# Patient Record
Sex: MALE | Race: WHITE | NOT HISPANIC OR LATINO | Employment: OTHER | URBAN - METROPOLITAN AREA
[De-identification: names, ages, dates, MRNs, and addresses within clinical notes are randomized per-mention and may not be internally consistent; named-entity substitution may affect disease eponyms.]

---

## 2018-09-10 ENCOUNTER — TRANSCRIBE ORDERS (OUTPATIENT)
Dept: ADMINISTRATIVE | Facility: HOSPITAL | Age: 68
End: 2018-09-10

## 2018-09-10 ENCOUNTER — TRANSCRIBE ORDERS (OUTPATIENT)
Dept: LAB | Facility: HOSPITAL | Age: 68
End: 2018-09-10

## 2018-09-10 ENCOUNTER — APPOINTMENT (OUTPATIENT)
Dept: LAB | Facility: HOSPITAL | Age: 68
End: 2018-09-10
Attending: INTERNAL MEDICINE
Payer: MEDICARE

## 2018-09-10 DIAGNOSIS — R07.89 CHEST PRESSURE: ICD-10-CM

## 2018-09-10 DIAGNOSIS — R20.0 NUMBNESS AND TINGLING: Primary | ICD-10-CM

## 2018-09-10 DIAGNOSIS — R07.89 OTHER CHEST PAIN: Primary | ICD-10-CM

## 2018-09-10 DIAGNOSIS — R20.2 NUMBNESS AND TINGLING: Primary | ICD-10-CM

## 2018-09-10 DIAGNOSIS — R07.89 OTHER CHEST PAIN: ICD-10-CM

## 2018-09-10 LAB
ALBUMIN SERPL BCP-MCNC: 3.8 G/DL (ref 3.5–5)
ALP SERPL-CCNC: 61 U/L (ref 46–116)
ALT SERPL W P-5'-P-CCNC: 25 U/L (ref 12–78)
ANION GAP SERPL CALCULATED.3IONS-SCNC: 6 MMOL/L (ref 4–13)
AST SERPL W P-5'-P-CCNC: 20 U/L (ref 5–45)
BILIRUB SERPL-MCNC: 1 MG/DL (ref 0.2–1)
BUN SERPL-MCNC: 18 MG/DL (ref 5–25)
CALCIUM SERPL-MCNC: 8.8 MG/DL (ref 8.3–10.1)
CHLORIDE SERPL-SCNC: 102 MMOL/L (ref 100–108)
CHOLEST SERPL-MCNC: 127 MG/DL (ref 50–200)
CO2 SERPL-SCNC: 28 MMOL/L (ref 21–32)
CREAT SERPL-MCNC: 1 MG/DL (ref 0.6–1.3)
GFR SERPL CREATININE-BSD FRML MDRD: 76 ML/MIN/1.73SQ M
GLUCOSE P FAST SERPL-MCNC: 86 MG/DL (ref 65–99)
HDLC SERPL-MCNC: 42 MG/DL (ref 40–60)
LDLC SERPL CALC-MCNC: 69 MG/DL (ref 0–100)
NONHDLC SERPL-MCNC: 85 MG/DL
POTASSIUM SERPL-SCNC: 4.4 MMOL/L (ref 3.5–5.3)
PROT SERPL-MCNC: 7.7 G/DL (ref 6.4–8.2)
SODIUM SERPL-SCNC: 136 MMOL/L (ref 136–145)
TRIGL SERPL-MCNC: 80 MG/DL
TROPONIN I SERPL-MCNC: <0.02 NG/ML

## 2018-09-10 PROCEDURE — 80061 LIPID PANEL: CPT

## 2018-09-10 PROCEDURE — 36415 COLL VENOUS BLD VENIPUNCTURE: CPT

## 2018-09-10 PROCEDURE — 84484 ASSAY OF TROPONIN QUANT: CPT

## 2018-09-10 PROCEDURE — 80053 COMPREHEN METABOLIC PANEL: CPT

## 2018-09-14 ENCOUNTER — TRANSCRIBE ORDERS (OUTPATIENT)
Dept: ADMINISTRATIVE | Facility: HOSPITAL | Age: 68
End: 2018-09-14

## 2018-09-14 ENCOUNTER — HOSPITAL ENCOUNTER (OUTPATIENT)
Dept: NON INVASIVE DIAGNOSTICS | Facility: CLINIC | Age: 68
Discharge: HOME | End: 2018-09-14
Payer: MEDICARE

## 2018-09-14 DIAGNOSIS — R20.2 NUMBNESS AND TINGLING: ICD-10-CM

## 2018-09-14 DIAGNOSIS — I25.10 CVD (CARDIOVASCULAR DISEASE): Primary | ICD-10-CM

## 2018-09-14 DIAGNOSIS — R07.89 CHEST PRESSURE: ICD-10-CM

## 2018-09-14 DIAGNOSIS — R20.0 NUMBNESS AND TINGLING: ICD-10-CM

## 2018-09-14 LAB
CHEST PAIN STATEMENT: NORMAL
MAX DIASTOLIC BP: 90 MMHG
MAX HEART RATE: 150 BPM
MAX PREDICTED HEART RATE: 150 BPM
MAX. SYSTOLIC BP: 190 MMHG
PROTOCOL NAME: NORMAL
REASON FOR TERMINATION: NORMAL
TARGET HR FORMULA: NORMAL
TEST INDICATION: NORMAL
TIME IN EXERCISE PHASE: NORMAL

## 2018-09-14 PROCEDURE — 93016 CV STRESS TEST SUPVJ ONLY: CPT | Performed by: INTERNAL MEDICINE

## 2018-09-14 PROCEDURE — 93018 CV STRESS TEST I&R ONLY: CPT | Performed by: INTERNAL MEDICINE

## 2018-09-14 PROCEDURE — 93017 CV STRESS TEST TRACING ONLY: CPT

## 2018-09-28 ENCOUNTER — HOSPITAL ENCOUNTER (OUTPATIENT)
Dept: NON INVASIVE DIAGNOSTICS | Facility: CLINIC | Age: 68
Discharge: HOME | End: 2018-09-28
Payer: MEDICARE

## 2018-09-28 DIAGNOSIS — I25.10 CVD (CARDIOVASCULAR DISEASE): ICD-10-CM

## 2018-09-28 PROCEDURE — 93018 CV STRESS TEST I&R ONLY: CPT | Performed by: INTERNAL MEDICINE

## 2018-09-28 PROCEDURE — 93016 CV STRESS TEST SUPVJ ONLY: CPT | Performed by: INTERNAL MEDICINE

## 2018-09-28 PROCEDURE — A9502 TC99M TETROFOSMIN: HCPCS

## 2018-09-28 PROCEDURE — 78452 HT MUSCLE IMAGE SPECT MULT: CPT

## 2018-09-28 PROCEDURE — 78452 HT MUSCLE IMAGE SPECT MULT: CPT | Performed by: INTERNAL MEDICINE

## 2018-09-28 PROCEDURE — 93017 CV STRESS TEST TRACING ONLY: CPT

## 2018-09-28 RX ADMIN — REGADENOSON 0.4 MG: 0.08 INJECTION, SOLUTION INTRAVENOUS at 14:12

## 2018-10-01 LAB
CHEST PAIN STATEMENT: NORMAL
MAX DIASTOLIC BP: 80 MMHG
MAX HEART RATE: 109 BPM
MAX PREDICTED HEART RATE: 150 BPM
MAX. SYSTOLIC BP: 144 MMHG
PROTOCOL NAME: NORMAL
REASON FOR TERMINATION: NORMAL
TARGET HR FORMULA: NORMAL
TEST INDICATION: NORMAL
TIME IN EXERCISE PHASE: NORMAL

## 2019-05-29 ENCOUNTER — HOSPITAL ENCOUNTER (OUTPATIENT)
Dept: RADIOLOGY | Facility: HOSPITAL | Age: 69
Discharge: HOME | End: 2019-05-29
Attending: ORTHOPAEDIC SURGERY
Payer: MEDICARE

## 2019-05-29 ENCOUNTER — HOSPITAL ENCOUNTER (OUTPATIENT)
Dept: RADIOLOGY | Facility: HOSPITAL | Age: 69
Discharge: HOME | End: 2019-05-29
Attending: ORTHOPAEDIC SURGERY

## 2019-05-29 VITALS
BODY MASS INDEX: 36.7 KG/M2 | HEIGHT: 74 IN | HEART RATE: 78 BPM | SYSTOLIC BLOOD PRESSURE: 159 MMHG | WEIGHT: 286 LBS | DIASTOLIC BLOOD PRESSURE: 92 MMHG

## 2019-05-29 DIAGNOSIS — M25.551 PAIN IN RIGHT HIP: ICD-10-CM

## 2019-05-29 DIAGNOSIS — M17.0 PRIMARY OSTEOARTHRITIS OF BOTH KNEES: ICD-10-CM

## 2019-05-29 DIAGNOSIS — M25.561 PAIN IN BOTH KNEES, UNSPECIFIED CHRONICITY: ICD-10-CM

## 2019-05-29 DIAGNOSIS — M25.562 PAIN IN BOTH KNEES, UNSPECIFIED CHRONICITY: ICD-10-CM

## 2019-05-29 DIAGNOSIS — M16.11 ARTHRITIS OF RIGHT HIP: Primary | ICD-10-CM

## 2019-05-29 DIAGNOSIS — M92.523 OSGOOD-SCHLATTER'S DISEASE OF BOTH KNEES: ICD-10-CM

## 2019-05-29 PROCEDURE — 73562 X-RAY EXAM OF KNEE 3: CPT

## 2019-05-29 PROCEDURE — 99214 OFFICE O/P EST MOD 30 MIN: CPT | Performed by: ORTHOPAEDIC SURGERY

## 2019-05-29 PROCEDURE — 73502 X-RAY EXAM HIP UNI 2-3 VIEWS: CPT

## 2019-05-29 RX ORDER — SODIUM CHLORIDE, SODIUM LACTATE, POTASSIUM CHLORIDE, CALCIUM CHLORIDE 600; 310; 30; 20 MG/100ML; MG/100ML; MG/100ML; MG/100ML
125 INJECTION, SOLUTION INTRAVENOUS CONTINUOUS
Status: CANCELLED | OUTPATIENT
Start: 2019-05-29

## 2019-05-29 RX ORDER — LEVOTHYROXINE SODIUM 0.15 MG/1
TABLET ORAL
COMMUNITY
Start: 2014-10-05 | End: 2021-02-27

## 2019-05-29 RX ORDER — ATORVASTATIN CALCIUM 10 MG/1
TABLET, FILM COATED ORAL
COMMUNITY
End: 2021-02-27

## 2019-05-29 RX ORDER — METRONIDAZOLE 7.5 MG/G
GEL TOPICAL
COMMUNITY
End: 2019-08-14 | Stop reason: HOSPADM

## 2019-05-29 RX ORDER — ASCORBIC ACID 500 MG
500 TABLET ORAL 2 TIMES DAILY
Qty: 60 TABLET | Refills: 0 | Status: SHIPPED | OUTPATIENT
Start: 2019-05-29 | End: 2019-08-14 | Stop reason: HOSPADM

## 2019-05-29 RX ORDER — FOLIC ACID 1 MG/1
1 TABLET ORAL DAILY
Qty: 30 TABLET | Refills: 0 | Status: SHIPPED | OUTPATIENT
Start: 2019-05-29 | End: 2019-08-14 | Stop reason: HOSPADM

## 2019-05-29 RX ORDER — GABAPENTIN 300 MG/1
300 CAPSULE ORAL ONCE
Status: CANCELLED | OUTPATIENT
Start: 2019-05-29 | End: 2019-05-29

## 2019-05-29 RX ORDER — FERROUS SULFATE TAB EC 324 MG (65 MG FE EQUIVALENT) 324 (65 FE) MG
324 TABLET DELAYED RESPONSE ORAL
Qty: 60 TABLET | Refills: 0 | Status: SHIPPED | OUTPATIENT
Start: 2019-05-29 | End: 2019-08-14 | Stop reason: HOSPADM

## 2019-05-29 RX ORDER — ACETAMINOPHEN 325 MG/1
975 TABLET ORAL ONCE
Status: CANCELLED | OUTPATIENT
Start: 2019-05-29 | End: 2019-05-29

## 2019-05-29 RX ORDER — CHLORHEXIDINE GLUCONATE 0.12 MG/ML
15 RINSE ORAL ONCE
Status: CANCELLED | OUTPATIENT
Start: 2019-05-29 | End: 2019-05-29

## 2019-07-01 ENCOUNTER — EVALUATION (OUTPATIENT)
Dept: PHYSICAL THERAPY | Facility: CLINIC | Age: 69
End: 2019-07-01
Payer: MEDICARE

## 2019-07-01 DIAGNOSIS — M16.11 ARTHRITIS OF RIGHT HIP: ICD-10-CM

## 2019-07-01 DIAGNOSIS — M25.551 PAIN IN RIGHT HIP: Primary | ICD-10-CM

## 2019-07-01 PROCEDURE — 97162 PT EVAL MOD COMPLEX 30 MIN: CPT | Performed by: PHYSICAL THERAPIST

## 2019-07-01 PROCEDURE — 97110 THERAPEUTIC EXERCISES: CPT | Performed by: PHYSICAL THERAPIST

## 2019-07-01 NOTE — PROGRESS NOTES
PT Evaluation     Today's date: 2019  Patient name: Scar Michaud  : 1950  MRN: [id number]  Referring provider: Hussein Simon MD  Dx:   Encounter Diagnosis     ICD-10-CM    1  Pain in right hip M25 551 Ambulatory referral to Physical Therapy   2  Arthritis of right hip M16 11 Ambulatory referral to Physical Therapy                  Assessment  Assessment details:  Discussed DOS and patients questions were answered to patients satisfaction  Mobility/ROM, Strength and Balance/Gait (including Timed Up & Go) per above  Virtual Home Assessment was reviewed with patient  Home Preparation Checklist was reviewed with patient including identification of care partner and encouragement of single level set-up  (He did not have home assessment with him but he will drop it off and reviwed the elements of this verballY)  Post-operative pain management expectations discussed to the patients satisfaction  Post-operative gait training for level ground, stairs, and car transfers was performed  Patient demonstrated competence with immediate post-operative home exercise program        Impairments: abnormal gait, abnormal or restricted ROM, activity intolerance, impaired balance, impaired physical strength, lacks appropriate home exercise program and pain with function  Understanding of Dx/Px/POC: good   Prognosis: good    Goals  ST-6 weeks  1  Patient to be independent with HEP  2   Decrease pain at least 2 subjective levels  LT-12 weeks  1    Patient to voice comfort with self management of condition  2   75% or > decreased pain  3   75% or > decreased functional deficits  4   Normalize AROM of all deficit planes  5   Normalize strength  7   Patient to voice understanding of activities/positions to avoid    9   Normalize Gait    Plan  Patient would benefit from: skilled PT  Referral necessary: No  Planned modality interventions: cryotherapy  Planned therapy interventions: IADL retraining, joint mobilization, manual therapy, motor coordination training, neuromuscular re-education, patient education, postural training, self care, strengthening, stretching, therapeutic activities, therapeutic exercise, home exercise program, flexibility, ADL training, balance and body mechanics training  Frequency: 2x week  Duration in weeks: 6  Treatment plan discussed with: patient        Subjective Evaluation    History of Present Illness  Onset date: 4 years ago  Mechanism of injury: Chief Complaint: R Hip pain    History:  Pt will be undergoing R PEGGY    In 2016 he fell into a split position  He has had some R groin pain since that time  He lives in a ranch home with steps into the basement where the laundry is  There are 2 small steps into his family room but no steps entering through the front door  There are assistance bars in his bathroom  He has a higher seat toilet  He has no AD but will be looking into this  He lives with his wife who will be able to drive post surgery   He is retired  Surgery is scheduled for 19    PMH: B knee OA, treated with injections with relief  Back pain (L4/5 Discectomy);       Aggravating factors: 2-3 blocks, gofling  Relieving factors: rest    Functional Deficits: golfing, walking    Patient Goals: return to golf    Quality of life: good    Pain  At best pain ratin  At worst pain ratin  Location: R Groin          Objective     Active Range of Motion     Right Hip   Flexion: 95 degrees   Abduction: 45 degrees   External rotation (90/90): 35 degrees   Internal rotation (90/90): 0 degrees     Passive Range of Motion     Right Hip   Abduction: 50 degrees   Internal rotation (90/90): 5 degrees     Strength/Myotome Testing     Right Hip   Planes of Motion   Flexion: 4  Abduction: 4  External rotation: 4  Internal rotation: 4    General Comments:      Hip Comments   Gait: No AD, Mild decreased R stance time  Stairs: Reciprocal, handrail, mild loss of eccentric control with lowering  TU 27 sec no AD  DL Squat 90 with UE support  SLS: 8 sec  Knee MMT       Flowsheet Rows      Most Recent Value   PT/OT G-Codes   Current Score  57   Projected Score  63             Precautions:  Total hip precautions    Manual              PROM Abduction                                                                     Exercise Diary              HEP 10'                         Bike             GT with RW             SLR (DENNIS)             Heel slides             LAQ             Q/Glut Sets             Stand Hip 3 way                                                                                                                                                                Modalities              CP             TENS prn

## 2019-07-08 ENCOUNTER — APPOINTMENT (OUTPATIENT)
Dept: LAB | Facility: HOSPITAL | Age: 69
End: 2019-07-08
Payer: MEDICARE

## 2019-07-08 ENCOUNTER — HOSPITAL ENCOUNTER (OUTPATIENT)
Dept: RADIOLOGY | Facility: HOSPITAL | Age: 69
Discharge: HOME | End: 2019-07-08
Payer: MEDICARE

## 2019-07-08 DIAGNOSIS — M25.551 PAIN IN RIGHT HIP: ICD-10-CM

## 2019-07-08 DIAGNOSIS — M16.11 ARTHRITIS OF RIGHT HIP: ICD-10-CM

## 2019-07-08 LAB
ABO GROUP BLD: NORMAL
ALBUMIN SERPL BCP-MCNC: 3.9 G/DL (ref 3.5–5)
ALP SERPL-CCNC: 61 U/L (ref 46–116)
ALT SERPL W P-5'-P-CCNC: 27 U/L (ref 12–78)
ANION GAP SERPL CALCULATED.3IONS-SCNC: 8 MMOL/L (ref 4–13)
APTT PPP: 33 SECONDS (ref 23–37)
AST SERPL W P-5'-P-CCNC: 25 U/L (ref 5–45)
ATRIAL RATE: 71 BPM
BASOPHILS # BLD AUTO: 0.09 THOUSANDS/ΜL (ref 0–0.1)
BASOPHILS NFR BLD AUTO: 2 % (ref 0–1)
BILIRUB SERPL-MCNC: 1.08 MG/DL (ref 0.2–1)
BLD GP AB SCN SERPL QL: NEGATIVE
BUN SERPL-MCNC: 19 MG/DL (ref 5–25)
CALCIUM SERPL-MCNC: 8.9 MG/DL (ref 8.3–10.1)
CHLORIDE SERPL-SCNC: 106 MMOL/L (ref 100–108)
CO2 SERPL-SCNC: 26 MMOL/L (ref 21–32)
CREAT SERPL-MCNC: 0.97 MG/DL (ref 0.6–1.3)
CRP SERPL QL: <3 MG/L
EOSINOPHIL # BLD AUTO: 0.15 THOUSAND/ΜL (ref 0–0.61)
EOSINOPHIL NFR BLD AUTO: 3 % (ref 0–6)
ERYTHROCYTE [DISTWIDTH] IN BLOOD BY AUTOMATED COUNT: 13.2 % (ref 11.6–15.1)
EST. AVERAGE GLUCOSE BLD GHB EST-MCNC: 120 MG/DL
FERRITIN SERPL-MCNC: 166 NG/ML (ref 8–388)
GFR SERPL CREATININE-BSD FRML MDRD: 79 ML/MIN/1.73SQ M
GLUCOSE P FAST SERPL-MCNC: 90 MG/DL (ref 65–99)
HBA1C MFR BLD: 5.8 % (ref 4.2–6.3)
HCT VFR BLD AUTO: 50.5 % (ref 36.5–49.3)
HGB BLD-MCNC: 16.8 G/DL (ref 12–17)
IMM GRANULOCYTES # BLD AUTO: 0.01 THOUSAND/UL (ref 0–0.2)
IMM GRANULOCYTES NFR BLD AUTO: 0 % (ref 0–2)
INR PPP: 1.06 (ref 0.84–1.19)
IRON SATN MFR SERPL: 38 %
IRON SERPL-MCNC: 126 UG/DL (ref 65–175)
LYMPHOCYTES # BLD AUTO: 1.16 THOUSANDS/ΜL (ref 0.6–4.47)
LYMPHOCYTES NFR BLD AUTO: 24 % (ref 14–44)
MCH RBC QN AUTO: 30.7 PG (ref 26.8–34.3)
MCHC RBC AUTO-ENTMCNC: 33.3 G/DL (ref 31.4–37.4)
MCV RBC AUTO: 92 FL (ref 82–98)
MONOCYTES # BLD AUTO: 0.44 THOUSAND/ΜL (ref 0.17–1.22)
MONOCYTES NFR BLD AUTO: 9 % (ref 4–12)
NEUTROPHILS # BLD AUTO: 3.02 THOUSANDS/ΜL (ref 1.85–7.62)
NEUTS SEG NFR BLD AUTO: 62 % (ref 43–75)
NRBC BLD AUTO-RTO: 0 /100 WBCS
P AXIS: 16 DEGREES
PLATELET # BLD AUTO: 195 THOUSANDS/UL (ref 149–390)
PMV BLD AUTO: 11.8 FL (ref 8.9–12.7)
POTASSIUM SERPL-SCNC: 4.3 MMOL/L (ref 3.5–5.3)
PR INTERVAL: 160 MS
PROT SERPL-MCNC: 7.8 G/DL (ref 6.4–8.2)
PROTHROMBIN TIME: 13.4 SECONDS (ref 11.6–14.5)
QRS AXIS: 22 DEGREES
QRSD INTERVAL: 94 MS
QT INTERVAL: 398 MS
QTC INTERVAL: 432 MS
RBC # BLD AUTO: 5.48 MILLION/UL (ref 3.88–5.62)
RH BLD: POSITIVE
SODIUM SERPL-SCNC: 140 MMOL/L (ref 136–145)
SPECIMEN EXPIRATION DATE: NORMAL
T WAVE AXIS: 25 DEGREES
TIBC SERPL-MCNC: 335 UG/DL (ref 250–450)
VENTRICULAR RATE: 71 BPM
WBC # BLD AUTO: 4.87 THOUSAND/UL (ref 4.31–10.16)

## 2019-07-08 PROCEDURE — 80053 COMPREHEN METABOLIC PANEL: CPT

## 2019-07-08 PROCEDURE — 93005 ELECTROCARDIOGRAM TRACING: CPT

## 2019-07-08 PROCEDURE — 85025 COMPLETE CBC W/AUTO DIFF WBC: CPT

## 2019-07-08 PROCEDURE — 83036 HEMOGLOBIN GLYCOSYLATED A1C: CPT

## 2019-07-08 PROCEDURE — 86901 BLOOD TYPING SEROLOGIC RH(D): CPT

## 2019-07-08 PROCEDURE — 86140 C-REACTIVE PROTEIN: CPT

## 2019-07-08 PROCEDURE — 83540 ASSAY OF IRON: CPT

## 2019-07-08 PROCEDURE — 86850 RBC ANTIBODY SCREEN: CPT

## 2019-07-08 PROCEDURE — 36415 COLL VENOUS BLD VENIPUNCTURE: CPT

## 2019-07-08 PROCEDURE — 82728 ASSAY OF FERRITIN: CPT

## 2019-07-08 PROCEDURE — 85730 THROMBOPLASTIN TIME PARTIAL: CPT

## 2019-07-08 PROCEDURE — 71046 X-RAY EXAM CHEST 2 VIEWS: CPT

## 2019-07-08 PROCEDURE — 93010 ELECTROCARDIOGRAM REPORT: CPT | Performed by: INTERNAL MEDICINE

## 2019-07-08 PROCEDURE — 83550 IRON BINDING TEST: CPT

## 2019-07-08 PROCEDURE — 85610 PROTHROMBIN TIME: CPT

## 2019-07-08 PROCEDURE — 86900 BLOOD TYPING SEROLOGIC ABO: CPT

## 2019-07-09 NOTE — PRE-PROCEDURE INSTRUCTIONS
Pre-Surgery Instructions:   Medication Instructions    atorvastatin (LIPITOR) 10 mg tablet epic    ferrous sulfate 324 (65 Fe) mg Patient was instructed by Physician and understands   folic acid (FOLVITE) 1 mg tablet Patient was instructed by Physician and understands   levothyroxine 150 mcg tablet epic    metroNIDAZOLE (METROGEL) 0 75 % gel Instructed patient per Anesthesia Guidelines   Naproxen Sod-diphenhydrAMINE 220-25 MG TABS Instructed patient per Anesthesia Guidelines   Omega-3 Fatty Acids (FISH OIL PO) Instructed patient per Anesthesia Guidelines   TURMERIC PO Instructed patient per Anesthesia Guidelines  Education Index     Med Instructions Troubleshoot   Herbal Med Class     Stop taking this herbal medications at least one week prior to surgery/procedure  Statin Med Class     Continue to take this medication on your normal schedule  If this is an oral medication and you take it in the morning, then you may take this medicine with a sip of water  Thyroxine Med Class     Continue to take this medication on your normal schedule  If this is an oral medication and you take it in the morning, then you may take this medicine with a sip of water  Pre procedure instructions reviewed with wife present, verbalizes understanding  All questions answered  All ortho vitamins started, confirmed Lovenox for post procedure

## 2019-07-15 ENCOUNTER — APPOINTMENT (OUTPATIENT)
Dept: PHYSICAL THERAPY | Facility: CLINIC | Age: 69
End: 2019-07-15
Payer: MEDICARE

## 2019-07-16 ENCOUNTER — TELEPHONE (OUTPATIENT)
Dept: OBGYN CLINIC | Facility: HOSPITAL | Age: 69
End: 2019-07-16

## 2019-07-16 NOTE — TELEPHONE ENCOUNTER
Caller: Dr Javan Baez Office  Call Back number: [phone]  Provider: Dr Vega Never    Dr Josefina Velez called and like to ask for Clearance Form be faxed to their office at [phone]      Please advise, thank you

## 2019-07-18 ENCOUNTER — TELEPHONE (OUTPATIENT)
Dept: OBGYN CLINIC | Facility: HOSPITAL | Age: 69
End: 2019-07-18

## 2019-07-18 NOTE — TELEPHONE ENCOUNTER
Preoperative Elective Admission Assessment       Living Situation: Pt reports living at home with his wifeTang  Home Layout: Ranch style home with 4 steps to enter                      Steps: 4 to enter    First Floor Setup: Yes    Post-op Caregiver:WifeTang    Post-op Transport: Tang Gordon  Outpatient Physical Therapy Site: [facility]     DME: Pt denies cane, RW or BSC     Patient's Current Level of Function: Independent with ADLS and Ambulation    Medication Management: Pt reports self managing medications removing them daily from the bottle                     Preferred Pharmacy: [facility]                    Blood Management Vitamins: Pt reports taking folic acid, vitamin C and Iron daily                  Post-op anticoagulant: Pt reports it is at pharmacy, and is aware he will  before surgery  DC Plan:Home with outpatient                    Barriers to DC identified preoperatively:     BMI:36 72    [employer]: Enrolled on 7/18                    RAPT:10                    ACE/ARB Form:GFR>60                    HOOS/KOOS:52 965    Patient Education: Pt educated on post op pain, early mobilization (POD0), indication/use of incentive spirometer (10x/hr while awake), and indication for/use of foot/leg pumps  pt encouraged to call me with questions, concerns or issues

## 2019-07-22 ENCOUNTER — OFFICE VISIT (OUTPATIENT)
Dept: PODIATRY | Facility: CLINIC | Age: 69
End: 2019-07-22

## 2019-07-22 VITALS
HEART RATE: 71 BPM | DIASTOLIC BLOOD PRESSURE: 87 MMHG | BODY MASS INDEX: 37.19 KG/M2 | WEIGHT: 280.6 LBS | SYSTOLIC BLOOD PRESSURE: 139 MMHG | HEIGHT: 73 IN

## 2019-07-22 DIAGNOSIS — L60.3 NAIL DYSTROPHY: Primary | ICD-10-CM

## 2019-07-22 PROCEDURE — NCFTCARE PR NON-COVERED FOOT CARE: Performed by: PODIATRIST

## 2019-07-22 NOTE — PROGRESS NOTES
Patient presents for palliative nail care  Patient is having right hip replacement in early August   He desires to have his toenails trimmed properly before this procedure  He is concerned that he could have an ingrown toenail without this palliative care  No ingrown nail pain noted today  Pedal pulses are within normal limits  All toenails are elongated  Treatment consisted of nail trimming

## 2019-07-30 RX ORDER — ACETAMINOPHEN 325 MG/1
650 TABLET ORAL EVERY 6 HOURS PRN
COMMUNITY
End: 2020-10-12

## 2019-07-30 RX ORDER — DOCUSATE SODIUM 100 MG/1
100 CAPSULE, LIQUID FILLED ORAL 2 TIMES DAILY
Status: ON HOLD | COMMUNITY
End: 2019-08-14 | Stop reason: SDUPTHER

## 2019-08-04 ENCOUNTER — ANESTHESIA EVENT (OUTPATIENT)
Dept: PERIOP | Facility: HOSPITAL | Age: 69
DRG: 470 | End: 2019-08-04
Payer: MEDICARE

## 2019-08-05 ENCOUNTER — ANESTHESIA (OUTPATIENT)
Dept: PERIOP | Facility: HOSPITAL | Age: 69
DRG: 470 | End: 2019-08-05
Payer: MEDICARE

## 2019-08-05 ENCOUNTER — HOSPITAL ENCOUNTER (INPATIENT)
Facility: HOSPITAL | Age: 69
LOS: 3 days | DRG: 470 | End: 2019-08-08
Attending: ORTHOPAEDIC SURGERY | Admitting: ORTHOPAEDIC SURGERY
Payer: MEDICARE

## 2019-08-05 DIAGNOSIS — Z96.641 STATUS POST TOTAL REPLACEMENT OF RIGHT HIP: ICD-10-CM

## 2019-08-05 DIAGNOSIS — R45.89 DEPRESSED MOOD: ICD-10-CM

## 2019-08-05 DIAGNOSIS — M16.11 ARTHRITIS OF RIGHT HIP: Primary | ICD-10-CM

## 2019-08-05 PROCEDURE — 97163 PT EVAL HIGH COMPLEX 45 MIN: CPT

## 2019-08-05 PROCEDURE — G8978 MOBILITY CURRENT STATUS: HCPCS

## 2019-08-05 PROCEDURE — 0SR902A REPLACEMENT OF RIGHT HIP JOINT WITH METAL ON POLYETHYLENE SYNTHETIC SUBSTITUTE, UNCEMENTED, OPEN APPROACH: ICD-10-PCS | Performed by: ORTHOPAEDIC SURGERY

## 2019-08-05 PROCEDURE — C1776 JOINT DEVICE (IMPLANTABLE): HCPCS | Performed by: ORTHOPAEDIC SURGERY

## 2019-08-05 PROCEDURE — 27130 TOTAL HIP ARTHROPLASTY: CPT | Performed by: ORTHOPAEDIC SURGERY

## 2019-08-05 PROCEDURE — 99024 POSTOP FOLLOW-UP VISIT: CPT | Performed by: ORTHOPAEDIC SURGERY

## 2019-08-05 PROCEDURE — C1713 ANCHOR/SCREW BN/BN,TIS/BN: HCPCS | Performed by: ORTHOPAEDIC SURGERY

## 2019-08-05 PROCEDURE — G8979 MOBILITY GOAL STATUS: HCPCS

## 2019-08-05 DEVICE — PINNACLE HIP SOLUTIONS ALTRX LD POLYETHYLENE ACETABULAR LINER +4 10 DEGREE 40MM ID 56MM OD
Type: IMPLANTABLE DEVICE | Site: HIP | Status: FUNCTIONAL
Brand: PINNACLE ALTRX

## 2019-08-05 DEVICE — PINNACLE POROCOAT ACETABULAR SHELL SECTOR II 56MM OD
Type: IMPLANTABLE DEVICE | Site: HIP | Status: FUNCTIONAL
Brand: PINNACLE POROCOAT

## 2019-08-05 DEVICE — M-SPEC METAL FEMORAL HEAD 12/14 TAPER DIAMETER 40MM +8.5 OFFSET: Type: IMPLANTABLE DEVICE | Site: HIP | Status: FUNCTIONAL

## 2019-08-05 DEVICE — CORAIL HIP SYSTEM CEMENTLESS FEMORAL STEM 12/14 AMT 135 DEGREES KHO SIZE 14 HA COATED HIGH OFFSET NO COLLAR
Type: IMPLANTABLE DEVICE | Site: HIP | Status: FUNCTIONAL
Brand: CORAIL

## 2019-08-05 DEVICE — PINNACLE CANCELLOUS BONE SCREW 6.5MM X 30MM
Type: IMPLANTABLE DEVICE | Site: HIP | Status: FUNCTIONAL
Brand: PINNACLE

## 2019-08-05 RX ORDER — HYDROMORPHONE HCL/PF 1 MG/ML
0.2 SYRINGE (ML) INJECTION
Status: DISCONTINUED | OUTPATIENT
Start: 2019-08-05 | End: 2019-08-05 | Stop reason: HOSPADM

## 2019-08-05 RX ORDER — MIDAZOLAM HYDROCHLORIDE 1 MG/ML
INJECTION INTRAMUSCULAR; INTRAVENOUS AS NEEDED
Status: DISCONTINUED | OUTPATIENT
Start: 2019-08-05 | End: 2019-08-05 | Stop reason: SURG

## 2019-08-05 RX ORDER — ONDANSETRON 2 MG/ML
4 INJECTION INTRAMUSCULAR; INTRAVENOUS EVERY 6 HOURS PRN
Status: DISCONTINUED | OUTPATIENT
Start: 2019-08-05 | End: 2019-08-08 | Stop reason: HOSPADM

## 2019-08-05 RX ORDER — DOCUSATE SODIUM 100 MG/1
100 CAPSULE, LIQUID FILLED ORAL 2 TIMES DAILY
Status: DISCONTINUED | OUTPATIENT
Start: 2019-08-05 | End: 2019-08-08 | Stop reason: HOSPADM

## 2019-08-05 RX ORDER — CHLORHEXIDINE GLUCONATE 0.12 MG/ML
15 RINSE ORAL ONCE
Status: DISCONTINUED | OUTPATIENT
Start: 2019-08-05 | End: 2019-08-08 | Stop reason: HOSPADM

## 2019-08-05 RX ORDER — DEXAMETHASONE SODIUM PHOSPHATE 4 MG/ML
8 INJECTION, SOLUTION INTRA-ARTICULAR; INTRALESIONAL; INTRAMUSCULAR; INTRAVENOUS; SOFT TISSUE ONCE AS NEEDED
Status: DISCONTINUED | OUTPATIENT
Start: 2019-08-05 | End: 2019-08-05 | Stop reason: HOSPADM

## 2019-08-05 RX ORDER — ATORVASTATIN CALCIUM 10 MG/1
10 TABLET, FILM COATED ORAL
Status: DISCONTINUED | OUTPATIENT
Start: 2019-08-05 | End: 2019-08-08 | Stop reason: HOSPADM

## 2019-08-05 RX ORDER — MAGNESIUM HYDROXIDE 1200 MG/15ML
LIQUID ORAL AS NEEDED
Status: DISCONTINUED | OUTPATIENT
Start: 2019-08-05 | End: 2019-08-05 | Stop reason: HOSPADM

## 2019-08-05 RX ORDER — SODIUM CHLORIDE, SODIUM LACTATE, POTASSIUM CHLORIDE, CALCIUM CHLORIDE 600; 310; 30; 20 MG/100ML; MG/100ML; MG/100ML; MG/100ML
125 INJECTION, SOLUTION INTRAVENOUS CONTINUOUS
Status: DISCONTINUED | OUTPATIENT
Start: 2019-08-05 | End: 2019-08-08 | Stop reason: HOSPADM

## 2019-08-05 RX ORDER — LEVOTHYROXINE SODIUM 0.07 MG/1
150 TABLET ORAL
Status: DISCONTINUED | OUTPATIENT
Start: 2019-08-06 | End: 2019-08-08 | Stop reason: HOSPADM

## 2019-08-05 RX ORDER — LIDOCAINE HYDROCHLORIDE 10 MG/ML
0.5 INJECTION, SOLUTION EPIDURAL; INFILTRATION; INTRACAUDAL; PERINEURAL ONCE AS NEEDED
Status: COMPLETED | OUTPATIENT
Start: 2019-08-05 | End: 2019-08-05

## 2019-08-05 RX ORDER — GABAPENTIN 300 MG/1
300 CAPSULE ORAL ONCE
Status: COMPLETED | OUTPATIENT
Start: 2019-08-05 | End: 2019-08-05

## 2019-08-05 RX ORDER — SODIUM CHLORIDE, SODIUM LACTATE, POTASSIUM CHLORIDE, CALCIUM CHLORIDE 600; 310; 30; 20 MG/100ML; MG/100ML; MG/100ML; MG/100ML
INJECTION, SOLUTION INTRAVENOUS CONTINUOUS PRN
Status: DISCONTINUED | OUTPATIENT
Start: 2019-08-05 | End: 2019-08-05 | Stop reason: SURG

## 2019-08-05 RX ORDER — ALBUMIN, HUMAN INJ 5% 5 %
25 SOLUTION INTRAVENOUS ONCE
Status: COMPLETED | OUTPATIENT
Start: 2019-08-05 | End: 2019-08-05

## 2019-08-05 RX ORDER — ACETAMINOPHEN 325 MG/1
975 TABLET ORAL ONCE
Status: COMPLETED | OUTPATIENT
Start: 2019-08-05 | End: 2019-08-05

## 2019-08-05 RX ORDER — ONDANSETRON 2 MG/ML
4 INJECTION INTRAMUSCULAR; INTRAVENOUS ONCE AS NEEDED
Status: DISCONTINUED | OUTPATIENT
Start: 2019-08-05 | End: 2019-08-05 | Stop reason: HOSPADM

## 2019-08-05 RX ORDER — SODIUM CHLORIDE, SODIUM LACTATE, POTASSIUM CHLORIDE, CALCIUM CHLORIDE 600; 310; 30; 20 MG/100ML; MG/100ML; MG/100ML; MG/100ML
75 INJECTION, SOLUTION INTRAVENOUS CONTINUOUS
Status: DISCONTINUED | OUTPATIENT
Start: 2019-08-05 | End: 2019-08-08 | Stop reason: HOSPADM

## 2019-08-05 RX ORDER — ATORVASTATIN CALCIUM 10 MG/1
10 TABLET, FILM COATED ORAL
Status: DISCONTINUED | OUTPATIENT
Start: 2019-08-05 | End: 2019-08-05

## 2019-08-05 RX ORDER — OXYCODONE HYDROCHLORIDE 10 MG/1
10 TABLET ORAL EVERY 4 HOURS PRN
Status: DISCONTINUED | OUTPATIENT
Start: 2019-08-05 | End: 2019-08-08 | Stop reason: HOSPADM

## 2019-08-05 RX ORDER — CEFAZOLIN SODIUM 1 G/50ML
1000 SOLUTION INTRAVENOUS ONCE
Status: DISCONTINUED | OUTPATIENT
Start: 2019-08-05 | End: 2019-08-08 | Stop reason: HOSPADM

## 2019-08-05 RX ORDER — SENNOSIDES 8.6 MG
1 TABLET ORAL DAILY
Status: DISCONTINUED | OUTPATIENT
Start: 2019-08-06 | End: 2019-08-08 | Stop reason: HOSPADM

## 2019-08-05 RX ORDER — CEFAZOLIN SODIUM 2 G/50ML
2000 SOLUTION INTRAVENOUS ONCE
Status: DISCONTINUED | OUTPATIENT
Start: 2019-08-05 | End: 2019-08-08 | Stop reason: HOSPADM

## 2019-08-05 RX ORDER — EPHEDRINE SULFATE 50 MG/ML
INJECTION INTRAVENOUS AS NEEDED
Status: DISCONTINUED | OUTPATIENT
Start: 2019-08-05 | End: 2019-08-05 | Stop reason: SURG

## 2019-08-05 RX ORDER — FENTANYL CITRATE/PF 50 MCG/ML
12.5 SYRINGE (ML) INJECTION
Status: COMPLETED | OUTPATIENT
Start: 2019-08-05 | End: 2019-08-05

## 2019-08-05 RX ORDER — OXYCODONE HYDROCHLORIDE 5 MG/1
TABLET ORAL
Qty: 30 TABLET | Refills: 0 | Status: SHIPPED | OUTPATIENT
Start: 2019-08-05 | End: 2019-08-14 | Stop reason: HOSPADM

## 2019-08-05 RX ORDER — CEFAZOLIN SODIUM 2 G/50ML
2000 SOLUTION INTRAVENOUS EVERY 8 HOURS
Status: COMPLETED | OUTPATIENT
Start: 2019-08-05 | End: 2019-08-06

## 2019-08-05 RX ORDER — BUPIVACAINE HYDROCHLORIDE 7.5 MG/ML
INJECTION, SOLUTION INTRASPINAL AS NEEDED
Status: DISCONTINUED | OUTPATIENT
Start: 2019-08-05 | End: 2019-08-05 | Stop reason: SURG

## 2019-08-05 RX ORDER — CALCIUM CARBONATE 200(500)MG
1000 TABLET,CHEWABLE ORAL DAILY PRN
Status: DISCONTINUED | OUTPATIENT
Start: 2019-08-05 | End: 2019-08-08 | Stop reason: HOSPADM

## 2019-08-05 RX ORDER — OXYCODONE HYDROCHLORIDE 5 MG/1
5 TABLET ORAL EVERY 4 HOURS PRN
Status: DISCONTINUED | OUTPATIENT
Start: 2019-08-05 | End: 2019-08-08 | Stop reason: HOSPADM

## 2019-08-05 RX ORDER — PROPOFOL 10 MG/ML
INJECTION, EMULSION INTRAVENOUS CONTINUOUS PRN
Status: DISCONTINUED | OUTPATIENT
Start: 2019-08-05 | End: 2019-08-05 | Stop reason: SURG

## 2019-08-05 RX ORDER — ACETAMINOPHEN 325 MG/1
650 TABLET ORAL EVERY 6 HOURS PRN
Status: DISCONTINUED | OUTPATIENT
Start: 2019-08-05 | End: 2019-08-08 | Stop reason: HOSPADM

## 2019-08-05 RX ADMIN — LIDOCAINE HYDROCHLORIDE 0.5 ML: 10 INJECTION, SOLUTION EPIDURAL; INFILTRATION; INTRACAUDAL; PERINEURAL at 09:20

## 2019-08-05 RX ADMIN — PHENYLEPHRINE HYDROCHLORIDE 100 MCG/MIN: 10 INJECTION INTRAVENOUS at 12:52

## 2019-08-05 RX ADMIN — OXYCODONE HYDROCHLORIDE 10 MG: 10 TABLET ORAL at 17:43

## 2019-08-05 RX ADMIN — MIDAZOLAM 1 MG: 1 INJECTION INTRAMUSCULAR; INTRAVENOUS at 12:35

## 2019-08-05 RX ADMIN — FENTANYL CITRATE 12.5 MCG: 50 INJECTION, SOLUTION INTRAMUSCULAR; INTRAVENOUS at 15:23

## 2019-08-05 RX ADMIN — OXYCODONE HYDROCHLORIDE 10 MG: 10 TABLET ORAL at 23:18

## 2019-08-05 RX ADMIN — GABAPENTIN 300 MG: 300 CAPSULE ORAL at 09:02

## 2019-08-05 RX ADMIN — FENTANYL CITRATE 12.5 MCG: 50 INJECTION, SOLUTION INTRAMUSCULAR; INTRAVENOUS at 15:29

## 2019-08-05 RX ADMIN — SODIUM CHLORIDE, SODIUM LACTATE, POTASSIUM CHLORIDE, AND CALCIUM CHLORIDE 125 ML/HR: .6; .31; .03; .02 INJECTION, SOLUTION INTRAVENOUS at 15:36

## 2019-08-05 RX ADMIN — SODIUM CHLORIDE, SODIUM LACTATE, POTASSIUM CHLORIDE, AND CALCIUM CHLORIDE: .6; .31; .03; .02 INJECTION, SOLUTION INTRAVENOUS at 09:20

## 2019-08-05 RX ADMIN — FENTANYL CITRATE 12.5 MCG: 50 INJECTION, SOLUTION INTRAMUSCULAR; INTRAVENOUS at 15:07

## 2019-08-05 RX ADMIN — Medication 3000 MG: at 12:38

## 2019-08-05 RX ADMIN — FENTANYL CITRATE 12.5 MCG: 50 INJECTION, SOLUTION INTRAMUSCULAR; INTRAVENOUS at 15:10

## 2019-08-05 RX ADMIN — SODIUM CHLORIDE, SODIUM LACTATE, POTASSIUM CHLORIDE, AND CALCIUM CHLORIDE 125 ML/HR: .6; .31; .03; .02 INJECTION, SOLUTION INTRAVENOUS at 14:41

## 2019-08-05 RX ADMIN — PHENYLEPHRINE HYDROCHLORIDE 200 MCG: 10 INJECTION INTRAVENOUS at 12:44

## 2019-08-05 RX ADMIN — FENTANYL CITRATE 12.5 MCG: 50 INJECTION, SOLUTION INTRAMUSCULAR; INTRAVENOUS at 15:39

## 2019-08-05 RX ADMIN — ALBUMIN (HUMAN) 25 G: 12.5 SOLUTION INTRAVENOUS at 14:17

## 2019-08-05 RX ADMIN — BUPIVACAINE HYDROCHLORIDE IN DEXTROSE 1.8 ML: 7.5 INJECTION, SOLUTION SUBARACHNOID at 12:30

## 2019-08-05 RX ADMIN — DOCUSATE SODIUM 100 MG: 100 CAPSULE, LIQUID FILLED ORAL at 17:41

## 2019-08-05 RX ADMIN — ATORVASTATIN CALCIUM 10 MG: 10 TABLET, FILM COATED ORAL at 17:43

## 2019-08-05 RX ADMIN — MORPHINE SULFATE 2 MG: 2 INJECTION, SOLUTION INTRAMUSCULAR; INTRAVENOUS at 19:15

## 2019-08-05 RX ADMIN — FENTANYL CITRATE 12.5 MCG: 50 INJECTION, SOLUTION INTRAMUSCULAR; INTRAVENOUS at 15:33

## 2019-08-05 RX ADMIN — FENTANYL CITRATE 12.5 MCG: 50 INJECTION, SOLUTION INTRAMUSCULAR; INTRAVENOUS at 15:26

## 2019-08-05 RX ADMIN — EPHEDRINE SULFATE 20 MG: 50 INJECTION, SOLUTION INTRAVENOUS at 12:42

## 2019-08-05 RX ADMIN — PROPOFOL 50 MCG/KG/MIN: 10 INJECTION, EMULSION INTRAVENOUS at 12:31

## 2019-08-05 RX ADMIN — CEFAZOLIN SODIUM 2000 MG: 2 SOLUTION INTRAVENOUS at 22:11

## 2019-08-05 RX ADMIN — SODIUM CHLORIDE, SODIUM LACTATE, POTASSIUM CHLORIDE, AND CALCIUM CHLORIDE: .6; .31; .03; .02 INJECTION, SOLUTION INTRAVENOUS at 13:07

## 2019-08-05 RX ADMIN — ACETAMINOPHEN 975 MG: 325 TABLET ORAL at 09:02

## 2019-08-05 RX ADMIN — ALBUMIN (HUMAN) 25 G: 12.5 SOLUTION INTRAVENOUS at 15:25

## 2019-08-05 RX ADMIN — SODIUM CHLORIDE, SODIUM LACTATE, POTASSIUM CHLORIDE, AND CALCIUM CHLORIDE 125 ML/HR: .6; .31; .03; .02 INJECTION, SOLUTION INTRAVENOUS at 15:02

## 2019-08-05 RX ADMIN — SODIUM CHLORIDE, SODIUM LACTATE, POTASSIUM CHLORIDE, AND CALCIUM CHLORIDE 125 ML/HR: .6; .31; .03; .02 INJECTION, SOLUTION INTRAVENOUS at 09:20

## 2019-08-05 RX ADMIN — CALCIUM CARBONATE (ANTACID) CHEW TAB 500 MG 1000 MG: 500 CHEW TAB at 19:54

## 2019-08-05 RX ADMIN — MIDAZOLAM 1 MG: 1 INJECTION INTRAMUSCULAR; INTRAVENOUS at 12:22

## 2019-08-05 RX ADMIN — PHENYLEPHRINE HYDROCHLORIDE 200 MCG: 10 INJECTION INTRAVENOUS at 12:51

## 2019-08-05 RX ADMIN — FENTANYL CITRATE 12.5 MCG: 50 INJECTION, SOLUTION INTRAMUSCULAR; INTRAVENOUS at 15:36

## 2019-08-05 NOTE — CONSULTS
Consultation - Lydia Smith 68 y o  male MRN: [id number]    Unit/Bed#: OR [place] Encounter: [id number]        History of Present Illness     HPI: Lydia Smith is a 68 y o  male, with PMH of hyperlipidemia and hypothyroidism, who presents for an elective Rt PEGGY by  Dr Peter Wheeler  He had failed conservative treatment  Pt had minimal EBL intraop  He was hypotensive in PACU with a BP of 52/31  He did receive albumin  Pt currently denies complaints  ROS:  Constitutional: Negative  HENT: Negative  Respiratory: Negative  Cardiovascular: Negative  Gastrointestinal: Negative  Musculoskeletal: Negative  Neurological: Negative  Psychiatric/Behavioral: Negative          Historical Information   Past Medical History:   Diagnosis Date    Hyperthyroidism     Osteoarthritis     last assesed 6-6-16    Polyneuropathy     last assesed 5-8-17    Pure hypercholesterolemia     Wears glasses      Past Surgical History:   Procedure Laterality Date    BACK SURGERY      CHOLECYSTECTOMY      TONSILLECTOMY       Social History   Social History     Substance and Sexual Activity   Alcohol Use Not on file     Social History     Substance and Sexual Activity   Drug Use Not on file     Social History     Tobacco Use   Smoking Status Former Smoker   Smokeless Tobacco Never Used     Family History   Problem Relation Age of Onset    Arthritis Other        Meds/Allergies   current meds:  Current Facility-Administered Medications   Medication Dose Route Frequency    atorvastatin (LIPITOR) tablet 10 mg  10 mg Oral Daily With Dinner    ceFAZolin (ANCEF) IVPB (premix) 2,000 mg  2,000 mg Intravenous Once    And    ceFAZolin (ANCEF) IVPB (premix) 1,000 mg  1,000 mg Intravenous Once    chlorhexidine (PERIDEX) 0 12 % oral rinse 15 mL  15 mL Swish & Spit Once    dexamethasone (DECADRON) injection 8 mg  8 mg Intravenous Once PRN    fentaNYL (SUBLIMAZE) injection 12 5 mcg  12 5 mcg Intravenous Q3 min PRN    HYDROmorphone (DILAUDID) injection 0 2 mg  0 2 mg Intravenous Q5 Min PRN    lactated ringers infusion  75 mL/hr Intravenous Continuous    lactated ringers infusion  125 mL/hr Intravenous Continuous    [START ON 8/6/2019] levothyroxine tablet 150 mcg  150 mcg Oral Early Morning    ondansetron (ZOFRAN) injection 4 mg  4 mg Intravenous Once PRN       PTA meds:   Medications Prior to Admission   Medication    acetaminophen (TYLENOL) 325 mg tablet    atorvastatin (LIPITOR) 10 mg tablet    docusate sodium (COLACE) 100 mg capsule    ferrous sulfate 324 (65 Fe) mg    folic acid (FOLVITE) 1 mg tablet    levothyroxine 150 mcg tablet    Naproxen Sod-diphenhydrAMINE 220-25 MG TABS    TURMERIC PO    ascorbic acid (VITAMIN C) 500 mg tablet    enoxaparin (LOVENOX) 40 mg/0 4 mL    metroNIDAZOLE (METROGEL) 0 75 % gel     No Known Allergies    Objective   Vitals: Blood pressure 133/83, pulse 84, temperature 98 7 °F (37 1 °C), temperature source Tympanic, resp  rate 16, height 6' 2" (1 88 m), weight 131 kg (288 lb), SpO2 95 %  Physical Exam      HENT:  Normocephalic  EOM are normal  PERRLA  Neck supple  Cardiovascular: Normal rate and regular rhythm  Pulmonary: Breath sounds normal  No respiratory distress  Pt has no wheezes nor rales  Abdominal: Soft  Bowel sounds are normal  Pt exhibits no distension  There is no tenderness  There is no rebound and no guarding  Neurological: Pt is alert and oriented to person, place, and time  Psychiatric: Pt has a normal mood and affect  Lab Results:           Invalid input(s): LABGLOM                  Labs reviewed    Imaging: reviewed  EKG, Pathology, and Other Studies: I have personally reviewed pertinent reports  VTE Prophylaxis: Enoxaparin (Lovenox)    Code Status: No Order   Advance Directive and Living Will:      Power of :    POLST:      Reviewed with patient their advanced directive wishes while being in hospital during this encounter   Patient demonstrates understanding of the directives they wish and these are in line with what is noted in the current hospital record  Level 1: Full Code    Assessment/Plan     Rt hip OA s/p Rt PEGGY: Continue post op pain control measures as prescribed  Follow bowel regimen to help decrease narcotic induced constipation  Follow post operative hemoglobin with serial CBC and treat accordingly  Monitor WBC and fever curve post op while encouraging use of incentive spirometer  DVT prophylaxis in place and reviewed  Hypothyroidism: Continue levothyroxine  Hyperlipidemia: Continue atorvastatin  Hypotension: Has improved  Will continue to monitor  Counseling / Coordination of Care  Total time spent: At least 60 minutes, with more than 50% spent counseling/coordinating care  Counseling includes discussion with patient re: progress  and discussion with patient of his/her current medical state/information  Coordination of patient's care was performed in conjunction with primary service  Time invested included review of patient's labs, vitals, and management of their comorbidities with continued monitoring  In addition, this patient was discussed with medical team including physician and advanced extenders  The care of the patient was extensively discussed and appropriate treatment plan was formulated unique for this patient  ** Please Note: Dragon 360 Dictation voice to text software may have been used in the creation of this document   **

## 2019-08-05 NOTE — CONSULTS
Office Visit     5/29/2019  [address] [facility] [place] Beach MD   Orthopedic Surgery   Arthritis of right hip +4 more   Dx   Left Knee - Pain , Right Knee - Pain ; Referred by Marlee Robb MD   Reason for Visit    Progress Notes   Alyson Short PA-C (Physician Assistant) [facility]   Cosigned by: Hector Beach MD at 5/29/2019  5:01 PM   Attestation signed by Hector Beach MD at 5/29/2019 5:01 PM   Patient was seen and examined today  Case was reviewed with the physician assistant  I agree the history, exam, assessment and plan as documented by the physician assistant  Almost 69-year-old male presents for evaluation  He has persistence of weight-bearing pain in both knees, and of note recently notices onset worsening of pain in the right groin greater than left groin  Occurs without back pain, and occurs without paresthesia  Physical exam confirms significant restriction of internal rotation of both hips whether flexed  This recreates pain in the groin right greater than left  Right knee has a 10 degree flexion contracture  Both knees are in varus  There is no limb of the quality  Foot and toes strongly dorsiflex bilaterally  I personally reviewed x-rays of both hips my interpretation is as follows: Modest arthritic changes seen in both hips  Assessment/plan:  Not only does patient has symptomatic osteoarthritis of both knees, he has symptomatic osteoarthritis of both hips  His right hip symptoms are worse  Treatment options discussed in detail  Consent was established for elective right total hip replacement  No guarantees given    I would welcome the opportunity see back in the office a postoperative patient      Expand All Collapse All    Chief complaint;   ambulatory dysfunction                                Bilateral knee pain                                 Sporadic right hip and groin pain      History; 70-year-old male gentleman known to the practice  He has a past history significant for Osgood-Schlatter's disease, of both knees  He has had the inability to kneel and flex, or hyperflex his knees without pain for years      Recently he has noticed declining distance, going from bench to bench when he walks because of predictable difficulty with knee motion knee and leg fatigue and right hip and groin pain  He finds that this is not the way he wishes to live these years  He does not have resting pain of either the right groin or the knees      X-rays of the knees as well as his hip and pelvis were ordered at this time      Medical History   History reviewed  No pertinent past medical history         Surgical History   History reviewed  No pertinent surgical history         History reviewed  No pertinent family history      Social History           Tobacco Use    Smoking status: Former Smoker    Smokeless tobacco: Never Used   Substance Use Topics    Alcohol use: Not on file    Drug use: Not on file      Exam;     General; well-developed well-nourished  male  HEENT; NC/AT  Neck; no JVD no bruits  Chest; CTA  CVS; RRR  Abdomen; soft nontender     Musc:     Adult well-developed well-nourished male, with care of varus knees by inspection alone left greater than right  He has significant patellofemoral grating through the range of motion arc  His right hip allows for hip flexion 90° but is externally rotated  Attempted internal rotation of the right hip has an abrupt stop and discomfort in the groin    He also has reproducible pain over the outside of the hip the lateral aspect of the greater trochanteric flare or greater troch bursa      X-rays; AP pelvis and right hip show significant spondylosis of the lumbar spine to a lesser extent degenerative changes of the right and left hip      Bilateral knee series, shows degenerative tricompartmental osteoarthritis of the knees bone on bone opposition left greater than right knee significant patellofemoral degenerative osteoarthritic changes also noted on the lateral projections the Osgood-Schlatter's and the Merchant view also showing significant patellar degenerative changes               Impression;     History of Osgood-Schlatter's disease both knees  Genu varum left greater than right  Bilateral knee degenerative osteoarthritis  Right groin pain  Right hip DJD  Lumbar spondylosis  Right hip bursitis     Plan;     His exam was thoroughly reviewed with the patient and his wife  Is single most predictable pain generator is his right hip  He is offered an accepts right total hip replacement arthroplasty  He will continue to attempt generalized wellness as well as weight management or weight reduction  He will next be seen in the office as a postoperative patient      Instructions         Return for Postoperative     Additional Documentation     Vitals:    /92    Pulse 78    Ht 6' 2" (1 88 m)    Wt 130 kg (286 lb)    BMI 36 72 kg/m²    BSA 2 53 m²       More Vitals    SmartForms:     [facility] PRE-CHARTING      [facility] PCMH/PCSP WRAP UP REQUIREMENTS ADVANCED       Encounter Info:    Billing Info,    History,    Allergies,    Detailed Report       Media     Opioid Agreement - Scan on 7/24/2019 1:26 PM: Ortho's Opioid agreement Opioid Agreement - Scan on 7/24/2019 1:26 PM: Ortho's Opioid agreement     Orders Placed         CBC and differential       Comprehensive metabolic panel       XR chest pa & lateral       XR hip/pelv 2-3 vws right if performed       XR knee 3 vw left non injury       XR knee 3 vw right non injury      Ambulatory referral to Family Practice Pending Review      Case request operating room: ARTHROPLASTY HIP TOTAL Once      EKG 12 lead      All Encounter Results    Medication Changes         Ascorbic Acid 500 mg Oral 2 times daily       Enoxaparin Sodium 40 mg Subcutaneous Daily (early morning), Provided preop to be used after the surgery Do not start or use this medication prior to your operation       Ferrous Sulfate 324 mg Oral 2 times daily before meals, Take 1 pill BID, PRE-OP with Vit C, may be constipatory       Folic Acid 1 mg Oral Daily, Take 1 pill po Qday PRE-OP, with Vit C, and Iron      Medication List    Visit Diagnoses         Arthritis of right hip      Pain in both knees, unspecified chronicity      Pain in right hip      Primary osteoarthritis of both knees      Osgood-Schlatter's disease of both knees      Problem List

## 2019-08-05 NOTE — ANESTHESIA PROCEDURE NOTES
Spinal Block    Patient location during procedure: OR  Start time: 8/5/2019 12:30 PM  Reason for block: primary anesthetic  Staffing  Anesthesiologist: Bk Graves MD  Resident/CRNA: Lalita Montiel CRNA  Preanesthetic Checklist  Completed: patient identified, site marked, surgical consent, pre-op evaluation, timeout performed, IV checked, risks and benefits discussed and monitors and equipment checked  Spinal Block  Patient position: sitting  Prep: Betadine  Patient monitoring: heart rate, cardiac monitor, continuous pulse ox and frequent blood pressure checks  Approach: midline  Location: L2-3  Injection technique: single-shot  Needle  Needle type: pencil-tip   Needle gauge: 24 G  Needle length: 10 cm  Assessment  Sensory level: T4  Injection Assessment:  positive aspiration for clear CSF, no paresthesia on injection and negative aspiration for heme  Post-procedure:  site cleaned  Additional Notes  Took 4 attempts to place spinal   First 3 by CRNA, unsuccessful likely due to needle length being inadequate  Last attempt by MD, extra long 24 g needle required to reach csf

## 2019-08-05 NOTE — PROGRESS NOTES
BP down to 52/31, patient alert and oriented x 4  States he's droggy  Dr Samson Lugo at bedside and aware    Orders given

## 2019-08-05 NOTE — OP NOTE
OPERATIVE REPORT  PATIENT NAME: Ursula Limon    :  1950  MRN: [id number]  Pt Location: [facility] OR ROOM 04    SURGERY DATE: 2019    Surgeon(s) and Role:     * Betty Beltran MD - Primary     * Madalyn De Jesus PA-C - Srini Galicia MD - Assisting    Preop Diagnosis:  Pain in right hip [M25 551]  Arthritis of right hip [M16 11]    Post-Op Diagnosis Codes:     * Pain in right hip [M25 551]     * Arthritis of right hip [M16 11]    Procedure(s) (LRB):  ARTHROPLASTY HIP TOTAL (Right)    Specimen(s):  * No specimens in log *    Estimated Blood Loss:   Minimal    Drains:  Urethral Catheter Latex 16 Fr  (Active)   Number of days: 0       Anesthesia Type:   Choice    Operative Indications:  Pain in right hip [M25 551]  Arthritis of right hip [M16 11]      Operative Findings:  depuy   Cup=56mm metal   Liner-10 degree lipped poly   Head/neck-40 +8m5mm metal   [id number] HO    Complications:   None    Procedure and Technique: Following induction of adequate level of spinal anesthesia, Morfin catheters and sterilely introduced into this patient's bladder  Antibiotics were administered  His then placed in the left leg is, right-side-up position  An axillary roll was placed underneath the left axilla  The right hip and lateral thigh were then prepped draped sterilely  A posterior-lateral approach was created order gain access to the hip  Full-thickness flaps raised get the tensor fascia  This was split, expose the deep layer the hip  With the hip in internal rotation, the piriformis tendon was identified, transected, and retracted in a posterior fashion  The remainder of the short external rotators were sectioned as well  A T-type capsulotomy was used open the hip  The femoral head was then delivered posteriorly  Utilizing the femoral neck osteotomy guide, the proper femoral cut was then made    A posterior capsulectomy, anterior capsulotomy then created in order to circumferentially expose the aming started 54 extended up to 56 mm, which point time a hemisphere of bleeding cancellous bone countered  Fifty-six trial was inserted and noted to fit well, therefore the 56 mm insert was then hammered into place  A single screw was then placed within the posterior superior quadrant for additional fixation  The 10 degree lip liner was then snapped into position  The proximal femur was then prepared for insertion of Press-Fit component  The box osteotome was used of the proximal femur  Patient's canal sequentially broached  With a 14  High offset, and a 40+ 8 5 femoral head and neck, hip was located, taken through range of motion, found to be quite stable  The trial components removed if the hip was carefully dislocated  The insert components were assembled and introduced the patient's right hip in standard fashion  Hip was once again located, taken through range of motion, found to be quite stable  Satisfied with the extent of surgery, the wounds then flushed with saline closed  A Betadine soak was initiated  The piriformis tendon was reapproximated to the greater trochanter number Vicryl suture  The tensor fascia was then closed number Vicryl suture  The subcu tissue closed mixture 1  For deep layer, 2 O Vicryl for the subcutaneous tissues, and skin staples the skin  Sterile dressings applied  Abduction pillow placed    He was then transferred to recovery room in stable condition with plans to include physical therapy weight-bearing to tolerance, he will require DVT prophylaxis with Lovenox   I was present for the entire procedure    Patient Disposition:  PACU     SIGNATURE: Jerman Huynh MD  DATE: August 5, 2019  TIME: 1:43 PM

## 2019-08-05 NOTE — INTERVAL H&P NOTE
H&P reviewed  After examining the patient I find no changes in the patients condition since the H&P had been written        Preop for right total hip arthroplasty

## 2019-08-05 NOTE — ANESTHESIA POSTPROCEDURE EVALUATION
Post-Op Assessment Note    CV Status:  Stable       Mental Status:  Somnolent   Hydration Status:  Stable   PONV Controlled:  None   Airway Patency:  Patent   Post Op Vitals Reviewed: Yes      Staff: Anesthesiologist, with CRNAs           BP   100/74   Temp   97 2   Pulse  80   Resp   18   SpO2   96

## 2019-08-05 NOTE — H&P (VIEW-ONLY)
Office Visit     5/29/2019  [address] [place] [address] Specialists Sixto Avery MD   Orthopedic Surgery   Arthritis of right hip +4 more   Dx   Left Knee - Pain , Right Knee - Pain ; Referred by Walter Ayoub MD   Reason for Visit    Progress Notes   Sandra Alonso PA-C (Physician Assistant) [facility]   Cosigned by: Sandi Avery MD at 5/29/2019  5:01 PM   Attestation signed by Sandi Avery MD at 5/29/2019 5:01 PM   Patient was seen and examined today  Case was reviewed with the physician assistant  I agree the history, exam, assessment and plan as documented by the physician assistant  Almost 20-year-old male presents for evaluation  He has persistence of weight-bearing pain in both knees, and of note recently notices onset worsening of pain in the right groin greater than left groin  Occurs without back pain, and occurs without paresthesia  Physical exam confirms significant restriction of internal rotation of both hips whether flexed  This recreates pain in the groin right greater than left  Right knee has a 10 degree flexion contracture  Both knees are in varus  There is no limb of the quality  Foot and toes strongly dorsiflex bilaterally  I personally reviewed x-rays of both hips my interpretation is as follows: Modest arthritic changes seen in both hips  Assessment/plan:  Not only does patient has symptomatic osteoarthritis of both knees, he has symptomatic osteoarthritis of both hips  His right hip symptoms are worse  Treatment options discussed in detail  Consent was established for elective right total hip replacement  No guarantees given    I would welcome the opportunity see back in the office a postoperative patient      Expand All Collapse All    Chief complaint;   ambulatory dysfunction                                Bilateral knee pain                                 Sporadic right hip and groin pain      History; 20-year-old male gentleman known to the practice  He has a past history significant for Osgood-Schlatter's disease, of both knees  He has had the inability to kneel and flex, or hyperflex his knees without pain for years      Recently he has noticed declining distance, going from bench to bench when he walks because of predictable difficulty with knee motion knee and leg fatigue and right hip and groin pain  He finds that this is not the way he wishes to live these years  He does not have resting pain of either the right groin or the knees      X-rays of the knees as well as his hip and pelvis were ordered at this time      Medical History   History reviewed  No pertinent past medical history         Surgical History   History reviewed  No pertinent surgical history         History reviewed  No pertinent family history      Social History           Tobacco Use    Smoking status: Former Smoker    Smokeless tobacco: Never Used   Substance Use Topics    Alcohol use: Not on file    Drug use: Not on file      Exam;     General; well-developed well-nourished  male  HEENT; NC/AT  Neck; no JVD no bruits  Chest; CTA  CVS; RRR  Abdomen; soft nontender     Musc:     Adult well-developed well-nourished male, with care of varus knees by inspection alone left greater than right  He has significant patellofemoral grating through the range of motion arc  His right hip allows for hip flexion 90° but is externally rotated  Attempted internal rotation of the right hip has an abrupt stop and discomfort in the groin    He also has reproducible pain over the outside of the hip the lateral aspect of the greater trochanteric flare or greater troch bursa      X-rays; AP pelvis and right hip show significant spondylosis of the lumbar spine to a lesser extent degenerative changes of the right and left hip      Bilateral knee series, shows degenerative tricompartmental osteoarthritis of the knees bone on bone opposition left greater than right knee significant patellofemoral degenerative osteoarthritic changes also noted on the lateral projections the Osgood-Schlatter's and the Merchant view also showing significant patellar degenerative changes               Impression;     History of Osgood-Schlatter's disease both knees  Genu varum left greater than right  Bilateral knee degenerative osteoarthritis  Right groin pain  Right hip DJD  Lumbar spondylosis  Right hip bursitis     Plan;     His exam was thoroughly reviewed with the patient and his wife  Is single most predictable pain generator is his right hip  He is offered an accepts right total hip replacement arthroplasty  He will continue to attempt generalized wellness as well as weight management or weight reduction  He will next be seen in the office as a postoperative patient      Instructions         Return for Postoperative     Additional Documentation     Vitals:    /92    Pulse 78    Ht 6' 2" (1 88 m)    Wt 130 kg (286 lb)    BMI 36 72 kg/m²    BSA 2 53 m²       More Vitals    SmartForms:     [facility] PRE-CHARTING      [facility] PCMH/PCSP WRAP UP REQUIREMENTS ADVANCED       Encounter Info:    Billing Info,    History,    Allergies,    Detailed Report       Media     Opioid Agreement - Scan on 7/24/2019 1:26 PM: Ortho's Opioid agreement Opioid Agreement - Scan on 7/24/2019 1:26 PM: Ortho's Opioid agreement     Orders Placed         CBC and differential       Comprehensive metabolic panel       XR chest pa & lateral       XR hip/pelv 2-3 vws right if performed       XR knee 3 vw left non injury       XR knee 3 vw right non injury      Ambulatory referral to Family Practice Pending Review      Case request operating room: ARTHROPLASTY HIP TOTAL Once      EKG 12 lead      All Encounter Results    Medication Changes         Ascorbic Acid 500 mg Oral 2 times daily       Enoxaparin Sodium 40 mg Subcutaneous Daily (early morning), Provided preop to be used after the surgery Do not start or use this medication prior to your operation       Ferrous Sulfate 324 mg Oral 2 times daily before meals, Take 1 pill BID, PRE-OP with Vit C, may be constipatory       Folic Acid 1 mg Oral Daily, Take 1 pill po Qday PRE-OP, with Vit C, and Iron      Medication List    Visit Diagnoses         Arthritis of right hip      Pain in both knees, unspecified chronicity      Pain in right hip      Primary osteoarthritis of both knees      Osgood-Schlatter's disease of both knees      Problem List

## 2019-08-05 NOTE — PHYSICAL THERAPY NOTE
Physical Therapy Evaluation     Patient's Name: Ehsan Wyman    Admitting Diagnosis  Pain in right hip [M25 551]  Arthritis of right hip [M16 11]    Problem List  Patient Active Problem List   Diagnosis    Pain in right hip    Arthritis of right hip    Primary osteoarthritis of both knees    Osgood-Schlatter's disease of both knees    Pain in both knees       Past Medical History  Past Medical History:   Diagnosis Date    Hyperthyroidism     Osteoarthritis     last assesed 6-6-16    Polyneuropathy     last assesed 5-8-17    Pure hypercholesterolemia     Wears glasses        Past Surgical History  Past Surgical History:   Procedure Laterality Date    BACK SURGERY      CHOLECYSTECTOMY      TONSILLECTOMY          08/05/19 9276   Note Type   Note type Eval/Treat   Pain Assessment   Pain Assessment 0-10   Pain Score 8   Pain Type Acute pain   Pain Location Hip   Pain Orientation Right   Hospital Pain Intervention(s) Ambulation/increased activity;Repositioned   Home Living   Type of [address] One level  (4 RUDY)   Prior Function   Level of [place] Independent with ADLs and functional mobility   Lives With Spouse   Receives Help From Family   ADL Assistance Independent   IADLs Independent   Falls in the last 6 months 0   Vocational Retired   Restrictions/Precautions   [place] Bearing Precautions Per Order Yes   RLE [place] Bearing Per Order WBAT   Other Precautions Pain; Fall Risk;Multiple lines;WBS;Chair Alarm; Bed Alarm;THR   General   Family/Caregiver Present No   Cognition   Orientation Level Oriented X4   RLE Assessment   RLE Assessment   (grossly at least 4/5)   LLE Assessment   LLE Assessment WFL   Coordination   Movements are Fluid and Coordinated 0   Coordination and Movement Description slow and gaurded with pain   Bed Mobility   Supine to Sit 4  Minimal assistance   Additional items Assist x 1; Increased time required;LE management   Sit to Supine Unable to assess  (pt left resting in chair, alarm active, no additional reques)   Transfers   Sit to Stand 4  Minimal assistance   Additional items Assist x 1   Stand to Sit 4  Minimal assistance   Additional items Assist x 1;Verbal cues   Ambulation/Elevation   Gait pattern Excessively slow; Shuffling;Decreased foot clearance;Decreased R stance   Gait Assistance 4  Minimal assist   Additional items Assist x 1   Assistive Device Rolling walker   Distance 3'   Balance   Static Sitting Fair -   Dynamic Sitting Fair -   Ambulatory Poor +   Endurance Deficit   Endurance Deficit Yes   Endurance Deficit Description limited by pain, fatigue   Activity Tolerance   Activity Tolerance Patient limited by fatigue;Patient limited by pain   Nurse Made Aware [facility] gave clearance to work with pt   Assessment   Prognosis Good   Problem List Decreased strength;Decreased endurance; Impaired balance;Decreased mobility; Decreased safety awareness;Pain;Orthopedic restrictions;Decreased range of motion   Assessment Pt is 68 y o  male seen for PT evaluation s/p admit to [facility] on 8/5/2019 w/ Arthritis of right hip  PT consulted to assess pt's functional mobility and d/c needs  Order placed for PT eval and tx, w/ up w/ A and WBAT R LE order  Comorbidities affecting pt's physical performance at time of assessment include: pain, new onset tremor, hypotension in PACU  PTA, pt was ambulates household distances and has 4 RUDY  Personal factors affecting pt at time of IE include: ambulating w/ assistive device, stairs to enter home, inability to ambulate household distances, limited home support, impulsivity, unable to perform physical activity, inability to perform IADLs and inability to perform ADLs  Please find objective findings from PT assessment regarding body systems outlined above with impairments and limitations including weakness, impaired balance, gait deviations, decreased activity tolerance, decreased safety awareness and fall risk   Noted B/L UE tremor laying in bed which pt reports began in PACU, nsg aware  Pt required RLE management for bed mobility  Tolerated sitting EOB with stable BP  Educated on hand placement during transfers  Demonstrated fair WB tolerance through painful RLE  Demonstrated ability to ambulate to chair without reports of dizziness  Extensive eduction for THPs  Pt reports frequently sleeping on side and rolling to get OOB, educated in risks associated with avoiding IR with all mobility  The following objective measures performed on IE also reveal limitations: Barthel Index: 65/100  Pt's clinical presentation is currently unstable/unpredictable seen in pt's presentation of pain  Pt to benefit from continued PT tx to address deficits as defined above and maximize level of functional independent mobility and consistency  From PT/mobility standpoint, recommendation at time of d/c would be OP PT pending progress in order to facilitate return to PLOF  Goals   Patient Goals To decrease pain and tremor   STG Expiration Date 08/17/19   Short Term Goal #1 1  Complete bed mobility and transfers I to decrease need for caregiver in home  2  Ambulate 300' I to complete household and community mobility without A  3  Improve dynamic balance to good to decrease need for UE support during ambulation  4  Be educated & demonstate 12 steps to be able to enter home without A  5  I with HEP  6  I with THPs   Plan   Treatment/Interventions Gait training;Bed mobility; Equipment eval/education;Patient/family training; Endurance training;Functional transfer training;LE strengthening/ROM   PT Frequency Twice a day;7x/wk   Recommendation   Recommendation Outpatient PT   Equipment Recommended Walker; Other (Comment)  (BSC)   PT - OK to Discharge No   Barthel Index   Feeding 10   Bathing 0   Grooming Score 5   Dressing Score 10   Bladder Score 0   Bowels Score 10   Toilet Use Score 10   Transfers (Bed/Chair) Score 10   Mobility (Level Surface) Score 10   Stairs Score 0   Barthel Index Score 65           Melony Lal, PT

## 2019-08-05 NOTE — SOCIAL WORK
CM met with Pt with an introduction and explanation of role  Pt reported residng with his spouse Radha Bocanegra in a ranch style house with no use of DME and 1 step to enter the home  Pt reported being independent with ADLs with no hx of VNA, SNF, mental health or drug/alcohol placements  Pt reported having a living will, uses [place] [address] [employer] [place] and has Dr Sandy Chavarria as a PCP  Family to transport upon d/c     CM reviewed d/c planning process including the following: identifying help at home, patient preference for d/c planning needs, Discharge Lounge, [facility] Meds to Bed program, availability of treatment team to discuss questions or concerns patient and/or family may have regarding understanding medications and recognizing signs and symptoms once discharged  CM also encouraged patient to follow up with all recommended appointments after discharge  Patient advised of importance for patient and family to participate in managing patients medical well being

## 2019-08-05 NOTE — PLAN OF CARE
Problem: PHYSICAL THERAPY ADULT  Goal: Performs mobility at highest level of function for planned discharge setting  See evaluation for individualized goals  Description  Treatment/Interventions: Gait training, Bed mobility, Equipment eval/education, Patient/family training, Endurance training, Functional transfer training, LE strengthening/ROM  Equipment Recommended: Hassan Shone, Other (Comment)([facility])       See flowsheet documentation for full assessment, interventions and recommendations  Note:   Prognosis: Good  Problem List: Decreased strength, Decreased endurance, Impaired balance, Decreased mobility, Decreased safety awareness, Pain, Orthopedic restrictions, Decreased range of motion  Assessment: Pt is 68 y o  male seen for PT evaluation s/p admit to [facility] on 8/5/2019 w/ Arthritis of right hip  PT consulted to assess pt's functional mobility and d/c needs  Order placed for PT eval and tx, w/ up w/ A and WBAT R LE order  Comorbidities affecting pt's physical performance at time of assessment include: pain, new onset tremor, hypotension in PACU  PTA, pt was ambulates household distances and has 4 RUDY  Personal factors affecting pt at time of IE include: ambulating w/ assistive device, stairs to enter home, inability to ambulate household distances, limited home support, impulsivity, unable to perform physical activity, inability to perform IADLs and inability to perform ADLs  Please find objective findings from PT assessment regarding body systems outlined above with impairments and limitations including weakness, impaired balance, gait deviations, decreased activity tolerance, decreased safety awareness and fall risk  Noted B/L UE tremor laying in bed which pt reports began in PACU, nsg aware  Pt required RLE management for bed mobility  Tolerated sitting EOB with stable BP  Educated on hand placement during transfers  Demonstrated fair WB tolerance through painful RLE   Demonstrated ability to ambulate to chair without reports of dizziness  Extensive eduction for THPs  Pt reports frequently sleeping on side and rolling to get OOB, educated in risks associated with avoiding IR with all mobility  The following objective measures performed on IE also reveal limitations: Barthel Index: 65/100  Pt's clinical presentation is currently unstable/unpredictable seen in pt's presentation of pain  Pt to benefit from continued PT tx to address deficits as defined above and maximize level of functional independent mobility and consistency  From PT/mobility standpoint, recommendation at time of d/c would be IP rehab pending progress in order to facilitate return to PLOF  Recommendation: Outpatient PT     PT - OK to Discharge: No    See flowsheet documentation for full assessment

## 2019-08-05 NOTE — ANESTHESIA PREPROCEDURE EVALUATION
Review of Systems/Medical History  Patient summary reviewed  Chart reviewed  No history of anesthetic complications     Cardiovascular  Hyperlipidemia,    Pulmonary  Smoker ex-smoker  ,        GI/Hepatic            Endo/Other  History of thyroid disease (h/o hypertyhroid) , hypothyroidism,   Obesity    GYN       Hematology   Musculoskeletal    Arthritis     Neurology   Psychology     Chronic pain (both knees and hips),            Physical Exam    Airway    Mallampati score: III  TM Distance: >3 FB  Neck ROM: full     Dental       Cardiovascular  Rhythm: regular, Rate: normal,     Pulmonary  Breath sounds clear to auscultation,     Other Findings        Anesthesia Plan  ASA Score- 2     Anesthesia Type- IV sedation with anesthesia and spinal with ASA Monitors  Additional Monitors:   Airway Plan:         Plan Factors-    Induction- intravenous  Postoperative Plan-     Informed Consent- Anesthetic plan and risks discussed with patient  I personally reviewed this patient with the CRNA  Discussed and agreed on the Anesthesia Plan with the CRNA  Thalia Fisher

## 2019-08-05 NOTE — DISCHARGE INSTRUCTIONS
Discharge Instructions - Orthopedics  Lety  68 y o  male MRN: [id number]  Unit/Bed#: PACU 02    Weight Bearing Status:                                           Weight Bearing as tolerated to the right lower extremity  DVT prophylaxis:  Complete course of Lovenox as directed    Pain:  Continue analgesics as directed    Showering Instructions:   Do not shower until postop day 5  Dressing Instructions:   Keep dressing clean, dry and intact until follow up appointment  Driving Instructions:  No driving until cleared by Orthopaedic Surgery  PT/OT:  Continue PT/OT on outpatient basis as directed    Appt Instructions:    If you do not have your appointment, please call the clinic at [phone]  Otherwise followup as scheduled below:

## 2019-08-06 LAB
ANION GAP SERPL CALCULATED.3IONS-SCNC: 5 MMOL/L (ref 4–13)
BUN SERPL-MCNC: 14 MG/DL (ref 5–25)
CALCIUM SERPL-MCNC: 7.7 MG/DL (ref 8.3–10.1)
CHLORIDE SERPL-SCNC: 105 MMOL/L (ref 100–108)
CO2 SERPL-SCNC: 26 MMOL/L (ref 21–32)
CREAT SERPL-MCNC: 0.91 MG/DL (ref 0.6–1.3)
ERYTHROCYTE [DISTWIDTH] IN BLOOD BY AUTOMATED COUNT: 13.4 % (ref 11.6–15.1)
GFR SERPL CREATININE-BSD FRML MDRD: 84 ML/MIN/1.73SQ M
GLUCOSE SERPL-MCNC: 134 MG/DL (ref 65–140)
HCT VFR BLD AUTO: 35.1 % (ref 36.5–49.3)
HGB BLD-MCNC: 11.5 G/DL (ref 12–17)
MCH RBC QN AUTO: 30.6 PG (ref 26.8–34.3)
MCHC RBC AUTO-ENTMCNC: 32.8 G/DL (ref 31.4–37.4)
MCV RBC AUTO: 93 FL (ref 82–98)
PLATELET # BLD AUTO: 146 THOUSANDS/UL (ref 149–390)
PMV BLD AUTO: 11 FL (ref 8.9–12.7)
POTASSIUM SERPL-SCNC: 3.9 MMOL/L (ref 3.5–5.3)
RBC # BLD AUTO: 3.76 MILLION/UL (ref 3.88–5.62)
SODIUM SERPL-SCNC: 136 MMOL/L (ref 136–145)
WBC # BLD AUTO: 6.12 THOUSAND/UL (ref 4.31–10.16)

## 2019-08-06 PROCEDURE — G8988 SELF CARE GOAL STATUS: HCPCS

## 2019-08-06 PROCEDURE — 97167 OT EVAL HIGH COMPLEX 60 MIN: CPT

## 2019-08-06 PROCEDURE — 97110 THERAPEUTIC EXERCISES: CPT

## 2019-08-06 PROCEDURE — 99024 POSTOP FOLLOW-UP VISIT: CPT | Performed by: PHYSICIAN ASSISTANT

## 2019-08-06 PROCEDURE — 80048 BASIC METABOLIC PNL TOTAL CA: CPT | Performed by: ORTHOPAEDIC SURGERY

## 2019-08-06 PROCEDURE — 85027 COMPLETE CBC AUTOMATED: CPT | Performed by: ORTHOPAEDIC SURGERY

## 2019-08-06 PROCEDURE — 97116 GAIT TRAINING THERAPY: CPT

## 2019-08-06 PROCEDURE — G8987 SELF CARE CURRENT STATUS: HCPCS

## 2019-08-06 PROCEDURE — 97530 THERAPEUTIC ACTIVITIES: CPT

## 2019-08-06 RX ORDER — METHOCARBAMOL 500 MG/1
500 TABLET, FILM COATED ORAL EVERY 6 HOURS PRN
Status: DISCONTINUED | OUTPATIENT
Start: 2019-08-06 | End: 2019-08-08 | Stop reason: HOSPADM

## 2019-08-06 RX ADMIN — ACETAMINOPHEN 650 MG: 325 TABLET ORAL at 03:09

## 2019-08-06 RX ADMIN — DOCUSATE SODIUM 100 MG: 100 CAPSULE, LIQUID FILLED ORAL at 18:18

## 2019-08-06 RX ADMIN — CEFAZOLIN SODIUM 2000 MG: 2 SOLUTION INTRAVENOUS at 05:35

## 2019-08-06 RX ADMIN — ATORVASTATIN CALCIUM 10 MG: 10 TABLET, FILM COATED ORAL at 18:17

## 2019-08-06 RX ADMIN — ACETAMINOPHEN 650 MG: 325 TABLET ORAL at 11:03

## 2019-08-06 RX ADMIN — ENOXAPARIN SODIUM 40 MG: 40 INJECTION SUBCUTANEOUS at 02:14

## 2019-08-06 RX ADMIN — MORPHINE SULFATE 2 MG: 2 INJECTION, SOLUTION INTRAMUSCULAR; INTRAVENOUS at 09:16

## 2019-08-06 RX ADMIN — SODIUM CHLORIDE, SODIUM LACTATE, POTASSIUM CHLORIDE, AND CALCIUM CHLORIDE 75 ML/HR: .6; .31; .03; .02 INJECTION, SOLUTION INTRAVENOUS at 02:11

## 2019-08-06 RX ADMIN — OXYCODONE HYDROCHLORIDE 10 MG: 10 TABLET ORAL at 08:21

## 2019-08-06 RX ADMIN — OXYCODONE HYDROCHLORIDE 10 MG: 10 TABLET ORAL at 12:01

## 2019-08-06 RX ADMIN — LEVOTHYROXINE SODIUM 150 MCG: 75 TABLET ORAL at 05:33

## 2019-08-06 RX ADMIN — METHOCARBAMOL 500 MG: 500 TABLET, FILM COATED ORAL at 13:24

## 2019-08-06 RX ADMIN — SENNOSIDES 8.6 MG: 8.6 TABLET, FILM COATED ORAL at 08:21

## 2019-08-06 RX ADMIN — MORPHINE SULFATE 2 MG: 2 INJECTION, SOLUTION INTRAMUSCULAR; INTRAVENOUS at 13:24

## 2019-08-06 RX ADMIN — DOCUSATE SODIUM 100 MG: 100 CAPSULE, LIQUID FILLED ORAL at 08:21

## 2019-08-06 NOTE — PLAN OF CARE
Problem: PHYSICAL THERAPY ADULT  Goal: Performs mobility at highest level of function for planned discharge setting  See evaluation for individualized goals  Description  Treatment/Interventions: Gait training, Bed mobility, Equipment eval/education, Patient/family training, Endurance training, Functional transfer training, LE strengthening/ROM  Equipment Recommended: Sukh Saleem, Other (Comment)([facility])       See flowsheet documentation for full assessment, interventions and recommendations  Outcome: Progressing  Note:   Prognosis: Good  Problem List: Decreased strength, Decreased range of motion, Decreased endurance, Impaired balance, Decreased mobility, Decreased safety awareness, Pain, Decreased coordination  Assessment: Pt demonstrated improved functional mobility this session  Continues to require assist with mobility, but able to do so with decreased complaints of pain  Ambulated up to household distances with standing rest in between to use urinal   Pt instructed in safe transfers and hip precautions but will benefit from further practice & education to ensure recall as he did not remember events from this AM   Anticipate pt will continue to make progress to discharge home in 1-2 sessions to increase ambulation and attempt stair training to ensure safe mobilty in home & community  Barriers to Discharge: Inaccessible home environment  Barriers to Discharge Comments: increased ambulation & RUDY  Recommendation: Home with family support, Outpatient PT     PT - OK to Discharge: No    See flowsheet documentation for full assessment

## 2019-08-06 NOTE — PHYSICAL THERAPY NOTE
PHYSICAL THERAPY NOTE          Patient Name: Nahum Katz  JUSLH'V Date: 8/6/2019 08/06/19 0920   Pain Assessment   Pain Assessment 0-10   Pain Score Worst Possible Pain   Pain Type Acute pain   Pain Location Hip   Pain Orientation Right   Hospital Pain Intervention(s) Ambulation/increased activity;Repositioned   Response to Interventions improved once resting in chair   Precautions   Total Hip Replacement Flexion; Internal rotation;ADduction   Restrictions/Precautions   Weight Bearing Precautions Per Order Yes   RLE Weight Bearing Per Order WBAT   Other Precautions Multiple lines; Fall Risk;Pain; Chair Alarm; Bed Alarm;THR;WBS   General   Chart Reviewed Yes   Family/Caregiver Present No   Cognition   Orientation Level Oriented X4   Subjective   Subjective "I had a very bad night and I am very sore"   Bed Mobility   Supine to Sit 3  Moderate assistance   Additional items Assist x 1; Increased time required   Sit to Supine Unable to assess  (pt left in chair, call bell in reach, all concern addressed)   Transfers   Sit to Stand 4  Minimal assistance   Additional items Assist x 1   Stand to Sit 4  Minimal assistance   Additional items Assist x 1;Verbal cues   Ambulation/Elevation   Gait pattern Decreased foot clearance;Shuffling;Decreased R stance   Gait Assistance 4  Minimal assist   Additional items Assist x 1   Assistive Device Rolling walker   Distance 10'   Balance   Static Sitting Fair -   Dynamic Sitting Fair -   Ambulatory Poor +   Endurance Deficit   Endurance Deficit Yes   Endurance Deficit Description limited by pain   Activity Tolerance   Activity Tolerance Patient limited by fatigue;Patient limited by pain   Nurse Made Aware Nsg gave clearance to work with pt   Aware of pt request for additional pain medication   Exercises   Glute Sets   (pt reports completing I prior to session)   Hip Flexion Sitting;AROM;10 reps   Hip Adduction AROM;10 reps; Sitting   Knee AROM Long Arc Quad Sitting;AROM;5 reps   Ankle Pumps   (Pt reports completing supine prior to session)   Assessment   Prognosis Good   Problem List Decreased strength;Decreased range of motion;Decreased endurance; Impaired balance;Decreased mobility; Decreased safety awareness;Pain;Decreased coordination   Assessment Pt required LE management for bed mobility  Required single step instruction to maintain THPs  Tolerated sitting EOB with stable BP  Hand placement instruction during transfers  Instructed for upright posture  Pt required increased encouragement to ambulate increased distance with noted stiffness and pain  Fair WB tolerance for step to gait pattern with minimal A from therapist and technique instruction  Pt noted anxiety about returning home after MD asked if pt was going to rehab  Anticipate pt will progress quickly with improved pain control  Required increased time and encouragement to complete LE exercises this session  Pt will benefit from continued inpt skilled PT to maximize functional mobility & safety  Goals   Patient Goals To decrease pain   STG Expiration Date 08/17/19   Treatment Day 1   Plan   Treatment/Interventions Bed mobility;Gait training; Endurance training;LE strengthening/ROM; Functional transfer training;OT;Spoke to case management;Spoke to nursing   Progress Slow progress, decreased activity tolerance   PT Frequency Twice a day;7x/wk   Recommendation   Recommendation Outpatient PT   Equipment Recommended Walker; Other (Comment)  (BSC)   PT - OK to Discharge Breanna Collier PT

## 2019-08-06 NOTE — UTILIZATION REVIEW
Initial Clinical Review    Elective Inpatient surgical procedure    Age/Sex: 68 y o  male     Surgery Date: 8/5    Procedure: S/P ARTHROPLASTY HIP TOTAL (Right)    Anesthesia: Choice    Admission Orders: Date/Time/Statement: 8/5/19 @ 1411   Orders Placed This Encounter   Procedures    Inpatient Admission     Standing Status:   Standing     Number of Occurrences:   1     Order Specific Question:   Admitting Physician     Answer:   Fely Hardy [[id number]]     Order Specific Question:   Level of Care     Answer:   Med Surg [[id number]]     Order Specific Question:   Estimated length of stay     Answer:   More than 2 Midnights     Order Specific Question:   Certification     Answer:   I certify that inpatient services are medically necessary for this patient for a duration of greater than two midnights  See H&P and MD Progress Notes for additional information about the patient's course of treatment       Vital Signs: /80   Pulse 97   Temp 98 6 °F (37 °C)   Resp 18   Ht 6' 2" (1 88 m)   Wt 131 kg (288 lb)   SpO2 96%   BMI 36 98 kg/m²      Diet: Regular  Mobility: Activity as tolerated  DVT Prophylaxis: Sequential compression device    Medications/Pain Control:   Current Facility-Administered Medications:  acetaminophen 650 mg Oral Q6H PRN    atorvastatin 10 mg Oral Daily With Dinner    calcium carbonate 1,000 mg Oral Daily PRN    cefazolin 2,000 mg Intravenous Once    And       cefazolin 1,000 mg Intravenous Once    chlorhexidine 15 mL Swish & Spit Once    docusate sodium 100 mg Oral BID    enoxaparin 40 mg Subcutaneous Q24H    lactated ringers 1,000 mL Intravenous Once PRN    And       lactated ringers 1,000 mL Intravenous Once PRN    lactated ringers 75 mL/hr Intravenous Continuous Last Rate: 75 mL/hr (08/06/19 0211)   lactated ringers 125 mL/hr Intravenous Continuous Last Rate: 125 mL/hr (08/05/19 0920)   lactated ringers 125 mL/hr Intravenous Continuous Last Rate: Stopped (08/06/19 0130)   levothyroxine 150 mcg Oral Early Morning    morphine injection 2 mg Intravenous Q2H PRN    ondansetron 4 mg Intravenous Q6H PRN    oxyCODONE 10 mg Oral Q4H PRN    oxyCODONE 5 mg Oral Q4H PRN    senna 1 tablet Oral Daily    sodium chloride 1,000 mL Intravenous Once PRN    And       sodium chloride 1,000 mL Intravenous Once PRN        Network Utilization Review Department  Phone: [phone]; Fax [phone]  [email or web address]  ATTENTION: Please call with any questions or concerns to [phone]  and carefully listen to the prompts so that you are directed to the right person  Send all requests for admission clinical reviews, approved or denied determinations and any other requests to fax [phone]   All voicemails are confidential

## 2019-08-06 NOTE — PROGRESS NOTES
Internal Medicine Progress Note  Patient: Huseyin Torres  Age/sex: 68 y o  male  Medical Record #: [id number]      ASSESSMENT/PLAN:  Huseyin Torres is seen and examined and mangement for following issues:    Rt hip OA s/p Rt PEGGY: - Hgb 11 5  Monitor WBC and fever curve post op while encouraging use of incentive spirometer  DVT prophylaxis in place and reviewed      Hypothyroidism: Continue levothyroxine      Hyperlipidemia: Continue atorvastatin      Hypotension: Has improved with intravenous fluids    Will continue to monitor  Fever: Tmax 100 9 overnight  No fever this a m     WBC within normal limits  Monitor for any signs of infection  No leukocytosis  Thrombocytopenia: Mild at 146  Subjective: Patient seen and examined  Patients overnight issues or events were reviewed with nursing or staff during rounds or morning huddle session  New or overnight issues include the following: Hypotension: Pt did receive IV fluid boluses overnight  Fever: Tmax 100 9  No leukocytosis  Temp normal this AM  Will monitor for signs of infection  Hypothyroidism: Continue levothyroxine as per home dosage      ROS:   GI: denies abdominal pain, change bowel habits or reflux symptoms  Neuro: Denies any headache, new vision changes, new neuropathies,new weaknesses   Respiratory: No Cough, SOB, denies wheeze  Cardiovascular: No CP, palpitations , denies perception of rapid heartbeat  : denies any new urinary burning or frequency    Review of Scheduled Meds:    Current Facility-Administered Medications:  acetaminophen 650 mg Oral Q6H PRN Sue Schultz MD    atorvastatin 10 mg Oral Daily With Yazan Yee MD    calcium carbonate 1,000 mg Oral Daily PRN Sue Schultz MD    cefazolin 2,000 mg Intravenous Once Sue Schultz MD    And        cefazolin 1,000 mg Intravenous Once Sue Schultz MD    chlorhexidine 15 mL Swish & Spit Once Sue Schultz MD    docusate sodium 100 mg Oral BID Sue Schultz MD    enoxaparin 40 mg Subcutaneous Q24H Maggie Islas MD    lactated ringers 1,000 mL Intravenous Once PRN Maggie Islas MD    And        lactated ringers 1,000 mL Intravenous Once PRN Maggie Islas MD    lactated ringers 75 mL/hr Intravenous Continuous Maggie Islas MD Last Rate: 75 mL/hr (08/06/19 0211)   lactated ringers 125 mL/hr Intravenous Continuous Maggie Islas MD Last Rate: 125 mL/hr (08/05/19 0920)   lactated ringers 125 mL/hr Intravenous Continuous Maggie sIlas MD Last Rate: Stopped (08/06/19 0130)   levothyroxine 150 mcg Oral Early Morning Maggie Islas MD    morphine injection 2 mg Intravenous Q2H PRN Maggie Islas MD    ondansetron 4 mg Intravenous Q6H PRN Maggie Islas MD    oxyCODONE 10 mg Oral Q4H PRN Maggie Islas MD    oxyCODONE 5 mg Oral Q4H PRN Maggie Islas MD    senna 1 tablet Oral Daily Maggie Islas MD    sodium chloride 1,000 mL Intravenous Once PRN Maggie Islas MD    And        sodium chloride 1,000 mL Intravenous Once PRN Maggie Islas MD        Labs:     Results from last 7 days   Lab Units 08/06/19  0546   WBC Thousand/uL 6 12   HEMOGLOBIN g/dL 11 5*   HEMATOCRIT % 35 1*   PLATELETS Thousands/uL 146*     Results from last 7 days   Lab Units 08/06/19  0546   SODIUM mmol/L 136   POTASSIUM mmol/L 3 9   CHLORIDE mmol/L 105   CO2 mmol/L 26   BUN mg/dL 14   CREATININE mg/dL 0 91   CALCIUM mg/dL 7 7*                      *Labs reviewed  *Radiology studies reviewed  *Medications reviewed and reconciled as needed  *Please refer to order section for additional ordered labs studies    Physical Examination:  Vitals:   Vitals:    08/05/19 1907 08/06/19 0000 08/06/19 0309 08/06/19 0703   BP: 117/70 125/79 121/79 122/77   Pulse: 86 105 101 92   Resp: 16 18 18 18   Temp: 98 4 °F (36 9 °C) (!) 100 9 °F (38 3 °C) 99 7 °F (37 6 °C) 98 6 °F (37 °C)   TempSrc:       SpO2: 100% 95% 93% 94%   Weight:       Height:           GEN: NAD  RESP: CTAB, no R/R/W, good expiratory effort, breath sounds equal  CV: +S1 S2, regular rate, no rubs, PMI normal  ABD: soft, NT, ND, normal BS   : catheter removed;   EXT: DP pulses intact b/l; good cap refill;   Skin: no rashes , no lesions  Neuro: AAOx3 no focality on exam;    Total time spent: At least 35 minutes, with more than 50% spent counseling/coordinating care  Counseling includes discussion with patient re: progress  and discussion with patient of his/her current medical state/information  Coordination of patient's care was performed in conjunction with primary service  Time invested included review of patient's labs, vitals, and management of their comorbidities with continued monitoring  In addition, this patient was discussed with medical team including physician and advanced extenders  The care of the patient was extensively discussed and appropriate treatment plan was formulated unique for this patient  ** Please Note: Dragon 360 Dictation voice to text software may have been used in the creation of this document   **

## 2019-08-06 NOTE — PLAN OF CARE
Problem: PHYSICAL THERAPY ADULT  Goal: Performs mobility at highest level of function for planned discharge setting  See evaluation for individualized goals  Description  Treatment/Interventions: Gait training, Bed mobility, Equipment eval/education, Patient/family training, Endurance training, Functional transfer training, LE strengthening/ROM  Equipment Recommended: Karen Bernal, Other (Comment)([facility] [place] [facility])       See flowsheet documentation for full assessment, interventions and recommendations  Note:   Prognosis: Good  Problem List: Decreased strength, Decreased range of motion, Decreased endurance, Impaired balance, Decreased mobility, Decreased safety awareness, Pain, Decreased coordination  Assessment: Pt required LE management for bed mobility  Required single step instruction to maintain THPs  Tolerated sitting EOB with stable BP  Hand placement instruction during transfers  Instructed for upright posture  Pt required increased encouragement to ambulate increased distance with noted stiffness and pain  Fair WB tolerance for step to gait pattern with minimal A from therapist and technique instruction  Pt noted anxiety about returning home after MD asked if pt was going to rehab  Anticipate pt will progress quickly with improved pain control  Required increased time and encouragement to complete LE exercises this session  Pt will benefit from continued inpt skilled PT to maximize functional mobility & safety  Recommendation: Outpatient PT     PT - OK to Discharge: No    See flowsheet documentation for full assessment

## 2019-08-06 NOTE — OCCUPATIONAL THERAPY NOTE
[address]  Evaluation     Patient Name: Jassi Melton  PXMAI'V Date: 8/6/2019  Problem List  Patient Active Problem List   Diagnosis    Pain in right hip    Arthritis of right hip    Primary osteoarthritis of both knees    Osgood-Schlatter's disease of both knees    Pain in both knees     Past Medical History  Past Medical History:   Diagnosis Date    Hyperthyroidism     Osteoarthritis     last assesed 6-6-16    Polyneuropathy     last assesed 5-8-17    Pure hypercholesterolemia     Wears glasses      Past Surgical History  Past Surgical History:   Procedure Laterality Date    BACK SURGERY      CHOLECYSTECTOMY      TONSILLECTOMY           08/06/19 0905   Note Type   Note type Eval/Treat   Restrictions/Precautions   Weight Bearing Precautions Per Order Yes   RUE Weight Bearing Per Order WBAT   LUE Weight Bearing Per Order WBAT   RLE Weight Bearing Per Order WBAT   LLE Weight Bearing Per Order WBAT   Braces or Orthoses   (ABDUCTION PILLOW)   Other Precautions WBS;THR;Fall Risk;Pain;Multiple lines   Pain Assessment   Pain Assessment 0-10   Pain Score 8   Pain Type Acute pain   Pain Location Hip   Pain Orientation Right   Hospital Pain Intervention(s) Repositioned; Ambulation/increased activity; Emotional support   Home Living   Type of [address] One level;Stairs to enter with rails  (4 RUDY)   Prior Function   Level of [place] Independent with ADLs and functional mobility   Lives With Spouse   Receives Help From Family   ADL Assistance Independent   IADLs Independent   Falls in the last 6 months 0   Vocational Retired   [address] [place] [address]   Reciprocal Relationships SUPPORTIVE FAMILY   Service to Others RETIRED   Intrinsic Gratification MOSTLY SEDENTARY    Subjective   Subjective "I THINK REHAB WOULD BE BEST FOR ME"   ADL   Eating Assistance 7  Independent   Grooming Assistance 5  Supervision/Setup   UB Bathing Assistance 4  Minimal Assistance   LB Bathing Assistance 3  Moderate Assistance   UB Dressing Assistance 4  Minimal Assistance   LB Dressing Assistance 3  Moderate Assistance   Toileting Assistance  3  Moderate Assistance   Bed Mobility   Supine to Sit 3  Moderate assistance   Transfers   Sit to Stand 4  Minimal assistance   Stand to Sit 4  Minimal assistance   Stand pivot 4  Minimal assistance   Additional Comments SELF LIMITING - REQUIRES ENCOURAGMENT    Functional Mobility   Functional Mobility 4  Minimal assistance   Additional Comments ONLY ABLE TO TAKE A FEW STEPS FORWARD DESPITE ENCOURAGEMENT TO CONTINUE    Additional items Rolling walker   Balance   Static Sitting Fair -   Dynamic Sitting Fair -   Static Standing Poor +   Dynamic Standing Poor   Ambulatory Poor   Activity Tolerance   Activity Tolerance Patient limited by fatigue;Patient limited by pain   RUE Assessment   RUE Assessment WFL   LUE Assessment   LUE Assessment WFL   Cognition   Overall Cognitive Status WFL   Assessment   Limitation Decreased ADL status; Decreased endurance;Decreased self-care trans;Decreased high-level ADLs   Prognosis Good   Assessment Pt is a 68 y o  male who was [facility] to [facility] on 8/5/2019 with Arthritis of right hip s/p R THR - posterior approach  Pt's problem list also includes PMH of obesity, previous surgery, neuropathy and hyperthyroidism, OA, hypercholesterolemia  At baseline pt was completing adls and mobility independently - I iadls - shares homemaking with spouse  Pt lives with wife in 1 story home with 4 RUDY  Currently pt requires moderate assist for overall ADLS and min assist for functional mobility/transfers  Pt currently presents with impairments in the following categories -steps to enter environment, difficulty performing ADLS and difficulty performing IADLS  activity tolerance, endurance and standing balance/tolerance   These impairments, as well as pt's fatigue, pain, hip precautions, orthopedic restricitions , WBS  and risk for falls  limit pt's ability to safely engage in all baseline areas of occupation, includingbathing, dressing, toileting, functional mobility/transfers, community mobility, driving, house maintenance, meal prep, cleaning, social participation  and leisure activities  From OT standpoint, recommend home with family support upon D/C  OT will continue to follow to address the below stated goals  Goals   Patient Goals have less pain    STG Time Frame 3-5   Short Term Goal #1 refer to established goals below   Plan   Treatment Interventions ADL retraining;Functional transfer training; Endurance training;Patient/family training;Equipment evaluation/education; Compensatory technique education; Activityengagement   Goal Expiration Date 08/11/19   OT Frequency 3-5x/wk   Recommendation   OT Discharge Recommendation Home with family support   Barthel Index   Feeding 10   Bathing 0   Grooming Score 5   Dressing Score 5   Bladder Score 10   Bowels Score 10   Toilet Use Score 5   Transfers (Bed/Chair) Score 10   Mobility (Level Surface) Score 0   Stairs Score 0   Barthel Index Score 55       OCCUPATIONAL THERAPY GOALS:    *Mod I adls after setup with use of AE    *Mod I toileting and clothing management   *Mod I functional mobility and transfers to/from all surfaces with good dynamic balance and safety for participation in dynamic adls and iadl tasks   *Demonstrate good carryover with safe use of RW, LHAE and THR precautions during functional tasks   *Assess DME needs   *Increase activity tolerance to 35-40 minutes for participation in adls and enjoyable activities  *Assist with safe d/c recommendations     Nilo Surendra, OT

## 2019-08-06 NOTE — PROGRESS NOTES
Progress Note - Orthopedics   Sharon Sames 68 y o  male MRN: [id number]  Unit/Bed#: -01      Subjective:    68 y o male POD#1 R PEGGY  Patient described chills and reflux last night, both are resolved currently  No n/t       Labs:  0   Lab Value Date/Time    HCT 50 5 (H) 07/08/2019 1014    HGB 16 8 07/08/2019 1014    INR 1 06 07/08/2019 1014    WBC 4 87 07/08/2019 1014    CRP <3 0 07/08/2019 1014       Meds:    Current Facility-Administered Medications:     acetaminophen (TYLENOL) tablet 650 mg, 650 mg, Oral, Q6H PRN, Kailey Mazariegos MD, 650 mg at 08/06/19 0309    atorvastatin (LIPITOR) tablet 10 mg, 10 mg, Oral, Daily With Charmayne Greenhouse, MD, 10 mg at 08/05/19 1743    calcium carbonate (TUMS) chewable tablet 1,000 mg, 1,000 mg, Oral, Daily PRN, Kailey Mazariegos MD, 1,000 mg at 08/05/19 1954    ceFAZolin (ANCEF) IVPB (premix) 2,000 mg, 2,000 mg, Intravenous, Once **AND** ceFAZolin (ANCEF) IVPB (premix) 1,000 mg, 1,000 mg, Intravenous, Once, Kailey Mazariegos MD    chlorhexidine (PERIDEX) 0 12 % oral rinse 15 mL, 15 mL, Swish & Spit, Once, Kailey Mazariegos MD    docusate sodium (COLACE) capsule 100 mg, 100 mg, Oral, BID, Kailey Mazariegos MD, 100 mg at 08/05/19 1741    enoxaparin (LOVENOX) subcutaneous injection 40 mg, 40 mg, Subcutaneous, Q24H, Kailey Mazariegos MD, 40 mg at 08/06/19 0214    lactated ringers bolus 1,000 mL, 1,000 mL, Intravenous, Once PRN **AND** lactated ringers bolus 1,000 mL, 1,000 mL, Intravenous, Once PRN, Kailey Mazariegos MD    lactated ringers infusion, 75 mL/hr, Intravenous, Continuous, Kailey Mazariegos MD, Last Rate: 75 mL/hr at 08/06/19 0211, 75 mL/hr at 08/06/19 0211    lactated ringers infusion, 125 mL/hr, Intravenous, Continuous, Kailey Mazariegos MD, Last Rate: 125 mL/hr at 08/05/19 0920, 125 mL/hr at 08/05/19 0920    lactated ringers infusion, 125 mL/hr, Intravenous, Continuous, Kailey Mazariegos MD, Stopped at 08/06/19 0130    levothyroxine tablet 150 mcg, 150 mcg, Oral, Early Morning, Gabi Crowley MD, 150 mcg at 08/06/19 0533    morphine injection 2 mg, 2 mg, Intravenous, Q2H PRN, Gabi Crowley MD, 2 mg at 08/05/19 1915    ondansetron [facility]) injection 4 mg, 4 mg, Intravenous, Q6H PRN, Gabi Crowley MD    oxyCODONE (ROXICODONE) immediate release tablet 10 mg, 10 mg, Oral, Q4H PRN, Gabi Crowley MD, 10 mg at 08/05/19 2318    oxyCODONE (ROXICODONE) IR tablet 5 mg, 5 mg, Oral, Q4H PRN, Gabi Crowley MD    senna (SENOKOT) tablet 8 6 mg, 1 tablet, Oral, Daily, Gabi Crowley MD    sodium chloride 0 9 % bolus 1,000 mL, 1,000 mL, Intravenous, Once PRN **AND** sodium chloride 0 9 % bolus 1,000 mL, 1,000 mL, Intravenous, Once PRN, Gabi Crowley MD    Blood Culture:   No results found for: BLOODCX    Wound Culture:   No results found for: WOUNDCULT    Ins and Outs:  I/O last 24 hours:   In: 6237 5 [I V :6187 5; IV Piggyback:50]  Out: 6362 [Urine:1325]          Physical:  Vitals:    08/06/19 0309   BP: 121/79   Pulse: 101   Resp: 18   Temp: 99 7 °F (37 6 °C)   SpO2: 93%     Musculoskeletal: right Lower Extremity  · Dressing c/d/i  · Calf soft, compressible  · SILT s/s/sp/dp/t  · +fhl/ehl, +ankle dorsi/plantar flexion  · +PT pulse    _*_*_*_*_*_*_*_*_*_*_*_*_*_*_*_*_*_*_*_*_*_*_*_*_*_*_*_*_*_*_*_*_*_*_*_*_*_*_*_*_*    Assessment:    69 y o male  POD#1 R PEGGY     Plan:  · WBAT RLE  · Posterior hip precautions  · PT/OT  · Pain control  · DVT ppx  · Patient noted to have acute blood loss anemia due to a drop in Hbg of > 2 0g from preop levels, will monitor vital signs and resuscitate with IV fluids as needed  · Dispo: Ortho will follow    Jesus Toussaint PA-C

## 2019-08-06 NOTE — PLAN OF CARE
Problem: Potential for Falls  Goal: Patient will remain free of falls  Description  INTERVENTIONS:  - Assess patient frequently for physical needs  -  Identify cognitive and physical deficits and behaviors that affect risk of falls    -  [place] fall precautions as indicated by assessment   - Educate patient/family on patient safety including physical limitations  - Instruct patient to call for assistance with activity based on assessment  - Modify environment to reduce risk of injury  - Consider OT/PT consult to assist with strengthening/mobility  Outcome: Progressing     Problem: Prexisting or High Potential for Compromised Skin Integrity  Goal: Skin integrity is maintained or improved  Description  INTERVENTIONS:  - Identify patients at risk for skin breakdown  - Assess and monitor skin integrity  - Assess and monitor nutrition and hydration status  - Monitor labs (i e  albumin)  - Assess for incontinence   - Turn and reposition patient  - Assist with mobility/ambulation  - Relieve pressure over bony prominences  - Avoid friction and shearing  - Provide appropriate hygiene as needed including keeping skin clean and dry  - Evaluate need for skin moisturizer/barrier cream  - Collaborate with interdisciplinary team (i e  Nutrition, Rehabilitation, etc )   - Patient/family teaching  Outcome: Progressing

## 2019-08-06 NOTE — PHYSICAL THERAPY NOTE
Physical Therapy Progress Note     08/06/19 2455   Pain Assessment   Pain Assessment 0-10   Pain Score 5   Pain Location Hip   Pain Orientation Right   Hospital Pain Intervention(s) Cold applied;Repositioned; Ambulation/increased activity; Rest   Response to Interventions tolerated   Restrictions/Precautions   RLE Weight Bearing Per Order WBAT   Other Precautions Pain; Fall Risk;WBS;THR   Subjective   Subjective Pt initially encountered asleep, having just been given IV morphine as per wife  Deferred at that time, attempted again, with pt intially asleep & easily roused  Reports improved pain, but does not remember AM therapy session or hip precautions  Appeared fatigued throughout session  Bed Mobility   Supine to Sit 3  Moderate assistance   Additional items Assist x 2   Transfers   Sit to Stand 4  Minimal assistance   Additional items Assist x 1; Armrests; Increased time required;Verbal cues   Stand to Sit 4  Minimal assistance   Additional items Assist x 1; Armrests; Increased time required;Verbal cues   Stand pivot 4  Minimal assistance   Additional items Assist x 1; Armrests; Increased time required   Ambulation/Elevation   Gait pattern Excessively slow; Step to;Short stride; Inconsistent davi;Decreased R stance;Decreased foot clearance; Antalgic; Improper Weight shift   Gait Assistance 4  Minimal assist   Additional items Assist x 1  (+ chair follow)   Assistive Device Rolling walker   Distance 40', 40'   Balance   Static Sitting Fair +   Static Standing Fair   Ambulatory Poor +   Endurance Deficit   Endurance Deficit Yes   Endurance Deficit Description fatigue, pain   Activity Tolerance   Activity Tolerance Patient tolerated treatment well;Patient limited by fatigue;Patient limited by pain   Nurse Made Aware fatigue, pain   Assessment   Prognosis Good   Problem List Decreased strength;Decreased range of motion;Decreased endurance; Impaired balance;Decreased mobility; Decreased safety awareness;Pain;Decreased coordination   Assessment Pt demonstrated improved functional mobility this session  Continues to require assist with mobility, but able to do so with decreased complaints of pain  Ambulated up to household distances with standing rest in between to use urinal   Pt instructed in safe transfers and hip precautions but will benefit from further practice & education to ensure recall as he did not remember events from this AM   Anticipate pt will continue to make progress to discharge home in 1-2 sessions to increase ambulation and attempt stair training to ensure safe mobilty in home & community  Barriers to Discharge Inaccessible home environment   Barriers to Discharge Comments increased ambulation & RUDY   Goals   Patient Goals to feel better & go home tomorrow   STG Expiration Date 08/17/19   Treatment Day 2   Plan   Treatment/Interventions Functional transfer training;LE strengthening/ROM; Elevations; Therapeutic exercise; Endurance training;Patient/family training;Equipment eval/education; Bed mobility;Gait training   Progress Progressing toward goals   PT Frequency Twice a day;7x/wk   Recommendation   Recommendation Home with family support; Outpatient PT   Equipment Recommended Walker  [facility])   PT - OK to Discharge No     Fausto Gibson, PTA

## 2019-08-06 NOTE — PLAN OF CARE
Problem: OCCUPATIONAL THERAPY ADULT  Goal: Performs self-care activities at highest level of function for planned discharge setting  See evaluation for individualized goals  Description  Treatment Interventions: ADL retraining, Functional transfer training, Endurance training, Patient/family training, Equipment evaluation/education, Compensatory technique education, Activityengagement          See flowsheet documentation for full assessment, interventions and recommendations  Note:   Limitation: Decreased ADL status, Decreased endurance, Decreased self-care trans, Decreased high-level ADLs  Prognosis: Good  Assessment: Pt is a 68 y o  male who was [facility] to [facility] on 8/5/2019 with Arthritis of right hip s/p R THR - posterior approach  Pt's problem list also includes PMH of obesity, previous surgery, neuropathy and hyperthyroidism, OA, hypercholesterolemia  At baseline pt was completing adls and mobility independently - I iadls - shares homemaking with spouse  Pt lives with wife in 1 story home with 4 RUDY  Currently pt requires moderate assist for overall ADLS and min assist for functional mobility/transfers  Pt currently presents with impairments in the following categories -steps to enter environment, difficulty performing ADLS and difficulty performing IADLS  activity tolerance, endurance and standing balance/tolerance  These impairments, as well as pt's fatigue, pain, hip precautions, orthopedic restricitions , WBS  and risk for falls  limit pt's ability to safely engage in all baseline areas of occupation, includingbathing, dressing, toileting, functional mobility/transfers, community mobility, driving, house maintenance, meal prep, cleaning, social participation  and leisure activities  From OT standpoint, recommend home with family support upon D/C  OT will continue to follow to address the below stated goals        OT Discharge Recommendation: Home with family support

## 2019-08-07 DIAGNOSIS — Z96.641 AFTERCARE FOLLOWING RIGHT HIP JOINT REPLACEMENT SURGERY: Primary | ICD-10-CM

## 2019-08-07 DIAGNOSIS — Z47.1 AFTERCARE FOLLOWING RIGHT HIP JOINT REPLACEMENT SURGERY: Primary | ICD-10-CM

## 2019-08-07 PROBLEM — M16.11 ARTHRITIS OF RIGHT HIP: Status: RESOLVED | Noted: 2019-05-29 | Resolved: 2019-08-07

## 2019-08-07 PROBLEM — M25.551 PAIN IN RIGHT HIP: Status: RESOLVED | Noted: 2019-05-29 | Resolved: 2019-08-07

## 2019-08-07 LAB
ANION GAP SERPL CALCULATED.3IONS-SCNC: 7 MMOL/L (ref 4–13)
BUN SERPL-MCNC: 12 MG/DL (ref 5–25)
CALCIUM SERPL-MCNC: 8.3 MG/DL (ref 8.3–10.1)
CHLORIDE SERPL-SCNC: 99 MMOL/L (ref 100–108)
CO2 SERPL-SCNC: 26 MMOL/L (ref 21–32)
CREAT SERPL-MCNC: 0.91 MG/DL (ref 0.6–1.3)
ERYTHROCYTE [DISTWIDTH] IN BLOOD BY AUTOMATED COUNT: 13.3 % (ref 11.6–15.1)
GFR SERPL CREATININE-BSD FRML MDRD: 84 ML/MIN/1.73SQ M
GLUCOSE SERPL-MCNC: 112 MG/DL (ref 65–140)
HCT VFR BLD AUTO: 35.5 % (ref 36.5–49.3)
HGB BLD-MCNC: 11.6 G/DL (ref 12–17)
MCH RBC QN AUTO: 30.3 PG (ref 26.8–34.3)
MCHC RBC AUTO-ENTMCNC: 32.7 G/DL (ref 31.4–37.4)
MCV RBC AUTO: 93 FL (ref 82–98)
PLATELET # BLD AUTO: 143 THOUSANDS/UL (ref 149–390)
PMV BLD AUTO: 11.6 FL (ref 8.9–12.7)
POTASSIUM SERPL-SCNC: 3.7 MMOL/L (ref 3.5–5.3)
RBC # BLD AUTO: 3.83 MILLION/UL (ref 3.88–5.62)
SODIUM SERPL-SCNC: 132 MMOL/L (ref 136–145)
WBC # BLD AUTO: 9.01 THOUSAND/UL (ref 4.31–10.16)

## 2019-08-07 PROCEDURE — 97110 THERAPEUTIC EXERCISES: CPT

## 2019-08-07 PROCEDURE — 99024 POSTOP FOLLOW-UP VISIT: CPT | Performed by: PHYSICIAN ASSISTANT

## 2019-08-07 PROCEDURE — 97535 SELF CARE MNGMENT TRAINING: CPT

## 2019-08-07 PROCEDURE — 80048 BASIC METABOLIC PNL TOTAL CA: CPT | Performed by: ORTHOPAEDIC SURGERY

## 2019-08-07 PROCEDURE — 85027 COMPLETE CBC AUTOMATED: CPT | Performed by: ORTHOPAEDIC SURGERY

## 2019-08-07 PROCEDURE — 97116 GAIT TRAINING THERAPY: CPT

## 2019-08-07 PROCEDURE — 97530 THERAPEUTIC ACTIVITIES: CPT

## 2019-08-07 RX ADMIN — ATORVASTATIN CALCIUM 10 MG: 10 TABLET, FILM COATED ORAL at 15:42

## 2019-08-07 RX ADMIN — ACETAMINOPHEN 650 MG: 325 TABLET ORAL at 03:16

## 2019-08-07 RX ADMIN — DOCUSATE SODIUM 100 MG: 100 CAPSULE, LIQUID FILLED ORAL at 08:54

## 2019-08-07 RX ADMIN — ACETAMINOPHEN 650 MG: 325 TABLET ORAL at 15:42

## 2019-08-07 RX ADMIN — OXYCODONE HYDROCHLORIDE 10 MG: 10 TABLET ORAL at 04:20

## 2019-08-07 RX ADMIN — DOCUSATE SODIUM 100 MG: 100 CAPSULE, LIQUID FILLED ORAL at 17:24

## 2019-08-07 RX ADMIN — SENNOSIDES 8.6 MG: 8.6 TABLET, FILM COATED ORAL at 08:54

## 2019-08-07 RX ADMIN — OXYCODONE HYDROCHLORIDE 10 MG: 10 TABLET ORAL at 08:54

## 2019-08-07 RX ADMIN — OXYCODONE HYDROCHLORIDE 5 MG: 5 TABLET ORAL at 13:47

## 2019-08-07 RX ADMIN — METHOCARBAMOL 500 MG: 500 TABLET, FILM COATED ORAL at 08:54

## 2019-08-07 RX ADMIN — OXYCODONE HYDROCHLORIDE 10 MG: 10 TABLET ORAL at 18:32

## 2019-08-07 RX ADMIN — ENOXAPARIN SODIUM 40 MG: 40 INJECTION SUBCUTANEOUS at 01:34

## 2019-08-07 RX ADMIN — METHOCARBAMOL 500 MG: 500 TABLET, FILM COATED ORAL at 15:42

## 2019-08-07 RX ADMIN — LEVOTHYROXINE SODIUM 150 MCG: 75 TABLET ORAL at 05:37

## 2019-08-07 NOTE — PROGRESS NOTES
Orthopedics   Juany Diego 68 y o  male MRN: [id number]  Unit/Bed#: -01      Subjective:  69 y o male post operative day 2 right posterior total hip arthroplasty  Pt doing well  Pain controlled now that patient taking medications more regularly  PT did not go well yesterday due to pain       Labs:  0   Lab Value Date/Time    HCT 35 1 (L) 08/06/2019 0546    HCT 50 5 (H) 07/08/2019 1014    HGB 11 5 (L) 08/06/2019 0546    HGB 16 8 07/08/2019 1014    INR 1 06 07/08/2019 1014    WBC 6 12 08/06/2019 0546    WBC 4 87 07/08/2019 1014    CRP <3 0 07/08/2019 1014       Meds:    Current Facility-Administered Medications:     acetaminophen (TYLENOL) tablet 650 mg, 650 mg, Oral, Q6H PRN, Linda Ashton MD, 650 mg at 08/07/19 0316    atorvastatin (LIPITOR) tablet 10 mg, 10 mg, Oral, Daily With Mattie Oseguera MD, 10 mg at 08/06/19 1817    calcium carbonate (TUMS) chewable tablet 1,000 mg, 1,000 mg, Oral, Daily PRN, Linda Ashton MD, 1,000 mg at 08/05/19 1954    ceFAZolin (ANCEF) IVPB (premix) 2,000 mg, 2,000 mg, Intravenous, Once **AND** ceFAZolin (ANCEF) IVPB (premix) 1,000 mg, 1,000 mg, Intravenous, Once, Linda Ashton MD    chlorhexidine (PERIDEX) 0 12 % oral rinse 15 mL, 15 mL, Swish & Spit, Once, Linda Ashton MD    docusate sodium (COLACE) capsule 100 mg, 100 mg, Oral, BID, Linda Ashton MD, 100 mg at 08/06/19 1818    enoxaparin (LOVENOX) subcutaneous injection 40 mg, 40 mg, Subcutaneous, Q24H, Linda Ashton MD, 40 mg at 08/07/19 0134    lactated ringers infusion, 75 mL/hr, Intravenous, Continuous, Linda Ashton MD, Last Rate: 75 mL/hr at 08/06/19 0211, 75 mL/hr at 08/06/19 0211    lactated ringers infusion, 125 mL/hr, Intravenous, Continuous, Linda Ashton MD, Last Rate: 125 mL/hr at 08/05/19 0920, 125 mL/hr at 08/05/19 0920    lactated ringers infusion, 125 mL/hr, Intravenous, Continuous, Linda Ashton MD, Stopped at 08/06/19 0130    levothyroxine tablet 150 mcg, 150 mcg, Oral, Early Morning, Sue Schultz MD, 150 mcg at 08/07/19 0537    methocarbamol (ROBAXIN) tablet 500 mg, 500 mg, Oral, Q6H PRN, Va Flores MD, 500 mg at 08/06/19 1324    morphine injection 2 mg, 2 mg, Intravenous, Q2H PRN, Sue Schultz MD, 2 mg at 08/06/19 1324    ondansetron (ZOFRAN) injection 4 mg, 4 mg, Intravenous, Q6H PRN, Sue Schultz MD    oxyCODONE (ROXICODONE) immediate release tablet 10 mg, 10 mg, Oral, Q4H PRN, Sue Schultz MD, 10 mg at 08/07/19 0420    oxyCODONE (ROXICODONE) IR tablet 5 mg, 5 mg, Oral, Q4H PRN, Sue Schultz MD    senna (SENOKOT) tablet 8 6 mg, 1 tablet, Oral, Daily, Sue Schultz MD, 8 6 mg at 08/06/19 8124    Blood Culture:   No results found for: BLOODCX    Wound Culture:   No results found for: WOUNDCULT    Ins and Outs:  I/O last 24 hours: In: 3257 5 [P O :1020; I V :2187 5; IV Piggyback:50]  Out: 2150 [Urine:2150]          Physical Exam:  Vitals:    08/07/19 0144   BP:    Pulse:    Resp:    Temp:    SpO2: 93%     right lower extremity:  · Dressings C/D/I  · Sensation intact L2-S1  · Motor intact L2-S1  · 2+ dorsalis pedis     _*_*_*_*_*_*_*_*_*_*_*_*_*_*_*_*_*_*_*_*_*_*_*_*_*_*_*_*_*_*_*_*_*_*_*_*_*_*_*_*_*    Assessment: 69 y o male post operative day 2 right total hip arthroplasty   Doing well    Plan:  · Weight Bearing as tolerated  · Up and out of bed  · Posterior total hip precautions  · Abduction pillow while in bed  · DC planning  · Pending PT progress  · DVT prophylaxis  · Analgesics  · PT/OT  · Patient noted to have acute blood loss anemia due to a drop in Hbg of > 2 0g from preop levels, will monitor vital signs and resuscitate with IV fluids as needed    Alayna Medrano PA-C

## 2019-08-07 NOTE — PLAN OF CARE
Problem: PHYSICAL THERAPY ADULT  Goal: Performs mobility at highest level of function for planned discharge setting  See evaluation for individualized goals  Description  Treatment/Interventions: Gait training, Bed mobility, Equipment eval/education, Patient/family training, Endurance training, Functional transfer training, LE strengthening/ROM  Equipment Recommended: Teresa Lu (Comment)([facility] [place])       See flowsheet documentation for full assessment, interventions and recommendations  8/7/2019 1606 by Daria Hernandez PTA  Outcome: Progressing  Note:   Prognosis: Good  Problem List: Decreased strength, Decreased range of motion, Decreased endurance, Impaired balance, Decreased mobility, Decreased safety awareness, Pain, Decreased coordination  Assessment: Pt continues to demonstrate difficulty with ambulation due to RLE weakness, pain, and lack of full knee extension during stance phase  Pt quickly became discouraged by lack of progress & feelings of weakness this session, which also impacted his participation this session  Extensive emotional support given during session & encouragement given by this PTA & pt's wife  Extensive seated rests given between trials to recover & attempt to use urinal   After ambulating, pt performed LE exercises as noted above to improve RLE strength, AROM, and attempt to improve R knee extension to facilitate improved gait quality  Will continue to benefit from therapy services to progress mobility, strength, and activity tolerance to ensure safe discharge home when appropriate  If pt unable to progress mobility, may require inpatient rehab to address deficits before returning home  Barriers to Discharge: Inaccessible home environment  Barriers to Discharge Comments: increased ambulation, stair training  Recommendation: Home with family support, Outpatient PT(pending progress)     PT - OK to Discharge: No    See flowsheet documentation for full assessment       8/7/2019 5564 by Mikhail Mendoza PTA  Outcome: Progressing  Note:   Prognosis: Good  Problem List: Decreased strength, Decreased range of motion, Decreased endurance, Impaired balance, Decreased mobility, Decreased safety awareness, Pain, Decreased coordination  Assessment: Pt continues to be limited in mobility this session by complaints of increased pain & weakness in RLE  Pt also remains self limiting with mobility tasks, reporting inability to perform them before attempting  Pt does require assist to perform R knee extension to assist with RLE positioning & donning briefs & shorts at beginning of session  Pt able to maintain standing balance while performing dynamic UE movements to complete dression with AYON  Occasional UE support required to maintain balance  Pt then ambulated up to household distances with seated rest between trials to recover due to reported fatigue & pain  Applied ACE wrap to R knee to improve support as pt reports using one at home prior to surgery  Pt verbalized improved confidence after & reduced pain  Took it off after ambulation due to pt reporting pounding & feeling his pulse behind his knee while knees bent  During ambulation, pt required occasional standing rests to complete increased distance, and demonstrated toe walking & limited knee extension while weight bearing on RLE  Will require further instruction and education to improve gait quality before attempting stair training next session if appropriate  Will continue to benefit from therapy services at this time to progress mobility, reduce pain, improve pt confidence, and attempt stairs when appropriate to allow pt access to home & community at discharge  Barriers to Discharge: Inaccessible home environment  Barriers to Discharge Comments: limited ambulation & RUDY at this time  Recommendation: Home with family support, Outpatient PT     PT - OK to Discharge: No    See flowsheet documentation for full assessment

## 2019-08-07 NOTE — PROGRESS NOTES
Internal Medicine Progress Note  Patient: Rylan Covarrubias  Age/sex: 68 y o  male  Medical Record #: [id number]      ASSESSMENT/PLAN:  Rylan Covarrubias is seen and examined and mangement for following issues:    Rt hip OA s/p Rt PEGGY: - Hgb 11 6  Monitor WBC and fever curve post op while encouraging use of incentive spirometer  DVT prophylaxis in place and reviewed      Hypothyroidism: Continue levothyroxine      Hyperlipidemia: Continue atorvastatin      Hypotension:  improved with intravenous fluids    Will continue to monitor  Post operative blood loss anemia:  Stable with expected drop, asymptomatic, cont ferrous sulfate    Fever:  Improved, afebrile overnight; wbc wnl    Thrombocytopenia: Mild; improving    Patient stable for DC from medical standpoint  Subjective: Patient seen and examined  Patients overnight issues or events were reviewed with nursing or staff during rounds or morning huddle session  New or overnight issues include the following: Hypotension: Pt did receive IV fluid boluses initially post op however no further required, BP stable  Fever: Tmax 99 5, no further fevers noted overnight  Hypothyroidism: Continue levothyroxine as per home dosage      ROS:   GI: denies abdominal pain, change bowel habits or reflux symptoms  Neuro: Denies any headache, new vision changes, new neuropathies,new weaknesses   Respiratory: No Cough, SOB, denies wheeze  Cardiovascular: No CP, palpitations , denies perception of rapid heartbeat  Musculoskeletal: +right hip pain  : denies any new urinary burning or frequency    Review of Scheduled Meds:    Current Facility-Administered Medications:  acetaminophen 650 mg Oral Q6H PRN Gabi Crowley MD    atorvastatin 10 mg Oral Daily With Bhavesh Garcia MD    calcium carbonate 1,000 mg Oral Daily PRN Gabi Crowley MD    cefazolin 2,000 mg Intravenous Once Gabi Crowley MD    And        cefazolin 1,000 mg Intravenous Once Gabi Crowley MD    chlorhexidine 15 mL Swish & Spit Once Kirby Nuno MD    docusate sodium 100 mg Oral BID Kirby Nuno MD    enoxaparin 40 mg Subcutaneous Q24H Kirby Nuno MD    lactated ringers 75 mL/hr Intravenous Continuous Kirby Nuno MD Last Rate: 75 mL/hr (08/06/19 0211)   lactated ringers 125 mL/hr Intravenous Continuous Kirby Nuno MD Last Rate: 125 mL/hr (08/05/19 0920)   lactated ringers 125 mL/hr Intravenous Continuous Kirby Nuno MD Last Rate: Stopped (08/06/19 0130)   levothyroxine 150 mcg Oral Early Morning Kirby Nuno MD    methocarbamol 500 mg Oral Q6H PRN Roverto Rodriguez MD    morphine injection 2 mg Intravenous Q2H PRN Kirby Nuno MD    ondansetron 4 mg Intravenous Q6H PRN Kirby Nuno MD    oxyCODONE 10 mg Oral Q4H PRN Kirby Nuno MD    oxyCODONE 5 mg Oral Q4H PRN Kirby Nnuo MD    senna 1 tablet Oral Daily Kirby Nuno MD        Labs:     Results from last 7 days   Lab Units 08/07/19  0515 08/06/19  0546   WBC Thousand/uL 9 01 6 12   HEMOGLOBIN g/dL 11 6* 11 5*   HEMATOCRIT % 35 5* 35 1*   PLATELETS Thousands/uL 143* 146*     Results from last 7 days   Lab Units 08/07/19  0515 08/06/19  0546   SODIUM mmol/L 132* 136   POTASSIUM mmol/L 3 7 3 9   CHLORIDE mmol/L 99* 105   CO2 mmol/L 26 26   BUN mg/dL 12 14   CREATININE mg/dL 0 91 0 91   CALCIUM mg/dL 8 3 7 7*                      *Labs reviewed  *Radiology studies reviewed  *Medications reviewed and reconciled as needed  *Please refer to order section for additional ordered labs studies    Physical Examination:  Vitals:   Vitals:    08/06/19 2100 08/06/19 2340 08/07/19 0100 08/07/19 0144   BP:  120/93     Pulse:  103     Resp:  18     Temp:  99 5 °F (37 5 °C)     TempSrc:       SpO2: 92% 95% 91% 93%   Weight:       Height:           GEN: NAD  RESP: CTAB, no R/R/W, good expiratory effort, breath sounds equal  CV: +S1 S2, regular rate, no rubs, PMI normal  ABD: soft, NT, ND, normal BS   : voiding;   EXT: DP pulses intact b/l; good cap refill; +right hip dressing intact  Skin: no rashes , no lesions  Neuro: AAOx3 no focality on exam;    Total time spent: At least 35 minutes, with more than 50% spent counseling/coordinating care  Counseling includes discussion with patient re: progress  and discussion with patient of his/her current medical state/information  Coordination of patient's care was performed in conjunction with primary service  Time invested included review of patient's labs, vitals, and management of their comorbidities with continued monitoring  In addition, this patient was discussed with medical team including physician and advanced extenders  The care of the patient was extensively discussed and appropriate treatment plan was formulated unique for this patient  ** Please Note: Dragon 360 Dictation voice to text software may have been used in the creation of this document   **

## 2019-08-07 NOTE — PLAN OF CARE
Problem: PHYSICAL THERAPY ADULT  Goal: Performs mobility at highest level of function for planned discharge setting  See evaluation for individualized goals  Description  Treatment/Interventions: Gait training, Bed mobility, Equipment eval/education, Patient/family training, Endurance training, Functional transfer training, LE strengthening/ROM  Equipment Recommended: Haresh Greene, Other (Comment)([facility] [place])       See flowsheet documentation for full assessment, interventions and recommendations  Outcome: Progressing  Note:   Prognosis: Good  Problem List: Decreased strength, Decreased range of motion, Decreased endurance, Impaired balance, Decreased mobility, Decreased safety awareness, Pain, Decreased coordination  Assessment: Pt continues to be limited in mobility this session by complaints of increased pain & weakness in RLE  Pt also remains self limiting with mobility tasks, reporting inability to perform them before attempting  Pt does require assist to perform R knee extension to assist with RLE positioning & donning briefs & shorts at beginning of session  Pt able to maintain standing balance while performing dynamic UE movements to complete dression with AYON  Occasional UE support required to maintain balance  Pt then ambulated up to household distances with seated rest between trials to recover due to reported fatigue & pain  Applied ACE wrap to R knee to improve support as pt reports using one at home prior to surgery  Pt verbalized improved confidence after & reduced pain  Took it off after ambulation due to pt reporting pounding & feeling his pulse behind his knee while knees bent  During ambulation, pt required occasional standing rests to complete increased distance, and demonstrated toe walking & limited knee extension while weight bearing on RLE  Will require further instruction and education to improve gait quality before attempting stair training next session if appropriate    Will continue to benefit from therapy services at this time to progress mobility, reduce pain, improve pt confidence, and attempt stairs when appropriate to allow pt access to home & community at discharge  Barriers to Discharge: Inaccessible home environment  Barriers to Discharge Comments: limited ambulation & RUDY at this time  Recommendation: Home with family support, Outpatient PT     PT - OK to Discharge: No    See flowsheet documentation for full assessment

## 2019-08-07 NOTE — DISCHARGE SUMMARY
ORTHOPEDICS DISCHARGE SUMMARY   Adalberto De La Cruz 68 y o  male MRN: [id number]  Unit/Bed#: -01      Attending Physician: Carol Ann Ledesma    Admitting diagnosis: Pain in right hip [M25 551]  Arthritis of right hip [M16 11]    Discharge diagnosis: Pain in right hip [M25 551]  Arthritis of right hip [M16 11]    Date of admission: 8/5/2019    Date of discharge: 08/07/19    Procedure: Right Total Hip Arthroplasty    HPI  This is a 68y o  year old male that presented to the office with signs and symptoms of right hip osteoarthritis  They tried and failed conservative treatment measures and wished to proceed with surgical intervention  The risks, benefits, and complications of the procedure were discussed with the patient and informed consent was obtained  Hospital Course: The patient was admitted to the hospital on 8/5/2019 and underwent an uncomplicated right total hip arthroplasty  They were transferred to the floor after a brief stay in the post-anesthesia care unit  Their pain was well managed with IV and oral pain medications  They began therapy on post operative day #1  Lovenox was also started for DVT prophylaxis post operative day #1  On discharge date pt was cleared by PT and the medicine team and determined to be safe for discharge  Daily discussion was had with the patient, nursing staff, orthopaedic team, and family members if present  All questions were answered to the patients satisifaction  0   Lab Value Date/Time    HGB 11 6 (L) 08/07/2019 0515    HGB 11 5 (L) 08/06/2019 0546    HGB 16 8 07/08/2019 1014       Acute blood loss anemia, as evidenced by a drop greater than 2 gm in Hgb (16 8 to 11 5 to 11 6)  Vital signs remained stable and pt was resuscitated with IVF as needed   Body mass index is 36 98 kg/m²  moderately obese  Recommend behavior modifications, nutrition and physical activity  Discharge Instructions: The patient was discharged weight bearing as tolerated to the right lower extremity  Lovenox will be continued for 28 days  Continue PT/OT  Take pain medications as instructed  Discharge Medications: For the complete list of discharge medications, please refer to the patient's medication reconciliation

## 2019-08-07 NOTE — PHYSICAL THERAPY NOTE
Physical Therapy Progress Note     08/07/19 1000   Pain Assessment   Pain Assessment 0-10   Pain Score 7   Pain Location Hip;Knee   Pain Orientation Right   Hospital Pain Intervention(s) Repositioned; Ambulation/increased activity; Rest   Response to Interventions tolerated   Restrictions/Precautions   RLE Weight Bearing Per Order WBAT   Other Precautions Pain; Fall Risk;WBS;THR   Subjective   Subjective Pt complained of increased pain in R knee this session, and demonstrates overall anxiety with mobility tasks  Transfers   Sit to Stand 4  Minimal assistance   Additional items Assist x 1; Armrests; Increased time required;Verbal cues   Stand to Sit 4  Minimal assistance   Additional items Assist x 1; Armrests; Increased time required;Verbal cues   Ambulation/Elevation   Gait pattern Excessively slow; Step to;Short stride; Foward flexed;Decreased R stance;Decreased foot clearance; Antalgic; Improper Weight shift   Gait Assistance 4  Minimal assist   Additional items Assist x 1  (+ chair follow)   Assistive Device Rolling walker   Distance 20', 50'   Balance   Static Sitting Fair +   Static Standing Fair   Ambulatory Poor +   Endurance Deficit   Endurance Deficit Yes   Endurance Deficit Description pain, fatigue   Activity Tolerance   Activity Tolerance Patient tolerated treatment well;Patient limited by fatigue;Patient limited by pain   Nurse Terri Madrid RN   Assessment   Prognosis Good   Problem List Decreased strength;Decreased range of motion;Decreased endurance; Impaired balance;Decreased mobility; Decreased safety awareness;Pain;Decreased coordination   Assessment Pt continues to be limited in mobility this session by complaints of increased pain & weakness in RLE  Pt also remains self limiting with mobility tasks, reporting inability to perform them before attempting  Pt does require assist to perform R knee extension to assist with RLE positioning & donning briefs & shorts at beginning of session    Pt able to maintain standing balance while performing dynamic UE movements to complete dression with AYON  Occasional UE support required to maintain balance  Pt then ambulated up to household distances with seated rest between trials to recover due to reported fatigue & pain  Applied ACE wrap to R knee to improve support as pt reports using one at home prior to surgery  Pt verbalized improved confidence after & reduced pain  Took it off after ambulation due to pt reporting pounding & feeling his pulse behind his knee while knees bent  During ambulation, pt required occasional standing rests to complete increased distance, and demonstrated toe walking & limited knee extension while weight bearing on RLE  Will require further instruction and education to improve gait quality before attempting stair training next session if appropriate  Will continue to benefit from therapy services at this time to progress mobility, reduce pain, improve pt confidence, and attempt stairs when appropriate to allow pt access to home & community at discharge  Barriers to Discharge Inaccessible home environment   Barriers to Discharge Comments limited ambulation & RUDY at this time   Goals   Patient Goals to stay in hospital to get better   STG Expiration Date 08/17/19   Treatment Day 3   Plan   Treatment/Interventions Functional transfer training;LE strengthening/ROM; Elevations; Therapeutic exercise; Endurance training;Patient/family training;Equipment eval/education; Bed mobility;Gait training   Progress Progressing toward goals   PT Frequency 7x/wk; Twice a day   Recommendation   Recommendation Home with family support; Outpatient PT   Equipment Recommended Lesly Barfield   PT - OK to Discharge Breanna Hamlin, PTA

## 2019-08-07 NOTE — OCCUPATIONAL THERAPY NOTE
Occupational Therapy Treatment Note     08/07/19 1027   Restrictions/Precautions   Weight Bearing Precautions Per Order Yes   RUE Weight Bearing Per Order WBAT   LUE Weight Bearing Per Order WBAT   RLE Weight Bearing Per Order WBAT   LLE Weight Bearing Per Order WBAT   Braces or Orthoses   (ABDUCTION PILLOW)   Other Precautions Fall Risk;Pain;WBS   Lifestyle   Autonomy I ADLS AND MOBILITY - I IADLS - SHARES HOMEMAKING WITH SPOUSE   Reciprocal Relationships SUPPORTIVE FAMILY   Service to Others RETIRED   Intrinsic Gratification MOSTLY SEDENTARY    Pain Assessment   Pain Assessment 0-10   Pain Type Acute pain   Pain Location Hip   Pain Orientation Right   ADL   Where Assessed Sitting at sink   Grooming Assistance 5  Supervision/Setup   Grooming Deficit Wash/dry hands; Wash/dry face   UB Bathing Assistance 5  Supervision/Setup   UB Bathing Deficit Increased time to complete   UB Dressing Assistance 4  Minimal Assistance   UB Dressing Deficit Thread LUE;Setup; Thread RUE   LB Dressing Assistance 3  Moderate Assistance   LB Dressing Deficit Thread RLE into pants; Thread LLE into pants;Pull up over hips   Toileting Assistance  3  Moderate Assistance   Toileting Deficit Use of bedpan/urinal setup   Toileting Comments urinal set up    Transfers   Sit to Stand 4  Minimal assistance   Additional items Assist x 1   Stand to Sit 4  Minimal assistance   Additional items Assist x 1   Functional Mobility   Functional Mobility 4  Minimal assistance   Additional items Rolling walker   Cognition   Overall Cognitive Status WFL   Orientation Level Oriented X4   Assessment   Assessment Pt participated in occupational therapy with focus on activity tolerance, bed mob, unsupported sitting balance and tolerance for pt engagement in functional self-care task/oral care  Pt cleared by FAIZAN/Elpidio for pt participation in occupational therapy    Pt received sitting out of bed to bedside chair and agreeable to therapy following pt Identifiers confirmed  Pt requires assist/SBA and increased time for UB/LB self-care 2* pt report of significant R LE knee pain  pt able to demonstrate good carryover with long handle reacher this session   pt    Plan   Treatment Interventions ADL retraining;Functional transfer training   Goal Expiration Date 08/11/19   Treatment Day 1   OT Frequency 3-5x/wk   Recommendation   OT Discharge Recommendation Home with family support   Barthel Index   Feeding 10   Bathing 0   Grooming Score 5   Dressing Score 5   Bladder Score 10   Bowels Score 10   Toilet Use Score 5   Transfers (Bed/Chair) Score 10   Mobility (Level Surface) Score 0   Stairs Score 0   Barthel Index Score 55       Zahra AKBARA/L

## 2019-08-07 NOTE — PHYSICAL THERAPY NOTE
Physical Therapy Progress Note     08/07/19 1538   Pain Assessment   Pain Assessment FLACC   Pain Rating: FLACC (Rest) - Face 0   Pain Rating: FLACC (Rest) - Legs 0   Pain Rating: FLACC (Rest) - Activity 0   Pain Rating: FLACC (Rest) - Cry 1   Pain Rating: FLACC (Rest) - Consolability 0   Score: FLACC (Rest) 1   Pain Rating: FLACC (Activity) - Face 2   Pain Rating: FLACC (Activity) - Legs 1   Pain Rating: FLACC (Activity) - Activity 1   Restrictions/Precautions   RLE Weight Bearing Per Order WBAT   Other Precautions Pain; Fall Risk;WBS;THR   Subjective   Subjective Pt encountered seated in recliner, initially pleasant and agreeable, but became fatigued and discouraged by lack of progress this session  Extensive emotional support and encouragement given after gait trials  Wife present & reports pt has not been eating or drinking much in last 2 days and has been sleeping most of the day  Pt reports he will continue to do his best tomorrow  Transfers   Sit to Stand 4  Minimal assistance   Additional items Assist x 1; Armrests; Increased time required   Stand to Sit 4  Minimal assistance   Additional items Assist x 1; Armrests; Increased time required   Ambulation/Elevation   Gait pattern Excessively slow; Step to;Short stride; Foward flexed; Inconsistent davi; Shuffling;Decreased R stance;Decreased foot clearance; Antalgic; Improper Weight shift  (unable to achieve R knee extension)   Gait Assistance 4  Minimal assist  (min-mod A as he fatigued)   Additional items Assist x 1  (+ chair follow)   Assistive Device Rolling walker   Distance 10', 15' x 2   Balance   Static Sitting Fair +   Static Standing Fair   Ambulatory Poor +   Endurance Deficit   Endurance Deficit Yes   Endurance Deficit Description pain, fatigue, RLE weakness   Activity Tolerance   Activity Tolerance Patient limited by fatigue;Patient limited by pain   Nurse [address], RN   Exercises   [employer]; Right;5 reps   Hip Flexion Sitting;AAROM; Right;15 reps   Knee AROM Short Arc Quad Sitting;10 reps;AAROM; Right   Knee AROM Long Arc Quad Sitting;10 reps;AAROM; Right   Ankle Pumps Sitting;10 reps;AAROM; Right   Assessment   Prognosis Good   Problem List Decreased strength;Decreased range of motion;Decreased endurance; Impaired balance;Decreased mobility; Decreased safety awareness;Pain;Decreased coordination   Assessment Pt continues to demonstrate difficulty with ambulation due to RLE weakness, pain, and lack of full knee extension during stance phase  Pt quickly became discouraged by lack of progress & feelings of weakness this session, which also impacted his participation this session  Extensive emotional support given during session & encouragement given by this PTA & pt's wife  Extensive seated rests given between trials to recover & attempt to use urinal   After ambulating, pt performed LE exercises as noted above to improve RLE strength, AROM, and attempt to improve R knee extension to facilitate improved gait quality  Will continue to benefit from therapy services to progress mobility, strength, and activity tolerance to ensure safe discharge home when appropriate  If pt unable to progress mobility, may require inpatient rehab to address deficits before returning home  Barriers to Discharge Inaccessible home environment   Barriers to Discharge Comments increased ambulation, stair training   Goals   Patient Goals to do better tomorrow   STG Expiration Date 08/17/19   Treatment Day 4   Plan   Treatment/Interventions Functional transfer training;LE strengthening/ROM; Elevations; Therapeutic exercise; Endurance training;Patient/family training;Equipment eval/education; Bed mobility;Gait training   Progress Progressing toward goals   PT Frequency 7x/wk; Twice a day   Recommendation   Recommendation Home with family support; Outpatient PT  (pending progress)   Equipment Recommended Vesta Patterson   PT - OK to Discharge Breanna Sheikh PTA

## 2019-08-07 NOTE — PLAN OF CARE
Problem: OCCUPATIONAL THERAPY ADULT  Goal: Performs self-care activities at highest level of function for planned discharge setting  See evaluation for individualized goals  Description  Treatment Interventions: ADL retraining, Functional transfer training, Endurance training, Patient/family training, Equipment evaluation/education, Compensatory technique education, Activityengagement          See flowsheet documentation for full assessment, interventions and recommendations  Outcome: Progressing  Note:   Limitation: Decreased ADL status, Decreased endurance, Decreased self-care trans, Decreased high-level ADLs  Prognosis: Good  Assessment: Pt participated in occupational therapy with focus on activity tolerance, bed mob, unsupported sitting balance and tolerance for pt engagement in functional self-care task/oral care  Pt cleared by RN/Elpidio for pt participation in occupational therapy  Pt received sitting out of bed to bedside chair and agreeable to therapy following pt Identifiers confirmed  Pt requires assist/SBA and increased time for UB/LB self-care 2* pt report of significant R LE knee pain  pt able to demonstrate good carryover with long handle reacher this session   pt      OT Discharge Recommendation: Home with family support

## 2019-08-08 ENCOUNTER — HOSPITAL ENCOUNTER (INPATIENT)
Facility: HOSPITAL | Age: 69
LOS: 6 days | Discharge: HOME | DRG: 560 | End: 2019-08-14
Attending: PHYSICAL MEDICINE & REHABILITATION | Admitting: PHYSICAL MEDICINE & REHABILITATION
Payer: MEDICARE

## 2019-08-08 VITALS
BODY MASS INDEX: 38.34 KG/M2 | TEMPERATURE: 98.6 F | SYSTOLIC BLOOD PRESSURE: 141 MMHG | WEIGHT: 298.72 LBS | DIASTOLIC BLOOD PRESSURE: 90 MMHG | RESPIRATION RATE: 18 BRPM | OXYGEN SATURATION: 95 % | HEART RATE: 91 BPM | HEIGHT: 74 IN

## 2019-08-08 DIAGNOSIS — G47.9 DIFFICULTY SLEEPING: ICD-10-CM

## 2019-08-08 DIAGNOSIS — K59.01 SLOW TRANSIT CONSTIPATION: ICD-10-CM

## 2019-08-08 DIAGNOSIS — Z96.641 STATUS POST RIGHT HIP REPLACEMENT: Primary | ICD-10-CM

## 2019-08-08 DIAGNOSIS — M92.523 OSGOOD-SCHLATTER'S DISEASE OF BOTH KNEES: ICD-10-CM

## 2019-08-08 PROBLEM — Z96.642 STATUS POST TOTAL HIP REPLACEMENT, LEFT: Status: ACTIVE | Noted: 2019-08-08

## 2019-08-08 LAB
ANION GAP SERPL CALCULATED.3IONS-SCNC: 6 MMOL/L (ref 4–13)
BUN SERPL-MCNC: 14 MG/DL (ref 5–25)
CALCIUM SERPL-MCNC: 8.2 MG/DL (ref 8.3–10.1)
CHLORIDE SERPL-SCNC: 102 MMOL/L (ref 100–108)
CO2 SERPL-SCNC: 28 MMOL/L (ref 21–32)
CREAT SERPL-MCNC: 0.79 MG/DL (ref 0.6–1.3)
ERYTHROCYTE [DISTWIDTH] IN BLOOD BY AUTOMATED COUNT: 13.1 % (ref 11.6–15.1)
GFR SERPL CREATININE-BSD FRML MDRD: 90 ML/MIN/1.73SQ M
GLUCOSE SERPL-MCNC: 109 MG/DL (ref 65–140)
HCT VFR BLD AUTO: 34.1 % (ref 36.5–49.3)
HGB BLD-MCNC: 11.5 G/DL (ref 12–17)
MCH RBC QN AUTO: 31 PG (ref 26.8–34.3)
MCHC RBC AUTO-ENTMCNC: 33.7 G/DL (ref 31.4–37.4)
MCV RBC AUTO: 92 FL (ref 82–98)
PLATELET # BLD AUTO: 139 THOUSANDS/UL (ref 149–390)
PMV BLD AUTO: 10.9 FL (ref 8.9–12.7)
POTASSIUM SERPL-SCNC: 3.9 MMOL/L (ref 3.5–5.3)
RBC # BLD AUTO: 3.71 MILLION/UL (ref 3.88–5.62)
SODIUM SERPL-SCNC: 136 MMOL/L (ref 136–145)
WBC # BLD AUTO: 8.68 THOUSAND/UL (ref 4.31–10.16)

## 2019-08-08 PROCEDURE — 99024 POSTOP FOLLOW-UP VISIT: CPT | Performed by: PHYSICIAN ASSISTANT

## 2019-08-08 PROCEDURE — 99223 1ST HOSP IP/OBS HIGH 75: CPT | Performed by: PHYSICAL MEDICINE & REHABILITATION

## 2019-08-08 PROCEDURE — 97535 SELF CARE MNGMENT TRAINING: CPT

## 2019-08-08 PROCEDURE — 97116 GAIT TRAINING THERAPY: CPT

## 2019-08-08 PROCEDURE — 80048 BASIC METABOLIC PNL TOTAL CA: CPT | Performed by: ORTHOPAEDIC SURGERY

## 2019-08-08 PROCEDURE — 85027 COMPLETE CBC AUTOMATED: CPT | Performed by: ORTHOPAEDIC SURGERY

## 2019-08-08 PROCEDURE — NC001 PR NO CHARGE: Performed by: ORTHOPAEDIC SURGERY

## 2019-08-08 PROCEDURE — 97110 THERAPEUTIC EXERCISES: CPT

## 2019-08-08 PROCEDURE — 99222 1ST HOSP IP/OBS MODERATE 55: CPT | Performed by: PSYCHIATRY & NEUROLOGY

## 2019-08-08 PROCEDURE — 1123F ACP DISCUSS/DSCN MKR DOCD: CPT | Performed by: ORTHOPAEDIC SURGERY

## 2019-08-08 PROCEDURE — 97530 THERAPEUTIC ACTIVITIES: CPT

## 2019-08-08 RX ORDER — DOCUSATE SODIUM 100 MG/1
100 CAPSULE, LIQUID FILLED ORAL 2 TIMES DAILY
Status: DISCONTINUED | OUTPATIENT
Start: 2019-08-09 | End: 2019-08-14 | Stop reason: HOSPADM

## 2019-08-08 RX ORDER — OXYCODONE HYDROCHLORIDE 5 MG/1
5 TABLET ORAL EVERY 4 HOURS PRN
Status: DISCONTINUED | OUTPATIENT
Start: 2019-08-08 | End: 2019-08-13

## 2019-08-08 RX ORDER — METHOCARBAMOL 500 MG/1
500 TABLET, FILM COATED ORAL EVERY 6 HOURS PRN
Status: DISCONTINUED | OUTPATIENT
Start: 2019-08-08 | End: 2019-08-09

## 2019-08-08 RX ORDER — ACETAMINOPHEN 325 MG/1
650 TABLET ORAL EVERY 6 HOURS PRN
Status: DISCONTINUED | OUTPATIENT
Start: 2019-08-08 | End: 2019-08-14 | Stop reason: HOSPADM

## 2019-08-08 RX ORDER — LEVOTHYROXINE SODIUM 0.07 MG/1
150 TABLET ORAL
Status: DISCONTINUED | OUTPATIENT
Start: 2019-08-09 | End: 2019-08-14 | Stop reason: HOSPADM

## 2019-08-08 RX ORDER — OXYCODONE HYDROCHLORIDE 10 MG/1
10 TABLET ORAL EVERY 4 HOURS PRN
Status: DISCONTINUED | OUTPATIENT
Start: 2019-08-08 | End: 2019-08-13

## 2019-08-08 RX ORDER — ONDANSETRON 2 MG/ML
4 INJECTION INTRAMUSCULAR; INTRAVENOUS EVERY 6 HOURS PRN
Status: DISCONTINUED | OUTPATIENT
Start: 2019-08-08 | End: 2019-08-12

## 2019-08-08 RX ORDER — ATORVASTATIN CALCIUM 10 MG/1
10 TABLET, FILM COATED ORAL
Status: DISCONTINUED | OUTPATIENT
Start: 2019-08-09 | End: 2019-08-14 | Stop reason: HOSPADM

## 2019-08-08 RX ORDER — SENNOSIDES 8.6 MG
1 TABLET ORAL DAILY
Status: DISCONTINUED | OUTPATIENT
Start: 2019-08-09 | End: 2019-08-14 | Stop reason: HOSPADM

## 2019-08-08 RX ORDER — CALCIUM CARBONATE 200(500)MG
1000 TABLET,CHEWABLE ORAL DAILY PRN
Status: DISCONTINUED | OUTPATIENT
Start: 2019-08-08 | End: 2019-08-14 | Stop reason: HOSPADM

## 2019-08-08 RX ADMIN — ENOXAPARIN SODIUM 40 MG: 40 INJECTION SUBCUTANEOUS at 05:54

## 2019-08-08 RX ADMIN — OXYCODONE HYDROCHLORIDE 5 MG: 5 TABLET ORAL at 00:00

## 2019-08-08 RX ADMIN — OXYCODONE HYDROCHLORIDE 10 MG: 10 TABLET ORAL at 08:18

## 2019-08-08 RX ADMIN — DOCUSATE SODIUM 100 MG: 100 CAPSULE, LIQUID FILLED ORAL at 08:18

## 2019-08-08 RX ADMIN — OXYCODONE HYDROCHLORIDE 10 MG: 10 TABLET ORAL at 13:27

## 2019-08-08 RX ADMIN — OXYCODONE HYDROCHLORIDE 10 MG: 10 TABLET ORAL at 20:48

## 2019-08-08 RX ADMIN — METHOCARBAMOL 500 MG: 500 TABLET, FILM COATED ORAL at 22:16

## 2019-08-08 RX ADMIN — DOCUSATE SODIUM 100 MG: 100 CAPSULE, LIQUID FILLED ORAL at 17:15

## 2019-08-08 RX ADMIN — ATORVASTATIN CALCIUM 10 MG: 10 TABLET, FILM COATED ORAL at 17:15

## 2019-08-08 RX ADMIN — LEVOTHYROXINE SODIUM 150 MCG: 75 TABLET ORAL at 05:54

## 2019-08-08 RX ADMIN — SENNOSIDES 8.6 MG: 8.6 TABLET, FILM COATED ORAL at 08:18

## 2019-08-08 RX ADMIN — METHOCARBAMOL 500 MG: 500 TABLET, FILM COATED ORAL at 08:18

## 2019-08-08 NOTE — PHYSICAL THERAPY NOTE
Physical Therapy Progress Note     08/08/19 1140   Pain Assessment   Pain Assessment 0-10   Pain Score 6   Pain Location Hip   Pain Orientation Right   Hospital Pain Intervention(s) Cold applied;Repositioned;Rest;Elevated; Ambulation/increased activity   Response to Interventions tolerated   Restrictions/Precautions   RLE Weight Bearing Per Order WBAT   Other Precautions Pain; Fall Risk;WBS;THR   Subjective   Subjective Pt encountered seated in recliner, more pleasant and alert since last session, but reported feeling very frustrated with progress before gettting OOB this AM   More motivated to increase ambulation this session  Transfers   Sit to Stand 5  Supervision   Additional items Assist x 1; Armrests; Increased time required   Stand to Sit 4  Minimal assistance   Additional items Assist x 1; Armrests; Increased time required   Ambulation/Elevation   Gait pattern Excessively slow; Step to;Short stride;Decreased R stance;Decreased foot clearance; Antalgic; Improper Weight shift   Gait Assistance 4  Minimal assist   Additional items Assist x 1  (+ chair follow)   Assistive Device Rolling walker   Distance 70', 50'   Balance   Static Sitting Fair +   Static Standing Fair   Ambulatory Poor +   Endurance Deficit   Endurance Deficit Yes   Endurance Deficit Description pain, fatigue   Activity Tolerance   Activity Tolerance Patient tolerated treatment well;Patient limited by fatigue;Patient limited by pain   Nurse [address], RN   Assessment   Prognosis Good   Problem List Decreased strength;Decreased range of motion;Decreased endurance; Impaired balance;Decreased mobility; Decreased safety awareness;Pain;Decreased coordination   Assessment Pt demonstrated improved mobility compared to quoc's sessions  Performed scooting & transferring from chair without physical assist today and maintained static standing balance without incident while getting washed by AYON    Pt continues to require assist to ambulate household distances due to decreased R knee extension, with toe walking as a result  Demonstrated improved weight bearing compared to yesterday's session, but must improve consistancy before attempting stair training  Discussed pt progress and current barriers with supervising PT & medical team before & after session, and currently recommending rehab due to increased assist required for mobility, decreased standing activity tolerance, and impaired RLE strength, weight bearing, and balance at this time  Barriers to Discharge Inaccessible home environment   Barriers to Discharge Comments increase imbulation, activity tolerance, and attempt stairs when appropriate   Goals   Patient Goals to get better & be more independent   STG Expiration Date 08/17/19   Treatment Day 5   Plan   Treatment/Interventions Functional transfer training;LE strengthening/ROM; Elevations; Therapeutic exercise; Endurance training;Patient/family training;Equipment eval/education; Bed mobility;Gait training   Progress Progressing toward goals   PT Frequency 7x/wk; Twice a day   Recommendation   Recommendation Post acute IP rehab  (pending progress)   Equipment Recommended Carlos James, PTA

## 2019-08-08 NOTE — SOCIAL WORK
CM met with pt  A post acute care recommendation was made by the care team for rehab  Discussed [facility] with pt  Pt requested [facility] and [employer] referrals  CM made referrals

## 2019-08-08 NOTE — PROGRESS NOTES
PHYSICAL MEDICINE AND REHABILITATION   PREADMISSION ASSESSMENT     Projected [facility] Diagnoses:  Impairment of mobility, safety and Activities of Daily Living (ADLs) due to Orthopedic Disorders:  08 51  Unilateral Hip Replacement   Etiology: Right Hip Osteoarthritis   Date of Onset: 8/5/19   Date of surgery: 8/5/19: Right Hip Total Arthroplasty     PATIENT INFORMATION  Name: Shaggy Spencer Phone #: [phone] (home)   Address: [address] [address]  YOB: 1950 Age: 68 y o  SS#   Marital Status: /Civil Union  Ethnicity:    Employment Status: retired  Extended Emergency Contact Information  Primary Emergency Contact: Aniya Dos Santos  Address: [facility] [address] [facility] [address], [address] [place] [address] Phone: [phone]  Relation: Spouse  Advance Directive: Level 1 Full Code, AD Unknown     INSURANCE/COVERAGE:     Primary Payor: MEDICARE / Plan: MEDICARE A AND B / Product Type: Medicare A & B Fee for Service /   Secondary Payer:[employer] Blue   Payer Contact:  Payer Contact:   Contact Phone:  Contact Phone:   Authorization #:   Coverage Dates:  LCD:   Medicare ID #: [id number]  Medicare Days:60/30/60  Medical Record #: [id number]    REFERRAL SOURCE:   Referring provider: Chandler Lamas MD  Referring facility: [address] [place] [address]   Room: [facility] [address]  PCP: Jordy Patel MD PCP phone number: [phone]    MEDICAL INFORMATION  HPI: Luba Lerma is a 68year old male with PMH of hypothyroidism, HTN, HLD, and Osgood Schlatter's disease of the bi-lateral knees  He was following with orthopedics for complaints of of bi-lateral knee pain with worsening pain in the bi-lateral groin right greater than left  On exam he was with significant restriction of rotation of his hips  Imaging showed moderate arthritic changes to bi-lateral hips    It was recommended that he undergo surgical intervention  Patient presented to [facility] on 8/5/19 for scheduled right hip total arthroplasty with Dr Janie Coreas  Prior to admission hemoglobin was 16 8 and it is 11 5 today  He was given IV fluids and has not needed transfusion  Platelets prior to admission were 195 and are 139 today  He has been closely monitored with ABLA and thrombocytopenia  He is hemodynamically stable at this time  He did have fevers up to 100 9 but did not have leukocytosis  He is afebrile  Post operatively patient was with hypotension that resolved with IV fluids  Acute post op pain is being managed with oxycodone  PT and OT have evaluated that patient and are recommending inpatient rehab as well as attending physician  He is medically clear for transfer today  Past Medical History:   Past Surgical History:    Allergies:     Past Medical History:   Diagnosis Date    Hyperthyroidism     Osteoarthritis     last assesed 6-6-16    Polyneuropathy     last assesed 5-8-17    Pure hypercholesterolemia     Wears glasses     Past Surgical History:   Procedure Laterality Date    BACK SURGERY      CHOLECYSTECTOMY      ND TOTAL HIP ARTHROPLASTY Right 8/5/2019    Procedure: ARTHROPLASTY HIP TOTAL;  Surgeon: Saul Rosas MD;  Location: [facility];  Service: Orthopedics    TONSILLECTOMY       No Known Allergies      Comorbidities: ABLA, hypotension, thrombocytopenia, acute pain and history of: HLD, HTN, and [place]-Schlatter's Disease    CURRENT VITAL SIGNS:   Temp:  [98 2 °F (36 8 °C)-98 8 °F (37 1 °C)] 98 6 °F (37 °C)  HR:  [] 91  Resp:  [18] 18  BP: (124-151)/(74-94) 141/90   Intake/Output Summary (Last 24 hours) at 8/8/2019 1509  Last data filed at 8/8/2019 1500  Gross per 24 hour   Intake 720 ml   Output 850 ml   Net -130 ml        LABORATORY RESULTS:      Lab Results   Component Value Date    HGB 11 5 (L) 08/08/2019    HCT 34 1 (L) 08/08/2019    WBC 8 68 08/08/2019     Lab Results   Component Value Date BUN 14 08/08/2019    K 3 9 08/08/2019     08/08/2019    CREATININE 0 79 08/08/2019     Lab Results   Component Value Date    PROTIME 13 4 07/08/2019    INR 1 06 07/08/2019        DIAGNOSTIC STUDIES:  Xr Chest Pa & Lateral    Result Date: 7/10/2019  Impression: No acute cardiopulmonary disease   Workstation performed: [id number]       PRECAUTIONS/SPECIAL NEEDS:  Tobacco:   Social History     Tobacco Use   Smoking Status Former Smoker   Smokeless Tobacco Never Used   , Alcohol:    Social History     Substance and Sexual Activity   Alcohol Use Never    Frequency: Never    Drinks per session: Patient refused    Binge frequency: Never   , Total Hip Precautions, Weight Bearing Precautions:  weight bearing as tolerated, Anticoagulation:  Lovenox 40 mg Q24hrs, Edema Management, Safety Concerns, Pain Management and Fall Precautions     MEDICATIONS:     Current Facility-Administered Medications:     acetaminophen (TYLENOL) tablet 650 mg, 650 mg, Oral, Q6H PRN, Virgil Vasquez MD, 650 mg at 08/07/19 1542    atorvastatin (LIPITOR) tablet 10 mg, 10 mg, Oral, Daily With Love Smiley MD, 10 mg at 08/07/19 1542    calcium carbonate (TUMS) chewable tablet 1,000 mg, 1,000 mg, Oral, Daily PRN, Virgil Vasquez MD, 1,000 mg at 08/05/19 1954    ceFAZolin (ANCEF) IVPB (premix) 2,000 mg, 2,000 mg, Intravenous, Once **AND** ceFAZolin (ANCEF) IVPB (premix) 1,000 mg, 1,000 mg, Intravenous, Once, Virgil Vasquez MD    chlorhexidine (PERIDEX) 0 12 % oral rinse 15 mL, 15 mL, Swish & Spit, Once, Virgil Vasquez MD    docusate sodium (COLACE) capsule 100 mg, 100 mg, Oral, BID, Virgil Vasquez MD, 100 mg at 08/08/19 0818    enoxaparin (LOVENOX) subcutaneous injection 40 mg, 40 mg, Subcutaneous, Q24H, Virgil Vasquez MD, 40 mg at 08/08/19 0554    lactated ringers infusion, 75 mL/hr, Intravenous, Continuous, Virgil Vasquez MD, Last Rate: 75 mL/hr at 08/06/19 0211, 75 mL/hr at 08/06/19 0211    lactated ringers infusion, 125 mL/hr, Intravenous, Continuous, Angeles Russo MD, Last Rate: 125 mL/hr at 08/05/19 0920, 125 mL/hr at 08/05/19 0920    lactated ringers infusion, 125 mL/hr, Intravenous, Continuous, Angeles Russo MD, Stopped at 08/06/19 0130    levothyroxine tablet 150 mcg, 150 mcg, Oral, Early Morning, Angeles Russo MD, 150 mcg at 08/08/19 0554    methocarbamol (ROBAXIN) tablet 500 mg, 500 mg, Oral, Q6H PRN, Debra Kimbrough MD, 500 mg at 08/08/19 0818    ondansetron (ZOFRAN) injection 4 mg, 4 mg, Intravenous, Q6H PRN, Angeles Russo MD    oxyCODONE (ROXICODONE) immediate release tablet 10 mg, 10 mg, Oral, Q4H PRN, Angeles Russo MD, 10 mg at 08/08/19 1327    oxyCODONE (ROXICODONE) IR tablet 5 mg, 5 mg, Oral, Q4H PRN, Angeles Russo MD, 5 mg at 08/08/19 0000    senna (SENOKOT) tablet 8 6 mg, 1 tablet, Oral, Daily, Angeles Russo MD, 8 6 mg at 08/08/19 0818    SKIN INTEGRITY:   Right hip incision with mepilex dressing C/D/I    PRIOR LEVEL OF FUNCTION:  He lives in a(n) single family home  Bucky Caba is  and lives with their spouse  Self Care: Independent, Indoor Mobility: Independent, Stairs (in/outdoor): Independent and Cognition: Independent    HOME ENVIRONMENT:  The living area: can live on one level  There 4 steps to enter the home  The patient will have 24 hour supervision/physical assistance available upon discharge  PREVIOUS DME:  Equipment in home (previous DME): None    FUNCTIONAL STATUS:  Physical Therapy Occupational Therapy Speech Therapy   As per PTA:       08/08/19 1140   Pain Assessment   Pain Assessment 0-10   Pain Score 6   Pain Location Hip   Pain Orientation Right   Hospital Pain Intervention(s) Cold applied;Repositioned;Rest;Elevated; Ambulation/increased activity   Response to Interventions tolerated   Restrictions/Precautions   RLE Weight Bearing Per Order WBAT   Other Precautions Pain; Fall Risk;WBS;THR   Subjective   Subjective Pt encountered seated in recliner, more pleasant and alert since last session, but reported feeling very frustrated with progress before gettting OOB this AM   More motivated to increase ambulation this session  Transfers   Sit to Stand 5  Supervision   Additional items Assist x 1; Armrests; Increased time required   Stand to Sit 4  Minimal assistance   Additional items Assist x 1; Armrests; Increased time required   Ambulation/Elevation   Gait pattern Excessively slow; Step to;Short stride;Decreased R stance;Decreased foot clearance; Antalgic; Improper Weight shift   Gait Assistance 4  Minimal assist   Additional items Assist x 1  (+ chair follow)   Assistive Device Rolling walker   Distance 70', 50'   Balance   Static Sitting Fair +   Static Standing Fair   Ambulatory Poor +   Endurance Deficit   Endurance Deficit Yes   Endurance Deficit Description pain, fatigue   Activity Tolerance   Activity Tolerance Patient tolerated treatment well;Patient limited by fatigue;Patient limited by pain   Nurse [address], RN   Assessment   Prognosis Good   Problem List Decreased strength;Decreased range of motion;Decreased endurance; Impaired balance;Decreased mobility; Decreased safety awareness;Pain;Decreased coordination   Assessment Pt demonstrated improved mobility compared to yesteday's sessions  Performed scooting & transferring from chair without physical assist today and maintained static standing balance without incident while getting washed by AYON  Pt continues to require assist to ambulate household distances due to decreased R knee extension, with toe walking as a result  Demonstrated improved weight bearing compared to yesterday's session, but must improve consistancy before attempting stair training    Discussed pt progress and current barriers with supervising PT & medical team before & after session, and currently recommending rehab due to increased assist required for mobility, decreased standing activity tolerance, and impaired RLE strength, weight bearing, and balance at this time  As per OT:    08/07/19 1027    Restrictions/Precautions   Weight Bearing Precautions Per Order Yes   RUE Weight Bearing Per Order WBAT   LUE Weight Bearing Per Order WBAT   RLE Weight Bearing Per Order WBAT   LLE Weight Bearing Per Order WBAT   Braces or Orthoses    (ABDUCTION PILLOW)   Other Precautions Fall Risk;Pain;WBS   Lifestyle   Autonomy I ADLS AND MOBILITY - I IADLS - SHARES HOMEMAKING WITH SPOUSE   Reciprocal Relationships SUPPORTIVE FAMILY   Service to Others RETIRED   Intrinsic Gratification MOSTLY SEDENTARY    Pain Assessment   Pain Assessment 0-10   Pain Type Acute pain   Pain Location Hip   Pain Orientation Right   ADL   Where Assessed Sitting at sink   Grooming Assistance 5  Supervision/Setup   Grooming Deficit Wash/dry hands; Wash/dry face   UB Bathing Assistance 5  Supervision/Setup   UB Bathing Deficit Increased time to complete   UB Dressing Assistance 4  Minimal Assistance   UB Dressing Deficit Thread LUE;Setup; Thread RUE   LB Dressing Assistance 3  Moderate Assistance   LB Dressing Deficit Thread RLE into pants; Thread LLE into pants;Pull up over hips   Toileting Assistance  3  Moderate Assistance   Toileting Deficit Use of bedpan/urinal setup   Toileting Comments urinal set up    Transfers   Sit to Stand 4  Minimal assistance   Additional items Assist x 1   Stand to Sit 4  Minimal assistance   Additional items Assist x 1   Functional Mobility   Functional Mobility 4  Minimal assistance   Additional items Rolling walker   Cognition   Overall Cognitive Status WFL   Orientation Level Oriented X4   Assessment   Assessment Pt participated in occupational therapy with focus on activity tolerance, bed mob, unsupported sitting balance and tolerance for pt engagement in functional self-care task/oral care  Pt cleared by FAIZAN/Elpidio for pt participation in occupational therapy    Pt received sitting out of bed to bedside chair and agreeable to therapy following pt Identifiers confirmed  Pt requires assist/SBA and increased time for UB/LB self-care 2* pt report of significant R LE knee pain  pt able to demonstrate good carryover with long handle reacher this session  pt           N/A     CURRENT GAP IN FUNCTION  Prior to admission patient was completely independent with functional mobility, ADLs, and IADLS including driving without and assistive device  Estimated length of stay: 10 to 14 days    Anticipated Post-Discharge Disposition/Treatment  Disposition: Return to previous home/apartment  Outpatient Services: Physical Therapy (PT) and Occupational Therapy (OT)    BARRIERS TO DISCHARGE  Lovenox, Weakness, Pain, Balance Difficulty, Fatigue, Home Accessibility, Caregiver Accessibility, Financial Resources, Equipment Needs and Resource Availability    INTERVENTIONS FOR DISCHARGE  Adaptive equipment, Patient/Family/Caregiver Education, Community Resources, Financial Assistance, Arrange DME needs, Medication Changes as per MD and Therapy exercises    REQUIRED THERAPY:  Patient will require PT and OT 90 minutes each per day, five days per week to achieve rehab goals  REQUIRED FUNCTIONAL AND MEDICAL MANAGEMENT FOR INPATIENT REHABILITATION:  Skin:  Right hip incision with mepilex dressing C/D/I , Pain Management: Overall pain is well controlled, Deep Vein Thrombosis (DVT) Prophylaxis:  low molecular weight heparin and SCD's while in bed, nursing management for education, internal medicine to monitor and manage medical conditions, PM&R to maximize function and provide medical oversight, PT/OT intervention, patient and family education and training, neuropsychology consult, and any other consults as needed  RECOMMENDED LEVEL OF CARE:  Pearl Fatima was following with orthopedics for complaints of of bi-lateral knee pain with worsening pain in the bi-lateral groin right greater than left  On exam he was with significant restriction of rotation of his hips    Imaging showed moderate arthritic changes to bi-lateral hips  He presented to [address] [place] [address] on 8/5/19 for scheduled right hip total arthroplasty with Dr Ermelinda Cobos  Prior to admission hemoglobin was 16 8 and it is 11 5 today  He was given IV fluids and has not needed transfusion  Platelets prior to admission were 195 and are 139 today  He has been closely monitored with ABLA and thrombocytopenia  He is hemodynamically stable at this time  He did have fevers up to 100 9 but did not have leukocytosis  He is afebrile  Post operatively patient was with hypotension that resolved with IV fluids  Acute post op pain is being managed with oxycodone  Prior to admission patient was completely independent with functional mobility, ADLs, and IADLS including driving without and assistive device  Currently he is a min assist with transfers and ambulation with rolling walker as well as sup to min with upper body ADLs and mod assist with lower body ADLs  Nursing is being recommended for education, internal medicine to monitor and manage medical conditions, PM&R to maximize function and provide medical oversight, and inpatient rehab to maximize self care and mobility to supervision to modified independent upon discharge to home with the support of his wife

## 2019-08-08 NOTE — OCCUPATIONAL THERAPY NOTE
Occupational Therapy Treatment Note:       08/08/19 1033   Restrictions/Precautions   RLE Weight Bearing Per Order WBAT   Other Precautions   (P THP, abd pillow)   Pain Assessment   Pain Assessment 0-10   Pain Score 6   Transfers   Sit to Stand 5  Supervision  (from taller  reclining arm chair)   Stand to Sit 4  Minimal assistance   Stand pivot 4  Minimal assistance   Functional Mobility   Functional Mobility 4  Minimal assistance   Additional Comments vc's for reassurance and for technique with rw   Additional items Rolling walker   Cognition   Arousal/Participation Alert; Cooperative   Attention Attends with cues to redirect   Memory Decreased recall of precautions   Following Commands Follows one step commands without difficulty   Comments pt requires mod cues to follow thp's during adl session  pt reports alck of appetite, and feeling shaky with mobility and dizzy when sitting in chair  Activity Tolerance   Activity Tolerance Patient tolerated treatment well   Assessment   Assessment pt participated in am ot session and was seen focusing on  adls seated oob in chair, functional sit to from stand transfers using rw and standing tolerence  pt required asst x 1 for all mobility and increased time  pt was able to use dressing stick with ease to doff b socks  pt used rigid sock aide with min asst for allignment  pt with fair immediate recall   Plan   Treatment Interventions ADL retraining;Functional transfer training; Activityengagement;Equipment evaluation/education;Patient/family training; Endurance training   Goal Expiration Date 08/11/19   Treatment Day 2   OT Frequency 3-5x/wk   Recommendation   OT Discharge Recommendation Short Term Rehab   Barthel Index   Feeding 10   Bathing 0   Grooming Score 5   Dressing Score 5   Bladder Score 10   Bowels Score 10   Toilet Use Score 5   Transfers (Bed/Chair) Score 10   Mobility (Level Surface) Score 0   Stairs Score 0   Barthel Index Score 55   Modified [place] Scale Modified Barrow Scale 4   pt was sba grooming, sba ub bathing and dressing, mod asst lb bathing and dressing using dressing stick and rigid sock aide for b sock raul doff seated in chair after set up

## 2019-08-08 NOTE — PROGRESS NOTES
08/08/19 1500   Clinical Encounter Type   Visited With Patient and family together   Routine Visit Introduction   Family Spiritual Encounters   Family Coping Accepting

## 2019-08-08 NOTE — PROGRESS NOTES
08/08/19 1500   Spiritual Beliefs/Perceptions   Support Systems Spouse/significant other   Stress Factors   Patient Stress Factors Health changes   Family Stress Factors None identified   Coping Responses   Patient Coping Open/discussion   Family Coping Accepting   Plan of Care   Comments Pt  visited will be having surgey done on Monday, provided  empathically listening, and a caring presence     Assessment Completed by: Unit visit

## 2019-08-08 NOTE — PROGRESS NOTES
Orthopedics Progress / Post-op Note  Sara Roth 68 y o  male MRN: [id number]  Unit/Bed#: -01      Subjective:  68 y o male post operative day 3 right posterior hip arthroplasty  Patient states he is slow to progress with physical therapy  Denies any new pain or symptoms in the right hip  No acute issues overnight        Objective:    Labs:  0   Lab Value Date/Time    HCT 35 5 (L) 08/07/2019 0515    HCT 35 1 (L) 08/06/2019 0546    HCT 50 5 (H) 07/08/2019 1014    HGB 11 6 (L) 08/07/2019 0515    HGB 11 5 (L) 08/06/2019 0546    HGB 16 8 07/08/2019 1014    INR 1 06 07/08/2019 1014    WBC 9 01 08/07/2019 0515    WBC 6 12 08/06/2019 0546    WBC 4 87 07/08/2019 1014    CRP <3 0 07/08/2019 1014       Meds:    Current Facility-Administered Medications:     acetaminophen (TYLENOL) tablet 650 mg, 650 mg, Oral, Q6H PRN, Greg Xiong MD, 650 mg at 08/07/19 1542    atorvastatin (LIPITOR) tablet 10 mg, 10 mg, Oral, Daily With Yaa Mathias MD, 10 mg at 08/07/19 1542    calcium carbonate (TUMS) chewable tablet 1,000 mg, 1,000 mg, Oral, Daily PRN, Greg Xiong MD, 1,000 mg at 08/05/19 1954    ceFAZolin (ANCEF) IVPB (premix) 2,000 mg, 2,000 mg, Intravenous, Once **AND** ceFAZolin (ANCEF) IVPB (premix) 1,000 mg, 1,000 mg, Intravenous, Once, Greg Xiong MD    chlorhexidine (PERIDEX) 0 12 % oral rinse 15 mL, 15 mL, Swish & Spit, Once, Greg Xiong MD    docusate sodium (COLACE) capsule 100 mg, 100 mg, Oral, BID, Greg Xiong MD, 100 mg at 08/07/19 1724    enoxaparin (LOVENOX) subcutaneous injection 40 mg, 40 mg, Subcutaneous, Q24H, Greg Xiong MD, 40 mg at 08/08/19 0554    lactated ringers infusion, 75 mL/hr, Intravenous, Continuous, Greg Xiong MD, Last Rate: 75 mL/hr at 08/06/19 0211, 75 mL/hr at 08/06/19 0211    lactated ringers infusion, 125 mL/hr, Intravenous, Continuous, Greg Xiong MD, Last Rate: 125 mL/hr at 08/05/19 0920, 125 mL/hr at 08/05/19 0920    lactated ringers infusion, 125 mL/hr, Intravenous, Continuous, Renee Reid MD, Stopped at 08/06/19 0130    levothyroxine tablet 150 mcg, 150 mcg, Oral, Early Morning, Renee Reid MD, 150 mcg at 08/08/19 0554    methocarbamol (ROBAXIN) tablet 500 mg, 500 mg, Oral, Q6H PRN, Natan Durham MD, 500 mg at 08/07/19 1542    ondansetron (ZOFRAN) injection 4 mg, 4 mg, Intravenous, Q6H PRN, Renee Reid MD    oxyCODONE (ROXICODONE) immediate release tablet 10 mg, 10 mg, Oral, Q4H PRN, Renee Reid MD, 10 mg at 08/07/19 1832    oxyCODONE (ROXICODONE) IR tablet 5 mg, 5 mg, Oral, Q4H PRN, Renee Reid MD, 5 mg at 08/08/19 0000    senna (SENOKOT) tablet 8 6 mg, 1 tablet, Oral, Daily, Renee Reid MD, 8 6 mg at 08/07/19 0854    Blood Culture:   No results found for: BLOODCX    Wound Culture:   No results found for: WOUNDCULT    Ins and Outs:  I/O last 24 hours: In: 780 [P O :780]  Out: 1875 [Urine:1875]        Orthopedic Physical Exam:  Vitals:    08/08/19 0100   BP:    Pulse:    Resp:    Temp:    SpO2: 92%   NAD  Lying supine in bed  Breathing unlabored  right Lower Extremity extremity:  · Dressings mild central saturation, mostly clean  It is intact  · Skin no surrounding erythema or ecchymoses  · He is able to flex hip and knee joint  · +Ankle PF/DF, +EHL/FHL  · Sensation intact, L2-S1  · Feet warm and well perfused  · Toes warm, sensate, mobile    _*_*_*_*_*_*_*_*_*_*_*_*_*_*_*_*_*_*_*_*_*_*_*_*_*_*_*_*_*_*_*_*_*_*_*_*_*_*_*_*_*    Assessment: 69 y o male post operative day 3 S/p right posterior hip arthroplasty  Slow to progress with therapy        Plan:  · WBAT RLE  · Posterior hip precautions  · Abduction pillow while in bed  · Up and out of bed  · DVT prophylaxis- Lovenox  · Analgesics  · PT/OT  · Dispo: Ortho will follow  · Patient noted to have acute blood loss anemia due to a drop in Hbg of > 2 0g from preop levels, will monitor vital signs and resuscitate with IV fluids as needed        Louise Carpenter, ANGELINA

## 2019-08-08 NOTE — PLAN OF CARE
Problem: PHYSICAL THERAPY ADULT  Goal: Performs mobility at highest level of function for planned discharge setting  See evaluation for individualized goals  Description  Treatment/Interventions: Gait training, Bed mobility, Equipment eval/education, Patient/family training, Endurance training, Functional transfer training, LE strengthening/ROM  Equipment Recommended: Ryan Ortiz, Other (Comment)([facility])       See flowsheet documentation for full assessment, interventions and recommendations  Outcome: Progressing  Note:   Prognosis: Good  Problem List: Decreased strength, Decreased range of motion, Decreased endurance, Impaired balance, Decreased mobility, Decreased safety awareness, Pain, Decreased coordination  Assessment: Pt demonstrated improved mobility compared to yesteday's sessions  Performed scooting & transferring from chair without physical assist today and maintained static standing balance without incident while getting washed by AYON  Pt continues to require assist to ambulate household distances due to decreased R knee extension, with toe walking as a result  Demonstrated improved weight bearing compared to yesterday's session, but must improve consistancy before attempting stair training  Discussed pt progress and current barriers with supervising PT & medical team before & after session, and currently recommending rehab due to increased assist required for mobility, decreased standing activity tolerance, and impaired RLE strength, weight bearing, and balance at this time  Barriers to Discharge: Inaccessible home environment  Barriers to Discharge Comments: increase imbulation, activity tolerance, and attempt stairs when appropriate  Recommendation: Post acute IP rehab(pending progress)     PT - OK to Discharge: No    See flowsheet documentation for full assessment

## 2019-08-08 NOTE — PLAN OF CARE
Problem: PHYSICAL THERAPY ADULT  Goal: Performs mobility at highest level of function for planned discharge setting  See evaluation for individualized goals  Description  Treatment/Interventions: Gait training, Bed mobility, Equipment eval/education, Patient/family training, Endurance training, Functional transfer training, LE strengthening/ROM  Equipment Recommended: Marlin Miles, Other (Comment)([facility] [place])       See flowsheet documentation for full assessment, interventions and recommendations  Outcome: Progressing  Note:   Prognosis: Good  Problem List: Decreased strength, Decreased range of motion, Decreased endurance, Impaired balance, Decreased mobility, Decreased safety awareness, Pain, Decreased coordination  Assessment: Pt demonstrated improved mobility compared to yesteday's sessions  Performed scooting & transferring from chair without physical assist today and maintained static standing balance without incident while getting washed by AYON  Pt continues to require assist to ambulate household distances due to decreased R knee extension, with toe walking as a result  Demonstrated improved weight bearing compared to yesterday's session, but must improve consistancy before attempting stair training  Discussed pt progress and current barriers with supervising PT & medical team before & after session, and currently recommending rehab due to increased assist required for mobility, decreased standing activity tolerance, and impaired RLE strength, weight bearing, and balance at this time  Barriers to Discharge: Inaccessible home environment  Barriers to Discharge Comments: increase imbulation, activity tolerance, and attempt stairs when appropriate  Recommendation: Post acute IP rehab(pending progress)     PT - OK to Discharge: No    See flowsheet documentation for full assessment

## 2019-08-08 NOTE — PLAN OF CARE
Problem: OCCUPATIONAL THERAPY ADULT  Goal: Performs self-care activities at highest level of function for planned discharge setting  See evaluation for individualized goals  Description  Treatment Interventions: ADL retraining, Functional transfer training, Endurance training, Patient/family training, Equipment evaluation/education, Compensatory technique education, Activityengagement          See flowsheet documentation for full assessment, interventions and recommendations  Outcome: Progressing  Note:   Limitation: Decreased ADL status, Decreased endurance, Decreased self-care trans, Decreased high-level ADLs  Prognosis: Good  Assessment: pt participated in am ot session and was seen focusing on  adls seated oob in chair, functional sit to from stand transfers using rw and standing tolerence  pt required asst x 1 for all mobility and increased time  pt was able to use dressing stick with ease to doff b socks  pt used rigid sock aide with min asst for allignment   pt with fair immediate recall     OT Discharge Recommendation: Short Term Rehab     April Rise GABO Myles

## 2019-08-08 NOTE — SOCIAL WORK
[facility] accepted pt  CM notified pt and his spouse and they are in agreement with [facility]  CM notified ortho and pt's nurse  [facility] to call RNCM with room # and time  RNCM aware

## 2019-08-08 NOTE — PLAN OF CARE
Problem: PHYSICAL THERAPY ADULT  Goal: Performs mobility at highest level of function for planned discharge setting  See evaluation for individualized goals  Description  Treatment/Interventions: Gait training, Bed mobility, Equipment eval/education, Patient/family training, Endurance training, Functional transfer training, LE strengthening/ROM  Equipment Recommended: Angela Patel, Other (Comment)([facility] [place])       See flowsheet documentation for full assessment, interventions and recommendations  8/8/2019 1724 by Gilmer Vang PTA  Outcome: Progressing  Note:   Prognosis: Good  Problem List: Decreased strength, Decreased range of motion, Decreased endurance, Impaired balance, Decreased mobility, Decreased safety awareness, Pain, Decreased coordination  Assessment: Pt continues to ambulate household distances with improved pace & decreased assist this session  Pt performed all transfers safely without assist & maintained static balance without UE support to urinate before ambulating  Pt refused seated rest due to unscertainty about being able to stand from lower surface  Upon return to room, pt performed LE exercises as noted above to improve strength and ROM in RLE to facilitate improved transfers & ambulation  Pt will continue to benefit from therapy services to maximize independence with ambulatory tasks and attempt stairs when he is consistantly demonstrating improved RLE weightbearing & mobility  Barriers to Discharge: Inaccessible home environment  Barriers to Discharge Comments: increase imbulation, activity tolerance, and attempt stairs when appropriate  Recommendation: Post acute IP rehab     PT - OK to Discharge: No    See flowsheet documentation for full assessment       8/8/2019 1205 by Gilmer Vang PTA  Outcome: Progressing  Note:   Prognosis: Good  Problem List: Decreased strength, Decreased range of motion, Decreased endurance, Impaired balance, Decreased mobility, Decreased safety awareness, Pain, Decreased coordination  Assessment: Pt demonstrated improved mobility compared to yesteday's sessions  Performed scooting & transferring from chair without physical assist today and maintained static standing balance without incident while getting washed by AYON  Pt continues to require assist to ambulate household distances due to decreased R knee extension, with toe walking as a result  Demonstrated improved weight bearing compared to yesterday's session, but must improve consistancy before attempting stair training  Discussed pt progress and current barriers with supervising PT & medical team before & after session, and currently recommending rehab due to increased assist required for mobility, decreased standing activity tolerance, and impaired RLE strength, weight bearing, and balance at this time  Barriers to Discharge: Inaccessible home environment  Barriers to Discharge Comments: increase imbulation, activity tolerance, and attempt stairs when appropriate  Recommendation: Post acute IP rehab(pending progress)     PT - OK to Discharge: No    See flowsheet documentation for full assessment

## 2019-08-08 NOTE — PHYSICAL THERAPY NOTE
Physical Therapy Progress Note     08/08/19 1140   Pain Assessment   Pain Assessment 0-10   Pain Score 6   Pain Location Hip   Pain Orientation Right   Hospital Pain Intervention(s) Cold applied;Repositioned;Rest;Elevated; Ambulation/increased activity   Response to Interventions tolerated   Restrictions/Precautions   RLE Weight Bearing Per Order WBAT   Other Precautions Pain; Fall Risk;WBS;THR   Subjective   Subjective Pt encountered seated in recliner, more pleasant and alert since last session, but reported feeling very frustrated with progress before gettting OOB this AM   More motivated to increase ambulation this session  Transfers   Sit to Stand 5  Supervision   Additional items Assist x 1; Armrests; Increased time required   Stand to Sit 4  Minimal assistance   Additional items Assist x 1; Armrests; Increased time required   Ambulation/Elevation   Gait pattern Excessively slow; Step to;Short stride;Decreased R stance;Decreased foot clearance; Antalgic; Improper Weight shift   Gait Assistance 4  Minimal assist   Additional items Assist x 1  (+ chair follow)   Assistive Device Rolling walker   Distance 70', 50'   Balance   Static Sitting Fair +   Static Standing Fair   Ambulatory Poor +   Endurance Deficit   Endurance Deficit Yes   Endurance Deficit Description pain, fatigue   Activity Tolerance   Activity Tolerance Patient tolerated treatment well;Patient limited by fatigue;Patient limited by pain   Nurse [address], RN   Assessment   Prognosis Good   Problem List Decreased strength;Decreased range of motion;Decreased endurance; Impaired balance;Decreased mobility; Decreased safety awareness;Pain;Decreased coordination   Assessment Pt demonstrated improved mobility compared to quoc's sessions  Performed scooting & transferring from chair without physical assist today and maintained static standing balance without incident while getting washed by AYON    Pt continues to require assist to ambulate household distances due to decreased R knee extension, with toe walking as a result  Demonstrated improved weight bearing compared to yesterday's session, but must improve consistancy before attempting stair training  Discussed pt progress and current barriers with supervising PT & medical team before & after session, and currently recommending rehab due to increased assist required for mobility, decreased standing activity tolerance, and impaired RLE strength, weight bearing, and balance at this time  Barriers to Discharge Inaccessible home environment   Barriers to Discharge Comments increase imbulation, activity tolerance, and attempt stairs when appropriate   Goals   Patient Goals to get better & be more independent   STG Expiration Date 08/17/19   Treatment Day 5   Plan   Treatment/Interventions Functional transfer training;LE strengthening/ROM; Elevations; Therapeutic exercise; Endurance training;Patient/family training;Equipment eval/education; Bed mobility;Gait training   Progress Progressing toward goals   PT Frequency 7x/wk; Twice a day   Recommendation   Recommendation Post acute IP rehab  (pending progress)   Equipment Recommended Walker     Actual treatment time:  2133-3502    Zack Mathews, PTA

## 2019-08-08 NOTE — PHYSICAL THERAPY NOTE
Physical Therapy Progress Note     08/08/19 3312   Pain Assessment   Pain Assessment 0-10   Pain Score 6   Pain Location Hip   Pain Orientation Right   Hospital Pain Intervention(s) Cold applied;Repositioned;Elevated; Rest   Response to Interventions tolerated   Restrictions/Precautions   LLE Weight Bearing Per Order WBAT   Other Precautions WBS;THR;Pain; Fall Risk   Subjective   Subjective Pt encountered seated in recliner, initially anxious about not being able to urinate, but felt better after doing so at beginning of session  Pauline Hernández to go to rehab and make further progress  Anxious & mindful of hip precautions during session  Transfers   Sit to Stand 5  Supervision   Additional items Assist x 1; Armrests; Increased time required   Stand to Sit 5  Supervision   Additional items Assist x 1; Armrests; Increased time required   Ambulation/Elevation   Gait pattern Excessively slow; Step to;Short stride; Inconsistent davi;Decreased R stance;Decreased foot clearance; Antalgic; Improper Weight shift   Gait Assistance 4  Minimal assist   Additional items Assist x 1   Assistive Device Rolling walker   Distance 80'   Balance   Static Sitting Fair +   Static Standing Fair   Ambulatory Poor +   Endurance Deficit   Endurance Deficit Yes   Endurance Deficit Description pain, fatigue   Activity Tolerance   Activity Tolerance Patient tolerated treatment well;Patient limited by fatigue;Patient limited by pain   Nurse [address], RN   Exercises   Glute Sets Sitting;10 reps;AROM; Bilateral   Hip Flexion Sitting;10 reps;AAROM; Right;Bilateral   Hip Abduction Sitting;10 reps;AAROM; Right;Bilateral   Knee AROM Short Arc Quad Sitting;10 reps;AROM; Right   Knee AROM Long Arc Quad Sitting;10 reps;AAROM; Right;Bilateral   Ankle Pumps Sitting;10 reps;AROM; Bilateral   Heel Cord Stretch Sitting;5 reps;Right  (20 second holds)   Assessment   Prognosis Good   Problem List Decreased strength;Decreased range of motion;Decreased endurance; Impaired balance;Decreased mobility; Decreased safety awareness;Pain;Decreased coordination   Assessment Pt continues to ambulate household distances with improved pace & decreased assist this session  Pt performed all transfers safely without assist & maintained static balance without UE support to urinate before ambulating  Pt refused seated rest due to unscertainty about being able to stand from lower surface  Upon return to room, pt performed LE exercises as noted above to improve strength and ROM in RLE to facilitate improved transfers & ambulation  Pt will continue to benefit from therapy services to maximize independence with ambulatory tasks and attempt stairs when he is consistantly demonstrating improved RLE weightbearing & mobility  Barriers to Discharge Inaccessible home environment   Goals   Patient Goals to go to rehab, get better & go home   STG Expiration Date 08/17/19   Treatment Day 6   Plan   Treatment/Interventions Functional transfer training;LE strengthening/ROM; Elevations; Therapeutic exercise; Endurance training;Patient/family training;Equipment eval/education; Bed mobility;Gait training   Progress Progressing toward goals   PT Frequency 7x/wk; Twice a day   Recommendation   Recommendation Post acute IP rehab   Equipment Recommended Valerie Drew, AGUSTÍN

## 2019-08-08 NOTE — PROGRESS NOTES
Internal Medicine Progress Note  Patient: Scar Michaud  Age/sex: 68 y o  male  Medical Record #: [id number]      ASSESSMENT/PLAN:  Scar Michaud is seen and examined and mangement for following issues:    Rt hip OA s/p Rt PEGGY: - Hgb 11 5  Monitor WBC and fever curve post op while encouraging use of incentive spirometer  DVT prophylaxis in place and reviewed      Hypothyroidism: Continue levothyroxine      Hyperlipidemia: Continue atorvastatin  Post operative blood loss anemia:  Stable with expected drop, asymptomatic, cont ferrous sulfate    Thrombocytopenia: Mild; improving    Patient stable for DC from medical standpoint  Subjective: Patient seen and examined  Patients overnight issues or events were reviewed with nursing or staff during rounds or morning huddle session  New or overnight issues include the following: Hypotension: no further issues; BP stable  Fever: resolved; no further fevers noted Hypothyroidism: Continue levothyroxine as per home dosage      ROS:   GI: denies abdominal pain, change bowel habits or reflux symptoms  Neuro: Denies any headache, new vision changes, new neuropathies,new weaknesses   Respiratory: No Cough, SOB, denies wheeze  Cardiovascular: No CP, palpitations , denies perception of rapid heartbeat  Musculoskeletal: +right hip pain  : denies any new urinary burning or frequency    Review of Scheduled Meds:    Current Facility-Administered Medications:  acetaminophen 650 mg Oral Q6H PRN Maggie Islas MD    atorvastatin 10 mg Oral Daily With Clarence Castellanos MD    calcium carbonate 1,000 mg Oral Daily PRN Maggie Islas MD    cefazolin 2,000 mg Intravenous Once Maggie Islas MD    And        cefazolin 1,000 mg Intravenous Once Maggie Islas MD    chlorhexidine 15 mL Swish & Spit Once Maggie Islas MD    docusate sodium 100 mg Oral BID Maggie Islas MD    enoxaparin 40 mg Subcutaneous Q24H Maggie Islas MD    lactated ringers 75 mL/hr Intravenous Continuous Courtney Tariq MD Last Rate: 75 mL/hr (08/06/19 0211)   lactated ringers 125 mL/hr Intravenous Continuous Courtney Tariq MD Last Rate: 125 mL/hr (08/05/19 0920)   lactated ringers 125 mL/hr Intravenous Continuous Courtney Tariq MD Last Rate: Stopped (08/06/19 0130)   levothyroxine 150 mcg Oral Early Morning Courtney Tariq MD    methocarbamol 500 mg Oral Q6H PRN Gabriel Arciniega MD    ondansetron 4 mg Intravenous Q6H PRN Courtney Tariq MD    oxyCODONE 10 mg Oral Q4H PRN Courtney Tariq MD    oxyCODONE 5 mg Oral Q4H PRN Courtney Tariq MD    senna 1 tablet Oral Daily Courtney Tariq MD        Labs:     Results from last 7 days   Lab Units 08/08/19  0541 08/07/19  0515 08/06/19  0546   WBC Thousand/uL 8 68 9 01 6 12   HEMOGLOBIN g/dL 11 5* 11 6* 11 5*   HEMATOCRIT % 34 1* 35 5* 35 1*   PLATELETS Thousands/uL 139* 143* 146*     Results from last 7 days   Lab Units 08/08/19  0541 08/07/19  0515 08/06/19  0546   SODIUM mmol/L 136 132* 136   POTASSIUM mmol/L 3 9 3 7 3 9   CHLORIDE mmol/L 102 99* 105   CO2 mmol/L 28 26 26   BUN mg/dL 14 12 14   CREATININE mg/dL 0 79 0 91 0 91   CALCIUM mg/dL 8 2* 8 3 7 7*                      *Labs reviewed  *Radiology studies reviewed  *Medications reviewed and reconciled as needed  *Please refer to order section for additional ordered labs studies    Physical Examination:  Vitals:   Vitals:    08/07/19 1838 08/07/19 2100 08/08/19 0100 08/08/19 0552   BP: 151/94      Pulse: 97      Resp: 18      Temp: 98 2 °F (36 8 °C)      TempSrc:       SpO2: 95% 92% 92%    Weight:    135 kg (298 lb 11 6 oz)   Height:           GEN: NAD  RESP: CTAB, no R/R/W, good expiratory effort, breath sounds equal  CV: +S1 S2, regular rate, no rubs, PMI normal  ABD: soft, NT, ND, normal BS   : voiding;   EXT: DP pulses intact b/l; good cap refill; +right hip dressing intact  Skin: no rashes , no lesions  Neuro: AAOx3 no focality on exam;    Total time spent:   At least 35 minutes, with more than 50% spent counseling/coordinating care  Counseling includes discussion with patient re: progress  and discussion with patient of his/her current medical state/information  Coordination of patient's care was performed in conjunction with primary service  Time invested included review of patient's labs, vitals, and management of their comorbidities with continued monitoring  In addition, this patient was discussed with medical team including physician and advanced extenders  The care of the patient was extensively discussed and appropriate treatment plan was formulated unique for this patient  ** Please Note: Dragon 360 Dictation voice to text software may have been used in the creation of this document   **

## 2019-08-08 NOTE — CONSULTS
Consultation - [facility] 68 y o  male MRN: [id number]  Unit/Bed#: -01 Encounter: [id number]      Chief Complaint:  I feel hopeless and I just do not want to be a burden to my wife    History of Present Illness   Physician Requesting Consult: Mathieu Streeter MD  Reason for Consult / Principal Problem:  68-year-old male under care after right total hip replacement POD3   Patient had been sad and states not save going home  Possible MDD symptoms please evaluate  Bryn Athyn Marci is a 68 y o  male presents with feeling of hopelessness post right hip surgery  He was not aware of his long recovery time post surgery and this such did not realize that he would be immobile  for such a long period of time  He was under the impression that when he would be released 2 days after surgery and that he would be in a much better place physically without  restraints  His biggest concern is of being a burden to his wife and his family  He does not mind the fact that he will not be completely independent or that he might require some assistance as long as his burden to do not escalate for his wife and his family  He has no past major psychiatric history  He has mildly decreased appetite  Today patient feels better because he is going to inpatient rehabilitation and he feels that when he goes home he will be more ready  He denies suicidal thoughts plans or intent, he denies any psychotic symptoms  Psychiatric Review Of Systems:  sleep: yes  appetite changes: no  weight changes: no  energy/anergy: no  interest/pleasure/anhedonia: no  somatic symptoms: no  anxiety/panic: no  iván: no  guilty/hopeless: no  self injurious behavior/risky behavior: no    Historical Information   Past Psychiatric History:   None  Currently in treatment with none    Past Suicide attempts:  None  Past Violent behavior:  None  Past Psychiatric medication trial:  None    Substance Abuse History:  Denies drugs or alcohol history     I have assessed this patient for substance use within the past 12 months     History of IP/OP rehabilitation program:  None  Smoking history: Former smoker  Family Psychiatric History:   He denies any mental illness in the family    Social History  Education: high school diploma/GED  Learning Disabilities: None  Marital history:   Living arrangement, social support: He live with his wife  Occupational History: retired  Functioning Relationships: good support system    Other Pertinent History: No legal or  history    Traumatic History:   Abuse: None  Other Traumatic Events: None    Past Medical History:   Diagnosis Date    Hyperthyroidism     Osteoarthritis     last assesed 6-6-16    Polyneuropathy     last assesed 5-8-17    Pure hypercholesterolemia     Wears glasses        Medical Review Of Systems:  Review of Systems - Negative except difficulty ambulating, all other systems reviewed were negative    Meds/Allergies   current meds:   Current Facility-Administered Medications   Medication Dose Route Frequency    acetaminophen (TYLENOL) tablet 650 mg  650 mg Oral Q6H PRN    atorvastatin (LIPITOR) tablet 10 mg  10 mg Oral Daily With Dinner    calcium carbonate (TUMS) chewable tablet 1,000 mg  1,000 mg Oral Daily PRN    ceFAZolin (ANCEF) IVPB (premix) 2,000 mg  2,000 mg Intravenous Once    And    ceFAZolin (ANCEF) IVPB (premix) 1,000 mg  1,000 mg Intravenous Once    chlorhexidine (PERIDEX) 0 12 % oral rinse 15 mL  15 mL Swish & Spit Once    docusate sodium (COLACE) capsule 100 mg  100 mg Oral BID    enoxaparin (LOVENOX) subcutaneous injection 40 mg  40 mg Subcutaneous Q24H    lactated ringers infusion  75 mL/hr Intravenous Continuous    lactated ringers infusion  125 mL/hr Intravenous Continuous    lactated ringers infusion  125 mL/hr Intravenous Continuous    levothyroxine tablet 150 mcg  150 mcg Oral Early Morning    methocarbamol (ROBAXIN) tablet 500 mg 500 mg Oral Q6H PRN    ondansetron (ZOFRAN) injection 4 mg  4 mg Intravenous Q6H PRN    oxyCODONE (ROXICODONE) immediate release tablet 10 mg  10 mg Oral Q4H PRN    oxyCODONE (ROXICODONE) IR tablet 5 mg  5 mg Oral Q4H PRN    senna (SENOKOT) tablet 8 6 mg  1 tablet Oral Daily     No Known Allergies    Objective   Vital signs in last 24 hours:  Temp:  [98 2 °F (36 8 °C)-98 8 °F (37 1 °C)] 98 6 °F (37 °C)  HR:  [] 91  Resp:  [18] 18  BP: (124-151)/(74-94) 141/90      Intake/Output Summary (Last 24 hours) at 8/8/2019 1308  Last data filed at 8/8/2019 0900  Gross per 24 hour   Intake 540 ml   Output 850 ml   Net -310 ml       Mental Status Evaluation:  Appearance:  age appropriate   Behavior:  normal   Speech:  normal pitch and normal volume   Mood:  euthymic   Affect:  mood-congruent   Language: naming objects and repeating phrases   Thought Process:  goal directed   Associations: intact associations   Thought Content:  normal   Perceptual Disturbances: None   Risk Potential: Denies suicidal thoughts plans or intent   Sensorium:  person, place, time/date, situation, day of week and month of year   Memory:  recent and remote memory grossly intact   Cognition:  grossly intact   Consciousness:  alert and awake    Attention: attention span and concentration were age appropriate   Intellect: within normal limits   Fund of Knowledge: awareness of current events: Fair, past history: Fair and vocabulary: Fair   Insight:  good   Judgment: good   Muscle Strength and Tone: Within normal limits   Gait/Station: Difficulty ambulating   Motor Activity: no abnormal movements     Lab Results:    I have personally reviewed all pertinent laboratory/tests results    Labs in last 72 hours:   Recent Labs     08/08/19  0541   WBC 8 68   RBC 3 71*   HGB 11 5*   HCT 34 1*   *   RDW 13 1   SODIUM 136   K 3 9      CO2 28   BUN 14   CREATININE 0 79   GLUC 109   CALCIUM 8 2*       Code Status: )Level 1 - Full Code    Assessment/Plan     Assessment:  Lydia Smith is a 68 y o  male who was admitted for right hip surgery, he he was expecting faster recovery and he thought that he was not able to home because he did not want to be a burden for his wife, he was feeling depressed and hopeless  Patient was informed that he would be transferred to rehabilitation to get stronger before he go home and he feels better about it, his mood is euthymic, denies suicidal thoughts plans or intent    He is more stable  Diagnosis:  Adjustment disorder unspecified  Plan:  Continue medical management  Patient is stable at this moment because he is going to inpatient rehabilitation and he feel better from his mood  No other intervention at this time  I will sign off   Risks, benefits and possible side effects of Medications:   No medication given      Jade Diallo MD

## 2019-08-09 ENCOUNTER — TELEPHONE (OUTPATIENT)
Dept: OBGYN CLINIC | Facility: HOSPITAL | Age: 69
End: 2019-08-09

## 2019-08-09 PROBLEM — D62 ACUTE BLOOD LOSS ANEMIA: Status: ACTIVE | Noted: 2019-08-09

## 2019-08-09 PROBLEM — E03.9 HYPOTHYROID: Status: ACTIVE | Noted: 2019-08-09

## 2019-08-09 PROBLEM — R35.0 URINARY FREQUENCY: Status: ACTIVE | Noted: 2019-08-09

## 2019-08-09 PROBLEM — Z74.09 IMPAIRED MOBILITY AND ADLS: Status: ACTIVE | Noted: 2019-08-09

## 2019-08-09 PROBLEM — Z78.9 IMPAIRED MOBILITY AND ADLS: Status: ACTIVE | Noted: 2019-08-09

## 2019-08-09 PROBLEM — E87.1 HYPONATREMIA: Status: ACTIVE | Noted: 2019-08-09

## 2019-08-09 PROBLEM — R41.0 CONFUSION AND DISORIENTATION: Status: ACTIVE | Noted: 2019-08-09

## 2019-08-09 PROCEDURE — 97116 GAIT TRAINING THERAPY: CPT

## 2019-08-09 PROCEDURE — 97166 OT EVAL MOD COMPLEX 45 MIN: CPT

## 2019-08-09 PROCEDURE — 97530 THERAPEUTIC ACTIVITIES: CPT

## 2019-08-09 PROCEDURE — 99232 SBSQ HOSP IP/OBS MODERATE 35: CPT | Performed by: PHYSICAL MEDICINE & REHABILITATION

## 2019-08-09 PROCEDURE — 97110 THERAPEUTIC EXERCISES: CPT

## 2019-08-09 PROCEDURE — 97162 PT EVAL MOD COMPLEX 30 MIN: CPT

## 2019-08-09 PROCEDURE — 97535 SELF CARE MNGMENT TRAINING: CPT

## 2019-08-09 RX ORDER — POLYETHYLENE GLYCOL 3350 17 G/17G
17 POWDER, FOR SOLUTION ORAL DAILY PRN
Status: DISCONTINUED | OUTPATIENT
Start: 2019-08-09 | End: 2019-08-14 | Stop reason: HOSPADM

## 2019-08-09 RX ORDER — LIDOCAINE 50 MG/G
1 PATCH TOPICAL DAILY
Status: DISCONTINUED | OUTPATIENT
Start: 2019-08-09 | End: 2019-08-14 | Stop reason: HOSPADM

## 2019-08-09 RX ORDER — LIDOCAINE 50 MG/G
1 PATCH TOPICAL DAILY
Status: DISCONTINUED | OUTPATIENT
Start: 2019-08-09 | End: 2019-08-09

## 2019-08-09 RX ADMIN — ACETAMINOPHEN 650 MG: 325 TABLET ORAL at 21:27

## 2019-08-09 RX ADMIN — POLYETHYLENE GLYCOL 3350 17 G: 17 POWDER, FOR SOLUTION ORAL at 16:40

## 2019-08-09 RX ADMIN — ATORVASTATIN CALCIUM 10 MG: 10 TABLET, FILM COATED ORAL at 16:40

## 2019-08-09 RX ADMIN — LIDOCAINE 1 PATCH: 50 PATCH CUTANEOUS at 18:30

## 2019-08-09 RX ADMIN — ENOXAPARIN SODIUM 40 MG: 40 INJECTION SUBCUTANEOUS at 06:06

## 2019-08-09 RX ADMIN — ACETAMINOPHEN 650 MG: 325 TABLET ORAL at 08:42

## 2019-08-09 RX ADMIN — DOCUSATE SODIUM 100 MG: 100 CAPSULE, LIQUID FILLED ORAL at 17:09

## 2019-08-09 RX ADMIN — LEVOTHYROXINE SODIUM 150 MCG: 75 TABLET ORAL at 06:07

## 2019-08-09 RX ADMIN — SENNOSIDES 8.6 MG: 8.6 TABLET, FILM COATED ORAL at 08:42

## 2019-08-09 RX ADMIN — DOCUSATE SODIUM 100 MG: 100 CAPSULE, LIQUID FILLED ORAL at 08:42

## 2019-08-09 NOTE — PROGRESS NOTES
PT Evaluation   08/09/19 1230   Patient Data   Rehab Impairment unilateral hip replacement    Etiologic Diagnosis R hip Osteoarthritis   Date of Onset 08/05/19  (R PEGGY )   Support System   Name Fatimah Cole Spouse   Phone Number [phone]   Home Setup   Type of Home Single Level   Method of Entry Curb  (2 curb like step )   Number of Stairs 2  (no railing )   Number of Stairs in Home   (FF to basement but does not have to manage )   First Floor Bathroom Full;Tub; Shower  (1 bathroom with walk in shower and 1 bathroom with tub)   First Floor Bathroom Accessibility Grab bars in [address] [place] [address] Available Yes   Available Equipment   (bedrail that they can install )   Prior Level of Function   Self-Care 3  Independent - Patient completed the activities by him/herself, with or without an assistive device, with no assistance from a helper  Indoor-Mobility (Ambulation) 3  Independent - Patient completed the activities by him/herself, with or without an assistive device, with no assistance from a helper  Stairs 3  Independent - Patient completed the activities by him/herself, with or without an assistive device, with no assistance from a helper  Functional Cognition 3  Independent - Patient completed the activities by him/herself, with or without an assistive device, with no assistance from a helper     Prior Assistance Needed for Household Chores/Cleaning   Prior Device Used Other (comments)  (none )   Patient Preference   Nickname (Patient Preference) Dandre   Restrictions/Precautions   Precautions Fall Risk;Pain;THR   RLE Weight Bearing Per Order WBAT   Pain Assessment   Pain Score 2  (inc pain with activities/ thera ex to 7/10)   Pain Type Acute pain;Surgical pain   Pain Location Leg  (R ant   thigh)   Pain Orientation Right   Hospital Pain Intervention(s) Cold applied;Repositioned  (nurse made aware )   QI: Roll Left and Right   Comment THP'S   Reason if not Attempted Safety concerns   Roll Left and Right CARE Score 88   QI: Sit to Lying   Assistance Needed Physical assistance   Assistance Provided by [place] 25%-49%   Comment for R LE   Sit to Lying CARE Score 3   QI: Lying to Sitting on Side of Bed   Assistance Needed Physical assistance   Assistance Provided by [place] 50%-74%   Lying to Sitting on Side of Bed CARE Score 2   QI: Sit to Stand   Assistance Needed Physical assistance   Assistance Provided by [place] Less than 25%   Sit to Stand CARE Score 3   QI: Chair/Bed-to-Chair Transfer   Assistance Needed Physical assistance   Assistance Provided by [place] Less than 25%   Chair/Bed-to-Chair Transfer CARE Score 3   QI: Car Transfer   Comment THP's, NW9 car is too low for pt;s height  Pt  has an SUV   Reason if not Attempted Safety concerns   Car Transfer CARE Score 88   Transfer Bed/Chair/Wheelchair   Adaptive Equipment Roller Walker   Stand Pivot Contact Guard   Sit to Stand Minimal   Stand to Sit Supervision  (CS/ CGA)   Supine to Sit Moderate Assist   Sit to Supine Minimal   Bed, Chair, Wheelchair Transfer (FIM) 3 - [place] lifts two limbs during transfer  (trunk)   QI: Walk 10 Feet   Assistance Needed Physical assistance   Assistance Provided by [place] Total assistance   Comment w/c follow for safety    Walk 10 Feet CARE Score 1   QI: Walk 50 Feet with Two Turns   Assistance Needed Physical assistance   Assistance Provided by [place] Total assistance   Comment w/c follow    Walk 50 Feet with Two Turns CARE Score 1   QI: Walk 150 Feet   Comment limited by pain    Reason if not Attempted Safety concerns   Walk 150 Feet CARE Score 88   QI: Walking 10 Feet on Uneven Surfaces   Reason if not Attempted Safety concerns   Walking 10 Feet on Uneven Surfaces CARE Score 88   Ambulation   Does the patient walk? 2  Yes   Primary Discharge Mode of Locomotion Walk   Walk Assist Level Contact Guard; Chair Follow   Gait Pattern Antalgic; Inconsistant Yadi;Decreased R stance; Improper weight shift  (pt  does not put R heel on the floor needing cueing )   Assist Device Roller Mary Castellanos Walked (feet) 100 ft   Limitations Noted In Endurance;Strength   Walking (FIM) 1 - Patient requires assist of two people   Wheelchair mobility   QI: Does the patient use a wheelchair? 0  No   QI: 1 Step (Curb)   Assistance Needed Physical assistance   Assistance Provided by [place] Total assistance   Comment min A X 2    1 Step (Curb) CARE Score 1   QI: 4 Steps   Comment does not have to manage steps inside ths house    Reason if not Attempted Safety concerns   4 Steps CARE Score 88   QI: 12 Steps   Comment does not have to manage steps inside ths house    Reason if not Attempted Safety concerns   12 Steps CARE Score 88   Stairs   Type Curb   # of Steps 1  (6" curb step on NW9 (then stepped up on 8"))   Weight Bearing Precautions RLE;WBAT   Assist Devices Roller Walker   Stairs (FIM) 1 - Patient goes up and down less than 4 stairs regardless of assist/device/set up   Comprehension   QI: Comprehension 4  Undestands: Clear comprehension without cues or repetitions   Comprehension (FIM) 6 - Understands complex/abstract but requires more time   Expression   QI: Expression 4  Express complex messages without difficulty and with speech that is clear and easy to Ralph   Expression (FIM) 6 - Expresses complex/abstract but requires:  more time   Social Interaction   Social Interaction (FIM) 6 - Interacts appropriately with others BUT requires extra  time   Problem Solving   Problem solving (FIM) 5 - Solves basic problems 90% of time   Memory   Memory (FIM) 5 - Recalls/performs request 90% of time   RLE Assessment   RLE Assessment X  (Simultaneous filing   User may not have seen previous data )   Strength RLE   R Hip Flexion 2+/5   R Hip ABduction 2+/5   R Hip ADduction 3-/5   R Knee Flexion 3-/5   R Knee Extension 3-/5   R Ankle Dorsiflexion 4/5   R Ankle Plantar Flexion 4/5   LLE Assessment   LLE Assessment WFL  (5/5 grossly graded )   Sensation Light Touch No apparent deficits   Sharp/Dull No apparent deficits   Propioception No apparent deficits   Objective Measure   PT Measure(s) Pt  participated in supine GS, SAQ, heelslides AAROM R LE and R gastroc and platar flexor manual gentle stretching   Seated LAQ and hip flex, AAROM R LE    (brief icing while at mat )   PT Findings Pt  has a hx of Osgood Schaletters dse on B knees  Discharge Information   Patient's Discharge Plan return home with family support   Patient's Rehab Expectations to move around better and for R LE to have less pain and swelling    Barriers to Discharge Home Decreased Strength;Decreased Endurance;Pain; Safety Considerations; Other  (othopedic restriction, RUDY )   Impressions Pt  Is a 69 y/o male who underwent elective R PEGGY secondary to OA by Dr Bren Cameron on 8/5  Pt  Has PMHx of Htn, hypothyroidism and Osgood Schlatter's disease of B knees  Post op pt  Also had a drop in Hgb but did not need transfusion and also had hypotension needing IV fluids  Pt  Now admitted to the [facility] and PT eval completed with pt  Demonstrating dec flexibility and strength on R LE, dec activity tolerance with mod pain with functional mobility needing min A with functional transfers and ambulation and mod A for bed mobility  Pt  demonstrates dec R knee ext ROM and swelling resulting to dec heelstrike in stance and dec WBAT on R LE due to pain  Pt  Reported to be fully indep with functional mobility PTA and is living with his wife that can assist him prn  Pt  Is a good rehab candidate and will benefit from skilled PT to address functional decline to be able to safely return home mod I level with family support      PT Therapy Minutes   PT Time In 1230   PT Time Out 1400   PT Total Time (minutes) 90   PT Mode of treatment - Individual (minutes) 90   PT Mode of treatment - Concurrent (minutes) 0   PT Mode of treatment - Group (minutes) 0   PT Mode of treatment - Co-treat (minutes) 0   PT Mode of Teatment - Total time(minutes) 90 minutes

## 2019-08-09 NOTE — ASSESSMENT & PLAN NOTE
Results from last 7 days   Lab Units 08/12/19  0528 08/08/19  0541 08/07/19  0515   SODIUM mmol/L 138 136 132*       · Resolved    · Monitor BMP intermittently

## 2019-08-09 NOTE — PROGRESS NOTES
Internal Medicine Progress Note  Patient: Hair Brownlee  Age/sex: 68 y o  male  Medical Record #: [id number]      ASSESSMENT/PLAN:  Hair Brownlee is seen and examined and mangement for following issues:    Rt hip OA s/p Rt PEGGY: - Hgb 11 5  Monitor WBC and fever curve post op while encouraging use of incentive spirometer  DVT prophylaxis in place and reviewed      Hypothyroidism: Continue levothyroxine      Hyperlipidemia: Continue atorvastatin  Post operative blood loss anemia:  Stable with expected drop, asymptomatic, cont ferrous sulfate    Thrombocytopenia: Mild  Stable  Subjective: Patient seen and examined  Patients overnight issues or events were reviewed with nursing or staff during rounds or morning huddle session  New or overnight issues include the following: Hypotension: no further issues; BP stable  Thrombocytopenia: Stable  Will continue to monitor  Hypothyroidism: Continue levothyroxine as per home dosage  Pt does report constipation      ROS:   GI: denies abdominal pain, change bowel habits or reflux symptoms  Neuro: Denies any headache, new vision changes, new neuropathies,new weaknesses   Respiratory: No Cough, SOB, denies wheeze  Cardiovascular: No CP, palpitations , denies perception of rapid heartbeat  Musculoskeletal: +right hip pain  : denies any new urinary burning or frequency    Review of Scheduled Meds:    Current Facility-Administered Medications:  acetaminophen 650 mg Oral Q6H PRN Kahlil Cortes MD   atorvastatin 10 mg Oral Daily With Dinner Lety Marx MD   calcium carbonate 1,000 mg Oral Daily PRN Kahlil Cortes MD   docusate sodium 100 mg Oral BID Kahlil Cortes MD   enoxaparin 40 mg Subcutaneous Q24H Kahlil Cortes MD   levothyroxine 150 mcg Oral Early Morning Kahlil Cortes MD   methocarbamol 500 mg Oral Q6H PRN Kahlil Cortes MD   ondansetron 4 mg Intravenous Q6H PRN Kahlil Cortes MD   oxyCODONE 10 mg Oral Q4H PRN Lety Marx MD   oxyCODONE 5 mg Oral Q4H PRN Nikole Dejesus MD   senna 1 tablet Oral Daily Nikole Dejesus MD       Labs:     Results from last 7 days   Lab Units 08/08/19  0541 08/07/19  0515 08/06/19  0546   WBC Thousand/uL 8 68 9 01 6 12   HEMOGLOBIN g/dL 11 5* 11 6* 11 5*   HEMATOCRIT % 34 1* 35 5* 35 1*   PLATELETS Thousands/uL 139* 143* 146*     Results from last 7 days   Lab Units 08/08/19  0541 08/07/19  0515 08/06/19  0546   SODIUM mmol/L 136 132* 136   POTASSIUM mmol/L 3 9 3 7 3 9   CHLORIDE mmol/L 102 99* 105   CO2 mmol/L 28 26 26   BUN mg/dL 14 12 14   CREATININE mg/dL 0 79 0 91 0 91   CALCIUM mg/dL 8 2* 8 3 7 7*                      *Labs reviewed  *Radiology studies reviewed  *Medications reviewed and reconciled as needed  *Please refer to order section for additional ordered labs studies    Physical Examination:  Vitals:   Vitals:    08/08/19 2012   BP: 132/79   BP Location: Right arm   Pulse: 97   Resp: 18   Temp: 98 7 °F (37 1 °C)   TempSrc: Oral   SpO2: 96%   Weight: 130 kg (286 lb 2 5 oz)   Height: 6' 1" (1 854 m)       GEN: NAD  RESP: CTAB, no R/R/W, good expiratory effort, breath sounds equal  CV: +S1 S2, regular rate, no rubs, PMI normal  ABD: soft, NT, ND, normal BS   : voiding;   EXT: DP pulses intact b/l; good cap refill; +right hip dressing intact  Skin: no rashes , no lesions  Neuro: AAOx3 no focality on exam;    Total time spent: At least 35 minutes, with more than 50% spent counseling/coordinating care  Counseling includes discussion with patient re: progress  and discussion with patient of his/her current medical state/information  Coordination of patient's care was performed in conjunction with primary service  Time invested included review of patient's labs, vitals, and management of their comorbidities with continued monitoring  In addition, this patient was discussed with medical team including physician and advanced extenders   The care of the patient was extensively discussed and appropriate treatment plan was formulated unique for this patient  ** Please Note: Dragon 360 Dictation voice to text software may have been used in the creation of this document   **

## 2019-08-09 NOTE — TELEPHONE ENCOUNTER
Patient was advised but appt on 8/13/19 was not cancelled in case of discharge & per Dr Falcon Amel request per phone call

## 2019-08-09 NOTE — ASSESSMENT & PLAN NOTE
· Performed on 8/5/19 by Dr Yenifer Rao  · Weight-bearing as tolerated  · hip precautions in place - reviewed them in detail with patient today  · DVT Prophylaxis for at 19 more days after day of discharge  · Has Lovenox at home  · Dr Yenifer Rao saw patient 8/13  Staples were removed last night  Has follow-up next week with Dr Yenifer Rao with repeat imaging  · Discussed with Orthopedics

## 2019-08-09 NOTE — PROGRESS NOTES
OCCUPATIONAL THERAPY ACUTE REHAB EVALUATION    Active Problem List:   Patient Active Problem List   Diagnosis    Primary osteoarthritis of both knees    Osgood-Schlatter's disease of both knees    Pain in both knees    Status post right hip replacement    Status post total hip replacement, left    Hyponatremia    Acute blood loss anemia    Confusion and disorientation    Urinary frequency    Impaired mobility and ADLs    Hypothyroid     Past Medical Hx:   Past Medical History:   Diagnosis Date    Hyperthyroidism     Osteoarthritis     last assesed 6-6-16    Polyneuropathy     last assesed 5-8-17    Pure hypercholesterolemia     Wears glasses      Past Surgical Hx:   Past Surgical History:   Procedure Laterality Date    BACK SURGERY      CHOLECYSTECTOMY      SC TOTAL HIP ARTHROPLASTY Right 8/5/2019    Procedure: ARTHROPLASTY HIP TOTAL;  Surgeon: Hector Beach MD;  Location: [facility];  Service: Orthopedics    TONSILLECTOMY         08/09/19 0830   Patient Data   Rehab Impairment Unilateral hip replacement   Etiologic Diagnosis Right Hip Osteoarthritis   Date of Onset 08/05/19   Home Setup   Type of Home Single Level   First Floor Bathroom Full;Tub; Shower  (master BR walk in shower w/ grab bars; other tub shower)   First Floor Bathroom Accessibility Grab bars in [address] Pt lives in single level home with his spouse, Aracely Savage, who does not work during day and can provide 24/7 supervision at d/c   pt reports having a walk in shower with grab bars in his bedroom and a spare bathroom with a full tub shower  pt was previously indep in his ADL/IADLs  Prior IADL Participation   Money Management Identify Money;Estimate Costs;Estimate Change;Manage Checkbook; Combine Bills   Laundry Partial Participation   Home Cleaning Partial Participation   Prior Level of Function   Self-Care 3   Independent - Patient completed the activities by him/herself, with or without an assistive device, with no assistance from a helper  Functional Cognition 3  Independent - Patient completed the activities by him/herself, with or without an assistive device, with no assistance from a helper  Patient Preference   Nicknamapoorva (Patient Preference) Dandre   Psychosocial   Psychosocial (WDL) WDL   Restrictions/Precautions   Precautions Fall Risk;Pain;THR   RLE Weight Bearing Per Order WBAT   Pain Assessment   Pain Assessment 0-10   Pain Score 2   Pain Type Surgical pain   Pain Location Hip;Leg   Pain Orientation Right   Hospital Pain Intervention(s) Repositioned; Ambulation/increased activity; Rest   Response to Interventions pt tolerated tx   QI: Eating   Assistance Needed Independent   Eating CARE Score 6   Eating Assessment   Eating (FIM) 7 - Patient completely independent   QI: Oral Hygiene   Assistance Needed Incidental touching   Oral Hygiene CARE Score 4   Grooming   Able To Initiate Tasks;Comb/Brush Hair;Wash/Dry Face;Brush/Clean Teeth;Wash/Dry Hands   Limitation Noted In Safety;Strength   Findings Pt completed grooming tasks standing at sink with RW at CGA level due to dec standing balance/endurance and to inc adherence to R THPs  Grooming (FIM) 4 - Patient requires steadying assist or light touching   QI: Shower/Bathe Self   Assistance Needed Physical assistance   Assistance Provided by [place] 75% or more   Shower/Bathe Self CARE Score 2   Bathing   Assessed Bath Style Sponge Bath   Anticipated D/C Bath Style Shower; Tub   Able to Gather/Transport No   Able to [employer] Temperature Yes   Able to Wash/Rinse/Dry (body part) Left Arm;Right Arm;L Upper Leg;R Upper Leg;Chest;Abdomen;Perineal Area   Limitations Noted in Balance; Endurance;ROM;Safety;Strength;Timeliness   Positioning Seated;Standing   Findings  Pt has shower orders but refused shower this morning    Pt completed sponge bath at sink standing with RW to bathe buttocks and groin and seated at w/c due dec standing balance/endurance, inc pain, and to inc adherence to R THPs  Pt req A to bath b/l LEs/feet to inc safety and adherence to THPs  Bathing (FIM) 4 - Patient completes 8/10 or 9/10 parts   QI: Upper Body Dressing   Assistance Needed Supervision   Upper Body Dressing CARE Score 4   QI: Lower Body Dressing   Assistance Needed Physical assistance   Assistance Provided by [place] 75% or more   Lower Body Dressing CARE Score 2   QI: Putting On/Taking Off Footwear   Assistance Needed Physical assistance   Assistance Provided by [place] Total assistance   Putting On/Taking Off Footwear CARE Score 1   Dressing/Undressing Clothing   Remove UB Clothes Pullover Shirt   Remove LB Clothes Socks   [address]; Undergarment;Socks   Limitations Noted In Balance; Endurance; Safety;Strength;ROM; Timeliness   Positioning Supported Sit;Standing   Findings pt completed UB dressing seated due to dec standing balance/endurance  Pt req A to thread b/l LEs into pants and underpants due to dec ROM to inc adherence to THPs and pt able to in stance don LB clothing over hips with CGA  Pt req A to doff/dons b/l socks     UB Dressing (FIM) 5 - Patient requires supervision/monitoring   LB Dressing (FIM) 2 - Patient completes 25-49% of all tasks   QI: [address] Needed Incidental touching   Toileting Hygiene CARE Score 4   Toileting   Findings use of urinal in stance   Toileting (FIM) 4 - Patient completes 75% of all tasks   QI: Sit to Stand   Assistance Needed Physical assistance   Assistance Provided by [facility] Less than 25%   Sit to Stand CARE Score 3   QI: Chair/Bed-to-Chair Transfer   Assistance Needed Physical assistance   Assistance Provided by [place] Less than 25%   Chair/Bed-to-Chair Transfer CARE Score 3   Transfer Bed/Chair/Wheelchair   Findings A with bed mob; Min A for fxnl xfers with use of RW;   Bed, Chair, Wheelchair Transfer (FIM) 3 - Patient completes 50 - 74% of all tasks   QI: Toilet Transfer   Assistance Needed Physical assistance   Assistance Provided by [place] Less than 25%   Toilet Transfer CARE Score 3   Toilet Transfer   Findings raised toilet seat to adhere to [address]   Toilet Transfer (FIM) 4 - Patient completes 75% of all tasks   Tub/Shower Transfer   Not Assessed Sponge Bath;Patient refusal   Comprehension   QI: Comprehension 4  Undestands: Clear comprehension without cues or repetitions   Comprehension (FIM) 6 - Understands complex/abstract but requires  glasses for visual comp   Expression   QI: Expression 4  Express complex messages without difficulty and with speech that is clear and easy to [place]   Expression (FIM) 7 - Expresses complex/abstract ideas in a reasonable time w/o devices or helper  Social Interaction   Social Interaction (FIM) 7 - Interacts appropriately without assistive device, medication or helper   Problem Solving   Problem solving (FIM) 6 - Solves complex problems BUT requires extra time   Memory   Memory (FIM) 6 - Recognizes with extra time   Cognition   Overall Cognitive Status WFL   Comments Pt reports episode of confusion in which he "broke all the laws" of THP  MD suspects reaction to medications  Recommendation for alarms H S  Discharge Information   Vocational Plan Retired/not working   Patient's Discharge Plan return home with support from spouse   Patient's Rehab Expectations "I want to be comfortable and be independent again"   Barriers to Discharge Home Decreased Strength;Decreased Endurance;Pain; Safety Considerations   Impressions Pt is a 69 y/o male admitted to [facility] s/p R Unilateral hip replacement with THP(posterior) in place  PTA pt reports he was fully indep with ADl/IADL fxn however was presenting with dec toelrance and pain with walking prompting ortho assessment  At time of OT evaluation pt overall Min A for transfers and Max A for LB ADLs    Pt found to be below fxnl baseline and would benefit from 7-10 day LOS to achieve Mod I level goals with use of LHAE and LRAD      OT Therapy Minutes   OT Time In 0830   OT Time Out 1000   OT Total Time (minutes) 90   OT Mode of treatment - Individual (minutes) 90   OT Mode of treatment - Concurrent (minutes) 0   OT Mode of treatment - Group (minutes) 0   OT Mode of treatment - Co-treat (minutes) 0   OT Mode of Teatment - Total time(minutes) 90 minutes

## 2019-08-09 NOTE — ASSESSMENT & PLAN NOTE
· Acute comprehensive interdisciplinary inpatient rehabilitation including PT, OT, RN, CM, SW, dietary

## 2019-08-09 NOTE — PLAN OF CARE
Problem: Potential for Falls  Goal: Patient will remain free of falls  Description  INTERVENTIONS:  - Assess patient frequently for physical needs  -  Identify cognitive and physical deficits and behaviors that affect risk of falls    -  [place] fall precautions as indicated by assessment   - Educate patient/family on patient safety including physical limitations  - Instruct patient to call for assistance with activity based on assessment  - Modify environment to reduce risk of injury  - Consider OT/PT consult to assist with strengthening/mobility  Outcome: Progressing     Problem: PAIN - ADULT  Goal: Verbalizes/displays adequate comfort level or baseline comfort level  Description  Interventions:  - Encourage patient to monitor pain and request assistance  - Assess pain using appropriate pain scale  - Administer analgesics based on type and severity of pain and evaluate response  - Implement non-pharmacological measures as appropriate and evaluate response  - Consider cultural and social influences on pain and pain management  - Notify physician/advanced practitioner if interventions unsuccessful or patient reports new pain  Outcome: Progressing     Problem: INFECTION - ADULT  Goal: Absence or prevention of progression during hospitalization  Description  INTERVENTIONS:  - Assess and monitor for signs and symptoms of infection  - Monitor lab/diagnostic results  - Monitor all insertion sites, i e  indwelling lines, tubes, and drains  - Monitor endotracheal (as able) and nasal secretions for changes in amount and color  - [place] appropriate cooling/warming therapies per order  - Administer medications as ordered  - Instruct and encourage patient and family to use good hand hygiene technique  - Identify and instruct in appropriate isolation precautions for identified infection/condition  Outcome: Progressing  Goal: Absence of fever/infection during neutropenic period  Description  INTERVENTIONS:  - Monitor WBC  - Implement neutropenic guidelines  Outcome: Progressing     Problem: SAFETY ADULT  Goal: Patient will remain free of falls  Description  INTERVENTIONS:  - Assess patient frequently for physical needs  -  Identify cognitive and physical deficits and behaviors that affect risk of falls    -  [place] fall precautions as indicated by assessment   - Educate patient/family on patient safety including physical limitations  - Instruct patient to call for assistance with activity based on assessment  - Modify environment to reduce risk of injury  - Consider OT/PT consult to assist with strengthening/mobility  Outcome: Progressing  Goal: Maintain or return to baseline ADL function  Description  INTERVENTIONS:  -  Assess patient's ability to carry out ADLs; assess patient's baseline for ADL function and identify physical deficits which impact ability to perform ADLs (bathing, care of mouth/teeth, toileting, grooming, dressing, etc )  - Assess/evaluate cause of self-care deficits   - Assess range of motion  - Assess patient's mobility; develop plan if impaired  - Assess patient's need for assistive devices and provide as appropriate  - Encourage maximum independence but intervene and supervise when necessary  ¯ Involve family in performance of ADLs  ¯ Assess for home care needs following discharge   ¯ Request OT consult to assist with ADL evaluation and planning for discharge  ¯ Provide patient education as appropriate  Outcome: Progressing  Goal: Maintain or return mobility status to optimal level  Description  INTERVENTIONS:  - Assess patient's baseline mobility status (ambulation, transfers, stairs, etc )    - Identify cognitive and physical deficits and behaviors that affect mobility  - Identify mobility aids required to assist with transfers and/or ambulation (gait belt, sit-to-stand, lift, walker, cane, etc )  - [place] fall precautions as indicated by assessment  - Record patient progress and toleration of activity level on Mobility SBAR; progress patient to next Phase/Stage  - Instruct patient to call for assistance with activity based on assessment  - Request Rehabilitation consult to assist with strengthening/weightbearing, etc   Outcome: Progressing     Problem: DISCHARGE PLANNING  Goal: Discharge to home or other facility with appropriate resources  Description  INTERVENTIONS:  - Identify barriers to discharge w/patient and caregiver  - Arrange for needed discharge resources and transportation as appropriate  - Identify discharge learning needs (meds, wound care, etc )  - Arrange for interpretive services to assist at discharge as needed  - Refer to Case Management Department for coordinating discharge planning if the patient needs post-hospital services based on physician/advanced practitioner order or complex needs related to functional status, cognitive ability, or social support system  Outcome: Progressing

## 2019-08-09 NOTE — PROGRESS NOTES
PCA went in pt's room to check on venodyne pump that was beeping around 0145  Pt reported to PCA that he ripped off his venodynes  Nurse went in to check on pt  Pt stated, " I took off venodynes and abductor wedge and sat on the edge of bed  I emptied my urinal  in the sink  I don't know if it was dream or reality  I do not know why this is happening " Pt reoriented to surroundings and repositioned in bed  Pt voided in urinal  Bed mague placed for safety  Denies pain at his time  Instructed pt to call for assistance  Call bell within reach  Will continue to monitor pt

## 2019-08-09 NOTE — PLAN OF CARE
Problem: Potential for Falls  Goal: Patient will remain free of falls  Description  INTERVENTIONS:  - Assess patient frequently for physical needs  -  Identify cognitive and physical deficits and behaviors that affect risk of falls    -  [place] fall precautions as indicated by assessment   - Educate patient/family on patient safety including physical limitations  - Instruct patient to call for assistance with activity based on assessment  - Modify environment to reduce risk of injury  - Consider OT/PT consult to assist with strengthening/mobility  Outcome: Progressing     Problem: PAIN - ADULT  Goal: Verbalizes/displays adequate comfort level or baseline comfort level  Description  Interventions:  - Encourage patient to monitor pain and request assistance  - Assess pain using appropriate pain scale  - Administer analgesics based on type and severity of pain and evaluate response  - Implement non-pharmacological measures as appropriate and evaluate response  - Consider cultural and social influences on pain and pain management  - Notify physician/advanced practitioner if interventions unsuccessful or patient reports new pain  Outcome: Progressing     Problem: INFECTION - ADULT  Goal: Absence or prevention of progression during hospitalization  Description  INTERVENTIONS:  - Assess and monitor for signs and symptoms of infection  - Monitor lab/diagnostic results  - Monitor all insertion sites, i e  indwelling lines, tubes, and drains  - Monitor endotracheal (as able) and nasal secretions for changes in amount and color  - [place] appropriate cooling/warming therapies per order  - Administer medications as ordered  - Instruct and encourage patient and family to use good hand hygiene technique  - Identify and instruct in appropriate isolation precautions for identified infection/condition  Outcome: Progressing     Problem: SAFETY ADULT  Goal: Patient will remain free of falls  Description  INTERVENTIONS:  - Assess patient frequently for physical needs  -  Identify cognitive and physical deficits and behaviors that affect risk of falls    -  [place] fall precautions as indicated by assessment   - Educate patient/family on patient safety including physical limitations  - Instruct patient to call for assistance with activity based on assessment  - Modify environment to reduce risk of injury  - Consider OT/PT consult to assist with strengthening/mobility  Outcome: Progressing  Goal: Maintain or return to baseline ADL function  Description  INTERVENTIONS:  -  Assess patient's ability to carry out ADLs; assess patient's baseline for ADL function and identify physical deficits which impact ability to perform ADLs (bathing, care of mouth/teeth, toileting, grooming, dressing, etc )  - Assess/evaluate cause of self-care deficits   - Assess range of motion  - Assess patient's mobility; develop plan if impaired  - Assess patient's need for assistive devices and provide as appropriate  - Encourage maximum independence but intervene and supervise when necessary  ¯ Involve family in performance of ADLs  ¯ Assess for home care needs following discharge   ¯ Request OT consult to assist with ADL evaluation and planning for discharge  ¯ Provide patient education as appropriate  Outcome: Progressing  Goal: Maintain or return mobility status to optimal level  Description  INTERVENTIONS:  - Assess patient's baseline mobility status (ambulation, transfers, stairs, etc )    - Identify cognitive and physical deficits and behaviors that affect mobility  - Identify mobility aids required to assist with transfers and/or ambulation (gait belt, sit-to-stand, lift, walker, cane, etc )  - [place] fall precautions as indicated by assessment  - Record patient progress and toleration of activity level on Mobility SBAR; progress patient to next Phase/Stage  - Instruct patient to call for assistance with activity based on assessment  - Request Rehabilitation consult to assist with strengthening/weightbearing, etc   Outcome: Progressing     Problem: DISCHARGE PLANNING  Goal: Discharge to home or other facility with appropriate resources  Description  INTERVENTIONS:  - Identify barriers to discharge w/patient and caregiver  - Arrange for needed discharge resources and transportation as appropriate  - Identify discharge learning needs (meds, wound care, etc )  - Arrange for interpretive services to assist at discharge as needed  - Refer to Case Management Department for coordinating discharge planning if the patient needs post-hospital services based on physician/advanced practitioner order or complex needs related to functional status, cognitive ability, or social support system  Outcome: Progressing

## 2019-08-09 NOTE — PLAN OF CARE
Problem: Potential for Falls  Goal: Patient will remain free of falls  Description  INTERVENTIONS:  - Assess patient frequently for physical needs  -  Identify cognitive and physical deficits and behaviors that affect risk of falls    -  [place] fall precautions as indicated by assessment   - Educate patient/family on patient safety including physical limitations  - Instruct patient to call for assistance with activity based on assessment  - Modify environment to reduce risk of injury  - Consider OT/PT consult to assist with strengthening/mobility  Outcome: Progressing     Problem: PAIN - ADULT  Goal: Verbalizes/displays adequate comfort level or baseline comfort level  Description  Interventions:  - Encourage patient to monitor pain and request assistance  - Assess pain using appropriate pain scale  - Administer analgesics based on type and severity of pain and evaluate response  - Implement non-pharmacological measures as appropriate and evaluate response  - Consider cultural and social influences on pain and pain management  - Notify physician/advanced practitioner if interventions unsuccessful or patient reports new pain  Outcome: Progressing     Problem: INFECTION - ADULT  Goal: Absence or prevention of progression during hospitalization  Description  INTERVENTIONS:  - Assess and monitor for signs and symptoms of infection  - Monitor lab/diagnostic results  - Monitor all insertion sites, i e  indwelling lines, tubes, and drains  - Monitor endotracheal (as able) and nasal secretions for changes in amount and color  - [place] appropriate cooling/warming therapies per order  - Administer medications as ordered  - Instruct and encourage patient and family to use good hand hygiene technique  - Identify and instruct in appropriate isolation precautions for identified infection/condition  Outcome: Progressing     Problem: SAFETY ADULT  Goal: Patient will remain free of falls  Description  INTERVENTIONS:  - Assess patient frequently for physical needs  -  Identify cognitive and physical deficits and behaviors that affect risk of falls    -  [place] fall precautions as indicated by assessment   - Educate patient/family on patient safety including physical limitations  - Instruct patient to call for assistance with activity based on assessment  - Modify environment to reduce risk of injury  - Consider OT/PT consult to assist with strengthening/mobility  Outcome: Progressing  Goal: Maintain or return to baseline ADL function  Description  INTERVENTIONS:  -  Assess patient's ability to carry out ADLs; assess patient's baseline for ADL function and identify physical deficits which impact ability to perform ADLs (bathing, care of mouth/teeth, toileting, grooming, dressing, etc )  - Assess/evaluate cause of self-care deficits   - Assess range of motion  - Assess patient's mobility; develop plan if impaired  - Assess patient's need for assistive devices and provide as appropriate  - Encourage maximum independence but intervene and supervise when necessary  ¯ Involve family in performance of ADLs  ¯ Assess for home care needs following discharge   ¯ Request OT consult to assist with ADL evaluation and planning for discharge  ¯ Provide patient education as appropriate  Outcome: Progressing  Goal: Maintain or return mobility status to optimal level  Description  INTERVENTIONS:  - Assess patient's baseline mobility status (ambulation, transfers, stairs, etc )    - Identify cognitive and physical deficits and behaviors that affect mobility  - Identify mobility aids required to assist with transfers and/or ambulation (gait belt, sit-to-stand, lift, walker, cane, etc )  - [place] fall precautions as indicated by assessment  - Record patient progress and toleration of activity level on Mobility SBAR; progress patient to next Phase/Stage  - Instruct patient to call for assistance with activity based on assessment  - Request Rehabilitation consult to assist with strengthening/weightbearing, etc   Outcome: Progressing     Problem: DISCHARGE PLANNING  Goal: Discharge to home or other facility with appropriate resources  Description  INTERVENTIONS:  - Identify barriers to discharge w/patient and caregiver  - Arrange for needed discharge resources and transportation as appropriate  - Identify discharge learning needs (meds, wound care, etc )  - Arrange for interpretive services to assist at discharge as needed  - Refer to Case Management Department for coordinating discharge planning if the patient needs post-hospital services based on physician/advanced practitioner order or complex needs related to functional status, cognitive ability, or social support system  Outcome: Progressing     Care plan reviewed

## 2019-08-09 NOTE — TREATMENT PLAN
Individualized Plan of [address] 68 y o  male MRN: [id number]  Unit/Bed#: -01 Encounter: [id number]     PATIENT INFORMATION  ADMISSION DATE: 8/8/2019  8:09 PM RACHEL CATEGORY:Orthopedic Disorders:  08 51  Unilateral Hip Replacement   ADMISSION DIAGNOSIS: Status post total hip replacement, right [[id number]]  EXPECTED LOS: 7 to 10 days     MEDICAL/FUNCTIONAL PROGNOSIS  Based on my assessment of the patient's medical conditions and current functional status, the prognosis for attaining medical and functional goals or the IRF stay is:  Good    Medical Goals: Patient will be medically stable for discharge to [facility] restrictive envrionment upon completion of rehab program    [address]: Home - with supervision     ANTICIPATED FOLLOW-UP SERVICE:   Outpatient Therapy Services: PT and OT      Home Health Services: PT, OT and Nursing    DISCIPLINE SPECIFIC PLANS:  Required Disciplines & Services: Rehabillitation Nursing, Case Management, Dietay/Nutrition and Prosthetics/Orthostics    REQUIRED THERAPY:  Therapy Hours per Day Days per Week Total Days   Physical Therapy 1 5 5 5   Occupational Therapy 1 5 5 5   NOTE: Additional therapy time(s) may be added as appropriate to meet patient needs and to achieve functional goals      Patient will either participate in above therapy regimen or participate in 900 minutes of therapy within 7 day week consisting of PT and OT due to the following medical procedure/condition:Orthopedic Disorders:  08 51  Unilateral Hip Replacement    ANTICIPATED FUNCTIONAL OUTCOMES:  ADL:  Modified independent using least restrictive assistive device   Bladder/Bowel:   Modified independent using least restrictive assistive device   Transfers:   Modified independent using least restrictive assistive device   Locomotion:   Modified independent using least restrictive assistive device   Cognitive:   patient at cog baseline     DISCHARGE PLANNING NEEDS  Equipment needs: Discharge needs to be reviewed with team    REHAB ANTICIPATED PARTICIPATION RESTRICTIONS:  Assist with Mobility and Rquires Assist with ADLS

## 2019-08-09 NOTE — ASSESSMENT & PLAN NOTE
Results from last 7 days   Lab Units 08/12/19  0528 08/08/19  0541 08/07/19  0515   HEMOGLOBIN g/dL 11 3* 11 5* 11 6*       · Monitor CBC intermittently  · Transfuse for Hgb <7

## 2019-08-09 NOTE — H&P
PHYSICAL MEDICINE AND REHABILITATION H&P/ADMISSION NOTE  Nahum Katz 68 y o  male MRN: [id number]  Unit/Bed#: [facility] 453-01 Encounter: [id number]     Rehab Diagnosis: Impairment of mobility, safety and Activities of Daily Living (ADLs) due to Orthopedic Disorders:  08 51  Unilateral Hip Replacement    History of Present Illness:   Nahum Katz is a 68 y o  male who presented to the [facility] [place] for a planned PEGGY  Procedure was performed on August 5th 2019 by Dr Tip Wells  Postoperatively patient with by point drop in hemoglobin to 11 5, hyponatremia  Behavioral services were consulted, due to heightened anxiety by patient  Patient was evaluated by PT and OT, found to be below functional baseline  He has accepted to the [facility] on 8/8/19  Subjective:  Patient seen examined at bedside today  This morning around 1:00 a m  patient had episode of confusion, in which he tried to climb out of bed, this regarding hip precautions  He reports he was aware that he was doing this, but "could not control myself"  He denies any pain currently, reporting that he was able tolerate therapies this morning without too much difficulty  Patient also notes that he has been having frequent urination, stating that he has been going approximately 2-3 times an hour  He denies any history of BPH, nor any history of bladder spasms  He denies any suprapubic pain, no dysuria, no burning with urination  Review of Systems: A 10-point review of systems was performed  Negative except as listed above  Plan:     * Impaired mobility and ADLs  Assessment & Plan  · Acute comprehensive interdisciplinary inpatient rehabilitation including PT, OT, RN, CM, SW, dietary    Status post total hip replacement, left  Assessment & Plan  · Performed on 8/5/19 by Dr Tip Wells    · Weight-bearing as tolerated  · hip precautions in place    Urinary frequency  Assessment & Plan  · Ordered bladder scans q 6 hours  · Patient may be having bladder spasms, consider starting Ditropan   · Patient without fever, chills, suprapubic pain, dysuria, leukocytosis  Low likelihood of UTI    Confusion and disorientation  Assessment & Plan  · Likely secondary to new environment  · Will discontinue Robaxin  · Continue oxycodone for now, consider either weaning off or switching to tramadol if confusion reoccurs    Acute blood loss anemia  Assessment & Plan  Results from last 7 days   Lab Units 08/08/19  0541 08/07/19  0515 08/06/19  0546   HEMOGLOBIN g/dL 11 5* 11 6* 11 5*       · Monitor CBC intermittently  · Transfuse for Hgb <7      Hyponatremia  Assessment & Plan  Results from last 7 days   Lab Units 08/08/19  0541 08/07/19  0515 08/06/19  0546   SODIUM mmol/L 136 132* 136       · Improved  · Monitor BMP intermittently    Osgood-Schlatter's disease of both knees  Assessment & Plan  · Follow-up with orthopedics as outpatient  · Order Lidoderm patch, especially to right knee    # Skin  · Encourage regular turning as patient at risk for skin breakdown  · Staff to continue patient education on Q2h turning  · Rehabilitation team to perform skin checks regularly     # Bowel  · Patient reports no constipation  · To ensure regular BMs, bowel regimen consisting of:  colace , dulcolax suppository  and miralax     # Bladder  · Patient with urinary frequency, see plan as above    # Pain  · Continue tylenol, for max of 3gm daily  · Continue oxycodone   · Discontinue methocarbamol  · Start lidoderm patch(es)    # Rehab Psych   · There are no psychological or psychiatric problems identified    # Other  - Diet/Nutrition:        Diet Orders   (From admission, onward)             Start     Ordered    08/08/19 2026  Diet Regular; Regular House  Diet effective now     Question Answer Comment   Diet Type Regular    Regular Regular House    RD to adjust diet per protocol?  Yes        08/08/19 2026              - DVT prophy: Sequential compression device (Venodyne)  and Enoxaparin (Lovenox)  - GI ppx: None  - Nausea: Zofran  - Supplements: None  - Sleep: None    Disposition: TBD    CODE: Level 1: Full Code   Drug regimen reviewed, all potential adverse effects identified and addressed:    Scheduled Meds:    Current Facility-Administered Medications:  acetaminophen 650 mg Oral Q6H PRN Kahlil Cortes MD   atorvastatin 10 mg Oral Daily With Miguel Scott MD   calcium carbonate 1,000 mg Oral Daily PRN Kahlil Cortes MD   docusate sodium 100 mg Oral BID Kahlil Cortes MD   enoxaparin 40 mg Subcutaneous Q24H Kahlil Cortes MD   levothyroxine 150 mcg Oral Early Morning Kahlil Cortes MD   lidocaine 1 patch Topical Daily Kahlil Cortes MD   ondansetron 4 mg Intravenous Q6H PRN Kahlil Cortes MD   oxyCODONE 10 mg Oral Q4H PRN Kahlil Cortes MD   oxyCODONE 5 mg Oral Q4H PRN Kahlil Cortes MD   polyethylene glycol 17 g Oral Daily PRN Sravanthi Kelly PA-C   senna 1 tablet Oral Daily Kahlil Cortes MD        Restrictions include:  bilateral lower extremities weight bearing as tolerated Fall precautions      Functional History - Prior to Admission:      Functional Status: Patient was independent with mobility/ambulation, transfers, ADL's, IADL's  Functional Status Upon Admission to [facility]:  Mobility:  Min assist  Transfers:  Min assist  ADLs:  Min assist    Amirah Elaine lives with their family  He lives in [facility]) single family home  The living area: can live on one level  Equipment in home: [address]  There 2 steps to enter the home  Patient/family's goals: Return to previous home/apartment    The patient will have 24 hour supervision/physical assistance available upon discharge      Physical Exam:  Temp:  [98 7 °F (37 1 °C)-99 2 °F (37 3 °C)] 99 2 °F (37 3 °C)  HR:  [83-97] 83  Resp:  [18] 18  BP: (121-132)/(74-79) 121/74  SpO2:  [95 %-96 %] 95 %    General: alert, no apparent distress, cooperative and comfortable  HEENT:  Head: Normocephalic, no lesions, without obvious abnormality  LUNGS:  no abnormal respiratory pattern, no retractions noted, non-labored breathing   ABDOMEN:  soft, non-tender  Bowel sounds normal  No masses, no organomegaly  EXTREMITIES:  extremities normal, warm and well-perfused; no cyanosis, clubbing, or edema  NEURO:   mental status, speech normal, alert and oriented x3  PSYCH:  Alert and oriented, appropriate affect  INCISION:  C/D/I and dressings present  MMT:   Strength:   Right  Left  Site  Right*  Left  Site    5 5  S Ab: Shoulder Abductors  4  5  HF: Hip Flexors    5 5  EF: Elbow Flexors  4 5 KF: Knee Flexors    5  5  EE: Elbow Extensors  4 5  KE: Knee Extensors    5  5  WE: Wrist Extensors  5  5  DR: Dorsi Flexors    5  5  FF: Finger Flexors  5  5  PF: Plantar Flexors    5  5  HI: Hand Intrinsics  5  5  EHL: Extensor Hallucis Longus   * secondary to pain    Laboratory:    Results from last 7 days   Lab Units 08/08/19  0541 08/07/19  0515 08/06/19  0546   HEMOGLOBIN g/dL 11 5* 11 6* 11 5*   HEMATOCRIT % 34 1* 35 5* 35 1*   WBC Thousand/uL 8 68 9 01 6 12     Results from last 7 days   Lab Units 08/08/19  0541 08/07/19  0515 08/06/19  0546   BUN mg/dL 14 12 14   SODIUM mmol/L 136 132* 136   POTASSIUM mmol/L 3 9 3 7 3 9   CHLORIDE mmol/L 102 99* 105   CREATININE mg/dL 0 79 0 91 0 91            Wt Readings from Last 1 Encounters:   08/09/19 128 kg (283 lb 1 1 oz)     Estimated body mass index is 37 35 kg/m² as calculated from the following:    Height as of this encounter: 6' 1" (1 854 m)  Weight as of this encounter: 128 kg (283 lb 1 1 oz)  Imaging: reviewed     Rehabilitation Prognosis: good     Tolerance for three hours of therapy a day: good     Family/Patient Goals:  Patient/family's goals: Return to previous home/apartment  Patient will receive PT and OT 90 minutes each per day, five days per week to achieve rehab goals or participate in 900 minutes of therapy within a 7 day week period      Mobility Goals: Supervision / Standby Assist  Transfer Goals: Supervision / Standby Assist  Activities of Daily Living (ADLs) Goals: Supervision / Standby Assist    Discharge Planning:  Rehabilitation and discharge goals discussed with the patient and/or family  Case Managment and Social Work to review patient/family resources and to coordinate Discharge Planning  Estimated length of stay: 4 to 7 days    Patient and Family Education and Training:  Rehabilitation and discharge goals discussed with the patient and/or family  Patient/family education/training needs to be discussed in weekly team meeting      Equipment/DME needs: Therapy teams to assess and evaluate for additional equipment/DME needs throughout rehabilitation stay    Past Medical History:   Past Surgical History:   Family History:   Social history:   Past Medical History:   Diagnosis Date    Hyperthyroidism     Osteoarthritis     last assesed 6-6-16    Polyneuropathy     last assesed 5-8-17    Pure hypercholesterolemia     Wears glasses     Past Surgical History:   Procedure Laterality Date    BACK SURGERY      CHOLECYSTECTOMY      AR TOTAL HIP ARTHROPLASTY Right 8/5/2019    Procedure: ARTHROPLASTY HIP TOTAL;  Surgeon: Mariusz Licea MD;  [facility]: [facility];  Service: Orthopedics    TONSILLECTOMY       Family History   Problem Relation Age of Onset    Arthritis Other       Social History     Socioeconomic History    Marital status: /Civil Union     Spouse name: None    Number of children: None    Years of education: None    Highest education level: None   Occupational History    None   Social Needs    Financial resource strain: None    Food insecurity:     Worry: None     Inability: None    Transportation needs:     Medical: None     Non-medical: None   Tobacco Use    Smoking status: Former Smoker    Smokeless tobacco: Never Used   Substance and Sexual Activity    Alcohol use: Never     Frequency: Never     Drinks per session: Patient refused     Binge frequency: Never    Drug use: Never    Sexual activity: None   Lifestyle    Physical activity:     Days per week: None     Minutes per session: None    Stress: None   Relationships    Social connections:     Talks on phone: None     Gets together: None     Attends [employer] service: None     Active member of club or organization: None     Attends meetings of clubs or organizations: None     Relationship status: None    Intimate partner violence:     Fear of current or ex partner: None     Emotionally abused: None     Physically abused: None     Forced sexual activity: None   Other Topics Concern    None   Social History Narrative    None          Current Medical Diagnosis Allergies   Patient Active Problem List   Diagnosis    Primary osteoarthritis of both knees    Osgood-Schlatter's disease of both knees    Pain in both knees    Status post right hip replacement    Status post total hip replacement, left    Hyponatremia    Acute blood loss anemia    Confusion and disorientation    Urinary frequency    Impaired mobility and ADLs    No Known Allergies        Medical Necessity Criteria for [facility] Admission: Electrolyte imbalance:  hyponatremia, Anemia, Bowel/Bladder Management and Incision/Wound care  In addition, the preadmission screen, post-admission physical evaluation, overall plan of care and admissions order demonstrate a reasonable expectation that the following criteria were met at the [facility]  1  The patient requires active and ongoing therapeutic intervention of multiple therapy disciplines (physical therapy, occupational therapy, speech-language pathology, or prosthetics/orthotics), one of which is physical or occupational therapy      2  Patient requires an intensive rehabilitation therapy program, as defined in Chapter 1, section 110 2 2 of the CMS Medicare Policy Manual  This intensive rehabilitation therapy program will consist of at least 3 hours of therapy per day at least 5 days per week or at least 15 hours of intensive rehabilitation therapy within a 7 consecutive day period, beginning with the date of admission to the [facility]  3  The patient is reasonably expected to actively participate in, and benefit significantly from, the intensive rehabilitation therapy program as defined in Chapter 1, section 110 2 2 of the CMS Medicare Policy Manual at this time of admission to the [facility]  He can reasonably be expected to make measurable improvement (that will be of practical value to improve the patients functional capacity or adaptation to impairments) as a result of the rehabilitation treatment, as defined in section 110 3, and such improvement can be expected to be made within the prescribed period of time  As noted in the CMS Medicare Policy Manual, the patient need not be expected to achieve complete independence in the domain of self-care nor be expected to return to his or her prior level of functioning in order to meet this standard  4  The patient must require physician supervision by a rehabilitation physician  As such, a rehabilitation physician will conduct face-to-face visits with the patient at least 3 days per week throughout the patients stay in the [facility] to assess the patient both medically and functionally, as well as to modify the course of treatment as needed to maximize the patients capacity to benefit from the rehabilitation process    5  The patient requires an intensive and coordinated interdisciplinary approach to providing rehabilitation, as defined in Chapter 1, section 110 2 5 of the CMS Medicare Policy Manual  This will be achieved through periodic team conferences, conducted at least once in a 7-day period, and comprising of an interdisciplinary team of medical professionals consisting of: a rehabilitation physician, registered nurse,  and/or , and a licensed/certified therapist from each therapy discipline involved in treating the patient  Changes Since Pre-admission Assessment: None -This patient's participation in rehab continues to be reasonable, necessary and appropriate  CMS Required Post-Admission Physician Evaluation Elements  History and Physical, including medical history, functional history and active comorbidities as in above text      PostAdmission Physician Evaluation:  The patient has the potential to make improvement and is in need of physical, occupational, and/or therapy services  The patient may also need nutritional services  Given the patient's complex medical condition and risk of further medical complications, rehabilitative services cannot be safely provided at a lower level of care, such as a skilled nursing facility  I have reviewed the patient's functional and medical status at the time of the preadmission screening and they are the same as on the day of this admission  I acknowledge that I have personally performed a full physical examination on this patient within 24 hours of admission  The patient and/or family demonstrated understanding the rehabilitation program and the discharge process after we discussed them      Agree in entirety: yes  Minor adaptions: none    Major changes: none     Nikole Dejesus MD  Physical Medicine and Rehabilitation    ** Please Note: Fluency Direct voice to text software may have been used in the creation of this document   **

## 2019-08-09 NOTE — TELEPHONE ENCOUNTER
Patient is calling because he has his first post op on 8/13/19 but is still in the arc unit at [facility] in room 453  Patient is asking if Dr Lyssa Lopes can see him while still there instead of in office  Patient will be there through Monday, as of now     [phone]

## 2019-08-09 NOTE — ASSESSMENT & PLAN NOTE
· Likely secondary to new environment/Robaxin  · Pain controlled without Robaxin  · Resolved  · Has not been using oxycodone  Discontinued

## 2019-08-09 NOTE — ASSESSMENT & PLAN NOTE
· Improved  No complaints today  · Bladder scans unremarkable, discontinued  · Patient without fever, chills, suprapubic pain, dysuria, leukocytosis    Low likelihood of UTI

## 2019-08-09 NOTE — SOCIAL WORK
Met with Pt to review rehab routine, and CM role  Pts spouse was also present  Pt reports that there are only 2 half [place], their [place] home  Pt shared that he is familiar with [facility], outpt services, as well as DME, as his parents required all of the above before they passed, but he has not been involved himself  Pts spouse and the son that lives locally (the others are in [place] and [place], [place]), as well as neighbors, are supportive and can be of assistance, if necessary  Pt shared that he utilizes [facility] [address], and was interested in [employer] meds to beds  Pt understands the team meeting process, as well as potential LOS  Following to assist with d/c planning needs

## 2019-08-10 PROCEDURE — 97116 GAIT TRAINING THERAPY: CPT

## 2019-08-10 PROCEDURE — 97110 THERAPEUTIC EXERCISES: CPT

## 2019-08-10 PROCEDURE — 97535 SELF CARE MNGMENT TRAINING: CPT | Performed by: OCCUPATIONAL THERAPY ASSISTANT

## 2019-08-10 PROCEDURE — 97530 THERAPEUTIC ACTIVITIES: CPT

## 2019-08-10 RX ORDER — MUSCLE RUB CREAM 100; 150 MG/G; MG/G
CREAM TOPICAL 4 TIMES DAILY PRN
Status: DISCONTINUED | OUTPATIENT
Start: 2019-08-10 | End: 2019-08-14 | Stop reason: HOSPADM

## 2019-08-10 RX ORDER — BISACODYL 10 MG
10 SUPPOSITORY, RECTAL RECTAL ONCE
Status: DISCONTINUED | OUTPATIENT
Start: 2019-08-11 | End: 2019-08-13

## 2019-08-10 RX ORDER — POLYETHYLENE GLYCOL 3350 17 G/17G
17 POWDER, FOR SOLUTION ORAL DAILY
Status: DISCONTINUED | OUTPATIENT
Start: 2019-08-10 | End: 2019-08-14 | Stop reason: HOSPADM

## 2019-08-10 RX ADMIN — ENOXAPARIN SODIUM 40 MG: 40 INJECTION SUBCUTANEOUS at 05:56

## 2019-08-10 RX ADMIN — DOCUSATE SODIUM 100 MG: 100 CAPSULE, LIQUID FILLED ORAL at 08:19

## 2019-08-10 RX ADMIN — ACETAMINOPHEN 650 MG: 325 TABLET ORAL at 03:55

## 2019-08-10 RX ADMIN — LEVOTHYROXINE SODIUM 150 MCG: 75 TABLET ORAL at 05:56

## 2019-08-10 RX ADMIN — POLYETHYLENE GLYCOL 3350 17 G: 17 POWDER, FOR SOLUTION ORAL at 20:56

## 2019-08-10 RX ADMIN — ACETAMINOPHEN 650 MG: 325 TABLET ORAL at 21:24

## 2019-08-10 RX ADMIN — DOCUSATE SODIUM 100 MG: 100 CAPSULE, LIQUID FILLED ORAL at 17:31

## 2019-08-10 RX ADMIN — ACETAMINOPHEN 650 MG: 325 TABLET ORAL at 10:09

## 2019-08-10 RX ADMIN — LIDOCAINE 1 PATCH: 50 PATCH CUTANEOUS at 18:40

## 2019-08-10 RX ADMIN — ATORVASTATIN CALCIUM 10 MG: 10 TABLET, FILM COATED ORAL at 17:31

## 2019-08-10 RX ADMIN — SENNOSIDES 8.6 MG: 8.6 TABLET, FILM COATED ORAL at 08:19

## 2019-08-10 RX ADMIN — MENTHOL, METHYL SALICYLATE: 10; 15 CREAM TOPICAL at 22:27

## 2019-08-10 NOTE — PROGRESS NOTES
Occupational Therapy Treatment Note:       08/10/19 1230   Pain Assessment   Pain Assessment 0-10   Pain Score 4   Pain Location Hip;Leg   Pain Orientation Right   Hospital Pain Intervention(s) Repositioned;Elevated; Rest  (Post having had received medication from [place] to alleviate  )   Restrictions/Precautions   Precautions Fall Risk;Pain;THR   RLE Weight Bearing Per Order WBAT   ROM Restrictions Yes   RLE ROM Restriction   (THPs)   Braces or Orthoses Other (Comment)  (Hip abduction pillow )   QI: Oral Hygiene   Assistance Needed Supervision   Assistance Provided by [place] No physical assistance   Comment Pt requesting to complete while seated due to c/o increased RLE discomfort while in stance  Oral Hygiene CARE Score 4   Grooming   Able To Initiate Tasks; Acquire Items; Shave;Comb/Brush Hair;Wash/Dry Face;Brush/Clean Teeth;Wash/Dry Hands   Limitation Noted In Safety;Strength   Findings Pt requesting to complete while seated due to c/o increased RLE discomfort while in stance  Grooming (FIM) 5 - Patient requires supervision/monitoring   QI: Shower/Bathe Self   Assistance Needed Adaptive equipment;Set-up / clean-up;Supervision;Verbal cues   Assistance Provided by [place] No physical assistance   Comment Instructed pt on using reacher with washcloth as simulated long handled sponge to bathe lower legs/feet, which pt able to complete with S  Pt completed carmen-care while standing unilaterally supported at grab bar with S and no noted LOB  Shower/Bathe Self CARE Score 4   Bathing   Assessed Bath Style Shower   Anticipated D/C Bath Style Shower   Able to [place] No   Able to [employer] Temperature Yes   Able to Wash/Rinse/Dry (body part) Left Arm;Right Arm;L Upper Leg;R Upper Leg;L Lower Leg/Foot;R Lower Leg/Foot;Chest;Abdomen;Perineal Area; Buttocks   Limitations Noted in Balance; Endurance;ROM;Safety;Strength   Positioning Seated;Standing   Adaptive Equipment Longhand Reacher;Tub Bench; Shower [employer] Held Shower   Bathing (FIM) 5 - Patient requires supervision/monitoring but completes 10/10 parts   Tub/Shower Transfer   Limitations Noted In Balance; Endurance; Safety;LE Strength   Adaptive Equipment Grab Bars;Transfer Bench; Other  (RW)   Assessed Shower   Findings VCs for technique  Provided pt with handout on tub bench available through [employer] with dimensions highlighted, which pt identified as his preferred place of purchase; pt's wife plans to take measurements to ensure adequate fit  Shower Transfer (FIM) 4 - Patient requires steadying assist or light touching   QI: Upper Body Dressing   Assistance Needed Set-up / clean-up;Supervision   Assistance Provided by [place] No physical assistance   Comment Seated  Upper Body Dressing CARE Score 4   QI: Lower Body Dressing   Assistance Needed Adaptive equipment;Set-up / clean-up;Supervision;Verbal cues   Assistance Provided by [place] No physical assistance   Comment Post initial demonstration and VCs for technique pt able to doff/don undergarment/pants using [place] Bending with S  Pt completed clothing mgmt over hips while in brief unsupported stance (RW available for support as needed) with S and no noted LOB  Lower Body Dressing CARE Score 4   QI: Putting On/Taking Off Footwear   Assistance Needed Adaptive equipment;Set-up / clean-up;Supervision;Verbal cues   Assistance Provided by [place] No physical assistance   Comment Post initial demonstration and VCs for technique pt able to doff socks using dressing stick, retrieve from floor level using reacher, don socks using wide sock aide, and don shoes with shoelaces remaining tied using reacher and [place] with S     Putting On/Taking Off Footwear CARE Score 4   QI: Picking Up Object   Comment Discussed various methods for safely transporting items using RW, including basket, tray table, and bag  Pt reported he would prefer RW bag as he hopes to transition to cane as soon as appropriate   Discussed option of using resuable 99 cent grocery bag, cutting straps, and double knotting to RW, and of using bottled drinks, placing food into tupperware containers, and using folding reacher (for easy access when needing to retrieve items from floor level)  Provided pt with handout on RW bag and folding reacher available on [employer] as pt identified this as his preferred place of purchase  Dressing/Undressing Clothing   Remove UB Clothes Pullover Shirt   Remove LB Clothes Shorts; Undergarment;Socks; Shoes   Don UB Clothes Pullover Shirt   Don LB Clothes Shorts; Undergarment;Socks; Shoes   Limitations Noted In Balance; Endurance; Safety;Strength;ROM; Timeliness   Adaptive Equipment Reacher;Dressing Stick;LH Shoehorn;Sock Aide; Other  (RW)   Positioning Supported Sit;Standing   Findings Discussed pt's options for purchasing LHAE kit items, as well as leg  LHAE (for bed mobility and car transfer - pt reported he has been requiring one for RLE re-positioning during hx stay thus far) including [employer] and [employer], which pt identified as his preferred place of purchase; handouts provided; OT to f/u on pt's decision  UB Dressing (FIM) 5 - Patient requires supervision/monitoring   LB Dressing (FIM) 5 - Patient requires supervision/monitoring   QI: Sit to Stand   Assistance Needed Adaptive equipment;Set-up / clean-up;Supervision   Assistance Provided by [place] No physical assistance   Comment RW   Sit to Stand CARE Score 4   QI: Chair/Bed-to-Chair Transfer   Assistance Needed Adaptive equipment;Set-up / clean-up;Supervision   Assistance Provided by [place] No physical assistance   Comment CS; RW   Chair/Bed-to-Chair Transfer CARE Score 4   Transfer Bed/Chair/Wheelchair   Limitations Noted In Balance; Endurance;Pain Management;LE Strength   Adaptive Equipment Roller Walker   Stand Pivot Supervision  (CS)   Sit to Stand Supervision   Stand to Sit Supervision   Findings Pt completed functional mobility short distances using RW with CS     Bed, Chair, Wheelchair Transfer (FIM) 5 - Patient requires supervision/monitoring  (CS)   QI: [place] [address] Pt reported at baseline having difficulty with posterior reach required for thoroughly completing rear carmen-care; pt reported he purchased a toileting aide which he has not used yet but plans to trial upon return to home  QI: Toilet Transfer   Comment Pt will require commode to place over standard height toilet upon return to home; OT to f/u if a platform/wide version is warranted and issue appropriate DME order  Cognition   Overall Cognitive Status WFL   Arousal/Participation Alert; Cooperative   Attention Within functional limits   Orientation Level Oriented X4   Memory Within functional limits   Following Commands Follows all commands and directions without difficulty   Activity Tolerance   Activity Tolerance Patient tolerated treatment well   Assessment   Treatment Assessment OT tx sessions focused on transfers, standing balance, functional mobility, ADL skills, discussion of DME needs, and overall activity tolerance/endurance  Pt verbalizing eagerness to return home  Pt's wife present througout for FT and reported she is available to provide assistance for pt as needed upon return to home  OT to f/u on DME assessment  Refer above for details on pt performance  Pt would benefit from continued skilled OT services in order to achieve highest functional abilities  OT Family training done with: Pt's wife  Prognosis Good   Problem List Decreased strength;Decreased range of motion;Decreased endurance; Impaired balance;Decreased mobility;Orthopedic restrictions;Pain   Barriers to Discharge Inaccessible home environment   Plan   Treatment/Interventions ADL retraining;Functional transfer training; Endurance training;Patient/family training;Equipment eval/education; Bed mobility; Compensatory technique education;OT   Progress Progressing toward goals   Recommendation   OT Discharge Recommendation Home with family support   OT Therapy Minutes   OT Time In 1230   OT Time Out 1400   OT Total Time (minutes) 90   OT Mode of treatment - Individual (minutes) 90   OT Mode of treatment - Concurrent (minutes) 0   OT Mode of treatment - Group (minutes) 0   OT Mode of treatment - Co-treat (minutes) 0   OT Mode of Teatment - Total time(minutes) 90 minutes   Therapy Time missed   Time missed?  No   Rei Quiles, [address]

## 2019-08-10 NOTE — PROGRESS NOTES
08/10/19 0830   Pain Assessment   Pain Score 2   Pain Type Acute pain   Pain Location Leg  (and thigh)   Pain Orientation Right; [facility]   Hospital Pain Intervention(s) Repositioned; Rest  (reported to have tylenol and lidocane overnight )   Restrictions/Precautions   Precautions Fall Risk;Pain;THR   RLE Weight Bearing Per Order WBAT   Cognition   Arousal/Participation Alert; Cooperative   Attention Within functional limits   Memory Within functional limits   Following Commands Follows all commands and directions without difficulty   Subjective   Subjective Pt  reported that he is feeling much better today with lesser pain and swelling  Stated that Dr Lidia Chisholm prescribe for him to have a Lidocane patch and was really helping him a lot    QI: Roll Left and Right   Assistance Needed Supervision;Verbal cues   Comment partial roll only due to hip precautions using bedrail    Roll Left and Right CARE Score 4   QI: Sit to Lying   Assistance Needed Supervision;Verbal cues   Comment in room, min use of bedrail with slight HOB elevated , pt  able to get R leg over using leg     Sit to Lying CARE Score 4   QI: Lying to Sitting on Side of Bed   Assistance Needed Supervision   Comment using bedrail upon getting up and HOB elevated and leg  for R LE   Lying to Sitting on Side of Bed CARE Score 4   QI: Sit to Stand   Assistance Needed Incidental touching   Sit to Stand CARE Score 4   QI: Chair/Bed-to-Chair Transfer   Assistance Needed Incidental touching   Chair/Bed-to-Chair Transfer CARE Score 4   Transfer Bed/Chair/Wheelchair   Limitations Noted In Pain Management   Adaptive Equipment Roller Walker   Stand Pivot Contact Guard   Sit to [employer]   Stand to [employer] Guard   Supine to Sit Supervision   Sit to Supine Supervision   Findings Pt  has a bed at home that [facility] can be elevated and will have his son find their bedrail they have for their parents previously to be installed      Bed, Chair, Wheelchair Transfer (FIM) 4 - Patient requires steadying assist or light touching   QI: Car Transfer   Reason if not Attempted Safety concerns   Car Transfer CARE Score 88   QI: Walk 10 Feet   Assistance Needed Incidental touching   Walk 10 Feet CARE Score 4   QI: Walk 50 Feet with Two Turns   Assistance Needed Incidental touching   Walk 50 Feet with Two Turns CARE Score 4   QI: Walk 150 Feet   Reason if not Attempted Safety concerns   Walk 150 Feet CARE Score 88   QI: Walking 10 Feet on Uneven Surfaces   Reason if not Attempted Safety concerns   Walking 10 Feet on Uneven Surfaces CARE Score 88   Ambulation   Does the patient walk? 2  Yes   Primary Discharge Mode of Locomotion Walk   Walk Assist Level Contact Guard;Close Supervision   Gait Pattern Inconsistant Yadi;Decreased R stance; Improper weight shift   Assist Device Roller Mary Castellanos Walked (feet) 120 ft  (X 2 )   Limitations Noted In Endurance   Walking (FIM) 2 - Patient ambulates between 50 - 149 feet regardless of assist/device/set up   Wheelchair mobility   QI: Does the patient use a wheelchair? 0  No   QI: 4 Steps   Reason if not Attempted Activity not applicable   4 Steps CARE Score 9   QI: 12 Steps   Reason if not Attempted Activity not applicable   12 Steps CARE Score 9   Stairs   Findings did not assess today    Therapeutic Interventions   Strengthening heelslides flex and ext, abd and add over slide board, R hip + knee flex, SAQ with 2# wts on L LE  seated LAQ, add squeeze and hip abd with red TB    Flexibility B hamstring and gastroc gentle stretching, seated R heelslides,    Equipment Use   NuStep Level 1 seat 13 X 10 mins and 30 secs    Assessment   Treatment Assessment Pt  tolerated session better today than yesterday  pt  noted to have dec swelling on R LE and inc ROM with mobility with dec tendency to tip toe with stance on R LE  Although pt  cont to need cueing to strike heel in stance and with dec stance time   Pt  able to perform better bed mobility today with S level using leg  and with HOB elevated and using bedrail  Pt  has inc pain at end of session to 5/10 but is still is manageable for pt  Pt  cont to avoid narcotics and stronger pain meds and will try to keep tylenol for pain relief  Pt  would like to keep leg  in room to allow R LE movement and repositioning  Educated pt  to always keep in mind his precautions and to avoid twisting and rotating his R leg  Problem List Decreased strength;Decreased range of motion;Decreased endurance; Impaired balance;Decreased mobility;Orthopedic restrictions;Pain   Barriers to Discharge Inaccessible home environment   Barriers to Discharge Comments RUDY   PT Barriers   Physical Impairment Decreased strength;Decreased range of motion;Decreased endurance; Impaired balance;Decreased mobility;Orthopedic restrictions;Pain   Functional Limitation Stair negotiation;Standing;Transfers; Walking   Plan   Treatment/Interventions Functional transfer training;LE strengthening/ROM; Elevations; Therapeutic exercise; Endurance training;Patient/family training;Equipment eval/education; Bed mobility;Gait training   Recommendation   Recommendation Outpatient PT; Home with family support   Equipment Recommended Walker  (pt  willing to purchase bariatric walker for comfort )   PT Therapy Minutes   PT Time In 0830   PT Time Out 1000   PT Total Time (minutes) 90   PT Mode of treatment - Individual (minutes) 90   PT Mode of treatment - Concurrent (minutes) 0   PT Mode of treatment - Group (minutes) 0   PT Mode of treatment - Co-treat (minutes) 0   PT Mode of Teatment - Total time(minutes) 90 minutes   Therapy Time missed   Time missed?  No

## 2019-08-10 NOTE — PLAN OF CARE
Problem: Potential for Falls  Goal: Patient will remain free of falls  Description  INTERVENTIONS:  - Assess patient frequently for physical needs  -  Identify cognitive and physical deficits and behaviors that affect risk of falls    -  [place] fall precautions as indicated by assessment   - Educate patient/family on patient safety including physical limitations  - Instruct patient to call for assistance with activity based on assessment  - Modify environment to reduce risk of injury  - Consider OT/PT consult to assist with strengthening/mobility  Outcome: Progressing     Problem: PAIN - ADULT  Goal: Verbalizes/displays adequate comfort level or baseline comfort level  Description  Interventions:  - Encourage patient to monitor pain and request assistance  - Assess pain using appropriate pain scale  - Administer analgesics based on type and severity of pain and evaluate response  - Implement non-pharmacological measures as appropriate and evaluate response  - Consider cultural and social influences on pain and pain management  - Notify physician/advanced practitioner if interventions unsuccessful or patient reports new pain  Outcome: Progressing     Problem: INFECTION - ADULT  Goal: Absence or prevention of progression during hospitalization  Description  INTERVENTIONS:  - Assess and monitor for signs and symptoms of infection  - Monitor lab/diagnostic results  - Monitor all insertion sites, i e  indwelling lines, tubes, and drains  - Monitor endotracheal (as able) and nasal secretions for changes in amount and color  - [place] appropriate cooling/warming therapies per order  - Administer medications as ordered  - Instruct and encourage patient and family to use good hand hygiene technique  - Identify and instruct in appropriate isolation precautions for identified infection/condition  Outcome: Progressing     Problem: SAFETY ADULT  Goal: Patient will remain free of falls  Description  INTERVENTIONS:  - Assess patient frequently for physical needs  -  Identify cognitive and physical deficits and behaviors that affect risk of falls    -  [place] fall precautions as indicated by assessment   - Educate patient/family on patient safety including physical limitations  - Instruct patient to call for assistance with activity based on assessment  - Modify environment to reduce risk of injury  - Consider OT/PT consult to assist with strengthening/mobility  Outcome: Progressing  Goal: Maintain or return to baseline ADL function  Description  INTERVENTIONS:  -  Assess patient's ability to carry out ADLs; assess patient's baseline for ADL function and identify physical deficits which impact ability to perform ADLs (bathing, care of mouth/teeth, toileting, grooming, dressing, etc )  - Assess/evaluate cause of self-care deficits   - Assess range of motion  - Assess patient's mobility; develop plan if impaired  - Assess patient's need for assistive devices and provide as appropriate  - Encourage maximum independence but intervene and supervise when necessary  ¯ Involve family in performance of ADLs  ¯ Assess for home care needs following discharge   ¯ Request OT consult to assist with ADL evaluation and planning for discharge  ¯ Provide patient education as appropriate  Outcome: Progressing  Goal: Maintain or return mobility status to optimal level  Description  INTERVENTIONS:  - Assess patient's baseline mobility status (ambulation, transfers, stairs, etc )    - Identify cognitive and physical deficits and behaviors that affect mobility  - Identify mobility aids required to assist with transfers and/or ambulation (gait belt, sit-to-stand, lift, walker, cane, etc )  - [place] fall precautions as indicated by assessment  - Record patient progress and toleration of activity level on Mobility SBAR; progress patient to next Phase/Stage  - Instruct patient to call for assistance with activity based on assessment  - Request Rehabilitation consult to assist with strengthening/weightbearing, etc   Outcome: Progressing     Problem: DISCHARGE PLANNING  Goal: Discharge to home or other facility with appropriate resources  Description  INTERVENTIONS:  - Identify barriers to discharge w/patient and caregiver  - Arrange for needed discharge resources and transportation as appropriate  - Identify discharge learning needs (meds, wound care, etc )  - Arrange for interpretive services to assist at discharge as needed  - Refer to Case Management Department for coordinating discharge planning if the patient needs post-hospital services based on physician/advanced practitioner order or complex needs related to functional status, cognitive ability, or social support system  Outcome: Progressing

## 2019-08-11 PROCEDURE — 97116 GAIT TRAINING THERAPY: CPT

## 2019-08-11 PROCEDURE — 97530 THERAPEUTIC ACTIVITIES: CPT

## 2019-08-11 PROCEDURE — 97535 SELF CARE MNGMENT TRAINING: CPT

## 2019-08-11 PROCEDURE — 97110 THERAPEUTIC EXERCISES: CPT

## 2019-08-11 RX ORDER — BISACODYL 10 MG
10 SUPPOSITORY, RECTAL RECTAL ONCE
Status: DISCONTINUED | OUTPATIENT
Start: 2019-08-11 | End: 2019-08-13

## 2019-08-11 RX ADMIN — DOCUSATE SODIUM 100 MG: 100 CAPSULE, LIQUID FILLED ORAL at 08:01

## 2019-08-11 RX ADMIN — ACETAMINOPHEN 650 MG: 325 TABLET ORAL at 23:27

## 2019-08-11 RX ADMIN — DOCUSATE SODIUM 100 MG: 100 CAPSULE, LIQUID FILLED ORAL at 17:37

## 2019-08-11 RX ADMIN — LEVOTHYROXINE SODIUM 150 MCG: 75 TABLET ORAL at 05:55

## 2019-08-11 RX ADMIN — POLYETHYLENE GLYCOL 3350 17 G: 17 POWDER, FOR SOLUTION ORAL at 08:01

## 2019-08-11 RX ADMIN — SENNOSIDES 8.6 MG: 8.6 TABLET, FILM COATED ORAL at 08:01

## 2019-08-11 RX ADMIN — ACETAMINOPHEN 650 MG: 325 TABLET ORAL at 12:41

## 2019-08-11 RX ADMIN — ATORVASTATIN CALCIUM 10 MG: 10 TABLET, FILM COATED ORAL at 17:37

## 2019-08-11 RX ADMIN — ENOXAPARIN SODIUM 40 MG: 40 INJECTION SUBCUTANEOUS at 05:55

## 2019-08-11 RX ADMIN — LIDOCAINE 1 PATCH: 50 PATCH CUTANEOUS at 18:49

## 2019-08-11 NOTE — PROGRESS NOTES
08/11/19 1230   Pain Assessment   Pain Score 6   Pain Type Acute pain   Pain Location Hip   Pain Orientation Right   Hospital Pain Intervention(s) Repositioned;Distraction  (given tylenol during session, nu step )   Restrictions/Precautions   Precautions Fall Risk;THR  (alarms and GH at HS )   RLE Weight Bearing Per Order WBAT   Cognition   Arousal/Participation Alert; Cooperative   Attention Within functional limits   Memory Within functional limits   Following Commands Follows all commands and directions without difficulty   Subjective   Subjective pt  reported stiffness and dull pain on R hip but otherwise is ready to participate in therapy    QI: Sit to [address] equipment;Supervision   Sit to Lying CARE Score 4   QI: Lying to Sitting on Side of Bed   Assistance Needed Adaptive equipment;Supervision   Lying to Sitting on Side of Bed CARE Score 4   QI: Sit to Stand   Assistance Needed Supervision   Comment CS   Sit to Stand CARE Score 4   QI: Chair/Bed-to-Chair Transfer   Assistance Needed Supervision   Comment CS   Chair/Bed-to-Chair Transfer CARE Score 4   Transfer Bed/Chair/Wheelchair   Adaptive Equipment Roller Walker   Stand Pivot Supervision   Sit to Stand Supervision   Stand to Sit Supervision   Supine to Sit Supervision   Sit to Supine Supervision   Findings S using leg  with minimal use od bedrail, pt's HOB elevated but can do this with his baed at home    Bed, Chair, Wheelchair Transfer (FIM) 5 - Patient requires supervision/monitoring   QI: Car Transfer   Comment to be assessed tomorrow using patient's SUV   QI: Walk 10 Feet   Assistance Needed Supervision   Walk 10 Feet CARE Score 4   QI: Walk 50 Feet with Two Turns   Assistance Needed Supervision   Walk 50 Feet with Two Turns CARE Score 4   QI: Walk 150 Feet   Assistance Needed Supervision   Walk 150 Feet CARE Score 4   QI: Walking 10 Feet on Uneven Surfaces   Assistance Needed Incidental touching   Walking 10 Feet on Uneven Surfaces CARE Score 4   Ambulation   Does the patient walk? 2  Yes   Primary Discharge Mode of Locomotion Walk   Walk Assist Level Close Supervision   Gait Pattern Inconsistant Yadi;Decreased R stance; Improper weight shift   Assist Device Roller Mary Castellanos Walked (feet) 150 ft   Limitations Noted In Endurance; Heel Strike   Findings walked outside 100-200 feet with CS including ramp and cement sidewalk and curb step    Walking (FIM) 5 - Patient requires supervision/monitoring AND distance 150 feet or more, no rest   Wheelchair mobility   QI: Does the patient use a wheelchair? 0  No   QI: 1 Step (Curb)   Assistance Needed Verbal cues; Incidental touching   1 Step (Curb) CARE Score 4   QI: 4 Steps   Reason if not Attempted Activity not applicable   4 Steps CARE Score 9   QI: 12 Steps   Reason if not Attempted Activity not applicable   12 Steps CARE Score 9   Stairs   Type Curb   # of Steps 2   Weight Bearing Precautions RLE;WBAT   Assist Devices Roller Walker   Findings min A    Stairs (FIM) 1 - Patient goes up and down less than 4 stairs regardless of assist/device/set up   QI: Toilet Transfer   Comment stood up to use urinal    Equipment Use   NuStep Level 1 x 10 mins for R LE ROM and dec stiffness seat 13    Assessment   Treatment Assessment Pt  tolerated session well although has mod pain all throuhgout session but was manageable  Tx focused on increasing activity tolerance in walking both in even and uneven surfaces , curb management and bed mobility  Pt  CS/ S level with most functional mobility including bed mobility using leg   Pt  cont to elevate HOB since his bed at home has this option and uses bedrail minimally to support himself  Pt  stated that at home on the R side of the bed he has a post/ nigtstand that is sturdy enough for him to stabilize himself or could install a bedrail/ bedcane  Pt  cont to be concern about in toeing of R foot with mobility and when sitting and laying down  Cont to educate patient that as much as he can he has to correct his toieing in and makig sure that his knee and hip are also not internally rotated  Explained to patient that sometimes it is normal for foot to turn in or out unconsciously knowing  when we are staying still but as long as the knee and hip is in neutral position he is still within precautions and to always make sure that there is a pillow or towel roll in bet his legs to keep his R leg from truning in execessively  Pt  able to demonstrate understanding  Pt  Cont to demonstrate good progress in PT and cont with POC as tolerated  FT tomorrow with pt's wife for car transfers using there on SUV  Problem List Decreased strength;Decreased range of motion;Decreased endurance; Impaired balance;Decreased mobility;Orthopedic restrictions;Pain   Barriers to Discharge Inaccessible home environment   PT Barriers   Physical Impairment Decreased strength;Decreased range of motion;Decreased endurance; Impaired balance;Decreased mobility;Orthopedic restrictions;Pain   Functional Limitation Stair negotiation;Standing;Transfers; Walking   Plan   Treatment/Interventions Functional transfer training;LE strengthening/ROM; Elevations; Therapeutic exercise; Endurance training;Patient/family training;Equipment eval/education; Bed mobility;Gait training   Recommendation   Recommendation Outpatient PT; Home with family support   Equipment Recommended Walker   PT Therapy Minutes   PT Time In 1230   PT Time Out 1330   PT Total Time (minutes) 60   PT Mode of treatment - Individual (minutes) 60   PT Mode of treatment - Concurrent (minutes) 0   PT Mode of treatment - Group (minutes) 0   PT Mode of treatment - Co-treat (minutes) 0   PT Mode of Teatment - Total time(minutes) 60 minutes   Therapy Time missed   Time missed?  No

## 2019-08-11 NOTE — PROGRESS NOTES
[address]  OT Daily Treatment Note           08/11/19 0845   Pain Assessment   Pain Assessment 0-10   Pain Score 6  (c/o "stiffness")   Pain Type Acute pain;Surgical pain   Pain Location Groin; Hip   Pain Orientation Right   Restrictions/Precautions   RLE Weight Bearing Per Order WBAT   QI: Lying to Sitting on Side of Bed   Assistance Needed Physical assistance   Assistance Provided by Winigan Less than 25%   Lying to Sitting on Side of Bed CARE Score 3   QI: Sit to [address] Provided by Winigan No physical assistance   Comment close   Sit to Stand CARE Score 4   QI: Chair/Bed-to-Chair Transfer   Assistance Needed Supervision   Assistance Provided by Winigan No physical assistance   Comment close   Chair/Bed-to-Chair Transfer CARE Score 4   Transfer Bed/Chair/Wheelchair   Adaptive Equipment Roller Walker   Sit to Stand Supervision   Stand to Sit Supervision   Supine to Sit Minimal  (with bed rail)   Findings close supervision, assist for right LE with bed mobility with use of bed rails   Bed, Chair, Wheelchair Transfer (FIM) 4 - Winigan lifts one extremity during transfer   QI: [address] Needed Physical assistance   Assistance Provided by Winigan Less than 25%   Berlin Carrillo 83 Score 3   Toileting   Able to [address] down yes, up yes  Manage Hygiene Bladder   Limitations Noted In Balance   Adaptive Equipment Grab Bar  [facility] over toilet)   Findings Patient requires assistance to pull pants up from floor secondary to THP's   Completed toileting x2 trials   Toileting (FIM) 4 - Patient completes 75% of all tasks   QI: [address] 61 Provided by Winigan No physical assistance   Toilet Transfer CARE Score 4   Toilet Transfer   Surface Assessed Standard Commode  (over toilet)   Transfer Technique Standard   Limitations Noted In [address] Transfer (FIM) 5 - Patient requires supervision/monitoring  (close)   Right Upper Extremity- Strength   RUE Strength Comment Patient completed B/L UE exercise with 4 lbs , 3x10 in shoulder press, chest press, internal/external rotation, and elbow flexion/extension  Activity Tolerance   Activity Tolerance Patient tolerated treatment well   Assessment   Treatment Assessment Patient is pleasant and cooperative this morning  Patient reporting concerns of constipation and discomfort  RN reported she was aware  Patient completed toileting trials x2 with increased time during second trial  This therapist applied ace wrap to right LE in figure 8 pattern for edema management  He completed seated UE exercise to increase overall strength for ADLs and transfers  Prognosis Good   Problem List Decreased strength;Decreased range of motion;Decreased endurance; Impaired balance;Decreased mobility;Pain;Orthopedic restrictions   Plan   Treatment/Interventions ADL retraining;Functional transfer training; Therapeutic exercise; Endurance training;Patient/family training;Equipment eval/education; Bed mobility   Progress Progressing toward goals   OT Therapy Minutes   OT Time In 0845   OT Time Out 0945   OT Total Time (minutes) 60   OT Mode of treatment - Individual (minutes) 45   OT Mode of treatment - Concurrent (minutes) 15   Therapy Time missed   Time missed? No           Patient left with call bell in reach and alarms in place

## 2019-08-11 NOTE — PROGRESS NOTES
Internal Medicine Progress Note  Patient: Tona Ledesma  Age/sex: 68 y o  male  Medical Record #: [id number]      ASSESSMENT/PLAN:  Tona Ledesma is seen and examined and mangement for following issues:    Rt hip OA s/p Rt PEGGY 8/5/19: WBAT; watch incision  Added ACE RLE yesterday for edema     Hypothyroidism: Continue current Levothyroxine  TSH 5/2019 0 91     Hyperlipidemia: Continue atorvastatin  Post operative blood loss anemia:  Stable with expected drop, asymptomatic  On no iron = not needed    Thrombocytopenia: Mild  Stable  Constipation:  Resolved    Right knee pain: says HS Lidopatch effective      Subjective: Patients overnight issues or events were reviewed with nursing or staff during rounds or morning huddle session  No new or overnight issues  Thrombocytopenia: Stable  Hypothyroidism: Continue levothyroxine as per home dosage  Constipation:  resolved  Offers no complaints      ROS:   GI: denies abdominal pain, change bowel habits or reflux symptoms  Neuro: Denies any headache, new vision changes, new neuropathies,new weaknesses   Respiratory: No Cough, SOB, denies wheeze  Cardiovascular: No CP, palpitations , denies perception of rapid heartbeat  Musculoskeletal: +right hip pain  : denies any new urinary burning or frequency    Review of Scheduled Meds:    Current Facility-Administered Medications:  acetaminophen 650 mg Oral Q6H PRN Kahlil Cortes MD   atorvastatin 10 mg Oral Daily With Dinner Zenia Trejo MD   bisacodyl 10 mg Rectal Once Ara Zuñiga MD   bisacodyl 10 mg Rectal Once Ara Zuñiga MD   calcium carbonate 1,000 mg Oral Daily PRN Kahlil Cortes MD   docusate sodium 100 mg Oral BID Kahlil Cortes MD   enoxaparin 40 mg Subcutaneous Q24H Kahlil Cortes MD   levothyroxine 150 mcg Oral Early Morning Kahlil Cortes MD   lidocaine 1 patch Topical Daily Zenia Trejo MD   menthol-methyl salicylate  Apply externally 4x Daily PRN Ara Zuñiga MD   ondansetron 4 mg Intravenous Q6H PRN Vance Marie MD   oxyCODONE 10 mg Oral Q4H PRN Kahlil Cortes MD   oxyCODONE 5 mg Oral Q4H PRN Vance Marie MD   polyethylene glycol 17 g Oral Daily PRN Sravanthi Kelly PA-C   polyethylene glycol 17 g Oral Daily Quiana Tilley MD   senna 1 tablet Oral Daily Vance Marie MD       Labs:     Results from last 7 days   Lab Units 08/08/19  0541 08/07/19  0515 08/06/19  0546   WBC Thousand/uL 8 68 9 01 6 12   HEMOGLOBIN g/dL 11 5* 11 6* 11 5*   HEMATOCRIT % 34 1* 35 5* 35 1*   PLATELETS Thousands/uL 139* 143* 146*     Results from last 7 days   Lab Units 08/08/19  0541 08/07/19  0515 08/06/19  0546   SODIUM mmol/L 136 132* 136   POTASSIUM mmol/L 3 9 3 7 3 9   CHLORIDE mmol/L 102 99* 105   CO2 mmol/L 28 26 26   BUN mg/dL 14 12 14   CREATININE mg/dL 0 79 0 91 0 91   CALCIUM mg/dL 8 2* 8 3 7 7*                      *Labs reviewed  *Radiology studies reviewed  *Medications reviewed and reconciled as needed  *Please refer to order section for additional ordered labs studies    Physical Examination:  Vitals:   Vitals:    08/10/19 1300 08/10/19 2124 08/11/19 0554 08/11/19 0600   BP: 119/72 124/75 125/73    BP Location: Right arm Right arm Right arm    Pulse: 84 83 75    Resp: 18 20 18    Temp: 98 °F (36 7 °C) 98 1 °F (36 7 °C) 97 7 °F (36 5 °C)    TempSrc: Oral Oral Oral    SpO2: 96% 96% 94%    Weight:    127 kg (280 lb 6 8 oz)   Height:           GEN: NAD  RESP: BBS w/o crackles/wheeze/rhonci; resp unlabored  CV: +S1 S2, regular rate, no rubs/murmurs/gallops  ABD: soft, NT, ND, normal BS   : voiding   EXT: no edema of LLE but +edema RLE = ACE wrapped; +right hip dressing intact  Skin: no rashes , no lesions  Neuro: AAOx3 no focality on exam    Total time spent: At least 35 minutes, with more than 50% spent counseling/coordinating care  Counseling includes discussion with patient re: progress  and discussion with patient of his/her current medical state/information   Coordination of patient's care was performed in conjunction with primary service  Time invested included review of patient's labs, vitals, and management of their comorbidities with continued monitoring  In addition, this patient was discussed with medical team including physician and advanced extenders  The care of the patient was extensively discussed and appropriate treatment plan was formulated unique for this patient  ** Please Note: Dragon 360 Dictation voice to text software may have been used in the creation of this document   **

## 2019-08-11 NOTE — PLAN OF CARE
Problem: Potential for Falls  Goal: Patient will remain free of falls  Description  INTERVENTIONS:  - Assess patient frequently for physical needs  -  Identify cognitive and physical deficits and behaviors that affect risk of falls    -  [place] fall precautions as indicated by assessment   - Educate patient/family on patient safety including physical limitations  - Instruct patient to call for assistance with activity based on assessment  - Modify environment to reduce risk of injury  - Consider OT/PT consult to assist with strengthening/mobility  Outcome: Progressing     Problem: PAIN - ADULT  Goal: Verbalizes/displays adequate comfort level or baseline comfort level  Description  Interventions:  - Encourage patient to monitor pain and request assistance  - Assess pain using appropriate pain scale  - Administer analgesics based on type and severity of pain and evaluate response  - Implement non-pharmacological measures as appropriate and evaluate response  - Consider cultural and social influences on pain and pain management  - Notify physician/advanced practitioner if interventions unsuccessful or patient reports new pain  Outcome: Progressing     Problem: INFECTION - ADULT  Goal: Absence or prevention of progression during hospitalization  Description  INTERVENTIONS:  - Assess and monitor for signs and symptoms of infection  - Monitor lab/diagnostic results  - Monitor all insertion sites, i e  indwelling lines, tubes, and drains  - Monitor endotracheal (as able) and nasal secretions for changes in amount and color  - [place] appropriate cooling/warming therapies per order  - Administer medications as ordered  - Instruct and encourage patient and family to use good hand hygiene technique  - Identify and instruct in appropriate isolation precautions for identified infection/condition  Outcome: Progressing     Problem: SAFETY ADULT  Goal: Patient will remain free of falls  Description  INTERVENTIONS:  - Assess patient frequently for physical needs  -  Identify cognitive and physical deficits and behaviors that affect risk of falls    -  [place] fall precautions as indicated by assessment   - Educate patient/family on patient safety including physical limitations  - Instruct patient to call for assistance with activity based on assessment  - Modify environment to reduce risk of injury  - Consider OT/PT consult to assist with strengthening/mobility  Outcome: Progressing  Goal: Maintain or return to baseline ADL function  Description  INTERVENTIONS:  -  Assess patient's ability to carry out ADLs; assess patient's baseline for ADL function and identify physical deficits which impact ability to perform ADLs (bathing, care of mouth/teeth, toileting, grooming, dressing, etc )  - Assess/evaluate cause of self-care deficits   - Assess range of motion  - Assess patient's mobility; develop plan if impaired  - Assess patient's need for assistive devices and provide as appropriate  - Encourage maximum independence but intervene and supervise when necessary  ¯ Involve family in performance of ADLs  ¯ Assess for home care needs following discharge   ¯ Request OT consult to assist with ADL evaluation and planning for discharge  ¯ Provide patient education as appropriate  Outcome: Progressing  Goal: Maintain or return mobility status to optimal level  Description  INTERVENTIONS:  - Assess patient's baseline mobility status (ambulation, transfers, stairs, etc )    - Identify cognitive and physical deficits and behaviors that affect mobility  - Identify mobility aids required to assist with transfers and/or ambulation (gait belt, sit-to-stand, lift, walker, cane, etc )  - [place] fall precautions as indicated by assessment  - Record patient progress and toleration of activity level on Mobility SBAR; progress patient to next Phase/Stage  - Instruct patient to call for assistance with activity based on assessment  - Request Rehabilitation consult to assist with strengthening/weightbearing, etc   Outcome: Progressing     Problem: DISCHARGE PLANNING  Goal: Discharge to home or other facility with appropriate resources  Description  INTERVENTIONS:  - Identify barriers to discharge w/patient and caregiver  - Arrange for needed discharge resources and transportation as appropriate  - Identify discharge learning needs (meds, wound care, etc )  - Arrange for interpretive services to assist at discharge as needed  - Refer to Case Management Department for coordinating discharge planning if the patient needs post-hospital services based on physician/advanced practitioner order or complex needs related to functional status, cognitive ability, or social support system  Outcome: Progressing

## 2019-08-11 NOTE — PROGRESS NOTES
When pt  Was transferring back from bathroom with rolling walker, pt  Stated that it felt like he "pulled something" in his left upper arm  Dr Nataly Cook on floor at the time  Dr Nataly Cook made aware of same and orders noted  [place] applied as per prn order  Pt  Also medicated with Tylenol as requested  Will continue to monitor

## 2019-08-12 LAB
ANION GAP SERPL CALCULATED.3IONS-SCNC: 6 MMOL/L (ref 4–13)
BASOPHILS # BLD AUTO: 0.08 THOUSANDS/ΜL (ref 0–0.1)
BASOPHILS NFR BLD AUTO: 1 % (ref 0–1)
BUN SERPL-MCNC: 11 MG/DL (ref 5–25)
CALCIUM SERPL-MCNC: 8.2 MG/DL (ref 8.3–10.1)
CHLORIDE SERPL-SCNC: 103 MMOL/L (ref 100–108)
CO2 SERPL-SCNC: 29 MMOL/L (ref 21–32)
CREAT SERPL-MCNC: 0.78 MG/DL (ref 0.6–1.3)
EOSINOPHIL # BLD AUTO: 0.32 THOUSAND/ΜL (ref 0–0.61)
EOSINOPHIL NFR BLD AUTO: 4 % (ref 0–6)
ERYTHROCYTE [DISTWIDTH] IN BLOOD BY AUTOMATED COUNT: 13.6 % (ref 11.6–15.1)
GFR SERPL CREATININE-BSD FRML MDRD: 91 ML/MIN/1.73SQ M
GLUCOSE P FAST SERPL-MCNC: 97 MG/DL (ref 65–99)
GLUCOSE SERPL-MCNC: 97 MG/DL (ref 65–140)
HCT VFR BLD AUTO: 34.7 % (ref 36.5–49.3)
HGB BLD-MCNC: 11.3 G/DL (ref 12–17)
IMM GRANULOCYTES # BLD AUTO: 0.07 THOUSAND/UL (ref 0–0.2)
IMM GRANULOCYTES NFR BLD AUTO: 1 % (ref 0–2)
LYMPHOCYTES # BLD AUTO: 1.63 THOUSANDS/ΜL (ref 0.6–4.47)
LYMPHOCYTES NFR BLD AUTO: 22 % (ref 14–44)
MCH RBC QN AUTO: 30.5 PG (ref 26.8–34.3)
MCHC RBC AUTO-ENTMCNC: 32.6 G/DL (ref 31.4–37.4)
MCV RBC AUTO: 94 FL (ref 82–98)
MONOCYTES # BLD AUTO: 0.81 THOUSAND/ΜL (ref 0.17–1.22)
MONOCYTES NFR BLD AUTO: 11 % (ref 4–12)
NEUTROPHILS # BLD AUTO: 4.62 THOUSANDS/ΜL (ref 1.85–7.62)
NEUTS SEG NFR BLD AUTO: 61 % (ref 43–75)
NRBC BLD AUTO-RTO: 0 /100 WBCS
PLATELET # BLD AUTO: 302 THOUSANDS/UL (ref 149–390)
PMV BLD AUTO: 9.5 FL (ref 8.9–12.7)
POTASSIUM SERPL-SCNC: 3.9 MMOL/L (ref 3.5–5.3)
RBC # BLD AUTO: 3.7 MILLION/UL (ref 3.88–5.62)
SODIUM SERPL-SCNC: 138 MMOL/L (ref 136–145)
WBC # BLD AUTO: 7.53 THOUSAND/UL (ref 4.31–10.16)

## 2019-08-12 PROCEDURE — 97535 SELF CARE MNGMENT TRAINING: CPT

## 2019-08-12 PROCEDURE — 85025 COMPLETE CBC W/AUTO DIFF WBC: CPT | Performed by: PHYSICIAN ASSISTANT

## 2019-08-12 PROCEDURE — 80048 BASIC METABOLIC PNL TOTAL CA: CPT | Performed by: PHYSICIAN ASSISTANT

## 2019-08-12 PROCEDURE — 97530 THERAPEUTIC ACTIVITIES: CPT

## 2019-08-12 PROCEDURE — 97110 THERAPEUTIC EXERCISES: CPT

## 2019-08-12 PROCEDURE — 97116 GAIT TRAINING THERAPY: CPT

## 2019-08-12 PROCEDURE — 99232 SBSQ HOSP IP/OBS MODERATE 35: CPT | Performed by: PHYSICAL MEDICINE & REHABILITATION

## 2019-08-12 RX ORDER — LANOLIN ALCOHOL/MO/W.PET/CERES
3 CREAM (GRAM) TOPICAL
Status: DISCONTINUED | OUTPATIENT
Start: 2019-08-12 | End: 2019-08-14 | Stop reason: HOSPADM

## 2019-08-12 RX ORDER — ONDANSETRON 4 MG/1
4 TABLET, ORALLY DISINTEGRATING ORAL EVERY 6 HOURS PRN
Status: DISCONTINUED | OUTPATIENT
Start: 2019-08-12 | End: 2019-08-13

## 2019-08-12 RX ADMIN — ACETAMINOPHEN 650 MG: 325 TABLET ORAL at 12:34

## 2019-08-12 RX ADMIN — ENOXAPARIN SODIUM 40 MG: 40 INJECTION SUBCUTANEOUS at 05:28

## 2019-08-12 RX ADMIN — MELATONIN 3 MG: 3 TAB ORAL at 22:32

## 2019-08-12 RX ADMIN — POLYETHYLENE GLYCOL 3350 17 G: 17 POWDER, FOR SOLUTION ORAL at 08:12

## 2019-08-12 RX ADMIN — SENNOSIDES 8.6 MG: 8.6 TABLET, FILM COATED ORAL at 08:12

## 2019-08-12 RX ADMIN — ATORVASTATIN CALCIUM 10 MG: 10 TABLET, FILM COATED ORAL at 16:35

## 2019-08-12 RX ADMIN — LEVOTHYROXINE SODIUM 150 MCG: 75 TABLET ORAL at 05:28

## 2019-08-12 RX ADMIN — DOCUSATE SODIUM 100 MG: 100 CAPSULE, LIQUID FILLED ORAL at 08:12

## 2019-08-12 RX ADMIN — DOCUSATE SODIUM 100 MG: 100 CAPSULE, LIQUID FILLED ORAL at 18:10

## 2019-08-12 RX ADMIN — LIDOCAINE 1 PATCH: 50 PATCH CUTANEOUS at 19:01

## 2019-08-12 RX ADMIN — ACETAMINOPHEN 650 MG: 325 TABLET ORAL at 22:32

## 2019-08-12 NOTE — PLAN OF CARE
Problem: Potential for Falls  Goal: Patient will remain free of falls  Description  INTERVENTIONS:  - Assess patient frequently for physical needs  -  Identify cognitive and physical deficits and behaviors that affect risk of falls    -  [place] fall precautions as indicated by assessment   - Educate patient/family on patient safety including physical limitations  - Instruct patient to call for assistance with activity based on assessment  - Modify environment to reduce risk of injury  - Consider OT/PT consult to assist with strengthening/mobility  Outcome: Progressing     Problem: PAIN - ADULT  Goal: Verbalizes/displays adequate comfort level or baseline comfort level  Description  Interventions:  - Encourage patient to monitor pain and request assistance  - Assess pain using appropriate pain scale  - Administer analgesics based on type and severity of pain and evaluate response  - Implement non-pharmacological measures as appropriate and evaluate response  - Consider cultural and social influences on pain and pain management  - Notify physician/advanced practitioner if interventions unsuccessful or patient reports new pain  Outcome: Progressing     Problem: INFECTION - ADULT  Goal: Absence or prevention of progression during hospitalization  Description  INTERVENTIONS:  - Assess and monitor for signs and symptoms of infection  - Monitor lab/diagnostic results  - Monitor all insertion sites, i e  indwelling lines, tubes, and drains  - Monitor endotracheal (as able) and nasal secretions for changes in amount and color  - [place] appropriate cooling/warming therapies per order  - Administer medications as ordered  - Instruct and encourage patient and family to use good hand hygiene technique  - Identify and instruct in appropriate isolation precautions for identified infection/condition  Outcome: Progressing     Problem: SAFETY ADULT  Goal: Patient will remain free of falls  Description  INTERVENTIONS:  - Assess patient frequently for physical needs  -  Identify cognitive and physical deficits and behaviors that affect risk of falls    -  [place] fall precautions as indicated by assessment   - Educate patient/family on patient safety including physical limitations  - Instruct patient to call for assistance with activity based on assessment  - Modify environment to reduce risk of injury  - Consider OT/PT consult to assist with strengthening/mobility  Outcome: Progressing  Goal: Maintain or return to baseline ADL function  Description  INTERVENTIONS:  -  Assess patient's ability to carry out ADLs; assess patient's baseline for ADL function and identify physical deficits which impact ability to perform ADLs (bathing, care of mouth/teeth, toileting, grooming, dressing, etc )  - Assess/evaluate cause of self-care deficits   - Assess range of motion  - Assess patient's mobility; develop plan if impaired  - Assess patient's need for assistive devices and provide as appropriate  - Encourage maximum independence but intervene and supervise when necessary  ¯ Involve family in performance of ADLs  ¯ Assess for home care needs following discharge   ¯ Request OT consult to assist with ADL evaluation and planning for discharge  ¯ Provide patient education as appropriate  Outcome: Progressing  Goal: Maintain or return mobility status to optimal level  Description  INTERVENTIONS:  - Assess patient's baseline mobility status (ambulation, transfers, stairs, etc )    - Identify cognitive and physical deficits and behaviors that affect mobility  - Identify mobility aids required to assist with transfers and/or ambulation (gait belt, sit-to-stand, lift, walker, cane, etc )  - [place] fall precautions as indicated by assessment  - Record patient progress and toleration of activity level on Mobility SBAR; progress patient to next Phase/Stage  - Instruct patient to call for assistance with activity based on assessment  - Request Rehabilitation consult to assist with strengthening/weightbearing, etc   Outcome: Progressing     Problem: DISCHARGE PLANNING  Goal: Discharge to home or other facility with appropriate resources  Description  INTERVENTIONS:  - Identify barriers to discharge w/patient and caregiver  - Arrange for needed discharge resources and transportation as appropriate  - Identify discharge learning needs (meds, wound care, etc )  - Arrange for interpretive services to assist at discharge as needed  - Refer to Case Management Department for coordinating discharge planning if the patient needs post-hospital services based on physician/advanced practitioner order or complex needs related to functional status, cognitive ability, or social support system  Outcome: Progressing

## 2019-08-12 NOTE — PROGRESS NOTES
08/12/19 1400   Pain Assessment   Pain Assessment 0-10   Pain Score 6   Pain Type Acute pain;Surgical pain   Pain Location Hip;Groin;Knee   Pain Orientation Right   Pain Descriptors Aching;Discomfort   Pain Frequency Intermittent   Pain Onset Gradual   Hospital Pain Intervention(s) Cold applied; Rest   Restrictions/Precautions   Precautions Fall Risk;Pain   Braces or Orthoses Other (Comment)  (ACE wrap RLE )   Cognition   Overall Cognitive Status WFL   Subjective   Subjective pt anxious about going home  pt c/o discomfort in RLE but tolerable with therapy   QI: Sit to [address] Provided by [place] No physical assistance   Sit to Stand CARE Score 4   QI: Chair/Bed-to-Chair Transfer   Assistance Needed Supervision; Adaptive equipment   Assistance Provided by [place] No physical assistance   Chair/Bed-to-Chair Transfer CARE Score 4   Transfer Bed/Chair/Wheelchair   Limitations Noted In Pain Management;LE Strength   Adaptive Equipment Roller Walker   All Transfer Supervision   Bed, Chair, Wheelchair Transfer (FIM) 5 - Patient requires supervision/monitoring   QI: Car Transfer   Assistance Needed Physical assistance   Assistance Provided by [place] 25%-49%   Comment A with RLE into and out of car   Car Transfer CARE Score 3   QI: [address]; Adaptive equipment   Assistance Provided by [place] No physical assistance   Walk 10 Feet CARE Score 4   QI: Walk 50 Feet with Two Turns   Assistance Needed Supervision; Adaptive equipment   Assistance Provided by [place] No physical assistance   Walk 50 Feet with Two Turns CARE Score 4   QI: Walk 150 Feet   Assistance Needed Supervision; Adaptive equipment   Assistance Provided by [place] No physical assistance   Walk 150 Feet CARE Score 4   QI: Walking 10 Feet on Uneven Surfaces   Assistance Needed Incidental touching; Adaptive equipment   Assistance Provided by [place] No physical assistance   Walking 10 Feet on Uneven Surfaces CARE Score 4   Ambulation   Does the patient walk? 2  Yes   Primary Discharge Mode of Locomotion Walk   Walk Assist Level Close Supervision   Gait Pattern Inconsistant Yadi; Slow Yadi;Decreased foot clearance;Decreased R stance; Improper weight shift   Assist Device Roller Mary Castellanos Walked (feet) 300 ft   Limitations Noted In Endurance; Heel Strike; Sequencing;Speed;Strength;Swing   Findings increased gait distance today  short distances outside while walking to car for tx   Walking (FIM) 5 - Patient requires supervision/monitoring AND distance 150 feet or more, no rest   QI: Wheel 50 Feet with Two Turns   Reason if not Attempted Activity not applicable   Wheel 50 Feet with Two Turns CARE Score 9   QI: Wheel 150 Feet   Reason if not Attempted Activity not applicable   Wheel 449 Feet CARE Score 9   Wheelchair mobility   QI: Does the patient use a wheelchair? 0  No   QI: 1 Step (Curb)   Assistance Needed Supervision; Adaptive equipment   Assistance Provided by [place] No physical assistance   1 Step (Curb) CARE Score 4   QI: 4 Steps   Reason if not Attempted Activity not applicable   4 Steps CARE Score 9   QI: 12 Steps   Reason if not Attempted Activity not applicable   12 Steps CARE Score 9   Stairs   Type Curb   # of Steps 2   Weight Bearing Precautions Fall Risk;Hip;RLE   Assist Devices Roller Walker   Findings CS 6" step outside   Stairs (FIM) 1 - Patient goes up and down less than 4 stairs regardless of assist/device/set up   QI: Toilet Transfer   Assistance Needed Supervision   Toilet Transfer CARE Score 4   Toilet Transfer   Findings stood to urinate    Toilet Transfer (FIM) 5 - Patient requires supervision/monitoring   Therapeutic Interventions   Strengthening standing hip flex and abd x20 each    supine LAQ x30   quad sets with CP applied in room   Equipment Use   NuStep L2 10mins (seat 13, arms 9)   Assessment   Treatment Assessment pt tolerated tx well with increase fatigue towards end of session  pt anxious about car tx and going home, but able to perform with Bernice  will provide pt with waffle cushion for d/c for the car to even chair out for improved comfort  spoke to nursing about ACE wrap on RLE and discontinued alarms at this time  will cont to work on strenghtening and improving wt bearing to progress with functional mobility and ind for d/c home  Family/Caregiver Present wife  Problem List Decreased strength;Decreased endurance;Decreased mobility   Barriers to Discharge Inaccessible home environment   PT Barriers   Physical Impairment Decreased strength;Decreased range of motion;Decreased endurance; Impaired balance;Decreased mobility;Orthopedic restrictions;Pain   Functional Limitation Stair negotiation;Standing;Transfers; Walking   Plan   Treatment/Interventions Functional transfer training;LE strengthening/ROM; Endurance training;Bed mobility;Gait training   Progress Progressing toward goals   Recommendation   Recommendation Outpatient PT; Home with family support   Equipment Recommended Walker   PT Therapy Minutes   PT Time In 1400   PT Time Out 1530   PT Total Time (minutes) 90   PT Mode of treatment - Individual (minutes) 90   PT Mode of treatment - Concurrent (minutes) 0   PT Mode of treatment - Group (minutes) 0   PT Mode of treatment - Co-treat (minutes) 0   PT Mode of Teatment - Total time(minutes) 90 minutes   Therapy Time missed   Time missed?  No

## 2019-08-12 NOTE — PROGRESS NOTES
Physical Medicine and Rehabilitation Progress Note  Don Sheppard 68 y o  male MRN: [id number]  Unit/Bed#: [facility] 453-01 Encounter: [id number]    HPI: Don Sheppard is a 68 y o  male who presented to the [facility] for a planned PEGGY  Procedure was performed on August 5th 2019 by Dr Loman Lombard  Postoperatively patient with by point drop in hemoglobin to 11 5, hyponatremia  Behavioral services were consulted, due to heightened anxiety by patient  Patient was evaluated by PT and OT, found to be below functional baseline  He has accepted to the [facility] on 8/8/19  Chief Complaint: Difficulty sleeping     Interval: Pt s/e in reclining chair  Feeling well and pain is quite well-controlled  He is limiting his narcotic use  He does report difficulty sleeping at night, due to discomfort (typically a side sleeper, and hip precautions prevent him from doing that), and loud noises outside his room  He typically goes to sleep around 1AM at home, and uses soft music to help him sleep  He denies any depression/anxiety, Cp, Sob, fevers, chills, N/V  He is tolerating therapy  Urinary frequency has improved, and bladder scans have been low  His last BM was today after getting miralax, prunes, and coffee  ROS: A 10 point ROS was performed; negative except as noted above  Assessment/Plan:      Hypothyroid  Assessment & Plan  · Continue synthroid    Urinary frequency  Assessment & Plan  · Improved  No complaints today  · Bladder scans unremarkable, discontinued  · Patient without fever, chills, suprapubic pain, dysuria, leukocytosis  Low likelihood of UTI    Confusion and disorientation  Assessment & Plan  · Likely secondary to new environment/Robaxin  · Pain controlled without Robaxin  · Resolved     · Continue oxycodone for now, consider either weaning off or switching to tramadol if confusion reoccurs    Acute blood loss anemia  Assessment & Plan  Results from last 7 days   Lab Units 08/12/19  0528 08/08/19  0541 08/07/19  0515   HEMOGLOBIN g/dL 11 3* 11 5* 11 6*       · Monitor CBC intermittently  · Transfuse for Hgb <7      Hyponatremia  Assessment & Plan  Results from last 7 days   Lab Units 08/12/19  0528 08/08/19  0541 08/07/19  0515   SODIUM mmol/L 138 136 132*       · Resolved  · Monitor BMP intermittently    Status post total hip replacement, left  Assessment & Plan  · Performed on 8/5/19 by Dr Yenifer Sauer  · Weight-bearing as tolerated  · hip precautions in place - reviewed them in detail with patient today  · DVT Prophylaxis for at least 28-30 days post-op  Osgood-Schlatter's disease of both knees  Assessment & Plan  · Follow-up with orthopedics as outpatient  · Order Lidoderm patch, especially to right knee    * Impaired mobility and ADLs  Assessment & Plan  · Acute comprehensive interdisciplinary inpatient rehabilitation including PT, OT, RN, CM, SW, dietary    # Skin  · Encourage regular turning as patient at risk for skin breakdown  · Staff to continue patient education on Q2h turning     # Bowel  · Patient reports constipation  · To ensure regular BMs, bowel regimen consisting of:  colace , senna and miralax     # Bladder  · Patient voiding spontaneously  · See above  # Pain  · Continue tylenol, for max of 3gm daily  · Continue oxycodone   · Continue lidoderm patch(es)    # Rehab Psych   · There are no psychological or psychiatric problems identified    # Other  - Diet/Nutrition:        Diet Orders   (From admission, onward)             Start     Ordered    08/08/19 2026  Diet Regular; Regular House  Diet effective now     Question Answer Comment   Diet Type Regular    Regular Regular House    RD to adjust diet per protocol?  Yes        08/08/19 2026              - DVT prophy: Sequential compression device (Venodyne)  and Enoxaparin (Lovenox)  - GI ppx: None  - Nausea: None  - Supplements: None  - Sleep: Melatonin    Disposition: TBD    CODE: Level 1: Full Code Scheduled Meds:  Current Facility-Administered Medications:  acetaminophen 650 mg Oral Q6H PRN Alana Arechiga MD   atorvastatin 10 mg Oral Daily With Dinner Alana Arechiga MD   bisacodyl 10 mg Rectal Once Anali Quispe MD   bisacodyl 10 mg Rectal Once Anali Quispe MD   calcium carbonate 1,000 mg Oral Daily PRN Alana Arechiga MD   docusate sodium 100 mg Oral BID Kahlil Cortes MD   enoxaparin 40 mg Subcutaneous Q24H Kahlil Cortes MD   levothyroxine 150 mcg Oral Early Morning Kahlil Cortes MD   lidocaine 1 patch Topical Daily Kahlil Cortes MD   melatonin 3 mg Oral HS PRN Sravanthi Kelly PA-C   melatonin 3 mg Oral HS Nadira Johansen MD   menthol-methyl salicylate  Apply externally 4x Daily PRN Anali Quispe MD   ondansetron 4 mg Oral Q6H PRN Sravanthi Kelly PA-C   oxyCODONE 10 mg Oral Q4H PRN Alana Arechiga MD   oxyCODONE 5 mg Oral Q4H PRN Kahlil Cortes MD   polyethylene glycol 17 g Oral Daily PRN Sravanthi Kelly PA-C   polyethylene glycol 17 g Oral Daily Anali Quispe MD   senna 1 tablet Oral Daily Kahlil Cortes MD        Objective:    Functional Update:  Mobility: Sup for bed mobility, Close Supervision for ambulation with '   Transfers: CGA  ADLs: [place]-CGA for bathing, no physical assistance for UB dressing, [place] for LB dressing, Total A for footwear,     Allergies per EMR    Physical Exam:  Temp:  [97 7 °F (36 5 °C)-98 1 °F (36 7 °C)] 98 1 °F (36 7 °C)  HR:  [75-82] 78  Resp:  [17-18] 18  BP: (117-134)/(70-79) 123/70  SpO2:  [96 %-97 %] 96 %    General: alert, no apparent distress, cooperative and comfortable  CARDIAC:  regular rate and rhythm  LUNGS:  normal air entry, lungs clear to auscultation  ABDOMEN:  soft, non-tender   Bowel sounds normal  No masses, no organomegaly  EXTREMITIES:  extremities normal, warm and well-perfused; no cyanosis, clubbing, or edema  NEURO:   mental status, speech normal, alert and oriented x3  PSYCH:  Normal  and Alert and oriented, appropriate affect  MMT:   Right  Left  Site  Right  Left  Site    5 5  S Ab: Shoulder Abductors  5  5  HF: Hip Flexors    5 5  EF: Elbow Flexors  5  5 KF: Knee Flexors    5  5  EE: Elbow Extensors  5  5  KE: Knee Extensors    5  5  WE: Wrist Extensors  5  5  DR: Dorsi Flexors    5  5  FF: Finger Flexors  5  5  PF: Plantar Flexors    5  5  HI: Hand Intrinsics  5  5  EHL: Extensor Hallucis Longus   Physical examination is otherwise unchanged from previous encounter, except as noted above  Diagnostic Studies: Reviewed, no new imaging  No orders to display       Laboratory: Reviewed  Results from last 7 days   Lab Units 08/12/19  0528 08/08/19  0541 08/07/19  0515   HEMOGLOBIN g/dL 11 3* 11 5* 11 6*   HEMATOCRIT % 34 7* 34 1* 35 5*   WBC Thousand/uL 7 53 8 68 9 01     Results from last 7 days   Lab Units 08/12/19  0528 08/08/19  0541 08/07/19  0515   BUN mg/dL 11 14 12   SODIUM mmol/L 138 136 132*   POTASSIUM mmol/L 3 9 3 9 3 7   CHLORIDE mmol/L 103 102 99*   CREATININE mg/dL 0 78 0 79 0 91            Patient Active Problem List   Diagnosis    Primary osteoarthritis of both knees    Osgood-Schlatter's disease of both knees    Pain in both knees    Status post right hip replacement    Status post total hip replacement, left    Hyponatremia    Acute blood loss anemia    Confusion and disorientation    Urinary frequency    Impaired mobility and ADLs    Hypothyroid       ** Please Note: Fluency Direct voice to text software may have been used in the creation of this document   **

## 2019-08-12 NOTE — PCC OCCUPATIONAL THERAPY
Pt is a 67 y/o male admitted to [facility] s/p R Unilateral hip replacement with THP(posterior) in place  PTA pt reports he was fully indep with ADl/IADL fxn however was presenting with dec toelrance and pain with walking prompting ortho assessment  At time of OT evaluation pt overall Min A for transfers and Max A for LB ADLs  Pt currently S for xfers with use of RW and inc A for LB ADLs as pt reports he will not be purchasing LHAE and will have his spouse assist for ADLs to adhere to THP  Pt found to be below fxnl baseline and would benefit from 7-10 day LOS to achieve Mod I level goals with use of LHAE and LRAD

## 2019-08-12 NOTE — PROGRESS NOTES
08/12/19 0830   Pain Assessment   Pain Assessment No/denies pain   Pain Score No Pain   Diversional Activities Other (Comment)  (Conversation)   Restrictions/Precautions   Precautions Fall Risk;Pain;THR  (alarms and GH and HS, hip abduction pillow)   RLE Weight Bearing Per Order WBAT   QI: Shower/Bathe Self   Assistance Needed Physical assistance   Assistance Provided by [facility] Less than 25%   Shower/Bathe Self CARE Score 3   Bathing   Assessed Bath Style Shower   Anticipated D/C Bath Style Shower   Able to Janet Milad No   Able to [employer] Temperature Yes   Able to Wash/Rinse/Dry (body part) Left Arm;Right Arm;L Upper Leg;R Upper Leg;Chest;Abdomen;Perineal Area; Buttocks   Limitations Noted in Other  (refused use of LHAE stating spouse to assist at d/c)   Positioning Seated;Standing   Adaptive Equipment Other  (refused Mary Jane Ache stating spouse to complete at home)   Findings  Pt required assistance with bilatera LE bathing from knees down with pt requiring assistance with pt refusing use of LHAE stating spouse to complete for pt at d/c  Pt and OTR discussed importance of increasing pt IND however despite education pt refused use of LHAE  Bathing (FIM) 4 - Patient completes 8/10 or 9/10 parts   Tub/Shower Transfer   Limitations Noted In Balance   Adaptive Equipment Grab Bars;Seat with Back   Assessed Shower   Findings CGA with grab bars for shower stall transfer   Shower Transfer (FIM) 4 - Patient requires steadying assist or light touching   QI: Upper Body Dressing   Assistance Needed Set-up / [place] Provided by [facility] No physical assistance   Upper Body Dressing CARE Score 5   QI: Lower Body Dressing   Assistance Needed Physical assistance   Assistance Provided by [facility] 25%-49%   Comment Mod A needed to maintain THPs during LB dressing tasks with pt refusing use of LHAE for threading and donning/doffing socks stating spouse to assist with task upon d/c      Lower Body Dressing CARE Score 3 QI: Putting On/Taking Off Footwear   Assistance Needed Physical assistance   Assistance Provided by [place] Total assistance   Comment Pt refusing use of LHAE sock aid despite education to don/doff socks and footwear  Putting On/Taking Off Footwear CARE Score 1   Dressing/Undressing Clothing   Remove UB Clothes Pullover Shirt   Remove LB Clothes Undergarment;Socks; [address] UB Clothes Pullover Shirt   Don LB Clothes Undergarment;Socks; Shorts   Limitations Noted In Balance; Endurance;Timeliness   Positioning Supported Sit;Standing   UB Dressing (FIM) 5 - [place] sets up supplies or applies device   LB Dressing (FIM) 3 - Patient completes  50-74% of all tasks   QI: Lying to Sitting on Side of Bed   Assistance Needed Supervision; Adaptive equipment   Assistance Provided by [place] No physical assistance   Comment bed rails   Lying to Sitting on Side of Bed CARE Score 4   QI: Sit to Stand   Assistance Needed Incidental touching   Comment CGA with RW   Sit to Stand CARE Score 4   QI: Chair/Bed-to-Chair Transfer   Assistance Needed Incidental touching   Comment CGA with RW   Chair/Bed-to-Chair Transfer CARE Score 4   Transfer Bed/Chair/Wheelchair   Bed, Chair, Wheelchair Transfer (FIM) 4 - Patient requires steadying assist or light touching   QI: [address] Needed Incidental touching   Comment CGA with RW    Toileting Hygiene CARE Score 4   Toileting   Able to [address] down yes, up yes     Able to Manage Clothing Closures Yes   Manage Hygiene Bladder   Limitations Noted In Balance   Toileting (FIM) 4 - Patient requires steadying assist or light touching   QI: Toilet Transfer   Assistance Needed Incidental touching   Comment CGA with RW   Toilet Transfer CARE Score 4   Toilet Transfer   Surface Assessed Raised Toilet   Transfer Technique Standard   Toilet Transfer (FIM) 4 - Patient requires steadying assist or light touching   Cognition   Orientation Level Oriented X4   Assessment   Treatment Assessment Pt participated in 80 OT session with fair activity tolerance with focus on ADL training and AE education  Pt seated in bedside recliner upon therapist entering room  Pt expressed concern to OT in regards to not sleeping well the night before and wanting to speak with the MD to discuss  Nursing informed  Pt and OTR discussed techniques to prepare body for sleep in preparation and varying repositioning for pain relief to increase restfulness for improved participation in daily tasks  Pt agreeable to participate in showering routine during OT session  Pt and OTR discussed use of LHAE in depth and importance of utilization for completion of ADL tasks at Mod I level however despite education pt refusing to utilize [facility] stating his spouse will complete at home  Pt participated in toileting routine prior to shower at [facility] level with RW  Pt participated in showering routine at Set-up A for UB bathing/dressing, Min A/Mod A for LB bathing/dressing (secondary to pt refusing to participate in activities below knee, adhering to THPs however refusing use of LHAE with therapist completing for pt)  Hip incision covered during showering routine- with dressing remaining dry and intact pre/post showering routine  Pt participated in grooming standing at sinkside at set-up A level  Pt participated in transfer from w/c to recliner chair at end of therapy session at [facility] level with RW  Pt seated in bedside recliner with bed/chair alarm on, tray table, phone, and call bell in reach  Pt would benefit from continued OT services to progress pt through [address] to meet established OT goals  Prognosis Good   Problem List Decreased strength;Decreased endurance;Decreased mobility   Plan   Treatment/Interventions ADL retraining;Functional transfer training; Endurance training; Therapeutic exercise;Patient/family training; Compensatory technique education;Equipment eval/education   OT Therapy Minutes   OT Time In 0830   OT Time Out 1000   OT Total Time (minutes) 90   OT Mode of treatment - Individual (minutes) 90   OT Mode of treatment - Concurrent (minutes) 0   OT Mode of treatment - Group (minutes) 0   OT Mode of treatment - Co-treat (minutes) 0   OT Mode of Teatment - Total time(minutes) 90 minutes   Therapy Time missed   Time missed?  No

## 2019-08-12 NOTE — PCC PHYSICAL THERAPY
Pt making progress towards d/c goals  Pt safely maintaining hip precautions and performing all txs at CS level  Pt using leg  for bed mobility at this time  Pt with pain and decrease strength limiting activity tolerance at this time  Pt has good support at home and good potential for meeting goals for d/c  Will benefit from cont therapy to improve strengthening, activity tolerance and safety to progress with functional mobility and ind for d/c home

## 2019-08-12 NOTE — PROGRESS NOTES
Internal Medicine Progress Note  Patient: Adalberto De La Cruz  Age/sex: 68 y o  male  Medical Record #: [id number]      ASSESSMENT/PLAN:  Adalberto De La Cruz is seen and examined and mangement for following issues:    Rt hip OA s/p Rt PEGGY 8/5/19: WBAT; watch incision  Added ACE RLE for edema     Hypothyroidism: Continue current Levothyroxine  TSH 5/2019 0 91     Hyperlipidemia: Continue atorvastatin  Post operative blood loss anemia:  Stable with expected drop, asymptomatic  On no iron = not needed    Thrombocytopenia: Mild  Stable  Right knee pain: says HS Lidopatch effective      Subjective: Patients overnight issues or events were reviewed with nursing or staff during rounds or morning huddle session  No new or overnight issues  Thrombocytopenia: Stable  Hypothyroidism: Continue levothyroxine as per home dosage  Rt knee pain: Improved with Lidoderm patch  Pt reports difficulty sleeping  He is requesting a medication as needed for sleep      ROS:   GI: denies abdominal pain, change bowel habits or reflux symptoms  Neuro: Denies any headache, new vision changes, new neuropathies,new weaknesses   Respiratory: No Cough, SOB, denies wheeze  Cardiovascular: No CP, palpitations, denies perception of rapid heartbeat  Musculoskeletal: +right hip pain  : denies any new urinary burning or frequency    Review of Scheduled Meds:    Current Facility-Administered Medications:  acetaminophen 650 mg Oral Q6H PRN Kahlil Cortes MD   atorvastatin 10 mg Oral Daily With Dinner Nikole Dejesus MD   bisacodyl 10 mg Rectal Once Kenny Moon MD   bisacodyl 10 mg Rectal Once Kenny Moon MD   calcium carbonate 1,000 mg Oral Daily PRN Kahlil Cortes MD   docusate sodium 100 mg Oral BID Kahlil Cortes MD   enoxaparin 40 mg Subcutaneous Q24H Kahlil Cortes MD   levothyroxine 150 mcg Oral Early Morning Kahlil Cortes MD   lidocaine 1 patch Topical Daily Nikole Dejesus MD   menthol-methyl salicylate  Apply externally 4x Daily PRN Karissa Naranjo MD   ondansetron 4 mg Oral Q6H PRN Sravanthi Kelly PA-C   oxyCODONE 10 mg Oral Q4H PRN Gianluca Boswell MD   oxyCODONE 5 mg Oral Q4H PRN Gianluca Boswell MD   polyethylene glycol 17 g Oral Daily PRN Sravanthi Kelly PA-C   polyethylene glycol 17 g Oral Daily Karissa Naranjo MD   senna 1 tablet Oral Daily Gianluca Boswell MD       Labs:     Results from last 7 days   Lab Units 08/12/19  0528 08/08/19  0541 08/07/19  0515   WBC Thousand/uL 7 53 8 68 9 01   HEMOGLOBIN g/dL 11 3* 11 5* 11 6*   HEMATOCRIT % 34 7* 34 1* 35 5*   PLATELETS Thousands/uL 302 139* 143*     Results from last 7 days   Lab Units 08/12/19  0528 08/08/19  0541 08/07/19  0515   SODIUM mmol/L 138 136 132*   POTASSIUM mmol/L 3 9 3 9 3 7   CHLORIDE mmol/L 103 102 99*   CO2 mmol/L 29 28 26   BUN mg/dL 11 14 12   CREATININE mg/dL 0 78 0 79 0 91   CALCIUM mg/dL 8 2* 8 2* 8 3                      *Labs reviewed  *Radiology studies reviewed  *Medications reviewed and reconciled as needed  *Please refer to order section for additional ordered labs studies    Physical Examination:  Vitals:   Vitals:    08/11/19 1341 08/11/19 2059 08/12/19 0500 08/12/19 0600   BP: 143/82 134/79 117/75    BP Location: Right arm Right arm Right arm    Pulse: 85 82 75    Resp: 18 17 18    Temp: 98 °F (36 7 °C) 98 °F (36 7 °C) 97 7 °F (36 5 °C)    TempSrc: Oral Oral Oral    SpO2: 97% 96% 97%    Weight:    125 kg (275 lb 2 2 oz)   Height:           GEN: NAD  RESP: BBS w/o crackles/wheeze/rhonci; resp unlabored  CV: +S1 S2, regular rate, no rubs/murmurs/gallops  ABD: soft, NT, ND, normal BS   : voiding   EXT: no edema of LLE but +edema RLE = ACE wrapped; +right hip dressing intact  Skin: no rashes , no lesions  Neuro: AAOx3 no focality on exam    Total time spent: At least 35 minutes, with more than 50% spent counseling/coordinating care   Counseling includes discussion with patient re: progress  and discussion with patient of his/her current medical state/information  Coordination of patient's care was performed in conjunction with primary service  Time invested included review of patient's labs, vitals, and management of their comorbidities with continued monitoring  In addition, this patient was discussed with medical team including physician and advanced extenders  The care of the patient was extensively discussed and appropriate treatment plan was formulated unique for this patient  ** Please Note: Dragon 360 Dictation voice to text software may have been used in the creation of this document   **

## 2019-08-12 NOTE — PCC NURSING
Right hip OA  S /P- right hip total arthroplasty with Dr Yenifer Sauer- 8/5  Incision- staples with mepilex  WBAT RLE  Pt  Is CG  assist with the RW  Abductor pillow- total hip precautions  Pt  Takes primarily Tylenol for pain control even though Oxycodone is ordered, pt  Prefers not to take anything that strong  Lidoderm patch to right knee at HS effective for pain control  LBM on 8/11  Pt  Taking Miralax, Colace and Senokot for same  Pt  Was constipated but instead of taking dulcolax supp, he requested warm prune juice and coffee which was effective for a large BM  Lipitor is taken by pt for hypercholesterolemia and Synthroid is taken for hypothyroidism  Pt  Was encouraged to inject himself with Lovenox, but he is not interested in doing so  Wife to help with injections  She will need teaching on Lovenox administration  This week we will monitor vital signs, labs, manage pain, prevent skin breakdown by offloading pressure  We will keep patient safe from falls  Teaching will start with wife on Lovenox injections

## 2019-08-13 PROBLEM — G47.9 DIFFICULTY SLEEPING: Status: ACTIVE | Noted: 2019-08-13

## 2019-08-13 PROCEDURE — 97530 THERAPEUTIC ACTIVITIES: CPT | Performed by: OCCUPATIONAL THERAPY ASSISTANT

## 2019-08-13 PROCEDURE — 97110 THERAPEUTIC EXERCISES: CPT

## 2019-08-13 PROCEDURE — 99232 SBSQ HOSP IP/OBS MODERATE 35: CPT | Performed by: PHYSICAL MEDICINE & REHABILITATION

## 2019-08-13 PROCEDURE — 97116 GAIT TRAINING THERAPY: CPT

## 2019-08-13 PROCEDURE — 97110 THERAPEUTIC EXERCISES: CPT | Performed by: OCCUPATIONAL THERAPY ASSISTANT

## 2019-08-13 PROCEDURE — 99024 POSTOP FOLLOW-UP VISIT: CPT | Performed by: ORTHOPAEDIC SURGERY

## 2019-08-13 PROCEDURE — 97530 THERAPEUTIC ACTIVITIES: CPT

## 2019-08-13 PROCEDURE — 97535 SELF CARE MNGMENT TRAINING: CPT | Performed by: OCCUPATIONAL THERAPY ASSISTANT

## 2019-08-13 RX ADMIN — MELATONIN 3 MG: 3 TAB ORAL at 21:16

## 2019-08-13 RX ADMIN — SENNOSIDES 8.6 MG: 8.6 TABLET, FILM COATED ORAL at 08:15

## 2019-08-13 RX ADMIN — ACETAMINOPHEN 650 MG: 325 TABLET ORAL at 08:36

## 2019-08-13 RX ADMIN — DOCUSATE SODIUM 100 MG: 100 CAPSULE, LIQUID FILLED ORAL at 17:35

## 2019-08-13 RX ADMIN — LEVOTHYROXINE SODIUM 150 MCG: 75 TABLET ORAL at 05:30

## 2019-08-13 RX ADMIN — ACETAMINOPHEN 650 MG: 325 TABLET ORAL at 14:28

## 2019-08-13 RX ADMIN — ATORVASTATIN CALCIUM 10 MG: 10 TABLET, FILM COATED ORAL at 17:35

## 2019-08-13 RX ADMIN — MENTHOL, METHYL SALICYLATE: 10; 15 CREAM TOPICAL at 14:29

## 2019-08-13 RX ADMIN — DOCUSATE SODIUM 100 MG: 100 CAPSULE, LIQUID FILLED ORAL at 08:15

## 2019-08-13 RX ADMIN — ENOXAPARIN SODIUM 40 MG: 40 INJECTION SUBCUTANEOUS at 05:30

## 2019-08-13 RX ADMIN — POLYETHYLENE GLYCOL 3350 17 G: 17 POWDER, FOR SOLUTION ORAL at 08:15

## 2019-08-13 RX ADMIN — LIDOCAINE 1 PATCH: 50 PATCH CUTANEOUS at 18:14

## 2019-08-13 RX ADMIN — ACETAMINOPHEN 650 MG: 325 TABLET ORAL at 21:19

## 2019-08-13 NOTE — PROGRESS NOTES
08/13/19 1330   Pain Assessment   Pain Assessment No/denies pain   Pain Score No Pain   Restrictions/Precautions   Precautions Fall Risk;Pain;THR   RLE Weight Bearing Per Order WBAT   Cognition   Overall Cognitive Status WFL   Arousal/Participation Alert; Cooperative   Subjective   Subjective Pt reports today has been a big step in his rehab and excited about getting d/c date this thurday 8/15  Pt agreeable to session  QI: Sit to [address]; Adaptive equipment   Assistance Provided by [place] No physical assistance   Comment Leg  RLE    Sit to Lying CARE Score 6   QI: Lying to Sitting on Side of Bed   Assistance Needed Independent; Adaptive equipment   Assistance Provided by [place] No physical assistance   Comment leg  RLE    Lying to Sitting on Side of Bed CARE Score 6   QI: Sit to Stand   Assistance Needed Adaptive equipment; Independent   Assistance Provided by [place] No physical assistance   Comment RW    Sit to Stand CARE Score 6   QI: Chair/Bed-to-Chair Transfer   Assistance Needed Independent; Adaptive equipment   Assistance Provided by [place] No physical assistance   Comment RW    Chair/Bed-to-Chair Transfer CARE Score 6   Transfer Bed/Chair/Wheelchair   Limitations Noted In Pain Management;LE Strength   Adaptive Equipment Roller Walker   Stand Pivot Modified Independent   Sit to Stand Modified Independent   Stand to Sit Modified Independent   Supine to Sit Modified Independent   Sit to Supine Modified Independent   Findings Pt progressed to [place]ha IRP  SPoke with OT Nikki Kurtz who agreed to progress pt  Nursing notified  Bed, Chair, Wheelchair Transfer (FIM) 6 - Patient requires assistive device/extra time/safety concerns but completes independently   QI: [address]; Adaptive equipment   Assistance Provided by [place] No physical assistance   Comment with RW    Walk 10 Feet CARE Score 6   QI: Walk 50 Feet with Two Turns   Assistance Needed Independent; Adaptive equipment   Assistance Provided by [place] No physical assistance   Comment With RW    Walk 50 Feet with Two Turns CARE Score 6   QI: Walk 150 Feet   Assistance Needed Supervision; Adaptive equipment   Assistance Provided by [place] No physical assistance   Comment CS with RW for longer distances  Walk 150 Feet CARE Score 4   Ambulation   Does the patient walk? 2  Yes   Primary Discharge Mode of Locomotion Walk   Walk Assist Level Modified Independent;Close Supervision   Gait Pattern Inconsistant Yadi; Slow Yadi;Decreased foot clearance; Improper weight shift   Assist Device Roller Mary Castellanos Walked (feet) 400 ft  (x2, 50x2 )   Limitations Noted In Endurance; Heel Strike;Speed;Strength;Swing   Findings Pt ambulated HH distances [place] and remains CS for longer distance walking  Walking (FIM) 5 - Patient requires supervision/monitoring AND distance 150 feet or more, no rest   Wheelchair mobility   QI: Does the patient use a wheelchair? 0  No   QI: 1 Step (Curb)   Assistance Needed Supervision; Adaptive equipment   Assistance Provided by [place] No physical assistance   Comment CS with BHR non-reciprocal pattern on  stairs    1 Step (Curb) CARE Score 4   QI: 4 Steps   Assistance Needed Supervision   Assistance Provided by [place] No physical assistance   Comment CS with BHR non-reciprocal pattern on  stairs    4 Steps CARE Score 4   Stairs   Type Stairs   # of Steps 6   Weight Bearing Precautions Fall Risk;WBAT;RLE   Assist Devices Bilateral Rail   Findings CS with BHR non-reciprocal pattern on  stairs    Stairs (FIM) 2 - Patient goes up and down 4 - 11 stairs regardless of assist/device/setup   Toilet Transfer   Toilet Transfer (FIM) 0 - Activity does not occur   Therapeutic Interventions   Flexibility Seated BLE passive hs and gastroc stretch x4 min   Modalities CP behind R knee x25 min post session      Other Re-wrapped ace wrap for RLE, ambultion in room working on walker maangment in tight spaces and setting up furniture  Bed mobility in room to find comfotable position for pt to sleep using wedges and pillows while maintaining THPs  Assessment   Treatment Assessment Pt participated in skilled PT session with focus on inc functional mobility, progressing with IRPs and bed mobility to help inc sleep at night  Pt able to find comfotable position with therapist positioning wedges and pillows to help relieve pressure and maintain THPs  PCA Juan instructed to pass on set up to night PCA  Pt progressed to IRP per PT Niraj Walton  Spent time on walker management and furniture set up in room to maximize safety  Pt cont to beenfit from skiled PT to maximize functional independence and overall safety  Family/Caregiver Present no    Barriers to Discharge Inaccessible home environment   PT Barriers   Physical Impairment Decreased strength;Decreased range of motion;Decreased endurance; Impaired balance;Decreased mobility;Pain;Orthopedic restrictions   Functional Limitation Car transfers;Stair negotiation;Standing;Transfers; Walking   Plan   Treatment/Interventions Functional transfer training;LE strengthening/ROM; Elevations; Therapeutic exercise; Endurance training;Equipment eval/education;Gait training;Bed mobility   Progress Progressing toward goals   Recommendation   Recommendation Home with family support; Outpatient PT   Equipment Recommended Walker   PT Therapy Minutes   PT Time In 1330   PT Time Out 1430   PT Total Time (minutes) 60   PT Mode of treatment - Individual (minutes) 60   PT Mode of treatment - Concurrent (minutes) 0   PT Mode of treatment - Group (minutes) 0   PT Mode of treatment - Co-treat (minutes) 0   PT Mode of Teatment - Total time(minutes) 60 minutes   Therapy Time missed   Time missed?  No

## 2019-08-13 NOTE — ASSESSMENT & PLAN NOTE
Discussed sleep hygiene  Started melatonin 3mg dosed at bedtime, has PRN order for another 3mg as well

## 2019-08-13 NOTE — PROGRESS NOTES
08/13/19 0830   Pain Assessment   Pain Assessment 0-10   Pain Score 5   Pain Type Acute pain;Surgical pain   Pain Location Hip;Knee;Leg   Pain Orientation Right   Hospital Pain Intervention(s) Cold applied; Ambulation/increased activity; Medication (See MAR)  (Nursing admin medication at start of session)   Response to Interventions Dec at end of session to 2/10 after activity  Restrictions/Precautions   Precautions Fall Risk;Pain  (R THP's)   RLE Weight Bearing Per Order WBAT   Braces or Orthoses Other (Comment)  (ACE wrape RLE )   General   Change In Medical/Functional Status     Cognition   Overall Cognitive Status WFL   Arousal/Participation Alert; Cooperative   Subjective   Subjective Pt reports fatigue due to not sleeping at night  Pt agreeable to sesion  QI: Sit to Stand   Assistance Needed Supervision   Assistance Provided by [place] No physical assistance   Comment CS with RW    Sit to Stand CARE Score 4   QI: Chair/Bed-to-Chair Transfer   Assistance Needed Supervision; Adaptive equipment   Assistance Provided by [place] No physical assistance   Comment CS with RW    Chair/Bed-to-Chair Transfer CARE Score 4   Transfer Bed/Chair/Wheelchair   Limitations Noted In Pain Management;LE Strength   Adaptive Equipment Roller Walker   Stand Pivot Supervision   Sit to Stand Supervision   Stand to Sit Supervision   Findings CS with functional transfers  Speak with OT about progressing pt this afternoon to IRP  Bed, Chair, Wheelchair Transfer (FIM) 5 - Patient requires supervision/monitoring   QI: Walk 10 Feet   Assistance Needed Supervision; Adaptive equipment   Assistance Provided by [place] No physical assistance   Comment CS with RW   Walk 10 Feet CARE Score 4   QI: Walk 50 Feet with Two Turns   Assistance Needed Supervision; Adaptive equipment   Assistance Provided by [place] No physical assistance   Comment CS with RW    Walk 50 Feet with Two Turns CARE Score 4   QI: Walk 150 Feet   Assistance Needed Supervision; Adaptive equipment   Assistance Provided by [place] No physical assistance   Comment CS with RW    Walk 150 Feet CARE Score 4   Ambulation   Does the patient walk? 2  Yes   Primary Discharge Mode of Locomotion Walk   Walk Assist Level Supervision   Gait Pattern Inconsistant Yadi; Slow Yadi;Decreased foot clearance; Improper weight shift   Assist Device Roller Mary Castellanos Walked (feet) 300 ft  (x2 200x2 )   Limitations Noted In Endurance; Heel Strike;Speed;Strength;Swing   Findings Pt ambulated with CS level verbal cues to inc heel strike on RLE  Pt fatigued at end of walks but recover affter short rest break  Walking (FIM) 5 - Patient requires supervision/monitoring AND distance 150 feet or more, no rest   Wheelchair mobility   QI: Does the patient use a wheelchair? 0  No   QI: 1 Step (Curb)   Assistance Needed Supervision; Adaptive equipment   Assistance Provided by [place] No physical assistance   Comment CS with RW 6" curb step x2 to mimic home set up  1 Step (Curb) CARE Score 4   QI: 4 Steps   Reason if not Attempted Activity not applicable   4 Steps CARE Score 9   QI: 12 Steps   Reason if not Attempted Activity not applicable   12 Steps CARE Score 9   Stairs   Type Curb   # of Steps 2   Weight Bearing Precautions Fall Risk;WBAT;RLE   Assist Devices Roller Walker   Findings CS with RW 6" curb step in supply closet   Stairs (FIM) 1 - Patient goes up and down less than 4 stairs regardless of assist/device/set up   Toilet Transfer   Toilet Transfer (FIM) 0 - Activity does not occur   Therapeutic Interventions   Strengthening Repeated STS from 18inch high chair to mimic toilet height at home    Flexibility BLE passive HS and gastroc stretching x4min   Modalities CP at end of session behing R knee in recliner due to inc R knee pain with ambulation      Assessment   Treatment Assessment Pt participated in skilled PT session with focus on inc functional mobility, transfer, act tolerance and educated on equipment for home  Spoke with pt and JOHANA tamayo about commode for toilet and RW  Pt plans to order adaptive equipment which allows him to sit on his normal toilet but has handles similiar to commode for pt to push from when standing  Pt requested ordering his own as commode over toilet does not allow him to properly clean himself  Pt states he feels much better after talking about d/c plans and equipment he needs  Pt ordered equipment after session to be delivered thursday8/15  Understands these are out of pocket expenses  walker order placed today by JOHANA tamayo  Spoke to OT about progressing PT to IRP this afternoon, plan to assess function in room  Pt cont to benefit from skileld PT to inc R hip strength, RLE ROM, functional mobility to maximize functional independence and overall safety       Family/Caregiver Present no    Barriers to Discharge Inaccessible home environment   PT Barriers   Physical Impairment Decreased strength;Decreased range of motion;Decreased endurance; Impaired balance;Decreased mobility;Orthopedic restrictions;Pain   Functional Limitation Stair negotiation;Standing;Transfers; Walking   Plan   Treatment/Interventions Functional transfer training;LE strengthening/ROM; Elevations; Therapeutic exercise; Endurance training;Patient/family training;Equipment eval/education; Bed mobility;Gait training   Progress Progressing toward goals   Recommendation   Recommendation Home with family support; Outpatient PT   Equipment Recommended Walker   PT Equipment ordered RW ordered by JOHANA Tamayo    Date ordered 08/13/19   PT Therapy Minutes   PT Time In 0830   PT Time Out 0930   PT Total Time (minutes) 60   PT Mode of treatment - Individual (minutes) 60   PT Mode of treatment - Concurrent (minutes) 0   PT Mode of treatment - Group (minutes) 0   PT Mode of treatment - Co-treat (minutes) 0   PT Mode of Teatment - Total time(minutes) 60 minutes   Therapy Time missed   Time missed?  No

## 2019-08-13 NOTE — TEAM CONFERENCE
Acute RehabilitationTeam Conference Note  Date: 8/13/2019   Time: 10:42 AM       Patient Name:  Marcelino Driscoll       Medical Record Number: [id number]   YOB: 1950  Sex: Male          Room/Bed:  [facility] 453/[facility] 453-01  Payor Info:  Payor: Jyoti Shepherd / Plan: MEDICARE A AND B / Product Type: Medicare A & B Fee for Service /      Admitting Diagnosis: Status post total hip replacement, right [Z96 [id number]]   Admit Date/Time:  8/8/2019  8:09 PM  Admission Comments: No comment available     Primary Diagnosis:  Impaired mobility and ADLs  Principal Problem: Impaired mobility and ADLs    Patient Active Problem List    Diagnosis Date Noted    Hyponatremia 08/09/2019    Acute blood loss anemia 08/09/2019    Confusion and disorientation 08/09/2019    Urinary frequency 08/09/2019    Impaired mobility and ADLs 08/09/2019    Hypothyroid 08/09/2019    Status post total hip replacement, left 08/08/2019    Status post right hip replacement 08/07/2019    Primary osteoarthritis of both knees 05/29/2019    Osgood-Schlatter's disease of both knees 05/29/2019    Pain in both knees 05/29/2019       Physical Therapy:    Weight Bearing Status: Weight Bearing as Tolerated  Transfers: Supervision  Bed Mobility: Supervision  Amulation Distance (ft): 300 feet  Ambulation: Supervision  Assistive Device for Ambulation: Roller Walker  Number of Stairs: 1  Assistive Device for Stairs: [address]: Supervision  Discharge Recommendations: Home with:  [address] [facility] [address] with[de-identified] Family Support, Outpatient Physical Therapy    Pt making progress towards d/c goals  Pt safely maintaining hip precautions and performing all txs at  level  Pt using leg  for bed mobility at this time  Pt with pain and decrease strength limiting activity tolerance at this time  Pt has good support at home and good potential for meeting goals for d/c    Will benefit from cont therapy to improve strengthening, activity tolerance and safety to progress with functional mobility and ind for d/c home  Occupational Therapy:  Eating: Independent  Grooming: Supervision  Bathing: Minimal Assistance  Bathing: Minimal Assistance  Upper Body Dressing: Supervision  Lower Body Dressing: Maximum Assistance  Toileting: Supervision  Tub/Shower Transfer: Supervision  Toilet Transfer: Supervision  Cognition: Within Defined Limits  Orientation: Person, Place, Time, Situation  Discharge Recommendations: Home with:  [address] with[de-identified] Family Support       Pt is a 69 y/o male admitted to [facility] s/p R Unilateral hip replacement with THP(posterior) in place  PTA pt reports he was fully indep with ADl/IADL fxn however was presenting with dec toelrance and pain with walking prompting ortho assessment  At time of OT evaluation pt overall Min A for transfers and Max A for LB ADLs  Pt currently S for xfers with use of RW and inc A for LB ADLs as pt reports he will not be purchasing LHAE and will have his spouse assist for ADLs to adhere to THP  Pt found to be below fxnl baseline and would benefit from 7-10 day LOS to achieve Mod I level goals with use of LHAE and LRAD  Speech Therapy:           No notes on file    Nursing Notes:  Appetite: Good  Diet Type: Regular/House                      Diet Patient/Family Education Complete: Yes                            Bladder: 5 - Supervision     Bladder Patient/Family Education: Yes  Bowel: Medication     Bowel Patient/Family Education: Yes  Pain Location: Hip, Knee, Leg  Pain Orientation: Right  Pain Score: 5                       Hospital Pain Intervention(s): Medication (See MAR)  Pain Patient/Family Education: Yes  Medication Management/Safety  New Medication: (Pt  is not on Heparin  Pt  is receiving Lovenox  )  Injectable: Lovenox(Pt  is not interested in self injecting  Wife will need education on Lovenox administration  )  Safe Administration: Yes  Medication Patient/Family Education Complete: Yes    Right hip OA   S /P- right hip total arthroplasty with Dr Erik Roberson- 8/5  Incision- staples with mepilex  WBAT RLE  Pt  Is CG  assist with the RW  Abductor pillow- total hip precautions  Pt  Takes primarily Tylenol for pain control even though Oxycodone is ordered, pt  Prefers not to take anything that strong  Lidoderm patch to right knee at HS effective for pain control  LBM on 8/11  Pt  Taking Miralax, Colace and Senokot for same  Pt  Was constipated but instead of taking dulcolax supp, he requested warm prune juice and coffee which was effective for a large BM  Lipitor is taken by pt for hypercholesterolemia and Synthroid is taken for hypothyroidism  Pt  Was encouraged to inject himself with Lovenox, but he is not interested in doing so  Wife to help with injections  She will need teaching on Lovenox administration  This week we will monitor vital signs, labs, manage pain, prevent skin breakdown by offloading pressure  We will keep patient safe from falls  Teaching will start with wife on Lovenox injections  Case Management:     Discharge Planning  Living Arrangements: Spouse/significant other  Support Systems: Family members, Yazidi/seth community  Assistance Needed: no  Type of Current Residence: Private residence  Current [facility] : No  Pt is participating well with therapy, and plans to return home  Pts wife is supportive, and both were educated about the potential for cont'd care, ie therapy and services  Following to assist with d/c planning needs  Is the patient actively participating in therapies? yes  List any modifications to the treatment plan:     Barriers Interventions   All functional barriers are resolved  Is the patient making expected progress toward goals?  yes  List any update or changes to goals:     Medical Goals: Patient will be medically stable for discharge to [facility] upon completion of rehab program and Patient will be able to manage medical conditions and comorbid conditions with medications and follow up upon completion of rehab program    Weekly Team Goals:   Rehab Team Goals  ADL Team Goal: Patient will be independent with ADLs with least restrictive device upon completion of rehab program  Bowel/Bladder Team Goal: Patient will return to premorbid level for bladder/bowel management upon completion of rehab program  Transfer Team Goal: Patient will be independent with transfers with least restrictive device upon completion of rehab program  Locomotion Team Goal: Patient will be independent with locomotion with least restrictive device upon completion of rehab program  Cognitive Team Goal: Patient will be independent for basic and complex tasks upon completion of rehab program    Discussion:  Pt is currently at supervision for transfers/bed mobility, as well as ambulation  Pt is min a for bathing, max a for lower body dressing, and supervision for upper body dressing  Pt has reached goals, and has supportive family for assistance  Recommendations are for outpt PT  Anticipated Discharge Date: 8 15 19   [facility] Team Members Present: The following team members are supervising care for this patient and were present during this Weekly Team Conference      Physician: Dr Blanche Valentin MD  : LIOR Maldonado/ GENTRYW  Registered Nurse: Natalee Blanchard RN, CRRN  Physical Therapist: Ira Monroy DPT  Occupational Therapist: Sasha Holm MS, OTR/L  Speech Therapist:   Other: Luis Mcgrath RN

## 2019-08-13 NOTE — PROGRESS NOTES
Occupational Therapy Treatment Note:       08/13/19 1000   Pain Assessment   Pain Assessment No/denies pain   Pain Score No Pain   Restrictions/Precautions   Precautions Fall Risk;Pain;THR   RLE Weight Bearing Per Order WBAT   ROM Restrictions Yes   RLE ROM Restriction Other  (THPs)   Braces or Orthoses Other (Comment)  (RLE ace wrap; hip abduction wedge in bed  )   QI: [address] Provided by [place] No physical assistance   Eating CARE Score 6   Eating Assessment   Food To Mouth Yes   Able To Cut Yes   Positioning Upright;Out of Bed   Meal Assessed Lunch   Intake Mode PO;Self   Finishes Timely Yes   Opens Packages Yes   Eating (FIM) 7 - Patient completely independent   QI: Lower Body Dressing   Assistance Needed Adaptive equipment;Set-up / clean-up;Supervision   Assistance Provided by [place] No physical assistance   Comment When provided with increased time pt able to doff/don pants over feet while seated using reacher and complete clothing mgmt over hips while in brief unsupported stance (RW available for support as needed) with S     Lower Body Dressing CARE Score 4   QI: Putting On/Taking Off Footwear   Assistance Needed Adaptive equipment;Set-up / Nawaf Moore; Verbal cues; Physical assistance   Assistance Provided by [place] 25%-49%   Comment Seated  Pt able to doff socks using dressing stick, retrieve from floor level using reacher, doff shoes using reacher, don socks using widened sock aide, and don L shoe with elastic shoelaces using reacher and [address] with S when provided with increased time  Assist warranted to don R shoe; pt reported he will be able to receive assist from wife upon return to home  Putting On/Taking Off Footwear CARE Score 3   Dressing/Undressing Clothing   Remove LB Clothes Pants;Socks; Shoes   Don LB Clothes Pants;Socks; Shoes   Limitations Noted In Balance; Endurance;Strength;ROM; Timeliness   Adaptive Equipment Reacher;Dressing Stick;LH Shoehorn;Sock Aide;Elastic Laces; Other  (RW)   Positioning Supported Sit;Standing   Findings [employer]  LB Dressing (FIM) 4 - Patient completes 75% of all tasks   QI: Sit to Lying   Comment Pt reported he utilizes leg  LHAE for RLE mgmt; discussed having placed on nightstand next to bed for easy access as BRP privledges granted  Leg  issued  QI: Lying to Sitting on Side of Bed   Comment Pt reported he utilizes leg  LHAE for RLE mgmt; discussed having placed on nightstand next to bed for easy access as BRP privledges granted  Leg  issued  QI: Sit to Stand   Assistance Needed Independent; Adaptive equipment   Assistance Provided by [place] No physical assistance   Comment BRPs using RW  Sit to Stand CARE Score 6   QI: Chair/Bed-to-Chair Transfer   Assistance Needed Independent; Adaptive equipment   Assistance Provided by [place] No physical assistance   Comment BRPs using RW   Chair/Bed-to-Chair Transfer CARE Score 6   Transfer Bed/Chair/Wheelchair   Limitations Noted In Balance; Endurance;UE Strength;LE Strength   Adaptive Equipment Roller Walker   Stand Pivot Modified Independent  (BRPs)   Sit to Stand Other  (BRPs)   Stand to Sit Other  (BRPs)   Findings Pt completed functional mobility short distances using RW at BRP level  Pt demonstrated good carryover with folding/unfolding of RW     Bed, Chair, Wheelchair Transfer (FIM) 6 - Patient requires assistive device/extra time/safety concerns but completes independently  (BRPs)   QI: [address]; Adaptive equipment   Assistance Provided by [place] No physical assistance   Comment With urination only pt able to complete toileting routine at BRP level, using RW for support as needed while in stance  Pt verbalized difficulty completing rear carmen-care at baseline and has toileting aide oRsario Sánchez to trial upon return home, however reported increased ease with use of hand held bidet; wife able to provide assist as needed  Pt, OTR, PT, and NSG all aware that pt is to ring call bell for assist post BM for assist with rear carmen-care  Toileting Hygiene CARE Score 6   Toileting   Able to Pull Clothing down yes, up yes  Able to [employer] Other  (None needed; elastic waisted pants  )   Manage Hygiene Bladder   Limitations Noted In [address] Other  (RW; commode over toilet  )   Toileting (FIM) 6 - Patient requires assistive device/extra time/safety concerns but completes independently  (BRPs)   QI: Toilet Transfer   Assistance Needed Independent; Adaptive equipment   Assistance Provided by [place] No physical assistance   Comment BPRs; RW and commode over toilet; pt reported he has a raised toilet seat at baseline and order bilat hand rails  Toilet Transfer CARE Score 6   Toilet Transfer   Surface Assessed Raised Toilet  (Commode over toilet  )   Transfer Technique Standard   Limitations Noted In Balance; Endurance;UE Strength;LE Strength   Adaptive Equipment   (RW; commode over toilet  )   Toilet Transfer (FIM) 6 - Patient requires assistive device/extra time/safety concerns but completes independently  (BRPs)   Meal Prep   Meal Preparation Pt reported he wife performs majority of meal prep tasks at baseline  Kitchen Mobility   Kitchen-Mobility Level Walker   Kitchen Activity Retrieve items;Transport items   Kitchen Mobility Comments Pt reported commonly used items are positioned within easy reach in abidance to THPs in refigerator and overhead cabinets, however that pots and pants are located in below waist drawers, as well as items in below waist freezer; discussed having wife pull these items out in advance for pt if needed  Discussed safe RW positioning, item transport in RW bag (bottled drinks and food in tupperware containers), [id number] method, and completing lengthy meal prep tasks while seated if needed   Pt completed task of retrieving a cup from overhead cabinet, pitcher of water from refigerator, pouring self a cup of water, and using sliding-on countertop method to transport cup to microwave while in stance/ambulating with RW at DS/Pawel level  Exercise Tools   Theraband Pt completed BUE blue theraband exercises in various planes at 3 sets/10 reps; HEP established as pt verbalized interest in continuing upon UB strength upon return home; in order to improve upon overall strength and endurance for carryover in completion of transfers and functional tasks as pt c/o generalized weakness/fatigue  Cognition   Overall Cognitive Status WFL   Arousal/Participation Alert; Cooperative   Attention Within functional limits   Orientation Level Oriented X4   Memory Within functional limits   Following Commands Follows all commands and directions without difficulty   Activity Tolerance   Activity Tolerance Patient tolerated treatment well   Assessment   Treatment Assessment OT tx sessions focused on transfers, standing balance, functional mobility, toileting, LB dressing, kitchen mobility, UB strengthening, and overall activity tolerance/endurance  Spoke with PT, OTR, and NSG and pt progress to BRP privledges using RW (with exception of BM as assist warranted for rear carmen-care)  Refer above for details on pt performance  Pt would benefit from continued skilled OT services in order to achieve highest functional abilities  Prognosis Good   Problem List Decreased strength;Decreased range of motion;Decreased endurance; Impaired balance;Decreased mobility;Orthopedic restrictions   Barriers to Discharge Inaccessible home environment   Plan   Treatment/Interventions ADL retraining;Functional transfer training; Therapeutic exercise; Endurance training;Patient/family training;Equipment eval/education; Compensatory technique education;OT   Progress Progressing toward goals   Recommendation   OT Discharge Recommendation Home with family support   OT Equipment ordered LHAE kit; leg    Date ordered 08/03/19   OT Therapy Minutes   OT Time In 1000   OT Time Out 1130   OT Total Time (minutes) 90   OT Mode of treatment - Individual (minutes) 90   OT Mode of treatment - Concurrent (minutes) 0   OT Mode of treatment - Group (minutes) 0   OT Mode of treatment - Co-treat (minutes) 0   OT Mode of Teatment - Total time(minutes) 90 minutes   Therapy Time missed   Time missed?  Breanna Yoder, [address]

## 2019-08-13 NOTE — PROGRESS NOTES
Physical Medicine and Rehabilitation Progress Note  Paty Carter 68 y o  male MRN: [id number]  Unit/Bed#: [facility] 453-01 Encounter: [id number]    HPI: Paty Carter is a 68 y o  male who presented to the [address] [place] [address] for a planned PEGGY  Procedure was performed on August 5th 2019 by Dr Jan Romo  Postoperatively patient with by point drop in hemoglobin to 11 5, hyponatremia  Behavioral services were consulted, due to heightened anxiety by patient  Patient was evaluated by PT and OT, found to be below functional baseline  He has accepted to the [facility] on 8/8/19  Chief Complaint: Poor sleep    Interval: Pt s/e in reclining chair  He had poor sleep last night as his ACE wrap for his RLE edema was uncomfortable  He actually was able to fall asleep, but woke up at 3:00am  He was able to get back to sleep, but then had to wake-up for the morning  His pain is well-controlled  He has Lipitor, levothyroxine, and Lovenox at home, but will need prescriptions for the remainder of his meds  He had a good bowel movement this morning  He otherwise feels well and denies any other symptoms  ROS: A 10 point ROS was performed; negative except as noted above  Assessment/Plan:      Difficulty sleeping  Assessment & Plan  Discussed sleep hygiene  Started melatonin 3mg dosed at bedtime, has PRN order for another 3mg as well  Hypothyroid  Assessment & Plan  · Continue synthroid    Urinary frequency  Assessment & Plan  · Improved  No complaints today  · Bladder scans unremarkable, discontinued  · Patient without fever, chills, suprapubic pain, dysuria, leukocytosis  Low likelihood of UTI    Confusion and disorientation  Assessment & Plan  · Likely secondary to new environment/Robaxin  · Pain controlled without Robaxin  · Resolved  · Has not been using oxycodone  Discontinued       Acute blood loss anemia  Assessment & Plan  Results from last 7 days   Lab Units 08/12/19  0528 08/08/19  0541 08/07/19  0515   HEMOGLOBIN g/dL 11 3* 11 5* 11 6*       · Monitor CBC intermittently  · Transfuse for Hgb <7      Hyponatremia  Assessment & Plan  Results from last 7 days   Lab Units 08/12/19  0528 08/08/19  0541 08/07/19  0515   SODIUM mmol/L 138 136 132*       · Resolved  · Monitor BMP intermittently    Status post total hip replacement, left  Assessment & Plan  · Performed on 8/5/19 by Dr Tigist Pang  · Weight-bearing as tolerated  · hip precautions in place - reviewed them in detail with patient today  · DVT Prophylaxis for at 18 more days after day of discharge  · Has Lovenox at home  · Dr Tigist Pang saw patient today  Plan to remove staples next week  · Discussed with Orthopedics  Osgood-Schlatter's disease of both knees  Assessment & Plan  · Follow-up with orthopedics as outpatient  · Order Lidoderm patch, especially to right knee    * Impaired mobility and ADLs  Assessment & Plan  · Acute comprehensive interdisciplinary inpatient rehabilitation including PT, OT, RN, CM, SW, dietary    # Skin  · Encourage regular turning as patient at risk for skin breakdown  · Staff to continue patient education on Q2h turning     # Bowel  · Patient reports constipation  · To ensure regular BMs, bowel regimen consisting of:  colace , senna and miralax  · Last BM 8/13     # Bladder  · Patient voiding spontaneously  · See above  # Pain  · Continue tylenol, for max of 3gm daily  · Discontinue oxycodone   · Continue lidoderm patch(es)    # Rehab Psych   · There are no psychological or psychiatric problems identified    # Other  - Diet/Nutrition:        Diet Orders   (From admission, onward)             Start     Ordered    08/08/19 2026  Diet Regular; Regular House  Diet effective now     Question Answer Comment   Diet Type Regular    Regular Regular House    RD to adjust diet per protocol?  Yes        08/08/19 2026              - DVT prophy: Sequential compression device (Venodyne)  and Enoxaparin (Lovenox)  - GI ppx: None  - Nausea: None  - Supplements: None  - Sleep: Melatonin    Disposition: Team Meeting 8/13  Plan to discharge on 8/15  Patient is amenable  Has Lovenox at home  He would like his wife to be trained to give it to him  Will require 18 more days of treatment on day of discharge      CODE: Level 1: Full Code     Scheduled Meds:    Current Facility-Administered Medications:  acetaminophen 650 mg Oral Q6H PRN Kahlil Cortes MD   atorvastatin 10 mg Oral Daily With Dinner Delvin Matthew MD   bisacodyl 10 mg Rectal Once Tracey Bliss MD   bisacodyl 10 mg Rectal Once Tracey Bliss MD   calcium carbonate 1,000 mg Oral Daily PRN Delvin Matthew MD   docusate sodium 100 mg Oral BID Kahlil Cortes MD   enoxaparin 40 mg Subcutaneous Q24H Kahlil Cortes MD   levothyroxine 150 mcg Oral Early Morning Kahlil Cortes MD   lidocaine 1 patch Topical Daily Kahlil Cortes MD   melatonin 3 mg Oral HS PRN Sravanthi Kelly PA-C   melatonin 3 mg Oral HS Elbert Rodriguez MD   menthol-methyl salicylate  Apply externally 4x Daily PRN Tracey Bliss MD   ondansetron 4 mg Oral Q6H PRN Sravanthi Kelly PA-C   oxyCODONE 10 mg Oral Q4H PRN Delvin Matthew MD   oxyCODONE 5 mg Oral Q4H PRN Kahlil Cortes MD   polyethylene glycol 17 g Oral Daily PRN Sravanthi Kelly PA-C   polyethylene glycol 17 g Oral Daily Tracey Bliss MD   senna 1 tablet Oral Daily Kahlil Corets MD        Objective:    Functional Update:  Mobility: Sup for bed mobility, Supervision for ambulation with '  Transfers: Supervision  ADLs: Bernice-CGA for bathing, no physical assistance for UB dressing, Bernice for LB dressing, Total A for footwear,     Allergies per EMR    Physical Exam:  Temp:  [97 8 °F (36 6 °C)-98 1 °F (36 7 °C)] 98 °F (36 7 °C)  HR:  [69-82] 82  Resp:  [16-20] 16  BP: (132-154)/(76-77) 133/76  SpO2:  [95 %-98 %] 97 %    General: alert, no apparent distress, cooperative and comfortable  CARDIAC:  regular rate and rhythm  LUNGS: normal air entry, lungs clear to auscultation  ABDOMEN:  soft, non-tender  Bowel sounds normal  No masses, no organomegaly  EXTREMITIES:  extremities normal, warm and well-perfused; no cyanosis, clubbing, or edema  NEURO:   mental status, speech normal, alert and oriented x3  PSYCH:  Normal  and Alert and oriented, appropriate affect  Physical examination is otherwise unchanged from previous encounter, except as noted above  Diagnostic Studies: Reviewed, no new imaging  No orders to display       Laboratory: Reviewed, no new labs  Results from last 7 days   Lab Units 08/12/19 0528 08/08/19  0541 08/07/19  0515   HEMOGLOBIN g/dL 11 3* 11 5* 11 6*   HEMATOCRIT % 34 7* 34 1* 35 5*   WBC Thousand/uL 7 53 8 68 9 01     Results from last 7 days   Lab Units 08/12/19 0528 08/08/19  0541 08/07/19  0515   BUN mg/dL 11 14 12   SODIUM mmol/L 138 136 132*   POTASSIUM mmol/L 3 9 3 9 3 7   CHLORIDE mmol/L 103 102 99*   CREATININE mg/dL 0 78 0 79 0 91            Patient Active Problem List   Diagnosis    Primary osteoarthritis of both knees    Osgood-Schlatter's disease of both knees    Pain in both knees    Status post right hip replacement    Status post total hip replacement, left    Hyponatremia    Acute blood loss anemia    Confusion and disorientation    Urinary frequency    Impaired mobility and ADLs    Hypothyroid       ** Please Note: Fluency Direct voice to text software may have been used in the creation of this document   **

## 2019-08-13 NOTE — PROGRESS NOTES
Internal Medicine Progress Note  Patient: Marcus Potts  Age/sex: 68 y o  male  Medical Record #: [id number]      ASSESSMENT/PLAN:  Marcus Potts is seen and examined and mangement for following issues:    Rt hip OA s/p Rt PEGGY 8/5/19: WBAT; watch incision  Added ACE RLE for edema     Hypothyroidism: Continue current Levothyroxine  TSH 5/2019 0 91     Hyperlipidemia: Continue atorvastatin  Post operative blood loss anemia:  Stable with expected drop, asymptomatic  On no iron = not needed    Thrombocytopenia: Mild  Stable  Right knee pain: says HS Lidopatch effective      Subjective: Patients overnight issues or events were reviewed with nursing or staff during rounds or morning huddle session  No new or overnight issues  Thrombocytopenia: Stable  Hypothyroidism: Continue levothyroxine as per home dosage  Rt knee pain: Improved with Lidoderm patch  Pt reports difficulty sleeping  He is requesting a medication as needed for sleep      ROS:   GI: denies abdominal pain, change bowel habits or reflux symptoms  Neuro: Denies any headache, new vision changes, new neuropathies,new weaknesses   Respiratory: No Cough, SOB, denies wheeze  Cardiovascular: No CP, palpitations, denies perception of rapid heartbeat  Musculoskeletal: +right hip pain  : denies any new urinary burning or frequency    Review of Scheduled Meds:    Current Facility-Administered Medications:  acetaminophen 650 mg Oral Q6H PRN Kahlil Cortes MD   atorvastatin 10 mg Oral Daily With Dinner Alana Arechiga MD   bisacodyl 10 mg Rectal Once Anali Quispe MD   bisacodyl 10 mg Rectal Once Anali Quispe MD   calcium carbonate 1,000 mg Oral Daily PRN Kahlil Cortes MD   docusate sodium 100 mg Oral BID Kahlil Cortes MD   enoxaparin 40 mg Subcutaneous Q24H Kahlil Cortes MD   levothyroxine 150 mcg Oral Early Morning Kahlil Cortes MD   lidocaine 1 patch Topical Daily Kahlil Cortes MD   melatonin 3 mg Oral HS PRN Sravanthi Kelly ANGELINA   melatonin 3 mg Oral HS Abril Paredes MD   menthol-methyl salicylate  Apply externally 4x Daily PRN Hunter Cordova MD   ondansetron 4 mg Oral Q6H PRN Sravanthi Kelly PA-C   oxyCODONE 10 mg Oral Q4H PRN Edmundo Pickens MD   oxyCODONE 5 mg Oral Q4H PRN Edmundo Pickens MD   polyethylene glycol 17 g Oral Daily PRN Sravanthi Kelly, ANGELINA   polyethylene glycol 17 g Oral Daily Hunter Cordova MD   senna 1 tablet Oral Daily Edmundo Pickens MD       Labs:     Results from last 7 days   Lab Units 08/12/19  0528 08/08/19  0541 08/07/19  0515   WBC Thousand/uL 7 53 8 68 9 01   HEMOGLOBIN g/dL 11 3* 11 5* 11 6*   HEMATOCRIT % 34 7* 34 1* 35 5*   PLATELETS Thousands/uL 302 139* 143*     Results from last 7 days   Lab Units 08/12/19  0528 08/08/19  0541 08/07/19  0515   SODIUM mmol/L 138 136 132*   POTASSIUM mmol/L 3 9 3 9 3 7   CHLORIDE mmol/L 103 102 99*   CO2 mmol/L 29 28 26   BUN mg/dL 11 14 12   CREATININE mg/dL 0 78 0 79 0 91   CALCIUM mg/dL 8 2* 8 2* 8 3                      *Labs reviewed  *Radiology studies reviewed  *Medications reviewed and reconciled as needed  *Please refer to order section for additional ordered labs studies    Physical Examination:  Vitals:   Vitals:    08/12/19 1306 08/12/19 2021 08/13/19 0533 08/13/19 0651   BP: 123/70 154/77 132/76    BP Location: Right arm Left arm Right arm    Pulse: 78 76 69    Resp: 18 20 19    Temp: 98 1 °F (36 7 °C) 97 8 °F (36 6 °C) 98 1 °F (36 7 °C)    TempSrc: Oral Oral Oral    SpO2: 96% 98% 95%    Weight:    124 kg (274 lb 4 oz)   Height:           GEN: NAD  RESP: BBS w/o crackles/wheeze/rhonci; resp unlabored  CV: +S1 S2, regular rate, no rubs/murmurs/gallops  ABD: soft, NT, ND, normal BS   : voiding   EXT: no edema of LLE but +edema RLE = ACE wrapped; +right hip dressing intact  Skin: no rashes , no lesions  Neuro: AAOx3 no focality on exam    Total time spent: At least 35 minutes, with more than 50% spent counseling/coordinating care   Counseling includes discussion with patient re: progress  and discussion with patient of his/her current medical state/information  Coordination of patient's care was performed in conjunction with primary service  Time invested included review of patient's labs, vitals, and management of their comorbidities with continued monitoring  In addition, this patient was discussed with medical team including physician and advanced extenders  The care of the patient was extensively discussed and appropriate treatment plan was formulated unique for this patient  ** Please Note: Dragon 360 Dictation voice to text software may have been used in the creation of this document   **

## 2019-08-13 NOTE — SOCIAL WORK
Met with Pt to review team meeting, and he is in agreement with the d/c scheduled for Thursday, 8 15  Pt would like CM to schedule his initial appt or outpt PT at [facility]  VM left for [facility], [phone], requesting a return call to schedule Pts initial evaluation for PT  Referral for a heavy duty walker sent to [facility] via [place], requesting delivery to Pts room prior to d/c

## 2019-08-13 NOTE — PROGRESS NOTES
08/13/19 4697 [facility] Involvement Patient active with Jehovah's witness   Spiritual Beliefs/Perceptions   Concept of God Accepting   Support Systems Family members;Alevism/seth community   Stress Factors   Patient Stress Factors None identified   Coping Responses   Patient Coping Accepting;Open/discussion   Plan of Care   Comments Cultivated a realtionship of care and support with patient  Explored support system and Christianity resources     Assessment Completed by: Unit visit

## 2019-08-13 NOTE — PLAN OF CARE
Problem: Potential for Falls  Goal: Patient will remain free of falls  Description  INTERVENTIONS:  - Assess patient frequently for physical needs  -  Identify cognitive and physical deficits and behaviors that affect risk of falls    -  [place] fall precautions as indicated by assessment   - Educate patient/family on patient safety including physical limitations  - Instruct patient to call for assistance with activity based on assessment  - Modify environment to reduce risk of injury  - Consider OT/PT consult to assist with strengthening/mobility  Outcome: Progressing     Problem: PAIN - ADULT  Goal: Verbalizes/displays adequate comfort level or baseline comfort level  Description  Interventions:  - Encourage patient to monitor pain and request assistance  - Assess pain using appropriate pain scale  - Administer analgesics based on type and severity of pain and evaluate response  - Implement non-pharmacological measures as appropriate and evaluate response  - Consider cultural and social influences on pain and pain management  - Notify physician/advanced practitioner if interventions unsuccessful or patient reports new pain  Outcome: Progressing     Problem: INFECTION - ADULT  Goal: Absence or prevention of progression during hospitalization  Description  INTERVENTIONS:  - Assess and monitor for signs and symptoms of infection  - Monitor lab/diagnostic results  - Monitor all insertion sites, i e  indwelling lines, tubes, and drains  - Monitor endotracheal (as able) and nasal secretions for changes in amount and color  - [place] appropriate cooling/warming therapies per order  - Administer medications as ordered  - Instruct and encourage patient and family to use good hand hygiene technique  - Identify and instruct in appropriate isolation precautions for identified infection/condition  Outcome: Progressing     Problem: SAFETY ADULT  Goal: Patient will remain free of falls  Description  INTERVENTIONS:  - Assess patient frequently for physical needs  -  Identify cognitive and physical deficits and behaviors that affect risk of falls    -  [place] fall precautions as indicated by assessment   - Educate patient/family on patient safety including physical limitations  - Instruct patient to call for assistance with activity based on assessment  - Modify environment to reduce risk of injury  - Consider OT/PT consult to assist with strengthening/mobility  Outcome: Progressing  Goal: Maintain or return to baseline ADL function  Description  INTERVENTIONS:  -  Assess patient's ability to carry out ADLs; assess patient's baseline for ADL function and identify physical deficits which impact ability to perform ADLs (bathing, care of mouth/teeth, toileting, grooming, dressing, etc )  - Assess/evaluate cause of self-care deficits   - Assess range of motion  - Assess patient's mobility; develop plan if impaired  - Assess patient's need for assistive devices and provide as appropriate  - Encourage maximum independence but intervene and supervise when necessary  ¯ Involve family in performance of ADLs  ¯ Assess for home care needs following discharge   ¯ Request OT consult to assist with ADL evaluation and planning for discharge  ¯ Provide patient education as appropriate  Outcome: Progressing  Goal: Maintain or return mobility status to optimal level  Description  INTERVENTIONS:  - Assess patient's baseline mobility status (ambulation, transfers, stairs, etc )    - Identify cognitive and physical deficits and behaviors that affect mobility  - Identify mobility aids required to assist with transfers and/or ambulation (gait belt, sit-to-stand, lift, walker, cane, etc )  - [place] fall precautions as indicated by assessment  - Record patient progress and toleration of activity level on Mobility SBAR; progress patient to next Phase/Stage  - Instruct patient to call for assistance with activity based on assessment  - Request Rehabilitation consult to assist with strengthening/weightbearing, etc   Outcome: Progressing     Problem: DISCHARGE PLANNING  Goal: Discharge to home or other facility with appropriate resources  Description  INTERVENTIONS:  - Identify barriers to discharge w/patient and caregiver  - Arrange for needed discharge resources and transportation as appropriate  - Identify discharge learning needs (meds, wound care, etc )  - Arrange for interpretive services to assist at discharge as needed  - Refer to Case Management Department for coordinating discharge planning if the patient needs post-hospital services based on physician/advanced practitioner order or complex needs related to functional status, cognitive ability, or social support system  Outcome: Progressing

## 2019-08-13 NOTE — PCC CARE MANAGEMENT
Pt is participating well with therapy, and plans to return home  Pts wife is supportive, and both were educated about the potential for cont'd care, ie therapy and services  Following to assist with d/c planning needs

## 2019-08-14 VITALS
BODY MASS INDEX: 37.2 KG/M2 | RESPIRATION RATE: 18 BRPM | TEMPERATURE: 98.1 F | OXYGEN SATURATION: 95 % | DIASTOLIC BLOOD PRESSURE: 80 MMHG | WEIGHT: 280.65 LBS | HEART RATE: 83 BPM | HEIGHT: 73 IN | SYSTOLIC BLOOD PRESSURE: 124 MMHG

## 2019-08-14 PROBLEM — R35.0 URINARY FREQUENCY: Status: RESOLVED | Noted: 2019-08-09 | Resolved: 2019-08-14

## 2019-08-14 PROBLEM — E87.1 HYPONATREMIA: Status: RESOLVED | Noted: 2019-08-09 | Resolved: 2019-08-14

## 2019-08-14 PROBLEM — R41.0 CONFUSION AND DISORIENTATION: Status: RESOLVED | Noted: 2019-08-09 | Resolved: 2019-08-14

## 2019-08-14 PROCEDURE — 97110 THERAPEUTIC EXERCISES: CPT

## 2019-08-14 PROCEDURE — 97530 THERAPEUTIC ACTIVITIES: CPT

## 2019-08-14 PROCEDURE — 97535 SELF CARE MNGMENT TRAINING: CPT

## 2019-08-14 PROCEDURE — 99239 HOSP IP/OBS DSCHRG MGMT >30: CPT | Performed by: PHYSICAL MEDICINE & REHABILITATION

## 2019-08-14 RX ORDER — SENNOSIDES 8.6 MG
1 TABLET ORAL DAILY
Qty: 30 EACH | Refills: 0 | Status: SHIPPED | OUTPATIENT
Start: 2019-08-15 | End: 2020-10-12

## 2019-08-14 RX ORDER — LIDOCAINE 50 MG/G
1 PATCH TOPICAL DAILY
Qty: 30 PATCH | Refills: 0 | Status: SHIPPED | OUTPATIENT
Start: 2019-08-14 | End: 2020-10-12

## 2019-08-14 RX ORDER — POLYETHYLENE GLYCOL 3350 17 G/17G
17 POWDER, FOR SOLUTION ORAL DAILY PRN
Qty: 14 EACH | Refills: 0 | Status: SHIPPED | OUTPATIENT
Start: 2019-08-14 | End: 2020-10-12

## 2019-08-14 RX ORDER — LANOLIN ALCOHOL/MO/W.PET/CERES
3 CREAM (GRAM) TOPICAL
Qty: 30 TABLET | Refills: 0 | Status: SHIPPED | OUTPATIENT
Start: 2019-08-14 | End: 2020-10-12

## 2019-08-14 RX ORDER — DOCUSATE SODIUM 100 MG/1
100 CAPSULE, LIQUID FILLED ORAL 2 TIMES DAILY
Qty: 30 CAPSULE | Refills: 0 | Status: SHIPPED | OUTPATIENT
Start: 2019-08-14 | End: 2020-10-12

## 2019-08-14 RX ADMIN — DOCUSATE SODIUM 100 MG: 100 CAPSULE, LIQUID FILLED ORAL at 08:53

## 2019-08-14 RX ADMIN — POLYETHYLENE GLYCOL 3350 17 G: 17 POWDER, FOR SOLUTION ORAL at 08:53

## 2019-08-14 RX ADMIN — SENNOSIDES 8.6 MG: 8.6 TABLET, FILM COATED ORAL at 08:53

## 2019-08-14 RX ADMIN — LEVOTHYROXINE SODIUM 150 MCG: 75 TABLET ORAL at 05:14

## 2019-08-14 RX ADMIN — ENOXAPARIN SODIUM 40 MG: 40 INJECTION SUBCUTANEOUS at 11:54

## 2019-08-14 RX ADMIN — ACETAMINOPHEN 650 MG: 325 TABLET ORAL at 10:26

## 2019-08-14 NOTE — PROGRESS NOTES
08/14/19 0930   Pain Assessment   Pain Assessment No/denies pain   Pain Score No Pain   Restrictions/Precautions   Precautions Pain;THR   RLE Weight Bearing Per Order WBAT   Cognition   Overall Cognitive Status WFL   Arousal/Participation Alert; Cooperative   Subjective   Subjective Pt states he cont with not being able to sleep last night and has been awake since 330 this morning  Pt agreeable to session and excited about news he will be d/c home today  QI: Roll Left and Right   Assistance Needed Independent   Assistance Provided by [place] No physical assistance   Roll Left and Right CARE Score 6   QI: Sit to [address]; Adaptive equipment   Assistance Provided by [place] No physical assistance   Comment Leg  RLE    Sit to Lying CARE Score 6   QI: Lying to Sitting on Side of Bed   Assistance Needed Independent; Adaptive equipment   Assistance Provided by [place] No physical assistance   Comment Leg  RLE    Lying to Sitting on Side of Bed CARE Score 6   QI: Sit to [address] Provided by [place] No physical assistance   Comment STS to RW    Sit to Stand CARE Score 6   Bed Mobility   Able to Roll Right to Left;Scoot Up;Scoot Down;Left to Right   QI: Chair/Bed-to-Chair Transfer   Assistance Needed Independent; Adaptive equipment   Assistance Provided by [place] No physical assistance   Comment RW    Chair/Bed-to-Chair Transfer CARE Score 6   Transfer Bed/Chair/Wheelchair   Limitations Noted In LE Strength   Adaptive Equipment Roller Walker   Stand Pivot Modified Independent   Sit to Stand Modified Independent   Stand to Sit Modified Independent   Supine to Sit Modified Independent   Sit to Supine Modified Independent   Car Transfer Supervision   Findings Pt functioning overall Radha level with use of RW and leg  for bedmobility      Bed, Chair, Wheelchair Transfer (FIM) 6 - Patient requires assistive device/extra time/safety concerns but completes independently   QI: Car Transfer   Assistance Needed Supervision; Adaptive equipment   Assistance Provided by [place] No physical assistance   Comment use of leg  for RLE  Modifed car transfer performed on hi/low mat on [address] 9 with pt swinging legs over bolster and therapist providing boundaries to prevent extra space to swing BLE into car  Car Transfer CARE Score 4   QI: Walk [address]; Adaptive equipment   Assistance Provided by [place] No physical assistance   Comment RW    Walk 10 Feet CARE Score 6   QI: Walk 50 Feet with Two [address]; Adaptive equipment   Assistance Provided by [place] No physical assistance   Comment RW   Walk 50 Feet with Two Turns CARE Score 6   QI: Walk 150 Feet   Assistance Needed Adaptive equipment; Independent   Assistance Provided by [place] No physical assistance   Comment RW    Walk 150 Feet CARE Score 6   QI: Walking 10 Feet on Uneven Surfaces   Assistance Needed Supervision; Adaptive equipment   Assistance Provided by [place] No physical assistance   Comment CS on with RW on ramp in PT gym  Walking 10 Feet on Uneven Surfaces CARE Score 4   Ambulation   Does the patient walk? 2  Yes   Primary Discharge Mode of Locomotion Walk   Walk Assist Level Moderate Assist;Supervision   Gait Pattern Slow Yadi;Decreased foot clearance; Improper weight shift   Assist Device Roller Mary Castellanos Walked (feet) 500 ft  (x2, 300, 150modI )   Limitations Noted In Endurance; Heel Strike;Strength;Swing   Findings Pt ambulated S level 500ft with RW  Radha level with HH distances up to 150ft  Walking (FIM) 6 - Patient requires assistive device/extra time/safety concerns but completes independently AND distance 150 feet or more, no rest   Wheelchair mobility   QI: Does the patient use a wheelchair? 0  No   Wheelchair (FIM) 0 - Activity does not occur   QI: 1 Step (Curb)   Assistance Needed Adaptive equipment; Independent Assistance Provided by [place] No physical assistance   Comment RW 6"  step in PT gym  1 Step (Curb) CARE Score 6   QI: 4 Steps   Assistance Needed Adaptive equipment;Supervision   Assistance Provided by [place] No physical assistance   Comment CS on  6"  stairs    4 Steps CARE Score 4   QI: 12 Steps   Reason if not Attempted Activity not applicable   12 Steps CARE Score 9   Stairs   Type Stairs;Curb;Ramp   # of Steps 6  (6"  stairs )   Weight Bearing Precautions Fall Risk;WBAT;RLE   Assist Devices Bilateral Rail   Stairs (FIM) 5 - Patient requires verbal cues AND goes up and down full flight (12- 14 stairs)   QI: Picking Up Object   Assistance Needed Adaptive equipment; Independent   Assistance Provided by [place] No physical assistance   Comment Radha with long handled reacher to prevent breaking THP  Picking Up Object CARE Score 6   Toilet Transfer   Toilet Transfer (FIM) 0 - Activity does not occur   Therapeutic Interventions   Strengthening Standing TE: 2x10 RLE: BUE support on RW, hip flexion, ext, ABD, heel raise  Repeated STS x8    Flexibility Seated BLE passive hs gastroc stretch x4min  Balance Standing unsupported with RW in front of pt incase of LOB [id number]  Other Simulated car txfr on hi/low mat NW9  STS from varying surface heights on hi/low mat on NW9  Rewrapped RLE ace wrap  Assessment   Treatment Assessment Pt participated in skilled PT session with focus on inc functional mobility, LE strengthening, inc act tolerance and safe d/c planning  Discussed with pt his concerns for functionality at home and pt at ease after conversation  States he feels ready for d/c and spoke with MD reguarding his questions about medical management  Pt to d/c home this evening and Radha level with use of RW and adaptive equipment  Pt demo ability to safelty complete transfer, ambulation, and RUDY into home without A  Plan to provide HEP to patient as initial eval for OPPT a week away   Pt appropriate for d c at current functional level with wife home to provide support 24/7  Pt cont to benefit from skilled PT to maximize functional independence and dec caregiver burden  Family/Caregiver Present no    Barriers to Discharge None   PT Barriers   Physical Impairment Decreased strength;Decreased endurance;Decreased range of motion;Decreased mobility   Plan   Treatment/Interventions Functional transfer training;LE strengthening/ROM; Elevations; Therapeutic exercise; Endurance training   Progress Progressing toward goals   Recommendation   Recommendation Outpatient PT; Home with family support   Equipment Recommended Walker   PT Therapy Minutes   PT Time In 0930   PT Time Out 1100   PT Total Time (minutes) 90   PT Mode of treatment - Individual (minutes) 90   PT Mode of treatment - Concurrent (minutes) 0   PT Mode of treatment - Group (minutes) 0   PT Mode of treatment - Co-treat (minutes) 0   PT Mode of Teatment - Total time(minutes) 90 minutes   Therapy Time missed   Time missed?  No

## 2019-08-14 NOTE — DISCHARGE SUMMARY
Discharge Summary - PMR   Paty Carter 68 y o  male MRN: [id number]  Unit/Bed#: [facility] 453-01 Encounter: [id number]    Admission Date: 8/8/2019     Discharge Date:     Etiologic/Rehabilitation Diagnosis: Impairment of mobility, safety and Activities of Daily Living (ADLs) due to Orthopedic Disorders:  08 51  Unilateral Hip Replacement    HPI: Paty Carter is a 68 y o  male who presented to the [address] [place] [address] for a planned PEGGY  Procedure was performed on August 5th 2019 by Dr Jan Romo  Postoperatively patient with by point drop in hemoglobin to 11 5, hyponatremia  Behavioral services were consulted, due to heightened anxiety by patient  Patient was evaluated by PT and OT, found to be below functional baseline  He has accepted to the [facility] on 8/8/19  Procedures Performed During [facility] Admission: None    Acute Rehabilitation Center Course: Patient participated in a comprehensive interdisciplinary inpatient rehabilitation program which included involvment of MD, therapies (PT, OT, and/or SLP), RN, CM, SW, dietary, and psychology services  He was able to be advanced to a Supervision-Bernice level of assist and considered safe for discharge home  Please see below for patient's day to day management of medical needs  Difficulty sleeping  Assessment & Plan  Discussed sleep hygiene  Started melatonin 3mg dosed at bedtime, has PRN order for another 3mg as well  Hypothyroid  Assessment & Plan  · Continue synthroid    Acute blood loss anemia  Assessment & Plan  Results from last 7 days   Lab Units 08/12/19  0528 08/08/19  0541 08/07/19  0515   HEMOGLOBIN g/dL 11 3* 11 5* 11 6*       · Monitor CBC intermittently  · Transfuse for Hgb <7      Status post total hip replacement, left  Assessment & Plan  · Performed on 8/5/19 by Dr Jan Romo  · Weight-bearing as tolerated  · hip precautions in place - reviewed them in detail with patient today     · DVT Prophylaxis for at 19 more days after day of discharge  · Has Lovenox at home  · Dr Erik Roberson saw patient 8/13  Staples were removed last night  Has follow-up next week with Dr Erik Roberson with repeat imaging  · Discussed with Orthopedics  Osgood-Schlatter's disease of both knees  Assessment & Plan  · Follow-up with orthopedics as outpatient  · Order Lidoderm patch, especially to right knee    * Impaired mobility and ADLs  Assessment & Plan  · Acute comprehensive interdisciplinary inpatient rehabilitation including PT, OT, RN, CM, SW, dietary    Urinary frequencyresolved as of 8/14/2019  Assessment & Plan  · Improved  No complaints today  · Bladder scans unremarkable, discontinued  · Patient without fever, chills, suprapubic pain, dysuria, leukocytosis  Low likelihood of UTI    Confusion and disorientationresolved as of 8/14/2019  Assessment & Plan  · Likely secondary to new environment/Robaxin  · Pain controlled without Robaxin  · Resolved  · Has not been using oxycodone  Discontinued  Hyponatremiaresolved as of 8/14/2019  Assessment & Plan  Results from last 7 days   Lab Units 08/12/19  0528 08/08/19  0541 08/07/19  0515   SODIUM mmol/L 138 136 132*       · Resolved  · Monitor BMP intermittently      Discharge Physical Examination:  /74 (BP Location: Right arm)   Pulse 73   Temp 98 2 °F (36 8 °C) (Oral)   Resp 18   Ht 6' 1" (1 854 m)   Wt 127 kg (280 lb 10 3 oz)   SpO2 94%   BMI 37 03 kg/m²     Gen: No acute distress, Well-nourished, well-appearing  HEENT: Moist mucus membranes, Normocephalic/Atraumatic  Cardiovascular: Regular rate, rhythm, S1/S2  Distal pulses palpable  Heme/Extr: Mild proximal RUE edema, improved during his stay  Pulmonary: Non-labored breathing  Lungs CTAB  : No lopez  GI: Soft, non-tender, non-distended  BS+  MSK: AROM is WFL in all extremities  No effusions or deformities  Bulk is symmetric  See below for MMT scores  Integumentary: Skin is warm, dry  No rashes or ulcers    Neuro: AAOx3, Speech is intact  Appropriate to questioning  Tone is normal    MMT:   Strength:   Right  Left  Site  Right  Left  Site    5 5  S Ab: Shoulder Abductors  1-2  5  HF: Hip Flexors    5 5  EF: Elbow Flexors  5  5 KF: Knee Flexors    5  5  EE: Elbow Extensors  5  5  KE: Knee Extensors    5  5  WE: Wrist Extensors  5  5  DR: Dorsi Flexors    5  5  FF: Finger Flexors  5  5  PF: Plantar Flexors    5  5  HI: Hand Intrinsics  5  5  EHL: Extensor Hallucis Longus   Psych: Normal mood and affect  Significant Findings, Care, Treatment and Services Provided: Acute comprehensive interdisciplinary inpatient rehabilitation including PT, OT, SLP, RN, CM, SW, dietary, psychology, etc     Complications: None    Functional Status Upon Admission to [facility]:  Mobility:  Min assist  Transfers:  Min assist  ADLs:  Min assist    Functional Status Upon Discharge from [facility]:   Mobility: Supervision for transfers, ambulation 300' with RW  ADLs: Ind with eating, Supervision for grooming, Bernice for bathing, Supervision fro UB ADLs, maxA for LB dressing, Supervision for toileting, toilet transfers  Discharge Diagnosis: Impairment of mobility, safety and Activities of Daily Living (ADLs) due to Orthopedic Disorders:  08 51  Unilateral Hip Replacement    Discharge Medications:   See after visit summary for reconciled discharge medications provided to patient and family  Condition at Discharge: good     Discharge instructions/Information to patient and family:   See after visit summary for information provided to patient and family  Provisions for Follow-Up Care:  See after visit summary for information related to follow-up care and any pertinent home health orders  Future Appointments   Date Time Provider Sheree Lora   8/20/2019  9:15 AM Ruthann Upton MD [facility]       Disposition: Home    Planned Readmission: No    Discharge Statement   I spent 45 minutes discharging the patient   This time was spent on the day of discharge  I had direct contact with the patient on the day of discharge  Greater than 50% of the total time was spent examining patient, answering all patient questions, arranging and discussing plan of care with patient as well as directly providing post-discharge instructions  Additional time then spent on discharge activities  Discharge Medications:  See after visit summary for reconciled discharge medications provided to patient and family

## 2019-08-14 NOTE — PROGRESS NOTES
Internal Medicine Progress Note  Patient: Sharon Taveras  Age/sex: 68 y o  male  Medical Record #: [id number]      ASSESSMENT/PLAN:  Sharon Taveras is seen and examined and mangement for following issues:    Rt hip OA s/p Rt PEGGY 8/5/19: WBAT; watch incision  Added ACE RLE for edema     Hypothyroidism: Continue current Levothyroxine  TSH 5/2019 0 91     Hyperlipidemia: Continue atorvastatin  Post operative blood loss anemia:  Stable with expected drop, asymptomatic  Thrombocytopenia: Mild  Stable  Right knee pain: says HS Lidopatch effective      Subjective: Patients overnight issues or events were reviewed with nursing or staff during rounds or morning huddle session  No new or overnight issues  Thrombocytopenia: Stable  Hypothyroidism: Continue levothyroxine as per home dosage  Rt knee pain: Cont Lidoderm patch   Would like to go home today instead of tomorrow d/t sleeping issues    ROS:   GI: denies abdominal pain, change bowel habits or reflux symptoms  Neuro: Denies any headache, new vision changes, new neuropathies,new weaknesses   Respiratory: No Cough, SOB, denies wheeze  Cardiovascular: No CP, palpitations, denies perception of rapid heartbeat  Musculoskeletal: +right hip pain  : denies any new urinary burning or frequency    Review of Scheduled Meds:    Current Facility-Administered Medications:  acetaminophen 650 mg Oral Q6H PRN Kahlil Cortes MD   atorvastatin 10 mg Oral Daily With Dinner Gia Leonard MD   calcium carbonate 1,000 mg Oral Daily PRN Kahlil Cortes MD   docusate sodium 100 mg Oral BID Kahlil Cortes MD   enoxaparin 40 mg Subcutaneous Q24H Jet Leonard MD   levothyroxine 150 mcg Oral Early Morning Kahlil Cortes MD   lidocaine 1 patch Topical Daily Kahlil Cortes MD   melatonin 3 mg Oral HS PRN Sravanthi Kelly PA-C   melatonin 3 mg Oral HS Jet Leonard MD   menthol-methyl salicylate  Apply externally 4x Daily PRN Janee Thacker MD   polyethylene glycol 17 g Oral Daily PRN Sravanthi Kelly PA-C   polyethylene glycol 17 g Oral Daily Janette Calderon MD   senna 1 tablet Oral Daily Radha Ray MD       Labs:     Results from last 7 days   Lab Units 08/12/19  0528 08/08/19  0541   WBC Thousand/uL 7 53 8 68   HEMOGLOBIN g/dL 11 3* 11 5*   HEMATOCRIT % 34 7* 34 1*   PLATELETS Thousands/uL 302 139*     Results from last 7 days   Lab Units 08/12/19  0528 08/08/19  0541   SODIUM mmol/L 138 136   POTASSIUM mmol/L 3 9 3 9   CHLORIDE mmol/L 103 102   CO2 mmol/L 29 28   BUN mg/dL 11 14   CREATININE mg/dL 0 78 0 79   CALCIUM mg/dL 8 2* 8 2*                      *Labs reviewed  *Radiology studies reviewed  *Medications reviewed and reconciled as needed  *Please refer to order section for additional ordered labs studies    Physical Examination:  Vitals:   Vitals:    08/13/19 2000 08/13/19 2054 08/14/19 0527 08/14/19 0534   BP:  139/72 121/74    BP Location:  Right arm Right arm    Pulse:  81 73    Resp:  16 18    Temp: (!) 125 °F (51 7 °C) 98 3 °F (36 8 °C) 98 2 °F (36 8 °C)    TempSrc:  Oral Oral    SpO2:  96% 94%    Weight:   127 kg (280 lb 10 3 oz) 127 kg (280 lb 10 3 oz)   Height:           GEN: NAD  RESP: BBS w/o crackles/wheeze/rhonci; resp unlabored  CV: +S1 S2, regular rate, no rubs/murmurs/gallops  ABD: soft, NT, ND, normal BS   : voiding   EXT: no edema of LLE but +edema RLE = ACE wrapped; +right hip dressing intact  Skin: no rashes , no lesions  Neuro: AAOx3 no focality on exam    Total time spent: At least 35 minutes, with more than 50% spent counseling/coordinating care  Counseling includes discussion with patient re: progress  and discussion with patient of his/her current medical state/information  Coordination of patient's care was performed in conjunction with primary service  Time invested included review of patient's labs, vitals, and management of their comorbidities with continued monitoring   In addition, this patient was discussed with medical team including physician and advanced extenders  The care of the patient was extensively discussed and appropriate treatment plan was formulated unique for this patient  ** Please Note: Dragon 360 Dictation voice to text software may have been used in the creation of this document   **

## 2019-08-14 NOTE — NURSING NOTE
Wife demonstrated proper administration of Lovenox injection with verbal cueing from this RN  Wife comfortable with procedure

## 2019-08-14 NOTE — NURSING NOTE
D/c instructions discussed with pt and wife  All questions answered  RW delivered to bedside  All belongings packed and sent with pt  Pt D/C to home at Mod I level

## 2019-08-14 NOTE — PROGRESS NOTES
08/14/19 1330   Pain Assessment   Pain Assessment No/denies pain   Pain Score No Pain   Restrictions/Precautions   Precautions Pain;THR   Cognition   Arousal/Participation Alert; Cooperative   Subjective   Subjective Pt excited to d/c home shortly after therapy session  Pt agreeable to session    QI: Sit to [address]; Adaptive equipment   Assistance Provided by Mcdonough No physical assistance   Sit to Stand CARE Score 6   QI: Chair/Bed-to-Chair Transfer   Assistance Needed Adaptive equipment; Independent   Assistance Provided by Mcdonough No physical assistance   Comment RW    Chair/Bed-to-Chair Transfer CARE Score 6   Transfer Bed/Chair/Wheelchair   Limitations Noted In LE Strength   Adaptive Equipment Roller Walker   Stand Pivot Modified Independent   Sit to Stand Modified Independent   Stand to Sit Modified Independent   Bed, Chair, Wheelchair Transfer (FIM) 6 - Patient requires assistive device/extra time/safety concerns but completes independently   QI: [address]; Adaptive equipment   Assistance Provided by Mcdonough No physical assistance   Comment RW    Walk 10 Feet CARE Score 6   QI: Walk 50 Feet with Two [address]; Adaptive equipment   Assistance Provided by Mcdonough No physical assistance   Comment RW    Walk 50 Feet with Two Turns CARE Score 6   QI: Walk 150 Feet   Assistance Needed Independent; Adaptive equipment   Assistance Provided by Mcdonough No physical assistance   Comment RW    Walk 150 Feet CARE Score 6   Ambulation   Does the patient walk? 2  Yes   Primary Discharge Mode of Locomotion Walk   Walk Assist Level Modified Independent   Gait Pattern Slow Yadi;Decreased foot clearance; Improper weight shift   Assist Device Sachi Castellanos Walked (feet) 200 ft  (x2)   Limitations Noted In Endurance; Heel Strike;Strength;Swing   Walking (FIM) 6 - Patient requires assistive device/extra time/safety concerns but completes independently AND distance 150 feet or more, no rest   Wheelchair mobility   QI: Does the patient use a wheelchair? 0  No   Toilet Transfer   Toilet Transfer (FIM) 0 - Activity does not occur   Therapeutic Interventions   Other Pt given HEP to do after d/c as OPPT eval not for approx1 week  Pt with sound understanding of exercises and able to recite hip precautions  Instructed to stop acitivity if inc pain  Equipment Use   NuStep Seat 13 Arm 9 12min BLE and BUE L2 SPM around 60    Assessment   Treatment Assessment Pt participated in skilled PT session with focus on functional mobility and act tolerance  Pt to d/c home this afternoon having all goals met  pt functioning at [address] level with wife present to provide support  Pt to follow up OPPT  Given HEP to hold him over in meantime  Pt cont to beenfit from skileld PT to inc strength and functional mobility to maximize functional independence  Family/Caregiver Present no    Barriers to Discharge None   PT Barriers   Physical Impairment Decreased strength;Decreased range of motion;Decreased endurance;Decreased mobility   Plan   Treatment/Interventions Functional transfer training;Elevations;LE strengthening/ROM; Therapeutic exercise; Endurance training   Progress Progressing toward goals   Recommendation   Recommendation Outpatient PT; Home with family support   Equipment Recommended Walker   PT Therapy Minutes   PT Time In 1330   PT Time Out 1400   PT Total Time (minutes) 30   PT Mode of treatment - Individual (minutes) 30   PT Mode of treatment - Concurrent (minutes) 0   PT Mode of treatment - Group (minutes) 0   PT Mode of treatment - Co-treat (minutes) 0   PT Mode of Teatment - Total time(minutes) 30 minutes   Therapy Time missed   Time missed?  No

## 2019-08-14 NOTE — PLAN OF CARE
Problem: Potential for Falls  Goal: Patient will remain free of falls  Description  INTERVENTIONS:  - Assess patient frequently for physical needs  -  Identify cognitive and physical deficits and behaviors that affect risk of falls    -  [place] fall precautions as indicated by assessment   - Educate patient/family on patient safety including physical limitations  - Instruct patient to call for assistance with activity based on assessment  - Modify environment to reduce risk of injury  - Consider OT/PT consult to assist with strengthening/mobility  Outcome: Progressing     Problem: PAIN - ADULT  Goal: Verbalizes/displays adequate comfort level or baseline comfort level  Description  Interventions:  - Encourage patient to monitor pain and request assistance  - Assess pain using appropriate pain scale  - Administer analgesics based on type and severity of pain and evaluate response  - Implement non-pharmacological measures as appropriate and evaluate response  - Consider cultural and social influences on pain and pain management  - Notify physician/advanced practitioner if interventions unsuccessful or patient reports new pain  Outcome: Progressing     Problem: INFECTION - ADULT  Goal: Absence or prevention of progression during hospitalization  Description  INTERVENTIONS:  - Assess and monitor for signs and symptoms of infection  - Monitor lab/diagnostic results  - Monitor all insertion sites, i e  indwelling lines, tubes, and drains  - Monitor endotracheal (as able) and nasal secretions for changes in amount and color  - [place] appropriate cooling/warming therapies per order  - Administer medications as ordered  - Instruct and encourage patient and family to use good hand hygiene technique  - Identify and instruct in appropriate isolation precautions for identified infection/condition  Outcome: Progressing     Problem: SAFETY ADULT  Goal: Patient will remain free of falls  Description  INTERVENTIONS:  - Assess patient frequently for physical needs  -  Identify cognitive and physical deficits and behaviors that affect risk of falls    -  [place] fall precautions as indicated by assessment   - Educate patient/family on patient safety including physical limitations  - Instruct patient to call for assistance with activity based on assessment  - Modify environment to reduce risk of injury  - Consider OT/PT consult to assist with strengthening/mobility  Outcome: Progressing  Goal: Maintain or return to baseline ADL function  Description  INTERVENTIONS:  -  Assess patient's ability to carry out ADLs; assess patient's baseline for ADL function and identify physical deficits which impact ability to perform ADLs (bathing, care of mouth/teeth, toileting, grooming, dressing, etc )  - Assess/evaluate cause of self-care deficits   - Assess range of motion  - Assess patient's mobility; develop plan if impaired  - Assess patient's need for assistive devices and provide as appropriate  - Encourage maximum independence but intervene and supervise when necessary  ¯ Involve family in performance of ADLs  ¯ Assess for home care needs following discharge   ¯ Request OT consult to assist with ADL evaluation and planning for discharge  ¯ Provide patient education as appropriate  Outcome: Progressing  Goal: Maintain or return mobility status to optimal level  Description  INTERVENTIONS:  - Assess patient's baseline mobility status (ambulation, transfers, stairs, etc )    - Identify cognitive and physical deficits and behaviors that affect mobility  - Identify mobility aids required to assist with transfers and/or ambulation (gait belt, sit-to-stand, lift, walker, cane, etc )  - [place] fall precautions as indicated by assessment  - Record patient progress and toleration of activity level on Mobility SBAR; progress patient to next Phase/Stage  - Instruct patient to call for assistance with activity based on assessment  - Request Rehabilitation consult to assist with strengthening/weightbearing, etc   Outcome: Progressing     Problem: DISCHARGE PLANNING  Goal: Discharge to home or other facility with appropriate resources  Description  INTERVENTIONS:  - Identify barriers to discharge w/patient and caregiver  - Arrange for needed discharge resources and transportation as appropriate  - Identify discharge learning needs (meds, wound care, etc )  - Arrange for interpretive services to assist at discharge as needed  - Refer to Case Management Department for coordinating discharge planning if the patient needs post-hospital services based on physician/advanced practitioner order or complex needs related to functional status, cognitive ability, or social support system  Outcome: Progressing     Problem: Prexisting or High Potential for Compromised Skin Integrity  Goal: Skin integrity is maintained or improved  Description  INTERVENTIONS:  - Identify patients at risk for skin breakdown  - Assess and monitor skin integrity  - Assess and monitor nutrition and hydration status  - Monitor labs   - Assess for incontinence   - Turn and reposition patient  - Assist with mobility/ambulation  - Relieve pressure over bony prominences  - Avoid friction and shearing  - Provide appropriate hygiene as needed including keeping skin clean and dry  - Evaluate need for skin moisturizer/barrier cream  - Collaborate with interdisciplinary team   - Patient/family teaching  - Consider wound care consult   Outcome: Progressing

## 2019-08-14 NOTE — PROGRESS NOTES
Progress Note - Orthopedics   Raoul Potts 68 y o  male MRN: [id number]  Unit/Bed#: -01      Subjective:    68 y o male s/p Right PEGGY POD 8 currently in the acute rehab center  He states feeling well and progressing with physical therapy  His pain is well controlled  He is scheduled to be discharged this Thursday from [facility]  He has being ambulating with a walker most of the time  Pt doing well      Labs:  0   Lab Value Date/Time    HCT 34 7 (L) 08/12/2019 0528    HCT 34 1 (L) 08/08/2019 0541    HCT 35 5 (L) 08/07/2019 0515    HGB 11 3 (L) 08/12/2019 0528    HGB 11 5 (L) 08/08/2019 0541    HGB 11 6 (L) 08/07/2019 0515    INR 1 06 07/08/2019 1014    WBC 7 53 08/12/2019 0528    WBC 8 68 08/08/2019 0541    WBC 9 01 08/07/2019 0515    CRP <3 0 07/08/2019 1014       Meds:    Current Facility-Administered Medications:     acetaminophen (TYLENOL) tablet 650 mg, 650 mg, Oral, Q6H PRN, Eduardo Egan MD, 650 mg at 08/13/19 2119    atorvastatin (LIPITOR) tablet 10 mg, 10 mg, Oral, Daily With Javi Randolph MD, 10 mg at 08/13/19 1735    calcium carbonate (TUMS) chewable tablet 1,000 mg, 1,000 mg, Oral, Daily PRN, Eduardo Egan MD    docusate sodium (COLACE) capsule 100 mg, 100 mg, Oral, BID, Kahlil Cortes MD, 100 mg at 08/13/19 1735    [START ON 8/14/2019] enoxaparin (LOVENOX) subcutaneous injection 40 mg, 40 mg, Subcutaneous, Q24H, Simeon Keller MD    levothyroxine tablet 150 mcg, 150 mcg, Oral, Early Morning, Kahlil Cortes MD, 150 mcg at 08/13/19 0530    lidocaine (LIDODERM) 5 % patch 1 patch, 1 patch, Topical, Daily, Kahlil Cortes MD, 1 patch at 08/13/19 1814    melatonin tablet 3 mg, 3 mg, Oral, HS PRN, Sravanthi Kelly PA-C    melatonin tablet 3 mg, 3 mg, Oral, HS, Simeon Keller MD, 3 mg at 08/13/19 2116    menthol-methyl salicylate (BENGAY) 87-04 % cream, , Apply externally, 4x Daily PRN, Ynes Canales MD    polyethylene glycol [facility]) packet 17 g, 17 g, Oral, Daily ESHA, Sravanthi Kelly PA-C, 17 g at 08/09/19 1640    polyethylene glycol (MIRALAX) packet 17 g, 17 g, Oral, Daily, Kenny Moon MD, 17 g at 08/13/19 0815    senna (SENOKOT) tablet 8 6 mg, 1 tablet, Oral, Daily, Kahlil Cortes MD, 8 6 mg at 08/13/19 0815    Blood Culture:   No results found for: BLOODCX    Wound Culture:   No results found for: WOUNDCULT    Ins and Outs:  I/O last 24 hours: In: 8637 [P O :1040]  Out: 4235 [Urine:1350]          Physical:  Vitals:    08/13/19 2054   BP: 139/72   Pulse: 81   Resp: 16   Temp: 98 3 °F (36 8 °C)   SpO2: 96%     Musculoskeletal: right Lower Extremity  · Skin with staples in place, incision dry and clean  No erythema noted  · Nontender to palpation  · SILT s/s/sp/dp/t  +fhl/ehl, +ankle dorsi/plantar flexion  2+ DP pulse    Assessment:    69 y o male s/p R PEGGY POD 8 doing well with physical therapy      Plan:  · WBAT RLE  · Continue PT/OT  · Staples removed  · Sterile Strips applied  · Continue Pain control  · Continue DVT ppx  · Follow up visit next week    Chon Balbuena MD

## 2019-08-14 NOTE — CASE MANAGEMENT
Team dc summary - pt made good progress and returned home w/family support and contd outpt physical therapy at [facility] scheduled for pt and placed on dc instructions  Pt received a heavy duty roller walker through [facility] medical  Family present for dc instructions and aware of pts functional ability

## 2019-08-14 NOTE — PLAN OF CARE
Problem: Potential for Falls  Goal: Patient will remain free of falls  Description  INTERVENTIONS:  - Assess patient frequently for physical needs  -  Identify cognitive and physical deficits and behaviors that affect risk of falls    -  [place] fall precautions as indicated by assessment   - Educate patient/family on patient safety including physical limitations  - Instruct patient to call for assistance with activity based on assessment  - Modify environment to reduce risk of injury  - Consider OT/PT consult to assist with strengthening/mobility  Outcome: Completed     Problem: PAIN - ADULT  Goal: Verbalizes/displays adequate comfort level or baseline comfort level  Description  Interventions:  - Encourage patient to monitor pain and request assistance  - Assess pain using appropriate pain scale  - Administer analgesics based on type and severity of pain and evaluate response  - Implement non-pharmacological measures as appropriate and evaluate response  - Consider cultural and social influences on pain and pain management  - Notify physician/advanced practitioner if interventions unsuccessful or patient reports new pain  Outcome: Completed     Problem: INFECTION - ADULT  Goal: Absence or prevention of progression during hospitalization  Description  INTERVENTIONS:  - Assess and monitor for signs and symptoms of infection  - Monitor lab/diagnostic results  - Monitor all insertion sites, i e  indwelling lines, tubes, and drains  - Monitor endotracheal (as able) and nasal secretions for changes in amount and color  - [place] appropriate cooling/warming therapies per order  - Administer medications as ordered  - Instruct and encourage patient and family to use good hand hygiene technique  - Identify and instruct in appropriate isolation precautions for identified infection/condition  Outcome: Completed     Problem: SAFETY ADULT  Goal: Patient will remain free of falls  Description  INTERVENTIONS:  - Assess patient frequently for physical needs  -  Identify cognitive and physical deficits and behaviors that affect risk of falls    -  [place] fall precautions as indicated by assessment   - Educate patient/family on patient safety including physical limitations  - Instruct patient to call for assistance with activity based on assessment  - Modify environment to reduce risk of injury  - Consider OT/PT consult to assist with strengthening/mobility  Outcome: Completed  Goal: Maintain or return to baseline ADL function  Description  INTERVENTIONS:  -  Assess patient's ability to carry out ADLs; assess patient's baseline for ADL function and identify physical deficits which impact ability to perform ADLs (bathing, care of mouth/teeth, toileting, grooming, dressing, etc )  - Assess/evaluate cause of self-care deficits   - Assess range of motion  - Assess patient's mobility; develop plan if impaired  - Assess patient's need for assistive devices and provide as appropriate  - Encourage maximum independence but intervene and supervise when necessary  ¯ Involve family in performance of ADLs  ¯ Assess for home care needs following discharge   ¯ Request OT consult to assist with ADL evaluation and planning for discharge  ¯ Provide patient education as appropriate  Outcome: Completed  Goal: Maintain or return mobility status to optimal level  Description  INTERVENTIONS:  - Assess patient's baseline mobility status (ambulation, transfers, stairs, etc )    - Identify cognitive and physical deficits and behaviors that affect mobility  - Identify mobility aids required to assist with transfers and/or ambulation (gait belt, sit-to-stand, lift, walker, cane, etc )  - [place] fall precautions as indicated by assessment  - Record patient progress and toleration of activity level on Mobility SBAR; progress patient to next Phase/Stage  - Instruct patient to call for assistance with activity based on assessment  - Request Rehabilitation consult to assist with strengthening/weightbearing, etc   Outcome: Completed     Problem: DISCHARGE PLANNING  Goal: Discharge to home or other facility with appropriate resources  Description  INTERVENTIONS:  - Identify barriers to discharge w/patient and caregiver  - Arrange for needed discharge resources and transportation as appropriate  - Identify discharge learning needs (meds, wound care, etc )  - Arrange for interpretive services to assist at discharge as needed  - Refer to Case Management Department for coordinating discharge planning if the patient needs post-hospital services based on physician/advanced practitioner order or complex needs related to functional status, cognitive ability, or social support system  Outcome: Completed     Problem: Prexisting or High Potential for Compromised Skin Integrity  Goal: Skin integrity is maintained or improved  Description  INTERVENTIONS:  - Identify patients at risk for skin breakdown  - Assess and monitor skin integrity  - Assess and monitor nutrition and hydration status  - Monitor labs   - Assess for incontinence   - Turn and reposition patient  - Assist with mobility/ambulation  - Relieve pressure over bony prominences  - Avoid friction and shearing  - Provide appropriate hygiene as needed including keeping skin clean and dry  - Evaluate need for skin moisturizer/barrier cream  - Collaborate with interdisciplinary team   - Patient/family teaching  - Consider wound care consult   Outcome: Completed

## 2019-08-14 NOTE — DISCHARGE INSTRUCTIONS
[address] Discharge Instructions    Should you develop fevers, chills, sweats, rigors, or any drainage from your surgical site please contact your family doctor or surgeon immediately or go to the ER immediately as these are indicators of possible infection     Please note you are restricted from driving/operating a motorized vehicle/operating heavy machinery/etc until you are cleared by your doctors or through a formal driving evaluation  This service is offered through [facility] driving evaluation program on [address] however this evaluation can be done at a site of your choosing  Please contact your family doctor for a referral when your family doctor clears you to perform this evaluation once your neurologic/physical deficits have stabilized  Please see your doctors listed in the follow up providers section of your discharge paperwork, and take the discharge paperwork with you to your appointments    Please note changes may have been made to your medications please refer to your discharge paperwork for your current medications and take this list with you to all your doctors appointments for your doctors to review    Please do not resume a home medication unless the medication reconciliation sheet indicates to do so, please do not assume that a medication that you were given a prescription for is the same as a medication you have at home based on both medications having the same name as dosages and frequency may have changed  Prior to your discharge from the hospital, your nurse has reviewed: your medications, when to take them, how to take them, what they are for, how much to take, and when your next dose is due; please follow these instructions as directed       Unless listed to do so on your medication reconciliation in your discharge paperwork please do not combine pain medications, muscle relaxants, or sedatives/sleep aids/anti-anxiety medications that you may have been prescribed prior to admission with each other  Please do not drive or operate heavy machinery while using these medications  Do not drink alcohol while using these medications  Unless listed to do so on your medication reconciliation in your discharge paperwork in general it is advisable to limit/avoid the use of pain medications, sedatives/sleep aids/anti-anxiety medications, and muscle relaxants as they may become habit forming  Please do not drive or operate heavy machinery while using these medications  Do not drink alcohol while using these medications    Please avoid NSAID (including but not limited to advil, aleve, motrin, naproxen, ibuprofen, mobic, meloxicam, diclofenac etc) medications as NSAID medications may delay bone healing    Please avoid NSAID (including but not limited to advil, aleve, motrin, naproxen, ibuprofen, mobic, meloxicam diclofenac etc) medications, anti platelet medications (including but not limited to plavix, aspirin and aspirin containing products), and any prescription blood thinners due to your already being on Lovenox for a total of a 28 day course (You have 19 more days left)  You may be cleared by your doctors to use these medications after you have completed your 28 day course however please do not use them unless your are advised to do so by your doctors as the use of these medications in combination with Lovenox can increase bleeding risk       Unless specifically noted in your medication list provided to you in your discharge paper work, please discuss with your family doctor prior to resuming any vitamins/minerals/supplements you may have been taking prior to your hospitalization     Please note a summary of your hospital stay with relevant information for your doctors has been sent to them, please confirm with your doctors at your follow up visits that they have received this summary and have them contact [address] [place] [address] if they have not received them along with any other medical records they may require  You are a 68 y o  male who presented to the [address] [place] [address] for a planned total hip replacement  Procedure was performed on August 5th 2019 by Dr Stanton Carrasco  Postoperatively you had a little bit of blood loss anemia, but did not require any transfusions  Your sodium levels were also low  You had a lot of anxiety, but that has improved  You were transferred to the [facility] on 8/8/19 and we managed the following issues:     * Impaired mobility and ADLs  Assessment & Plan  · You participated in an acute comprehensive interdisciplinary inpatient rehabilitation program including PT, OT, RN, CM, SW, dietary     Status post total hip replacement, left  Assessment & Plan  · Performed on 8/5/19 by Dr Stanton Carrasco  · Weight-bearing as tolerated  · We discussed hip precautions in detail  Please no bending past 90 degrees, twisting, squatting, crossing your legs  We discussed how this can lead to hip dislocation  · You will need to take Lovenox for 19 more days once discharged home  · Dr Stanton Carrasco has ordered repeat XR of your hip  This can be completed at [place] [id number]  Please do this prior to your appointment next week        Acute blood loss anemia  Assessment & Plan  We monitored your hemoglobin levels which remained stable          Hyponatremia  Assessment & Plan  · Your sodium levels normalized during your stay      Osgood-Schlatter's disease of both knees  Assessment & Plan  · Follow-up with orthopedics as outpatient  Lidoderm patch to right knee as needed for pain  Please wear for only 12 hours at a time, and then remove for 12 hours  Do not place any heating pads over patch

## 2019-08-14 NOTE — SOCIAL WORK
OZZIE from [facility], re: scheduling for Pt  PCT VERNELL Arshad, [phone], and the first available is 8 28, at 11am       Pt was informed, and the information was placed on d/c instructions  PCT Nikole [facility], re: Pts d/c occurring today, instead of tomorrow, and that they will have payment for the balance of DME

## 2019-08-14 NOTE — PROGRESS NOTES
08/14/19 0700   Pain Assessment   Pain Assessment 0-10   Pain Score 7   Pain Location Leg;Back;Hip   Hospital Pain Intervention(s) Rest;Repositioned; Emotional support;Distraction   Response to Interventions improved tolerance   Restrictions/Precautions   Precautions Pain;THR   QI: Eating   Assistance Needed Independent   Eating CARE Score 6   Eating Assessment   Eating (FIM) 7 - Patient completely independent   QI: Oral Hygiene   Assistance Needed Independent   Oral Hygiene CARE Score 6   Grooming   Able To Initiate Tasks; Acquire Items; Wash/Dry Face;Brush/Clean Teeth;Wash/Dry Hands;Comb/Brush Hair   Findings Pt completed in stance at sink   Grooming (FIM) 7 - No helper, safely, timely and completes all tasks independently   QI: Shower/Bathe Self   Assistance Needed Set-up / clean-up   Shower/Bathe Self CARE Score 5   Bathing   Assessed Bath Style Sponge Bath   Anticipated D/C Bath Style Shower;Sponge Bath   Able to [employer] Temperature Yes   Able to Wash/Rinse/Dry (body part) Left Arm;Right Arm;L Upper Leg;R Upper Leg;L Lower Leg/Foot;R Lower Leg/Foot;Chest;Abdomen;Perineal Area; Buttocks   Positioning Seated;Standing   Findings  Pt utilizing LHS to complete LB bathing to adhere to THP  Pt edu on hook method to dry b/l LEs  pt req s/u of items only in which pt's spouse will provide at d/c   Bathing (FIM) 5 - [place] sets up supplies or applies device but patient completes 10/10 parts   Tub/Shower Transfer   Shower Transfer (FIM) 5 - Patient requires supervision/monitoring   QI: Upper Body Dressing   Assistance Needed Independent   Upper Body Dressing CARE Score 6   QI: [address]; Adaptive equipment   Lower Body Dressing CARE Score 6   QI: Putting On/Taking Off Footwear   Assistance Needed Physical assistance   Assistance Provided by [place] Less than 25%   Putting On/Taking Off Footwear CARE Score 3   Dressing/Undressing Clothing   Able to  [facility] Clothes Pullover Shirt   Remove LB Clothes Pants; Undergarment;Socks   Don  [address]; Undergarment;Socks; Shoes   Findings A to don ace wrap to LLE only   UB Dressing (FIM) 7 - No helper, safely, timely and completes all tasks independently   LB Dressing (FIM) 5 - [place] sets up supplies or applies device   QI: Roll Left and Right   Assistance Needed Independent   Roll Left and Right CARE Score 6   QI: Sit to [address] to Lying CARE Score 6   QI: Lying to Sitting on Side of Bed   Assistance Needed Independent   Lying to Sitting on Side of Bed CARE Score 6   QI: Sit to [address]; Adaptive equipment   Sit to Stand CARE Score 6   QI: Chair/Bed-to-Chair Transfer   Assistance Needed Independent; Adaptive equipment   Chair/Bed-to-Chair Transfer CARE Score 6   Transfer Bed/Chair/Wheelchair   Findings use of RW   Bed, Chair, Wheelchair Transfer (FIM) 6 - Patient requires assistive device/extra time/safety concerns but completes independently   QI: [address]  Score 6   Toileting   Able to Pull Clothing down yes, up yes  Able to [address] Yes   Findings urinated in stance at toilet   Toileting (FIM) 7 - No helper, safely, timely and completes all tasks independently   QI: Toilet Transfer   Assistance Needed Independent; Adaptive equipment   Toilet Transfer CARE Score 6   Toilet Transfer   Toilet Transfer (FIM) 6 - Patient requires assistive device/extra time/safety concerns but completes independently   Assessment   Treatment Assessment Upon OT arrival, pt demo inc fear, worry, frustrations, and verbalized inc fatigue  Pt reports "I did not sleep well at all last night, or any night really since Mackenzie been here"   Pt then ongoing regarding medications, equipment, and other pt perceived issues he was anxious to discuss   OT spoke with pt to calm and to redirect and develop appropriate solutions/resolutions to all of pt's concerns  Pt able to be consoled and demo inc confidence and dec worry  Pt completed ADL routine at overall mod Ilevel with exception to set up assist for bathing tasks  Pt not agreeable to shower this am however completed sponge bathing routine with use of LHAE  Pt appropriate for d/c home at current level of fxn with current support in place   OT Therapy Minutes   OT Time In 0700   OT Time Out 0830   OT Total Time (minutes) 90   OT Mode of treatment - Individual (minutes) 75   OT Mode of treatment - Concurrent (minutes) 15   OT Mode of treatment - Group (minutes) 0   OT Mode of treatment - Co-treat (minutes) 0   OT Mode of Teatment - Total time(minutes) 90 minutes   Therapy Time missed   Time missed?  No

## 2019-08-14 NOTE — PHYSICAL THERAPY NOTE
[facility] PT Discharge Summary  Pt demonstrated good progress in PT  At time of d/c pt demonstrated mod indep with transfers using RW and leg  (bed/car transfer) and ambulation task using RW up to 200' while S for longer distances with uneven terrain and negotiating curb/ramp with RW or 6 steps using bilat HR  Family training with pt's wife for car transfer completed prior to d/c as well  DME for bariatric RW per pt request (aware of extra cost) submitted to CM and issued to pt prior to d/c  Also given waffle cushion for added height/comfort on car seat  Pt was given a HEP while awaiting outpatient PT appt with good carry over of exercise technique while maintaining THPs on R LE  Pt was d/c on 8/14/2019 to home with family support and outpatient PT services to address ongoing rehab needs

## 2019-08-18 NOTE — OCCUPATIONAL THERAPY NOTE
Pt discharge home from [facility] on 8/15/2019 at overall MOD I  level for xfers,  MOD I level for ADL fxn, and MOD I level for IADL tasks with use of RW shower chair  Pt discharged home with family support  Pt demo good progress in skilled OT  OT recommending use of DME/AE as mentioned and pt/family received and/or purchased prior to d/c for home use  Family training not necessary prior to d/c and completed to maximize indep and safety with ADL/IADL fxn at home environment  Pt safe to d/c home at current level of function and continues to present with deficits of decreased endurance, pain and ortheopedic restrictions  Pt with no further OT needs

## 2019-08-20 ENCOUNTER — HOSPITAL ENCOUNTER (OUTPATIENT)
Dept: RADIOLOGY | Facility: HOSPITAL | Age: 69
Discharge: HOME | End: 2019-08-20
Attending: ORTHOPAEDIC SURGERY
Payer: MEDICARE

## 2019-08-20 ENCOUNTER — OFFICE VISIT (OUTPATIENT)
Dept: OBGYN CLINIC | Facility: HOSPITAL | Age: 69
End: 2019-08-20

## 2019-08-20 VITALS
SYSTOLIC BLOOD PRESSURE: 149 MMHG | HEART RATE: 88 BPM | BODY MASS INDEX: 37.11 KG/M2 | WEIGHT: 279.98 LBS | HEIGHT: 73 IN | DIASTOLIC BLOOD PRESSURE: 79 MMHG

## 2019-08-20 DIAGNOSIS — Z47.1 AFTERCARE FOLLOWING RIGHT HIP JOINT REPLACEMENT SURGERY: ICD-10-CM

## 2019-08-20 DIAGNOSIS — Z96.641 STATUS POST RIGHT HIP REPLACEMENT: Primary | ICD-10-CM

## 2019-08-20 DIAGNOSIS — Z96.641 AFTERCARE FOLLOWING RIGHT HIP JOINT REPLACEMENT SURGERY: ICD-10-CM

## 2019-08-20 PROCEDURE — 73502 X-RAY EXAM HIP UNI 2-3 VIEWS: CPT

## 2019-08-20 PROCEDURE — 99024 POSTOP FOLLOW-UP VISIT: CPT | Performed by: ORTHOPAEDIC SURGERY

## 2019-08-20 RX ORDER — CEPHALEXIN 500 MG/1
500 CAPSULE ORAL ONCE
Qty: 4 CAPSULE | Refills: 3 | Status: SHIPPED | OUTPATIENT
Start: 2019-08-20 | End: 2019-08-20

## 2019-08-20 NOTE — PROGRESS NOTES
Assessment:  1  Status post right hip replacement  cephalexin (KEFLEX) 500 mg capsule     Patient Active Problem List   Diagnosis    Primary osteoarthritis of both knees    Osgood-Schlatter's disease of both knees    Pain in both knees    Status post right hip replacement    Status post total hip replacement, left    Acute blood loss anemia    Impaired mobility and ADLs    Hypothyroid    Difficulty sleeping           Plan      Continue in PT  Hip precautions reviewed again today  Dental prophylaxis referred to pharmacy  Follow up in 4 weeks  Subjective:     Patient ID:    Chief Complaint:Solis Carbajal Edu 68 y o  male      HPI    47-year-old male who is 2 weeks status post right total hip arthroplasty presents for routine postoperative check today  He is ambulating with his walker  He feels very well and has no complaints        The following portions of the patient's history were reviewed and updated as appropriate: allergies, current medications, past family history, past social history, past surgical history and problem list       Social History     Socioeconomic History    Marital status: /Civil Union     Spouse name: Not on file    Number of children: Not on file    Years of education: Not on file    Highest education level: Not on file   Occupational History    Not on file   Social Needs    Financial resource strain: Not on file    Food insecurity:     Worry: Not on file     Inability: Not on file    Transportation needs:     Medical: Not on file     Non-medical: Not on file   Tobacco Use    Smoking status: Former Smoker    Smokeless tobacco: Never Used   Substance and Sexual Activity    Alcohol use: Never     Frequency: Never     Drinks per session: Patient refused     Binge frequency: Never    Drug use: Never    Sexual activity: Not on file   Lifestyle    Physical activity:     Days per week: Not on file     Minutes per session: Not on file    Stress: Not on file   Relationships    Social connections:     Talks on phone: Not on file     Gets together: Not on file     Attends Jewish service: Not on file     Active member of club or organization: Not on file     Attends meetings of clubs or organizations: Not on file     Relationship status: Not on file    Intimate partner violence:     Fear of current or ex partner: Not on file     Emotionally abused: Not on file     Physically abused: Not on file     Forced sexual activity: Not on file   Other Topics Concern    Not on file   Social History Narrative    Not on file     Past Medical History:   Diagnosis Date    Hyperthyroidism     Osteoarthritis     last assesed 6-6-16    Polyneuropathy     last assesed 5-8-17    Pure hypercholesterolemia     Wears glasses      Past Surgical History:   Procedure Laterality Date    BACK SURGERY      CHOLECYSTECTOMY      DC TOTAL HIP ARTHROPLASTY Right 8/5/2019    Procedure: ARTHROPLASTY HIP TOTAL;  Surgeon: Sulema Escobar MD;  [facility]: [facility];  Service: Orthopedics    TONSILLECTOMY       No Known Allergies  Current Outpatient Medications on File Prior to Visit   Medication Sig Dispense Refill    acetaminophen (TYLENOL) 325 mg tablet Take 650 mg by mouth every 6 (six) hours as needed for mild pain      atorvastatin (LIPITOR) 10 mg tablet atorvastatin 10 mg tablet      docusate sodium (COLACE) 100 mg capsule Take 1 capsule (100 mg total) by mouth 2 (two) times a day 30 capsule 0    enoxaparin (LOVENOX) 40 mg/0 4 mL Inject 0 4 mL (40 mg total) under the skin daily in the early morning for 28 days Provided preop to be used after the surgery Do not start or use this medication prior to your operation 28 Syringe 0    levothyroxine 150 mcg tablet levothyroxine 150 mcg tablet      lidocaine (LIDODERM) 5 % Apply 1 patch topically daily Remove & Discard patch within 12 hours or as directed by MD 30 patch 0    melatonin 3 mg Take 1 tablet (3 mg total) by mouth daily at bedtime 30 tablet 0    polyethylene glycol (MIRALAX) 17 g packet Take 17 g by mouth daily as needed (constipation) 14 each 0    senna (SENOKOT) 8 6 mg Take 1 tablet (8 6 mg total) by mouth daily 30 each 0    TURMERIC PO daily        No current facility-administered medications on file prior to visit  Objective:        Right Hip Exam     Comments:  Right hip incision is clean and dry                    Portions of the record may have been created with voice recognition software   Occasional wrong word or "sound a like" substitutions may have occurred due to the inherent limitations of voice recognition software   Read the chart carefully and recognize, using context, where substitutions have occurred

## 2019-08-26 ENCOUNTER — EVALUATION (OUTPATIENT)
Dept: PHYSICAL THERAPY | Facility: CLINIC | Age: 69
End: 2019-08-26
Payer: MEDICARE

## 2019-08-26 DIAGNOSIS — M25.551 RIGHT HIP PAIN: Primary | ICD-10-CM

## 2019-08-26 DIAGNOSIS — Z96.641 STATUS POST TOTAL HIP REPLACEMENT, RIGHT: ICD-10-CM

## 2019-08-26 PROCEDURE — 97530 THERAPEUTIC ACTIVITIES: CPT | Performed by: PHYSICAL THERAPIST

## 2019-08-26 PROCEDURE — 97162 PT EVAL MOD COMPLEX 30 MIN: CPT | Performed by: PHYSICAL THERAPIST

## 2019-08-26 PROCEDURE — 97140 MANUAL THERAPY 1/> REGIONS: CPT | Performed by: PHYSICAL THERAPIST

## 2019-08-26 NOTE — PROGRESS NOTES
PT Evaluation     Today's date: 2019  Patient name: Daniela Sandoval  : 1950  MRN: [id number]  Referring provider: Mis Good  Dx:   Encounter Diagnosis     ICD-10-CM    1  Right hip pain M25 551    2  Status post total hip replacement, right [id number] [id number]                   Assessment  Assessment details: Pt demonstrated decreased strength, balance, functional mobility, hamstring flexibility and pain  Pt will benefit from PT to address impairments and restore function  Impairments: abnormal gait, abnormal or restricted ROM, abnormal movement, activity intolerance, impaired physical strength, lacks appropriate home exercise program, pain with function and safety issue    Goals  ST-4 weeks  1   Pt will decrease pain > 25%  2   Pt will d/c FWW   3   Pt will increase hip flexion MMT > 3+/5  LT-8 weeks  1   Pt will decrease pain > 50%  2   Pt will d/c AD  3   Pt will ascend 2 flights of stairs with a reciprocal gait pattern  Plan  Planned therapy interventions: manual therapy, abdominal trunk stabilization, neuromuscular re-education, patient education, postural training, body mechanics training, strengthening, stretching, therapeutic activities, therapeutic exercise, therapeutic training, flexibility, functional ROM exercises, gait training and home exercise program  Frequency: 2x week  Duration in weeks: 8        Subjective Evaluation    History of Present Illness  Date of surgery: 2019  Mechanism of injury: Pt is a 68 yom s/p a R PEGGY with a posterior approach on 19  Pt discharged from hospital to a rehab facility for 8 days    Pain  Current pain ratin  At best pain ratin  At worst pain ratin  Quality: dull ache  Relieving factors: rest  Aggravating factors: walking, standing and sitting    Social Support  Steps to enter house: yes (1 step with a wide platform and no railings )  Stairs in house: yes (13 steps, B railing to basement where laundry is )   Lives in: one-story house  Lives with: spouse    Employment status: not working  Patient Goals  Patient goals for therapy: decreased edema, decreased pain, improved balance, increased motion, increased strength, independence with ADLs/IADLs and return to sport/leisure activities          Objective     Passive Range of Motion     Right Hip   Flexion: 90 degrees   Abduction: 20 degrees     Additional Passive Range of Motion Details  PSLR: R: 32 deg  Gait: severe rounded shoulders and lumbar flexion with a FWW  STS: Max UE assist   Stairs: 4 inch steps with B railing 1x5 leading with the R   Balance: SLS: R: 15 seconds  Transfers: min LE assist with supine to sit  Strength/Myotome Testing     Right Hip   Planes of Motion   Flexion: 3  Extension: 4-  Abduction: 3+  Adduction: 3+             Precautions:   Standard R hip precautions  PMHx: hypothyroidism, B Osgood-Schlatter's, L4-5 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'  Dx: R PEGGY 8/5/19      Manual  8/22            R hip PROM 7 min            R HA stretching 15"x3                                                       Exercise Diary  8/22            Gait training 1x50"FWW, 1x50 ft SPC            Step-ups FWW 4"/ 1x5            Mini-suats FWW 1x10            Standing Hip ABD AROM 1x10            Balance: SLS 1x20" ea            SLR 1x3            Bridges 1x10            Supine HA stretch 15"x3                                                                                                                                                                                         Modalities

## 2019-08-29 ENCOUNTER — OFFICE VISIT (OUTPATIENT)
Dept: PHYSICAL THERAPY | Facility: CLINIC | Age: 69
End: 2019-08-29
Payer: MEDICARE

## 2019-08-29 DIAGNOSIS — Z96.641 STATUS POST TOTAL HIP REPLACEMENT, RIGHT: ICD-10-CM

## 2019-08-29 DIAGNOSIS — M25.551 RIGHT HIP PAIN: Primary | ICD-10-CM

## 2019-08-29 PROCEDURE — 97116 GAIT TRAINING THERAPY: CPT | Performed by: PHYSICAL THERAPIST

## 2019-08-29 PROCEDURE — 97140 MANUAL THERAPY 1/> REGIONS: CPT | Performed by: PHYSICAL THERAPIST

## 2019-08-29 PROCEDURE — 97110 THERAPEUTIC EXERCISES: CPT | Performed by: PHYSICAL THERAPIST

## 2019-08-29 NOTE — PROGRESS NOTES
Daily Note     Today's date: 2019  Patient name: Merrily Burkitt  : 1950  MRN: [id number]  Referring provider: Sofiya Abbott  Dx:   Encounter Diagnosis     ICD-10-CM    1  Right hip pain M25 551    2  Status post total hip replacement, right Z96 641                   Subjective: Pt reports no pain  Objective: See treatment diary below    Assessment: Pt demonstrated extreme difficulty with R SLS secondary to R knee OA and instability  During gait training, but demonstrated mod L trunk lean during L stance without an AD  Plan: Cont POC  Precautions:   Standard R hip precautions  PMHx: hypothyroidism, B Osgood-Schlatter's, L4-5 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'  Dx: R PEGGY 19      Manual             R hip PROM 7 min 7 min           R HA stretching 15"x3 15"x3                                                      Exercise Diary             Gait training 1x50"FWW, 1x50 ft SPC SPC 1x400 ft, no AD 1x100 ft           Step-ups FWW 4"/ 1x5 FWW 4"/ L railing 1x5           Mini-suats FWW 1x10 UE assist 2x10           Standing Hip ABD AROM 1x10 2x10           Balance: SLS 1x20" ea 2x20" ea           SLR 1x3 3x5           Bridges 1x10 2x10           Supine HA stretch 15"x3 15"x3           LAQ  5#/ 2x10           SAQ  5#/ 2x10                                                                                                                                                              Modalities

## 2019-09-03 ENCOUNTER — OFFICE VISIT (OUTPATIENT)
Dept: PHYSICAL THERAPY | Facility: CLINIC | Age: 69
End: 2019-09-03
Payer: MEDICARE

## 2019-09-03 DIAGNOSIS — M25.551 RIGHT HIP PAIN: Primary | ICD-10-CM

## 2019-09-03 DIAGNOSIS — Z96.641 STATUS POST TOTAL HIP REPLACEMENT, RIGHT: ICD-10-CM

## 2019-09-03 PROCEDURE — 97110 THERAPEUTIC EXERCISES: CPT | Performed by: PHYSICAL THERAPIST

## 2019-09-03 PROCEDURE — 97530 THERAPEUTIC ACTIVITIES: CPT | Performed by: PHYSICAL THERAPIST

## 2019-09-03 PROCEDURE — 97116 GAIT TRAINING THERAPY: CPT | Performed by: PHYSICAL THERAPIST

## 2019-09-03 NOTE — PROGRESS NOTES
Daily Note     Today's date: 9/3/2019  Patient name: Sarah Ibarra  : 1950  MRN: [id number]  Referring provider: Analy Yu  Dx:   Encounter Diagnosis     ICD-10-CM    1  Right hip pain M25 551    2  Status post total hip replacement, right [id number] [id number]                   Subjective: Pt reports no R hip pain, but reports a 5/10 R knee pain  Pt requests not performing a SLS with the [employer] secondary to R knee pain  Objective: See treatment diary below  Assessment: Pt was limited with stairs and ambulation secondary to R knee pain  Plan: Cont POC  Precautions:   Standard R hip precautions  PMHx: hypothyroidism, B Osgood-Schlatter's, L4-5 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'  Dx: R PEGGY 19      Manual  8/22 8/29 9/3          R hip PROM 7 min 7 min 7 min          R HA stretching 15"x3 15"x3 15"x3                                                     Exercise Diary  8/22 8/29 9/3          Gait training 1x50"FWW, 1x50 ft SPC SPC 1x400 ft, no AD 1x100 ft No AD 1x200 ft          Step-ups FWW 4"/ 1x5 FWW 4"/ L railing 1x5 Railing 4"/ 1x5 railing, 1x5 no railing, 6"/ 1x4 railing          Mini-suats FWW 1x10 UE assist 2x10 UE assist 2x10          Standing Hip ABD AROM 1x10 2x10 2x10          Balance: SLS 1x20" ea 2x20" ea np          SLR 1x3 3x5 3x6          Bridges 1x10 2x10 2x10          Supine HA stretch 15"x3 15"x3           LAQ  5#/ 2x10 5#/ 3x10          SAQ  5#/ 2x10 5#/ 3x10          Biodex: FT EO balance   L6/ 1x90"          Bi[employer]: limits of stability FT    L11 beginner 1 x 90"          SL leg press machine   40#/ 2x10                                                                                                                      Modalities

## 2019-09-05 ENCOUNTER — OFFICE VISIT (OUTPATIENT)
Dept: PHYSICAL THERAPY | Facility: CLINIC | Age: 69
End: 2019-09-05
Payer: MEDICARE

## 2019-09-05 DIAGNOSIS — M25.551 RIGHT HIP PAIN: Primary | ICD-10-CM

## 2019-09-05 DIAGNOSIS — Z96.641 STATUS POST TOTAL HIP REPLACEMENT, RIGHT: ICD-10-CM

## 2019-09-05 PROCEDURE — 97110 THERAPEUTIC EXERCISES: CPT | Performed by: PHYSICAL THERAPIST

## 2019-09-05 PROCEDURE — 97116 GAIT TRAINING THERAPY: CPT | Performed by: PHYSICAL THERAPIST

## 2019-09-05 PROCEDURE — 97530 THERAPEUTIC ACTIVITIES: CPT | Performed by: PHYSICAL THERAPIST

## 2019-09-05 NOTE — PROGRESS NOTES
Daily Note     Today's date: 2019  Patient name: Argelia Puri  : 1950  MRN: [id number]  Referring provider: Betzaida Crane  Dx:   Encounter Diagnosis     ICD-10-CM    1  Right hip pain M25 551    2  Status post total hip replacement, right Z96 641                   Subjective: Pt reports no R hip pain, but reports a 5/10 R knee pain  Pt being able to ambulate on the grass with a SPC today  Pt requests to hold Biodex machine  Objective: See treatment diary below  Assessment: Pt's stair tolerance and SLS balance was limited secondary to R knee instability and pain  Plan: Cont POC  Precautions:   Standard R hip precautions  PMHx: hypothyroidism, B Osgood-Schlatter's, L4-5 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'  Dx: R PEGGY 19      Manual  8/22 8/29 9/3 9/5         R hip PROM 7 min 7 min 7 min 7 min         R HA stretching 15"x3 15"x3 15"x3 15"x3                                                    Exercise Diary  8/22 8/29 9/3 9/5         Gait training 1x50"FWW, 1x50 ft SPC SPC 1x400 ft, no AD 1x100 ft No AD 1x200 ft No AD 1x250 ft         Step-ups FWW 4"/ 1x5 FWW 4"/ L railing 1x5 Railing 4"/ 1x5 railing, 1x5 no railing, 6"/ 1x4 railing Railing 4"/1x5, railing 6"/ 1x5         Mini-suats FWW 1x10 UE assist 2x10 UE assist 2x10 UE assist 2x10         Standing Hip ABD AROM 1x10 2x10 2x10 3x12         Balance: SLS 1x20" ea 2x20" ea np          SLR 1x3 3x5 3x6 3x8         Bridges 1x10 2x10 2x10 3x10         Supine HA stretch 15"x3 15"x3           LAQ  5#/ 2x10 5#/ 3x10 5#/ 3x10         SAQ  5#/ 2x10 5#/ 3x10 5#/ 3x10         Biodex: FT EO balance   L6/ 1x90"          Biodex: limits of stability FT    L11 beginner 1 x 90"          SL leg press machine   40#/ 2x10 70#/ 3x10                                                                                                                     Modalities

## 2019-09-09 ENCOUNTER — OFFICE VISIT (OUTPATIENT)
Dept: PHYSICAL THERAPY | Facility: CLINIC | Age: 69
End: 2019-09-09
Payer: MEDICARE

## 2019-09-09 DIAGNOSIS — Z96.641 STATUS POST TOTAL HIP REPLACEMENT, RIGHT: ICD-10-CM

## 2019-09-09 DIAGNOSIS — M25.551 RIGHT HIP PAIN: Primary | ICD-10-CM

## 2019-09-09 PROCEDURE — 97110 THERAPEUTIC EXERCISES: CPT | Performed by: PHYSICAL THERAPIST

## 2019-09-09 PROCEDURE — 97530 THERAPEUTIC ACTIVITIES: CPT | Performed by: PHYSICAL THERAPIST

## 2019-09-09 PROCEDURE — 97116 GAIT TRAINING THERAPY: CPT | Performed by: PHYSICAL THERAPIST

## 2019-09-09 NOTE — PROGRESS NOTES
Daily Note     Today's date: 2019  Patient name: Argelia Puri  : 1950  MRN: [id number]  Referring provider: Betzaida Crane  Dx:   Encounter Diagnosis     ICD-10-CM    1  Right hip pain M25 551    2  Status post total hip replacement, right Z96 641                   Subjective: Pt reports no R hip pain, but reports a 5/10 R knee pain  Pt being able to ambulate on the grass with a SPC today  Pt requests to hold Biodex machine  Objective: See treatment diary below  Assessment: Pt's stair tolerance and SLS balance was limited secondary to R knee instability and pain  Plan: Cont POC  Precautions:   Standard R hip precautions  PMHx: hypothyroidism, B Osgood-Schlatter's, L4-5 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'  Dx: R PEGGY 19      Manual  8/22 8/29 9/3 9/5 9/9        R hip PROM 7 min 7 min 7 min 7 min 7 min        R HA stretching 15"x3 15"x3 15"x3 15"x3 15"x3                                                   Exercise Diary  8/22 8/29 9/3 9/5 9/9        Gait training 1x50"FWW, 1x50 ft SPC SPC 1x400 ft, no AD 1x100 ft No AD 1x200 ft No AD 1x250 ft TM 10 min x 1 3 mph        Step-ups FWW 4"/ 1x5 FWW 4"/ L railing 1x5 Railing 4"/ 1x5 railing, 1x5 no railing, 6"/ 1x4 railing Railing 4"/1x5, railing 6"/ 1x5 Railing 6"/ 1x10        Mini-suats FWW 1x10 UE assist 2x10 UE assist 2x10 UE assist 2x10 UE assist 2x10        Standing Hip ABD AROM 1x10 2x10 2x10 3x12 1 5#/ 3x10        Balance: SLS 1x20" ea 2x20" ea np          SLR 1x3 3x5 3x6 3x8 3x8        Bridges 1x10 2x10 2x10 3x10 3x10        Supine HA stretch 15"x3 15"x3   15"x3        LAQ  5#/ 2x10 5#/ 3x10 5#/ 3x10         SAQ  5#/ 2x10 5#/ 3x10 5#/ 3x10         Biodex: FT EO balance   L6/ 1x90"          Biodex: limits of stability FT    L11 beginner 1 x 90"          SL leg press machine   40#/ 2x10 70#/ 3x10 70#/ 3x10                                                                                                                    Modalities

## 2019-09-11 ENCOUNTER — TELEPHONE (OUTPATIENT)
Dept: OBGYN CLINIC | Facility: HOSPITAL | Age: 69
End: 2019-09-11

## 2019-09-11 NOTE — TELEPHONE ENCOUNTER
Pt had RTHA on 8/5  Contacted me today reports no hip pain, but reports "twisting" the wrong way this morning and since having back pain  Pt questioned whether he could take motrin or aleve  Pt reports being off of Lovenox injections since early September  Pt was told as long as he is off the lovenox (due to blood thinning) he can take the Motrin as box recommends  Pt reports hip is doing well, reports walking 1/4 mile a day  pt encouraged to call me with questions, concerns or issues

## 2019-09-12 ENCOUNTER — OFFICE VISIT (OUTPATIENT)
Dept: PHYSICAL THERAPY | Facility: CLINIC | Age: 69
End: 2019-09-12
Payer: MEDICARE

## 2019-09-12 DIAGNOSIS — M25.551 RIGHT HIP PAIN: Primary | ICD-10-CM

## 2019-09-12 DIAGNOSIS — Z96.641 STATUS POST TOTAL HIP REPLACEMENT, RIGHT: ICD-10-CM

## 2019-09-12 PROCEDURE — 97110 THERAPEUTIC EXERCISES: CPT | Performed by: PHYSICAL THERAPIST

## 2019-09-12 PROCEDURE — 97116 GAIT TRAINING THERAPY: CPT | Performed by: PHYSICAL THERAPIST

## 2019-09-12 NOTE — PROGRESS NOTES
Daily Note     Today's date: 2019  Patient name: Paty Carter  : 1950  MRN: [id number]  Referring provider: Devan Martins  Dx:   Encounter Diagnosis     ICD-10-CM    1  Right hip pain M25 551    2  Status post total hip replacement, right Z96 641                   Subjective: Pt reports his threw his back out yesterday  Pt denies radicular sx  Pt refused biodex or balance  Objective: See treatment diary below  Assessment: Pt was limited secondary to LBP  Plan: Cont POC  Precautions:   Standard R hip precautions  PMHx: hypothyroidism, B Osgood-Schlatter's, L4-5 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'  Dx: R PEGGY 19      Manual  8/22 8/29 9/3 9/5 9/9 9/12       R hip PROM 7 min 7 min 7 min 7 min 7 min 7 min       R HA stretching 15"x3 15"x3 15"x3 15"x3 15"x3 15"x3                                                  Exercise Diary  8/22 8/29 9/3 9/5 9/9 9/12       Gait training 1x50"FWW, 1x50 ft SPC SPC 1x400 ft, no AD 1x100 ft No AD 1x200 ft No AD 1x250 ft TM 10 min x 1 3 mph TM 8 min x 1 1-1 3 mph       Step-ups FWW 4"/ 1x5 FWW 4"/ L railing 1x5 Railing 4"/ 1x5 railing, 1x5 no railing, 6"/ 1x4 railing Railing 4"/1x5, railing 6"/ 1x5 Railing 6"/ 1x10        Mini-suats FWW 1x10 UE assist 2x10 UE assist 2x10 UE assist 2x10 UE assist 2x10 U assist 2x10       Standing Hip ABD AROM 1x10 2x10 2x10 3x12 1 5#/ 3x10 2x15       Balance: SLS 1x20" ea 2x20" ea np          SLR 1x3 3x5 3x6 3x8 3x8 3x10       Bridges 1x10 2x10 2x10 3x10 3x10 held       Supine HA stretch 15"x3 15"x3   15"x3 15"x3       LAQ  5#/ 2x10 5#/ 3x10 5#/ 3x10         SAQ  5#/ 2x10 5#/ 3x10 5#/ 3x10         Biodex: FT EO balance   L6/ 1x90"          Biodex: limits of stability FT    L11 beginner 1 x 90"          SL leg press machine   40#/ 2x10 70#/ 3x10 70#/ 3x10                                                                                                                    Modalities

## 2019-09-16 ENCOUNTER — OFFICE VISIT (OUTPATIENT)
Dept: PODIATRY | Facility: CLINIC | Age: 69
End: 2019-09-16

## 2019-09-16 VITALS
SYSTOLIC BLOOD PRESSURE: 145 MMHG | DIASTOLIC BLOOD PRESSURE: 85 MMHG | BODY MASS INDEX: 37.25 KG/M2 | HEIGHT: 72 IN | WEIGHT: 275 LBS | HEART RATE: 83 BPM

## 2019-09-16 DIAGNOSIS — L60.3 NAIL DYSTROPHY: Primary | ICD-10-CM

## 2019-09-16 PROCEDURE — NCFTCARE PR NON-COVERED FOOT CARE: Performed by: PODIATRIST

## 2019-09-16 NOTE — PROGRESS NOTES
Patient presents for palliative nail care  No acute disorder noted  Pedal pulses are within normal limits  Patient cannot bend to cut them due to recent right hip replacement  Treatment consisted of nail trimming

## 2019-09-17 ENCOUNTER — OFFICE VISIT (OUTPATIENT)
Dept: PHYSICAL THERAPY | Facility: CLINIC | Age: 69
End: 2019-09-17
Payer: MEDICARE

## 2019-09-17 DIAGNOSIS — Z96.641 STATUS POST TOTAL HIP REPLACEMENT, RIGHT: ICD-10-CM

## 2019-09-17 DIAGNOSIS — M25.551 RIGHT HIP PAIN: Primary | ICD-10-CM

## 2019-09-17 PROCEDURE — 97110 THERAPEUTIC EXERCISES: CPT | Performed by: PHYSICAL THERAPIST

## 2019-09-17 PROCEDURE — 97140 MANUAL THERAPY 1/> REGIONS: CPT | Performed by: PHYSICAL THERAPIST

## 2019-09-17 PROCEDURE — 97116 GAIT TRAINING THERAPY: CPT | Performed by: PHYSICAL THERAPIST

## 2019-09-17 NOTE — PROGRESS NOTES
Daily Note     Today's date: 2019  Patient name: Kaley Ba  : 1950  MRN: [id number]  Referring provider: Elio Aguilar  Dx:   Encounter Diagnosis     ICD-10-CM    1  Right hip pain M25 551    2  Status post total hip replacement, right Z96 641                   Subjective: Pt reports improved LBP, no hip pain  Objective: See treatment diary below  Hook lying PSLR: R: 50 deg, R hip PROM: WNL within precautions  Assessment: Pt demonstrated improved ambulation speed and tolerance but was limited secondary to R knee pain  Plan: Cont POC  Precautions:   Standard R hip precautions  PMHx: hypothyroidism, B Osgood-Schlatter's, L4-5 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'  Dx: R PEGGY 19      Manual  8/22 8/29 9/3 9/5 9/9 9/12 9/17      R hip PROM 7 min 7 min 7 min 7 min 7 min 7 min 7 min      R HA stretching 15"x3 15"x3 15"x3 15"x3 15"x3 15"x3 15"x3                                                 Exercise Diary  8/22 8/29 9/3 9/5 9/9 9/12 9/17      Gait training 1x50"FWW, 1x50 ft SPC SPC 1x400 ft, no AD 1x100 ft No AD 1x200 ft No AD 1x250 ft TM 10 min x 1 3 mph TM 8 min x 1 1-1 3 mph TM 8 min x 1 3-1 4 mph      Step-ups FWW 4"/ 1x5 FWW 4"/ L railing 1x5 Railing 4"/ 1x5 railing, 1x5 no railing, 6"/ 1x4 railing Railing 4"/1x5, railing 6"/ 1x5 Railing 6"/ 1x10        Mini-suats FWW 1x10 UE assist 2x10 UE assist 2x10 UE assist 2x10 UE assist 2x10 U assist 2x10 UE assist 2x10      Standing Hip ABD AROM 1x10 2x10 2x10 3x12 1 5#/ 3x10 2x15 1 5#/ 3x10      Balance: SLS 1x20" ea 2x20" ea np          SLR 1x3 3x5 3x6 3x8 3x8 3x10 3x10      Bridges 1x10 2x10 2x10 3x10 3x10 held 3x10      Supine HA stretch 15"x3 15"x3   15"x3 15"x3 15"x3      LAQ  5#/ 2x10 5#/ 3x10 5#/ 3x10         SAQ  5#/ 2x10 5#/ 3x10 5#/ 3x10         Biodex: FT EO balance   L6/ 1x90"          Biodex: limits of stability FT    L11 beginner 1 x 90"          SL leg press machine   40#/ 2x10 70#/ 3x10 70#/ 3x10  70#/ 3x10 Modalities

## 2019-09-19 ENCOUNTER — OFFICE VISIT (OUTPATIENT)
Dept: PHYSICAL THERAPY | Facility: CLINIC | Age: 69
End: 2019-09-19
Payer: MEDICARE

## 2019-09-19 DIAGNOSIS — Z96.641 STATUS POST TOTAL HIP REPLACEMENT, RIGHT: ICD-10-CM

## 2019-09-19 DIAGNOSIS — M25.551 RIGHT HIP PAIN: Primary | ICD-10-CM

## 2019-09-19 PROCEDURE — 97110 THERAPEUTIC EXERCISES: CPT | Performed by: PHYSICAL THERAPIST

## 2019-09-19 PROCEDURE — 97116 GAIT TRAINING THERAPY: CPT | Performed by: PHYSICAL THERAPIST

## 2019-09-19 NOTE — PROGRESS NOTES
Daily Note     Today's date: 2019  Patient name: Argelia Puri  : 1950  MRN: [id number]  Referring provider: Betzaida Crane  Dx:   Encounter Diagnosis     ICD-10-CM    1  Right hip pain M25 551    2  Status post total hip replacement, right Z96 641                   Subjective: Pt reports no hip pain, improved SL sleeping tolerance  Objective: See treatment diary below  Assessment: Pt demonstrated mod trunk lean during R stance  Plan: Cont POC  Precautions:   Standard R hip precautions  PMHx: hypothyroidism, B Osgood-Schlatter's, L4-5 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'  Dx: R PEGGY 19      Manual  8/22 8/29 9/3 9/5 9/9 9/12 9/17 9/19     R hip PROM 7 min 7 min 7 min 7 min 7 min 7 min 7 min 7 min     R HA stretching 15"x3 15"x3 15"x3 15"x3 15"x3 15"x3 15"x3 15"x3                                                Exercise Diary  8/22 8/29 9/3 9/5 9/9 9/12 9/17 9/19     Gait training 1x50"FWW, 1x50 ft SPC SPC 1x400 ft, no AD 1x100 ft No AD 1x200 ft No AD 1x250 ft TM 10 min x 1 3 mph TM 8 min x 1 1-1 3 mph TM 8 min x 1 3-1 4 mph TM 8 min x 1 5 mph     Step-ups FWW 4"/ 1x5 FWW 4"/ L railing 1x5 Railing 4"/ 1x5 railing, 1x5 no railing, 6"/ 1x4 railing Railing 4"/1x5, railing 6"/ 1x5 Railing 6"/ 1x10        Mini-suats FWW 1x10 UE assist 2x10 UE assist 2x10 UE assist 2x10 UE assist 2x10 U assist 2x10 UE assist 2x10 UE assist 2x10     Standing Hip ABD AROM 1x10 2x10 2x10 3x12 1 5#/ 3x10 2x15 1 5#/ 3x10 2#/ 3x10     Balance: SLS 1x20" ea 2x20" ea np          SLR 1x3 3x5 3x6 3x8 3x8 3x10 3x10 3x10     Bridges 1x10 2x10 2x10 3x10 3x10 held 3x10 3x10     Supine HA stretch 15"x3 15"x3   15"x3 15"x3 15"x3 15"x3     LAQ  5#/ 2x10 5#/ 3x10 5#/ 3x10    5#/ 3x12     SAQ  5#/ 2x10 5#/ 3x10 5#/ 3x10         Biodex: FT EO balance   L6/ 1x90"          Biodex: limits of stability FT    L11 beginner 1 x 90"          SL leg press machine   40#/ 2x10 70#/ 3x10 70#/ 3x10  70#/ 3x10 70#/ 3x10 Modalities

## 2019-09-23 ENCOUNTER — APPOINTMENT (OUTPATIENT)
Dept: PHYSICAL THERAPY | Facility: CLINIC | Age: 69
End: 2019-09-23
Payer: MEDICARE

## 2019-09-24 ENCOUNTER — HOSPITAL ENCOUNTER (OUTPATIENT)
Dept: RADIOLOGY | Facility: HOSPITAL | Age: 69
Discharge: HOME | End: 2019-09-24
Attending: ORTHOPAEDIC SURGERY
Payer: MEDICARE

## 2019-09-24 ENCOUNTER — OFFICE VISIT (OUTPATIENT)
Dept: OBGYN CLINIC | Facility: HOSPITAL | Age: 69
End: 2019-09-24

## 2019-09-24 VITALS
DIASTOLIC BLOOD PRESSURE: 87 MMHG | HEIGHT: 72 IN | WEIGHT: 275 LBS | HEART RATE: 88 BPM | BODY MASS INDEX: 37.25 KG/M2 | SYSTOLIC BLOOD PRESSURE: 153 MMHG

## 2019-09-24 DIAGNOSIS — M54.5 ACUTE LOW BACK PAIN, UNSPECIFIED BACK PAIN LATERALITY, WITH SCIATICA PRESENCE UNSPECIFIED: ICD-10-CM

## 2019-09-24 DIAGNOSIS — M54.5 ACUTE LOW BACK PAIN, UNSPECIFIED BACK PAIN LATERALITY, WITH SCIATICA PRESENCE UNSPECIFIED: Primary | ICD-10-CM

## 2019-09-24 PROCEDURE — 72100 X-RAY EXAM L-S SPINE 2/3 VWS: CPT

## 2019-09-24 PROCEDURE — 99024 POSTOP FOLLOW-UP VISIT: CPT | Performed by: ORTHOPAEDIC SURGERY

## 2019-09-24 RX ORDER — METHOCARBAMOL 750 MG/1
750 TABLET, FILM COATED ORAL EVERY 8 HOURS SCHEDULED
Qty: 30 TABLET | Refills: 1 | Status: SHIPPED | OUTPATIENT
Start: 2019-09-24 | End: 2020-10-12

## 2019-09-24 NOTE — PROGRESS NOTES
Subjective; Follow-up for right total hip replacement  This gentleman is approximately 6 weeks out from successful right total hip replacement  He enthusiastically reports that his hip is doing wonderful,   He then quickly adds that he has low back pain in a gentleman has a history for lumbar surgery 1987 and previous treatment for his back  He finds that he feels okay when physical therapy is being done and performed, however difficulty after its performance as well as during hours of sleep  He is accompanied by his wife for today's visit  There are no recent films of his lumbar sacral spine these were done at this time  Past Medical History:   Diagnosis Date    Hyperthyroidism     Osteoarthritis     last assesed 6-6-16    Polyneuropathy     last assesed 5-8-17    Pure hypercholesterolemia     Wears glasses        Past Surgical History:   Procedure Laterality Date    BACK SURGERY      CHOLECYSTECTOMY      DE TOTAL HIP ARTHROPLASTY Right 8/5/2019    Procedure: ARTHROPLASTY HIP TOTAL;  Surgeon: Hector Beach MD;  Location: [facility];  Service: Orthopedics    TONSILLECTOMY         Family History   Problem Relation Age of Onset    Arthritis Other        Social History     Tobacco Use    Smoking status: Former Smoker    Smokeless tobacco: Never Used   Substance Use Topics    Alcohol use: Never     Frequency: Never     Drinks per session: Patient refused     Binge frequency: Never    Drug use: Never     Exam;    His exam was performed standing and seated  Standing his pelvis was horizontal to the floor  His left knee was fully extended his right knee slightly flexed  He felt imbalanced when the right knee was fully extended  With the use of a shoe box he felt most comfortable when he was standing left shoe on a 3 /8 in buildup      Seated; he had no reproducible pain from the right hip  Right hip flexion to 90° internal and external rotation all done without pain    X-rays LS two views; show evidence of prior surgery there is degenerative disc at various lower lumbar levels most advanced at L4-5 and L5-S1 there is a grade 1 spondylolisthesis at L4-5 there is significant or varied evidence of foraminal and spinal stenosis 3/4,4/5, and 5/1    Impression;    Several weeks status post right total hip replacement  Onset or recurrence of lumbago  History of spinal surgery  Spinal spondylosis  Lumbar degenerative disc disease  Lumbar spinal stenosis    Plan;    His therapy was altered to include both lower extremities as well as respect his past history of lumbar surgery and lumbar spinal stenosis  He was offered and elected to receive a muscle relaxant in addition to the Naprosyn that he already takes  He was given a script for a 3 8 in heel lift as recommended by the attending surgeon for left shoe only  He intends to get this filled at [facility]     His entire experience was supervised by and plan formulated by the attending    It was my privilege to assist him in the delivery of this gentleman's care    We spent over 30 minutes with this patient today of which half was in verbal face-to-face consultation

## 2019-09-26 ENCOUNTER — APPOINTMENT (OUTPATIENT)
Dept: PHYSICAL THERAPY | Facility: CLINIC | Age: 69
End: 2019-09-26
Payer: MEDICARE

## 2019-09-30 ENCOUNTER — OFFICE VISIT (OUTPATIENT)
Dept: PHYSICAL THERAPY | Facility: CLINIC | Age: 69
End: 2019-09-30
Payer: MEDICARE

## 2019-09-30 DIAGNOSIS — M54.50 CHRONIC RIGHT-SIDED LOW BACK PAIN WITHOUT SCIATICA: ICD-10-CM

## 2019-09-30 DIAGNOSIS — G89.29 CHRONIC RIGHT-SIDED LOW BACK PAIN WITHOUT SCIATICA: ICD-10-CM

## 2019-09-30 DIAGNOSIS — Z96.641 STATUS POST TOTAL HIP REPLACEMENT, RIGHT: Primary | ICD-10-CM

## 2019-09-30 PROCEDURE — 97140 MANUAL THERAPY 1/> REGIONS: CPT | Performed by: PHYSICAL THERAPIST

## 2019-09-30 PROCEDURE — 97110 THERAPEUTIC EXERCISES: CPT | Performed by: PHYSICAL THERAPIST

## 2019-09-30 PROCEDURE — 97161 PT EVAL LOW COMPLEX 20 MIN: CPT | Performed by: PHYSICAL THERAPIST

## 2019-09-30 NOTE — PROGRESS NOTES
Daily Note     Today's date: 2019  Patient name: Shaggy Spencer  : 1950  MRN: [id number]  Referring provider: Maxine Foster  Dx:   Encounter Diagnosis     ICD-10-CM    1  Right hip pain M25 551    2  Status post total hip replacement, right Z96 641                   Subjective: Pt reports LBP is limiting his ability to walk, sit, lie supine  Pt presents with a script for LBP  Objective: See treatment diary below  Pt was issued a 3/8" L heel lift and reports mild relief  Pt was unable to tolerate R SL secondary to R hip soreness  Assessment: Pt presents with an acute exacerbation of chronic LBP with a mobilization with  R opening and stabilization preference  Plan: Cont POC  Precautions:   Standard R hip precautions  PMHx: hypothyroidism, B Osgood-Schlatter's, L4-5 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'  Dx: R PEGGY 19, Chronic LBP with a mobilization/stabilization preference      Manual              P-A Gr IV L1-S1 mobs 1x20 ea            Laser R L2-4 4000J                                                       Exercise Diary              R side glides 1x10            Standing lumbar ext 1x10            PPT 5"x10            PPT with partial crunch 1x10            bridges 2x10            Standing L hip ABD             Posture education 3 min

## 2019-09-30 NOTE — PROGRESS NOTES
PT Evaluation     Today's date: 2019  Patient name: Ursula Limon  : 1950  MRN: [id number]  Referring provider: Nimco Deal PA-C  Dx:   Encounter Diagnosis     ICD-10-CM    1  Status post total hip replacement, right Z96 641    2  Chronic right-sided low back pain without sciatica M54 5     G89 29                   Assessment  Assessment details: Pt presents with acute on chronic LBP with R opening mobility and stability preference  Pt will benefit from PT to address impairments and restore function  Impairments: abnormal gait, abnormal or restricted ROM, abnormal movement, activity intolerance, impaired physical strength, lacks appropriate home exercise program, pain with function, poor posture  and poor body mechanics    Goals  ST-4 weeks  1   Pt will decrease pain > 50%  2   Pt will be able to lie supine without pain  LT-8 weeks  1   Pt will decrease pain > 75%  2   Pt will be able to walk > 15 minutes without pain  Plan  Planned modality interventions: low level laser therapy  Planned therapy interventions: joint mobilization, abdominal trunk stabilization, neuromuscular re-education, strengthening, stretching, therapeutic activities, therapeutic exercise, flexibility, functional ROM exercises, gait training and home exercise program  Frequency: 2x week  Duration in weeks: 6        Subjective Evaluation    History of Present Illness  Mechanism of injury: Pt reports an acute exacerbation of chronic LBP that is worse on the R than L  Pt reports occasional vague radicular sx to the calf    Pain  Current pain ratin  At best pain ratin  At worst pain ratin  Quality: dull ache and sharp  Relieving factors: change in position      Diagnostic Tests  X-ray: abnormal (19: advaced lumbar degenerative changes )  Patient Goals  Patient goals for therapy: increased strength, decreased pain, improved balance, increased motion, decreased edema and independence with ADLs/IADLs          Objective     Active Range of Motion     Lumbar   Flexion:  Restriction level: moderate  Extension:  Restriction level: moderate    Joint Play     Hypomobile: L1, L2, L3, L4, L5 and S1     Pain: L3   Mechanical Assessment    Cervical      Thoracic    Lying extension: repeated movements  Pain location: no change    Lumbar    Standing flexion: repeated movements   Pain location:no change  Standing extension: repeated movements  Pain location: no change  Left Sidegliding: repeated movements  Pain location: no change  Right sidegliding: repeated movements  Pain location: no change  Pain intensity:better    Tests     Lumbar     Left   Negative crossed SLR and passive SLR  Right   Negative crossed SLR and passive SLR

## 2019-10-01 ENCOUNTER — APPOINTMENT (OUTPATIENT)
Dept: PHYSICAL THERAPY | Facility: CLINIC | Age: 69
End: 2019-10-01
Payer: MEDICARE

## 2019-10-03 ENCOUNTER — OFFICE VISIT (OUTPATIENT)
Dept: PHYSICAL THERAPY | Facility: CLINIC | Age: 69
End: 2019-10-03
Payer: MEDICARE

## 2019-10-03 DIAGNOSIS — M25.551 RIGHT HIP PAIN: ICD-10-CM

## 2019-10-03 DIAGNOSIS — M54.50 CHRONIC RIGHT-SIDED LOW BACK PAIN WITHOUT SCIATICA: Primary | ICD-10-CM

## 2019-10-03 DIAGNOSIS — G89.29 CHRONIC RIGHT-SIDED LOW BACK PAIN WITHOUT SCIATICA: Primary | ICD-10-CM

## 2019-10-03 DIAGNOSIS — Z96.641 STATUS POST TOTAL HIP REPLACEMENT, RIGHT: ICD-10-CM

## 2019-10-03 PROCEDURE — 97112 NEUROMUSCULAR REEDUCATION: CPT | Performed by: PHYSICAL THERAPIST

## 2019-10-03 PROCEDURE — 97110 THERAPEUTIC EXERCISES: CPT | Performed by: PHYSICAL THERAPIST

## 2019-10-03 NOTE — PROGRESS NOTES
Daily Note     Today's date: 10/3/2019  Patient name: Puneet Rosa  : 1950  MRN: [id number]  Referring provider: Stefano Bernheim  Dx:   Encounter Diagnosis     ICD-10-CM    1  Chronic right-sided low back pain without sciatica M54 5     G89 29    2  Status post total hip replacement, right Z96 641    3  Right hip pain M25 551                   Subjective: Pt reports fair compliance to HEP over the past few days  Pt reports the most pain relief with PPT  Objective: See treatment diary below  Assessment: Pt demonstrated poor core stability  Plan: Cont POC  Precautions:   Standard R hip precautions  PMHx: hypothyroidism, B Osgood-Schlatter's, L4-5 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'  Dx: R PEGGY 19, Chronic LBP with a mobilization/stabilization preference      Manual  9/30 10/3           P-A Gr IV L1-S1 mobs 1x20 ea 1x30 ea           Laser R L2-4 np np           Graston R lumbar paraspinals 5 min 5 min                                         Exercise Diary  9/30 10/3           R side glides 1x10 1x10           Standing lumbar ext 1x10 1x10           PPT 5"x10 5"x20           PPT with partial crunch 1x10 3x10           bridges 2x10 3x10           Standing L hip ABD  3x15            Posture education 3 min            DLS: SLR  [id number] ea

## 2019-10-07 ENCOUNTER — OFFICE VISIT (OUTPATIENT)
Dept: PHYSICAL THERAPY | Facility: CLINIC | Age: 69
End: 2019-10-07
Payer: MEDICARE

## 2019-10-07 DIAGNOSIS — M25.551 RIGHT HIP PAIN: ICD-10-CM

## 2019-10-07 DIAGNOSIS — M54.50 CHRONIC RIGHT-SIDED LOW BACK PAIN WITHOUT SCIATICA: Primary | ICD-10-CM

## 2019-10-07 DIAGNOSIS — G89.29 CHRONIC RIGHT-SIDED LOW BACK PAIN WITHOUT SCIATICA: Primary | ICD-10-CM

## 2019-10-07 DIAGNOSIS — Z96.641 STATUS POST TOTAL HIP REPLACEMENT, RIGHT: ICD-10-CM

## 2019-10-07 PROCEDURE — 97112 NEUROMUSCULAR REEDUCATION: CPT | Performed by: PHYSICAL THERAPIST

## 2019-10-07 PROCEDURE — 97140 MANUAL THERAPY 1/> REGIONS: CPT | Performed by: PHYSICAL THERAPIST

## 2019-10-07 PROCEDURE — 97110 THERAPEUTIC EXERCISES: CPT | Performed by: PHYSICAL THERAPIST

## 2019-10-07 NOTE — PROGRESS NOTES
Daily Note     Today's date: 10/7/2019  Patient name: Lillian Marin  : 1950  MRN: [id number]  Referring provider: Joe Marx  Dx:   Encounter Diagnosis     ICD-10-CM    1  Chronic right-sided low back pain without sciatica M54 5     G89 29    2  Status post total hip replacement, right Z96 641    3  Right hip pain M25 551                   Subjective: Pt reports occasional compliance with PPT which he reports help when his back is tightening, but reports not completing HEP since last visit  Pt reports pain is getting much better and reports no episodes of high or moderate pain since last visit  Objective: See treatment diary below  Assessment: Pt demonstrated improved lumbar AROM, TTP S1     Plan: Cont POC  Precautions:   Standard R hip precautions  PMHx: hypothyroidism, B Osgood-Schlatter's, L4-5 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'  Dx: R PEGGY 19, Chronic LBP with a mobilization/stabilization preference      Manual  9/30 10/3 10/7          P-A Gr IV L1-S1 mobs 1x20 ea 1x30 ea 1x30 ea          Laser R L2-4 np np           Graston R lumbar paraspinals 5 min 5 min 5 min                                        Exercise Diary  9/30 10/3 10/7          R side glides 1x10 1x10 1x15          Standing lumbar ext 1x10 1x10 1x15          PPT 5"x10 5"x20 5"x20          PPT with partial crunch 1x10 3x10 3x10          bridges 2x10 3x10 3x10          Standing L hip ABD  3x15  3x15          Posture education 3 min            DLS: SLR  8V29 ea 2N06 ea

## 2019-10-10 ENCOUNTER — OFFICE VISIT (OUTPATIENT)
Dept: PHYSICAL THERAPY | Facility: CLINIC | Age: 69
End: 2019-10-10
Payer: MEDICARE

## 2019-10-10 DIAGNOSIS — M25.551 RIGHT HIP PAIN: ICD-10-CM

## 2019-10-10 DIAGNOSIS — M54.50 CHRONIC RIGHT-SIDED LOW BACK PAIN WITHOUT SCIATICA: Primary | ICD-10-CM

## 2019-10-10 DIAGNOSIS — Z96.641 STATUS POST TOTAL HIP REPLACEMENT, RIGHT: ICD-10-CM

## 2019-10-10 DIAGNOSIS — G89.29 CHRONIC RIGHT-SIDED LOW BACK PAIN WITHOUT SCIATICA: Primary | ICD-10-CM

## 2019-10-10 PROCEDURE — 97140 MANUAL THERAPY 1/> REGIONS: CPT | Performed by: PHYSICAL THERAPIST

## 2019-10-10 PROCEDURE — 97112 NEUROMUSCULAR REEDUCATION: CPT | Performed by: PHYSICAL THERAPIST

## 2019-10-10 PROCEDURE — 97110 THERAPEUTIC EXERCISES: CPT | Performed by: PHYSICAL THERAPIST

## 2019-10-10 NOTE — PROGRESS NOTES
Daily Note     Today's date: 10/10/2019  Patient name: Arnold Guadarrama  : 1950  MRN: [id number]  Referring provider: Chen Babin  Dx:   Encounter Diagnosis     ICD-10-CM    1  Chronic right-sided low back pain without sciatica M54 5     G89 29    2  Status post total hip replacement, right Z96 641    3  Right hip pain M25 551                   Subjective: Pt reports no LBP currently, mild soreness after moving furniture around in the basement, poor compliance with HEP, but reports being active  Objective: See treatment diary below  Assessment: Pt demonstrated improved lumbar AROM, TTP L3 likely secondary to instability  Plan: Cont POC  Precautions:   Standard R hip precautions  PMHx: hypothyroidism, B Osgood-Schlatter's, L4-5 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'  Dx: R PEGGY 19, Chronic LBP with a mobilization/stabilization preference      Manual  9/30 10/3 10/7 10/10         P-A Gr IV L1-S1 mobs 1x20 ea 1x30 ea 1x30 ea 1x50 ea         Laser R L2-4 np np           Graston R lumbar paraspinals 5 min 5 min 5 min 5 min                                       Exercise Diary  9/30 10/3 10/7 10/10         R side glides 1x10 1x10 1x15 1x15         Standing lumbar ext 1x10 1x10 1x15 1x15         PPT 5"x10 5"x20 5"x20 5"x20         PPT with partial crunch 1x10 3x10 3x10 3x10         bridges 2x10 3x10 3x10 3x12         Standing L hip ABD  3x15  3x15 3x15         Posture education 3 min            DLS: SLR  9F92 ea 6V32 ea 2x10 ea

## 2019-10-14 ENCOUNTER — OFFICE VISIT (OUTPATIENT)
Dept: PHYSICAL THERAPY | Facility: CLINIC | Age: 69
End: 2019-10-14
Payer: MEDICARE

## 2019-10-14 DIAGNOSIS — M25.551 RIGHT HIP PAIN: ICD-10-CM

## 2019-10-14 DIAGNOSIS — Z96.641 STATUS POST TOTAL HIP REPLACEMENT, RIGHT: ICD-10-CM

## 2019-10-14 DIAGNOSIS — G89.29 CHRONIC RIGHT-SIDED LOW BACK PAIN WITHOUT SCIATICA: Primary | ICD-10-CM

## 2019-10-14 DIAGNOSIS — M54.50 CHRONIC RIGHT-SIDED LOW BACK PAIN WITHOUT SCIATICA: Primary | ICD-10-CM

## 2019-10-14 PROCEDURE — 97140 MANUAL THERAPY 1/> REGIONS: CPT | Performed by: PHYSICAL THERAPIST

## 2019-10-14 PROCEDURE — 97110 THERAPEUTIC EXERCISES: CPT | Performed by: PHYSICAL THERAPIST

## 2019-10-14 PROCEDURE — 97112 NEUROMUSCULAR REEDUCATION: CPT | Performed by: PHYSICAL THERAPIST

## 2019-10-14 NOTE — PROGRESS NOTES
Daily Note     Today's date: 10/14/2019  Patient name: Cody Ojeda  : 1950  MRN: [id number]  Referring provider: Rose Cadena  Dx:   Encounter Diagnosis     ICD-10-CM    1  Chronic right-sided low back pain without sciatica M54 5     G89 29    2  Status post total hip replacement, right Z96 641    3  Right hip pain M25 551                   Subjective: Pt reports R sided thoracic pain after turning in bed last night, poor compliance with HEP  Objective: See treatment diary below  Assessment: Pt demonstrated TTP T9-L3 centrally and T9/10 on the R side  Pain appears to be muscular at this time  Plan: Cont POC  Precautions:   Standard R hip precautions  PMHx: hypothyroidism, B Osgood-Schlatter's, L4-5 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'  Dx: R PEGGY 19, Chronic LBP with a mobilization/stabilization preference  * indicates pain      Manual  9/30 10/3 10/7 10/10 10/14        P-A Gr IV L1-S1 mobs 1x20 ea 1x30 ea 1x30 ea 1x50 ea 1x50 ea        Laser R L2-4 np np           Graston R lumbar paraspinals 5 min 5 min 5 min 5 min 5  min                                      Exercise Diary  9/30 10/3 10/7 10/10 10/14        R side glides 1x10 1x10 1x15 1x15 1x15        Standing lumbar ext 1x10 1x10 1x15 1x15 1x15        PPT 5"x10 5"x20 5"x20 5"x20 5"x20        PPT with partial crunch 1x10 3x10 3x10 3x10 3x10        bridges 2x10 3x10 3x10 3x12 3x12        Standing L hip ABD  3x15  3x15 3x15 3x15        Posture education 3 min            DLS: SLR  7T62 ea 5W46 ea 2x10 ea 1x10 *        Back Extension machine     40#/ 3x10        Seated thoracic B Rot

## 2019-10-17 ENCOUNTER — OFFICE VISIT (OUTPATIENT)
Dept: PHYSICAL THERAPY | Facility: CLINIC | Age: 69
End: 2019-10-17
Payer: MEDICARE

## 2019-10-17 DIAGNOSIS — G89.29 CHRONIC RIGHT-SIDED LOW BACK PAIN WITHOUT SCIATICA: Primary | ICD-10-CM

## 2019-10-17 DIAGNOSIS — M54.50 CHRONIC RIGHT-SIDED LOW BACK PAIN WITHOUT SCIATICA: Primary | ICD-10-CM

## 2019-10-17 PROCEDURE — 97110 THERAPEUTIC EXERCISES: CPT | Performed by: PHYSICAL THERAPIST

## 2019-10-17 PROCEDURE — 97140 MANUAL THERAPY 1/> REGIONS: CPT | Performed by: PHYSICAL THERAPIST

## 2019-10-17 PROCEDURE — 97112 NEUROMUSCULAR REEDUCATION: CPT | Performed by: PHYSICAL THERAPIST

## 2019-10-17 NOTE — PROGRESS NOTES
Daily Note     Today's date: 10/17/2019  Patient name: Chantelle Harper  : 1950  MRN: [id number]  Referring provider: Marleny Richardson  Dx:   Encounter Diagnosis     ICD-10-CM    1  Chronic right-sided low back pain without sciatica M54 5     G89 29                   Subjective: Pt reports no central LBP, 4/10 R sided lower thoracic pain that is worse with L SL  Objective: See treatment diary below  Assessment: Pt demonstrated sx relief with lower thoracic R sided opening mobs  Plan: Cont POC  Precautions:   Standard R hip precautions  PMHx: hypothyroidism, B Osgood-Schlatter's, L4-5 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'  Dx: R PEGGY 19, Chronic LBP with a mobilization/stabilization preference  * indicates pain      Manual  9/30 10/3 10/7 10/10 10/14 10/17       P-A Gr IV L1-S1 mobs 1x20 ea 1x30 ea 1x30 ea 1x50 ea 1x50 ea 1x50 ea       Laser R L2-4 np np           Graston R lumbar paraspinals 5 min 5 min 5 min 5 min 5  min 5 min       Prone R lower thoracic Gr IV opening mobs      1x50                        Exercise Diary  9/30 10/3 10/7 10/10 10/14 10/17       R side glides 1x10 1x10 1x15 1x15 1x15 1x15       Standing lumbar ext 1x10 1x10 1x15 1x15 1x15 1x15       PPT 5"x10 5"x20 5"x20 5"x20 5"x20 5"x20       PPT with partial crunch 1x10 3x10 3x10 3x10 3x10 3x10       bridges 2x10 3x10 3x10 3x12 3x12 3x12       Standing L hip ABD  3x15  3x15 3x15 3x15 3x15       Posture education 3 min            DLS: SLR  6N60 ea 8K35 ea 2x10 ea 1x10 *        Back Extension machine     40#/ 3x10 40#/ 3x10       Seated thoracic B Rot

## 2019-10-21 ENCOUNTER — OFFICE VISIT (OUTPATIENT)
Dept: PHYSICAL THERAPY | Facility: CLINIC | Age: 69
End: 2019-10-21
Payer: MEDICARE

## 2019-10-21 DIAGNOSIS — G89.29 CHRONIC RIGHT-SIDED LOW BACK PAIN WITHOUT SCIATICA: Primary | ICD-10-CM

## 2019-10-21 DIAGNOSIS — M54.50 CHRONIC RIGHT-SIDED LOW BACK PAIN WITHOUT SCIATICA: Primary | ICD-10-CM

## 2019-10-21 PROCEDURE — 97112 NEUROMUSCULAR REEDUCATION: CPT | Performed by: PHYSICAL THERAPIST

## 2019-10-21 PROCEDURE — 97140 MANUAL THERAPY 1/> REGIONS: CPT | Performed by: PHYSICAL THERAPIST

## 2019-10-21 PROCEDURE — 97110 THERAPEUTIC EXERCISES: CPT | Performed by: PHYSICAL THERAPIST

## 2019-10-21 NOTE — PROGRESS NOTES
Daily Note     Today's date: 10/21/2019  Patient name: Modesta Buckner  : 1950  MRN: [id number]  Referring provider: Etienne Ogden  Dx:   Encounter Diagnosis     ICD-10-CM    1  Chronic right-sided low back pain without sciatica M54 5     G89 29    2  Status post total hip replacement, right Z96 641    3  Right hip pain M25 551                   Subjective: Pt reports no central LBP and no thoracic pain  Pt reports sleeping on his R side helped decrease the pain  Objective: See treatment diary below  Assessment: R lumbar opening while sleeping is managing sx  Plan: Cont POC  Precautions:   Standard R hip precautions  PMHx: hypothyroidism, B Osgood-Schlatter's, L4-5 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'  Dx: R PEGGY 19, Chronic LBP with a mobilization/stabilization preference  * indicates pain      Manual  9/30 10/3 10/7 10/10 10/14 10/17 10/21      P-A Gr IV L1-S1 mobs 1x20 ea 1x30 ea 1x30 ea 1x50 ea 1x50 ea 1x50 ea 1x50 ea      Laser R L2-4 np np           Graston R lumbar paraspinals 5 min 5 min 5 min 5 min 5  min 5 min 5 min      Prone R lower thoracic Gr IV opening mobs      1x50 1x50                       Exercise Diary  9/30 10/3 10/7 10/10 10/14 10/17 10/21      R side glides 1x10 1x10 1x15 1x15 1x15 1x15 1x15      Standing lumbar ext 1x10 1x10 1x15 1x15 1x15 1x15 1x15      PPT 5"x10 5"x20 5"x20 5"x20 5"x20 5"x20 5"x20      PPT with partial crunch 1x10 3x10 3x10 3x10 3x10 3x10 3x10 3x12     bridges 2x10 3x10 3x10 3x12 3x12 3x12 3x12 3x15     Standing L hip ABD  3x15  3x15 3x15 3x15 3x15 3x15      Posture education 3 min            DLS: SLR  9U25 ea 1E74 ea 2x10 ea 1x10 *        Back Extension machine     40#/ 3x10 40#/ 3x10 50#/ 3x10      Seated thoracic B Rot

## 2019-10-24 ENCOUNTER — OFFICE VISIT (OUTPATIENT)
Dept: PHYSICAL THERAPY | Facility: CLINIC | Age: 69
End: 2019-10-24
Payer: MEDICARE

## 2019-10-24 DIAGNOSIS — M54.50 CHRONIC RIGHT-SIDED LOW BACK PAIN WITHOUT SCIATICA: Primary | ICD-10-CM

## 2019-10-24 DIAGNOSIS — G89.29 CHRONIC RIGHT-SIDED LOW BACK PAIN WITHOUT SCIATICA: Primary | ICD-10-CM

## 2019-10-24 PROCEDURE — 97140 MANUAL THERAPY 1/> REGIONS: CPT | Performed by: PHYSICAL THERAPIST

## 2019-10-24 PROCEDURE — 97112 NEUROMUSCULAR REEDUCATION: CPT | Performed by: PHYSICAL THERAPIST

## 2019-10-24 PROCEDURE — 97110 THERAPEUTIC EXERCISES: CPT | Performed by: PHYSICAL THERAPIST

## 2019-10-24 NOTE — PROGRESS NOTES
Daily Note     Today's date: 10/24/2019  Patient name: Giorgio Khanna  : 1950  MRN: [id number]  Referring provider: Paolo Carrasquillo  Dx:   Encounter Diagnosis     ICD-10-CM    1  Chronic right-sided low back pain without sciatica M54 5     G89 29                   Subjective: Pt reports only very mild LBP first thing in the morning that he can resolve with heat  Pt denies functional limitations  Objective: See treatment diary below  Assessment: Pt demonstrated functional ROM, improved core stability, R hip strength and ROM and is appropriate for d/c     Plan: D/c to HEP  Precautions:   Standard R hip precautions  PMHx: hypothyroidism, B Osgood-Schlatter's, L4-5 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'  Dx: R PEGGY 19, Chronic LBP with a mobilization/stabilization preference  * indicates pain      Manual  9/30 10/3 10/7 10/10 10/14 10/17 10/21 10/24     P-A Gr IV L1-S1 mobs 1x20 ea 1x30 ea 1x30 ea 1x50 ea 1x50 ea 1x50 ea 1x50 ea 1x50 ea     Laser R L2-4 np np           Graston R lumbar paraspinals 5 min 5 min 5 min 5 min 5  min 5 min 5 min 5 min     Prone R lower thoracic Gr IV opening mobs      1x50 1x50 1x50                    3x15  Exercise Diary  9/30 10/3 10/7 10/10 10/14 10/17 10/21 10/24     R side glides 1x10 1x10 1x15 1x15 1x15 1x15 1x15 1x15     Standing lumbar ext 1x10 1x10 1x15 1x15 1x15 1x15 1x15 1x15     PPT 5"x10 5"x20 5"x20 5"x20 5"x20 5"x20 5"x20 5"x20     PPT with partial crunch 1x10 3x10 3x10 3x10 3x10 3x10 3x10 3x12     bridges 2x10 3x10 3x10 3x12 3x12 3x12 3x12 3x15     Standing L hip ABD  3x15  3x15 3x15 3x15 3x15 3x15 3x15     Posture education 3 min            DLS: SLR  8A42 ea 3Y22 ea 2x10 ea 1x10 *   2x10 ea     Back Extension machine     40#/ 3x10 40#/ 3x10 50#/ 3x10 50#/ 3x10     Seated thoracic B Rot

## 2019-10-28 ENCOUNTER — APPOINTMENT (OUTPATIENT)
Dept: PHYSICAL THERAPY | Facility: CLINIC | Age: 69
End: 2019-10-28
Payer: MEDICARE

## 2019-10-31 ENCOUNTER — APPOINTMENT (OUTPATIENT)
Dept: PHYSICAL THERAPY | Facility: CLINIC | Age: 69
End: 2019-10-31
Payer: MEDICARE

## 2019-11-05 ENCOUNTER — OFFICE VISIT (OUTPATIENT)
Dept: OBGYN CLINIC | Facility: HOSPITAL | Age: 69
End: 2019-11-05

## 2019-11-05 ENCOUNTER — HOSPITAL ENCOUNTER (OUTPATIENT)
Dept: RADIOLOGY | Facility: HOSPITAL | Age: 69
Discharge: HOME | End: 2019-11-05
Attending: ORTHOPAEDIC SURGERY
Payer: MEDICARE

## 2019-11-05 VITALS
SYSTOLIC BLOOD PRESSURE: 146 MMHG | HEART RATE: 77 BPM | DIASTOLIC BLOOD PRESSURE: 92 MMHG | HEIGHT: 72 IN | BODY MASS INDEX: 37.79 KG/M2 | WEIGHT: 279 LBS

## 2019-11-05 DIAGNOSIS — Z47.1 AFTERCARE FOLLOWING RIGHT HIP JOINT REPLACEMENT SURGERY: ICD-10-CM

## 2019-11-05 DIAGNOSIS — Z96.641 AFTERCARE FOLLOWING RIGHT HIP JOINT REPLACEMENT SURGERY: ICD-10-CM

## 2019-11-05 DIAGNOSIS — Z47.1 AFTERCARE FOLLOWING RIGHT HIP JOINT REPLACEMENT SURGERY: Primary | ICD-10-CM

## 2019-11-05 DIAGNOSIS — Z96.641 AFTERCARE FOLLOWING RIGHT HIP JOINT REPLACEMENT SURGERY: Primary | ICD-10-CM

## 2019-11-05 PROCEDURE — 73502 X-RAY EXAM HIP UNI 2-3 VIEWS: CPT

## 2019-11-05 PROCEDURE — 99024 POSTOP FOLLOW-UP VISIT: CPT | Performed by: ORTHOPAEDIC SURGERY

## 2019-11-05 NOTE — PROGRESS NOTES
Assessment:   Diagnosis ICD-10-CM Associated Orders   1  Aftercare following right hip joint replacement surgery Z47 1 XR hip/pelv 2-3 vws right if performed    Z96 641        Plan: Activities as tolerated  Antibiotics for 2 yrs PO  Patient would like his knees injected at the next visit  To do next visit:  Return in about 4 months (around 3/5/2020) for x-rays  The above stated was discussed in layman's terms and the patient expressed understanding  All questions were answered to the patient's satisfaction  Scribe Attestation    I,:   Jovon Leiva am acting as a scribe while in the presence of the attending physician :        I,:   Rodriguez Hernandez MD personally performed the services described in this documentation    as scribed in my presence :              Subjective:   Britany Leon is a 68 y o  male who presents today s/p right total hip arthroplasty 8/05/19  Patient states overall he is doing well and offers no complaints  Patient states he is able to complete ADLs without difficulty  Patient complains of B/L knee pain today but would not like them addressed until his next visit         Review of systems negative unless otherwise specified in HPI    Past Medical History:   Diagnosis Date    Hyperthyroidism     Osteoarthritis     last assesed 6-6-16    Polyneuropathy     last assesed 5-8-17    Pure hypercholesterolemia     Wears glasses        Past Surgical History:   Procedure Laterality Date    BACK SURGERY      CHOLECYSTECTOMY      [place] TOTAL HIP ARTHROPLASTY Right 8/5/2019    Procedure: ARTHROPLASTY HIP TOTAL;  Surgeon: Rodriguez Hernandez MD;  Location:  MAIN OR;  Service: Orthopedics    TONSILLECTOMY         Family History   Problem Relation Age of Onset    Arthritis Other        Social History     Occupational History    Not on file   Tobacco Use    Smoking status: Former Smoker    Smokeless tobacco: Never Used   Substance and Sexual Activity    Alcohol use: Never Frequency: Never     Drinks per session: Patient refused     Binge frequency: Never    Drug use: Never    Sexual activity: Not on file         Current Outpatient Medications:     acetaminophen (TYLENOL) 325 mg tablet, Take 650 mg by mouth every 6 (six) hours as needed for mild pain, Disp: , Rfl:     atorvastatin (LIPITOR) 10 mg tablet, atorvastatin 10 mg tablet, Disp: , Rfl:     docusate sodium (COLACE) 100 mg capsule, Take 1 capsule (100 mg total) by mouth 2 (two) times a day (Patient not taking: Reported on 9/16/2019), Disp: 30 capsule, Rfl: 0    levothyroxine 150 mcg tablet, levothyroxine 150 mcg tablet, Disp: , Rfl:     lidocaine (LIDODERM) 5 %, Apply 1 patch topically daily Remove & Discard patch within 12 hours or as directed by MD, Disp: 30 patch, Rfl: 0    melatonin 3 mg, Take 1 tablet (3 mg total) by mouth daily at bedtime (Patient not taking: Reported on 9/16/2019), Disp: 30 tablet, Rfl: 0    methocarbamol (ROBAXIN) 750 mg tablet, Take 1 tablet (750 mg total) by mouth every 8 (eight) hours for 30 doses, Disp: 30 tablet, Rfl: 1    polyethylene glycol (MIRALAX) 17 g packet, Take 17 g by mouth daily as needed (constipation) (Patient not taking: Reported on 9/16/2019), Disp: 14 each, Rfl: 0    senna (SENOKOT) 8 6 mg, Take 1 tablet (8 6 mg total) by mouth daily (Patient not taking: Reported on 9/16/2019), Disp: 30 each, Rfl: 0    TURMERIC PO, daily , Disp: , Rfl:     No Known Allergies         Vitals:    11/05/19 1051   BP: 146/92   Pulse: 77       Objective:                    Right Hip Exam     Tenderness   The patient is experiencing no tenderness  Range of Motion   The patient has normal right hip ROM  Muscle Strength   The patient has normal right hip strength  Other   Erythema: absent  Sensation: normal  Pulse: present    Comments: Well healed incision, clean, dry, and intact          Diagnostics, reviewed and taken today if performed as documented:     The attending physician has personally reviewed the pertinent films in PACS and interpretation is as follows:  Right hip x-rays demonstrate no fracture or dislocation, prothesis remain in acceptable alignment and position  Procedures, if performed today:    Procedures    None performed      Portions of the record may have been created with voice recognition software  Occasional wrong word or "sound a like" substitutions may have occurred due to the inherent limitations of voice recognition software  Read the chart carefully and recognize, using context, where substitutions have occurred

## 2019-11-25 ENCOUNTER — OFFICE VISIT (OUTPATIENT)
Dept: PODIATRY | Facility: CLINIC | Age: 69
End: 2019-11-25

## 2019-11-25 VITALS
BODY MASS INDEX: 37.93 KG/M2 | HEART RATE: 72 BPM | WEIGHT: 280 LBS | HEIGHT: 72 IN | DIASTOLIC BLOOD PRESSURE: 80 MMHG | SYSTOLIC BLOOD PRESSURE: 147 MMHG

## 2019-11-25 DIAGNOSIS — L60.3 NAIL DYSTROPHY: Primary | ICD-10-CM

## 2019-11-25 PROCEDURE — NCFTCARE PR NON-COVERED FOOT CARE: Performed by: PODIATRIST

## 2019-11-25 NOTE — PROGRESS NOTES
Patient presents for palliative nail care  No acute disorder noted  Pedal pulses are within normal limits  Treatment consisted of nail trimming  Patient desires to be seen every 2 months

## 2020-01-20 ENCOUNTER — OFFICE VISIT (OUTPATIENT)
Dept: PODIATRY | Facility: CLINIC | Age: 70
End: 2020-01-20

## 2020-01-20 VITALS
BODY MASS INDEX: 38.9 KG/M2 | DIASTOLIC BLOOD PRESSURE: 94 MMHG | HEIGHT: 72 IN | HEART RATE: 94 BPM | WEIGHT: 287.2 LBS | SYSTOLIC BLOOD PRESSURE: 134 MMHG

## 2020-01-20 DIAGNOSIS — L60.3 NAIL DYSTROPHY: Primary | ICD-10-CM

## 2020-01-20 PROCEDURE — NCFTCARE PR NON-COVERED FOOT CARE: Performed by: PODIATRIST

## 2020-01-20 NOTE — PROGRESS NOTES
Patient presents for palliative nail care  No acute disorder noted but patient complains of ingrown nail pain along the lateral nail border of the right hallux  Pedal pulses are within normal limits  Superficial avulsion performed  Patient advised to consider partial avulsion or partial matrixectomy in the future  He is rescheduled in 8 weeks

## 2020-03-05 ENCOUNTER — TELEPHONE (OUTPATIENT)
Dept: OBGYN CLINIC | Facility: HOSPITAL | Age: 70
End: 2020-03-05

## 2020-03-05 DIAGNOSIS — Z96.642 STATUS POST TOTAL HIP REPLACEMENT, LEFT: ICD-10-CM

## 2020-03-05 DIAGNOSIS — Z47.1 AFTERCARE FOLLOWING RIGHT HIP JOINT REPLACEMENT SURGERY: Primary | ICD-10-CM

## 2020-03-05 DIAGNOSIS — Z96.641 AFTERCARE FOLLOWING RIGHT HIP JOINT REPLACEMENT SURGERY: Primary | ICD-10-CM

## 2020-03-05 RX ORDER — CEPHALEXIN 500 MG/1
2000 CAPSULE ORAL ONCE
Qty: 4 CAPSULE | Refills: 1 | Status: SHIPPED | OUTPATIENT
Start: 2020-03-05 | End: 2020-03-05

## 2020-03-09 DIAGNOSIS — Z96.641 AFTERCARE FOLLOWING RIGHT HIP JOINT REPLACEMENT SURGERY: Primary | ICD-10-CM

## 2020-03-09 DIAGNOSIS — Z47.1 AFTERCARE FOLLOWING RIGHT HIP JOINT REPLACEMENT SURGERY: Primary | ICD-10-CM

## 2020-03-09 RX ORDER — CEPHALEXIN 500 MG/1
2000 CAPSULE ORAL ONCE
Qty: 4 CAPSULE | Refills: 0 | Status: SHIPPED | OUTPATIENT
Start: 2020-03-09 | End: 2020-03-09

## 2020-07-15 ENCOUNTER — OFFICE VISIT (OUTPATIENT)
Dept: OBGYN CLINIC | Facility: CLINIC | Age: 70
End: 2020-07-15
Payer: MEDICARE

## 2020-07-15 VITALS
HEART RATE: 75 BPM | WEIGHT: 287 LBS | DIASTOLIC BLOOD PRESSURE: 95 MMHG | SYSTOLIC BLOOD PRESSURE: 156 MMHG | HEIGHT: 72 IN | BODY MASS INDEX: 38.87 KG/M2

## 2020-07-15 DIAGNOSIS — R20.2 NUMBNESS AND TINGLING IN LEFT HAND: Primary | ICD-10-CM

## 2020-07-15 DIAGNOSIS — R20.0 NUMBNESS AND TINGLING IN LEFT HAND: Primary | ICD-10-CM

## 2020-07-15 PROCEDURE — 99214 OFFICE O/P EST MOD 30 MIN: CPT | Performed by: SURGERY

## 2020-07-15 RX ORDER — METHYLPREDNISOLONE 4 MG/1
TABLET ORAL
Qty: 1 EACH | Refills: 0 | Status: SHIPPED | OUTPATIENT
Start: 2020-07-15 | End: 2020-10-12

## 2020-07-15 NOTE — PROGRESS NOTES
ASSESSMENT/PLAN:      68 y o  male with left hand numbness and tingling  The etiology of carpal and cubital tunnel was discussed with Horatio Osler today  OT was ordered for ulnar and median nerve glides  A medrol dosepak was prescribed and sent to his pharmacy electronically  He will continue with nighttime bracing  A EMG was ordered today to evaluate his ulnar, median and radial nerves  It was discussed today that if his symptoms improve with nighttime bracing, the oral steroids as well as OT he may cancel the EMG  I would like to see him back in the office in aprox  5 weeks, prior to his EMG to check his progress  The patient verbalized understanding of exam findings and treatment plan  We engaged in the shared decision-making process and treatment options were discussed at length with the patient  Surgical and conservative management discussed today along with risks and benefits  Diagnoses and all orders for this visit:    Numbness and tingling in left hand  -     EMG 2 Limb Upper Extremity; Future  -     Ambulatory referral to PT/OT hand therapy; Future    Other orders  -     methylPREDNISolone 4 MG tablet therapy pack; Use as directed on package      Follow Up:  Return in about 5 weeks (around 8/19/2020), or before EMG  To Do Next Visit:  Re-evaluation of current issue    ____________________________________________________________________________________________________________________________________________      CHIEF COMPLAINT:  Chief Complaint   Patient presents with    Left Hand - Pain       SUBJECTIVE:  Fransisca Potter is a 68y o  year old RHD male who presents to the office today for left hand numbness and tingling  Horatio Osler states that he has been experiencing numbness to his left ring finger for aprox  3-4 weeks  he denies any injury or trama  He notes that numbness starts at his shoulder and radiated down to his hand  He notes his numbness and tingling is intermittent   He states if his hand is at his side he will experience numbness and tingling  If his arm is elevated, he will not experience numbness and tingling  He bought a carpal tunnel brace OTC that he wears at night, with improvement in his symptoms  He will wear brace occassionally throughout the day  I have personally reviewed all the relevant PMH, PSH, SH, FH, Medications and allergies       PAST MEDICAL HISTORY:  Past Medical History:   Diagnosis Date    Hyperthyroidism     Osteoarthritis     last assesed 6-6-16    Polyneuropathy     last assesed 5-8-17    Pure hypercholesterolemia     Wears glasses        PAST SURGICAL HISTORY:  Past Surgical History:   Procedure Laterality Date    BACK SURGERY      CHOLECYSTECTOMY      [place] TOTAL HIP ARTHROPLASTY Right 8/5/2019    Procedure: ARTHROPLASTY HIP TOTAL;  Surgeon: Radha Marquez MD;  Location: [facility];  Service: Orthopedics    TONSILLECTOMY         FAMILY HISTORY:  Family History   Problem Relation Age of Onset    Arthritis Other        SOCIAL HISTORY:  Social History     Tobacco Use    Smoking status: Former Smoker    Smokeless tobacco: Never Used   Substance Use Topics    Alcohol use: Never     Frequency: Never     Drinks per session: Patient refused     Binge frequency: Never    Drug use: Never       MEDICATIONS:    Current Outpatient Medications:     atorvastatin (LIPITOR) 10 mg tablet, atorvastatin 10 mg tablet, Disp: , Rfl:     levothyroxine 150 mcg tablet, levothyroxine 150 mcg tablet, Disp: , Rfl:     acetaminophen (TYLENOL) 325 mg tablet, Take 650 mg by mouth every 6 (six) hours as needed for mild pain, Disp: , Rfl:     docusate sodium (COLACE) 100 mg capsule, Take 1 capsule (100 mg total) by mouth 2 (two) times a day (Patient not taking: Reported on 9/16/2019), Disp: 30 capsule, Rfl: 0    lidocaine (LIDODERM) 5 %, Apply 1 patch topically daily Remove & Discard patch within 12 hours or as directed by MD (Patient not taking: Reported on 1/20/2020), Disp: 30 patch, Rfl: 0    melatonin 3 mg, Take 1 tablet (3 mg total) by mouth daily at bedtime (Patient not taking: Reported on 9/16/2019), Disp: 30 tablet, Rfl: 0    methocarbamol (ROBAXIN) 750 mg tablet, Take 1 tablet (750 mg total) by mouth every 8 (eight) hours for 30 doses, Disp: 30 tablet, Rfl: 1    polyethylene glycol (MIRALAX) 17 g packet, Take 17 g by mouth daily as needed (constipation) (Patient not taking: Reported on 9/16/2019), Disp: 14 each, Rfl: 0    senna (SENOKOT) 8 6 mg, Take 1 tablet (8 6 mg total) by mouth daily (Patient not taking: Reported on 9/16/2019), Disp: 30 each, Rfl: 0    TURMERIC PO, daily , Disp: , Rfl:     ALLERGIES:  No Known Allergies    REVIEW OF SYSTEMS:  Review of Systems   Constitutional: Negative for chills, fever and unexpected weight change  HENT: Negative for hearing loss, nosebleeds and sore throat  Eyes: Negative for pain, redness and visual disturbance  Respiratory: Negative for cough, shortness of breath and wheezing  Cardiovascular: Negative for chest pain, palpitations and leg swelling  Gastrointestinal: Negative for abdominal pain, nausea and vomiting  Endocrine: Negative for polydipsia and polyuria  Genitourinary: Negative for difficulty urinating and hematuria  Musculoskeletal: Negative for arthralgias, joint swelling and myalgias  Skin: Negative for rash and wound  Neurological: Positive for numbness  Negative for dizziness and headaches  Psychiatric/Behavioral: Negative for decreased concentration, dysphoric mood and suicidal ideas  The patient is not nervous/anxious          VITALS:  Vitals:    07/15/20 1428   BP: 156/95   Pulse: 75       LABS:  HgA1c:   Lab Results   Component Value Date    HGBA1C 5 8 07/08/2019     BMP:   Lab Results   Component Value Date    CALCIUM 8 2 (L) 08/12/2019    K 3 9 08/12/2019    CO2 29 08/12/2019     08/12/2019    BUN 11 08/12/2019    CREATININE 0 78 08/12/2019 _____________________________________________________  PHYSICAL EXAMINATION:  General: well developed and well nourished, alert, oriented times 3 and appears comfortable  Psychiatric: Normal  HEENT: Normocephalic, Atraumatic Trachea Midline, No torticollis  Pulmonary: No audible wheezing or respiratory distress   Cardiovascular: No pitting edema, 2+ radial pulse   Skin: No masses, erythema, lacerations, fluctation, ulcerations  Neurovascular: Sensation Intact to the Median, Ulnar, Radial Nerve, Motor Intact to the Median, Ulnar, Radial Nerve and Pulses Intact  Musculoskeletal: Normal, except as noted in detailed exam and in HPI  MUSCULOSKELETAL EXAMINATION:    Left Carpal Tunnel Exam:  Negative thenar atrophy  Negative phalen's test  Positive carpal tunnel compression  Negative tinels over median nerve at the wrist     - Spurling's test bilaterally   Normal neck and shoulder ROM     Left Ulnar Nerve Exam:  Negative intrinsic atrophy  Negative  deformity at the elbow  Full range of motion with flexion and extension of the elbow  Negative ulnar nerve compression test at the elbow  Negative tinels over the ulnar nerve at the elbow  Non tender to palpation  Able to cross index and long fingers           ___________________________________________________  STUDIES REVIEWED:  No imaging to review            PROCEDURES PERFORMED:  Procedures  No Procedures performed today    _____________________________________________________      [facility]    I,:   Mike Oscar am acting as a scribe while in the presence of the attending physician :        I,:   Maritza Ware MD personally performed the services described in this documentation    as scribed in my presence :

## 2020-07-17 ENCOUNTER — TELEPHONE (OUTPATIENT)
Dept: OBGYN CLINIC | Facility: HOSPITAL | Age: 70
End: 2020-07-17

## 2020-07-17 NOTE — TELEPHONE ENCOUNTER
Can you please call him, let him know to hold off on steroids until 5 days after he has finished his oral abx for dental procedure

## 2020-07-17 NOTE — TELEPHONE ENCOUNTER
Spoke to patient  He stated he would hold off on going to the dentist right now  Advised that it was just for a cleaning and he would rather take the steroids to get that issue dealt with first  Will reschedule his dental cleaning

## 2020-07-17 NOTE — TELEPHONE ENCOUNTER
Patient sees Dr Anahi Amaya  Patient is calling to check with Dr Anahi Amaya about his medication  He wanted to know if there will be an interaction between the methylPREDNISolone and also the antibiotic Keflex he will be taking for his dental appointment        CB: [phone]

## 2020-07-23 ENCOUNTER — EVALUATION (OUTPATIENT)
Dept: OCCUPATIONAL THERAPY | Age: 70
End: 2020-07-23
Payer: MEDICARE

## 2020-07-23 DIAGNOSIS — R20.0 NUMBNESS AND TINGLING IN LEFT HAND: Primary | ICD-10-CM

## 2020-07-23 DIAGNOSIS — R20.0 TACTILE ANESTHESIA: ICD-10-CM

## 2020-07-23 DIAGNOSIS — R20.2 NUMBNESS AND TINGLING IN LEFT HAND: Primary | ICD-10-CM

## 2020-07-23 PROCEDURE — 97165 OT EVAL LOW COMPLEX 30 MIN: CPT

## 2020-07-23 PROCEDURE — 97110 THERAPEUTIC EXERCISES: CPT

## 2020-07-23 NOTE — PROGRESS NOTES
OT Evaluation     Today's date: 2020  Patient name: Lasha Engel  : 1950  MRN: [id number]  Referring provider: Leandra Conner MD  Dx:   Encounter Diagnosis     ICD-10-CM    1  Numbness and tingling in left hand R20 0 Ambulatory referral to PT/OT hand therapy    R20 2                   Assessment  Assessment details: Patient referred to therapy with "buzzing" sensation in left UE which radiates in the UE from shoulder to hand   Pattern in dermatomes of C5/ C7  With nerve like sensation in biceps and ring finger  He does have some left intrinsic hand weakness and rotator cuff  He is mildly limited in passive/active ER compared to right  This is more noticeable in sitting vs  Supine  His Upper llimb tension test on left was(+)  FIGUEROA gliding was WNL's  Mild tinel's over ulnar nerve at elbow  He does have poor habits with leaning on his left arm and working in sedentary postures  He is going for an EMG  Continue with wrist brace for now but most likely will try an anterior elbow orthosis for night or modify his sleeping posture since he sleeps on his left side  Patient needs to be educated in ergonomics for his job also  Impairments: abnormal or restricted ROM, impaired physical strength, lacks appropriate home exercise program and poor body mechanics  Other impairment:  altered sensation left UE  Understanding of Dx/Px/POC: fair   Prognosis: good    Goals  STGS  4 visits 1) instruct in ulnar/median nerve glides 2) modify sleep postures 3) try elbow pad  4) increase his understanding of management of symptoms with activity modification and ergonomics  LTGs 4-6wks 1) improve left ER by 10 degrees 2) improve RTC strength by 1/2 grade ( 4/4+) 3) improve left intrinsic hand strength 5) decrease intensity and or frequency of parathesias in left UE           Subjective Evaluation    History of Present Illness  Mechanism of injury: Patient started with pressure sensativity in the palm of his hand  He was also experiencing altered sensation down the back of his forearm  Presently he has a "buzzing" in his left UE that radiates from his shoulder to his hand  "I sleep in stupid ways"  Patient states his symptoms are not affecting his function  Onset 4-6 wks ago  Patient just finished a medrol dose pack  His symptoms do not awake him at night  He is wearing a wrist brace and notices less symptoms in his hand but not in his upper arm  He also will wear the wrist brace during the day  Denies neck/shoulder pain  Patient states he is very sedentary right now  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  His office chair has arm rests and he leans on his elbows  Pain  Current pain ratin  At worst pain ratin  Aggravating factors: keyboarding and sitting  Progression: no change    Hand dominance: right    Patient Goals  Patient goal: relieve parathesias in left UE    get rid of the buzzing in arm  Flowsheet Rows      Most Recent Value   PT/OT G-Codes   Current Score  88   Projected Score  87             Precautions: universal      Manuals             FIGUEROA/MNG performed and instructed for HEP x 10min            PROM left ER                                       Neuro Re-Ed                                       Patient education in Cub  Tunnel management with positioning ad activity modification 5'            Intrinsic hand strengthening             Ergonomic education                                        Ther Ex             Rotator cuff strengthening                                                                                                          Ther Activity                                       Gait Training                                       Modalities

## 2020-07-29 ENCOUNTER — OFFICE VISIT (OUTPATIENT)
Dept: OCCUPATIONAL THERAPY | Age: 70
End: 2020-07-29
Payer: MEDICARE

## 2020-07-29 DIAGNOSIS — R20.2 NUMBNESS AND TINGLING IN LEFT HAND: Primary | ICD-10-CM

## 2020-07-29 DIAGNOSIS — R20.0 NUMBNESS AND TINGLING IN LEFT HAND: Primary | ICD-10-CM

## 2020-07-29 PROCEDURE — 97110 THERAPEUTIC EXERCISES: CPT

## 2020-07-29 PROCEDURE — 97140 MANUAL THERAPY 1/> REGIONS: CPT

## 2020-07-29 NOTE — PROGRESS NOTES
Daily Note     Today's date: 2020  Patient name: Chaka Perez  : 1950  MRN: [id number]  Referring provider: Gamaliel Van MD  Dx:   Encounter Diagnosis     ICD-10-CM    1  Numbness and tingling in left hand R20 0     R20 2                   Subjective: patient noted today that his arm falls asleep or buzzs when he holds the steering wheel      Objective: See treatment diary below  Screened patient for TOS  Adson's test (-)    (+) radicular symptoms with rotation and lateral flexion to ipsilateral side  Patient chief complaints during session is altered feeling in his bicep region and also at times posterior arm  Provided with medium soft putty for intrinsic hand strengthening  We examined sitting posture  He has some rounding of his shoulders but nothing terrible  Issued yellow t-band for ER with arms at side  May perform this as part of his home exercise program         Assessment: Tolerated treatment well  Patient would benefit from continued OT      Plan: Progress treatment as tolerated  Precautions: universal      Manuals            FIGUEROA/MNG performed and instructed for HEP x 10min X 5min           PROM left ER  X 5min                                     Neuro Re-Ed                                       Patient education in Cub  Tunnel management with positioning ad activity modification 5' Educated in posture           Intrinsic hand strengthening  rubberbands, putty exercises           Ergonomic education                                        Ther Ex             Rotator cuff strengthening  ER YTB elbows at side                           Warm up  UBE 8min x 65 rpm                                                                            Ther Activity                                       Gait Training                                       Modalities

## 2020-07-31 ENCOUNTER — OFFICE VISIT (OUTPATIENT)
Dept: OCCUPATIONAL THERAPY | Age: 70
End: 2020-07-31
Payer: MEDICARE

## 2020-07-31 DIAGNOSIS — R20.2 NUMBNESS AND TINGLING IN LEFT HAND: Primary | ICD-10-CM

## 2020-07-31 DIAGNOSIS — R20.0 NUMBNESS AND TINGLING IN LEFT HAND: Primary | ICD-10-CM

## 2020-07-31 PROCEDURE — 97110 THERAPEUTIC EXERCISES: CPT

## 2020-07-31 PROCEDURE — 97140 MANUAL THERAPY 1/> REGIONS: CPT

## 2020-07-31 NOTE — PROGRESS NOTES
Daily Note     Today's date: 2020  Patient name: Roxie Staples  : 1950  MRN: [id number]  Referring provider: Aston Morales MD  Dx:   Encounter Diagnosis     ICD-10-CM    1  Numbness and tingling in left hand R20 0     R20 2                   Subjective: "Im trying to figure out what causes my symptoms  Noticing discomfort posterior shoulder      Objective: See treatment diary below  Normal gliding of ulnar nerve in both UE's  Left UE minimal more median nerve tension than the left  Weakness left palmar interossei    Assessment: Tolerated treatment well  Patient would benefit from continued OT      Plan: Progress treatment as tolerated  Precautions: universal      Manuals           FIGUEROA/MNG performed and instructed for HEP x 10min X 5min 15'            PROM left ER  X 5min                                     Neuro Re-Ed                                       Patient education in Cub  Tunnel management with positioning ad activity modification 5' Educated in posture           Intrinsic hand strengthening  rubberbands, putty exercises RB, YPW finger add  Ergonomic education              S upine ER with diaphragmatic breathing   2x10                       Ther Ex             Rotator cuff strengthening  ER YTB elbows at side    ER YTB, elbows at side 3x10                       Warm up  UBE 8min x 65 rpm                                                                            Ther Activity                                                                              Modalities             HP to left shoulder   10'

## 2020-08-04 ENCOUNTER — APPOINTMENT (OUTPATIENT)
Dept: OCCUPATIONAL THERAPY | Age: 70
End: 2020-08-04
Payer: MEDICARE

## 2020-08-06 ENCOUNTER — OFFICE VISIT (OUTPATIENT)
Dept: OCCUPATIONAL THERAPY | Age: 70
End: 2020-08-06
Payer: MEDICARE

## 2020-08-06 DIAGNOSIS — R20.0 NUMBNESS AND TINGLING IN LEFT HAND: Primary | ICD-10-CM

## 2020-08-06 DIAGNOSIS — R20.2 NUMBNESS AND TINGLING IN LEFT HAND: Primary | ICD-10-CM

## 2020-08-06 PROCEDURE — 97110 THERAPEUTIC EXERCISES: CPT

## 2020-08-06 NOTE — PROGRESS NOTES
Daily Note     Today's date: 2020  Patient name: Janey Bone  : 1950  MRN: [id number]  Referring provider: Natacha Iraheta MD  Dx:   Encounter Diagnosis     ICD-10-CM    1  Numbness and tingling in left hand  R20 0     R20 2                   Subjective: Patient finding arm numbness when driving  Objective: See treatment diary below  Superior shoulder tenderness with patient reporting numbness down arm during palpation  He states he still leans on his elbow when working on computer  I gave him an elbow pad  Instructed patient not to use of upper band feels too tight or constrictive  Therapist able to fit finger size under the pad  Assessment: Tolerated treatment well  Patient would benefit from continued OT      Plan: Progress treatment as tolerated  Precautions: universal      Manuals          FIGUEROA/MNG performed and instructed for HEP x 10min X 5min 15'   x10'         PROM left ER  X 5min                                     Neuro Re-Ed                                       Patient education in Cub  Tunnel management with positioning ad activity modification 5' Educated in posture  Made elbow pad          Intrinsic hand strengthening  rubberbands, putty exercises RB, YPW finger add  RB, pegs,          Ergonomic education              S upine ER with diaphragmatic breathing   2x10          Corner stretch    15x         Ther Ex             Rotator cuff strengthening  ER YTB elbows at side    ER YTB, elbows at side 3x10                       Warm up  UBE 8min x 65 rpm                                                                            Ther Activity                                                                              Modalities             HP to left shoulder   10' 10'

## 2020-08-11 ENCOUNTER — OFFICE VISIT (OUTPATIENT)
Dept: OCCUPATIONAL THERAPY | Age: 70
End: 2020-08-11
Payer: MEDICARE

## 2020-08-11 DIAGNOSIS — R20.2 NUMBNESS AND TINGLING IN LEFT HAND: Primary | ICD-10-CM

## 2020-08-11 DIAGNOSIS — R20.0 NUMBNESS AND TINGLING IN LEFT HAND: Primary | ICD-10-CM

## 2020-08-11 PROCEDURE — 97140 MANUAL THERAPY 1/> REGIONS: CPT

## 2020-08-11 PROCEDURE — 97110 THERAPEUTIC EXERCISES: CPT

## 2020-08-11 NOTE — PROGRESS NOTES
Daily Note     Today's date: 2020  Patient name: Lasha Engel  : 1950  MRN: [id number]  Referring provider: Leandra Conner MD  Dx:   Encounter Diagnosis     ICD-10-CM    1  Numbness and tingling in left hand  R20 0     R20 2                   Subjective: Patient finding arm numbness when driving  Objective: See treatment diary below  Superior shoulder tenderness with patient reporting numbness down arm during palpation  He states he still leans on his elbow when working on computer  I gave him an elbow pad  Instructed patient not to use of upper band feels too tight or constrictive  Therapist able to fit finger size under the pad  Assessment: Tolerated treatment well  Patient would benefit from continued OT      Plan: Progress treatment as tolerated  Precautions: universal      Manuals          FIGUEROA/MNG performed and instructed for HEP x 10min X 5min 15'   x10'         PROM left ER  X 5min                                     Neuro Re-Ed                                       Patient education in Cub  Tunnel management with positioning ad activity modification 5' Educated in posture  Made elbow pad          Intrinsic hand strengthening  rubberbands, putty exercises RB, YPW finger add  RB, pegs,          Ergonomic education              S upine ER with diaphragmatic breathing   2x10          Corner stretch    15x         Ther Ex             Rotator cuff strengthening  ER YTB elbows at side    ER YTB, elbows at side 3x10                       Warm up  UBE 8min x 65 rpm                                                                            Ther Activity                                                                              Modalities             HP to left shoulder   10' 10'                                                                                                        Daily Note     Today's date: 2020  Patient name: Yaw Erickson Raya  : 1950  MRN: [id number]  Referring provider: Esme Jordan MD  Dx:   Encounter Diagnosis     ICD-10-CM    1  Numbness and tingling in left hand  R20 0     R20 2                   Subjective: "I didn't have tingling in my arm at all yesterday"  Pt states that he still feels tingling when driving but did not experience tingling while mowing the lawn  Pt still c/o shoulder tenderness  Objective: See treatment diary below  He states he has been wearing the elbow pad and feels it has been helping  Pt  has diminished the severity and frequency of his arm tingling  Pt still has tenderness of R shoulder  Treatment focused on nerve gliding, finger intrinsic strengthening, and shoulder stretching  Therapist assessed strength 4-/5  Therapist discussed sizing with pt to order elbow pads- however sizing does not allow for proper fit  Therapist provided pt with an extra elbow pad to modify to ensure proper fit  Assessment: Tolerated treatment well  Patient would benefit from continued OT  Plan: Progress treatment as tolerated  Inc  Postural strengthening/exercises  Precautions: universal      Manuals         FIGUEROA/MNG performed and instructed for HEP x 10min X 5min 15'   x10' 15'        PROM left ER  X 5min                                     Neuro Re-Ed                                       Patient education in Cub  Tunnel management with positioning ad activity modification 5' Educated in posture  Made elbow pad          Intrinsic hand strengthening  rubberbands, putty exercises RB, YPW finger add  RB, pegs,  RB x3    YPW finger add  Ergonomic education              S upine ER with diaphragmatic breathing   2x10          Corner stretch    15x 15x        Ther Ex             Rotator cuff strengthening  ER YTB elbows at side    ER YTB, elbows at side 3x10                       Warm up  UBE 8min x 65 rpm Ther Activity                                                                              Modalities             HP to left shoulder   10' 10' 20'

## 2020-08-13 ENCOUNTER — OFFICE VISIT (OUTPATIENT)
Dept: OCCUPATIONAL THERAPY | Age: 70
End: 2020-08-13
Payer: MEDICARE

## 2020-08-13 DIAGNOSIS — R20.2 NUMBNESS AND TINGLING IN LEFT HAND: Primary | ICD-10-CM

## 2020-08-13 DIAGNOSIS — R20.0 NUMBNESS AND TINGLING IN LEFT HAND: Primary | ICD-10-CM

## 2020-08-13 PROCEDURE — 97140 MANUAL THERAPY 1/> REGIONS: CPT

## 2020-08-13 PROCEDURE — 97530 THERAPEUTIC ACTIVITIES: CPT

## 2020-08-13 NOTE — PROGRESS NOTES
Daily Note     Today's date: 2020  Patient name: Ramya Snowden  : 1950  MRN: [id number]  Referring provider: Lake Cha MD  Dx:   No diagnosis found  Subjective: Patient finding arm numbness when driving  Objective: See treatment diary below  Superior shoulder tenderness with patient reporting numbness down arm during palpation  He states he still leans on his elbow when working on computer  I gave him an elbow pad  Instructed patient not to use of upper band feels too tight or constrictive  Therapist able to fit finger size under the pad  Assessment: Tolerated treatment well  Patient would benefit from continued OT      Plan: Progress treatment as tolerated  Precautions: universal      Manuals          FIGUEROA/MNG performed and instructed for HEP x 10min X 5min 15'   x10'         PROM left ER  X 5min                                     Neuro Re-Ed                                       Patient education in Cub  Tunnel management with positioning ad activity modification 5' Educated in posture  Made elbow pad          Intrinsic hand strengthening  rubberbands, putty exercises RB, YPW finger add  RB, pegs,          Ergonomic education              S upine ER with diaphragmatic breathing   2x10          Corner stretch    15x         Ther Ex             Rotator cuff strengthening  ER YTB elbows at side    ER YTB, elbows at side 3x10                       Warm up  UBE 8min x 65 rpm                                                                            Ther Activity                                                                              Modalities             HP to left shoulder   10' 10'                                                                                                        Daily Note     Today's date: 2020  Patient name: Ramya Snowden  : 1950  MRN: [id number]  Referring provider: Lake Cha MD  Dx: No diagnosis found  Subjective: Pt reports he is still experiencing slight tingling but it has been improving since attending therapy  Pt c/o of L shoulder pain/tenderness  Pt reports sleeping on his left side with this left arm bent but pt denies numbness or tingling in this position or when waking up in the morning  Objective: See treatment diary below  Treatment focused on ulnar and radial nerve gliding, finger intrinsic strengthening, and shoulder stretching  Median and ulnar nerve glides WNL  Pt denies paraesthesias  Pt still has tenderness of R shoulder (supraspinatus)  Pt felt inc  In shoulder irritation when performing external rotation with theraband  FOTO was completed today  Assessment: Tolerated treatment well  Patient would benefit from continued OT  Pt needs to modify sleep position as this is likely contributing to his arm pain  Plan: Progress treatment as tolerated  Inc  Postural strengthening/exercises as well as rotator cuff strengthening as tolerated  Precautions: universal      Manuals 7/23 7/29 7/31 8/6 8/11 8/13       FIGUEROA/MNG performed and instructed for HEP x 10min X 5min 15'   x10' 15' 15'       PROM left ER  X 5min                                     Neuro Re-Ed                                       Patient education in Cub  Tunnel management with positioning ad activity modification 5' Educated in posture  Made elbow pad          Intrinsic hand strengthening  rubberbands, putty exercises RB, YPW finger add  RB, pegs,  RB x3    YPW finger add  RB    YPW finger add  Ergonomic education              S upine ER with diaphragmatic breathing   2x10          Corner stretch    15x 15x        Ther Ex             Rotator cuff strengthening  ER YTB elbows at side    ER YTB, elbows at side 3x10   YTB ER, IR and shoulder adduction with elbow at side                    Warm up  UBE 8min x 65 rpm Ther Activity                                                                              Modalities             HP to left shoulder   10' 10' 20' 10'

## 2020-08-18 ENCOUNTER — OFFICE VISIT (OUTPATIENT)
Dept: OCCUPATIONAL THERAPY | Age: 70
End: 2020-08-18
Payer: MEDICARE

## 2020-08-18 DIAGNOSIS — R20.0 NUMBNESS AND TINGLING IN LEFT HAND: Primary | ICD-10-CM

## 2020-08-18 DIAGNOSIS — R20.2 NUMBNESS AND TINGLING IN LEFT HAND: Primary | ICD-10-CM

## 2020-08-18 PROCEDURE — 97110 THERAPEUTIC EXERCISES: CPT

## 2020-08-18 PROCEDURE — 97140 MANUAL THERAPY 1/> REGIONS: CPT

## 2020-08-18 NOTE — PROGRESS NOTES
Daily Note     Today's date: 2020  Patient name: Jacob Reyes  : 1950  MRN: [id number]  Referring provider: Yang Van MD  Dx:   No diagnosis found  Subjective: 0/10 pain last several days  Experiencing  "buzzing" in biceps region when driving  I'm watching carefully what I'm doing"  Denies parathesias in hand  Objective: See treatment diary below  Patient trying to change his sleeping posture  He does notice a difference  Wearing protected elbow pad  (-) tenderness supraspinatus today  Spurlings test (-)  Assessment: Tolerated treatment well  Patient's symptoms resolving in hand/elbow  (+) symptoms biceps (C5) distribution  Plan: Per recommendations Dr Kristen Webber  Follow up appt  2020  Hand therapy appt on 2020  Precautions: universal      Manuals         FIGUEROA/MNG performed and instructed for HEP x 10min X 5min 15'   x10' 10'        PROM left ER  X 5min                                     Neuro Re-Ed                                       Patient education in Cub  Tunnel management with positioning ad activity modification 5' Educated in posture  Made elbow pad          Intrinsic hand strengthening  rubberbands, putty exercises RB, YPW finger add  RB, pegs,  RB        Ergonomic education              S upine ER with diaphragmatic breathing   2x10          Corner stretch    15x 15x        Ther Ex             Rotator cuff strengthening  ER YTB elbows at side    ER YTB, elbows at side 3x10  ER/IR YTB 3x10        UE ROM     Wrist maze        Warm up  UBE 8min x 65 rpm   8min 65RPM        Hand strengthening     RPW                                                            Ther Activity                                                                              Modalities             HP to left shoulder   10' 10' Daily Note     Today's date: 2020  Patient name: Maico Flaherty  : 1950  MRN: [id number]  Referring provider: Carolina Barraza MD  Dx:   No diagnosis found  Subjective: Pt reports he is still experiencing slight tingling but it has been improving since attending therapy  Pt c/o of L shoulder pain/tenderness  Pt reports sleeping on his left side with this left arm bent but pt denies numbness or tingling in this position or when waking up in the morning  Objective: See treatment diary below  Treatment focused on ulnar and radial nerve gliding, finger intrinsic strengthening, and shoulder stretching  Median and ulnar nerve glides WNL  Pt denies paraesthesias  Pt still has tenderness of R shoulder (supraspinatus)  Pt felt inc  In shoulder irritation when performing external rotation with theraband  FOTO was completed today  Assessment: Tolerated treatment well  Patient would benefit from continued OT  Pt needs to modify sleep position as this is likely contributing to his arm pain  Plan: Progress treatment as tolerated  Inc  Postural strengthening/exercises as well as rotator cuff strengthening as tolerated  Precautions: universal      Manuals        FIGUEROA/MNG performed and instructed for HEP x 10min X 5min 15'   x10' 15' 15'       PROM left ER  X 5min                                     Neuro Re-Ed                                       Patient education in Cub  Tunnel management with positioning ad activity modification 5' Educated in posture  Made elbow pad          Intrinsic hand strengthening  rubberbands, putty exercises RB, YPW finger add  RB, pegs,  RB x3    YPW finger add  RB    YPW finger add          Ergonomic education              S upine ER with diaphragmatic breathing   2x10          Corner stretch    15x 15x        Ther Ex             Rotator cuff strengthening  ER YTB elbows at side    ER YTB, elbows at side 3x10   YTB ER, IR and shoulder adduction with elbow at side                    Warm up  UBE 8min x 65 rpm                                                                            Ther Activity                                                                              Modalities             HP to left shoulder   10' 10' 20' 10'

## 2020-08-19 ENCOUNTER — OFFICE VISIT (OUTPATIENT)
Dept: OBGYN CLINIC | Facility: CLINIC | Age: 70
End: 2020-08-19
Payer: MEDICARE

## 2020-08-19 VITALS
SYSTOLIC BLOOD PRESSURE: 145 MMHG | HEART RATE: 73 BPM | BODY MASS INDEX: 36.84 KG/M2 | HEIGHT: 72 IN | WEIGHT: 272 LBS | DIASTOLIC BLOOD PRESSURE: 87 MMHG

## 2020-08-19 DIAGNOSIS — R20.2 NUMBNESS AND TINGLING IN LEFT HAND: Primary | ICD-10-CM

## 2020-08-19 DIAGNOSIS — R20.0 NUMBNESS AND TINGLING IN LEFT HAND: Primary | ICD-10-CM

## 2020-08-19 PROCEDURE — 99213 OFFICE O/P EST LOW 20 MIN: CPT | Performed by: SURGERY

## 2020-08-19 NOTE — PROGRESS NOTES
ASSESSMENT/PLAN:      65 y o  male with left hand numbness and tingling  It was discussed with Bina Louis today that the numbness and tingling into his left biceps and shoulder region is likely due to cervical issues as the numbness and tingling no longer radiated past his elbow  His OT may recommend for him to see at physical therapist with regards to his shoulder/neck  I will place an order if needed  He will continue with nighttime bracing as well as his therapy exercises  I will see him back in 2 months time for re-evaluation  His EMG is scheduled for November, will discuss if this should still be performed at his next visit  The patient verbalized understanding of exam findings and treatment plan  We engaged in the shared decision-making process and treatment options were discussed at length with the patient  Surgical and conservative management discussed today along with risks and benefits  Diagnoses and all orders for this visit:    Numbness and tingling in left hand      Follow Up:  Return in about 2 months (around 10/19/2020)  To Do Next Visit:  Re-evaluation of current issue    ____________________________________________________________________________________________________________________________________________      CHIEF COMPLAINT:  Chief Complaint   Patient presents with    Left Hand - Follow-up       SUBJECTIVE:  Rosaura Spencer is a 65y o  year old RHD male who presents to the office today for a follow up regarding left hand numbness and tingling  Bina Louis took an oral steroid  He has also been attending OT for nerve glides  He notes that the numbness and tingling that was extending into his fingers has resolved  He does note numbness and tingling to his biceps/shouler region  He notes the only trigger he was able to find has been when he is driving and gripping the steering wheel  He denies any cervical issues          I have personally reviewed all the relevant PMH, PSH, SH, FH, Medications and allergies       PAST MEDICAL HISTORY:  Past Medical History:   Diagnosis Date    Hyperthyroidism     Osteoarthritis     last assesed 6-6-16    Polyneuropathy     last assesed 5-8-17    Pure hypercholesterolemia     Wears glasses        PAST SURGICAL HISTORY:  Past Surgical History:   Procedure Laterality Date    BACK SURGERY      CHOLECYSTECTOMY      NE TOTAL HIP ARTHROPLASTY Right 8/5/2019    Procedure: ARTHROPLASTY HIP TOTAL;  Surgeon: Rocío Mccoy MD;  Location: [facility];  Service: Orthopedics    TONSILLECTOMY         FAMILY HISTORY:  Family History   Problem Relation Age of Onset    Arthritis Other        SOCIAL HISTORY:  Social History     Tobacco Use    Smoking status: Former Smoker    Smokeless tobacco: Never Used   Substance Use Topics    Alcohol use: Never     Frequency: Never     Drinks per session: Patient refused     Binge frequency: Never    Drug use: Never       MEDICATIONS:    Current Outpatient Medications:     atorvastatin (LIPITOR) 10 mg tablet, atorvastatin 10 mg tablet, Disp: , Rfl:     levothyroxine 150 mcg tablet, levothyroxine 150 mcg tablet, Disp: , Rfl:     acetaminophen (TYLENOL) 325 mg tablet, Take 650 mg by mouth every 6 (six) hours as needed for mild pain, Disp: , Rfl:     docusate sodium (COLACE) 100 mg capsule, Take 1 capsule (100 mg total) by mouth 2 (two) times a day (Patient not taking: Reported on 9/16/2019), Disp: 30 capsule, Rfl: 0    lidocaine (LIDODERM) 5 %, Apply 1 patch topically daily Remove & Discard patch within 12 hours or as directed by MD (Patient not taking: Reported on 1/20/2020), Disp: 30 patch, Rfl: 0    melatonin 3 mg, Take 1 tablet (3 mg total) by mouth daily at bedtime (Patient not taking: Reported on 9/16/2019), Disp: 30 tablet, Rfl: 0    methocarbamol (ROBAXIN) 750 mg tablet, Take 1 tablet (750 mg total) by mouth every 8 (eight) hours for 30 doses, Disp: 30 tablet, Rfl: 1    methylPREDNISolone 4 MG tablet therapy pack, Use as directed on package, Disp: 1 each, Rfl: 0    polyethylene glycol (MIRALAX) 17 g packet, Take 17 g by mouth daily as needed (constipation) (Patient not taking: Reported on 9/16/2019), Disp: 14 each, Rfl: 0    senna (SENOKOT) 8 6 mg, Take 1 tablet (8 6 mg total) by mouth daily (Patient not taking: Reported on 9/16/2019), Disp: 30 each, Rfl: 0    TURMERIC PO, daily , Disp: , Rfl:     ALLERGIES:  No Known Allergies    REVIEW OF SYSTEMS:  Review of Systems   Constitutional: Negative for chills, fever and unexpected weight change  HENT: Negative for hearing loss, nosebleeds and sore throat  Eyes: Negative for pain, redness and visual disturbance  Respiratory: Negative for cough, shortness of breath and wheezing  Cardiovascular: Negative for chest pain, palpitations and leg swelling  Gastrointestinal: Negative for abdominal pain, nausea and vomiting  Endocrine: Negative for polydipsia and polyuria  Genitourinary: Negative for difficulty urinating and hematuria  Musculoskeletal: Negative for arthralgias, joint swelling and myalgias  Skin: Negative for rash and wound  Neurological: Positive for numbness  Negative for dizziness and headaches  Psychiatric/Behavioral: Negative for decreased concentration, dysphoric mood and suicidal ideas  The patient is not nervous/anxious          VITALS:  Vitals:    08/19/20 1355   BP: 145/87   Pulse: 73       LABS:  HgA1c:   Lab Results   Component Value Date    HGBA1C 5 8 07/08/2019     BMP:   Lab Results   Component Value Date    CALCIUM 8 2 (L) 08/12/2019    K 3 9 08/12/2019    CO2 29 08/12/2019     08/12/2019    BUN 11 08/12/2019    CREATININE 0 78 08/12/2019       _____________________________________________________  PHYSICAL EXAMINATION:  General: well developed and well nourished, alert, oriented times 3 and appears comfortable  Psychiatric: Normal  HEENT: Normocephalic, Atraumatic Trachea Midline, No torticollis  Pulmonary: No audible wheezing or respiratory distress   Cardiovascular: No pitting edema, 2+ radial pulse   Skin: No masses, erythema, lacerations, fluctation, ulcerations  Neurovascular: Sensation Intact to the Median, Ulnar, Radial Nerve, Motor Intact to the Median, Ulnar, Radial Nerve and Pulses Intact  Musculoskeletal: Normal, except as noted in detailed exam and in HPI        MUSCULOSKELETAL EXAMINATION:    Left upper extremity:     No erythema, ecchymosis or edema  Full elbow and wrist ROM   Non tender to palpation  Full composite fist   2+ radial pulse   Sensation intact to light touch      ___________________________________________________  STUDIES REVIEWED:  No new imaging to review           PROCEDURES PERFORMED:  Procedures  No Procedures performed today    _____________________________________________________      Elverna Halsted    I,:   Peggy Oscar am acting as a scribe while in the presence of the attending physician :        I,:   Noris Maravilla MD personally performed the services described in this documentation    as scribed in my presence :

## 2020-08-20 ENCOUNTER — OFFICE VISIT (OUTPATIENT)
Dept: OCCUPATIONAL THERAPY | Age: 70
End: 2020-08-20
Payer: MEDICARE

## 2020-08-20 DIAGNOSIS — M54.12 RADICULOPATHY, CERVICAL REGION: Primary | ICD-10-CM

## 2020-08-20 DIAGNOSIS — R20.2 NUMBNESS AND TINGLING IN LEFT HAND: Primary | ICD-10-CM

## 2020-08-20 DIAGNOSIS — R20.0 NUMBNESS AND TINGLING IN LEFT HAND: Primary | ICD-10-CM

## 2020-08-20 PROCEDURE — 97110 THERAPEUTIC EXERCISES: CPT

## 2020-08-20 PROCEDURE — 97140 MANUAL THERAPY 1/> REGIONS: CPT

## 2020-08-20 NOTE — PROGRESS NOTES
Reassessment    Today's date: 2020  Patient name: Ailin Syed  : 1950  MRN: [id number]  Referring provider: Jerry Patiño MD  Dx:   Encounter Diagnosis     ICD-10-CM    1  Numbness and tingling in left hand  R20 0     R20 2                   Subjective: 0/10 pain last several days  Experiencing  "buzzing" in biceps region when driving  I'm watching carefully what I'm doing"  Denies parathesias in hand  Objective: See treatment diary below  Seen by Dr Annie Chen yesterday  Patient's issues may be coming from cervical region  MMT:   ER/IR: 4-/5, 5/5  Shoulder abduction: 4+/5   :   R: 75  L:  70   Pinch:  Lateral: R 17   L 16      2-point: R 14   L   12 3-jaw  Alfred:R  12   L 13  ROM: ER at shoulder- 90    Goals  STGS  4 visits 1) instruct in ulnar/median nerve glides MET2) modify sleep postures MET 3) try elbow pad MET 4) increase his understanding of management of symptoms with activity modification and ergonomics   MET LTGs 4-6wks 1) improve left ER by 10 degrees   MET 2) improve RTC strength by 1/2 grade ( 4/4+) 3) improve left intrinsic hand strength   MET5) decrease intensity and or frequency of parathesias in left UE  PARTIALLY MET        Assessment: Tolerated treatment well  Patient's symptoms resolving in hand/elbow  (+) symptoms biceps (C5) distribution  (+) supraspinatus pain and tenderness      Plan: Ot goals met  Pt discharged from OT and transferred to PT for cervical radiculopathy  Precautions: universal      Manuals  8/6        FIGUEROA/MNG performed and instructed for HEP x 10min X 5min 15'   x10' 10' 10'       PROM left ER  X 5min                                     Neuro Re-Ed                                       Patient education in Cub   Tunnel management with positioning ad activity modification 5' Educated in posture  Made elbow pad          Intrinsic hand strengthening  rubberbands, putty exercises RB, YPW finger add  RB, pegs,  RB RB       Ergonomic education              S upine ER with diaphragmatic breathing   2x10          Corner stretch    15x 15x 15x       Ther Ex             Rotator cuff strengthening  ER YTB elbows at side  ER YTB, elbows at side 3x10  ER/IR YTB 3x10 ER/IR YTB 5x10       UE ROM     Wrist maze        Warm up  UBE 8min x 65 rpm   8min 65RPM        Hand strengthening     RPW              Wall stretching                                              Ther Activity                                                                              Modalities             HP to left shoulder   10' 10'  10'                                                                                                      Daily Note     Today's date: 2020  Patient name: Rell Dodd  : 1950  MRN: [id number]  Referring provider: Artem Brooks MD  Dx:   Encounter Diagnosis     ICD-10-CM    1  Numbness and tingling in left hand  R20 0     R20 2                   Subjective: Pt reports he is still experiencing slight tingling but it has been improving since attending therapy  Pt c/o of L shoulder pain/tenderness  Pt reports sleeping on his left side with this left arm bent but pt denies numbness or tingling in this position or when waking up in the morning  Objective: See treatment diary below  Treatment focused on ulnar and radial nerve gliding, finger intrinsic strengthening, and shoulder stretching  Median and ulnar nerve glides WNL  Pt denies paraesthesias  Pt still has tenderness of R shoulder (supraspinatus)  Pt felt inc  In shoulder irritation when performing external rotation with theraband  FOTO was completed today  Assessment: Tolerated treatment well  Patient would benefit from continued OT  Pt needs to modify sleep position as this is likely contributing to his arm pain  Plan: Progress treatment as tolerated     Inc  Postural strengthening/exercises as well as rotator cuff strengthening as tolerated  Precautions: universal      Manuals 7/23 7/29 7/31 8/6 8/11 8/13       FIGUEROA/MNG performed and instructed for HEP x 10min X 5min 15'   x10' 15' 15'       PROM left ER  X 5min                                     Neuro Re-Ed                                       Patient education in Cub  Tunnel management with positioning ad activity modification 5' Educated in posture  Made elbow pad          Intrinsic hand strengthening  rubberbands, putty exercises RB, YPW finger add  RB, pegs,  RB x3    YPW finger add  RB    YPW finger add  Ergonomic education              S upine ER with diaphragmatic breathing   2x10          Corner stretch    15x 15x        Ther Ex             Rotator cuff strengthening  ER YTB elbows at side    ER YTB, elbows at side 3x10   YTB ER, IR and shoulder adduction with elbow at side                    Warm up  UBE 8min x 65 rpm                                                                            Ther Activity                                                                              Modalities             HP to left shoulder   10' 10' 20' 10'

## 2020-08-25 ENCOUNTER — APPOINTMENT (OUTPATIENT)
Dept: OCCUPATIONAL THERAPY | Age: 70
End: 2020-08-25
Payer: MEDICARE

## 2020-08-27 ENCOUNTER — APPOINTMENT (OUTPATIENT)
Dept: OCCUPATIONAL THERAPY | Age: 70
End: 2020-08-27
Payer: MEDICARE

## 2020-08-27 ENCOUNTER — EVALUATION (OUTPATIENT)
Dept: PHYSICAL THERAPY | Age: 70
End: 2020-08-27
Payer: MEDICARE

## 2020-08-27 DIAGNOSIS — M54.12 RADICULOPATHY, CERVICAL REGION: Primary | ICD-10-CM

## 2020-08-27 PROCEDURE — 97110 THERAPEUTIC EXERCISES: CPT | Performed by: PHYSICAL THERAPIST

## 2020-08-27 PROCEDURE — 97162 PT EVAL MOD COMPLEX 30 MIN: CPT | Performed by: PHYSICAL THERAPIST

## 2020-08-27 NOTE — PROGRESS NOTES
PT Evaluation     Today's date: 2020  Patient name: Ailin Syed  : 1950  MRN: [id number]  Referring provider: Jerry Patiño MD  Dx:   Encounter Diagnosis     ICD-10-CM    1  Radiculopathy, cervical region  M54 12 Ambulatory referral to Physical Therapy                  Assessment  Assessment details: PT IE: 2020  Patient reported he initially had left hand and 5 th digit tingling and pain  Patient noted he did have left ue radicular symptoms  Patient noted he worked with OT and had success with his left hand but still had left UT / periscapular region symptoms that radiate into his biceps region  Patient noted he does sleep on his left side  Patient noted getting up in the am he has difficulties  Patient noted he had low back surgery in  and right PEGGY in   Patient noted he has bilateral knee Osgood javed  Patient noted he has left UT / periscapular region numbness / pain that radiates into left biceps  Patient noted lifting is limited by weakness  Patient noted he has tension in left UT and periscapular tension  Patient noted the left biceps paraesthesias is produced with grabbing the steering wheel  Patient noted he has 4 th digit edema hyper sensation that is intermittent  Patient denies cervical spine movements aggravating or producing left UT / UE symptoms  Patient noted he is right ue dominant  Patient denies left ue weakness  Impairments: abnormal or restricted ROM, abnormal movement, activity intolerance, lacks appropriate home exercise program and pain with function  Understanding of Dx/Px/POC: excellent   Prognosis: good  Prognosis details: Patient is a 65y o  year old male seen for outpatient PT evaluation with pain, mobility and functional deficits due to cervical spine radiculopathy   Patient presents to PT IE with the following problems, concerns, deficits and impairments: left UT and ue pain, decreased cervical spine range of motion, decreased left ue strength, + TTP, decrease in postural awareness, + special tests, functional limitations and decreased tolerance to activity  Patient would benefit from skilled PT services under the following PT treatment plan to address the above noted deficits: therapeutic exercises and activities to facilitate cervical spine rom and left ue rom and strength, modalities, manual therapy techniques, postural reeducation and strengthening, IASTM techniques, Kinesio taping techniques, and a hep  Thank you for the referral      Goals  Short Term goals - 4 weeks  1  Patient will be independent HEP  2   Patient will report a 25 - 50% decrease in pain complaints  3   Increase strength 1/2 grade  4   Increase ROM 5-10 degrees  Long Term goals - 8 weeks  1  Patient will report elimination of pain complaints  2   Patient will return to all recreational activities without restriction  3   ROM WFL  4   Strength 5/5   5   Patient will report lifting improved so he denies house hold deficits  6   Patient will report looking improved so he has no left ue symptoms  7   Patient will report no longer having left ue symptoms with driving  8   Patient will deny sleep deficits      Plan  Patient would benefit from: skilled physical therapy  Planned modality interventions: cryotherapy, TENS, thermotherapy: hydrocollator packs and unattended electrical stimulation  Planned therapy interventions: joint mobilization, manual therapy, massage, neuromuscular re-education, patient education, postural training, body mechanics training, self care, compression, strengthening, stretching, therapeutic activities, therapeutic exercise, therapeutic training, flexibility, functional ROM exercises, graded activity, graded exercise, graded motor and home exercise program  Frequency: 2x week  Duration in weeks: 6  Treatment plan discussed with: patient        Subjective Evaluation    History of Present Illness  Mechanism of injury: Patient's PMHx is remarkable for Hypothyroidism, Osgood Schlatter's, bilateral knee OA, bilateral PEGGY and back surgery  Pain  At best pain ratin  At worst pain ratin  Location: left cervical spine, UT, periscapular and elbow region  Patient Goals  Patient goals for therapy: decreased pain, increased motion and increased strength  Patient goal: Patient's goal: to isolate symptom generator and improve"  Objective     Tenderness     Additional Tenderness Details  Patient is + TTP at left UT, cervical spine paraspinal and periscapular region at minimal levels  Patient exhibits protracted cervical spine at moderate levels  Active Range of Motion   Cervical/Thoracic Spine       Cervical    Flexion: 35 degrees   Extension: 20 degrees      Left lateral flexion: 25 degrees     with pain  Right lateral flexion: 35 degrees      Left rotation: 50 degrees with pain  Right rotation: 60 degrees         Left Shoulder   Flexion: 150 degrees   Abduction: 140 degrees   External rotation 90°: 80 degrees   Internal rotation 90°: 70 degrees     Right Shoulder   Flexion: 170 degrees   Abduction: 168 degrees   External rotation 90°: 90 degrees  Internal rotation 90°: 64 degrees     Strength/Myotome Testing     Left Shoulder     Planes of Motion   Flexion: 4   Abduction: 4   External rotation at 0°: 4+   Internal rotation at 0°: 4+     Right Shoulder     Planes of Motion   Flexion: 5   Abduction: 5   External rotation at 0°: 5   Internal rotation at 0°: 5     Tests   Cervical     Left   Positive Spurling's Test A  Left Shoulder   Negative ULTT3 and ULTT4  Precautions: Patient's PMHx is remarkable for Hypothyroidism, Osgood Schlatter's, bilateral knee OA, bilateral PEGGY and back surgery        Manuals             STM / IASTM to left UT and cervical spine seated                                                    Neuro Re-Ed Ther Ex             Pulleys             UBE             Corner pec stretch with bilateral ue at 90 degrees             Scapular squeezes 5 x            Seated postural correction slough over correct 5 x             Cervical spine retraction 5 x             UT stretch without ue support             LS stretch without ue support             Shoulder scaption and flexion:B:             Biceps curls:B:             tband rows and extension:B:             tband ir and er:L             Supine shoulder flexion:B:             scap punches and triceps extension:L             Side lying shoulder ER and abd: L             Prone I, T and Y                                                                                                                                                                                      Ther Activity                                       Gait Training                                       Modalities             MHP to left UT PRN

## 2020-09-01 ENCOUNTER — OFFICE VISIT (OUTPATIENT)
Dept: PHYSICAL THERAPY | Age: 70
End: 2020-09-01
Payer: MEDICARE

## 2020-09-01 DIAGNOSIS — M54.12 RADICULOPATHY, CERVICAL REGION: Primary | ICD-10-CM

## 2020-09-01 PROCEDURE — 97110 THERAPEUTIC EXERCISES: CPT

## 2020-09-01 NOTE — PROGRESS NOTES
Daily Note     Today's date: 2020  Patient name: Yesenia Viera  : 1950  MRN: [id number]  Referring provider: Nick Fatima MD  Dx:   Encounter Diagnosis     ICD-10-CM    1  Radiculopathy, cervical region  M54 12        Start Time: 1000  Stop Time: 1045    Subjective: Pt reported 5/10 pain after taking tylenol at L UT and shoulder since a recent fall  Objective: See treatment diary below      Assessment: Increased pain at L shoulder/ UT after today's session due to UE exercises today, tender to touch at L UT  Progress as able       Plan: Cont with plan of care     Precautions: Patient's PMHx is remarkable for Hypothyroidism, Osgood Schlatter's, bilateral knee OA, bilateral PEGGY and back surgery        Manuals            STM / IASTM to left UT and cervical spine seated  NT                        Ther Ex             Pulleys  6 MIN            UBE  NT           Corner pec stretch with bilateral ue at 90 degrees  NT           Scapular squeezes 5 x 20X 3SEC            Seated postural correction slough over correct 5 x  20X            Cervical spine retraction 5 x  20X 3SEC            UT stretch without ue support  20sec 5x           LS stretch without ue support  NT           Shoulder scaption and flexion:B:  NT           Biceps curls:B:  NT           tband rows and extension:B:  NT           tband ir and er:L  NT           Supine shoulder flexion:B:  NT           scap punches and triceps extension:L  20X            Side lying shoulder ER and abd: L  NT           Prone I, T and Y  NT             Modalities               MHP to left UT PRN               CP to L UT and L shoulder   10 min

## 2020-09-03 ENCOUNTER — OFFICE VISIT (OUTPATIENT)
Dept: PHYSICAL THERAPY | Age: 70
End: 2020-09-03
Payer: MEDICARE

## 2020-09-03 DIAGNOSIS — M54.12 RADICULOPATHY, CERVICAL REGION: Primary | ICD-10-CM

## 2020-09-03 PROCEDURE — 97110 THERAPEUTIC EXERCISES: CPT | Performed by: PHYSICAL THERAPIST

## 2020-09-03 PROCEDURE — 97140 MANUAL THERAPY 1/> REGIONS: CPT | Performed by: PHYSICAL THERAPIST

## 2020-09-03 NOTE — PROGRESS NOTES
Daily Note     Today's date: 9/3/2020  Patient name: Maico Flaherty  : 1950  MRN: [id number]  Referring provider: Carolina Barraza MD  Dx:   Encounter Diagnosis     ICD-10-CM    1  Radiculopathy, cervical region  M54 12                Subjective:  Patient reported left UT and left UE muscle soreness persisted after last PT treatment but he noted pain is less in both left UT and left upper arm  Objective: See treatment diary below  Assessment: Patient presents with postural correction / cervical spine retraction as intensity reducing but not providing long term pain elimination  Patient continues to exhibit decrease in prolonged cervical spine postural control as well as utilization of left ue during functional activities as pain aggravating  Thus, pt education on importance of postural control as well as additions to manual therapy techniques added to current PT treatment session to promote long term pain reduction  Plan: Cont with plan of care     Precautions: Patient's PMHx is remarkable for Hypothyroidism, Osgood Schlatter's, bilateral knee OA, bilateral PEGGY and back surgery        Manuals 8/27 9/1 9/3          STM / IASTM to left UT and cervical spine seated  NT 10 min                       Ther Ex             Pulleys  6 MIN  6 min          UBE  NT NT          Corner pec stretch with bilateral ue at 90 degrees  NT 20 sec x 5          Scapular squeezes 5 x 20X 3SEC  3 sec x 20          Seated postural correction slough over correct 5 x  20X  3 sec x 20          Cervical spine retraction 5 x  20X 3SEC  2 x 10          Cervical spine retraction with extension seated    add         UT stretch without ue support  20sec 5x 20 sec x 5          LS stretch without ue support  NT NT          Shoulder scaption and flexion:B:  NT 2 x 10          Biceps curls:B:  NT 2 x 10          tband rows and extension:B:  NT NT          tband ir and er:L  NT NT          Supine shoulder flexion:B:  NT NT scap punches and triceps extension:L  20X  NT          Side lying shoulder ER and abd: L  NT NT          Prone I, T and Y  NT NT            Modalities               MHP to left UT PRN               CP to L UT and L shoulder   10 min  NT

## 2020-09-08 ENCOUNTER — OFFICE VISIT (OUTPATIENT)
Dept: PHYSICAL THERAPY | Age: 70
End: 2020-09-08
Payer: MEDICARE

## 2020-09-08 DIAGNOSIS — M54.12 RADICULOPATHY, CERVICAL REGION: Primary | ICD-10-CM

## 2020-09-08 PROCEDURE — 97140 MANUAL THERAPY 1/> REGIONS: CPT

## 2020-09-08 PROCEDURE — 97110 THERAPEUTIC EXERCISES: CPT

## 2020-09-08 NOTE — PROGRESS NOTES
Daily Note     Today's date: 2020  Patient name: Jovany Garcia  : 1950  MRN: [id number]  Referring provider: Marcus John MD  Dx:   Encounter Diagnosis     ICD-10-CM    1  Radiculopathy, cervical region  M54 12        Start Time: 1030  Stop Time: 1115    Subjective: Pt noted 3/10 pain at L UT today       Objective: See treatment diary below  Assessment: No pain after man work and modalities, Tender to touch at L UT today  Plan: Cont with plan of care     Precautions: Patient's PMHx is remarkable for Hypothyroidism, Osgood Schlatter's, bilateral knee OA, bilateral PEGGY and back surgery        Manuals 8/27 9/1 9/3 9/8         STM / IASTM to left UT and cervical spine seated  NT 10 min 10 min                       Ther Ex             Pulleys  6 MIN  6 min 6 min          UBE  NT NT          Corner pec stretch with bilateral ue at 90 degrees  NT 20 sec x 5 10x 10sec          Scapular squeezes 5 x 20X 3SEC  3 sec x 20 20x 3sec         Seated postural correction slough over correct 5 x  20X  3 sec x 20 20x 3sec          Cervical spine retraction 5 x  20X 3SEC  2 x 10 20x 3sec          Cervical spine retraction with extension seated    20x 3sec          UT stretch without ue support  20sec 5x 20 sec x 5 20sec 5x         LS stretch without ue support  NT NT NT         Shoulder scaption and flexion:B:  NT 2 x 10 20x          Biceps curls:B:  NT 2 x 10 20x          tband rows and extension:B:  NT NT NT         tband ir and er:L  NT NT NT         Supine shoulder flexion:B:  NT NT NT         scap punches and triceps extension:L  20X  NT NT         Side lying shoulder ER and abd: L  NT NT NT         Prone I, T and Y  NT NT NT           Modalities               MHP to left UT PRN               CP to L UT and L shoulder   10 min  NT NT

## 2020-09-10 ENCOUNTER — APPOINTMENT (OUTPATIENT)
Dept: PHYSICAL THERAPY | Age: 70
End: 2020-09-10
Payer: MEDICARE

## 2020-09-11 ENCOUNTER — OFFICE VISIT (OUTPATIENT)
Dept: PHYSICAL THERAPY | Age: 70
End: 2020-09-11
Payer: MEDICARE

## 2020-09-11 ENCOUNTER — TELEPHONE (OUTPATIENT)
Dept: OBGYN CLINIC | Facility: HOSPITAL | Age: 70
End: 2020-09-11

## 2020-09-11 DIAGNOSIS — Z96.60 HISTORY OF JOINT REPLACEMENT, UNSPECIFIED JOINT: Primary | ICD-10-CM

## 2020-09-11 DIAGNOSIS — M54.12 RADICULOPATHY, CERVICAL REGION: Primary | ICD-10-CM

## 2020-09-11 PROCEDURE — 97110 THERAPEUTIC EXERCISES: CPT | Performed by: PHYSICAL THERAPIST

## 2020-09-11 PROCEDURE — 97140 MANUAL THERAPY 1/> REGIONS: CPT | Performed by: PHYSICAL THERAPIST

## 2020-09-11 RX ORDER — CEPHALEXIN 500 MG/1
CAPSULE ORAL
Qty: 12 CAPSULE | Refills: 3 | Status: SHIPPED | OUTPATIENT
Start: 2020-09-11 | End: 2020-09-11

## 2020-09-11 RX ORDER — CEPHALEXIN 500 MG/1
CAPSULE ORAL
Qty: 12 CAPSULE | Refills: 3 | Status: SHIPPED | OUTPATIENT
Start: 2020-09-11 | End: 2020-09-18

## 2020-09-11 NOTE — PROGRESS NOTES
Daily Note     Today's date: 2020  Patient name: Landon Salinas  : 1950  MRN: [id number]  Referring provider: Liliam Burnett MD  Dx:   Encounter Diagnosis     ICD-10-CM    1  Radiculopathy, cervical region  M54 12                Subjective: Patient reported intermittent left UE pain into mid upper arm while left UT pain persists at 2-3 of 10 pain  Objective: See treatment diary below  Assessment: Patient presents with left medial distal UT / left upper thoracic spine erector spinae musculature as his area of pain aggravation with repeated ue movements as well as + TTP  Patient presents with postural correction persisting as pain reducing and not producing left ue radicular symptoms thus PT recommendations of hep as repeated cervical spine retraction with extension  Plan: Cont with plan of care     Precautions: Patient's PMHx is remarkable for Hypothyroidism, Osgood Schlatter's, bilateral knee OA, bilateral PEGGY and back surgery        Manuals 8/27 9/1 9/3 9/8 9/11        STM / IASTM to left UT and cervical spine seated  NT 10 min 10 min  10 min                     Ther Ex             Pulleys  6 MIN  6 min 6 min  6 min        UBE  NT NT NT NT        Corner pec stretch with bilateral ue at 90 degrees  NT 20 sec x 5 10x 10sec  10 sec x 10        Scapular squeezes 5 x 20X 3SEC  3 sec x 20 20x 3sec 3 sec x 20        Seated postural correction slough over correct 5 x  20X  3 sec x 20 20x 3sec  3 sec x 20        Cervical spine retraction 5 x  20X 3SEC  2 x 10 20x 3sec  3 sec x 20        Cervical spine retraction with extension seated    20x 3sec  2 x 10        UT stretch without ue support  20sec 5x 20 sec x 5 20sec 5x 20 sec x 5        LS stretch without ue support  NT NT NT NT        Shoulder scaption and flexion:B:  NT 2 x 10 20x  2 x 10        Biceps curls:B:  NT 2 x 10 20x  2 x 10        tband rows and extension:B:  NT NT NT Red x 20        tband ir and er:L  NT NT NT NT Supine shoulder flexion:B:  NT NT NT 2 x 10        scap punches and triceps extension:L  20X  NT NT 2 x 10        Side lying shoulder ER and abd: L  NT NT NT 2 x 10        Prone I, T and Y  NT NT NT NT          Modalities               MHP to left UT PRN               CP to L UT and L shoulder   10 min  NT NT 10 min MHP

## 2020-09-11 NOTE — TELEPHONE ENCOUNTER
Medhat Camargo from [facility] called in asking if the cephalexin (KEFLEX) 500 mg capsule can be substituted or is there a specific brand that Dr Laura Soto wants to order  [phone]- option [phone]

## 2020-09-11 NOTE — TELEPHONE ENCOUNTER
Patient sees Dr Jani Cabello    Patient called in asking for a script for antibiotics for his dental  appmts  He has dental appmts every 3 mos  Pls use [facility] on file  Thank you        [phone]

## 2020-09-15 ENCOUNTER — OFFICE VISIT (OUTPATIENT)
Dept: PHYSICAL THERAPY | Age: 70
End: 2020-09-15
Payer: MEDICARE

## 2020-09-15 DIAGNOSIS — M54.12 RADICULOPATHY, CERVICAL REGION: Primary | ICD-10-CM

## 2020-09-15 PROCEDURE — 97110 THERAPEUTIC EXERCISES: CPT | Performed by: PHYSICAL THERAPIST

## 2020-09-15 PROCEDURE — 97140 MANUAL THERAPY 1/> REGIONS: CPT | Performed by: PHYSICAL THERAPIST

## 2020-09-15 NOTE — PROGRESS NOTES
Daily Note     Today's date: 9/15/2020  Patient name: Miley Flynn  : 1950  MRN: [id number]  Referring provider: Rishabh Johnson MD  Dx:   Encounter Diagnosis     ICD-10-CM    1  Radiculopathy, cervical region  M54 12                Subjective: Patient reported he has not had many episodes of right ue pain and his pain primarily is present in the left UT / superior right scapular region at minimal to moderate levels           Objective: See treatment diary below  Assessment: Patient presents stretching, manual therapy techniques and modalities as most effective in left UT / LS / superior scapular region pain reduction but any over head or repeated ue activities aggravate left UT / LS / superior scapular region  Plan: Cont with plan of care     Precautions: Patient's PMHx is remarkable for Hypothyroidism, Osgood Schlatter's, bilateral knee OA, bilateral PEGGY and back surgery        Manuals 8/27 9/1 9/3 9/8 9/11 9/15       STM / IASTM to left UT and cervical spine seated  NT 10 min 10 min  10 min 10 min                    Ther Ex             Pulleys  6 MIN  6 min 6 min  6 min 6 min       UBE  NT NT NT NT NT       Corner pec stretch with bilateral ue at 90 degrees  NT 20 sec x 5 10x 10sec  10 sec x 10 10 sec x 10       Scapular squeezes 5 x 20X 3SEC  3 sec x 20 20x 3sec 3 sec x 20 3 sec x 20       Seated postural correction slough over correct 5 x  20X  3 sec x 20 20x 3sec  3 sec x 20 3 sec x 20       Cervical spine retraction 5 x  20X 3SEC  2 x 10 20x 3sec  3 sec x 20 2 x 10       Cervical spine retraction with extension seated    20x 3sec  2 x 10 2 x 10       UT stretch without ue support  20sec 5x 20 sec x 5 20sec 5x 20 sec x 5 20 sec x 5       LS stretch without ue support  NT NT NT NT 20 sec x 5       Shoulder scaption and flexion:B:  NT 2 x 10 20x  2 x 10 2 x 10       Biceps curls:B:  NT 2 x 10 20x  2 x 10 2 x 10       tband rows and extension:B:  NT NT NT Red x 20 Red x 20       tband ir and er:L  NT NT NT NT NT       Supine shoulder flexion:B:  NT NT NT 2 x 10 2 x 10       scap punches and triceps extension:L  20X  NT NT 2 x 10 2 x 10       Side lying shoulder ER and abd: L  NT NT NT 2 x 10 2 x 10       Prone I, T and Y  NT NT NT NT NT         Modalities               MHP to left UT PRN               CP to L UT and L shoulder   10 min  NT NT 10 min MHP 10 min MHP

## 2020-09-17 ENCOUNTER — OFFICE VISIT (OUTPATIENT)
Dept: PHYSICAL THERAPY | Age: 70
End: 2020-09-17
Payer: MEDICARE

## 2020-09-17 DIAGNOSIS — M54.12 RADICULOPATHY, CERVICAL REGION: Primary | ICD-10-CM

## 2020-09-17 PROCEDURE — 97140 MANUAL THERAPY 1/> REGIONS: CPT | Performed by: PHYSICAL THERAPIST

## 2020-09-17 PROCEDURE — 97110 THERAPEUTIC EXERCISES: CPT | Performed by: PHYSICAL THERAPIST

## 2020-09-17 NOTE — PROGRESS NOTES
Daily Note     Today's date: 2020  Patient name: Zulma Dunlap  : 1950  MRN: [id number]  Referring provider: Vladislav Freed MD  Dx:   Encounter Diagnosis     ICD-10-CM    1  Radiculopathy, cervical region  M54 12                Subjective: Patient reported he is feeling better since onset of PT with left UT / superior right scapular region pain persisting at minimal levels  Objective: See treatment diary below  Assessment: Patient presents stretching, manual therapy techniques and modalities as most effective in left UT / LS / superior scapular region pain reduction but any over head or repeated ue activities aggravate left UT / LS / superior scapular region  Thus, emphasis on elevation arom and strengthening to promote left GH joint stability  Plus, pt continues to exhibit reduction in TTP in left UT / LS / superior scapular region with short term pain elimination and overall pain reduction due to additions of stretching, manual therapy techniques and modalities  Plan: Cont with plan of care     Precautions: Patient's PMHx is remarkable for Hypothyroidism, Osgood Schlatter's, bilateral knee OA, bilateral PEGGY and back surgery        Manuals 8/27 9/1 9/3 9/8 9/11 9/15 9/17      STM / IASTM to left UT and cervical spine seated  NT 10 min 10 min  10 min 10 min 10 min                   Ther Ex             Pulleys  6 MIN  6 min 6 min  6 min 6 min 6 min      UBE  NT NT NT NT NT NT      Corner pec stretch with bilateral ue at 90 degrees  NT 20 sec x 5 10x 10sec  10 sec x 10 10 sec x 10 10 sec x 10      Scapular squeezes 5 x 20X 3SEC  3 sec x 20 20x 3sec 3 sec x 20 3 sec x 20 3 sec x 20      Seated postural correction slough over correct 5 x  20X  3 sec x 20 20x 3sec  3 sec x 20 3 sec x 20 3 sec x 20      Cervical spine retraction 5 x  20X 3SEC  2 x 10 20x 3sec  3 sec x 20 2 x 10 2 x 10      Cervical spine retraction with extension seated    20x 3sec  2 x 10 2 x 10 2 x 10 UT stretch without ue support  20sec 5x 20 sec x 5 20sec 5x 20 sec x 5 20 sec x 5 Right side bend left rotatoin stretch 20 sec x 5      LS stretch without ue support  NT NT NT NT 20 sec x 5 See above      Shoulder scaption and flexion:B:  NT 2 x 10 20x  2 x 10 2 x 10 1 5# x 20      Biceps curls:B:  NT 2 x 10 20x  2 x 10 2 x 10 1 5# x 30      tband rows and extension:B:  NT NT NT Red x 20 Red x 20 Red x 20      tband ir and er:L  NT NT NT NT NT Red x 20      Supine shoulder flexion:B:  NT NT NT 2 x 10 2 x 10 1 5# on each ue x 20 with spc      scap punches and triceps extension:L  20X  NT NT 2 x 10 2 x 10 1 5# x 20      Side lying shoulder ER and abd: L  NT NT NT 2 x 10 2 x 10 1 5# x 20      Prone I, T and Y  NT NT NT NT NT NT        Modalities               MHP to left UT PRN               CP to L UT and L shoulder   10 min  NT NT 10 min MHP 10 min MHP 10 min MHP

## 2020-09-22 ENCOUNTER — OFFICE VISIT (OUTPATIENT)
Dept: PHYSICAL THERAPY | Age: 70
End: 2020-09-22
Payer: MEDICARE

## 2020-09-22 DIAGNOSIS — M54.12 RADICULOPATHY, CERVICAL REGION: Primary | ICD-10-CM

## 2020-09-22 PROCEDURE — 97110 THERAPEUTIC EXERCISES: CPT | Performed by: PHYSICAL THERAPIST

## 2020-09-22 PROCEDURE — 97140 MANUAL THERAPY 1/> REGIONS: CPT | Performed by: PHYSICAL THERAPIST

## 2020-09-22 NOTE — PROGRESS NOTES
Daily Note     Today's date: 2020  Patient name: Solomon Mcnamara  : 1950  MRN: [id number]  Referring provider: Shabbir Toussaint MD  Dx:   Encounter Diagnosis     ICD-10-CM    1  Radiculopathy, cervical region  M54 12                Subjective: Patient reported he has an increase in left UT / superior right scapular region pain to moderate levels, which he noted was due to life stressors currently going on in his life  Objective: See treatment diary below  Assessment: Patient presents stretching, manual therapy techniques and modalities as most effective in left UT / LS / superior scapular region pain reduction but any over head or repeated ue activities aggravate left UT / LS / superior scapular region  But, postural correction is the movement based functional activities in which pt can perform that is effective in short term pain reduction  But, use of manual therapy techniques and modalities provide him longer periods of pain reduction which is the skilled therapy services we continue to provide as well as education on hep on attaining proper postural status  Plan: Cont with plan of care     Precautions: Patient's PMHx is remarkable for Hypothyroidism, Osgood Schlatter's, bilateral knee OA, bilateral PEGGY and back surgery        Manuals 8/27 9/1 9/3 9/8 9/11 9/15 9/17 9/22     STM / IASTM to left UT and cervical spine seated  NT 10 min 10 min  10 min 10 min 10 min                   Ther Ex             Pulleys  6 MIN  6 min 6 min  6 min 6 min 6 min 6 min     UBE  NT NT NT NT NT NT NT     Corner pec stretch with bilateral ue at 90 degrees  NT 20 sec x 5 10x 10sec  10 sec x 10 10 sec x 10 10 sec x 10 10 sec x 10     Scapular squeezes 5 x 20X 3SEC  3 sec x 20 20x 3sec 3 sec x 20 3 sec x 20 3 sec x 20 3 sec x 30     Seated postural correction slough over correct 5 x  20X  3 sec x 20 20x 3sec  3 sec x 20 3 sec x 20 3 sec x 20 3 sec x 30     Cervical spine retraction 5 x  20X 3SEC 2 x 10 20x 3sec  3 sec x 20 2 x 10 2 x 10 2 x 10     Cervical spine retraction with extension seated    20x 3sec  2 x 10 2 x 10 2 x 10      UT stretch without ue support  20sec 5x 20 sec x 5 20sec 5x 20 sec x 5 20 sec x 5 Right side bend left rotatoin stretch 20 sec x 5 Right side bend left rotatoin stretch 20 sec x 5     LS stretch without ue support  NT NT NT NT 20 sec x 5 See above NT     Shoulder scaption and flexion:B:  NT 2 x 10 20x  2 x 10 2 x 10 1 5# x 20 2 x 10     Biceps curls:B:  NT 2 x 10 20x  2 x 10 2 x 10 1 5# x 30 2 x 10     tband rows and extension:B:  NT NT NT Red x 20 Red x 20 Red x 20 NT     tband ir and er:L  NT NT NT NT NT Red x 20 NT     Supine shoulder flexion:B:  NT NT NT 2 x 10 2 x 10 1 5# on each ue x 20 with spc 2 x 10     scap punches and triceps extension:L  20X  NT NT 2 x 10 2 x 10 1 5# x 20 2 x 10     Side lying shoulder ER and abd: L  NT NT NT 2 x 10 2 x 10 1 5# x 20 NT     Prone I, T and Y  NT NT NT NT NT NT NT       Modalities               MHP to left UT PRN               CP to L UT and L shoulder   10 min  NT NT 10 min MHP 10 min MHP 10 min MHP

## 2020-09-24 ENCOUNTER — EVALUATION (OUTPATIENT)
Dept: PHYSICAL THERAPY | Age: 70
End: 2020-09-24
Payer: MEDICARE

## 2020-09-24 DIAGNOSIS — M54.12 RADICULOPATHY, CERVICAL REGION: Primary | ICD-10-CM

## 2020-09-24 PROCEDURE — 97750 PHYSICAL PERFORMANCE TEST: CPT | Performed by: PHYSICAL THERAPIST

## 2020-09-24 PROCEDURE — 97140 MANUAL THERAPY 1/> REGIONS: CPT | Performed by: PHYSICAL THERAPIST

## 2020-09-24 PROCEDURE — 97110 THERAPEUTIC EXERCISES: CPT | Performed by: PHYSICAL THERAPIST

## 2020-09-24 NOTE — PROGRESS NOTES
PT Evaluation  / PT Reassessment / PT Discharge    Today's date: 2020  Patient name: Jovany Garcia  : 1950  MRN: [id number]  Referring provider: Marcus John MD  Dx:   Encounter Diagnosis     ICD-10-CM    1  Radiculopathy, cervical region  M54 12                   Assessment  Assessment details: PT Reassessment: 2020  Patient reported the following progress since onset of PT: increase in cervical and left ue mobility, decrease in "buzzing type pain" frequency, decrease in cervical spine pain  But, he noted the following deficits that still persist: stress aggravates left cervical and ue pain, overall sedentary lifestyle  But, pt does recognize he is more mobile and active since onset of PT  Patient noted he is compliant with hep especially with combined cervical spine right side bending left rotation stretch that nearly eliminates left cervical / UT and superior shoulder symptoms  PT IE: 2020  Patient reported he initially had left hand and 5 th digit tingling and pain  Patient noted he did have left ue radicular symptoms  Patient noted he worked with OT and had success with his left hand but still had left UT / periscapular region symptoms that radiate into his biceps region  Patient noted he does sleep on his left side  Patient noted getting up in the am he has difficulties  Patient noted he had low back surgery in  and right PEGGY in 2019  Patient noted he has bilateral knee Osgood javed  Patient noted he has left UT / periscapular region numbness / pain that radiates into left biceps  Patient noted lifting is limited by weakness  Patient noted he has tension in left UT and periscapular tension  Patient noted the left biceps paraesthesias is produced with grabbing the steering wheel  Patient noted he has 4 th digit edema hyper sensation that is intermittent  Patient denies cervical spine movements aggravating or producing left UT / UE symptoms  Patient noted he is right ue dominant  Patient denies left ue weakness  Impairments: abnormal or restricted ROM, abnormal movement, activity intolerance, lacks appropriate home exercise program and pain with function  Understanding of Dx/Px/POC: excellent   Prognosis: good  Prognosis details: Patient is a 65y o  year old male seen for outpatient PT reevaluation with pain, mobility and functional deficits due to cervical spine radiculopathy  Patient presents with the following progress since onset of PT: decrease in left cervical / UT and shoulder pain, increase in cervical spine mobility, increase in left ue rom and strength, postural awareness improvements and functional progress  Thus, due to functional and impairment progress patient agrees with PT POC to d/c to hep  Thus, we discussed final activity and hep with red and green theraband provided  Therefore, patient d/c to hep with patient in agreement of d/c to hep  Thank you for the referral      Goals  Short Term goals - 4 weeks  1  Patient will be independent HEP   MET  2   Patient will report a 25 - 50% decrease in pain complaints  MET  3   Increase strength 1/2 grade  MET  4   Increase ROM 5-10 degrees  MET  Long Term goals - 8 weeks  1  Patient will report elimination of pain complaints  Partially MET  2   Patient will return to all recreational activities without restriction  Partially MET  3   ROM WFL  MET  4   Strength 5/5  Partially MET  5   Patient will report lifting improved so he denies house hold deficits  MET  6   Patient will report looking improved so he has no left ue symptoms  Partially MET  7   Patient will report no longer having left ue symptoms with driving  MET  8   Patient will deny sleep deficits  Partially MET      Plan  Patient would benefit from: skilled physical therapy  Planned modality interventions: cryotherapy, TENS, thermotherapy: hydrocollator packs and unattended electrical stimulation  Planned therapy interventions: joint mobilization, manual therapy, massage, neuromuscular re-education, patient education, postural training, body mechanics training, self care, compression, strengthening, stretching, therapeutic activities, therapeutic exercise, therapeutic training, flexibility, functional ROM exercises, graded activity, graded exercise, graded motor and home exercise program  Frequency: 2x week  Duration in weeks: 6  Treatment plan discussed with: patient        Subjective Evaluation    History of Present Illness  Mechanism of injury: Patient's PMHx is remarkable for Hypothyroidism, Osgood Schlatter's, bilateral knee OA, bilateral PEGGY and back surgery  Pain  At best pain ratin  At worst pain ratin  Location: left cervical spine, UT, periscapular and elbow region  Patient Goals  Patient goals for therapy: decreased pain, increased motion and increased strength  Patient goal: Patient's goal: to isolate symptom generator and improve"  Objective     Tenderness     Additional Tenderness Details  Patient is + TTP at left UT, cervical spine paraspinal and periscapular region at minimal levels  Patient exhibits protracted cervical spine at minimal to moderate levels      Active Range of Motion   Cervical/Thoracic Spine       Cervical    Flexion: 35 degrees   Extension: 40 degrees      Left lateral flexion: 35 degrees      Right lateral flexion: 42 degrees      Left rotation: 60 degrees  Right rotation: 64 degrees         Left Shoulder   Flexion: 170 degrees   Abduction: 164 degrees   External rotation 90°: 80 degrees   Internal rotation 90°: 70 degrees     Right Shoulder   Flexion: 172 degrees   Abduction: 168 degrees   External rotation 90°: 90 degrees  Internal rotation 90°: 64 degrees     Strength/Myotome Testing     Left Shoulder     Planes of Motion   Flexion: 4+   Abduction: 4+   External rotation at 0°: 5   Internal rotation at 0°: 5     Right Shoulder     Planes of Motion   Flexion: 5 Abduction: 5   External rotation at 0°: 5   Internal rotation at 0°: 5     Tests   Cervical     Left   Positive Spurling's Test A  Left Shoulder   Negative ULTT3 and ULTT4  Precautions: Patient's PMHx is remarkable for Hypothyroidism, Osgood Schlatter's, bilateral knee OA, bilateral PEGGY and back surgery        Manuals 8/27 9/1 9/3 9/8 9/11 9/15 9/17 9/22 9/24    STM / IASTM to left UT and cervical spine seated  NT 10 min 10 min  10 min 10 min 10 min 10 min 10 min                 Ther Ex             Pulleys  6 MIN  6 min 6 min  6 min 6 min 6 min 6 min 6 min    UBE  NT NT NT NT NT NT NT NT    Corner pec stretch with bilateral ue at 90 degrees  NT 20 sec x 5 10x 10sec  10 sec x 10 10 sec x 10 10 sec x 10 10 sec x 10 10 sec x 10    Scapular squeezes 5 x 20X 3SEC  3 sec x 20 20x 3sec 3 sec x 20 3 sec x 20 3 sec x 20 3 sec x 30 3 sec x 30    Seated postural correction slough over correct 5 x  20X  3 sec x 20 20x 3sec  3 sec x 20 3 sec x 20 3 sec x 20 3 sec x 30 3 sec x 30    Cervical spine retraction 5 x  20X 3SEC  2 x 10 20x 3sec  3 sec x 20 2 x 10 2 x 10 2 x 10 10 x     Cervical spine retraction with extension seated    20x 3sec  2 x 10 2 x 10 2 x 10  2 x 10    UT stretch without ue support  20sec 5x 20 sec x 5 20sec 5x 20 sec x 5 20 sec x 5 Right side bend left rotatoin stretch 20 sec x 5 Right side bend left rotation stretch 20 sec x 5 Right side bend left rotation stretch 20 sec x 5    LS stretch without ue support  NT NT NT NT 20 sec x 5 See above NT NT    Shoulder scaption and flexion:B:  NT 2 x 10 20x  2 x 10 2 x 10 1 5# x 20 2 x 10 2 x 10    Biceps curls:B:  NT 2 x 10 20x  2 x 10 2 x 10 1 5# x 30 2 x 10 2 x 10    tband rows and extension:B:  NT NT NT Red x 20 Red x 20 Red x 20 NT NT    tband ir and er:L  NT NT NT NT NT Red x 20 NT NT    Supine shoulder flexion:B:  NT NT NT 2 x 10 2 x 10 1 5# on each ue x 20 with spc 2 x 10 2 x 10    scap punches and triceps extension:L  20X  NT NT 2 x 10 2 x 10 1 5# x 20 2 x 10 2 x 10    Side lying shoulder ER and abd: L  NT NT NT 2 x 10 2 x 10 1 5# x 20 NT 2 x 10    Prone I, T and Y  NT NT NT NT NT NT NT NT      Modalities               MHP to left UT PRN               CP to L UT and L shoulder   10 min  NT NT 10 min MHP 10 min MHP 10 min MHP 10 min total 10 min total

## 2020-10-07 ENCOUNTER — TRANSCRIBE ORDERS (OUTPATIENT)
Dept: ADMINISTRATIVE | Age: 70
End: 2020-10-07

## 2020-10-07 ENCOUNTER — APPOINTMENT (OUTPATIENT)
Dept: LAB | Age: 70
End: 2020-10-07
Payer: MEDICARE

## 2020-10-07 DIAGNOSIS — E03.9 MYXEDEMA HEART DISEASE: ICD-10-CM

## 2020-10-07 DIAGNOSIS — E78.2 MIXED HYPERLIPIDEMIA: ICD-10-CM

## 2020-10-07 DIAGNOSIS — N40.1 BENIGN PROSTATIC HYPERPLASIA WITH URINARY RETENTION: ICD-10-CM

## 2020-10-07 DIAGNOSIS — I51.9 MYXEDEMA HEART DISEASE: ICD-10-CM

## 2020-10-07 DIAGNOSIS — I51.9 MYXEDEMA HEART DISEASE: Primary | ICD-10-CM

## 2020-10-07 DIAGNOSIS — R33.8 BENIGN PROSTATIC HYPERPLASIA WITH URINARY RETENTION: ICD-10-CM

## 2020-10-07 DIAGNOSIS — E03.9 MYXEDEMA HEART DISEASE: Primary | ICD-10-CM

## 2020-10-07 LAB
ALBUMIN SERPL BCP-MCNC: 3.8 G/DL (ref 3.5–5)
ALP SERPL-CCNC: 71 U/L (ref 46–116)
ALT SERPL W P-5'-P-CCNC: 21 U/L (ref 12–78)
ANION GAP SERPL CALCULATED.3IONS-SCNC: 5 MMOL/L (ref 4–13)
AST SERPL W P-5'-P-CCNC: 15 U/L (ref 5–45)
BILIRUB SERPL-MCNC: 0.95 MG/DL (ref 0.2–1)
BILIRUB UR QL STRIP: NEGATIVE
BUN SERPL-MCNC: 15 MG/DL (ref 5–25)
CALCIUM SERPL-MCNC: 8.8 MG/DL (ref 8.3–10.1)
CHLORIDE SERPL-SCNC: 109 MMOL/L (ref 100–108)
CHOLEST SERPL-MCNC: 130 MG/DL (ref 50–200)
CLARITY UR: CLEAR
CO2 SERPL-SCNC: 26 MMOL/L (ref 21–32)
COLOR UR: NORMAL
CREAT SERPL-MCNC: 0.95 MG/DL (ref 0.6–1.3)
GFR SERPL CREATININE-BSD FRML MDRD: 80 ML/MIN/1.73SQ M
GLUCOSE P FAST SERPL-MCNC: 86 MG/DL (ref 65–99)
GLUCOSE UR STRIP-MCNC: NEGATIVE MG/DL
HDLC SERPL-MCNC: 47 MG/DL
HGB UR QL STRIP.AUTO: NEGATIVE
KETONES UR STRIP-MCNC: NEGATIVE MG/DL
LDLC SERPL CALC-MCNC: 66 MG/DL (ref 0–100)
LEUKOCYTE ESTERASE UR QL STRIP: NEGATIVE
NITRITE UR QL STRIP: NEGATIVE
NONHDLC SERPL-MCNC: 83 MG/DL
PH UR STRIP.AUTO: 6.5 [PH]
POTASSIUM SERPL-SCNC: 4.2 MMOL/L (ref 3.5–5.3)
PROT SERPL-MCNC: 7.3 G/DL (ref 6.4–8.2)
PROT UR STRIP-MCNC: NEGATIVE MG/DL
PSA SERPL-MCNC: 0.4 NG/ML (ref 0–4)
SODIUM SERPL-SCNC: 140 MMOL/L (ref 136–145)
SP GR UR STRIP.AUTO: 1.02 (ref 1–1.03)
T4 FREE SERPL-MCNC: 1.47 NG/DL (ref 0.76–1.46)
TRIGL SERPL-MCNC: 84 MG/DL
TSH SERPL DL<=0.05 MIU/L-ACNC: 0.64 UIU/ML (ref 0.36–3.74)
UROBILINOGEN UR QL STRIP.AUTO: 1 E.U./DL

## 2020-10-07 PROCEDURE — 36415 COLL VENOUS BLD VENIPUNCTURE: CPT

## 2020-10-07 PROCEDURE — 80053 COMPREHEN METABOLIC PANEL: CPT

## 2020-10-07 PROCEDURE — 81003 URINALYSIS AUTO W/O SCOPE: CPT | Performed by: INTERNAL MEDICINE

## 2020-10-07 PROCEDURE — 84443 ASSAY THYROID STIM HORMONE: CPT

## 2020-10-07 PROCEDURE — 84439 ASSAY OF FREE THYROXINE: CPT

## 2020-10-07 PROCEDURE — 80061 LIPID PANEL: CPT

## 2020-10-07 PROCEDURE — G0103 PSA SCREENING: HCPCS

## 2020-10-12 ENCOUNTER — OFFICE VISIT (OUTPATIENT)
Dept: INTERNAL MEDICINE CLINIC | Facility: CLINIC | Age: 70
End: 2020-10-12
Payer: MEDICARE

## 2020-10-12 VITALS
DIASTOLIC BLOOD PRESSURE: 96 MMHG | HEART RATE: 72 BPM | TEMPERATURE: 97.9 F | BODY MASS INDEX: 36.84 KG/M2 | HEIGHT: 72 IN | WEIGHT: 272 LBS | SYSTOLIC BLOOD PRESSURE: 149 MMHG

## 2020-10-12 DIAGNOSIS — Z23 NEED FOR IMMUNIZATION AGAINST INFLUENZA: ICD-10-CM

## 2020-10-12 DIAGNOSIS — I10 ESSENTIAL HYPERTENSION: Primary | ICD-10-CM

## 2020-10-12 PROCEDURE — 90662 IIV NO PRSV INCREASED AG IM: CPT | Performed by: INTERNAL MEDICINE

## 2020-10-12 PROCEDURE — G0008 ADMIN INFLUENZA VIRUS VAC: HCPCS | Performed by: INTERNAL MEDICINE

## 2020-10-12 PROCEDURE — 99214 OFFICE O/P EST MOD 30 MIN: CPT | Performed by: INTERNAL MEDICINE

## 2020-10-14 ENCOUNTER — OFFICE VISIT (OUTPATIENT)
Dept: OBGYN CLINIC | Facility: CLINIC | Age: 70
End: 2020-10-14
Payer: MEDICARE

## 2020-10-14 VITALS
SYSTOLIC BLOOD PRESSURE: 145 MMHG | DIASTOLIC BLOOD PRESSURE: 104 MMHG | WEIGHT: 273 LBS | BODY MASS INDEX: 36.18 KG/M2 | HEIGHT: 73 IN

## 2020-10-14 DIAGNOSIS — M54.12 RADICULOPATHY, CERVICAL REGION: ICD-10-CM

## 2020-10-14 DIAGNOSIS — R20.2 NUMBNESS AND TINGLING IN LEFT HAND: Primary | ICD-10-CM

## 2020-10-14 DIAGNOSIS — R20.0 NUMBNESS AND TINGLING IN LEFT HAND: Primary | ICD-10-CM

## 2020-10-14 PROCEDURE — 99213 OFFICE O/P EST LOW 20 MIN: CPT | Performed by: SURGERY

## 2020-10-14 RX ORDER — DIAZEPAM 5 MG/1
TABLET ORAL
COMMUNITY
Start: 2020-09-08

## 2020-10-14 RX ORDER — CHLORHEXIDINE GLUCONATE 0.12 MG/ML
RINSE ORAL
COMMUNITY
Start: 2020-07-22 | End: 2021-04-14 | Stop reason: CLARIF

## 2021-02-13 DIAGNOSIS — Z23 ENCOUNTER FOR IMMUNIZATION: ICD-10-CM

## 2021-02-15 ENCOUNTER — TELEPHONE (OUTPATIENT)
Dept: INTERNAL MEDICINE CLINIC | Facility: CLINIC | Age: 71
End: 2021-02-15

## 2021-02-15 ENCOUNTER — TELEPHONE (OUTPATIENT)
Dept: OBGYN CLINIC | Facility: HOSPITAL | Age: 71
End: 2021-02-15

## 2021-02-15 NOTE — TELEPHONE ENCOUNTER
Patient is calling back stating that he would get his COVID vaccine 36 hours after taking the antibiotics for his dental procedure  He is asking if this is still okay? He is scheduled for his vaccine on Wednesday

## 2021-02-15 NOTE — TELEPHONE ENCOUNTER
Spoke to patient to address his questions  Pt was advised Dr Ashtyn Ralph typically has his pts take prophylactic antibiotics prior to dental appts x2 years post-op  Pt has cleaning scheduled for this afternoon, he confirms he does have antibiotics for today's appt  He is requesting a refill for future dental appts this year- Dr Ashtyn Ralph at your earliest convenience, please prescribe pt prophylactic antibiotics and send to [facility]  Pt is questioning if his dental antibiotics would interfere with the COVID vaccine  Pt does not currently have one scheduled, is currently in process of trying to get appt  Pt advised as vaccine is so new, little literature out there  NN advised however that it is possible if pt were to get the vaccine within a few days of oral antibiotic that this could affect the body's immune response of the vaccine  Pt advised it is truly up to him and is risk/benefit  Pt advised can also s/w PCP  Pt pleased and denies having questions, he reports he plans to wait on COVID vaccine this week with dental appt this afternoon and will try to schedule vaccine next week  Pt encouraged to call me with any questions, concerns or issues

## 2021-02-15 NOTE — TELEPHONE ENCOUNTER
Dr Alysa Cain    [phone]    Patient had his right hip replaced in 2019  How long will he need to take antibiotics? Will getting the covid vaccine will interfere with antibiotics?

## 2021-02-15 NOTE — TELEPHONE ENCOUNTER
Called pt, he again is looking for guidance as he has now secured himself a COVID vaccine appt and has a dental appt this afternoon  Strongly advised pt to contact his PCP regarding risk/benefit of taking the antibiotics and then having COVID vaccine-- pt plans to call PCP now to discuss for decision making  Surgeon is also welcome to weigh in if he has anything further to add  Thank you all

## 2021-02-15 NOTE — TELEPHONE ENCOUNTER
Pt is going to dentist today and is taking 4 keflex prior to the dental work  PATIENT IS GETTING HIS [address]    IT OKAY FOR HIM TO GET THE SHOT AFTER TAKING THE ANTIBIOTICS? ??

## 2021-02-21 ENCOUNTER — IMMUNIZATIONS (OUTPATIENT)
Dept: FAMILY MEDICINE CLINIC | Facility: HOSPITAL | Age: 71
End: 2021-02-21

## 2021-02-21 DIAGNOSIS — Z23 ENCOUNTER FOR IMMUNIZATION: Primary | ICD-10-CM

## 2021-02-21 PROCEDURE — 91300 SARS-COV-2 / COVID-19 MRNA VACCINE (PFIZER-BIONTECH) 30 MCG: CPT

## 2021-02-21 PROCEDURE — 0001A SARS-COV-2 / COVID-19 MRNA VACCINE (PFIZER-BIONTECH) 30 MCG: CPT

## 2021-02-27 DIAGNOSIS — E78.5 HYPERLIPIDEMIA, UNSPECIFIED HYPERLIPIDEMIA TYPE: Primary | ICD-10-CM

## 2021-02-27 DIAGNOSIS — E03.9 ACQUIRED HYPOTHYROIDISM: ICD-10-CM

## 2021-02-27 RX ORDER — ATORVASTATIN CALCIUM 10 MG/1
TABLET, FILM COATED ORAL
Qty: 90 TABLET | Refills: 3 | Status: SHIPPED | OUTPATIENT
Start: 2021-02-27 | End: 2022-02-22

## 2021-02-27 RX ORDER — LEVOTHYROXINE SODIUM 0.15 MG/1
TABLET ORAL
Qty: 90 TABLET | Refills: 3 | Status: SHIPPED | OUTPATIENT
Start: 2021-02-27 | End: 2022-02-22

## 2021-03-12 ENCOUNTER — IMMUNIZATIONS (OUTPATIENT)
Dept: FAMILY MEDICINE CLINIC | Facility: HOSPITAL | Age: 71
End: 2021-03-12

## 2021-03-12 DIAGNOSIS — Z23 ENCOUNTER FOR IMMUNIZATION: Primary | ICD-10-CM

## 2021-03-12 PROCEDURE — 91300 SARS-COV-2 / COVID-19 MRNA VACCINE (PFIZER-BIONTECH) 30 MCG: CPT

## 2021-03-12 PROCEDURE — 0002A SARS-COV-2 / COVID-19 MRNA VACCINE (PFIZER-BIONTECH) 30 MCG: CPT

## 2021-04-14 ENCOUNTER — OFFICE VISIT (OUTPATIENT)
Dept: INTERNAL MEDICINE CLINIC | Facility: CLINIC | Age: 71
End: 2021-04-14
Payer: MEDICARE

## 2021-04-14 VITALS
WEIGHT: 272 LBS | TEMPERATURE: 97.6 F | OXYGEN SATURATION: 97 % | BODY MASS INDEX: 36.05 KG/M2 | DIASTOLIC BLOOD PRESSURE: 84 MMHG | HEIGHT: 73 IN | SYSTOLIC BLOOD PRESSURE: 140 MMHG | HEART RATE: 73 BPM

## 2021-04-14 DIAGNOSIS — E03.9 ACQUIRED HYPOTHYROIDISM: ICD-10-CM

## 2021-04-14 DIAGNOSIS — Z96.641 STATUS POST RIGHT HIP REPLACEMENT: ICD-10-CM

## 2021-04-14 DIAGNOSIS — E66.01 OBESITY, MORBID (HCC): ICD-10-CM

## 2021-04-14 DIAGNOSIS — M17.0 PRIMARY OSTEOARTHRITIS OF BOTH KNEES: ICD-10-CM

## 2021-04-14 DIAGNOSIS — I10 ESSENTIAL HYPERTENSION: Primary | ICD-10-CM

## 2021-04-14 DIAGNOSIS — Z11.59 NEED FOR HEPATITIS C SCREENING TEST: ICD-10-CM

## 2021-04-14 DIAGNOSIS — Z00.00 MEDICARE ANNUAL WELLNESS VISIT, SUBSEQUENT: ICD-10-CM

## 2021-04-14 PROCEDURE — 99214 OFFICE O/P EST MOD 30 MIN: CPT | Performed by: INTERNAL MEDICINE

## 2021-04-14 PROCEDURE — G0438 PPPS, INITIAL VISIT: HCPCS | Performed by: INTERNAL MEDICINE

## 2021-04-14 PROCEDURE — 1123F ACP DISCUSS/DSCN MKR DOCD: CPT | Performed by: INTERNAL MEDICINE

## 2021-04-14 NOTE — PROGRESS NOTES
Assessment/Plan:    No problem-specific Assessment & Plan notes found for this encounter  Diagnoses and all orders for this visit:    Essential hypertension  -     Comprehensive metabolic panel; Future  -     CBC (Includes Diff/Plt) (Refl); Future  -     Urinalysis with microscopic  -     Lipid panel; Future    Need for hepatitis C screening test    Medicare annual wellness visit, subsequent    Status post right hip replacement    Obesity, morbid ([address] )    Other orders  -     Cancel: Hepatitis C antibody; Future          Subjective:      Patient ID: Lasha Engel is a 65 y o  male  Follow up  Hyperlipidemia,  Hip  Osteoarthritis, Hypertension      The following portions of the patient's history were reviewed and updated as appropriate: allergies, current medications, past family history, past medical history, past social history, past surgical history, and problem list     Review of Systems   Constitutional: Negative  HENT: Negative for dental problem, drooling, ear discharge and ear pain  Eyes: Negative for discharge, redness and itching  Respiratory: Negative for apnea, cough and wheezing  Cardiovascular: Negative for chest pain and palpitations  Gastrointestinal: Negative for abdominal pain, blood in stool, diarrhea and vomiting  Endocrine: Negative for polydipsia, polyphagia and polyuria  Genitourinary: Negative for decreased urine volume, dysuria and frequency  Musculoskeletal: Negative for arthralgias, myalgias and neck stiffness  Skin: Negative for pallor and wound  Allergic/Immunologic: Negative for environmental allergies and food allergies  Neurological: Negative for facial asymmetry, light-headedness, numbness and headaches  Hematological: Negative for adenopathy  Does not bruise/bleed easily  Psychiatric/Behavioral: Negative for agitation, behavioral problems and confusion           Objective:      /84 (BP Location: Left arm, Patient Position: Sitting, Cuff Size: Standard)   Pulse 73   Temp 97 6 °F (36 4 °C) (Temporal)   Ht 6' 1" (1 854 m)   Wt 123 kg (272 lb)   SpO2 97%   BMI 35 89 kg/m²     BMI Counseling: Body mass index is 35 89 kg/m²  The BMI is above normal  Nutrition recommendations include reducing portion sizes, decreasing overall calorie intake, 3-5 servings of fruits/vegetables daily, reducing fast food intake, consuming healthier snacks and decreasing soda and/or juice intake  Physical Exam  Constitutional:       Appearance: Normal appearance  He is obese  HENT:      Head: Normocephalic  Nose: Nose normal       Mouth/Throat:      Mouth: Mucous membranes are moist    Eyes:      Pupils: Pupils are equal, round, and reactive to light  Neck:      Musculoskeletal: Neck supple  Cardiovascular:      Rate and Rhythm: Regular rhythm  Heart sounds: Normal heart sounds  Pulmonary:      Breath sounds: Normal breath sounds  Abdominal:      Palpations: Abdomen is soft  Musculoskeletal:         General: No swelling  Skin:     General: Skin is warm  Neurological:      General: No focal deficit present  Mental Status: He is alert and oriented to person, place, and time  Psychiatric:         Mood and Affect: Mood normal         Assessment and Plan:     Problem List Items Addressed This Visit     None      Visit Diagnoses     Need for hepatitis C screening test    -  Primary           Preventive health issues were discussed with patient, and age appropriate screening tests were ordered as noted in patient's After Visit Summary  Personalized health advice and appropriate referrals for health education or preventive services given if needed, as noted in patient's After Visit Summary       History of Present Illness:     Patient presents for Medicare Annual Wellness visit    Patient Care Team:  Kiko Chino MD as PCP - General (Internal Medicine)  Kendra Alex MD     Problem List:     Patient Active Problem List   Diagnosis    Primary osteoarthritis of both knees    Osgood-Schlatter's disease of both knees    Pain in both knees    Status post right hip replacement    Status post total hip replacement, left    Acute blood loss anemia    Impaired mobility and ADLs    Hypothyroid    Difficulty sleeping    Aftercare following right hip joint replacement surgery      Past Medical and Surgical History:     Past Medical History:   Diagnosis Date    Anxiety disorder     Atherosclerotic heart disease of native coronary artery without angina pectoris     Benign prostatic hyperplasia without lower urinary tract symptoms     Dyslipidemia     GERD (gastroesophageal reflux disease)     Hypertension     Hyperthyroidism     Impaired fasting blood sugar     Low back pain     Osteoarthritis     last assesed 6-6-16    Other chest pain     Paresthesia of skin     Polyneuropathy     last assesed 5-8-17    Pure hypercholesterolemia     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 ([address] )     Wears glasses      Past Surgical History:   Procedure Laterality Date    BACK SURGERY      CHOLECYSTECTOMY      COLONOSCOPY  02/06/2019    NH TOTAL HIP ARTHROPLASTY Right 8/5/2019    Procedure: ARTHROPLASTY HIP TOTAL;  Surgeon: Cecelia Gonzáles MD;  [facility];  Service: Orthopedics    TONSILLECTOMY        Family History:     Family History   Problem Relation Age of Onset    Arthritis Other     Heart disease Father       Social History:        Social History     Socioeconomic History    Marital status: /Civil Union     Spouse name: Not on file    Number of children: Not on file    Years of education: Not on file    Highest education level: Not on file   Occupational History    Not on file   Social Needs    Financial resource strain: Not on file    Food insecurity     Worry: Not on file     Inability: Not on file    Transportation needs     Medical: Not on file     Non-medical: Not on file   Tobacco Use    Smoking status: Former Smoker    Smokeless tobacco: Never Used   Substance and Sexual Activity    Alcohol use: Never     Frequency: Never     Drinks per session: Patient refused     Binge frequency: Never    Drug use: Never    Sexual activity: Not on file   Lifestyle    Physical activity     Days per week: Not on file     Minutes per session: Not on file    Stress: Not on file   Relationships    Social connections     Talks on phone: Not on file     Gets together: Not on file     Attends Bahai service: Not on file     Active member of club or organization: Not on file     Attends meetings of clubs or organizations: Not on file     Relationship status: Not on file    Intimate partner violence     Fear of current or ex partner: Not on file     Emotionally abused: Not on file     Physically abused: Not on file     Forced sexual activity: Not on file   Other Topics Concern    Not on file   Social History Narrative    Smoking: Non-smoker    As per [employer]      Medications and Allergies:     Current Outpatient Medications   Medication Sig Dispense Refill    atorvastatin (LIPITOR) 10 mg tablet TAKE 1 TABLET BY MOUTH AT BEDTIME 90 tablet 3    chlorhexidine (PERIDEX) 0 12 % solution RINSE FOR 30 SECONDS AS DIRECTED      diazepam (VALIUM) 5 mg tablet TAKE 1 TABLET BY MOUTH 1 HOUR BEFORE DENTAL APPOINTMENT      levothyroxine 150 mcg tablet TAKE 1 TABLET BY MOUTH DAILY 90 tablet 3     No current facility-administered medications for this visit        No Known Allergies   Immunizations:     Immunization History   Administered Date(s) Administered    Influenza, high dose seasonal 0 7 mL 10/12/2020    Pneumococcal 11/04/2016    Pneumococcal Conjugate 13-Valent 01/18/2016    Pneumococcal Conjugate PCV 7 11/04/2016    SARS-CoV-2 / COVID-19 mRNA IM (Pfizer-BioNTech) 02/21/2021, 03/12/2021    Tdap 05/31/2013, 08/13/2018    Zoster Vaccine Recombinant 08/22/2018, 12/04/2018      Health Maintenance: Topic Date Due    Hepatitis C Screening  Never done    Colorectal Cancer Screening  Never done         Topic Date Due    Pneumococcal Vaccine: 65+ Years (2 of 2 - PPSV23) 01/18/2017      Medicare Health Risk Assessment:     Pulse 73   Temp 97 6 °F (36 4 °C) (Temporal)   Ht 6' 1" (1 854 m)   Wt 123 kg (272 lb)   SpO2 97%   BMI 35 89 kg/m²      Tremayne Hawkins is here for his Subsequent Wellness visit  Health Risk Assessment:   Patient rates overall health as good  Patient feels that their physical health rating is same  Patient is very satisfied with their life  Eyesight was rated as same  Hearing was rated as same  Patient feels that their emotional and mental health rating is same  Patients states they are never, rarely angry  Patient states they are never, rarely unusually tired/fatigued  Pain experienced in the last 7 days has been none  Patient states that he has experienced no weight loss or gain in last 6 months  Fall Risk Screening: In the past year, patient has experienced: no history of falling in past year      Home Safety:  Patient has trouble with stairs inside or outside of their home  Patient has working smoke alarms and has working carbon monoxide detector  Home safety hazards include: none  Nutrition:   Current diet is Regular  Medications:   Patient is currently taking over-the-counter supplements  OTC medications include: see medication list  Patient is able to manage medications  Activities of Daily Living (ADLs)/Instrumental Activities of Daily Living (IADLs):   Walk and transfer into and out of bed and chair?: Yes  Dress and groom yourself?: Yes    Bathe or shower yourself?: Yes    Feed yourself?  Yes  Do your laundry/housekeeping?: Yes  Manage your money, pay your bills and track your expenses?: Yes  Make your own meals?: Yes    Do your own shopping?: Yes    Previous Hospitalizations:   Any hospitalizations or ED visits within the last 12 months?: No      Advance Care Planning:   Living will: Yes    Durable POA for healthcare: Yes    Advanced directive: Yes      PREVENTIVE SCREENINGS      Cardiovascular Screening:    General: Screening Not Indicated and History Lipid Disorder      Diabetes Screening:     General: Screening Current      Prostate Cancer Screening:    General: Screening Current      Abdominal Aortic Aneurysm (AAA) Screening:    Risk factors include: age between 73-69 yo and tobacco use        Lung Cancer Screening:     General: Screening Not Indicated    Screening, Brief Intervention, and Referral to Treatment (SBIRT)    Screening  Typical number of drinks in a day: 0  Typical number of drinks in a week: 0  Interpretation: Low risk drinking behavior      Single Item Drug Screening:  How often have you used an illegal drug (including marijuana) or a prescription medication for non-medical reasons in the past year? never    Single Item Drug Screen Score: 0  Interpretation: Negative screen for possible drug use disorder      Lydia Harris MD

## 2021-04-14 NOTE — PATIENT INSTRUCTIONS

## 2021-04-15 ENCOUNTER — TELEPHONE (OUTPATIENT)
Dept: ADMINISTRATIVE | Facility: OTHER | Age: 71
End: 2021-04-15

## 2021-04-15 ENCOUNTER — APPOINTMENT (OUTPATIENT)
Dept: LAB | Age: 71
End: 2021-04-15
Payer: MEDICARE

## 2021-04-15 DIAGNOSIS — I10 ESSENTIAL HYPERTENSION: Primary | ICD-10-CM

## 2021-04-15 LAB
ALBUMIN SERPL BCP-MCNC: 3.5 G/DL (ref 3.5–5)
ALP SERPL-CCNC: 56 U/L (ref 46–116)
ALT SERPL W P-5'-P-CCNC: 23 U/L (ref 12–78)
ANION GAP SERPL CALCULATED.3IONS-SCNC: 3 MMOL/L (ref 4–13)
AST SERPL W P-5'-P-CCNC: 16 U/L (ref 5–45)
BACTERIA UR QL AUTO: NORMAL /HPF
BASOPHILS # BLD AUTO: 0.08 THOUSANDS/ΜL (ref 0–0.1)
BASOPHILS NFR BLD AUTO: 2 % (ref 0–1)
BILIRUB SERPL-MCNC: 0.89 MG/DL (ref 0.2–1)
BILIRUB UR QL STRIP: NEGATIVE
BUN SERPL-MCNC: 21 MG/DL (ref 5–25)
CALCIUM SERPL-MCNC: 8.5 MG/DL (ref 8.3–10.1)
CHLORIDE SERPL-SCNC: 108 MMOL/L (ref 100–108)
CHOLEST SERPL-MCNC: 125 MG/DL (ref 50–200)
CLARITY UR: CLEAR
CO2 SERPL-SCNC: 29 MMOL/L (ref 21–32)
COLOR UR: NORMAL
CREAT SERPL-MCNC: 0.96 MG/DL (ref 0.6–1.3)
EOSINOPHIL # BLD AUTO: 0.18 THOUSAND/ΜL (ref 0–0.61)
EOSINOPHIL NFR BLD AUTO: 4 % (ref 0–6)
ERYTHROCYTE [DISTWIDTH] IN BLOOD BY AUTOMATED COUNT: 13 % (ref 11.6–15.1)
GFR SERPL CREATININE-BSD FRML MDRD: 79 ML/MIN/1.73SQ M
GLUCOSE P FAST SERPL-MCNC: 94 MG/DL (ref 65–99)
GLUCOSE UR STRIP-MCNC: NEGATIVE MG/DL
HCT VFR BLD AUTO: 47.5 % (ref 36.5–49.3)
HDLC SERPL-MCNC: 44 MG/DL
HGB BLD-MCNC: 15.9 G/DL (ref 12–17)
HGB UR QL STRIP.AUTO: NEGATIVE
IMM GRANULOCYTES # BLD AUTO: 0.01 THOUSAND/UL (ref 0–0.2)
IMM GRANULOCYTES NFR BLD AUTO: 0 % (ref 0–2)
KETONES UR STRIP-MCNC: NEGATIVE MG/DL
LDLC SERPL CALC-MCNC: 67 MG/DL (ref 0–100)
LEUKOCYTE ESTERASE UR QL STRIP: NEGATIVE
LYMPHOCYTES # BLD AUTO: 1.31 THOUSANDS/ΜL (ref 0.6–4.47)
LYMPHOCYTES NFR BLD AUTO: 27 % (ref 14–44)
MCH RBC QN AUTO: 31.2 PG (ref 26.8–34.3)
MCHC RBC AUTO-ENTMCNC: 33.5 G/DL (ref 31.4–37.4)
MCV RBC AUTO: 93 FL (ref 82–98)
MONOCYTES # BLD AUTO: 0.46 THOUSAND/ΜL (ref 0.17–1.22)
MONOCYTES NFR BLD AUTO: 10 % (ref 4–12)
NEUTROPHILS # BLD AUTO: 2.78 THOUSANDS/ΜL (ref 1.85–7.62)
NEUTS SEG NFR BLD AUTO: 57 % (ref 43–75)
NITRITE UR QL STRIP: NEGATIVE
NON-SQ EPI CELLS URNS QL MICRO: NORMAL /HPF
NONHDLC SERPL-MCNC: 81 MG/DL
NRBC BLD AUTO-RTO: 0 /100 WBCS
PH UR STRIP.AUTO: 6.5 [PH]
PLATELET # BLD AUTO: 189 THOUSANDS/UL (ref 149–390)
PMV BLD AUTO: 10.5 FL (ref 8.9–12.7)
POTASSIUM SERPL-SCNC: 4.4 MMOL/L (ref 3.5–5.3)
PROT SERPL-MCNC: 7.3 G/DL (ref 6.4–8.2)
PROT UR STRIP-MCNC: NEGATIVE MG/DL
RBC # BLD AUTO: 5.09 MILLION/UL (ref 3.88–5.62)
RBC #/AREA URNS AUTO: NORMAL /HPF
SODIUM SERPL-SCNC: 140 MMOL/L (ref 136–145)
SP GR UR STRIP.AUTO: 1.02 (ref 1–1.03)
TRIGL SERPL-MCNC: 71 MG/DL
UROBILINOGEN UR QL STRIP.AUTO: 0.2 E.U./DL
WBC # BLD AUTO: 4.82 THOUSAND/UL (ref 4.31–10.16)
WBC #/AREA URNS AUTO: NORMAL /HPF

## 2021-04-15 PROCEDURE — 85025 COMPLETE CBC W/AUTO DIFF WBC: CPT

## 2021-04-15 PROCEDURE — 80053 COMPREHEN METABOLIC PANEL: CPT

## 2021-04-15 PROCEDURE — 81001 URINALYSIS AUTO W/SCOPE: CPT | Performed by: INTERNAL MEDICINE

## 2021-04-15 PROCEDURE — 36415 COLL VENOUS BLD VENIPUNCTURE: CPT

## 2021-04-15 PROCEDURE — 80061 LIPID PANEL: CPT

## 2021-04-15 NOTE — TELEPHONE ENCOUNTER
----- Message from Brian Cook sent at 4/14/2021  1:29 PM EDT -----  Regarding: COLONOSCOPY  04/14/21 1:29 PM    Hello, our patient Orval Economy has had CRC: Colonoscopy completed/performed  Please assist in updating the patient chart by making an External outreach to [place] [address] facility located in [address] [place] [address]  The date of service is 1/21/19      Thank you,  Brian Cook  [facility] INTERNAL MED

## 2021-04-15 NOTE — TELEPHONE ENCOUNTER
Upon review of the In Basket request and the patient's chart, initial outreach has been made via fax, please see Contacts section for details       Thank you  Osmani Aranda MA

## 2021-04-15 NOTE — LETTER
Procedure Request Form: Colonoscopy      Date Requested: 04/15/21  Patient: Lawerliseth Crafts  Patient : 1950   Referring Provider: Everett Reed, MD        Date of Procedure ______________________________       The above patient has informed us that they have completed their   most recent Colonoscopy at your facility  Please complete   this form and attach all corresponding procedure reports/results  Comments __________________________________________________________  ____________________________________________________________________  ____________________________________________________________________  ____________________________________________________________________    Facility Completing Procedure _________________________________________    Form Completed By (print name) _______________________________________      Signature __________________________________________________________      These reports are needed for  compliance    Please fax this completed form and a copy of the procedure report to our office located at [place] [address] as soon as possible to [phone] [facility]: Phone [phone]    We thank you for your assistance in treating our mutual patient

## 2021-04-19 ENCOUNTER — TELEPHONE (OUTPATIENT)
Dept: OBGYN CLINIC | Facility: HOSPITAL | Age: 71
End: 2021-04-19

## 2021-04-19 DIAGNOSIS — Z96.641 AFTERCARE FOLLOWING RIGHT HIP JOINT REPLACEMENT SURGERY: Primary | ICD-10-CM

## 2021-04-19 DIAGNOSIS — Z47.1 AFTERCARE FOLLOWING RIGHT HIP JOINT REPLACEMENT SURGERY: Primary | ICD-10-CM

## 2021-04-19 RX ORDER — CEPHALEXIN 500 MG/1
2000 CAPSULE ORAL ONCE
Qty: 4 CAPSULE | Refills: 0 | Status: SHIPPED | OUTPATIENT
Start: 2021-04-19 | End: 2021-04-19

## 2021-04-19 NOTE — TELEPHONE ENCOUNTER
Patient sees Dr Gallagher    Patient is having a root cancel and needs another script for antibiotics      Pharmacy: [facility] [id number] [facility] [address] - [phone]

## 2021-04-19 NOTE — TELEPHONE ENCOUNTER
Rx of Keflex sent to [facility] pharmacy  - to be taken about 1 hour prior to dental procedure  Thanks

## 2021-04-21 NOTE — TELEPHONE ENCOUNTER
Upon review of the In Basket request we were able to locate, review, and update the patient chart as requested for CRC: Colonoscopy  Any additional questions or concerns should be emailed to the Practice Liaisons via [email or web address] email, please do not reply via In Basket      Thank you  Zhao Montano MA

## 2021-04-23 ENCOUNTER — TELEPHONE (OUTPATIENT)
Dept: OBGYN CLINIC | Facility: HOSPITAL | Age: 71
End: 2021-04-23

## 2021-04-26 ENCOUNTER — OFFICE VISIT (OUTPATIENT)
Dept: OBGYN CLINIC | Facility: HOSPITAL | Age: 71
End: 2021-04-26
Payer: MEDICARE

## 2021-04-26 ENCOUNTER — HOSPITAL ENCOUNTER (OUTPATIENT)
Dept: RADIOLOGY | Facility: HOSPITAL | Age: 71
Discharge: HOME | End: 2021-04-26
Payer: MEDICARE

## 2021-04-26 VITALS
DIASTOLIC BLOOD PRESSURE: 91 MMHG | HEIGHT: 73 IN | WEIGHT: 272.4 LBS | HEART RATE: 76 BPM | BODY MASS INDEX: 36.1 KG/M2 | SYSTOLIC BLOOD PRESSURE: 163 MMHG

## 2021-04-26 DIAGNOSIS — M54.40 LOW BACK PAIN WITH SCIATICA, SCIATICA LATERALITY UNSPECIFIED, UNSPECIFIED BACK PAIN LATERALITY, UNSPECIFIED CHRONICITY: Primary | ICD-10-CM

## 2021-04-26 DIAGNOSIS — M54.40 LOW BACK PAIN WITH SCIATICA, SCIATICA LATERALITY UNSPECIFIED, UNSPECIFIED BACK PAIN LATERALITY, UNSPECIFIED CHRONICITY: ICD-10-CM

## 2021-04-26 PROBLEM — M79.605 ACUTE LEG PAIN, LEFT: Status: ACTIVE | Noted: 2021-04-26

## 2021-04-26 PROCEDURE — 99213 OFFICE O/P EST LOW 20 MIN: CPT | Performed by: PHYSICIAN ASSISTANT

## 2021-04-26 PROCEDURE — 72100 X-RAY EXAM L-S SPINE 2/3 VWS: CPT

## 2021-04-26 RX ORDER — METHYLPREDNISOLONE 4 MG/1
TABLET ORAL
Qty: 1 EACH | Refills: 0 | Status: SHIPPED | OUTPATIENT
Start: 2021-04-26

## 2021-04-26 RX ORDER — CEPHALEXIN 500 MG/1
CAPSULE ORAL
COMMUNITY
Start: 2021-04-19

## 2021-04-26 NOTE — PROGRESS NOTES
Assessment:   Diagnosis ICD-10-CM Associated Orders   1  Low back pain with sciatica, sciatica laterality unspecified, unspecified back pain laterality, unspecified chronicity  M54 40 XR spine lumbar 2 or 3 views injury     methylPREDNISolone 4 MG tablet therapy pack     Ambulatory referral to Physical Therapy     Ambulatory referral to Pain Management        Plan:    · Medrol Dosepak forwarded to pharmacy  · Ambulatory referral to physical therapy for lumbar spine and left leg pain  · Ambulatory referral to Spine and Pain to be made in 4-6 weeks if he has no improvement from therapy and Medrol  · Local corticosteroid injection offered to the left trochanteric bursa however Dandre's left leg symptoms extend down to the lateral ankle and foot and he wishes to hold off on this  To do next visit:  No follow-ups on file  The above stated was discussed in layman's terms and the patient expressed understanding  All questions were answered to the patient's satisfaction  Subjective:   Elis Valdivia is a 65 y o  male who presents today with a 2 week history of acute lateral sided left leg pain began without injury or exacerbating event  Ellender Patricio note intermittent but significant leg pain starting from the left buttock down the entire left lateral leg to the ankle and foot  This seems to occur when he has in a lateral prone position on the the right side or the left side  He denies numbness or tingling  He has no bowel bladder incontinence  He has a history of right-sided leg sciatica which was treated with lumbar surgery by Dr Suly Rodgers since in back in 1987  He recently has been taking 1 Tylenol or 1 Motrin for pain relief    The Motrin seems to help him the most           Review of systems negative unless otherwise specified in HPI    Past Medical History:   Diagnosis Date    Anxiety disorder     Atherosclerotic heart disease of native coronary artery without angina pectoris     Benign prostatic hyperplasia without lower urinary tract symptoms     Dyslipidemia     GERD (gastroesophageal reflux disease)     Hypertension     Hyperthyroidism     Impaired fasting blood sugar     Low back pain     Osteoarthritis     last assesed 6-6-16    Other chest pain     Paresthesia of skin     Polyneuropathy     last assesed 5-8-17    Pure hypercholesterolemia     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 ([address] )     Wears glasses        Past Surgical History:   Procedure Laterality Date    BACK SURGERY      CHOLECYSTECTOMY      COLONOSCOPY  02/06/2019    [place] TOTAL HIP ARTHROPLASTY Right 8/5/2019    Procedure: ARTHROPLASTY HIP TOTAL;  Surgeon: Valarie Ma MD;  [facility];  Service: Orthopedics    TONSILLECTOMY         Family History   Problem Relation Age of Onset    Arthritis Other     Heart disease Father        Social History     Occupational History    Not on file   Tobacco Use    Smoking status: Former Smoker    Smokeless tobacco: Never Used   Substance and Sexual Activity    Alcohol use: Never     Frequency: Never     Drinks per session: Patient refused     Binge frequency: Never    Drug use: Never    Sexual activity: Not on file         Current Outpatient Medications:     atorvastatin (LIPITOR) 10 mg tablet, TAKE 1 TABLET BY MOUTH AT BEDTIME, Disp: 90 tablet, Rfl: 3    cephalexin (KEFLEX) 500 mg capsule, TAKE 4 CAPSULES BY MOUTH ONCE FOR 1 DOSE  TAKE 1 HOUR PRIOR TO DENTAL PROCEDURE , Disp: , Rfl:     diazepam (VALIUM) 5 mg tablet, TAKE 1 TABLET BY MOUTH 1 HOUR BEFORE DENTAL APPOINTMENT, Disp: , Rfl:     levothyroxine 150 mcg tablet, TAKE 1 TABLET BY MOUTH DAILY, Disp: 90 tablet, Rfl: 3    methylPREDNISolone 4 MG tablet therapy pack, Use as directed on package, Disp: 1 each, Rfl: 0    No Known Allergies         Vitals:    04/26/21 0917   BP: 163/91   Pulse: 76       Objective:                    Physical Exam  Constitutional:       Appearance: He is well-developed  HENT:      Head: Normocephalic and atraumatic  Neck:      Musculoskeletal: Normal range of motion  Cardiovascular:      Rate and Rhythm: Normal rate  Pulmonary:      Effort: Pulmonary effort is normal    Musculoskeletal:      Right knee: He exhibits no effusion  Skin:     General: Skin is warm and dry  Capillary Refill: Capillary refill takes less than 2 seconds  Findings: No erythema or rash  Neurological:      Mental Status: He is alert and oriented to person, place, and time  Psychiatric:         Mood and Affect: Mood normal          Behavior: Behavior normal        Spine exam:  Lumbar Spine Exam    Appearance:  Normal lordosis  Palpation/Tenderness:  no tenderness or spasm  Sensory:  no sensory deficits noted  Range of Motion:  Full range of motion with no pain or limitations in flexion, extension, lateral flexion and rotation  Motor Strength:  Left hip flexion:  5/5  Right hip flexion:  5/5  Left knee flexion:  5/5  Right knee flexion:  5/5  Left foot dorsiflexion:  5/5  Left foot plantar flexion:  5/5  Right foot dorsiflexion:  5/5  Right foot plantar flexion:  5/5  Reflexes:  Left Patellar:  2+   Right Patellar:  1+   Left Achilles:  2+   Right Achilles:  2+   Special Tests:  Left Straight Leg Test:  negative  Right Straight Leg Test:  negative    Diagnostics, reviewed and taken today if performed as documented: The attending physician has personally reviewed the pertinent films in PACS and interpretation is as follows:  X-rays of the lumbar spine demonstrate no acute changes in comparison with films from 2019  There is multilevel osteoarthritic changes and decreased disc space narrowing present most pronounced at L3-4 and L4-5  Procedures, if performed today:    None performed      Portions of the record may have been created with voice recognition software    Occasional wrong word or "sound a like" substitutions may have occurred due to the inherent limitations of voice recognition software  Read the chart carefully and recognize, using context, where substitutions have occurred

## 2021-05-04 ENCOUNTER — EVALUATION (OUTPATIENT)
Dept: PHYSICAL THERAPY | Age: 71
End: 2021-05-04
Payer: MEDICARE

## 2021-05-04 DIAGNOSIS — M54.40 LOW BACK PAIN WITH SCIATICA, SCIATICA LATERALITY UNSPECIFIED, UNSPECIFIED BACK PAIN LATERALITY, UNSPECIFIED CHRONICITY: Primary | ICD-10-CM

## 2021-05-04 PROCEDURE — 97110 THERAPEUTIC EXERCISES: CPT | Performed by: PHYSICAL THERAPIST

## 2021-05-04 PROCEDURE — 97162 PT EVAL MOD COMPLEX 30 MIN: CPT | Performed by: PHYSICAL THERAPIST

## 2021-05-04 NOTE — PROGRESS NOTES
PT Evaluation     Today's date: 2021  Patient name: Elis Valdivia  : 1950  MRN: [id number]  Referring provider: Mariposa Gaviria PA-C  Dx:   Encounter Diagnosis     ICD-10-CM    1  Low back pain with sciatica, sciatica laterality unspecified, unspecified back pain laterality, unspecified chronicity  M54 40 Ambulatory referral to Physical Therapy                  Assessment  Assessment details: PT IE: 2021  Patient reported 2-3 weeks ago he had left lateral le pain that went into the left ankle and left buttock pain and tightness  Patient noted he fell in 2020  Patient noted after he fell he saw Dr Shyann Kaur associate who took x-ray on right hip which was - for bony pathology  Patient noted he had lumbar spine x-ray which was + for L 5 S 1 degenerative changes  Patient to see pain management later this week  Patient noted he was prescribed and took a medrol dose pack that reduced pain significantly initially but after dose pack was finished  Patient noted the past 3 days his body pain is aggravated which he feels is due to the wearing off of the positive effects of oral steroid  Patient noted his bilateral knee pain has been aggravated as well as well as bilateral knee instability has occurred as well  Patient noted he has not been able to resume fitness activities due to lumbar spine and left le pain  Patient noted he has getting muscle spasms in bilateral gastrocnemius the past 2 nights  Patient noted since COVID his activity level has significantly decreased  Patient noted buttock pain is a "knot type of tightness"  Patient noted prior to oral steroid, he would typically have left lateral distal le pain at night  Patient noted sitting eliminates pain  Patient noted in the evening, after prolonged sitting, he would move around and sit in different chairs and he has left le pain  Patient noted stretching left buttock is pain reducing    Patient noted walking did reduce his pain  Patient noted reclining in a chair and lying as pain aggravating  Patient noted he had to sleep flat last night which reduced his pain  Patient noted his left le pain into the lateral ankle is " nerve type pain" but he denies left radicular paresthesia  Patient noted his bilateral knees are weak, but he does not recognize if left le is weaker than right  Patient noted bending is without pain aggravation  Patient noted stair climbing is limited by apprehension of falling  Patient noted the following deficits that are limited by left le symptoms: stair climbing, walking, transfers, left side lying, pushing off with left le limits transfers and unable to resume fitness walking and activities  Patient noted he is apprehensive of all weight baring activities due to bilateral knee pain and weakness  Impairments: abnormal gait, abnormal or restricted ROM, abnormal movement, activity intolerance, lacks appropriate home exercise program and pain with function  Understanding of Dx/Px/POC: excellent   Prognosis: good  Prognosis details: Patient is a 65y o  year old male seen for outpatient PT evaluation with pain, mobility and functional deficits due to lumbar spine pain with intermittent left le sciatica  Patient presents to PT IE with the following problems, concerns, deficits and impairments: left le and buttock region pain, decreased lumbar spine range of motion, decreased left le strength, gait and stair dysfunctions, functional limitations and decreased tolerance to activity  Patient would benefit from skilled PT services under the following PT treatment plan to address the above noted deficits: therapeutic exercises and activities to facilitate lumbar spine rom and left le rom and strength, modalities, manual therapy techniques, postural reeducation and strengthening, DLS and abdominal strengthening activities, IASTM techniques, Kinesio taping technique and a hep    Thank you for the referral  Goals  Short Term goals - 4 weeks  1  Patient will be independent HEP  2   Patient will report a 25 - 50% decrease in pain complaints  3   Increase strength 1/2 grade  4   Increase ROM 5-10 degrees  Long Term goals - 8 weeks  1  Patient will report elimination of pain complaints  2   Patient will return to all recreational activities without restriction  3   ROM WFL  4   Strength 5/5   5   Patient will report stair climbing is no longer limited by left distal lateral le pain  6   Patient will report walking is no longer limited by left distal lateral le pain  7   Patient will report all transfers are no longer limited by left distal lateral le pain  8   Patient will report he is able to lie on left side during sleep and not having any sleep disturbed  9   Patient will report ability to resume all fitness activities with spouse  Plan  Patient would benefit from: skilled physical therapy  Planned modality interventions: cryotherapy, TENS, thermotherapy: hydrocollator packs, traction and unattended electrical stimulation  Planned therapy interventions: joint mobilization, manual therapy, massage, abdominal trunk stabilization, aquatic therapy, balance, balance/weight bearing training, neuromuscular re-education, patient education, postural training, body mechanics training, compression, self care, strengthening, stretching, therapeutic activities, therapeutic exercise, therapeutic training, transfer training, flexibility, functional ROM exercises, gait training, graded activity, graded exercise, graded motor and home exercise program  Frequency: 2x week  Duration in weeks: 8  Treatment plan discussed with: patient        Subjective Evaluation    History of Present Illness  Mechanism of injury: Patient's PMHx is remarkable for hypothyroid, polyneuropathy, HTN, bilateral knee OA, right hip PEGGY        LUMBAR SPINE     INDICATION:   M54 40: Lumbago with sciatica, unspecified side      COMPARISON: X-ray lumbar spine 2019     VIEWS:  XR SPINE LUMBAR 2 OR 3 VIEWS INJURY        FINDINGS:     Partially lumbarized S1      Levoscoliosis with apex at L3-4      No acute fracture      Grade 1 anterolisthesis of L5 on S1       Advanced degenerative changes pronounced at levels L4-5 and L5-S1      The pedicles appear intact      Soft tissues are unremarkable      Partially imaged right hip replacement      IMPRESSION:     1  No acute osseous abnormality      2  Advanced degenerative changes pronounced at levels L4-5 and L5-S1  Pain  At best pain ratin  At worst pain ratin  Location: lumbar spine region    Patient Goals  Patient goals for therapy: decreased pain, increased motion, increased strength, independence with ADLs/IADLs and return to sport/leisure activities  Patient goal: Patient's goal is to resume fitness activities with spouse  Objective     Active Range of Motion     Lumbar   Flexion: 78 degrees   Extension: 24 degrees   Left lateral flexion: 24 degrees       Right lateral flexion: 26 degrees   Left rotation: 50 degrees   Right rotation: 58 degrees   Left Hip   Flexion: 100 degrees     Right Hip   Flexion: 96 degrees     Additional Active Range of Motion Details  Hamstring mobility on right at 38 degrees and left at 44 degrees  Strength/Myotome Testing     Left Hip   Planes of Motion   Flexion: 4-  Extension: 4  Abduction: 4  Adduction: 4+    Right Hip   Planes of Motion   Flexion: 4-  Extension: 4-  Abduction: 4  Adduction: 4+    Left Knee   Flexion: 4  Extension: 4-    Right Knee   Flexion: 4-  Extension: 4-    Left Ankle/Foot   Dorsiflexion: 5  Plantar flexion: 5    Right Ankle/Foot   Dorsiflexion: 5  Plantar flexion: 5    Tests     Lumbar     Left   Negative passive SLR and slump test      Right   Negative passive SLR       Left Pelvic Girdle/Sacrum   Negative: active SLR test      Right Pelvic Girdle/Sacrum   Negative: active SLR test      Ambulation     Ambulation: Level Surfaces   Ambulation without assistive device: independent    Additional Level Surfaces Ambulation Details  Patient ambulates with decrease in pace, increase in base of support, decrease in left le stance phase versus right le stance phase  Ambulation: Stairs   Ascend stairs: independent  Pattern: non-reciprocal  Railings: two rails  Descend stairs: independent  Pattern: non-reciprocal  Railings: two rails             Precautions: Patient's PMHx is remarkable for hypothyroid, polyneuropathy, HTN, bilateral knee OA, right hip PEGGY        Manuals 5/4            Prone lumbar spine extension mobilization                                                     Neuro Re-Ed                                                                                                        Ther Ex             Nu step or recumbent bike             Standing back bends against counter top 2 x 10                         LAQ:B:             Seated hip flexion:B:                          Hamstring stretch:B:             Gastrocnemius stretch:B:             LTR:B:             Piriformis stretch:B:             DLS Abdominal bracing in hook lying:              DLS Abdominal bracing in hook lying with hip flexion:B:             DLS Abdominal bracing in hook lying with slr flexion:B              bridges             Prone lying             Prone press ups                          Mini squats to chair             Lunges of foam:B:             Forward step up:B: 6"                                      Ther Activity                                       Gait Training                                       Modalities

## 2021-05-06 ENCOUNTER — CONSULT (OUTPATIENT)
Dept: PAIN MEDICINE | Facility: CLINIC | Age: 71
End: 2021-05-06
Payer: MEDICARE

## 2021-05-06 VITALS
SYSTOLIC BLOOD PRESSURE: 157 MMHG | DIASTOLIC BLOOD PRESSURE: 98 MMHG | BODY MASS INDEX: 36.18 KG/M2 | HEIGHT: 73 IN | WEIGHT: 273 LBS | HEART RATE: 70 BPM

## 2021-05-06 DIAGNOSIS — M54.40 LOW BACK PAIN WITH SCIATICA, SCIATICA LATERALITY UNSPECIFIED, UNSPECIFIED BACK PAIN LATERALITY, UNSPECIFIED CHRONICITY: ICD-10-CM

## 2021-05-06 PROCEDURE — 99204 OFFICE O/P NEW MOD 45 MIN: CPT | Performed by: PHYSICAL MEDICINE & REHABILITATION

## 2021-05-06 NOTE — PATIENT INSTRUCTIONS
Lumbar Radiculopathy   WHAT YOU NEED TO KNOW:   Lumbar radiculopathy is a painful condition that happens when a nerve in your lumbar spine (lower back) is pinched or irritated  Nerves control feeling and movement in your body  You may have numbness or pain that shoots down from your lower back towards your foot  DISCHARGE INSTRUCTIONS:   Medicines:   · Medicines:     ? NSAIDs , such as ibuprofen, help decrease swelling, pain, and fever  This medicine is available with or without a doctor's order  NSAIDs can cause stomach bleeding or kidney problems in certain people  If you take blood thinner medicine, always ask your healthcare provider if NSAIDs are safe for you  Always read the medicine label and follow directions  ? Muscle relaxers  help decrease pain and muscle spasms  ? Opioids: This is a strong medicine given to reduce severe pain  It is also called narcotic pain medicine  Take this medicine exactly as directed by your healthcare provider  ? Oral steroids: Steroids may also be given to reduce pain and swelling  ? Take your medicine as directed  Contact your healthcare provider if you think your medicine is not helping or if you have side effects  Tell him of her if you are allergic to any medicine  Keep a list of the medicines, vitamins, and herbs you take  Include the amounts, and when and why you take them  Bring the list or the pill bottles to follow-up visits  Carry your medicine list with you in case of an emergency  Follow up with your healthcare provider or spine specialist within 1 to 3 weeks:  After your first follow-up appointment, return to your healthcare provider or spine specialist every 2 weeks until you have healed  Ask for information about physical therapy for your condition  Write down your questions so you remember to ask them during your visits  Physical therapy:  You may need physical therapy to improve your condition   Your physical therapist may teach you certain exercises to improve posture (the way you stand and sit), flexibility, and strength in your lower back  Self care:   · Stay active: It is best to be active when you have lumbar radiculopathy  Your physical therapist or healthcare provider may tell you to take walks to ease yourself back into your daily routine  Avoid long periods of bed rest  Bed rest could worsen your symptoms  Do not move in ways that increase your pain  Ask for more information about the best ways to stay active  · Use ice or heat packs:  Use ice or heat packs as directed on the sore area of your body to decrease the pain and swelling  Put ice in a plastic bag covered with a towel on your low back  Cover heated items with a towel to avoid burns  Use ice and heat as directed  · Avoid heavy lifting: Your condition may worsen if you lift heavy things  Avoid lifting if possible  · Maintain a healthy weight:  Excess body weight may strain your back  Talk with your healthcare provider about ways to lose excess weight if you are overweight  Contact your healthcare provider or spine specialist if:   · Your pain does not improve within 1 to 3 weeks after treatment  · Your pain and weakness keep you from your normal activities at work, home, or school  · You lose more than 10 pounds in 6 months without trying  · You become depressed or sad because of the pain  · You have questions or concerns about your condition or care  Return to the emergency department if:   · You have a fever greater than 100 4°F for longer than 2 days  · You have new, severe back or leg pain, or your pain spreads to both legs  · You have any new signs of numbness or weakness, especially in your lower back, legs, arms, or genital area  · You have new trouble controlling your urine and bowel movements  · You do not feel like your bladder empties when you urinate      © Copyright [employer] 2020 Information is for End User's use only and may not be sold, redistributed or otherwise used for commercial purposes  All illustrations and images included in CareNotes® are the copyrighted property of A D [employer] M [employer]  or [employer]  The above information is an  only  It is not intended as medical advice for individual conditions or treatments  Talk to your doctor, nurse or pharmacist before following any medical regimen to see if it is safe and effective for you

## 2021-05-06 NOTE — PROGRESS NOTES
Assessment  1  Low back pain with sciatica, sciatica laterality unspecified, unspecified back pain laterality, unspecified chronicity        Plan  Mr Pastora Najera  Is a pleasant 75-year-old male who presents for initial evaluation regarding low back pain with intermittent radiating symptoms into the left lower extremity of several weeks duration  He previously saw ortho on April 26, 2021 whom started the patient on a Medrol Dosepak and provided significant relief in his back and hip pain  He was  offered greater trochanteric bursa steroid injection  But was not done at that time  Today patient reports 0/10 pain and reports being back to his normal baseline able to perform activities of daily living and has reported improved quality of life  Physical exam did not identify any specific problems at this time either  As such advised to follow up as needed  For now he will continue with his home exercises  Advised if his pain does get progressively worse we may consider greater trochanteric bursa steroid injection at a later date but for now will follow-up as needed  Advised Tylenol no more than 3000 mg per day for acute inflammatory changes  All questions answered, patient is agreeable with plan  My impressions and treatment recommendations were discussed in detail with the patient who verbalized understanding and had no further questions  Discharge instructions were provided  I personally saw and examined the patient and I agree with the above discussed plan of care  No orders of the defined types were placed in this encounter  No orders of the defined types were placed in this encounter  History of Present Illness    Jacob Reyes is a 65 y o  male   P[facility] to [facility] for initial evaluation regarding 2 weeks duration of low back pain with radiating symptoms into the left lower extremity  Patient denies any significant inciting event or recent trauma    Today he reports 0/10 pain and describes as an occasional dull ache at night  Reports lower extremity weakness but denies any falls  Does not use any durable medical equipment for ambulation  Symptoms are worse with lying down  He has had excellent relief from physical therapy and home exercises as well as Medrol Dosepak  Presents today for initial evaluation  I have personally reviewed and/or updated the patient's past medical history, past surgical history, family history, social history, current medications, allergies, and vital signs today  Review of Systems   Constitutional: Negative for fever and unexpected weight change  HENT: Negative for trouble swallowing  Eyes: Negative for visual disturbance  Respiratory: Negative for shortness of breath and wheezing  Cardiovascular: Negative for chest pain and palpitations  Gastrointestinal: Negative for constipation, diarrhea, nausea and vomiting  Endocrine: Negative for cold intolerance, heat intolerance and polydipsia  Genitourinary: Negative for difficulty urinating and frequency  Musculoskeletal: Positive for back pain and gait problem (left hip and leg pain  )  Negative for arthralgias, joint swelling and myalgias  Skin: Negative for rash  Neurological: Negative for dizziness, seizures, syncope, weakness and headaches  Hematological: Does not bruise/bleed easily  Psychiatric/Behavioral: Negative for dysphoric mood  All other systems reviewed and are negative        Patient Active Problem List   Diagnosis    Primary osteoarthritis of both knees    Osgood-Schlatter's disease of both knees    Pain in both knees    Status post right hip replacement    Status post total hip replacement, left    Acute blood loss anemia    Impaired mobility and ADLs    Hypothyroid    Difficulty sleeping    Aftercare following right hip joint replacement surgery    Obesity, morbid ([address] )    Acute leg pain, left       Past Medical History: Diagnosis Date    Anxiety disorder     Atherosclerotic heart disease of native coronary artery without angina pectoris     Benign prostatic hyperplasia without lower urinary tract symptoms     Dyslipidemia     GERD (gastroesophageal reflux disease)     Hypertension     Hyperthyroidism     Impaired fasting blood sugar     Low back pain     Osteoarthritis     last assesed 6-6-16    Other chest pain     Paresthesia of skin     Polyneuropathy     last assesed 5-8-17    Pure hypercholesterolemia     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 ([address] )     Wears glasses        Past Surgical History:   Procedure Laterality Date    BACK SURGERY      CHOLECYSTECTOMY      COLONOSCOPY  02/06/2019    MT TOTAL HIP ARTHROPLASTY Right 8/5/2019    Procedure: ARTHROPLASTY HIP TOTAL;  Surgeon: Dominik Mendoza MD;  Location: [facility];  Service: Orthopedics    TONSILLECTOMY         Family History   Problem Relation Age of Onset    Arthritis Other     Heart disease Father        Social History     Occupational History    Not on file   Tobacco Use    Smoking status: Former Smoker    Smokeless tobacco: Never Used   Substance and Sexual Activity    Alcohol use: Never     Frequency: Never     Drinks per session: Patient refused     Binge frequency: Never    Drug use: Never    Sexual activity: Not on file       Current Outpatient Medications on File Prior to Visit   Medication Sig    atorvastatin (LIPITOR) 10 mg tablet TAKE 1 TABLET BY MOUTH AT BEDTIME    cephalexin (KEFLEX) 500 mg capsule TAKE 4 CAPSULES BY MOUTH ONCE FOR 1 DOSE  TAKE 1 HOUR PRIOR TO DENTAL PROCEDURE      diazepam (VALIUM) 5 mg tablet TAKE 1 TABLET BY MOUTH 1 HOUR BEFORE DENTAL APPOINTMENT    levothyroxine 150 mcg tablet TAKE 1 TABLET BY MOUTH DAILY    methylPREDNISolone 4 MG tablet therapy pack Use as directed on package (Patient not taking: Reported on 5/6/2021)     No current facility-administered medications on file prior to visit  No Known Allergies    Physical Exam    /98   Pulse 70   Ht 6' 1" (1 854 m)   Wt 124 kg (273 lb)   BMI 36 02 kg/m²     Constitutional: normal, well developed, well nourished, alert, in no distress and non-toxic and no overt pain behavior  Eyes: anicteric  HEENT: grossly intact  Neck: supple, symmetric, trachea midline and no masses   Pulmonary:even and unlabored  Cardiovascular:No edema or pitting edema present  Skin:Normal without rashes or lesions and well hydrated  Psychiatric:Mood and affect appropriate  Neurologic:Cranial Nerves II-XII grossly intact  Musculoskeletal:antalgic , no significant tenderness to palpation, range of motion within normal limits, MMT 5/5 bilateral lower extremities except for left hip flexion 4+ out of 5, sensation grossly intact to light touch, DTRs within normal limits, straight leg raise negative for radicular pain in the bilateral lower extremities in the supine position  Imaging  LUMBAR SPINE     INDICATION:   M54 40: Lumbago with sciatica, unspecified side      COMPARISON:  X-ray lumbar spine 9/24/2019     VIEWS:  XR SPINE LUMBAR 2 OR 3 VIEWS INJURY        FINDINGS:     Partially lumbarized S1      Levoscoliosis with apex at L3-4      No acute fracture      Grade 1 anterolisthesis of L5 on S1       Advanced degenerative changes pronounced at levels L4-5 and L5-S1      The pedicles appear intact      Soft tissues are unremarkable      Partially imaged right hip replacement      IMPRESSION:     1  No acute osseous abnormality      2    Advanced degenerative changes pronounced at levels L4-5 and L5-S1         Workstation performed: [id number]

## 2021-05-07 ENCOUNTER — TRANSCRIBE ORDERS (OUTPATIENT)
Dept: PAIN MEDICINE | Facility: CLINIC | Age: 71
End: 2021-05-07

## 2021-05-12 ENCOUNTER — OFFICE VISIT (OUTPATIENT)
Dept: PHYSICAL THERAPY | Age: 71
End: 2021-05-12
Payer: MEDICARE

## 2021-05-12 DIAGNOSIS — M54.40 LOW BACK PAIN WITH SCIATICA, SCIATICA LATERALITY UNSPECIFIED, UNSPECIFIED BACK PAIN LATERALITY, UNSPECIFIED CHRONICITY: Primary | ICD-10-CM

## 2021-05-12 PROCEDURE — 97140 MANUAL THERAPY 1/> REGIONS: CPT | Performed by: PHYSICAL THERAPIST

## 2021-05-12 PROCEDURE — 97110 THERAPEUTIC EXERCISES: CPT | Performed by: PHYSICAL THERAPIST

## 2021-05-12 NOTE — PROGRESS NOTES
Daily Note     Today's date: 2021  Patient name: Robin Link  : 1950  MRN: [id number]  Referring provider: Aminata Spears PA-C  Dx:   Encounter Diagnosis     ICD-10-CM    1  Low back pain with sciatica, sciatica laterality unspecified, unspecified back pain laterality, unspecified chronicity  M54 38                   Subjective: Patient noted he only had left gastrocnemius muscle spasm, but denies LBP currently  Objective: See treatment diary below  Assessment: Tolerated treatment well  Patient exhibited good technique with therapeutic exercises  Patient presents with right le weakness > left while he continues to exhibit a lumbar spine extension bias towards pain LBP elimination  Thus, PT is warranted to promote lumbar spine extension and DLS to promote lumbar spine pain reduction long term and functional progress  Plan: Continue per plan of care  Precautions: Patient's PMHx is remarkable for hypothyroid, polyneuropathy, HTN, bilateral knee OA, right hip PEGGY        Manuals            Prone lumbar spine extension mobilization                                                     Neuro Re-Ed                                                                                                        Ther Ex             Nu step or recumbent bike  nustep x 10 min           Standing back bends against counter top 2 x 10 3 x 10                        LAQ:B:  3 sec x 20           Seated hip flexion:B:  2 x 10                        Hamstring stretch:B:  20 sec x 5           Gastrocnemius stretch:B:  NT           LTR:B:  10 sec x 5           Piriformis stretch:B:  NT           DLS Abdominal bracing in hook lying:   3 sec x 20           DLS Abdominal bracing in hook lying with hip flexion:B:  2 x 10           DLS Abdominal bracing in hook lying with slr flexion:B   NT           bridges  2 x 10           Prone lying  2 min           Prone press ups  10 x 2                        Mini squats to chair  NT           Lunges of foam:B:  NT           Forward step up:B: 6" NT                                     Ther Activity                                       Gait Training                                       Modalities

## 2021-05-14 ENCOUNTER — OFFICE VISIT (OUTPATIENT)
Dept: PHYSICAL THERAPY | Age: 71
End: 2021-05-14
Payer: MEDICARE

## 2021-05-14 DIAGNOSIS — M54.40 LOW BACK PAIN WITH SCIATICA, SCIATICA LATERALITY UNSPECIFIED, UNSPECIFIED BACK PAIN LATERALITY, UNSPECIFIED CHRONICITY: Primary | ICD-10-CM

## 2021-05-14 PROCEDURE — 97110 THERAPEUTIC EXERCISES: CPT

## 2021-05-14 NOTE — PROGRESS NOTES
Daily Note     Today's date: 2021  Patient name: Valencia Crystal  : 1950  MRN: [id number]  Referring provider: Pauline Ibarra PA-C  Dx:   Encounter Diagnosis     ICD-10-CM    1  Low back pain with sciatica, sciatica laterality unspecified, unspecified back pain laterality, unspecified chronicity  M54 40                   Subjective: Pt noted no LB or B LE symptoms today       Objective: See treatment diary below  Assessment: Good tolerance to exercises  Progress as able       Plan: Continue per plan of care  Precautions: Patient's PMHx is remarkable for hypothyroid, polyneuropathy, HTN, bilateral knee OA, right hip PEGGY        Manuals           Prone lumbar spine extension mobilization                                                     Neuro Re-Ed                                       Ther Ex             L/C  NT  NT           Nu step or recumbent bike  nustep x 10 min 10 MIN           Standing back bends against counter top 2 x 10 3 x 10 30X                        LAQ:B:  3 sec x 20 30X 2SEC           Seated hip flexion:B:  2 x 10 30X 3SEC                        Hamstring stretch:B:  20 sec x 5 20SEC 5X           Gastrocnemius stretch:B:  NT NT          LTR:B:  10 sec x 5 20X 3SEC           Piriformis stretch:B:  NT NT          DLS Abdominal bracing in hook lying:   3 sec x 20 30x 3sec           DLS Abdominal bracing in hook lying with hip flexion:B:  2 x 10 30x           DLS Abdominal bracing in hook lying with slr flexion:B   NT 30x 2sec           bridges  2 x 10 20x 3sec           Prone lying  2 min 3 min           Prone press ups  10 x 2 30x           Loc and sag    NT          Mini squats to chair  NT NT          Lunges of foam:B:  NT NT          Forward step up:B: 6" NT NT                                    Ther Activity                                       Gait Training                                       Modalities

## 2021-05-19 ENCOUNTER — OFFICE VISIT (OUTPATIENT)
Dept: PHYSICAL THERAPY | Age: 71
End: 2021-05-19
Payer: MEDICARE

## 2021-05-19 DIAGNOSIS — M54.40 LOW BACK PAIN WITH SCIATICA, SCIATICA LATERALITY UNSPECIFIED, UNSPECIFIED BACK PAIN LATERALITY, UNSPECIFIED CHRONICITY: Primary | ICD-10-CM

## 2021-05-19 PROCEDURE — 97110 THERAPEUTIC EXERCISES: CPT | Performed by: PHYSICAL THERAPIST

## 2021-05-19 NOTE — PROGRESS NOTES
Daily Note     Today's date: 2021  Patient name: Pura Ortiz  : 1950  MRN: [id number]  Referring provider: Rosio Bhagat PA-C  Dx:   Encounter Diagnosis     ICD-10-CM    1  Low back pain with sciatica, sciatica laterality unspecified, unspecified back pain laterality, unspecified chronicity  M54 40                   Subjective: Patient reported he lifting up his laundry improperly and aggravated lumbar spine pain, which is at 4 of 10 and worse in the am and with movements after static position  Plus, he noted fully standing up from static sitting is limited by lumbar spine pain until he is fully in a neural lumbar spine position  Objective: See treatment diary below  Assessment: Patient presents with all lumbar spine flexion limited by pain aggravation with extension pain aggravation at end range only  Patient presents with movements and gait improved after PT visit but he lacks functional lumbar spine flexion / bending and lifting activities due to lumbar spine pain aggravation thus continue to progress lumbar spine extension for mobility and pain reducing effects  Plan: Continue per plan of care  Precautions: Patient's PMHx is remarkable for hypothyroid, polyneuropathy, HTN, bilateral knee OA, right hip PEGGY        Manuals          Prone lumbar spine extension mobilization      assess need for STM/ Extension mobs while prone                                               Neuro Re-Ed                                       Ther Ex             L/C  NT  NT  NT         Nu step or recumbent bike  nustep x 10 min 10 MIN  10 min         Standing back bends against counter top 2 x 10 3 x 10 30X  3 x 10                      LAQ:B:  3 sec x 20 30X 2SEC  3 sec x 30         Seated hip flexion:B:  2 x 10 30X 3SEC  3 x 10                      Hamstring stretch:B:  20 sec x 5 20SEC 5X  20 sec x 5         Gastrocnemius stretch:B:  NT NT NT         LTR:B:  10 sec x 5 20X 3SEC  10 sec x 5         Piriformis stretch:B:  NT NT NT         DLS Abdominal bracing in hook lying:   3 sec x 20 30x 3sec  3 sec x 30         DLS Abdominal bracing in hook lying with hip flexion:B:  2 x 10 30x  3 x 10         DLS Abdominal bracing in hook lying with slr flexion:B   NT 30x 2sec  NT         bridges  2 x 10 20x 3sec  2 x 10         Prone lying  2 min 3 min  NT add        Prone press ups  10 x 2 30x  NT add        Loc and sag    NT NT         Mini squats to chair  NT NT NT         Lunges of foam:B:  NT NT NT         Forward step up:B: 6" NT NT NT                                   Ther Activity                                       Gait Training                                       Modalities                 10 min MHP to lumbar spine seated

## 2021-05-21 ENCOUNTER — OFFICE VISIT (OUTPATIENT)
Dept: PHYSICAL THERAPY | Age: 71
End: 2021-05-21
Payer: MEDICARE

## 2021-05-21 DIAGNOSIS — M54.40 LOW BACK PAIN WITH SCIATICA, SCIATICA LATERALITY UNSPECIFIED, UNSPECIFIED BACK PAIN LATERALITY, UNSPECIFIED CHRONICITY: Primary | ICD-10-CM

## 2021-05-21 PROCEDURE — 97110 THERAPEUTIC EXERCISES: CPT

## 2021-05-21 PROCEDURE — 97140 MANUAL THERAPY 1/> REGIONS: CPT

## 2021-05-21 NOTE — PROGRESS NOTES
Daily Note     Today's date: 2021  Patient name: Rosaura Spencer  : 1950  MRN: [id number]  Referring provider: Enoc Francois PA-C  Dx:   Encounter Diagnosis     ICD-10-CM    1  Low back pain with sciatica, sciatica laterality unspecified, unspecified back pain laterality, unspecified chronicity  M54 40                   Subjective: Patient reported Improved LB and B LE       Objective: See treatment diary below  Assessment: Abolished symptoms at LB and B LE  Progress as able       Plan: Continue per plan of care  Precautions: Patient's PMHx is remarkable for hypothyroid, polyneuropathy, HTN, bilateral knee OA, right hip PEGGY        Manuals         Prone lumbar spine extension mobilization      OH                                                Neuro Re-Ed                                       Ther Ex             L/C  NT  NT  NT NT        Nu step or recumbent bike  nustep x 10 min 10 MIN  10 min 10MIN          Standing back bends against counter top 2 x 10 3 x 10 30X  3 x 10 30X   With pillow                      LAQ:B:  3 sec x 20 30X 2SEC  3 sec x 30 30X 3SEC        Seated hip flexion:B:  2 x 10 30X 3SEC  3 x 10 30X                      Hamstring stretch:B:  20 sec x 5 20SEC 5X  20 sec x 5 20SEC 5X          Gastrocnemius stretch:B:  NT NT NT NT        LTR:B:  10 sec x 5 20X 3SEC  10 sec x 5 10SEC 5X         Piriformis stretch:B:  NT NT NT 20SEC 5X         DLS Abdominal bracing in hook lying:   3 sec x 20 30x 3sec  3 sec x 30 30X 3SEC         DLS Abdominal bracing in hook lying with hip flexion:B:  2 x 10 30x  3 x 10 30X         DLS Abdominal bracing in hook lying with slr flexion:B   NT 30x 2sec  NT NT        bridges  2 x 10 20x 3sec  2 x 10 20X         Prone lying  2 min 3 min  NT 3 min         Prone press ups  10 x 2 30x  NT 3 min         Loc and sag    NT NT 10x         Mini squats to chair  NT NT NT 20x         Lunges of foam:B:  NT NT NT NT        Forward step up:B: 6" NT NT NT NT                                  Ther Activity                                       Gait Training                                       Modalities                 10 min MHP to lumbar spine seated 10 min  MHP     Prone on wedge and pillows

## 2021-05-26 ENCOUNTER — APPOINTMENT (OUTPATIENT)
Dept: PHYSICAL THERAPY | Age: 71
End: 2021-05-26
Payer: MEDICARE

## 2021-05-28 ENCOUNTER — OFFICE VISIT (OUTPATIENT)
Dept: PHYSICAL THERAPY | Age: 71
End: 2021-05-28
Payer: MEDICARE

## 2021-05-28 DIAGNOSIS — M54.40 LOW BACK PAIN WITH SCIATICA, SCIATICA LATERALITY UNSPECIFIED, UNSPECIFIED BACK PAIN LATERALITY, UNSPECIFIED CHRONICITY: Primary | ICD-10-CM

## 2021-05-28 PROCEDURE — 97110 THERAPEUTIC EXERCISES: CPT

## 2021-05-28 PROCEDURE — 97140 MANUAL THERAPY 1/> REGIONS: CPT

## 2021-05-28 NOTE — PROGRESS NOTES
Daily Note     Today's date: 2021  Patient name: Robin Link  : 1950  MRN: [id number]  Referring provider: Aminata Spears PA-C  Dx:   Encounter Diagnosis     ICD-10-CM    1  Low back pain with sciatica, sciatica laterality unspecified, unspecified back pain laterality, unspecified chronicity  M54 40                   Subjective: Patient denies lumbar spine pain but noted he has right lateral hip to knee pain that limits prolonged standing and walking activities  Objective: See treatment diary below  Assessment: Good tolerance to trial of Graston to right hip  No pain after today's session  Plan: Continue per plan of care  Precautions: Patient's PMHx is remarkable for hypothyroid, polyneuropathy, HTN, bilateral knee OA, right hip PEGGY        Manuals        Prone lumbar spine extension mobilization      OH  OH       Right ITB  STM                          Right   ITB Graston       10 min                                  Neuro Re-Ed                                       Ther Ex             L/C  NT  NT  NT NT NT       Nu step or recumbent bike  nustep x 10 min 10 MIN  10 min 10MIN   10 min       Standing back bends against counter top 2 x 10 3 x 10 30X  3 x 10 30X   With pillow  3 x 10                    LAQ:B:  3 sec x 20 30X 2SEC  3 sec x 30 30X 3SEC NT       Seated hip flexion:B:  2 x 10 30X 3SEC  3 x 10 30X  20X                    Hamstring stretch:B:  20 sec x 5 20SEC 5X  20 sec x 5 20SEC 5X   20SEC 5X        Gastrocnemius stretch:B:  NT NT NT NT NT       LTR:B:  10 sec x 5 20X 3SEC  10 sec x 5 10SEC 5X  10SEC 5X       Piriformis stretch:B:  NT NT NT 20SEC 5X  20SEC 5X        DLS Abdominal bracing in hook lying:   3 sec x 20 30x 3sec  3 sec x 30 30X 3SEC  20SEC 5X        DLS Abdominal bracing in hook lying with hip flexion:B:  2 x 10 30x  3 x 10 30X  30X 2SEC        DLS Abdominal bracing in hook lying with slr flexion:B   NT 30x 2sec  NT NT 20X bridges  2 x 10 20x 3sec  2 x 10 20X  20X        Prone lying  2 min 3 min  NT 3 min  3 MIN        Prone press ups  10 x 2 30x  NT 3 min  30X        Loc and sag    NT NT 10x  10X        Mini squats to chair  NT NT NT 20x  2 x 10       Lunges of foam:B:  NT NT NT NT 2 x 10       Forward step up:B: 6" NT NT NT NT NT                                 Ther Activity                                       Gait Training                                       Modalities                 10 min MHP to lumbar spine seated 10 min  MHP     Prone on wedge and pillows  NT

## 2021-06-01 ENCOUNTER — OFFICE VISIT (OUTPATIENT)
Dept: PHYSICAL THERAPY | Age: 71
End: 2021-06-01
Payer: MEDICARE

## 2021-06-01 DIAGNOSIS — M54.40 LOW BACK PAIN WITH SCIATICA, SCIATICA LATERALITY UNSPECIFIED, UNSPECIFIED BACK PAIN LATERALITY, UNSPECIFIED CHRONICITY: Primary | ICD-10-CM

## 2021-06-01 PROCEDURE — 97110 THERAPEUTIC EXERCISES: CPT

## 2021-06-01 PROCEDURE — 97140 MANUAL THERAPY 1/> REGIONS: CPT

## 2021-06-01 NOTE — PROGRESS NOTES
Daily Note     Today's date: 2021  Patient name: Robin Link  : 1950  MRN: [id number]  Referring provider: Aminata Spears PA-C  Dx:   Encounter Diagnosis     ICD-10-CM    1  Low back pain with sciatica, sciatica laterality unspecified, unspecified back pain laterality, unspecified chronicity  M54 40                   Subjective: Patient noted stiffness pre treatment  Patient noted that he helped clean up after a picnic yesterday and helped move a picnic table about 100 ft  Objective: See treatment diary below      Assessment: Tolerated treatment fair  Patient noted pain with LTR stopped exercise and then trialed piriformis stretch and then performed some more LTR with decreased pain while performing  Patient was able to perform exercises listed  Patient noted feeling better post manuals  Patient would benefit from continued PT  Plan: Continue per plan of care  Precautions: Patient's PMHx is remarkable for hypothyroid, polyneuropathy, HTN, bilateral knee OA, right hip PEGGY        Manuals       Prone lumbar spine extension mobilization      OH  OH OH      Right ITB  STM                          Right   ITB Graston       10 min                                  Neuro Re-Ed                                       Ther Ex             L/C  NT  NT  NT NT NT       Nu step or recumbent bike  nustep x 10 min 10 MIN  10 min 10MIN   10 min 10 min      Standing back bends against counter top 2 x 10 3 x 10 30X  3 x 10 30X   With pillow  3 x 10 3 x 10                   LAQ:B:  3 sec x 20 30X 2SEC  3 sec x 30 30X 3SEC NT       Seated hip flexion:B:  2 x 10 30X 3SEC  3 x 10 30X  20X 20x                   Hamstring stretch:B:  20 sec x 5 20SEC 5X  20 sec x 5 20SEC 5X   20SEC 5X  20 sec x5      Gastrocnemius stretch:B:  NT NT NT NT NT       LTR:B:  10 sec x 5 20X 3SEC  10 sec x 5 10SEC 5X  10SEC 5X 10SEC 5X      Piriformis stretch:B:  NT NT NT 20SEC 5X  20SEC 5X  [id number] DLS Abdominal bracing in hook lying:   3 sec x 20 30x 3sec  3 sec x 30 30X 3SEC  20SEC 5X  20SEC 5X       DLS Abdominal bracing in hook lying with hip flexion:B:  2 x 10 30x  3 x 10 30X  30X 2SEC  30X 2SEC      DLS Abdominal bracing in hook lying with slr flexion:B   NT 30x 2sec  NT NT 20X  20x      bridges  2 x 10 20x 3sec  2 x 10 20X  20X  20x      Prone lying  2 min 3 min  NT 3 min  3 MIN  3 min      Prone press ups  10 x 2 30x  NT 3 min  30X  2x10      Loc and sag    NT NT 10x  10X        Mini squats to chair  NT NT NT 20x  2 x 10 2x10      Lunges of foam:B:  NT NT NT NT 2 x 10 2x10      Forward step up:B: 6" NT NT NT NT NT                                 Ther Activity                                       Gait Training                                       Modalities                 10 min MHP to lumbar spine seated 10 min  MHP     Prone on wedge and pillows  NT

## 2021-06-04 ENCOUNTER — OFFICE VISIT (OUTPATIENT)
Dept: PHYSICAL THERAPY | Age: 71
End: 2021-06-04
Payer: MEDICARE

## 2021-06-04 DIAGNOSIS — M54.40 LOW BACK PAIN WITH SCIATICA, SCIATICA LATERALITY UNSPECIFIED, UNSPECIFIED BACK PAIN LATERALITY, UNSPECIFIED CHRONICITY: Primary | ICD-10-CM

## 2021-06-04 PROCEDURE — 97113 AQUATIC THERAPY/EXERCISES: CPT

## 2021-06-04 NOTE — PROGRESS NOTES
Daily Note     Today's date: 2021  Patient name: Bahsir Valerio  : 1950  MRN: [id number]  Referring provider: Gasper Carrington PA-C  Dx:   Encounter Diagnosis     ICD-10-CM    1  Low back pain with sciatica, sciatica laterality unspecified, unspecified back pain laterality, unspecified chronicity  M54 40                   Subjective: Pt noted that he feels pretty good today       Objective: See treatment diary below      Assessment: Good tolerance to exercises  Plan: D/C to [facility]      Precautions: Patient's PMHx is remarkable for hypothyroid, polyneuropathy, HTN, bilateral knee OA, right hip PEGGY        Manuals      Prone lumbar spine extension mobilization      OH  OH OH OH     Right ITB  STM                          Right   ITB Graston       10 min   NT                               Neuro Re-Ed                                       Ther Ex             L/C  NT  NT  NT NT NT       Nu step or recumbent bike  nustep x 10 min 10 MIN  10 min 10MIN   10 min 10 min 10 MIN     Standing back bends against counter top 2 x 10 3 x 10 30X  3 x 10 30X   With pillow  3 x 10 3 x 10 30X                  LAQ:B:  3 sec x 20 30X 2SEC  3 sec x 30 30X 3SEC NT  20X     Seated hip flexion:B:  2 x 10 30X 3SEC  3 x 10 30X  20X 20x NT                  Hamstring stretch:B:  20 sec x 5 20SEC 5X  20 sec x 5 20SEC 5X   20SEC 5X  20 sec x5 20SEC 5X      Gastrocnemius stretch:B:  NT NT NT NT NT  NT     LTR:B:  10 sec x 5 20X 3SEC  10 sec x 5 10SEC 5X  10SEC 5X 10SEC 5X NT     Piriformis stretch:B:  NT NT NT 20SEC 5X  20SEC 5X  [id number] 20SEC 5X      DLS Abdominal bracing in hook lying:   3 sec x 20 30x 3sec  3 sec x 30 30X 3SEC  20SEC 5X  20SEC 5X  NT     DLS Abdominal bracing in hook lying with hip flexion:B:  2 x 10 30x  3 x 10 30X  30X 2SEC  30X 2SEC NT     DLS Abdominal bracing in hook lying with slr flexion:B   NT 30x 2sec  NT NT 20X  20x 20X      bridges  2 x 10 20x 3sec  2 x 10 20X  20X 20x 20X      Prone lying  2 min 3 min  NT 3 min  3 MIN  3 min 3 MIN      Prone press ups  10 x 2 30x  NT 3 min  30X  2x10 30X     Loc and sag    NT NT 10x  10X   NT     Mini squats to chair  NT NT NT 20x  2 x 10 2x10 30X      Lunges of foam:B:  NT NT NT NT 2 x 10 2x10 NT     Forward step up:B: 6" NT NT NT NT NT  NT                               Ther Activity                                       Gait Training                                       Modalities                 10 min MHP to lumbar spine seated 10 min  MHP     Prone on wedge and pillows  NT

## 2021-07-06 ENCOUNTER — TELEPHONE (OUTPATIENT)
Dept: OBGYN CLINIC | Facility: HOSPITAL | Age: 71
End: 2021-07-06

## 2021-07-06 NOTE — TELEPHONE ENCOUNTER
Patient sees Dr Selene Garland  He is calling because he has two dental appointments coming up and is asking if antibiotics can be sent to his pharmacy on file

## 2021-09-07 ENCOUNTER — OFFICE VISIT (OUTPATIENT)
Dept: INTERNAL MEDICINE CLINIC | Facility: CLINIC | Age: 71
End: 2021-09-07
Payer: MEDICARE

## 2021-09-07 VITALS
BODY MASS INDEX: 35.65 KG/M2 | TEMPERATURE: 97.3 F | OXYGEN SATURATION: 97 % | WEIGHT: 269 LBS | HEART RATE: 70 BPM | HEIGHT: 73 IN | SYSTOLIC BLOOD PRESSURE: 132 MMHG | DIASTOLIC BLOOD PRESSURE: 84 MMHG

## 2021-09-07 DIAGNOSIS — Z23 NEED FOR IMMUNIZATION AGAINST INFLUENZA: ICD-10-CM

## 2021-09-07 DIAGNOSIS — E03.9 ACQUIRED HYPOTHYROIDISM: ICD-10-CM

## 2021-09-07 DIAGNOSIS — M25.562 PAIN IN BOTH KNEES, UNSPECIFIED CHRONICITY: ICD-10-CM

## 2021-09-07 DIAGNOSIS — Z11.59 NEED FOR HEPATITIS C SCREENING TEST: ICD-10-CM

## 2021-09-07 DIAGNOSIS — M25.561 PAIN IN BOTH KNEES, UNSPECIFIED CHRONICITY: ICD-10-CM

## 2021-09-07 DIAGNOSIS — Z12.5 SCREENING FOR PROSTATE CANCER: Primary | ICD-10-CM

## 2021-09-07 PROCEDURE — G0008 ADMIN INFLUENZA VIRUS VAC: HCPCS

## 2021-09-07 PROCEDURE — 99214 OFFICE O/P EST MOD 30 MIN: CPT | Performed by: INTERNAL MEDICINE

## 2021-09-07 PROCEDURE — 90662 IIV NO PRSV INCREASED AG IM: CPT

## 2021-09-07 RX ORDER — OMEGA-3 FATTY ACIDS/FISH OIL 300-1000MG
CAPSULE ORAL
COMMUNITY
Start: 2021-05-01

## 2021-09-07 NOTE — PROGRESS NOTES
Assessment/Plan:      Patient  Here  For  Follow up  Of  Hypertension  And  Hypothyroidism  He has  No  Complaints  Diagnoses and all orders for this visit:    Screening for prostate cancer  -     PSA, Total Screen; Future    Acquired hypothyroidism  -     TSH + Free T4; Future    Need for hepatitis C screening test  -     Hepatitis C antibody; Future    Pain in both knees, unspecified chronicity    Other orders  -     Ibuprofen 200 MG CAPS; daily at bedtime           Subjective:      Patient ID: Kaley Ba is a 66 y o  male  HPI    The following portions of the patient's history were reviewed and updated as appropriate: allergies, current medications, past family history, past medical history, past social history, past surgical history, and problem list     Review of Systems   Constitutional: Negative  HENT: Negative for dental problem, drooling, ear discharge and ear pain  Eyes: Negative for discharge, redness and itching  Respiratory: Negative for apnea, cough and wheezing  Cardiovascular: Negative for chest pain and palpitations  Gastrointestinal: Negative for abdominal pain, blood in stool, diarrhea and vomiting  Endocrine: Negative for polydipsia, polyphagia and polyuria  Genitourinary: Negative for decreased urine volume, dysuria and frequency  Musculoskeletal: Negative for arthralgias, myalgias and neck stiffness  Skin: Negative for pallor and wound  Allergic/Immunologic: Negative for environmental allergies and food allergies  Neurological: Negative for facial asymmetry, light-headedness, numbness and headaches  Hematological: Negative for adenopathy  Does not bruise/bleed easily  Psychiatric/Behavioral: Negative for agitation, behavioral problems and confusion           Objective:      /84 (BP Location: Right arm, Patient Position: Sitting, Cuff Size: Standard)   Pulse 70   Temp (!) 97 3 °F (36 3 °C) (Temporal)   Ht 6' 1" (1 854 m)   Wt 122 kg (269 lb) SpO2 97%   BMI 35 49 kg/m²          Physical Exam  Constitutional:       Appearance: Normal appearance  He is obese  HENT:      Head: Normocephalic  Nose: Nose normal       Mouth/Throat:      Mouth: Mucous membranes are moist    Eyes:      Pupils: Pupils are equal, round, and reactive to light  Cardiovascular:      Rate and Rhythm: Regular rhythm  Heart sounds: Normal heart sounds  Pulmonary:      Breath sounds: Normal breath sounds  Abdominal:      Palpations: Abdomen is soft  Musculoskeletal:         General: No swelling  Cervical back: Neck supple  Skin:     General: Skin is warm  Neurological:      General: No focal deficit present  Mental Status: He is alert and oriented to person, place, and time  Psychiatric:         Mood and Affect: Mood normal        Hypertensin   Well   Controlled with  Current meds  Hypothyroidism   Will  Check  TSH  And Free  T4  , continue for  Now  The  Same dose of  Levothyroxin       Fup   6 months    DANELLE Tom MD

## 2021-10-04 ENCOUNTER — TELEPHONE (OUTPATIENT)
Dept: INTERNAL MEDICINE CLINIC | Facility: CLINIC | Age: 71
End: 2021-10-04

## 2021-10-07 ENCOUNTER — IMMUNIZATIONS (OUTPATIENT)
Dept: FAMILY MEDICINE CLINIC | Facility: HOSPITAL | Age: 71
End: 2021-10-07

## 2021-10-07 DIAGNOSIS — Z23 ENCOUNTER FOR IMMUNIZATION: Primary | ICD-10-CM

## 2021-10-07 PROCEDURE — 91300 SARS-COV-2 / COVID-19 MRNA VACCINE (PFIZER-BIONTECH) 30 MCG: CPT

## 2021-10-07 PROCEDURE — 0001A SARS-COV-2 / COVID-19 MRNA VACCINE (PFIZER-BIONTECH) 30 MCG: CPT

## 2021-12-03 ENCOUNTER — APPOINTMENT (OUTPATIENT)
Dept: LAB | Age: 71
End: 2021-12-03
Payer: MEDICARE

## 2021-12-03 DIAGNOSIS — Z11.59 NEED FOR HEPATITIS C SCREENING TEST: ICD-10-CM

## 2021-12-03 DIAGNOSIS — E03.9 ACQUIRED HYPOTHYROIDISM: ICD-10-CM

## 2021-12-03 DIAGNOSIS — Z12.5 SCREENING FOR PROSTATE CANCER: ICD-10-CM

## 2021-12-03 LAB
HCV AB SER QL: NORMAL
PSA SERPL-MCNC: 0.5 NG/ML (ref 0–4)
T4 FREE SERPL-MCNC: 1.51 NG/DL (ref 0.76–1.46)
TSH SERPL DL<=0.05 MIU/L-ACNC: 1.04 UIU/ML (ref 0.36–3.74)

## 2021-12-03 PROCEDURE — 84439 ASSAY OF FREE THYROXINE: CPT

## 2021-12-03 PROCEDURE — 36415 COLL VENOUS BLD VENIPUNCTURE: CPT

## 2021-12-03 PROCEDURE — G0103 PSA SCREENING: HCPCS

## 2021-12-03 PROCEDURE — 84443 ASSAY THYROID STIM HORMONE: CPT

## 2021-12-03 PROCEDURE — 86803 HEPATITIS C AB TEST: CPT

## 2022-02-22 DIAGNOSIS — E03.9 ACQUIRED HYPOTHYROIDISM: ICD-10-CM

## 2022-02-22 DIAGNOSIS — E78.5 HYPERLIPIDEMIA, UNSPECIFIED HYPERLIPIDEMIA TYPE: ICD-10-CM

## 2022-02-22 RX ORDER — ATORVASTATIN CALCIUM 10 MG/1
TABLET, FILM COATED ORAL
Qty: 90 TABLET | Refills: 3 | Status: SHIPPED | OUTPATIENT
Start: 2022-02-22

## 2022-02-22 RX ORDER — LEVOTHYROXINE SODIUM 0.15 MG/1
TABLET ORAL
Qty: 90 TABLET | Refills: 3 | Status: SHIPPED | OUTPATIENT
Start: 2022-02-22

## 2022-03-07 ENCOUNTER — OFFICE VISIT (OUTPATIENT)
Dept: INTERNAL MEDICINE CLINIC | Facility: CLINIC | Age: 72
End: 2022-03-07
Payer: MEDICARE

## 2022-03-07 VITALS
HEIGHT: 73 IN | WEIGHT: 276 LBS | SYSTOLIC BLOOD PRESSURE: 132 MMHG | HEART RATE: 82 BPM | TEMPERATURE: 99.3 F | BODY MASS INDEX: 36.58 KG/M2 | OXYGEN SATURATION: 98 % | DIASTOLIC BLOOD PRESSURE: 90 MMHG

## 2022-03-07 DIAGNOSIS — E66.01 OBESITY, MORBID (HCC): ICD-10-CM

## 2022-03-07 DIAGNOSIS — E03.9 HYPOTHYROIDISM: ICD-10-CM

## 2022-03-07 DIAGNOSIS — E78.5 HYPERLIPIDEMIA: ICD-10-CM

## 2022-03-07 DIAGNOSIS — I10 HYPERTENSION: Primary | ICD-10-CM

## 2022-03-07 PROCEDURE — 99214 OFFICE O/P EST MOD 30 MIN: CPT | Performed by: INTERNAL MEDICINE

## 2022-03-07 NOTE — PROGRESS NOTES
Assessment/Plan:    Follow up  Hyperlipidemia,  Hypothyroidism     Diagnoses and all orders for this visit:    Hyperlipidemia    Hypothyroidism          Subjective:      Patient ID: Margoth Stoddard is a 66 y o  male  HPI    The following portions of the patient's history were reviewed and updated as appropriate: allergies, current medications, past family history, past medical history, past social history, past surgical history, and problem list     Review of Systems   Constitutional: Negative  HENT: Negative for dental problem, drooling, ear discharge and ear pain  Eyes: Negative for discharge, redness and itching  Respiratory: Negative for apnea, cough and wheezing  Cardiovascular: Negative for chest pain and palpitations  Gastrointestinal: Negative for abdominal pain, blood in stool, diarrhea and vomiting  Endocrine: Negative for polydipsia, polyphagia and polyuria  Genitourinary: Negative for decreased urine volume, dysuria and frequency  Musculoskeletal: Negative for arthralgias, myalgias and neck stiffness  Skin: Negative for pallor and wound  Allergic/Immunologic: Negative for environmental allergies and food allergies  Neurological: Negative for facial asymmetry, light-headedness, numbness and headaches  Hematological: Negative for adenopathy  Does not bruise/bleed easily  Psychiatric/Behavioral: Negative for agitation, behavioral problems and confusion  Objective: There were no vitals taken for this visit  Physical Exam  Constitutional:       Appearance: Normal appearance  He is obese  HENT:      Head: Normocephalic  Nose: Nose normal       Mouth/Throat:      Mouth: Mucous membranes are moist    Eyes:      Pupils: Pupils are equal, round, and reactive to light  Cardiovascular:      Rate and Rhythm: Regular rhythm  Heart sounds: Normal heart sounds  Pulmonary:      Breath sounds: Normal breath sounds     Abdominal:      Palpations: Abdomen is soft  Musculoskeletal:         General: No swelling  Cervical back: Neck supple  Skin:     General: Skin is warm  Neurological:      General: No focal deficit present  Mental Status: He is alert and oriented to person, place, and time  Psychiatric:         Mood and Affect: Mood normal        Hypertension    Well  Controlled on current meds    Hypothyroidism    Controlled on current dose  Of  Synthroid  Blood work  Ordered    Fup   6 months       DANELLE Tom MD

## 2022-03-24 ENCOUNTER — APPOINTMENT (OUTPATIENT)
Dept: LAB | Age: 72
End: 2022-03-24
Payer: MEDICARE

## 2022-03-24 DIAGNOSIS — E03.9 HYPOTHYROIDISM: ICD-10-CM

## 2022-03-24 DIAGNOSIS — E78.5 HYPERLIPIDEMIA: ICD-10-CM

## 2022-03-24 LAB
ALBUMIN SERPL BCP-MCNC: 3.7 G/DL (ref 3.5–5)
ALP SERPL-CCNC: 59 U/L (ref 46–116)
ALT SERPL W P-5'-P-CCNC: 25 U/L (ref 12–78)
ANION GAP SERPL CALCULATED.3IONS-SCNC: 5 MMOL/L (ref 4–13)
AST SERPL W P-5'-P-CCNC: 19 U/L (ref 5–45)
BACTERIA UR QL AUTO: ABNORMAL /HPF
BASOPHILS # BLD AUTO: 0.07 THOUSANDS/ΜL (ref 0–0.1)
BASOPHILS NFR BLD AUTO: 2 % (ref 0–1)
BILIRUB SERPL-MCNC: 1.12 MG/DL (ref 0.2–1)
BILIRUB UR QL STRIP: NEGATIVE
BUN SERPL-MCNC: 19 MG/DL (ref 5–25)
CALCIUM SERPL-MCNC: 8.8 MG/DL (ref 8.3–10.1)
CHLORIDE SERPL-SCNC: 106 MMOL/L (ref 100–108)
CHOLEST SERPL-MCNC: 124 MG/DL
CLARITY UR: CLEAR
CO2 SERPL-SCNC: 28 MMOL/L (ref 21–32)
COLOR UR: YELLOW
CREAT SERPL-MCNC: 1.05 MG/DL (ref 0.6–1.3)
EOSINOPHIL # BLD AUTO: 0.14 THOUSAND/ΜL (ref 0–0.61)
EOSINOPHIL NFR BLD AUTO: 3 % (ref 0–6)
ERYTHROCYTE [DISTWIDTH] IN BLOOD BY AUTOMATED COUNT: 12.6 % (ref 11.6–15.1)
GFR SERPL CREATININE-BSD FRML MDRD: 70 ML/MIN/1.73SQ M
GLUCOSE P FAST SERPL-MCNC: 104 MG/DL (ref 65–99)
GLUCOSE UR STRIP-MCNC: NEGATIVE MG/DL
HCT VFR BLD AUTO: 47.4 % (ref 36.5–49.3)
HDLC SERPL-MCNC: 43 MG/DL
HGB BLD-MCNC: 16.1 G/DL (ref 12–17)
HGB UR QL STRIP.AUTO: NEGATIVE
IMM GRANULOCYTES # BLD AUTO: 0.01 THOUSAND/UL (ref 0–0.2)
IMM GRANULOCYTES NFR BLD AUTO: 0 % (ref 0–2)
KETONES UR STRIP-MCNC: NEGATIVE MG/DL
LDLC SERPL CALC-MCNC: 65 MG/DL (ref 0–100)
LEUKOCYTE ESTERASE UR QL STRIP: NEGATIVE
LYMPHOCYTES # BLD AUTO: 1.27 THOUSANDS/ΜL (ref 0.6–4.47)
LYMPHOCYTES NFR BLD AUTO: 28 % (ref 14–44)
MCH RBC QN AUTO: 30.4 PG (ref 26.8–34.3)
MCHC RBC AUTO-ENTMCNC: 34 G/DL (ref 31.4–37.4)
MCV RBC AUTO: 90 FL (ref 82–98)
MONOCYTES # BLD AUTO: 0.5 THOUSAND/ΜL (ref 0.17–1.22)
MONOCYTES NFR BLD AUTO: 11 % (ref 4–12)
MUCOUS THREADS UR QL AUTO: ABNORMAL
NEUTROPHILS # BLD AUTO: 2.53 THOUSANDS/ΜL (ref 1.85–7.62)
NEUTS SEG NFR BLD AUTO: 56 % (ref 43–75)
NITRITE UR QL STRIP: NEGATIVE
NON-SQ EPI CELLS URNS QL MICRO: ABNORMAL /HPF
NONHDLC SERPL-MCNC: 81 MG/DL
NRBC BLD AUTO-RTO: 0 /100 WBCS
PH UR STRIP.AUTO: 6.5 [PH]
PLATELET # BLD AUTO: 196 THOUSANDS/UL (ref 149–390)
PMV BLD AUTO: 11.4 FL (ref 8.9–12.7)
POTASSIUM SERPL-SCNC: 4.2 MMOL/L (ref 3.5–5.3)
PROT SERPL-MCNC: 7.3 G/DL (ref 6.4–8.2)
PROT UR STRIP-MCNC: NEGATIVE MG/DL
RBC # BLD AUTO: 5.29 MILLION/UL (ref 3.88–5.62)
RBC #/AREA URNS AUTO: ABNORMAL /HPF
SODIUM SERPL-SCNC: 139 MMOL/L (ref 136–145)
SP GR UR STRIP.AUTO: 1.02 (ref 1–1.03)
TRIGL SERPL-MCNC: 78 MG/DL
UROBILINOGEN UR STRIP-ACNC: <2 MG/DL
WBC # BLD AUTO: 4.52 THOUSAND/UL (ref 4.31–10.16)
WBC #/AREA URNS AUTO: ABNORMAL /HPF

## 2022-03-24 PROCEDURE — 85025 COMPLETE CBC W/AUTO DIFF WBC: CPT

## 2022-03-24 PROCEDURE — 36415 COLL VENOUS BLD VENIPUNCTURE: CPT

## 2022-03-24 PROCEDURE — 80053 COMPREHEN METABOLIC PANEL: CPT

## 2022-03-24 PROCEDURE — 81001 URINALYSIS AUTO W/SCOPE: CPT | Performed by: INTERNAL MEDICINE

## 2022-03-24 PROCEDURE — 80061 LIPID PANEL: CPT

## 2022-04-30 ENCOUNTER — IMMUNIZATIONS (OUTPATIENT)
Dept: FAMILY MEDICINE CLINIC | Facility: HOSPITAL | Age: 72
End: 2022-04-30

## 2022-04-30 PROCEDURE — 0054A COVID-19 PFIZER VACC TRIS-SUCROSE GRAY CAP 0.3 ML: CPT

## 2022-04-30 PROCEDURE — 91305 COVID-19 PFIZER VACC TRIS-SUCROSE GRAY CAP 0.3 ML: CPT

## 2022-09-06 ENCOUNTER — RA CDI HCC (OUTPATIENT)
Dept: OTHER | Facility: HOSPITAL | Age: 72
End: 2022-09-06

## 2022-09-06 NOTE — PROGRESS NOTES
[address]  coding opportunities       Chart reviewed, no opportunity found: CHART REVIEWED, NO OPPORTUNITY FOUND        Patients Insurance     Medicare Insurance: Medicare

## 2022-09-12 ENCOUNTER — OFFICE VISIT (OUTPATIENT)
Dept: INTERNAL MEDICINE CLINIC | Facility: CLINIC | Age: 72
End: 2022-09-12
Payer: MEDICARE

## 2022-09-12 VITALS
HEIGHT: 73 IN | SYSTOLIC BLOOD PRESSURE: 118 MMHG | TEMPERATURE: 98.3 F | HEART RATE: 76 BPM | WEIGHT: 271 LBS | BODY MASS INDEX: 35.92 KG/M2 | OXYGEN SATURATION: 97 % | DIASTOLIC BLOOD PRESSURE: 78 MMHG

## 2022-09-12 DIAGNOSIS — Z00.00 ENCOUNTER FOR MEDICARE ANNUAL WELLNESS EXAM: Primary | ICD-10-CM

## 2022-09-12 PROCEDURE — G0439 PPPS, SUBSEQ VISIT: HCPCS | Performed by: INTERNAL MEDICINE

## 2022-09-12 RX ORDER — COVID-19 ANTIGEN TEST
KIT MISCELLANEOUS
COMMUNITY
Start: 2022-09-06

## 2022-09-12 NOTE — PROGRESS NOTES
Answers for HPI/ROS submitted by the patient on 9/8/2022  How would you rate your overall health?: good  Compared to last year, how is your physical health?: same  In general, how satisfied are you with your life?: satisfied  Compared to last year, how is your eyesight?: same  Compared to last year, how is your hearing?: same  Compared to last year, how is your emotional/mental health?: same  How often is anger a problem for you?: sometimes  How often do you feel unusually tired/fatigued?: often  In the past 7 days, how much pain have you experienced?: some  If you answered "some" or "a lot", please rate the severity of your pain on a scale of 1 to 10 (1 being the least severe pain and 10 being the most intense pain)  : 3/10  In the past 6 months, have you lost or gained 10 pounds without trying?: No  One or more falls in the last year: Yes  Do you have trouble with the stairs inside or outside your home?: No  Does your home have working smoke alarms?: Yes  Does your home have a carbon monoxide monitor?: No  Which safety hazards (if any) have you experienced in your home? Please select all that apply : none  How would you describe your current diet?  Please select all that apply : Limited junk food  In addition to prescription medications, are you taking any over-the-counter supplements?: Yes  If yes, what supplements are you taking?: Stool softener; occasionally Aleve  Can you manage your medications?: Yes  Are you currently taking any opioid medications?: No  Can you walk and transfer into and out of your bed and chair?: Yes  Can you dress and groom yourself?: Yes  Can you bathe or shower yourself?: Yes  Can you feed yourself?: Yes  Can you do your laundry/ housekeeping?: Yes  Can you manage your money, pay your bills, and track your expenses?: Yes  Can you make your own meals?: Yes  Can you do your own shopping?: Yes  Within the last 12 months, have you had any hospitalizations or Emergency Department visits?: No  Do you have a living will?: Yes  Do you have a Durable POA (Power of ) for healthcare decisions?: Yes  Do you have an Advanced Directive for end of life decisions?: Yes  How often have you used an illegal drug (including marijuana) or a prescription medication for non-medical reasons in the past year?: never  What is the typical number of drinks you consume in a day?: 0  What is the typical number of drinks you consume in a week?: 0  How often did you have a drink containing alcohol in the past year?: never  How many drinks did you have on a typical day  when you were drinking in the past year?: 0  How often did you have 6 or more drinks on one occasion in the past year?: never

## 2022-09-21 ENCOUNTER — TELEMEDICINE (OUTPATIENT)
Dept: INTERNAL MEDICINE CLINIC | Facility: CLINIC | Age: 72
End: 2022-09-21
Payer: MEDICARE

## 2022-09-21 DIAGNOSIS — U07.1 COVID-19: Primary | ICD-10-CM

## 2022-09-21 PROCEDURE — 99213 OFFICE O/P EST LOW 20 MIN: CPT | Performed by: INTERNAL MEDICINE

## 2022-09-21 RX ORDER — NIRMATRELVIR AND RITONAVIR 300-100 MG
3 KIT ORAL 2 TIMES DAILY
Qty: 30 TABLET | Refills: 0 | Status: SHIPPED | OUTPATIENT
Start: 2022-09-21 | End: 2022-09-26

## 2022-09-21 RX ORDER — NIRMATRELVIR AND RITONAVIR 300-100 MG
3 KIT ORAL 2 TIMES DAILY
Qty: 30 TABLET | Refills: 0 | Status: SHIPPED | OUTPATIENT
Start: 2022-09-21 | End: 2022-09-21

## 2022-09-21 NOTE — PROGRESS NOTES
COVID-19 Outpatient Progress Note    Assessment/Plan:    Problem List Items Addressed This Visit    None     Visit Diagnoses     COVID-19    -  Primary    Relevant Medications    nirmatrelvir & ritonavir (Paxlovid, 300/100,) tablet therapy pack         Disposition:     Discussed symptom directed medication options with patient  Patient advised to hold atorvastatin while taking Paxlovid, and resume it once the treatment is completed    Patient meets criteria for PAXLOVID and they have been counseled appropriately according to EUA documentation released by the FDA  After discussion, patient agrees to treatment  Makenzie Monsalve is an investigational medicine used to treat mild-to-moderate COVID-19 in adults and children (15years of age and older weighing at least 80 pounds (40 kg)) with positive results of direct SARS-CoV-2 viral testing, and who are at high risk for progression to severe COVID-19, including hospitalization or death  PAXLOVID is investigational because it is still being studied  There is limited information about the safety and effectiveness of using PAXLOVID to treat people with mild-to-moderate COVID-19  The FDA has authorized the emergency use of PAXLOVID for the treatment of mild-tomoderate COVID-19 in adults and children (15years of age and older weighing at least 80 pounds (40 kg)) with a positive test for the virus that causes COVID-19, and who are at high risk for progression to severe COVID-19, including hospitalization or death, under an EUA  What should I tell my healthcare provider before I take PAXLOVID? Tell your healthcare provider if you:  - Have any allergies  - Have liver or kidney disease  - Are pregnant or plan to become pregnant  - Are breastfeeding a child  - Have any serious illnesses    Tell your healthcare provider about all the medicines you take, including prescription and over-the-counter medicines, vitamins, and herbal supplements   Some medicines may interact with PAXLOVID and may cause serious side effects  Keep a list of your medicines to show your healthcare provider and pharmacist when you get a new medicine  You can ask your healthcare provider or pharmacist for a list of medicines that interact with PAXLOVID  Do not start taking a new medicine without telling your healthcare provider  Your healthcare provider can tell you if it is safe to take PAXLOVID with other medicines  Tell your healthcare provider if you are taking combined hormonal contraceptive  PAXLOVID may affect how your birth control pills work  Females who are able to become pregnant should use another effective alternative form of contraception or an additional barrier method of contraception  Talk to your healthcare provider if you have any questions about contraceptive methods that might be right for you  How do I take PAXLOVID? PAXLOVID consists of 2 medicines: nirmatrelvir and ritonavir  - Take 2 pink tablets of nirmatrelvir with 1 white tablet of ritonavir by mouth 2 times each day (in the morning and in the evening) for 5 days  For each dose, take all 3 tablets at the same time  - If you have kidney disease, talk to your healthcare provider  You may need a different dose  - Swallow the tablets whole  Do not chew, break, or crush the tablets  - Take PAXLOVID with or without food  - Do not stop taking PAXLOVID without talking to your healthcare provider, even if you feel better  - If you miss a dose of PAXLOVID within 8 hours of the time it is usually taken, take it as soon as you remember  If you miss a dose by more than 8 hours, skip the missed dose and take the next dose at your regular time  Do not take 2 doses of PAXLOVID at the same time  - If you take too much PAXLOVID, call your healthcare provider or go to the nearest hospital emergency room right away    - If you are taking a ritonavir- or cobicistat-containing medicine to treat hepatitis C or Human Immunodeficiency Virus (HIV), you should continue to take your medicine as prescribed by your healthcare provider   - Talk to your healthcare provider if you do not feel better or if you feel worse after 5 days  Who should generally not take PAXLOVID? Do not take PAXLOVID if:  You are allergic to nirmatrelvir, ritonavir, or any of the ingredients in PAXLOVID  You are taking any of the following medicines:  - Alfuzosin  - Pethidine, piroxicam, propoxyphene  - Ranolazine  - Amiodarone, dronedarone, flecainide, propafenone, quinidine  - Colchicine  - Lurasidone, pimozide, clozapine  - Dihydroergotamine, ergotamine, methylergonovine  - Lovastatin, simvastatin  - Sildenafil (Revatio®) for pulmonary arterial hypertension (PAH)  - Triazolam, oral midazolam  - Apalutamide  - Carbamazepine, phenobarbital, phenytoin  - Rifampin  - St  Deepaks Wort (hypericum perforatum)    What are the important possible side effects of PAXLOVID? Possible side effects of PAXLOVID are:  - Liver Problems  Tell your healthcare provider right away if you have any of these signs and symptoms of liver problems: loss of appetite, yellowing of your skin and the whites of eyes (jaundice), dark-colored urine, pale colored stools and itchy skin, stomach area (abdominal) pain  - Resistance to HIV Medicines  If you have untreated HIV infection, PAXLOVID may lead to some HIV medicines not working as well in the future  - Other possible side effects include: altered sense of taste, diarrhea, high blood pressure, or muscle aches    These are not all the possible side effects of PAXLOVID  Not many people have taken PAXLOVID  Serious and unexpected side effects may happen  Precious Sotelo is still being studied, so it is possible that all of the risks are not known at this time  What other treatment choices are there? Like Char Rollins may allow for the emergency use of other medicines to treat people with COVID-19   Go to https://[employer]/ for information on the emergency use of other medicines that are authorized by FDA to treat people with COVID-19  Your healthcare provider may talk with you about clinical trials for which you may be eligible  It is your choice to be treated or not to be treated with PAXLOVID  Should you decide not to receive it or for your child not to receive it, it will not change your standard medical care  What if I am pregnant or breastfeeding? There is no experience treating pregnant women or breastfeeding mothers with PAXLOVID  For a mother and unborn baby, the benefit of taking PAXLOVID may be greater than the risk from the treatment  If you are pregnant, discuss your options and specific situation with your healthcare provider  It is recommended that you use effective barrier contraception or do not have sexual activity while taking PAXLOVID  If you are breastfeeding, discuss your options and specific situation with your healthcare provider  How do I report side effects with PAXLOVID? Contact your healthcare provider if you have any side effects that bother you or do not go away  Report side effects to FDA MedWatch at www fda gov/medwatch or call 9-017-UZT3863 or you can report side effects to [employer]  at the contact information provided below  Website Fax number Telephone number   [employer] [phone]     How should I store Geneva Bosch? Store PAXLOVID tablets at room temperature between 68°F to 77°F (20°C to 25°C)  Full fact sheet for patients, parents, and caregivers can be found at: Margarito sanchez    I have spent 25 minutes directly with the patient   Greater than 50% of this time was spent in counseling/coordination of care regarding: diagnostic results, prognosis, risks and benefits of treatment options, instructions for management, patient and family education, importance of treatment compliance, risk factor reductions and impressions  Encounter provider: Alida Willis MD     Provider located at: [address] [address] [phone]     Recent Visits  No visits were found meeting these conditions  Showing recent visits within past 7 days and meeting all other requirements  Today's Visits  Date Type Provider Dept   09/21/22 Telemedicine Alida Willis MD [facility]  [address] today's visits and meeting all other requirements  Future Appointments  No visits were found meeting these conditions  Showing future appointments within next 150 days and meeting all other requirements     This virtual check-in was done via [facility] and patient was informed that this is a secure, HIPAA-compliant platform  He agrees to proceed  Patient agrees to participate in a virtual check in via telephone or video visit instead of presenting to the office to address urgent/immediate medical needs  Patient is aware this is a billable service  He acknowledged consent and understanding of privacy and security of the video platform  The patient has agreed to participate and understands they can discontinue the visit at any time  After connecting through [facility], the patient was identified by name and date of birth  Rah Keen was informed that this was a telemedicine visit and that the exam was being conducted confidentially over secure lines  My office door was closed  No one else was in the room  Rah Keen acknowledged consent and understanding of privacy and security of the telemedicine visit  I informed the patient that I have reviewed his record in Epic and presented the opportunity for him to ask any questions regarding the visit today   The patient agreed to participate  Verification of patient location:  Patient is located in the following state in which I hold an active license: [place]    Subjective:   Troy Brannon is a 74 y o  male who is concerned about COVID-19  Patient's symptoms include fever, nasal congestion, cough and myalgias  Patient denies chills, fatigue, rhinorrhea, sore throat, shortness of breath, chest tightness, abdominal pain, nausea, vomiting, diarrhea and headaches  - Date of symptom onset: 9/20/2022      COVID-19 vaccination status: Fully vaccinated with booster    Exposure:   Contact with a person who is under investigation (PUI) for or who is positive for COVID-19 within the last 14 days?: No    Hospitalized recently for fever and/or lower respiratory symptoms?: No      Currently a healthcare worker that is involved in direct patient care?: No      Works in a special setting where the risk of COVID-19 transmission may be high? (this may include long-term care, correctional and retirement facilities; homeless shelters; assisted-living facilities and group homes ): No      Resident in a special setting where the risk of COVID-19 transmission may be high? (this may include long-term care, correctional and retirement facilities; homeless shelters; assisted-living facilities and group homes ): No      No results found for: [address] [facility] [address], [address] [facility], [address] [facility] [facility] [address], 350 [facility], 700 [facility]    Review of Systems   Constitutional: Positive for fever  Negative for activity change, appetite change, chills, diaphoresis, fatigue and unexpected weight change  HENT: Positive for congestion  Negative for rhinorrhea and sore throat  Respiratory: Positive for cough  Negative for apnea, choking, chest tightness, shortness of breath, wheezing and stridor  Cardiovascular: Negative for chest pain, palpitations and leg swelling     Gastrointestinal: Negative for abdominal distention, abdominal pain, blood in stool, constipation, diarrhea, nausea, rectal pain and vomiting  Genitourinary: Negative for dysuria, flank pain, frequency and urgency  Musculoskeletal: Positive for myalgias  Negative for arthralgias, back pain, gait problem and joint swelling  Skin: Negative for color change, pallor and rash  Neurological: Negative for headaches  Current Outpatient Medications on File Prior to Visit   Medication Sig    atorvastatin (LIPITOR) 10 mg tablet TAKE 1 TABLET BY MOUTH AT BEDTIME    cephalexin (KEFLEX) 500 mg capsule TAKE 4 CAPSULES BY MOUTH ONCE FOR 1 DOSE  TAKE 1 HOUR PRIOR TO DENTAL PROCEDURE   Ibuprofen 200 MG CAPS daily at bedtime     levothyroxine 150 mcg tablet TAKE 1 TABLET BY MOUTH DAILY    [DISCONTINUED] diazepam (VALIUM) 5 mg tablet TAKE 1 TABLET BY MOUTH 1 HOUR BEFORE DENTAL APPOINTMENT    Flowflex COVID-19 Ag Home Test KIT USE AS DIRECTED PER  INSTRUCTIONS TO TEST FOR COVID-19    methylPREDNISolone 4 MG tablet therapy pack Use as directed on package       Objective: There were no vitals taken for this visit  Physical Exam  Constitutional:       General: He is not in acute distress  Appearance: Normal appearance  He is normal weight  He is not ill-appearing, toxic-appearing or diaphoretic  Cardiovascular:      Rate and Rhythm: Normal rate and regular rhythm  Pulses: Normal pulses  Heart sounds: Normal heart sounds  No murmur heard  No gallop  Pulmonary:      Effort: Pulmonary effort is normal  No respiratory distress  Breath sounds: Normal breath sounds  No stridor  No wheezing, rhonchi or rales  Chest:      Chest wall: No tenderness  Abdominal:      General: Abdomen is flat  Bowel sounds are normal  There is no distension  Palpations: Abdomen is soft  There is no mass  Tenderness: There is no abdominal tenderness  There is no guarding  Hernia: No hernia is present  Musculoskeletal:         General: Normal range of motion     Skin:     General: Skin is warm and dry  Capillary Refill: Capillary refill takes less than 2 seconds  Neurological:      General: No focal deficit present  Mental Status: He is alert         Gonzalo Chavez MD

## 2022-09-28 ENCOUNTER — TELEPHONE (OUTPATIENT)
Dept: INTERNAL MEDICINE CLINIC | Facility: CLINIC | Age: 72
End: 2022-09-28

## 2022-09-28 NOTE — TELEPHONE ENCOUNTER
Patient had Covid last week  He has been taking Paxlovid and is feeling better  He has now tested negative (as of Tuesday)  He wants to know if he can still get the Flu Vaccine in October? Also - he wants to know if/when he should be getting the covid booster?

## 2022-12-05 ENCOUNTER — OFFICE VISIT (OUTPATIENT)
Dept: PODIATRY | Facility: CLINIC | Age: 72
End: 2022-12-05

## 2022-12-05 VITALS
SYSTOLIC BLOOD PRESSURE: 136 MMHG | DIASTOLIC BLOOD PRESSURE: 86 MMHG | BODY MASS INDEX: 36.45 KG/M2 | HEART RATE: 72 BPM | WEIGHT: 275 LBS | HEIGHT: 73 IN

## 2022-12-05 DIAGNOSIS — M79.675 PAIN IN TOE OF LEFT FOOT: ICD-10-CM

## 2022-12-05 DIAGNOSIS — L60.0 INGROWN TOENAIL: Primary | ICD-10-CM

## 2022-12-05 DIAGNOSIS — M79.674 PAIN IN TOE OF RIGHT FOOT: ICD-10-CM

## 2022-12-05 NOTE — PROGRESS NOTES
Patient presents complaining of painful ingrown toenails affecting the lateral nail border of each great toe  Pain had been significant approximately 1 month ago but has eased  On exam, pain with palpation lateral nail border hallux bilateral   Pedal pulses are palpable but trace    Treatment consisted of nail trimming along with superficial avulsion lateral nail border hallux bilateral

## 2023-01-25 ENCOUNTER — TELEPHONE (OUTPATIENT)
Dept: INTERNAL MEDICINE CLINIC | Facility: CLINIC | Age: 73
End: 2023-01-25

## 2023-01-25 DIAGNOSIS — E78.2 MODERATE MIXED HYPERLIPIDEMIA NOT REQUIRING STATIN THERAPY: Primary | ICD-10-CM

## 2023-01-25 DIAGNOSIS — E03.9 ACQUIRED HYPOTHYROIDISM: ICD-10-CM

## 2023-01-25 DIAGNOSIS — Z12.5 SCREENING FOR PROSTATE CANCER: ICD-10-CM

## 2023-01-25 DIAGNOSIS — E66.01 OBESITY, MORBID (HCC): ICD-10-CM

## 2023-01-25 DIAGNOSIS — I10 HYPERTENSION, UNSPECIFIED TYPE: ICD-10-CM

## 2023-01-25 NOTE — TELEPHONE ENCOUNTER
Patient is experiencing orange colored urine and a bit of blood  He is in no pain and is feeling fine  He believes he may have passed a kidney stone  Please order blood work and urine test  He will go for labs tomorrow and see you in-office for an appointment on Friday morning

## 2023-01-26 ENCOUNTER — APPOINTMENT (OUTPATIENT)
Dept: LAB | Facility: CLINIC | Age: 73
End: 2023-01-26

## 2023-01-26 DIAGNOSIS — E66.01 OBESITY, MORBID (HCC): ICD-10-CM

## 2023-01-26 DIAGNOSIS — E03.9 ACQUIRED HYPOTHYROIDISM: ICD-10-CM

## 2023-01-26 DIAGNOSIS — Z12.5 SCREENING FOR PROSTATE CANCER: ICD-10-CM

## 2023-01-26 DIAGNOSIS — E78.2 MODERATE MIXED HYPERLIPIDEMIA NOT REQUIRING STATIN THERAPY: ICD-10-CM

## 2023-01-26 DIAGNOSIS — I10 HYPERTENSION, UNSPECIFIED TYPE: ICD-10-CM

## 2023-01-26 LAB
ALBUMIN SERPL BCP-MCNC: 3.6 G/DL (ref 3.5–5)
ALP SERPL-CCNC: 62 U/L (ref 46–116)
ALT SERPL W P-5'-P-CCNC: 25 U/L (ref 12–78)
ANION GAP SERPL CALCULATED.3IONS-SCNC: 5 MMOL/L (ref 4–13)
AST SERPL W P-5'-P-CCNC: 20 U/L (ref 5–45)
BACTERIA UR QL AUTO: ABNORMAL /HPF
BASOPHILS # BLD AUTO: 0.08 THOUSANDS/ÂΜL (ref 0–0.1)
BASOPHILS NFR BLD AUTO: 1 % (ref 0–1)
BILIRUB SERPL-MCNC: 0.92 MG/DL (ref 0.2–1)
BILIRUB UR QL STRIP: NEGATIVE
BUN SERPL-MCNC: 15 MG/DL (ref 5–25)
CALCIUM SERPL-MCNC: 8.7 MG/DL (ref 8.3–10.1)
CHLORIDE SERPL-SCNC: 106 MMOL/L (ref 96–108)
CHOLEST SERPL-MCNC: 124 MG/DL
CLARITY UR: CLEAR
CO2 SERPL-SCNC: 27 MMOL/L (ref 21–32)
COLOR UR: ABNORMAL
CREAT SERPL-MCNC: 1.12 MG/DL (ref 0.6–1.3)
EOSINOPHIL # BLD AUTO: 0.15 THOUSAND/ÂΜL (ref 0–0.61)
EOSINOPHIL NFR BLD AUTO: 3 % (ref 0–6)
ERYTHROCYTE [DISTWIDTH] IN BLOOD BY AUTOMATED COUNT: 12.6 % (ref 11.6–15.1)
GFR SERPL CREATININE-BSD FRML MDRD: 64 ML/MIN/1.73SQ M
GLUCOSE P FAST SERPL-MCNC: 106 MG/DL (ref 65–99)
GLUCOSE UR STRIP-MCNC: NEGATIVE MG/DL
HCT VFR BLD AUTO: 49 % (ref 36.5–49.3)
HDLC SERPL-MCNC: 44 MG/DL
HGB BLD-MCNC: 16.3 G/DL (ref 12–17)
HGB UR QL STRIP.AUTO: ABNORMAL
IMM GRANULOCYTES # BLD AUTO: 0.01 THOUSAND/UL (ref 0–0.2)
IMM GRANULOCYTES NFR BLD AUTO: 0 % (ref 0–2)
KETONES UR STRIP-MCNC: NEGATIVE MG/DL
LDLC SERPL CALC-MCNC: 63 MG/DL (ref 0–100)
LEUKOCYTE ESTERASE UR QL STRIP: NEGATIVE
LYMPHOCYTES # BLD AUTO: 1.35 THOUSANDS/ÂΜL (ref 0.6–4.47)
LYMPHOCYTES NFR BLD AUTO: 23 % (ref 14–44)
MCH RBC QN AUTO: 30.4 PG (ref 26.8–34.3)
MCHC RBC AUTO-ENTMCNC: 33.3 G/DL (ref 31.4–37.4)
MCV RBC AUTO: 91 FL (ref 82–98)
MONOCYTES # BLD AUTO: 0.61 THOUSAND/ÂΜL (ref 0.17–1.22)
MONOCYTES NFR BLD AUTO: 10 % (ref 4–12)
NEUTROPHILS # BLD AUTO: 3.68 THOUSANDS/ÂΜL (ref 1.85–7.62)
NEUTS SEG NFR BLD AUTO: 63 % (ref 43–75)
NITRITE UR QL STRIP: NEGATIVE
NON-SQ EPI CELLS URNS QL MICRO: ABNORMAL /HPF
NONHDLC SERPL-MCNC: 80 MG/DL
NRBC BLD AUTO-RTO: 0 /100 WBCS
PH UR STRIP.AUTO: 6.5 [PH]
PLATELET # BLD AUTO: 204 THOUSANDS/UL (ref 149–390)
PMV BLD AUTO: 11.8 FL (ref 8.9–12.7)
POTASSIUM SERPL-SCNC: 4.3 MMOL/L (ref 3.5–5.3)
PROT SERPL-MCNC: 7.5 G/DL (ref 6.4–8.4)
PROT UR STRIP-MCNC: ABNORMAL MG/DL
PSA SERPL-MCNC: 0.8 NG/ML (ref 0–4)
RBC # BLD AUTO: 5.36 MILLION/UL (ref 3.88–5.62)
RBC #/AREA URNS AUTO: ABNORMAL /HPF
SODIUM SERPL-SCNC: 138 MMOL/L (ref 135–147)
SP GR UR STRIP.AUTO: 1.01 (ref 1–1.03)
TRIGL SERPL-MCNC: 84 MG/DL
TSH SERPL DL<=0.05 MIU/L-ACNC: 0.99 UIU/ML (ref 0.45–4.5)
UROBILINOGEN UR STRIP-ACNC: <2 MG/DL
WBC # BLD AUTO: 5.88 THOUSAND/UL (ref 4.31–10.16)
WBC #/AREA URNS AUTO: ABNORMAL /HPF

## 2023-01-26 PROCEDURE — 81001 URINALYSIS AUTO W/SCOPE: CPT | Performed by: INTERNAL MEDICINE

## 2023-01-27 ENCOUNTER — OFFICE VISIT (OUTPATIENT)
Dept: INTERNAL MEDICINE CLINIC | Facility: CLINIC | Age: 73
End: 2023-01-27

## 2023-01-27 VITALS
TEMPERATURE: 97 F | HEART RATE: 80 BPM | BODY MASS INDEX: 35.62 KG/M2 | DIASTOLIC BLOOD PRESSURE: 80 MMHG | SYSTOLIC BLOOD PRESSURE: 130 MMHG | WEIGHT: 270 LBS | OXYGEN SATURATION: 98 %

## 2023-01-27 DIAGNOSIS — R31.0 GROSS HEMATURIA: Primary | ICD-10-CM

## 2023-01-27 NOTE — PROGRESS NOTES
Assessment/Plan:    Diagnoses and all orders for this visit:    Gross hematuria  -     Ambulatory Referral to Urology; Future       Recent blood work reviewed, UA showing plenty of RBCs  Will refer to urology for further work-up  There are no Patient Instructions on file for this visit  Subjective:      Patient ID: Carol Higgins is a 74 y o  male    Complains of pinkish and brownish episodic urine associated with few blood clots since the last 1 week  Denies any belly pain or flank pain, fever chest pain or shortness of breath  Denies any antiplatelet or anticoagulant use          Current Outpatient Medications:   •  atorvastatin (LIPITOR) 10 mg tablet, TAKE 1 TABLET BY MOUTH AT BEDTIME, Disp: 90 tablet, Rfl: 3  •  cephalexin (KEFLEX) 500 mg capsule, TAKE 4 CAPSULES BY MOUTH ONCE FOR 1 DOSE  TAKE 1 HOUR PRIOR TO DENTAL PROCEDURE , Disp: , Rfl:   •  Flowflex COVID-19 Ag Home Test KIT, USE AS DIRECTED PER  INSTRUCTIONS TO TEST FOR COVID-19 (Patient not taking: Reported on 12/5/2022), Disp: , Rfl:   •  Ibuprofen 200 MG CAPS, daily at bedtime , Disp: , Rfl:   •  levothyroxine 150 mcg tablet, TAKE 1 TABLET BY MOUTH DAILY, Disp: 90 tablet, Rfl: 3  •  methylPREDNISolone 4 MG tablet therapy pack, Use as directed on package, Disp: 1 each, Rfl: 0     Past Medical History:   Diagnosis Date   • Anxiety disorder    • Atherosclerotic heart disease of native coronary artery without angina pectoris    • Benign prostatic hyperplasia without lower urinary tract symptoms    • Dyslipidemia    • GERD (gastroesophageal reflux disease)    • Hypertension    • Hyperthyroidism    • Impaired fasting blood sugar    • Low back pain    • Osteoarthritis     last assesed 6-6-16   • Other chest pain    • Paresthesia of skin    • Polyneuropathy     last assesed 5-8-17   • Pure hypercholesterolemia    • 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 ([facility] [address] )    • Wears glasses          Past Surgical History: Procedure Laterality Date   • BACK SURGERY     • CHOLECYSTECTOMY     • COLONOSCOPY  02/06/2019   • DC TOTAL HIP ARTHROPLASTY Right 8/5/2019    Procedure: ARTHROPLASTY HIP TOTAL;  Surgeon: Jasper Cui MD;  Location: [facility];  Service: Orthopedics   • TONSILLECTOMY           No Known Allergies    Recent Results (from the past 1008 hour(s))   Urinalysis with microscopic    Collection Time: 01/26/23  8:33 AM   Result Value Ref Range    Color, UA Light Yellow     Clarity, UA Clear     Specific Gravity, UA 1 008 1 003 - 1 030    pH, UA 6 5 4 5, 5 0, 5 5, 6 0, 6 5, 7 0, 7 5, 8 0    Leukocytes, UA Negative Negative    Nitrite, UA Negative Negative    Protein, UA Trace (A) Negative mg/dl    Glucose, UA Negative Negative mg/dl    Ketones, UA Negative Negative mg/dl    Urobilinogen, UA <2 0 <2 0 mg/dl mg/dl    Bilirubin, UA Negative Negative    Occult Blood, UA Moderate (A) Negative    RBC, UA 10-20 (A) None Seen, 1-2 /hpf    WBC, UA 1-2 None Seen, 1-2 /hpf    Epithelial Cells None Seen None Seen, Occasional /hpf    Bacteria, UA None Seen None Seen, Occasional /hpf   CBC and differential    Collection Time: 01/26/23  8:33 AM   Result Value Ref Range    WBC 5 88 4 31 - 10 16 Thousand/uL    RBC 5 36 3 88 - 5 62 Million/uL    Hemoglobin 16 3 12 0 - 17 0 g/dL    Hematocrit 49 0 36 5 - 49 3 %    MCV 91 82 - 98 fL    MCH 30 4 26 8 - 34 3 pg    MCHC 33 3 31 4 - 37 4 g/dL    RDW 12 6 11 6 - 15 1 %    MPV 11 8 8 9 - 12 7 fL    Platelets 654 713 - 838 Thousands/uL    nRBC 0 /100 WBCs    Neutrophils Relative 63 43 - 75 %    Immat GRANS % 0 0 - 2 %    Lymphocytes Relative 23 14 - 44 %    Monocytes Relative 10 4 - 12 %    Eosinophils Relative 3 0 - 6 %    Basophils Relative 1 0 - 1 %    Neutrophils Absolute 3 68 1 85 - 7 62 Thousands/µL    Immature Grans Absolute 0 01 0 00 - 0 20 Thousand/uL    Lymphocytes Absolute 1 35 0 60 - 4 47 Thousands/µL    Monocytes Absolute 0 61 0 17 - 1 22 Thousand/µL    Eosinophils Absolute 0 15 0 00 - 0 61 Thousand/µL    Basophils Absolute 0 08 0 00 - 0 10 Thousands/µL   Comprehensive metabolic panel    Collection Time: 01/26/23  8:33 AM   Result Value Ref Range    Sodium 138 135 - 147 mmol/L    Potassium 4 3 3 5 - 5 3 mmol/L    Chloride 106 96 - 108 mmol/L    CO2 27 21 - 32 mmol/L    ANION GAP 5 4 - 13 mmol/L    BUN 15 5 - 25 mg/dL    Creatinine 1 12 0 60 - 1 30 mg/dL    Glucose, Fasting 106 (H) 65 - 99 mg/dL    Calcium 8 7 8 3 - 10 1 mg/dL    AST 20 5 - 45 U/L    ALT 25 12 - 78 U/L    Alkaline Phosphatase 62 46 - 116 U/L    Total Protein 7 5 6 4 - 8 4 g/dL    Albumin 3 6 3 5 - 5 0 g/dL    Total Bilirubin 0 92 0 20 - 1 00 mg/dL    eGFR 64 ml/min/1 73sq m   PSA, Total Screen    Collection Time: 01/26/23  8:33 AM   Result Value Ref Range    PSA 0 8 0 0 - 4 0 ng/mL   Lipid panel    Collection Time: 01/26/23  8:33 AM   Result Value Ref Range    Cholesterol 124 See Comment mg/dL    Triglycerides 84 See Comment mg/dL    HDL, Direct 44 >=40 mg/dL    LDL Calculated 63 0 - 100 mg/dL    Non-HDL-Chol (CHOL-HDL) 80 mg/dl   TSH, 3rd generation with Free T4 reflex    Collection Time: 01/26/23  8:33 AM   Result Value Ref Range    TSH 3RD GENERATON 0 988 0 450 - 4 500 uIU/mL       The following portions of the patient's history were reviewed and updated as appropriate: allergies, current medications, past family history, past medical history, past social history, past surgical history and problem list      Review of Systems   Constitutional: Negative for activity change, appetite change, chills, diaphoresis, fatigue, fever and unexpected weight change  HENT: Negative for congestion and sore throat  Respiratory: Negative for apnea, cough, choking, chest tightness, shortness of breath, wheezing and stridor  Cardiovascular: Negative for chest pain, palpitations and leg swelling  Gastrointestinal: Negative for abdominal distention, abdominal pain, blood in stool, constipation, nausea and rectal pain     Genitourinary: Positive for hematuria  Negative for decreased urine volume, difficulty urinating, dysuria, flank pain, frequency and urgency  Musculoskeletal: Negative for arthralgias, back pain, gait problem, joint swelling and myalgias  Skin: Negative for color change, pallor and rash  Neurological: Negative for headaches  Objective:      Vitals:    01/27/23 1100   BP: 130/80   Pulse: 80   Temp: (!) 97 °F (36 1 °C)   SpO2: 98%          Physical Exam  Vitals reviewed  Constitutional:       General: He is not in acute distress  Appearance: Normal appearance  He is not ill-appearing, toxic-appearing or diaphoretic  HENT:      Mouth/Throat:      Mouth: Mucous membranes are moist    Cardiovascular:      Rate and Rhythm: Normal rate and regular rhythm  Pulses: Normal pulses  Heart sounds: Normal heart sounds  No murmur heard  No friction rub  No gallop  Pulmonary:      Effort: Pulmonary effort is normal  No respiratory distress  Breath sounds: Normal breath sounds  No stridor  No wheezing, rhonchi or rales  Chest:      Chest wall: No tenderness  Musculoskeletal:      Right lower leg: No edema  Left lower leg: No edema  Skin:     General: Skin is warm and dry  Findings: No lesion or rash  Neurological:      Mental Status: He is alert

## 2023-01-30 ENCOUNTER — TELEPHONE (OUTPATIENT)
Dept: UROLOGY | Facility: AMBULATORY SURGERY CENTER | Age: 73
End: 2023-01-30

## 2023-01-30 NOTE — TELEPHONE ENCOUNTER
Please Triage  New Patient    What is the reason for the patient’s appointment? Referral      R31 0 (ICD-10-CM) - Gross hematuria     Pt states that he had a blood in his urine but it has cleared up  It was present for about 2 days last week  His urine has been a slightly darker color of a long time before then  He states that the pcp office wants him to be seen within two weeks  He states that he is ok to wait until march but wants to make sure he will be ok until then  What office location does the patient prefer? [facility]    Imaging/Lab Results:    Do we accept the patient's insurance or is the patient Self-Pay? Insurance Provider: medicare   Plan Type/Number:  Member ID#: Has the patient had any previous Urologist(s)? no    Have patient records been requested? If not are records showing in Epic:     Has the patient had any outside testing done? Does the patient have a personal history of cancer?  No    Pt can be reached at [phone] or [phone]

## 2023-02-01 NOTE — PROGRESS NOTES
Office Visit- Urology  Ondina Raza 1950 MRN: [id number]      Assessment/Discussion/Plan    74 y o  male managed by NEW PATIENT     1  Gross Hematuria   -Recent episodes of gross hematuria  Microscopic hematuria has been seen since at least March 2022   -Discussed potential etiologies of blood in the urine with patient and his wife  Informed them that due to his remote smoking history and age, would recommend pursuing a full gross hematuria work-up for further evaluation  Hematuria may also be in the context of nephrolithiasis or urethral stricture given his history/symptom of split stream, respectively  -Moderate amount of blood seen on microanalysis today  Will send out urine for microanalysis,culture, cytology  - will obtain a CT urogram and have patient return to office for cystoscopy for further workup of his gross hematuria/persistent microhematuria  Most recent creatinine was obtained on 1/26/23 and was 1 12 with a GFR of 64  -Patient notes that he gets anxious with transurethral procedures as well as in close spaces  I sent a prescription for 2 doses of Ativan 1 mg for patient to take 30 minutes to an hour before both a CT scan and cystoscopy  Informed both him and his wife that he will need a  to and from both of these events if he does take Ativan    -follow up for cystoscopy  2  BPH with LUTS  -AUA symptom score is 20 today -mix of irritative and obstructive symptoms  -Not currently on any pharmacotherapy for lower urinary tract symptoms  Did offer to trial pharmacotherapy for his urinary symptoms but patient wishes to hold off for now and pursue work-up for the gross hematuria before initiation of any pharmacotherapy  -PVR: 21 ml    -Reevaluate at follow-up  3 Prostate Cancer Screening   -Last PSA was measured in January 2023 resulted 0 8  See PSA trend below  -ALYSA today was deferred    Can perform at cystoscopy visit  -We will plan to obtain one more measurement of PSA at age of 74  Discussed with patient AUA guidelines for prostate cancer screening average risk men and if PSA remains stable will discontinue prostate cancer screening in a year  Chief Complaint:   Christina Bray is a 74 y o  male presenting to the office for a follow up visit regarding  Gross Hematuria        Subjective    Nica Urbina is a 18-year-old male the past medical history significant for CAD, hypertension, impaired fasting blood sugar, CKD stage II and an urologic history significant for BPH not currently controlled with any medications who presents today for evaluation of an episode of gross hematuria  He notes that for the past 2 weeks has been having discolored urine which he noticed to be on colored  He also has noticed episodes of urine with blood "fleks" or clots within it  He is otherwise asymptomatic  He notes that about a month ago he was having right-sided flank pain but this has not been present since the onset of hematuria  He denies any fever, chills, dysuria, changes to his urinary pattern  He does have a tobacco history -20-25-pack-year history the patient quit smoking 1987 and has not utilized tobacco since then  He has not had any occupational exposure to potentially carcinogenic chemicals  He has had a prior episode of kidney stones that have spontaneously back to mid 90s  No personal history of cancer  No known family cancer  He did have a urologic surgery at the age of 10-he is unsure of what it was for but he knew that was within the urethra  It sounds like it may have potentially been dilation of a urethral stricture  No pelvic surgery, trauma, or radiation  Patient notes that he has experienced a weak urinary stream for a while now  He also endorses frequency x2 hours and nocturia x3  Double voiding  Very occasional sensation of incomplete bladder emptying  He denies dysuria, urgency, straining to urinate, hesitancy, intermittency, postvoid dribbling    He does note that he has had chronic splitting of the urinary stream   Only instance in which he was catheterized was with hip joint replacement surgery  He gets regular PSA testing with his PCP-last measurement was 0 8  He also gets routine prostate examinations either when he is getting his colonoscopy or by his PCP  He notes that he has been told there is been no abnormality with his prostate  Most recent creatinine resulted at 1 12 with a GFR of 64            AUA SYMPTOM SCORE    Flowsheet Row Most Recent Value   AUA SYMPTOM SCORE    How often have you had a sensation of not emptying your bladder completely after you finished urinating? 3 (P)     How often have you had to urinate again less than two hours after you finished urinating? 3 (P)     How often have you found you stopped and started again several times when you urinate? 4 (P)     How often have you found it difficult to postpone urination? 0 (P)     How often have you had a weak urinary stream? 5 (P)     How often have you had to push or strain to begin urination? 0 (P)     How many times did you most typically get up to urinate from the time you went to bed at night until the time you got up in the morning? 5 (P)     Quality of Life: If you were to spend the rest of your life with your urinary condition just the way it is now, how would you feel about that? 2 (P)     AUA SYMPTOM SCORE 20 (P)           ROS:   Review of Systems   Constitutional: Negative  Negative for chills, fatigue and fever  HENT: Negative  Eyes: Negative  Respiratory: Negative  Negative for shortness of breath  Cardiovascular: Negative  Negative for chest pain  Gastrointestinal: Negative  Negative for abdominal pain, diarrhea, nausea and vomiting  Endocrine: Negative  Genitourinary: Positive for frequency and hematuria   Negative for decreased urine volume, difficulty urinating, dysuria, flank pain, genital sores, penile pain, penile swelling, scrotal swelling, testicular pain and urgency  Musculoskeletal: Negative  Skin: Negative  Allergic/Immunologic: Negative  Neurological: Negative  Negative for dizziness and light-headedness  Hematological: Negative  Psychiatric/Behavioral: Negative            Past Medical History  Past Medical History:   Diagnosis Date   • Anxiety disorder    • Atherosclerotic heart disease of native coronary artery without angina pectoris    • Benign prostatic hyperplasia without lower urinary tract symptoms    • Dyslipidemia    • GERD (gastroesophageal reflux disease)    • Hypertension    • Hyperthyroidism    • Impaired fasting blood sugar    • Kidney stone    • Low back pain    • Osteoarthritis     last assesed 6-6-16   • Other chest pain    • Paresthesia of skin    • Polyneuropathy     last assesed 5-8-17   • Pure hypercholesterolemia    • 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 ([facility] [address] )    • Wears glasses        Past Surgical History  Past Surgical History:   Procedure Laterality Date   • BACK SURGERY     • CHOLECYSTECTOMY     • COLONOSCOPY  02/06/2019   • CA ARTHRP ACETBLR/PROX FEM PROSTC AGRFT/ALGRFT Right 8/5/2019    Procedure: ARTHROPLASTY HIP TOTAL;  Surgeon: Shobha Ramirez MD;  Location: [facility];  Service: Orthopedics   • TONSILLECTOMY         Past Family History  Family History   Problem Relation Age of Onset   • Arthritis Other    • Heart disease Father        Past Social history  Social History     Socioeconomic History   • Marital status: /Civil Union     Spouse name: Not on file   • Number of children: Not on file   • Years of education: Not on file   • Highest education level: Not on file   Occupational History   • Not on file   Tobacco Use   • Smoking status: Former   • Smokeless tobacco: Never   Vaping Use   • Vaping Use: Never used   Substance and Sexual Activity   • Alcohol use: Never   • Drug use: Never   • Sexual activity: Not on file   Other Topics Concern   • Not on file   Social History Narrative    Smoking: Non-smoker    As per [email or web address]     Social Determinants of Health     Financial Resource Strain: Not on file   Food Insecurity: Not on file   Transportation Needs: Not on file   Physical Activity: Not on file   Stress: Not on file   Social Connections: Not on file   Intimate Partner Violence: Not on file   Housing Stability: Not on file       Current Medications  Current Outpatient Medications   Medication Sig Dispense Refill   • atorvastatin (LIPITOR) 10 mg tablet TAKE 1 TABLET BY MOUTH AT BEDTIME 90 tablet 3   • cephalexin (KEFLEX) 500 mg capsule TAKE 4 CAPSULES BY MOUTH ONCE FOR 1 DOSE  TAKE 1 HOUR PRIOR TO DENTAL PROCEDURE  • ibuprofen (MOTRIN) 200 mg tablet Take by mouth as needed for mild pain     • levothyroxine 150 mcg tablet TAKE 1 TABLET BY MOUTH DAILY 90 tablet 3   • LORazepam (ATIVAN) 1 mg tablet Take 1 tablet (1 mg total) by mouth every 8 (eight) hours as needed for anxiety (As need for CT scan and Cystoscopy) 2 tablet 0   • Flowflex COVID-19 Ag Home Test KIT USE AS DIRECTED PER  INSTRUCTIONS TO TEST FOR COVID-19 (Patient not taking: Reported on 12/5/2022)       No current facility-administered medications for this visit  Allergies  No Known Allergies    OBJECTIVE    Vitals   Vitals:    02/03/23 0958   BP: 132/78   BP Location: Left arm   Patient Position: Sitting   Pulse: 88   SpO2: 98%   Weight: 122 kg (270 lb)   Height: 6' 1" (1 854 m)       PVR: 21ml    Physical Exam  Vitals reviewed  Constitutional:       General: He is not in acute distress  Appearance: Normal appearance  He is not ill-appearing or toxic-appearing  HENT:      Head: Normocephalic and atraumatic  Right Ear: External ear normal       Left Ear: External ear normal    Cardiovascular:      Rate and Rhythm: Normal rate  Pulmonary:      Effort: Pulmonary effort is normal  No respiratory distress     Genitourinary:     Comments: Prostate Exam deferred by patient today  Musculoskeletal:         General: Normal range of motion  Cervical back: Normal range of motion and neck supple  Skin:     General: Skin is warm and dry  Neurological:      General: No focal deficit present  Mental Status: He is alert  Cranial Nerves: No cranial nerve deficit  Psychiatric:         Mood and Affect: Mood normal          Behavior: Behavior normal          Thought Content:  Thought content normal           Labs:     UA from today:     Component 10:05 AM    LEUKOCYTE ESTERASE,UA n    NITRITE,UA n    SL AMB POCT UROBILINOGEN 0 2    POCT URINE PROTEIN n     PH,UA 5 0    BLOOD,UA moderate    SPECIFIC GRAVITY,UA 1 015    KETONES,UA trace    BILIRUBIN,UA n    GLUCOSE, UA n     COLOR,UA banuelos    CLARITY,UA clear          Component Ref Range & Units 1/26/23  8:33 AM 3/24/22  9:05 AM 4/15/21  9:23 AM 10/7/20  9:50 AM   Color, UA  Light Yellow  Yellow  Dk Yellow  Dk Yellow    Clarity, UA  Clear  Clear  Clear  Clear    Specific [place], UA 1 003 - 1 030 1 008  1 022  1 025  1 024    pH, UA 4 5, 5 0, 5 5, 6 0, 6 5, 7 0, 7 5, 8 0 6 5  6 5  6 5  6 5    Leukocytes, UA Negative Negative  Negative  Negative  Negative    Nitrite, UA Negative Negative  Negative  Negative  Negative    Protein, UA Negative mg/dl Trace Abnormal   Negative  Negative  Negative    Glucose, UA Negative mg/dl Negative  Negative  Negative  Negative    Ketones, UA Negative mg/dl Negative  Negative  Negative  Negative    Urobilinogen, UA <2 0 mg/dl mg/dl <2 0  <2 0      Bilirubin, UA Negative Negative  Negative  Negative  Negative    Occult Blood, UA Negative Moderate Abnormal   Negative  Negative  Negative    RBC, UA None Seen, 1-2 /hpf 10-20 Abnormal   4-10 Abnormal   None Seen R     WBC, UA None Seen, 1-2 /hpf 1-2  1-2  None Seen R     Epithelial Cells None Seen, Occasional /hpf None Seen  None Seen  None Seen     Bacteria, UA None Seen, Occasional /hpf None Seen  None Seen  None Seen     MUCUS THREADS Occasional Abnormal  R      Urobilinogen, UA               Lab Results   Component Value Date    PSA 0 8 01/26/2023    PSA 0 5 12/03/2021    PSA 0 4 10/07/2020     Lab Results   Component Value Date    CREATININE 1 12 01/26/2023      Lab Results   Component Value Date    HGBA1C 5 8 07/08/2019     Lab Results   Component Value Date    CALCIUM 8 7 01/26/2023    K 4 3 01/26/2023    CO2 27 01/26/2023     01/26/2023    BUN 15 01/26/2023    CREATININE 1 12 01/26/2023     GFR: 64 on 1/26/2023    I have personally reviewed all pertinent lab results and reviewed with patient        Brittany Hernandez PA-C  Date: 2/3/2023 Time: 11:02 AM  [facility]    This note was written using fluency dictation software  Please excuse any resulting minor grammatical errors

## 2023-02-02 ENCOUNTER — VBI (OUTPATIENT)
Dept: ADMINISTRATIVE | Facility: OTHER | Age: 73
End: 2023-02-02

## 2023-02-03 ENCOUNTER — OFFICE VISIT (OUTPATIENT)
Dept: UROLOGY | Facility: AMBULATORY SURGERY CENTER | Age: 73
End: 2023-02-03

## 2023-02-03 VITALS
DIASTOLIC BLOOD PRESSURE: 78 MMHG | HEIGHT: 73 IN | BODY MASS INDEX: 35.78 KG/M2 | OXYGEN SATURATION: 98 % | WEIGHT: 270 LBS | HEART RATE: 88 BPM | SYSTOLIC BLOOD PRESSURE: 132 MMHG

## 2023-02-03 DIAGNOSIS — N13.8 BPH WITH OBSTRUCTION/LOWER URINARY TRACT SYMPTOMS: Primary | ICD-10-CM

## 2023-02-03 DIAGNOSIS — Z12.5 PROSTATE CANCER SCREENING: ICD-10-CM

## 2023-02-03 DIAGNOSIS — R31.0 GROSS HEMATURIA: ICD-10-CM

## 2023-02-03 DIAGNOSIS — N40.1 BPH WITH OBSTRUCTION/LOWER URINARY TRACT SYMPTOMS: Primary | ICD-10-CM

## 2023-02-03 LAB
BACTERIA UR QL AUTO: ABNORMAL /HPF
BILIRUB UR QL STRIP: NEGATIVE
CLARITY UR: ABNORMAL
COLOR UR: YELLOW
GLUCOSE UR STRIP-MCNC: NEGATIVE MG/DL
HGB UR QL STRIP.AUTO: ABNORMAL
KETONES UR STRIP-MCNC: NEGATIVE MG/DL
LEUKOCYTE ESTERASE UR QL STRIP: ABNORMAL
MUCOUS THREADS UR QL AUTO: ABNORMAL
NITRITE UR QL STRIP: NEGATIVE
NON-SQ EPI CELLS URNS QL MICRO: ABNORMAL /HPF
PH UR STRIP.AUTO: 6 [PH]
POST-VOID RESIDUAL VOLUME, ML POC: 21 ML
PROT UR STRIP-MCNC: ABNORMAL MG/DL
RBC #/AREA URNS AUTO: ABNORMAL /HPF
SL AMB  POCT GLUCOSE, UA: ABNORMAL
SL AMB LEUKOCYTE ESTERASE,UA: ABNORMAL
SL AMB POCT BILIRUBIN,UA: ABNORMAL
SL AMB POCT BLOOD,UA: ABNORMAL
SL AMB POCT CLARITY,UA: CLEAR
SL AMB POCT COLOR,UA: ABNORMAL
SL AMB POCT KETONES,UA: ABNORMAL
SL AMB POCT NITRITE,UA: ABNORMAL
SL AMB POCT PH,UA: 5
SL AMB POCT SPECIFIC GRAVITY,UA: 1.01
SL AMB POCT URINE PROTEIN: ABNORMAL
SL AMB POCT UROBILINOGEN: 0.2
SP GR UR STRIP.AUTO: 1.01 (ref 1–1.03)
UROBILINOGEN UR STRIP-ACNC: <2 MG/DL
WBC #/AREA URNS AUTO: ABNORMAL /HPF

## 2023-02-03 RX ORDER — IBUPROFEN 200 MG
TABLET ORAL AS NEEDED
COMMUNITY

## 2023-02-03 RX ORDER — LORAZEPAM 1 MG/1
1 TABLET ORAL EVERY 8 HOURS PRN
Qty: 2 TABLET | Refills: 0 | Status: SHIPPED | OUTPATIENT
Start: 2023-02-03

## 2023-02-04 LAB — BACTERIA UR CULT: NORMAL

## 2023-02-06 ENCOUNTER — VBI (OUTPATIENT)
Dept: ADMINISTRATIVE | Facility: OTHER | Age: 73
End: 2023-02-06

## 2023-02-06 NOTE — TELEPHONE ENCOUNTER
Dannie Ding PA-C  [address] [place] [address] [facility]  Please call patient to inform him that his urine test and once again this demonstrates large amount of blood in the urine   However there are no signs of infection or growth of bacteria   We will plan to proceed with full work-up of gross hematuria as discussed at office appointment  Valeriopeg Rodriguez will also get a call regarding urine cytology testing once that has been completed  Called pt to advise above  Pt verbally agreed and understood plan

## 2023-02-08 ENCOUNTER — TELEPHONE (OUTPATIENT)
Dept: UROLOGY | Facility: AMBULATORY SURGERY CENTER | Age: 73
End: 2023-02-08

## 2023-02-08 ENCOUNTER — HOSPITAL ENCOUNTER (OUTPATIENT)
Dept: RADIOLOGY | Age: 73
Discharge: HOME | End: 2023-02-08

## 2023-02-08 DIAGNOSIS — R31.0 GROSS HEMATURIA: ICD-10-CM

## 2023-02-08 RX ADMIN — IOHEXOL 100 ML: 350 INJECTION, SOLUTION INTRAVENOUS at 15:15

## 2023-02-08 NOTE — TELEPHONE ENCOUNTER
Spoke to patient and advised of AP's note  He is understanding  Advised if a sooner Cysto appointment opens up with Dr Kofi Lindsay, we will notify him  He is understanding

## 2023-02-08 NOTE — TELEPHONE ENCOUNTER
----- Message from [place] PA-C sent at 2/8/2023  8:18 AM EST -----  Please call patient to inform him that his urine cytology results came back  It demonstrated a few atypical urothelial cells  Reviewed with Dr Angelia Maria  Typically, AUC is regarded as a negative urine cytology test   We will plan for full work-up with CT scan and cystoscopy as previously discussed  Dr Kenneth Wynn will be able to provide recommendations at time of cystoscopy once all available data is collected  Please see if patient can be seen sooner for cystoscopy if there are any available open spots

## 2023-02-08 NOTE — TELEPHONE ENCOUNTER
Patient reports his last phone call had a lot of interference so he did not quite catch everything that was said  He was at an appointment but he will be home until about 1430  Patient would like a call back

## 2023-02-08 NOTE — TELEPHONE ENCOUNTER
Spoke to patient and gave him urine cytology results per Juana Angel PA-C and he will follow up for in office cystoscopy with Dr Kristina Mtz

## 2023-02-09 ENCOUNTER — TELEPHONE (OUTPATIENT)
Dept: OTHER | Facility: HOSPITAL | Age: 73
End: 2023-02-09

## 2023-02-09 NOTE — TELEPHONE ENCOUNTER
Radiology called me today for concerning 2 5 x 3 5 bladder mass seen on CT renal protocol  Patient also has atypical cells on urine cytology  patient will need an in office cystoscopy ASAP for surgical planning

## 2023-02-09 NOTE — TELEPHONE ENCOUNTER
Called and spoke with patient  Offered cysto tomorrow and CT/cyto review in office with Dr Paty Conn  Patient confirmed appointment

## 2023-02-10 ENCOUNTER — PROCEDURE VISIT (OUTPATIENT)
Dept: UROLOGY | Facility: AMBULATORY SURGERY CENTER | Age: 73
End: 2023-02-10

## 2023-02-10 ENCOUNTER — VBI (OUTPATIENT)
Dept: ADMINISTRATIVE | Facility: OTHER | Age: 73
End: 2023-02-10

## 2023-02-10 VITALS — BODY MASS INDEX: 35.78 KG/M2 | HEIGHT: 73 IN | HEART RATE: 91 BPM | OXYGEN SATURATION: 99 % | WEIGHT: 270 LBS

## 2023-02-10 DIAGNOSIS — C67.4 MALIGNANT NEOPLASM OF POSTERIOR WALL OF URINARY BLADDER (HCC): ICD-10-CM

## 2023-02-10 DIAGNOSIS — R31.0 HEMATURIA, GROSS: ICD-10-CM

## 2023-02-10 DIAGNOSIS — C67.3 MALIGNANT NEOPLASM OF ANTERIOR WALL OF URINARY BLADDER (HCC): Primary | ICD-10-CM

## 2023-02-10 RX ORDER — SODIUM CHLORIDE 9 MG/ML
125 INJECTION, SOLUTION INTRAVENOUS CONTINUOUS
OUTPATIENT
Start: 2023-02-10

## 2023-02-10 NOTE — PROGRESS NOTES
Cystoscopy     Date/Time 2/10/2023 2:37 PM     Performed by  Ashley Smith MD     Authorized by Ashley Smith MD          Camille Corona is a 75-year-old male with a reported history of gross hematuria  A CT scan reveals a mass protruding from the base of the bladder at the bladder neck  He presents today for cystoscopy  Male cystoscopy procedure note:  Risk and benefits of flexible cystoscopy were discussed  Informed consent was obtained  The patient was placed in the supine position  His genitalia was prepped and draped in a sterile fashion  Viscous lidocaine jelly was instilled into the urethra and flexible cystoscopy was then performed  The urethra, prostatic urethra, and bladder were all thoroughly inspected  A moderate size bladder tumor was identified coming from the posterior wall  This was 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

## 2023-02-11 LAB — BACTERIA UR CULT: NORMAL

## 2023-02-13 ENCOUNTER — TELEPHONE (OUTPATIENT)
Dept: OTHER | Facility: OTHER | Age: 73
End: 2023-02-13

## 2023-02-13 NOTE — TELEPHONE ENCOUNTER
Please ask patient if he is having any difficulties urinating, dysuria, symptoms of acute infection, etc   If he is passing blood clots or having difficulties voiding, would recommend coming in for PVR assessment with nurse and possible insertion of lopez catheter  Encourage patient to increase water intake and review ER precautions

## 2023-02-13 NOTE — TELEPHONE ENCOUNTER
Patient saw Ras Mccabe 2/3 for gross hematuria and  had CT 2/8 that showed bladder mass  Was then seen by Ramya Oliver 2/10 for a Cysto which showed urothelial carcinoma of the bladder and orders for surgery placed  Patient call today stating that he is having an increase of blood in his urine   Please advise

## 2023-02-13 NOTE — TELEPHONE ENCOUNTER
Patient called in stating he has noticed an increase of blood in his urine and also in the toilet bowl after urinating  He mentioned the one day his urine was noticeably brown in color, then changed to an orange color over the course of the next few days  Patient is a little concerned about this issue and wanted to know if he should be seen sooner for his procedure or if there is anything he can do in the meantime  Please call patient back today to discuss

## 2023-02-14 ENCOUNTER — NURSE TRIAGE (OUTPATIENT)
Dept: OTHER | Facility: OTHER | Age: 73
End: 2023-02-14

## 2023-02-14 NOTE — TELEPHONE ENCOUNTER
Patient called in to follow up on telephone call to office on 2/13  Patient reports he is not hydrating and will force fluids today  Patient still has blood in his urine with a weak stream  Reports two streams; one is urine and second is blood  Patient is fine as long as he is sitting  When he stands, he has frequency every 30 minutes; going 1-2 times per hour  Reports pressure in lower abdomen; vague on response if he feels like he is emptying his bladder  Patient is concerned and want to know if his upcoming procedure has been scheduled  Please follow up with patient  Reason for Disposition  • All other patients with blood in urine (Exception: could be normal menstrual bleeding)    Answer Assessment - Initial Assessment Questions  1  COLOR of URINE: "Describe the color of the urine "  (e g , tea-colored, pink, red, blood clots, bloody)      Dark yellow to orange; increasing hydration; two streams: one urine and one blood; no blood this morning; weak stream; no urge when sitting; urge as soon as he stands up  2  ONSET: "When did the bleeding start?"       Prior to procedure 2/10 and bleeding post procedure with clots  3  EPISODES: "How many times has there been blood in the urine?" or "How many times today?"      Less blood this morning than 2/13/23  4  PAIN with URINATION: "Is there any pain with passing your urine?" If Yes, ask: "How bad is the pain?"  (Scale 1-10; or mild, moderate, severe)     - MILD - complains slightly about urination hurting     - MODERATE - interferes with normal activities       - SEVERE - excruciating, unwilling or unable to urinate because of the pain       Mild to moderate discomfort  5  FEVER: "Do you have a fever?" If Yes, ask: "What is your temperature, how was it measured, and when did it start?"      Denies  6  ASSOCIATED SYMPTOMS: "Are you passing urine more frequently than usual?"      Frequency; 1-2 times hourly  7   OTHER SYMPTOMS: "Do you have any other symptoms?" (e g , back/flank pain, abdominal pain, vomiting)      Pressure in lower abdomen    Protocols used: URINE - BLOOD IN-ADULT-OH

## 2023-02-14 NOTE — TELEPHONE ENCOUNTER
Regarding: hematuria, urine retention  ----- Message from Greg Plata RN sent at 2/14/2023 10:30 AM EST -----  Patient called yesterday and is still waiting for a call back from urology  He is having blood in his urine and feels like he is retaining urine  It is worse than it was yesterday

## 2023-02-14 NOTE — TELEPHONE ENCOUNTER
Patient called in to follow up on telephone call to office on 2/13  Patient reports he is not hydrating and will force fluids today  Patient still has blood in his urine with a weak stream  Reports two streams; one is urine and second is blood  Patient is fine as long as he is sitting  When he stands, he has frequency every 30 minutes; going 1-2 times per hour  Reports pressure in lower abdomen; vague on response if he feels like he is emptying his bladder      Called patient advised that he should hydrate with water and avoid acidic/spicy foods  Patient is concerned and want to know if his upcoming procedure has been scheduled  Explained that the surgery scheduler is aware - it depends on what the OR has available  She will call once she has a date  He asked for us to use is cell number to contact him    Patient saw Dr Alejandra Steiner on 2/10 for a Cysto   Ct scan and Cysto showed bladder tumor

## 2023-02-16 NOTE — TELEPHONE ENCOUNTER
Per patient's phone call, he is in a lot of pain and having difficulties urinating  He still has not heard back in regards to scheduling surgery  He would like a call back with an update

## 2023-02-16 NOTE — TELEPHONE ENCOUNTER
I called and talk with pt  I advised that we have sent over his information to the surgery scheduler however it does take a week or two to get a call  Pt is still have a lot of pain to urinate  He stated there is a lot of pressure and he has to sit to pee with not much coming out  I did advise I would send this information to a provider and call him back  I did offer him to come in this afternoon for a PVR pending the nurse schedule  Will schedule once I get a response from the provider

## 2023-02-17 ENCOUNTER — TELEPHONE (OUTPATIENT)
Dept: UROLOGY | Facility: AMBULATORY SURGERY CENTER | Age: 73
End: 2023-02-17

## 2023-02-17 ENCOUNTER — VBI (OUTPATIENT)
Dept: ADMINISTRATIVE | Facility: OTHER | Age: 73
End: 2023-02-17

## 2023-02-17 ENCOUNTER — PROCEDURE VISIT (OUTPATIENT)
Dept: UROLOGY | Facility: AMBULATORY SURGERY CENTER | Age: 73
End: 2023-02-17

## 2023-02-17 VITALS
OXYGEN SATURATION: 98 % | HEIGHT: 73 IN | RESPIRATION RATE: 18 BRPM | WEIGHT: 270 LBS | HEART RATE: 88 BPM | DIASTOLIC BLOOD PRESSURE: 82 MMHG | SYSTOLIC BLOOD PRESSURE: 130 MMHG | BODY MASS INDEX: 35.78 KG/M2

## 2023-02-17 DIAGNOSIS — C67.3 MALIGNANT NEOPLASM OF ANTERIOR WALL OF URINARY BLADDER (HCC): ICD-10-CM

## 2023-02-17 DIAGNOSIS — N40.1 BPH WITH OBSTRUCTION/LOWER URINARY TRACT SYMPTOMS: Primary | ICD-10-CM

## 2023-02-17 DIAGNOSIS — N30.00 ACUTE CYSTITIS WITHOUT HEMATURIA: Primary | ICD-10-CM

## 2023-02-17 DIAGNOSIS — N13.8 BPH WITH OBSTRUCTION/LOWER URINARY TRACT SYMPTOMS: Primary | ICD-10-CM

## 2023-02-17 LAB
BACTERIA UR QL AUTO: ABNORMAL /HPF
BILIRUB UR QL STRIP: NEGATIVE
CLARITY UR: ABNORMAL
COLOR UR: ABNORMAL
GLUCOSE UR STRIP-MCNC: NEGATIVE MG/DL
HGB UR QL STRIP.AUTO: ABNORMAL
KETONES UR STRIP-MCNC: NEGATIVE MG/DL
LEUKOCYTE ESTERASE UR QL STRIP: ABNORMAL
MUCOUS THREADS UR QL AUTO: ABNORMAL
NITRITE UR QL STRIP: POSITIVE
NON-SQ EPI CELLS URNS QL MICRO: ABNORMAL /HPF
PH UR STRIP.AUTO: 6 [PH]
POST-VOID RESIDUAL VOLUME, ML POC: 15 ML
PROT UR STRIP-MCNC: ABNORMAL MG/DL
RBC #/AREA URNS AUTO: ABNORMAL /HPF
SL AMB  POCT GLUCOSE, UA: NORMAL
SL AMB  POCT GLUCOSE, UA: NORMAL
SL AMB LEUKOCYTE ESTERASE,UA: NORMAL
SL AMB LEUKOCYTE ESTERASE,UA: NORMAL
SL AMB POCT BILIRUBIN,UA: NORMAL
SL AMB POCT BILIRUBIN,UA: NORMAL
SL AMB POCT BLOOD,UA: NORMAL
SL AMB POCT BLOOD,UA: NORMAL
SL AMB POCT CLARITY,UA: CLEAR
SL AMB POCT CLARITY,UA: NORMAL
SL AMB POCT COLOR,UA: YELLOW
SL AMB POCT COLOR,UA: YELLOW
SL AMB POCT KETONES,UA: NORMAL
SL AMB POCT KETONES,UA: NORMAL
SL AMB POCT NITRITE,UA: POSITIVE
SL AMB POCT NITRITE,UA: POSITIVE
SL AMB POCT PH,UA: 5
SL AMB POCT PH,UA: 5
SL AMB POCT SPECIFIC GRAVITY,UA: 1.01
SL AMB POCT SPECIFIC GRAVITY,UA: 1.01
SL AMB POCT URINE PROTEIN: NORMAL
SL AMB POCT URINE PROTEIN: NORMAL
SL AMB POCT UROBILINOGEN: 0.2
SL AMB POCT UROBILINOGEN: 0.2
SP GR UR STRIP.AUTO: 1.02 (ref 1–1.03)
UROBILINOGEN UR STRIP-ACNC: <2 MG/DL
WBC #/AREA URNS AUTO: ABNORMAL /HPF

## 2023-02-17 RX ORDER — CEPHALEXIN 500 MG/1
500 CAPSULE ORAL 3 TIMES DAILY
Qty: 15 CAPSULE | Refills: 0 | Status: SHIPPED | OUTPATIENT
Start: 2023-02-17 | End: 2023-02-22

## 2023-02-17 NOTE — TELEPHONE ENCOUNTER
Jadon Gibson had a nurse visit today  Concern for UTI  Ucx sent from office today  Concern for UTI  Keflex 500 mg TID for 5 days sent to [facility] pharmacy  Await Ucx results

## 2023-02-17 NOTE — PROGRESS NOTES
2/17/2023  Carol Higgins is a 74 y o  male  [id number]    Diagnosis:  Chief Complaint    PVR         Patient presents for PVR managed by Dr Walker Serve:  Follow up with surgery coordinator for surgery  Will contact patient with final results of urine testing  Advised to contact the office in the meantime with any questions or concerns  Assessment:    Patient presents to the office with urinary symptoms, such as pain/burning, frequency, blood in urine  Patient recently seen by Dr Torsten Hutson and was found to have bladder mass  Patient is awaiting to have surgery scheduled for this  Urine testing was obtained and was noted to have + Nitrates  PVR performed and noted 15 ml  Advised patient of starting antibiotic treatment for urinary tract infection and due to upcoming weekend  Patient is understanding  Advised of symptoms he may be experiencing from infection  Advised Dr Torsten Hutson is the only provider in the office and will consult with him and will send antibiotics to his pharmacy  He is understanding  Advised we will contact him with the final results of the urine testing if antibiotic needs to be changed         Vitals:    02/17/23 1020   BP: 130/82   BP Location: Right arm   Patient Position: Sitting   Cuff Size: Standard   Pulse: 88   Resp: 18   SpO2: 98%   Weight: 122 kg (270 lb)   Height: 6' 1" (1 854 m)       Recent Results (from the past 1 hour(s))   POCT Measure PVR    Collection Time: 02/17/23 10:31 AM   Result Value Ref Range    POST-VOID RESIDUAL VOLUME, ML POC 15 mL   POCT urine dip    Collection Time: 02/17/23 10:31 AM   Result Value Ref Range    LEUKOCYTE ESTERASE,UA large     NITRITE,UA positive     SL AMB POCT UROBILINOGEN 0 2     POCT URINE PROTEIN -      PH,UA 5 0     BLOOD,UA large     SPECIFIC GRAVITY,UA 1 015     KETONES,UA -     BILIRUBIN,UA -     GLUCOSE, UA -      COLOR,UA yellow     CLARITY,UA clear    POCT urine dip    Collection Time: 02/17/23 10:41 AM   Result Value Ref Range LEUKOCYTE ESTERASE,UA large     NITRITE,UA positive     SL AMB POCT UROBILINOGEN 0 2     POCT URINE PROTEIN -      PH,UA 5 0     BLOOD,UA large     SPECIFIC GRAVITY,UA 1 015     Valdez Miracle -     GLUCOSE, UA -      COLOR,UA yellow     CLARITY,UA dark                Vic Ana, BSN, RN

## 2023-02-19 ENCOUNTER — TELEPHONE (OUTPATIENT)
Dept: UROLOGY | Facility: AMBULATORY SURGERY CENTER | Age: 73
End: 2023-02-19

## 2023-02-19 DIAGNOSIS — N30.00 ACUTE CYSTITIS WITHOUT HEMATURIA: Primary | ICD-10-CM

## 2023-02-19 LAB
BACTERIA UR CULT: ABNORMAL
BACTERIA UR CULT: ABNORMAL

## 2023-02-19 RX ORDER — NITROFURANTOIN 25; 75 MG/1; MG/1
100 CAPSULE ORAL 2 TIMES DAILY
Qty: 10 CAPSULE | Refills: 0 | Status: SHIPPED | OUTPATIENT
Start: 2023-02-19 | End: 2023-02-24

## 2023-02-20 NOTE — TELEPHONE ENCOUNTER
Called and spoke with patient  Advised on Dr Federico Pool note  Informed to stop the Keflex and complete the Macrobid  He states that the Keflex improved the color and clarity of his urine and improved some of his symptoms  He understands that he needs to stop the Keflex and complete the Macrobid

## 2023-02-20 NOTE — TELEPHONE ENCOUNTER
Please call patient and tell him to stop the keflex and start macrobid  This is the proper antibiotic  Thank you      FT

## 2023-02-21 ENCOUNTER — VBI (OUTPATIENT)
Dept: ADMINISTRATIVE | Facility: OTHER | Age: 73
End: 2023-02-21

## 2023-02-27 ENCOUNTER — PREP FOR PROCEDURE (OUTPATIENT)
Dept: UROLOGY | Facility: MEDICAL CENTER | Age: 73
End: 2023-02-27

## 2023-02-27 DIAGNOSIS — R39.89 SUSPECTED UTI: ICD-10-CM

## 2023-02-27 DIAGNOSIS — Z01.810 PRE-OPERATIVE CARDIOVASCULAR EXAMINATION: ICD-10-CM

## 2023-02-27 DIAGNOSIS — Z01.812 PRE-OPERATIVE LABORATORY EXAMINATION: ICD-10-CM

## 2023-02-27 DIAGNOSIS — C67.3 MALIGNANT NEOPLASM OF ANTERIOR WALL OF URINARY BLADDER (HCC): Primary | ICD-10-CM

## 2023-02-28 ENCOUNTER — VBI (OUTPATIENT)
Dept: ADMINISTRATIVE | Facility: OTHER | Age: 73
End: 2023-02-28

## 2023-03-06 ENCOUNTER — OFFICE VISIT (OUTPATIENT)
Dept: PODIATRY | Facility: CLINIC | Age: 73
End: 2023-03-06

## 2023-03-06 VITALS
HEIGHT: 73 IN | HEART RATE: 71 BPM | WEIGHT: 270 LBS | DIASTOLIC BLOOD PRESSURE: 86 MMHG | BODY MASS INDEX: 35.78 KG/M2 | SYSTOLIC BLOOD PRESSURE: 128 MMHG

## 2023-03-06 DIAGNOSIS — I73.9 PERIPHERAL VASCULAR DISEASE, UNSPECIFIED (HCC): ICD-10-CM

## 2023-03-06 DIAGNOSIS — L60.0 INGROWN TOENAIL: Primary | ICD-10-CM

## 2023-03-06 NOTE — PROGRESS NOTES
Established patient with class findings presents for nail care  Patient relates pain along the lateral nail great toe  Vascular exam:  DP  0/4 bilateral; PT  0 4 bilateral   Dermatological exam:  Each toenail is thick and  dystrophic  Diagnosis:  PVD  Treatment: Trimmed multiple dystrophic toenails    Superficial avulsion lateral nail border hallux bilateral     Nail removal    Date/Time: 3/6/2023 10:39 AM  Performed by: Montana Mi DPM  Authorized by: Montana Mi DPM     Nails trimmed:     Number of nails trimmed:  10

## 2023-03-07 ENCOUNTER — HOSPITAL ENCOUNTER (INPATIENT)
Facility: HOSPITAL | Age: 73
LOS: 4 days | Discharge: HOME | End: 2023-03-11
Attending: EMERGENCY MEDICINE | Admitting: INTERNAL MEDICINE

## 2023-03-07 ENCOUNTER — NURSE TRIAGE (OUTPATIENT)
Dept: OTHER | Facility: OTHER | Age: 73
End: 2023-03-07

## 2023-03-07 DIAGNOSIS — R31.0 GROSS HEMATURIA: ICD-10-CM

## 2023-03-07 DIAGNOSIS — M92.523 OSGOOD-SCHLATTER'S DISEASE OF BOTH KNEES: ICD-10-CM

## 2023-03-07 DIAGNOSIS — R39.89 SUSPECTED UTI: ICD-10-CM

## 2023-03-07 DIAGNOSIS — N32.89 BLADDER MASS: ICD-10-CM

## 2023-03-07 DIAGNOSIS — R31.9 URINARY TRACT INFECTION WITH HEMATURIA, SITE UNSPECIFIED: Primary | ICD-10-CM

## 2023-03-07 DIAGNOSIS — N39.0 URINARY TRACT INFECTION WITH HEMATURIA, SITE UNSPECIFIED: Primary | ICD-10-CM

## 2023-03-07 DIAGNOSIS — R31.9 HEMATURIA, UNSPECIFIED TYPE: Primary | ICD-10-CM

## 2023-03-07 DIAGNOSIS — M79.89 RIGHT LEG SWELLING: ICD-10-CM

## 2023-03-07 LAB
ANION GAP SERPL CALCULATED.3IONS-SCNC: 4 MMOL/L (ref 4–13)
BACTERIA UR QL AUTO: ABNORMAL /HPF
BASOPHILS # BLD AUTO: 0.08 THOUSANDS/ÂΜL (ref 0–0.1)
BASOPHILS NFR BLD AUTO: 1 % (ref 0–1)
BILIRUB UR QL STRIP: ABNORMAL
BUN SERPL-MCNC: 23 MG/DL (ref 5–25)
CALCIUM SERPL-MCNC: 8.5 MG/DL (ref 8.4–10.2)
CHLORIDE SERPL-SCNC: 104 MMOL/L (ref 96–108)
CLARITY UR: ABNORMAL
CO2 SERPL-SCNC: 30 MMOL/L (ref 21–32)
COLOR UR: ABNORMAL
CREAT SERPL-MCNC: 1.13 MG/DL (ref 0.6–1.3)
EOSINOPHIL # BLD AUTO: 0.1 THOUSAND/ÂΜL (ref 0–0.61)
EOSINOPHIL NFR BLD AUTO: 1 % (ref 0–6)
ERYTHROCYTE [DISTWIDTH] IN BLOOD BY AUTOMATED COUNT: 12.6 % (ref 11.6–15.1)
GFR SERPL CREATININE-BSD FRML MDRD: 63 ML/MIN/1.73SQ M
GLUCOSE SERPL-MCNC: 101 MG/DL (ref 65–140)
GLUCOSE UR STRIP-MCNC: ABNORMAL MG/DL
HCT VFR BLD AUTO: 48 % (ref 36.5–49.3)
HGB BLD-MCNC: 15.7 G/DL (ref 12–17)
HGB UR QL STRIP.AUTO: ABNORMAL
IMM GRANULOCYTES # BLD AUTO: 0.03 THOUSAND/UL (ref 0–0.2)
IMM GRANULOCYTES NFR BLD AUTO: 0 % (ref 0–2)
KETONES UR STRIP-MCNC: NEGATIVE MG/DL
LEUKOCYTE ESTERASE UR QL STRIP: ABNORMAL
LYMPHOCYTES # BLD AUTO: 1.13 THOUSANDS/ÂΜL (ref 0.6–4.47)
LYMPHOCYTES NFR BLD AUTO: 13 % (ref 14–44)
MCH RBC QN AUTO: 30.3 PG (ref 26.8–34.3)
MCHC RBC AUTO-ENTMCNC: 32.7 G/DL (ref 31.4–37.4)
MCV RBC AUTO: 93 FL (ref 82–98)
MONOCYTES # BLD AUTO: 0.77 THOUSAND/ÂΜL (ref 0.17–1.22)
MONOCYTES NFR BLD AUTO: 9 % (ref 4–12)
MUCOUS THREADS UR QL AUTO: ABNORMAL
NEUTROPHILS # BLD AUTO: 6.62 THOUSANDS/ÂΜL (ref 1.85–7.62)
NEUTS SEG NFR BLD AUTO: 76 % (ref 43–75)
NITRITE UR QL STRIP: NEGATIVE
NON-SQ EPI CELLS URNS QL MICRO: ABNORMAL /HPF
NRBC BLD AUTO-RTO: 0 /100 WBCS
PH UR STRIP.AUTO: 7 [PH]
PLATELET # BLD AUTO: 237 THOUSANDS/UL (ref 149–390)
PMV BLD AUTO: 10.6 FL (ref 8.9–12.7)
POTASSIUM SERPL-SCNC: 4.8 MMOL/L (ref 3.5–5.3)
PROT UR STRIP-MCNC: ABNORMAL MG/DL
RBC # BLD AUTO: 5.18 MILLION/UL (ref 3.88–5.62)
RBC #/AREA URNS AUTO: ABNORMAL /HPF
SODIUM SERPL-SCNC: 138 MMOL/L (ref 135–147)
SP GR UR STRIP.AUTO: 1.03 (ref 1–1.03)
UROBILINOGEN UR STRIP-ACNC: <2 MG/DL
WBC # BLD AUTO: 8.73 THOUSAND/UL (ref 4.31–10.16)
WBC #/AREA URNS AUTO: ABNORMAL /HPF

## 2023-03-07 RX ORDER — DIAZEPAM 2 MG/1
2 TABLET ORAL ONCE
Status: DISCONTINUED | OUTPATIENT
Start: 2023-03-07 | End: 2023-03-07

## 2023-03-07 RX ORDER — DIAZEPAM 5 MG/ML
2.5 INJECTION, SOLUTION INTRAMUSCULAR; INTRAVENOUS ONCE
Status: COMPLETED | OUTPATIENT
Start: 2023-03-07 | End: 2023-03-07

## 2023-03-07 RX ORDER — LIDOCAINE HYDROCHLORIDE 20 MG/ML
1 JELLY TOPICAL ONCE
Status: COMPLETED | OUTPATIENT
Start: 2023-03-07 | End: 2023-03-07

## 2023-03-07 RX ADMIN — DIAZEPAM 2.5 MG: 10 INJECTION, SOLUTION INTRAMUSCULAR; INTRAVENOUS at 20:03

## 2023-03-07 RX ADMIN — LIDOCAINE HYDROCHLORIDE 1 APPLICATION.: 20 JELLY TOPICAL at 19:24

## 2023-03-07 NOTE — TELEPHONE ENCOUNTER
Answer Assessment - Initial Assessment Questions  1  COLOR of URINE: "Describe the color of the urine "  (e g , tea-colored, pink, red, blood clots, bloody)      Blood in urine/ orangy with blood streams in it  2  ONSET: "When did the bleeding start?"       Today  3  EPISODES: "How many times has there been blood in the urine?" or "How many times today?"      6 times today  4  PAIN with URINATION: "Is there any pain with passing your urine?" If Yes, ask: "How bad is the pain?"  (Scale 1-10; or mild, moderate, severe)     - MILD - complains slightly about urination hurting     - MODERATE - interferes with normal activities       - SEVERE - excruciating, unwilling or unable to urinate because of the pain       3/10 with urination  5  FEVER: "Do you have a fever?" If Yes, ask: "What is your temperature, how was it measured, and when did it start?"      none  6  ASSOCIATED SYMPTOMS: "Are you passing urine more frequently than usual?"      frequency  7  OTHER SYMPTOMS: "Do you have any other symptoms?" (e g , back/flank pain, abdominal pain, vomiting)      none      For procedure 3/23 poly removal  Was just treated for uti  Antibiotic is finished for two weeks      Protocols used: URINE - BLOOD IN-ADULT-

## 2023-03-07 NOTE — ADDENDUM NOTE
Addended by: Napoleon Cannon on: 3/7/2023 04:28 PM     Modules accepted: Orders
PAST MEDICAL HISTORY:  Tavarez's esophagus     Elevated liver enzymes     Obese

## 2023-03-07 NOTE — TELEPHONE ENCOUNTER
Regarding: Blood in urine today  ----- Message from Frederick Astudillo sent at 3/7/2023  4:03 PM EST -----  I have bladder infection which is being treated and I had a hard bowel movement yesterday and today I urinating blood

## 2023-03-07 NOTE — ED PROVIDER NOTES
History  Chief Complaint   Patient presents with   • Blood in Urine     Pt presents with blood in urine after 2 large bowel movements yesterday and today     18-year-old male, presents with hematuria  Patient states he was diagnosed with bladder mass, scheduled to have urologic procedure later this month  Patient reports large amount of blood in urine after having large bowel movement earlier  Denies any difficulty urinating, no abdominal pain, no fevers  Patient states he has had bleeding in urine before, not as bad as currently  History provided by:  Patient   used: No    Blood in Urine  Pertinent negatives include no abdominal pain, fever, nausea or vomiting  Prior to Admission Medications   Prescriptions Last Dose Informant Patient Reported? Taking?    Flowflex COVID-19 Ag Home Test KIT   Yes No   LORazepam (ATIVAN) 1 mg tablet   No No   Sig: Take 1 tablet (1 mg total) by mouth every 8 (eight) hours as needed for anxiety (As need for CT scan and Cystoscopy)   atorvastatin (LIPITOR) 10 mg tablet   No No   Sig: TAKE 1 TABLET BY MOUTH AT BEDTIME   cephalexin (KEFLEX) 500 mg capsule   Yes No   Sig: TAKE 4 CAPSULES BY MOUTH ONCE FOR 1 DOSE  TAKE 1 HOUR PRIOR TO DENTAL PROCEDURE    ibuprofen (MOTRIN) 200 mg tablet   Yes No   Sig: Take by mouth as needed for mild pain   levothyroxine 150 mcg tablet   No No   Sig: TAKE 1 TABLET BY MOUTH DAILY      Facility-Administered Medications: None       Past Medical History:   Diagnosis Date   • Anxiety disorder    • Atherosclerotic heart disease of native coronary artery without angina pectoris    • Benign prostatic hyperplasia without lower urinary tract symptoms    • Dyslipidemia    • GERD (gastroesophageal reflux disease)    • Hypertension    • Hyperthyroidism    • Impaired fasting blood sugar    • Kidney stone    • Low back pain    • Osteoarthritis     last assesed 6-6-16   • Other chest pain    • Paresthesia of skin    • Polyneuropathy last assesed 5-8-17   • Pure hypercholesterolemia    • 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 ([address] )    • Wears glasses        Past Surgical History:   Procedure Laterality Date   • BACK SURGERY     • CHOLECYSTECTOMY     • COLONOSCOPY  02/06/2019   • [place] ARTHRP ACETBLR/PROX FEM PROSTC AGRFT/ALGRFT Right 8/5/2019    Procedure: ARTHROPLASTY HIP TOTAL;  Surgeon: Fidencio Kulkarni MD;  Location: [facility];  Service: Orthopedics   • TONSILLECTOMY         Family History   Problem Relation Age of Onset   • Arthritis Other    • Heart disease Father      I have reviewed and agree with the history as documented  E-Cigarette/Vaping   • E-Cigarette Use Never User      E-Cigarette/Vaping Substances   • Nicotine No    • THC No    • CBD No    • Flavoring No    • Other No    • Unknown No      Social History     Tobacco Use   • Smoking status: Former   • Smokeless tobacco: Never   Vaping Use   • Vaping Use: Never used   Substance Use Topics   • Alcohol use: Never   • Drug use: Never       Review of Systems   Constitutional: Negative  Negative for fever  Gastrointestinal: Negative  Negative for abdominal pain, nausea and vomiting  Genitourinary: Positive for hematuria  Neurological: Negative  Physical Exam  Physical Exam  Vitals and nursing note reviewed  Constitutional:       General: He is not in acute distress  HENT:      Head: Normocephalic and atraumatic  Eyes:      Extraocular Movements: Extraocular movements intact  Pupils: Pupils are equal, round, and reactive to light  Cardiovascular:      Rate and Rhythm: Normal rate and regular rhythm  Pulmonary:      Effort: Pulmonary effort is normal       Breath sounds: Normal breath sounds  Abdominal:      Palpations: Abdomen is soft  Tenderness: There is no abdominal tenderness  Genitourinary:     Comments: No penis, testicular swelling or tenderness  Small amount of red blood noted at meatus    Skin:     General: Skin is warm and dry  Neurological:      General: No focal deficit present  Mental Status: He is alert and oriented to person, place, and time  Vital Signs  ED Triage Vitals [03/07/23 1727]   Temperature Pulse Respirations Blood Pressure SpO2   99 1 °F (37 3 °C) 86 18 160/88 98 %      Temp Source Heart Rate Source Patient Position - Orthostatic VS BP Location FiO2 (%)   Oral Monitor Sitting Right arm --      Pain Score       --           Vitals:    03/07/23 1727 03/07/23 2013   BP: 160/88 115/71   Pulse: 86 74   Patient Position - Orthostatic VS: Sitting Lying         Visual Acuity      ED Medications  Medications   lidocaine (URO-JET) 2 % urethral/mucosal gel 1 application  (1 application   Urethral Given 3/7/23 1924)   diazepam (VALIUM) injection 2 5 mg (2 5 mg Intravenous Given 3/7/23 2003)       Diagnostic Studies  Results Reviewed     Procedure Component Value Units Date/Time    Basic metabolic panel [[id number]] Collected: 03/07/23 1837    Lab Status: Final result Specimen: Blood from Arm, Right Updated: 03/07/23 1913     Sodium 138 mmol/L      Potassium 4 8 mmol/L      Chloride 104 mmol/L      CO2 30 mmol/L      ANION GAP 4 mmol/L      BUN 23 mg/dL      Creatinine 1 13 mg/dL      Glucose 101 mg/dL      Calcium 8 5 mg/dL      eGFR 63 ml/min/1 73sq m     Narrative:      [place] guidelines for Chronic Kidney Disease (CKD):   •  Stage 1 with normal or high GFR (GFR > 90 mL/min/1 73 square meters)  •  Stage 2 Mild CKD (GFR = 60-89 mL/min/1 73 square meters)  •  Stage 3A Moderate CKD (GFR = 45-59 mL/min/1 73 square meters)  •  Stage 3B Moderate CKD (GFR = 30-44 mL/min/1 73 square meters)  •  Stage 4 Severe CKD (GFR = 15-29 mL/min/1 73 square meters)  •  Stage 5 End Stage CKD (GFR <15 mL/min/1 73 square meters)  Note: GFR calculation is accurate only with a steady state creatinine    Urine Microscopic [[id number]]  (Abnormal) Collected: 03/07/23 1825    Lab Status: Final result Specimen: Urine, Clean Catch Updated: 03/07/23 1857     RBC, UA Innumerable /hpf      WBC, UA Innumerable /hpf      Epithelial Cells None Seen /hpf      Bacteria, UA None Seen /hpf      MUCUS THREADS Innumerable    Urine culture [[id number]] Collected: 03/07/23 1825    Lab Status: In process Specimen: Urine, Clean Catch Updated: 03/07/23 1857    CBC and differential [[id number]]  (Abnormal) Collected: 03/07/23 1837    Lab Status: Final result Specimen: Blood from Arm, Right Updated: 03/07/23 1851     WBC 8 73 Thousand/uL      RBC 5 18 Million/uL      Hemoglobin 15 7 g/dL      Hematocrit 48 0 %      MCV 93 fL      MCH 30 3 pg      MCHC 32 7 g/dL      RDW 12 6 %      MPV 10 6 fL      Platelets 318 Thousands/uL      nRBC 0 /100 WBCs      Neutrophils Relative 76 %      Immat GRANS % 0 %      Lymphocytes Relative 13 %      Monocytes Relative 9 %      Eosinophils Relative 1 %      Basophils Relative 1 %      Neutrophils Absolute 6 62 Thousands/µL      Immature Grans Absolute 0 03 Thousand/uL      Lymphocytes Absolute 1 13 Thousands/µL      Monocytes Absolute 0 77 Thousand/µL      Eosinophils Absolute 0 10 Thousand/µL      Basophils Absolute 0 08 Thousands/µL     UA w Reflex to Microscopic w Reflex to Culture [[id number]]  (Abnormal) Collected: 03/07/23 1825    Lab Status: Final result Specimen: Urine, Clean Catch Updated: 03/07/23 1844     Color, UA Dark Red     Clarity, UA Extra Turbid     Specific Gravity, UA 1 028     pH, UA 7 0     Leukocytes, UA Small     Nitrite, UA Negative     Protein,  (3+) mg/dl      Glucose, UA Trace mg/dl      Ketones, UA Negative mg/dl      Urobilinogen, UA <2 0 mg/dl      Bilirubin, UA Small     Occult Blood, UA Large                 No orders to display              Procedures  Procedures         ED Course  ED Course as of 03/07/23 2225   Tue Mar 07, 2023   2125 Discussed with urology, agreed with plan to place three-way catheter and perform irrigation    Replaced by RN, irrigation started, urine noted to clear quickly  Irrigation for awhile, plan to stop irrigation and observe urine drainage  Medical Decision Making  18-year-old male, presenting with hematuria  Differential diagnosis include urinary tract infection, bleeding from bladder mass, anemia among other diagnoses  Patient looks well in no distress  Urine noted to be grossly bloody in room  Urinalysis and labs ordered  Discussed with on-call urology, recommends three-way Morfin insertion and bladder irrigation  Patient requiring bladder irrigation through catheter for gross hematuria  Attempted to stop the irrigation, patient noted to have increased bleeding again  Will admit patient for continued irrigation and further evaluation by urology  Discussed with hospitalist     Amount and/or Complexity of Data Reviewed  Labs: ordered  Decision-making details documented in ED Course  Risk  Prescription drug management  Decision regarding hospitalization  Disposition  Final diagnoses:   Hematuria, unspecified type     Time reflects when diagnosis was documented in both MDM as applicable and the Disposition within this note     Time User Action Codes Description Comment    3/7/2023 10:23 PM Lisa Rinaldi Add [R31 9] Hematuria, unspecified type       ED Disposition     ED Disposition   Admit    Condition   Stable    Date/Time   Tue Mar 7, 2023 10:23 PM    Comment   Case was discussed with Corbin Crawford and the patient's admission status was agreed to be Admission Status: inpatient status to the service of Dr Rehan Barton   Follow-up Information    None         Patient's Medications   Discharge Prescriptions    No medications on file       No discharge procedures on file      PDMP Review       Value Time User    PDMP Reviewed  Yes 2/3/2023 10:41 AM Emmanuel Matos PA-C          ED Provider  Electronically Signed by           Cristal Sweeney MD  03/07/23 [phone]

## 2023-03-08 ENCOUNTER — APPOINTMENT (INPATIENT)
Dept: CT IMAGING | Facility: HOSPITAL | Age: 73
End: 2023-03-08

## 2023-03-08 PROBLEM — E78.5 HYPERLIPIDEMIA: Status: ACTIVE | Noted: 2023-03-08

## 2023-03-08 PROBLEM — R31.0 GROSS HEMATURIA: Status: ACTIVE | Noted: 2023-03-08

## 2023-03-08 LAB
ANION GAP SERPL CALCULATED.3IONS-SCNC: 9 MMOL/L (ref 4–13)
APTT PPP: 34 SECONDS (ref 23–37)
BASOPHILS # BLD AUTO: 0.07 THOUSANDS/ÂΜL (ref 0–0.1)
BASOPHILS NFR BLD AUTO: 1 % (ref 0–1)
BUN SERPL-MCNC: 19 MG/DL (ref 5–25)
CALCIUM SERPL-MCNC: 8.3 MG/DL (ref 8.4–10.2)
CHLORIDE SERPL-SCNC: 104 MMOL/L (ref 96–108)
CO2 SERPL-SCNC: 23 MMOL/L (ref 21–32)
CREAT SERPL-MCNC: 0.88 MG/DL (ref 0.6–1.3)
EOSINOPHIL # BLD AUTO: 0.09 THOUSAND/ÂΜL (ref 0–0.61)
EOSINOPHIL NFR BLD AUTO: 1 % (ref 0–6)
ERYTHROCYTE [DISTWIDTH] IN BLOOD BY AUTOMATED COUNT: 12.8 % (ref 11.6–15.1)
GFR SERPL CREATININE-BSD FRML MDRD: 84 ML/MIN/1.73SQ M
GLUCOSE SERPL-MCNC: 103 MG/DL (ref 65–140)
HCT VFR BLD AUTO: 47.6 % (ref 36.5–49.3)
HGB BLD-MCNC: 15.8 G/DL (ref 12–17)
IMM GRANULOCYTES # BLD AUTO: 0.04 THOUSAND/UL (ref 0–0.2)
IMM GRANULOCYTES NFR BLD AUTO: 0 % (ref 0–2)
INR PPP: 1.05 (ref 0.84–1.19)
LYMPHOCYTES # BLD AUTO: 1.57 THOUSANDS/ÂΜL (ref 0.6–4.47)
LYMPHOCYTES NFR BLD AUTO: 17 % (ref 14–44)
MCH RBC QN AUTO: 30.3 PG (ref 26.8–34.3)
MCHC RBC AUTO-ENTMCNC: 33.2 G/DL (ref 31.4–37.4)
MCV RBC AUTO: 91 FL (ref 82–98)
MONOCYTES # BLD AUTO: 0.97 THOUSAND/ÂΜL (ref 0.17–1.22)
MONOCYTES NFR BLD AUTO: 11 % (ref 4–12)
NEUTROPHILS # BLD AUTO: 6.28 THOUSANDS/ÂΜL (ref 1.85–7.62)
NEUTS SEG NFR BLD AUTO: 70 % (ref 43–75)
NRBC BLD AUTO-RTO: 0 /100 WBCS
PLATELET # BLD AUTO: 228 THOUSANDS/UL (ref 149–390)
PMV BLD AUTO: 11.1 FL (ref 8.9–12.7)
POTASSIUM SERPL-SCNC: 4 MMOL/L (ref 3.5–5.3)
PROTHROMBIN TIME: 14 SECONDS (ref 11.6–14.5)
RBC # BLD AUTO: 5.21 MILLION/UL (ref 3.88–5.62)
SODIUM SERPL-SCNC: 136 MMOL/L (ref 135–147)
WBC # BLD AUTO: 9.02 THOUSAND/UL (ref 4.31–10.16)

## 2023-03-08 RX ORDER — SODIUM CHLORIDE, SODIUM LACTATE, POTASSIUM CHLORIDE, CALCIUM CHLORIDE 600; 310; 30; 20 MG/100ML; MG/100ML; MG/100ML; MG/100ML
75 INJECTION, SOLUTION INTRAVENOUS CONTINUOUS
Status: DISPENSED | OUTPATIENT
Start: 2023-03-08 | End: 2023-03-09

## 2023-03-08 RX ORDER — LIDOCAINE HYDROCHLORIDE 20 MG/ML
1 JELLY TOPICAL ONCE
Status: COMPLETED | OUTPATIENT
Start: 2023-03-08 | End: 2023-03-09

## 2023-03-08 RX ORDER — OXYBUTYNIN CHLORIDE 5 MG/1
5 TABLET ORAL 3 TIMES DAILY
Status: DISCONTINUED | OUTPATIENT
Start: 2023-03-08 | End: 2023-03-11 | Stop reason: HOSPADM

## 2023-03-08 RX ORDER — ACETAMINOPHEN 325 MG/1
650 TABLET ORAL EVERY 6 HOURS PRN
Status: DISCONTINUED | OUTPATIENT
Start: 2023-03-08 | End: 2023-03-11 | Stop reason: HOSPADM

## 2023-03-08 RX ORDER — LEVOTHYROXINE SODIUM 0.15 MG/1
150 TABLET ORAL
Status: DISCONTINUED | OUTPATIENT
Start: 2023-03-08 | End: 2023-03-11 | Stop reason: HOSPADM

## 2023-03-08 RX ORDER — DIAZEPAM 2 MG/1
2 TABLET ORAL EVERY 8 HOURS PRN
Status: DISCONTINUED | OUTPATIENT
Start: 2023-03-08 | End: 2023-03-11 | Stop reason: HOSPADM

## 2023-03-08 RX ORDER — ATORVASTATIN CALCIUM 10 MG/1
10 TABLET, FILM COATED ORAL
Status: DISCONTINUED | OUTPATIENT
Start: 2023-03-08 | End: 2023-03-11 | Stop reason: HOSPADM

## 2023-03-08 RX ADMIN — LEVOTHYROXINE SODIUM 150 MCG: 150 TABLET ORAL at 05:32

## 2023-03-08 RX ADMIN — ACETAMINOPHEN 650 MG: 325 TABLET ORAL at 03:36

## 2023-03-08 RX ADMIN — OXYBUTYNIN CHLORIDE 5 MG: 5 TABLET ORAL at 10:32

## 2023-03-08 RX ADMIN — DIAZEPAM 2 MG: 2 TABLET ORAL at 14:48

## 2023-03-08 RX ADMIN — OXYBUTYNIN CHLORIDE 5 MG: 5 TABLET ORAL at 16:15

## 2023-03-08 RX ADMIN — SODIUM CHLORIDE, SODIUM LACTATE, POTASSIUM CHLORIDE, AND CALCIUM CHLORIDE 75 ML/HR: .6; .31; .03; .02 INJECTION, SOLUTION INTRAVENOUS at 17:14

## 2023-03-08 RX ADMIN — ACETAMINOPHEN 650 MG: 325 TABLET ORAL at 16:15

## 2023-03-08 RX ADMIN — CEFTRIAXONE 1000 MG: 1 INJECTION, POWDER, FOR SOLUTION INTRAMUSCULAR; INTRAVENOUS at 17:08

## 2023-03-08 RX ADMIN — ACETAMINOPHEN 650 MG: 325 TABLET ORAL at 22:03

## 2023-03-08 RX ADMIN — SODIUM CHLORIDE, SODIUM LACTATE, POTASSIUM CHLORIDE, AND CALCIUM CHLORIDE 75 ML/HR: .6; .31; .03; .02 INJECTION, SOLUTION INTRAVENOUS at 02:58

## 2023-03-08 RX ADMIN — OXYBUTYNIN CHLORIDE 5 MG: 5 TABLET ORAL at 20:32

## 2023-03-08 RX ADMIN — ATORVASTATIN CALCIUM 10 MG: 10 TABLET, FILM COATED ORAL at 21:39

## 2023-03-08 RX ADMIN — DIAZEPAM 2 MG: 2 TABLET ORAL at 03:36

## 2023-03-08 NOTE — H&P
[address] 1950, 74 y o  male MRN: [id number]  Unit/Bed#: S -01 Encounter: [id number]  Primary Care Provider: Lizy Joseph MD   Date and time admitted to hospital: 3/7/2023  6:02 PM    Gross hematuria  Assessment & Plan  · Gross hematuria occurred Feb 2023  · Outpatient CT revealed bladder mass, highly suspicious for neoplasm  Non obstructing stone, renal cyst     · Cysto 2/10 with Dr Eulalia Flanagan  Recommended for transuretheral resection of the bladder  And intravesical installation of mitomycin for 3/23  · Reports urine was most recently orange in color  · He presented to ED tonight for gross hematuria  Case reviewed by ED with urology  Continue CBI overnight  · UA: innumerable WBC, RBC  No bacteria  · NPO at midnight  · Urology to evaluate today  Hyperlipidemia  Assessment & Plan  Continue statin    Hypothyroid  Assessment & Plan  Continue synthroid      VTE Pharmacologic Prophylaxis: VTE Score: 3 Moderate Risk (Score 3-4) - Pharmacological DVT Prophylaxis Contraindicated  Sequential Compression Devices Ordered  Code Status: Level 1 - Full Code   Discussion with family: Patient declined call to   Anticipated Length of Stay: Patient will be admitted on an inpatient basis with an anticipated length of stay of greater than 2 midnights secondary to hematuria  Total Time Spent on Date of Encounter in care of patient: 65 minutes This time was spent on one or more of the following: performing physical exam; counseling and coordination of care; obtaining or reviewing history; documenting in the medical record; reviewing/ordering tests, medications or procedures; communicating with other healthcare professionals and discussing with patient's family/caregivers  Chief Complaint: hematuria    History of Present Illness:  Martine Bentley is a 74 y o  male with a PMH as below who presents with hematuria    He first developed hematuria in February, but most recently urine has been orange in color  He has been following with urology and has undergone CT, cystoscopy and found to have bladder mass  He is scheduled for cystoscopy and transuretheral resection of bladder on 3/23  CBI was started in the ED  Clamp was trialed as hematuria had resolved, but shortly after clamping hematuria returned  He was recommended for CBI overnight and urology eval in AM   Patient takes NSAID on occasional basis with last dose around Sunday  Otherwise he is not on any blood thinners  Review of Systems:  Review of Systems   Genitourinary: Positive for hematuria  Discomfort from catheter   All other systems reviewed and are negative  Past Medical and Surgical History:   Past Medical History:   Diagnosis Date   • Anxiety disorder    • Atherosclerotic heart disease of native coronary artery without angina pectoris    • Benign prostatic hyperplasia without lower urinary tract symptoms    • Dyslipidemia    • GERD (gastroesophageal reflux disease)    • Hypertension    • Hyperthyroidism    • Impaired fasting blood sugar    • Kidney stone    • Low back pain    • Osteoarthritis     last assesed 6-6-16   • Other chest pain    • Paresthesia of skin    • Polyneuropathy     last assesed 5-8-17   • Pure hypercholesterolemia    • 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  Authorizing Provider   atorvastatin (LIPITOR) 10 mg tablet TAKE 1 TABLET BY MOUTH AT BEDTIME 2/22/22   Jody Clemente MD   cephalexin (KEFLEX) 500 mg capsule TAKE 4 CAPSULES BY MOUTH ONCE FOR 1 DOSE  TAKE 1 HOUR PRIOR TO DENTAL PROCEDURE  4/19/21   Historical Provider, MD   Flowflex COVID-19 Ag Home Test KIT  9/6/22   Historical Provider, MD   ibuprofen (MOTRIN) 200 mg tablet Take by mouth as needed for mild pain    Historical Provider, MD   levothyroxine 150 mcg tablet TAKE 1 TABLET BY MOUTH DAILY 2/22/22   Dora Valerio MD   LORazepam (ATIVAN) 1 mg tablet Take 1 tablet (1 mg total) by mouth every 8 (eight) hours as needed for anxiety (As need for CT scan and Cystoscopy) 2/3/23   Valentino Perez PA-C     I have reviewed home medications with a medical source (PCP, Pharmacy, other)  Allergies: No Known Allergies    Social History:  Marital Status: /Civil Union   Occupation:   Patient Pre-hospital Living Situation: Home  Patient Pre-hospital Level of Mobility: walks  Patient Pre-hospital Diet Restrictions:   Substance Use History:   Social History     Substance and Sexual Activity   Alcohol Use Never     Social History     Tobacco Use   Smoking Status Former   Smokeless Tobacco Never     Social History     Substance and Sexual Activity   Drug Use Never       Family History:  Family History   Problem Relation Age of Onset   • Arthritis Other    • Heart disease Father        Physical Exam:     Vitals:   Blood Pressure: 115/69 (03/07/23 2300)  Pulse: 75 (03/07/23 2300)  Temperature: 99 1 °F (37 3 °C) (03/07/23 1727)  Temp Source: Oral (03/07/23 1727)  Respirations: 16 (03/07/23 2300)  SpO2: 95 % (03/07/23 2300)    Physical Exam  Constitutional:       General: He is not in acute distress  Appearance: Normal appearance  He is obese  He is not ill-appearing  HENT:      Head: Normocephalic and atraumatic  Right Ear: External ear normal       Left Ear: External ear normal       Nose: Nose normal       Mouth/Throat:      Pharynx: Oropharynx is clear  Eyes:      General: No scleral icterus       Extraocular Movements: Extraocular movements intact  Conjunctiva/sclera: Conjunctivae normal    Cardiovascular:      Rate and Rhythm: Normal rate and regular rhythm  Pulses: Normal pulses  Heart sounds: Normal heart sounds  Pulmonary:      Effort: Pulmonary effort is normal       Breath sounds: Normal breath sounds  Abdominal:      General: Bowel sounds are normal       Palpations: Abdomen is soft  Tenderness: There is no right CVA tenderness or left CVA tenderness  Genitourinary:     Comments: cbi draining very light yellow / pale pink urine  Musculoskeletal:         General: Normal range of motion  Cervical back: Normal range of motion  Skin:     General: Skin is warm  Capillary Refill: Capillary refill takes less than 2 seconds  Neurological:      General: No focal deficit present  Mental Status: He is alert and oriented to person, place, and time  Psychiatric:         Mood and Affect: Mood normal          Behavior: Behavior normal           Additional Data:     Lab Results:  Results from last 7 days   Lab Units 03/07/23  1837   WBC Thousand/uL 8 73   HEMOGLOBIN g/dL 15 7   HEMATOCRIT % 48 0   PLATELETS Thousands/uL 237   NEUTROS PCT % 76*   LYMPHS PCT % 13*   MONOS PCT % 9   EOS PCT % 1     Results from last 7 days   Lab Units 03/07/23  1837   SODIUM mmol/L 138   POTASSIUM mmol/L 4 8   CHLORIDE mmol/L 104   CO2 mmol/L 30   BUN mg/dL 23   CREATININE mg/dL 1 13   ANION GAP mmol/L 4   CALCIUM mg/dL 8 5   GLUCOSE RANDOM mg/dL 101                       Lines/Drains:  Invasive Devices     Peripheral Intravenous Line  Duration           Peripheral IV 03/07/23 Right Antecubital <1 day          Drain  Duration           Continuous Bladder Irrigation Three-way <1 day    Urethral Catheter Latex; Three way 20 Fr  <1 day              Urinary Catheter:  Goal for removal: required for cbi             Imaging: Reviewed radiology reports from this admission including: recent CT, cysto reviewed  No orders to display       EKG and Other Studies Reviewed on Admission:   · EKG: No EKG obtained  ** Please Note: This note has been constructed using a voice recognition system   **

## 2023-03-08 NOTE — ASSESSMENT & PLAN NOTE
· Gross hematuria occurred Feb 2023  · Outpatient CT revealed bladder mass, highly suspicious for neoplasm  Non obstructing stone, renal cyst     · Cysto 2/10 with Dr Kori Head  Recommended for transuretheral resection of the bladder  And intravesical installation of mitomycin for 3/23  · Reports urine was most recently orange in color  · He presented to ED tonight for gross hematuria  Case reviewed by ED with urology  Continue CBI overnight  · UA: innumerable WBC, RBC  No bacteria  · NPO at midnight  · Urology to evaluate today

## 2023-03-08 NOTE — CONSULTS
Consult - Urology   [address] [facility] 1950, 74 y o  male MRN: [id number]    Unit/Bed#: S -01 Encounter: [id number]      Assessment & Plan:  1  Hematuria  - Scarlett Kaur is a 29-year-old male known to our practice with recent work-up for gross hematuria  He first had microscopic hematuria in March 2022  Patient had work-up with CT urogram showing concern for bladder mass  He had cystoscopy in the office 2/10/2023 and has a new diagnosis of urothelial carcinoma of the bladder   - Patient is scheduled to undergo TURBT and mitomycin instillation on 3/23/2023 with Dr Hattie Rao  - After cystoscopy 2/9/23 patient noted to have UTI, and was treated with Macrobid and patient reports resolution of dysuria  - Patient reports after large bowel movement on Monday, he had gross hematuria  He reports fruit punch colored urine  - ED placed catheter, irrigated clots out and started CBI  Nursing noted urine clear yellow this morning  Patient had bowel movement and urine was blush colored, and few small blood clots came out  - Irrigated catheter out this morning clear yellow/slight blush urine  No clots  - Clamp CBI  Will reassess in the afternoon   - Encourage bowel regimen  Avoid straining    - No leukocytosis  - Creatinine at baseline  - Abdomen soft, nontender nondistended  Patient reports pain around head of penis, with irrigation of catheter  Ordered oxybutynin for bladder spasms   - UA small leukocytes, nitrate negative  Innumerable WBCs, no bacteria  Recommend empiric antibiotics  - No acute  surgical intervention  - Urology will continue to follow    Subjective:   Scarlett Kaur is a 29-year-old male known to our practice with recent work-up for gross hematuria  He first had microscopic hematuria in March 2022  Patient had work-up with CT urogram showing concern for bladder mass  He had cystoscopy in the office 2/10/2023 and has a new diagnosis of urothelial carcinoma of the bladder    After cystoscopy patient noted to have dysuria, and was diagnosed with UTI  He was treated with Macrobid  After treatment with Macrobid patient reports resolution of his symptoms  Patient reports on Monday he had large bowel movement, after his bowel movement he had gross hematuria  Due to fruit punch colored urine that was thick patient presented to the ED  ED placed catheter, irrigated clots out and started CBI  UA showing small leukocytes, nitrate negative  Innumerable WBCs, no bacteria  No leukocytosis  Vital signs stable, afebrile  Urine clearing nicely on CBI  He denies any fevers, chills, dysuria, abdominal pain, flank pain, chest pain, shortness of breath  Review of Systems   Constitutional: Negative for chills and fever  Respiratory: Negative for cough and shortness of breath  Cardiovascular: Negative for chest pain and palpitations  Gastrointestinal: Negative for abdominal pain and vomiting  Genitourinary: Positive for hematuria and penile pain  Negative for dysuria  Musculoskeletal: Negative for arthralgias and back pain  Skin: Negative for color change and rash  Neurological: Negative for seizures and syncope  All other systems reviewed and are negative  Objective:  Vitals: Blood pressure 115/69, pulse 75, temperature 99 1 °F (37 3 °C), temperature source Oral, resp  rate 16, SpO2 95 %  ,There is no height or weight on file to calculate BMI  Physical Exam  Constitutional:       General: He is not in acute distress  Appearance: Normal appearance  He is normal weight  He is not ill-appearing or toxic-appearing  HENT:      Head: Normocephalic and atraumatic  Right Ear: External ear normal       Left Ear: External ear normal       Nose: Nose normal       Mouth/Throat:      Mouth: Mucous membranes are moist    Eyes:      General: No scleral icterus  Conjunctiva/sclera: Conjunctivae normal    Cardiovascular:      Rate and Rhythm: Normal rate  Pulses: Normal pulses     Pulmonary: Effort: Pulmonary effort is normal    Abdominal:      General: Abdomen is flat  There is no distension  Palpations: Abdomen is soft  Tenderness: There is no abdominal tenderness  There is no guarding  Genitourinary:     Comments: Morfin bag draining clear yellow/slight pink urine, no clots  Musculoskeletal:         General: Normal range of motion  Cervical back: Normal range of motion  Skin:     General: Skin is warm  Findings: No rash  Neurological:      General: No focal deficit present  Mental Status: He is alert and oriented to person, place, and time  Mental status is at baseline  Psychiatric:         Mood and Affect: Mood normal          Behavior: Behavior normal          Thought Content:  Thought content normal          Judgment: Judgment normal            Labs:  Recent Labs     03/07/23  1837 03/08/23  0743   WBC 8 73 9 02     Recent Labs     03/07/23  1837 03/08/23  0743   HGB 15 7 15 8     Recent Labs     03/07/23 1837 03/08/23  0743   CREATININE 1 13 0 88         History:  Social History     Socioeconomic History   • Marital status: /Civil Union     Spouse name: None   • Number of children: None   • Years of education: None   • Highest education level: None   Occupational History   • None   Tobacco Use   • Smoking status: Former   • Smokeless tobacco: Never   Vaping Use   • Vaping Use: Never used   Substance and Sexual Activity   • Alcohol use: Never   • Drug use: Never   • Sexual activity: None   Other Topics Concern   • None   Social History Narrative    Smoking: Non-smoker    As per [employer]     Social Determinants of Health     Financial Resource Strain: Not on file   Food Insecurity: Not on file   Transportation Needs: Not on file   Physical Activity: Not on file   Stress: Not on file   Social Connections: Not on file   Intimate Partner Violence: Not on file   Housing Stability: Not on file     Past Medical History:   Diagnosis Date   • Anxiety disorder    • Atherosclerotic heart disease of native coronary artery without angina pectoris    • Benign prostatic hyperplasia without lower urinary tract symptoms    • Dyslipidemia    • GERD (gastroesophageal reflux disease)    • Hypertension    • Hyperthyroidism    • Impaired fasting blood sugar    • Kidney stone    • Low back pain    • Osteoarthritis     last assesed 6-6-16   • Other chest pain    • Paresthesia of skin    • Polyneuropathy     last assesed 5-8-17   • Pure hypercholesterolemia    • 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

## 2023-03-09 ENCOUNTER — APPOINTMENT (INPATIENT)
Dept: VASCULAR ULTRASOUND | Facility: HOSPITAL | Age: 73
End: 2023-03-09

## 2023-03-09 ENCOUNTER — APPOINTMENT (INPATIENT)
Dept: RADIOLOGY | Facility: HOSPITAL | Age: 73
End: 2023-03-09

## 2023-03-09 PROBLEM — M79.89 RIGHT LEG SWELLING: Status: ACTIVE | Noted: 2023-03-09

## 2023-03-09 PROBLEM — N32.89 BLADDER MASS: Status: ACTIVE | Noted: 2023-03-09

## 2023-03-09 PROBLEM — R39.89 SUSPECTED UTI: Status: ACTIVE | Noted: 2023-03-09

## 2023-03-09 LAB
BASOPHILS # BLD AUTO: 0.05 THOUSANDS/ÂΜL (ref 0–0.1)
BASOPHILS NFR BLD AUTO: 1 % (ref 0–1)
EOSINOPHIL # BLD AUTO: 0.04 THOUSAND/ÂΜL (ref 0–0.61)
EOSINOPHIL NFR BLD AUTO: 1 % (ref 0–6)
ERYTHROCYTE [DISTWIDTH] IN BLOOD BY AUTOMATED COUNT: 12.9 % (ref 11.6–15.1)
HCT VFR BLD AUTO: 44.4 % (ref 36.5–49.3)
HGB BLD-MCNC: 14.5 G/DL (ref 12–17)
IMM GRANULOCYTES # BLD AUTO: 0.05 THOUSAND/UL (ref 0–0.2)
IMM GRANULOCYTES NFR BLD AUTO: 1 % (ref 0–2)
LYMPHOCYTES # BLD AUTO: 1.23 THOUSANDS/ÂΜL (ref 0.6–4.47)
LYMPHOCYTES NFR BLD AUTO: 15 % (ref 14–44)
MCH RBC QN AUTO: 30.1 PG (ref 26.8–34.3)
MCHC RBC AUTO-ENTMCNC: 32.7 G/DL (ref 31.4–37.4)
MCV RBC AUTO: 92 FL (ref 82–98)
MONOCYTES # BLD AUTO: 1.24 THOUSAND/ÂΜL (ref 0.17–1.22)
MONOCYTES NFR BLD AUTO: 15 % (ref 4–12)
NEUTROPHILS # BLD AUTO: 5.44 THOUSANDS/ÂΜL (ref 1.85–7.62)
NEUTS SEG NFR BLD AUTO: 67 % (ref 43–75)
NRBC BLD AUTO-RTO: 0 /100 WBCS
PLATELET # BLD AUTO: 179 THOUSANDS/UL (ref 149–390)
PMV BLD AUTO: 10.7 FL (ref 8.9–12.7)
RBC # BLD AUTO: 4.82 MILLION/UL (ref 3.88–5.62)
WBC # BLD AUTO: 8.05 THOUSAND/UL (ref 4.31–10.16)

## 2023-03-09 RX ADMIN — OXYBUTYNIN CHLORIDE 5 MG: 5 TABLET ORAL at 10:35

## 2023-03-09 RX ADMIN — AMPICILLIN SODIUM 2000 MG: 2 INJECTION, POWDER, FOR SOLUTION INTRAMUSCULAR; INTRAVENOUS at 13:36

## 2023-03-09 RX ADMIN — DIAZEPAM 2 MG: 2 TABLET ORAL at 06:29

## 2023-03-09 RX ADMIN — LIDOCAINE HYDROCHLORIDE 1 APPLICATION.: 20 JELLY TOPICAL at 02:57

## 2023-03-09 RX ADMIN — AMPICILLIN SODIUM 2000 MG: 2 INJECTION, POWDER, FOR SOLUTION INTRAMUSCULAR; INTRAVENOUS at 18:31

## 2023-03-09 RX ADMIN — ACETAMINOPHEN 650 MG: 325 TABLET ORAL at 10:35

## 2023-03-09 RX ADMIN — OXYBUTYNIN CHLORIDE 5 MG: 5 TABLET ORAL at 18:31

## 2023-03-09 RX ADMIN — ATORVASTATIN CALCIUM 10 MG: 10 TABLET, FILM COATED ORAL at 21:03

## 2023-03-09 RX ADMIN — OXYBUTYNIN CHLORIDE 5 MG: 5 TABLET ORAL at 20:39

## 2023-03-09 RX ADMIN — LEVOTHYROXINE SODIUM 150 MCG: 150 TABLET ORAL at 05:44

## 2023-03-09 NOTE — ASSESSMENT & PLAN NOTE
Patient reports on and off issues with right knee/lower leg at various points in his life -> over the last day, reports increased swelling of the right lower leg  Await XR of right knee and tibia/fibula - await right lower extremity venous duplex study rule out DVT

## 2023-03-09 NOTE — PROGRESS NOTES
Progress Note - Urology  Jenn Vu 1950, 74 y o  male MRN: [id number]    Unit/Bed#: S -01 Encounter: [id number]      Assessment & Plan:  1  Hematuria  - Ino Gamino is a 26-year-old male known to our practice with recent work-up for gross hematuria  He first had microscopic hematuria in March 2022  Patient had work-up with CT urogram showing concern for bladder mass  He had cystoscopy in the office 2/10/2023 and has a new diagnosis of urothelial carcinoma of the bladder  - Patient is scheduled to undergo TURBT and mitomycin instillation on 3/23/2023 with Dr Perlita Dia  - CT showing Extensive thickening of the bladder wall and adjacent fat stranding  Some intraluminal gas is present as well as gas density extending into the superior bladder wall most likely related to Morfin placement  A small diverticulum may be evident  Findings are suspicious for cystitis  - Urine culture showing Enterococcus faecalis  - No leukocytosis  - Pt with no abdominal pain  Penile pain with manual irrigation  - Oxybutinin for bladder spasms  - Urine clear yellow this morning  CBI clamped  Irrigated catheter, clear yellow urine    - Will reassess in afternoon  - VSS, afebrile  - No acute  surgical intervention  - Continue medical optimization and antibiosis per primary team  - Urology will continue to follow     Subjective:   HPI: Pt seen at bedside today  He reports overnight manual irrigation clear yellow urine  He reports significant right knee pain, difficulty walking due to pain  Pt denies any fever, chills, abdominal pain, SOB, chest pain  Review of Systems   Constitutional: Negative for chills and fever  HENT: Negative for ear pain and sore throat  Eyes: Negative for pain and visual disturbance  Respiratory: Negative for cough and shortness of breath  Cardiovascular: Negative for chest pain and palpitations  Gastrointestinal: Negative for abdominal pain and vomiting     Genitourinary: Positive for penile pain  Negative for dysuria and hematuria  Musculoskeletal: Positive for gait problem and joint swelling  Negative for arthralgias and back pain  Skin: Negative for color change and rash  Neurological: Negative for seizures and syncope  All other systems reviewed and are negative  Objective:    Vitals: Blood pressure 119/67, pulse 75, temperature 99 5 °F (37 5 °C), temperature source Oral, resp  rate 18, SpO2 95 %  ,There is no height or weight on file to calculate BMI  Physical Exam  Constitutional:       General: He is not in acute distress  Appearance: Normal appearance  He is normal weight  He is not ill-appearing or toxic-appearing  HENT:      Head: Normocephalic and atraumatic  Right Ear: External ear normal       Left Ear: External ear normal       Nose: Nose normal       Mouth/Throat:      Mouth: Mucous membranes are moist    Eyes:      General: No scleral icterus  Conjunctiva/sclera: Conjunctivae normal    Cardiovascular:      Rate and Rhythm: Normal rate  Pulses: Normal pulses  Pulmonary:      Effort: Pulmonary effort is normal    Abdominal:      General: Abdomen is flat  There is no distension  Palpations: Abdomen is soft  Tenderness: There is no abdominal tenderness  There is no right CVA tenderness, left CVA tenderness or guarding  Genitourinary:     Comments: Morfin in place draining clear yellow urine  Musculoskeletal:      Cervical back: Normal range of motion  Skin:     General: Skin is warm  Findings: No rash  Neurological:      General: No focal deficit present  Mental Status: He is alert and oriented to person, place, and time  Mental status is at baseline  Psychiatric:         Mood and Affect: Mood normal          Behavior: Behavior normal          Thought Content:  Thought content normal          Judgment: Judgment normal          Imaging:    CT ABDOMEN AND PELVIS WITHOUT IV CONTRAST     INDICATION:   Hematuria, gross/macroscopic  Hematuria  clot burden      COMPARISON:  2/8/2023     TECHNIQUE:  CT examination of the abdomen and pelvis was performed without intravenous contrast  Axial, sagittal, and coronal 2D reformatted images were created from the source data and submitted for interpretation       Radiation dose length product (DLP) for this visit:  1390 86 mGy-cm   This examination, like all CT scans performed in the [facility], was performed utilizing techniques to minimize radiation dose exposure, including the use of   iterative reconstruction and automated exposure control       Enteric contrast was administered       FINDINGS:     ABDOMEN     LOWER CHEST:  Left lower lobe atelectasis/scarring is unchanged        LIVER/BILIARY TREE:  Small hepatic hypodensities are unchanged      GALLBLADDER:  Gallbladder is surgically absent      SPLEEN:  Unremarkable      PANCREAS:  Unremarkable      ADRENAL GLANDS:  Unremarkable      KIDNEYS/URETERS:  No hydronephrosis      Nonobstructing calculi are noted on the right  Hypodensity at the upper pole right kidney is well-circumscribed and statistically a cyst   This is better characterized on the prior contrast study      STOMACH AND BOWEL:  Unremarkable      APPENDIX:  No findings to suggest appendicitis      ABDOMINOPELVIC CAVITY:  No ascites  No pneumoperitoneum  No lymphadenopathy      VESSELS:  Unremarkable for patient's age      PELVIS     REPRODUCTIVE ORGANS:  The prostate is mildly enlarged      URINARY BLADDER:  There is focal catheter gas present within the urinary bladder  Mass noted previously is not well visualized on the current examination  Bladder diverticulum is again demonstrated  There is significant stranding of the fat   surrounding the bladder which may be related to therapy changes or inflammation      Small amount of gas appears to be within the bladder wall superiorly on sagittal image 1:15    This could also represent a small diverticulum      ABDOMINAL WALL/INGUINAL REGIONS:  Small periumbilical hernia of fat      OSSEOUS STRUCTURES:  No acute fracture or destructive osseous lesion  Degenerative changes of left hip are noted  The patient is status post right hip replacement      IMPRESSION:     Extensive thickening of the bladder wall and adjacent fat stranding  Some intraluminal gas is present as well as gas density extending into the superior bladder wall most likely related to Morfin placement  A small diverticulum may be evident  Findings   are suspicious for cystitis     Patient's known bladder mass is not well seen on this examination      There is no hydronephrosis      Nonobstructing calculi of both kidneys    No evidence of ureteral calculi      The study was marked in EPIC for significant notification            Workstation performed: [id number]       Labs:  Recent Labs     03/07/23  1837 03/08/23  0743 03/09/23  0517   WBC 8 73 9 02 8 05     Recent Labs     03/07/23  1837 03/08/23  0743 03/09/23  0517   HGB 15 7 15 8 14 5     Recent Labs     03/07/23  1837 03/08/23  0743 03/09/23  0517   HCT 48 0 47 6 44 4     Recent Labs     03/07/23  1837 03/08/23  0743   CREATININE 1 13 0 88         History:  Past Medical History:   Diagnosis Date   • Anxiety disorder    • Atherosclerotic heart disease of native coronary artery without angina pectoris    • Benign prostatic hyperplasia without lower urinary tract symptoms    • Dyslipidemia    • GERD (gastroesophageal reflux disease)    • Hypertension    • Hyperthyroidism    • Impaired fasting blood sugar    • Kidney stone    • Low back pain    • Osteoarthritis     last assesed 6-6-16   • Other chest pain    • Paresthesia of skin    • Polyneuropathy     last assesed 5-8-17   • Pure hypercholesterolemia    • 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 ([facility] [address] )    • Wears glasses      Social History     Socioeconomic History   • Marital status: /Civil Union Spouse name: None   • Number of children: None   • Years of education: None   • Highest education level: None   Occupational History   • None   Tobacco Use   • Smoking status: Former   • Smokeless tobacco: Never   Vaping Use   • Vaping Use: Never used   Substance and Sexual Activity   • Alcohol use: Never   • Drug use: Never   • Sexual activity: None   Other Topics Concern   • None   Social History Narrative    Smoking: Non-smoker    As per [employer]     Social Determinants of Health     Financial Resource Strain: Not on file   Food Insecurity: Not on file   Transportation Needs: Not on file   Physical Activity: Not on file   Stress: Not on file   Social Connections: Not on file   Intimate Partner Violence: Not on file   Housing Stability: Not on file     Past Surgical History:   Procedure Laterality Date   • BACK SURGERY     • CHOLECYSTECTOMY     • COLONOSCOPY  02/06/2019   • [place] ARTHRP ACETBLR/PROX FEM PROSTC AGRFT/ALGRFT Right 8/5/2019    Procedure: ARTHROPLASTY HIP TOTAL;  Surgeon: Dino Barraza MD;  Location: [facility];  Service: Orthopedics   • TONSILLECTOMY       Family History   Problem Relation Age of Onset   • Arthritis Other    • Heart disease Father        August Jeremi [place]  Date: 3/9/2023 Time: 11:14 AM

## 2023-03-09 NOTE — PROGRESS NOTES
[facility]  Progress Note - Martine Bentley 1950, 74 y o  male MRN: [id number]  Unit/Bed#: S -01 Encounter: [id number]  Primary Care Provider: Lizy Joseph MD   Date and time admitted to hospital: 3/7/2023  6:02 PM      * Gross hematuria  Assessment & Plan  Appreciate urology input -> status post CBI now clamped w/ near resolution of hematuria  Outpatient CT revealed bladder mass, highly suspicious for neoplasm -> tentative plan for TURBT later this month w/ intravesical mitomycin instillation   See plan for suspected UTI below    Suspected UTI  Assessment & Plan  In the setting of gross hematuria, and urine culture positive for Enterococcus faecalis, urology recommended course of antibiotics -> sensitivities noted and will transition IV Rocephin -> IV Ampicillin  Of note, similar growth last month    Bladder mass  Assessment & Plan  Known bladder mass with plan for tentative TURBT later this month with mitomycin instillation    Right leg swelling  Assessment & Plan  Patient reports on and off issues with right knee/lower leg at various points in his life -> over the last day, reports increased swelling of the right lower leg  Await XR of right knee and tibia/fibula - await right lower extremity venous duplex study rule out DVT    Hyperlipidemia  Assessment & Plan  Continue statin    Hypothyroidism  Assessment & Plan  Continue Synthroid      DVT Prophylaxis:  SCDs    Patient Centered Rounds:  I have performed bedside rounds and discussed plan of care with nursing today  Discussions with Specialists or Other Care Team Provider:  see above assessments if applicable    Education and Discussions with Family / Patient:  Patient at bedside, who will self-update family as necessary    Time Spent for Care:  32 minutes  More than 50% of total time spent on counseling and coordination of care as described above      Current Length of Stay: 2 day(s)  Current Patient Status: Inpatient Certification Statement:  Patient will continue to require additional hospital stay due to assessments as noted above  Code Status: Level 1 - Full Code        Subjective:     Encountered earlier in the day  He is content that his urine is clearing up however, reports an increase in right lower leg swelling over the last day  Objective:     Vitals:   Temp (24hrs), Av 5 °F (37 5 °C), Min:99 5 °F (37 5 °C), Max:99 5 °F (37 5 °C)    Temp:  [99 5 °F (37 5 °C)] 99 5 °F (37 5 °C)  Resp:  [18] 18  BP: (119)/(67) 119/67  SpO2:  [95 %] 95 %  There is no height or weight on file to calculate BMI  Input and Output Summary (last 24 hours):        Intake/Output Summary (Last 24 hours) at 3/9/2023 1813  Last data filed at 3/9/2023 1356  Gross per 24 hour   Intake --   Output 4500 ml   Net -4500 ml       Physical Exam:     GENERAL   Well-developed/nourished    HEAD   Normocephalic - atraumatic   EYES   PERRL - EOMI    MOUTH   Mucosa moist   NECK   Supple - full range of motion   CARDIAC   rate controlled - S1/S2 positive   PULMONARY    Clear breath sounds bilaterally - nonlabored respirations   ABDOMEN   Soft - nontender/nondistended - active bowel sounds   MUSCULOSKELETAL   Motor strength/range of motion mildly diminished with decreased right knee flexion and still RLE swelling when compared to LLE   NEUROLOGIC   Alert/oriented at baseline   SKIN   Chronic wrinkles/blemishes    PSYCHIATRIC   Mood/affect stable         Additional Data:     Labs & Recent Cultures:    Results from last 7 days   Lab Units 23  0517   WBC Thousand/uL 8 05   HEMOGLOBIN g/dL 14 5   HEMATOCRIT % 44 4   PLATELETS Thousands/uL 179   NEUTROS PCT % 67   LYMPHS PCT % 15   MONOS PCT % 15*   EOS PCT % 1     Results from last 7 days   Lab Units 23  0743   POTASSIUM mmol/L 4 0   CHLORIDE mmol/L 104   CO2 mmol/L 23   BUN mg/dL 19   CREATININE mg/dL 0 88   CALCIUM mg/dL 8 3*     Results from last 7 days   Lab Units 23  0743   INR 1 05                     Results from last 7 days   Lab Units 03/07/23  1825   URINE CULTURE  >100,000 cfu/ml Enterococcus faecalis*         Lines/Drains:  Invasive Devices     Peripheral Intravenous Line  Duration           Peripheral IV 03/09/23 Left;Proximal;Ventral (anterior) Forearm <1 day          Drain  Duration           Continuous Bladder Irrigation Three-way 1 day    Urethral Catheter Latex; Three way 20 Fr  1 day                  Last 24 Hours Medication List:   Current Facility-Administered Medications   Medication Dose Route Frequency Provider Last Rate   • acetaminophen  650 mg Oral Q6H PRN Gala Heady, CRNP     • ampicillin  2,000 mg Intravenous Q6H Sarah Taylor MD 2,000 mg (03/09/23 1336)   • atorvastatin  10 mg Oral HS Gala Heady, CRNP     • diazepam  2 mg Oral Q8H PRN Gala Heady, CRNP     • levothyroxine  150 mcg Oral Early Morning Gala Heady, CRNP     • oxybutynin  5 mg Oral TID Tez Chavarria PA-C                          ** Please Note: This note is constructed using a voice recognition dictation system  An occasional wrong word/phrase or “sound-a-like” substitution may have been picked up by dictation device due to the inherent limitations of voice recognition software  Read the chart carefully and recognize, using reasonable context, where substitutions may have occurred  **

## 2023-03-09 NOTE — ASSESSMENT & PLAN NOTE
Appreciate urology input -> status post CBI now clamped w/ near resolution of hematuria  Outpatient CT revealed bladder mass, highly suspicious for neoplasm -> tentative plan for TURBT later this month w/ intravesical mitomycin instillation   See plan for suspected UTI below

## 2023-03-09 NOTE — ASSESSMENT & PLAN NOTE
In the setting of gross hematuria, and urine culture positive for Enterococcus faecalis, urology recommended course of antibiotics -> sensitivities noted and will transition IV Rocephin -> IV Ampicillin  Of note, similar growth last month

## 2023-03-10 LAB
APPEARANCE FLD: ABNORMAL
ATRIAL RATE: 86 BPM
ATRIAL RATE: 89 BPM
BACTERIA UR CULT: ABNORMAL
BASOPHILS # BLD AUTO: 0.06 THOUSANDS/ÂΜL (ref 0–0.1)
BASOPHILS NFR BLD AUTO: 1 % (ref 0–1)
COLOR FLD: ABNORMAL
EOSINOPHIL # BLD AUTO: 0.04 THOUSAND/ÂΜL (ref 0–0.61)
EOSINOPHIL NFR BLD AUTO: 0 % (ref 0–6)
ERYTHROCYTE [DISTWIDTH] IN BLOOD BY AUTOMATED COUNT: 12.7 % (ref 11.6–15.1)
HCT VFR BLD AUTO: 43.3 % (ref 36.5–49.3)
HGB BLD-MCNC: 14.4 G/DL (ref 12–17)
IMM GRANULOCYTES # BLD AUTO: 0.05 THOUSAND/UL (ref 0–0.2)
IMM GRANULOCYTES NFR BLD AUTO: 1 % (ref 0–2)
LYMPHOCYTES # BLD AUTO: 1.36 THOUSANDS/ÂΜL (ref 0.6–4.47)
LYMPHOCYTES # SNV MANUAL: 2 %
LYMPHOCYTES NFR BLD AUTO: 15 % (ref 14–44)
MCH RBC QN AUTO: 30.4 PG (ref 26.8–34.3)
MCHC RBC AUTO-ENTMCNC: 33.3 G/DL (ref 31.4–37.4)
MCV RBC AUTO: 92 FL (ref 82–98)
MONOCYTES # BLD AUTO: 1.26 THOUSAND/ÂΜL (ref 0.17–1.22)
MONOCYTES NFR BLD AUTO: 14 % (ref 4–12)
NEUTROPHILS # BLD AUTO: 6.38 THOUSANDS/ÂΜL (ref 1.85–7.62)
NEUTROPHILS NFR SNV MANUAL: 98 %
NEUTS SEG NFR BLD AUTO: 69 % (ref 43–75)
NRBC BLD AUTO-RTO: 0 /100 WBCS
P AXIS: 32 DEGREES
P AXIS: 38 DEGREES
PLATELET # BLD AUTO: 161 THOUSANDS/UL (ref 149–390)
PMV BLD AUTO: 10.7 FL (ref 8.9–12.7)
PR INTERVAL: 152 MS
PR INTERVAL: 152 MS
QRS AXIS: -6 DEGREES
QRS AXIS: -9 DEGREES
QRSD INTERVAL: 106 MS
QRSD INTERVAL: 110 MS
QT INTERVAL: 358 MS
QT INTERVAL: 364 MS
QTC INTERVAL: 435 MS
QTC INTERVAL: 435 MS
RBC # BLD AUTO: 4.73 MILLION/UL (ref 3.88–5.62)
RBC # SNV MANUAL: 4000 /UL (ref 0–10)
SITE: ABNORMAL
T WAVE AXIS: -1 DEGREES
T WAVE AXIS: 3 DEGREES
TOTAL CELLS COUNTED SPEC: 100
VENTRICULAR RATE: 86 BPM
VENTRICULAR RATE: 89 BPM
WBC # BLD AUTO: 9.15 THOUSAND/UL (ref 4.31–10.16)
WBC # FLD MANUAL: ABNORMAL /UL (ref 0–200)

## 2023-03-10 RX ORDER — LIDOCAINE HYDROCHLORIDE 10 MG/ML
5 INJECTION, SOLUTION EPIDURAL; INFILTRATION; INTRACAUDAL; PERINEURAL ONCE
Status: COMPLETED | OUTPATIENT
Start: 2023-03-10 | End: 2023-03-10

## 2023-03-10 RX ADMIN — LIDOCAINE HYDROCHLORIDE 5 ML: 10 INJECTION, SOLUTION EPIDURAL; INFILTRATION; INTRACAUDAL; PERINEURAL at 10:30

## 2023-03-10 RX ADMIN — OXYBUTYNIN CHLORIDE 5 MG: 5 TABLET ORAL at 08:11

## 2023-03-10 RX ADMIN — LEVOTHYROXINE SODIUM 150 MCG: 150 TABLET ORAL at 05:09

## 2023-03-10 RX ADMIN — AMPICILLIN SODIUM 2000 MG: 2 INJECTION, POWDER, FOR SOLUTION INTRAMUSCULAR; INTRAVENOUS at 19:30

## 2023-03-10 RX ADMIN — AMPICILLIN SODIUM 2000 MG: 2 INJECTION, POWDER, FOR SOLUTION INTRAMUSCULAR; INTRAVENOUS at 01:33

## 2023-03-10 RX ADMIN — OXYBUTYNIN CHLORIDE 5 MG: 5 TABLET ORAL at 21:04

## 2023-03-10 RX ADMIN — OXYBUTYNIN CHLORIDE 5 MG: 5 TABLET ORAL at 19:20

## 2023-03-10 RX ADMIN — ACETAMINOPHEN 650 MG: 325 TABLET ORAL at 19:30

## 2023-03-10 RX ADMIN — AMPICILLIN SODIUM 2000 MG: 2 INJECTION, POWDER, FOR SOLUTION INTRAMUSCULAR; INTRAVENOUS at 13:32

## 2023-03-10 RX ADMIN — ATORVASTATIN CALCIUM 10 MG: 10 TABLET, FILM COATED ORAL at 21:04

## 2023-03-10 RX ADMIN — AMPICILLIN SODIUM 2000 MG: 2 INJECTION, POWDER, FOR SOLUTION INTRAMUSCULAR; INTRAVENOUS at 08:11

## 2023-03-10 RX ADMIN — ACETAMINOPHEN 650 MG: 325 TABLET ORAL at 05:23

## 2023-03-10 NOTE — ASSESSMENT & PLAN NOTE
· Appreciate urology input -> status post CBI now clamped w/ near resolution of hematuria  · Outpatient CT revealed bladder mass, highly suspicious for neoplasm -> tentative plan for TURBT later this month w/ intravesical mitomycin instillation   · Will plan to discharge with outpatient voiding trial next week    · See plan for suspected UTI below

## 2023-03-10 NOTE — PROGRESS NOTES
Progress Note - Evgeny Basilio 74 y o  male MRN: [id number]    Unit/Bed#: S -01 Encounter: [id number]      Assessment:  Evgeny Basilio is a 68-year-old male with known bladder mass presenting with gross hematuria and UTI  CBI successful in achieving clear urine  Patient is afebrile, hemodynamically stable without complaints of flank, abdominal or suprapubic pain  Right lower extremity swelling  Fortunately, lower extremity duplex negative for DVT--orthopedic evaluation pending  Plan:  · Discharge at the discretion of the internal medicine service with Morfin in situ  · Continue antibiosis until scheduled TURBT 3/23 with Dr Mj Neely  · Patient will be contacted for catheter removal during the week  · No further  intervention indicated this hospital stay  Subjective:   Denies fever, chills, flank, abdominal or suprapubic pain    Objective:     Vitals: Blood pressure 121/84, pulse 69, temperature 98 1 °F (36 7 °C), resp  rate 17, SpO2 94 %  ,There is no height or weight on file to calculate BMI  Intake/Output Summary (Last 24 hours) at 3/10/2023 1128  Last data filed at 3/10/2023 6759  Gross per 24 hour   Intake --   Output 1650 ml   Net -1650 ml       Physical Exam: General appearance: alert and oriented, in no acute distress, appears stated age, cooperative and no distress  Head: Normocephalic, without obvious abnormality, atraumatic  Neck: no adenopathy, no carotid bruit, no JVD, supple, symmetrical, trachea midline and thyroid not enlarged, symmetric, no tenderness/mass/nodules  Lungs: diminished breath sounds  Heart: regular rate and rhythm, S1, S2 normal, no murmur, click, rub or gallop  Abdomen: soft, non-tender; bowel sounds normal; no masses,  no organomegaly  Extremities: extremities normal, warm and well-perfused; no cyanosis, clubbing, or edema  Pulses: 2+ and symmetric  Neurologic: Grossly normal  Morfin--clear yellow urine  Slight tinge of heme       Invasive Devices Peripheral Intravenous Line  Duration           Peripheral IV 03/09/23 Left;Proximal;Ventral (anterior) Forearm 1 day          Drain  Duration           Continuous Bladder Irrigation Three-way 2 days    Urethral Catheter Latex; Three way 20 Fr  2 days              Lab Results   Component Value Date    WBC 9 15 03/10/2023    HGB 14 4 03/10/2023    HCT 43 3 03/10/2023    MCV 92 03/10/2023     03/10/2023     Lab Results   Component Value Date    SODIUM 136 03/08/2023    K 4 0 03/08/2023     03/08/2023    CO2 23 03/08/2023    BUN 19 03/08/2023    CREATININE 0 88 03/08/2023    GLUC 103 03/08/2023    CALCIUM 8 3 (L) 03/08/2023       Lab, Imaging and other studies: I have personally reviewed pertinent reports

## 2023-03-10 NOTE — ASSESSMENT & PLAN NOTE
· Patient reports on and off issues with right knee/lower leg at various points in his life -> over the last day, reports increased swelling of the right lower leg  · Lower extremity duplex without DVT however with anterior swelling and a Baker's cyst   · Ortho evaluation    · Is unable to walk as a result, will have PT and OT evaluate

## 2023-03-10 NOTE — CONSULTS
Orthopedics   Jean-Claude Linear 74 y o  male MRN: [id number]  Unit/Bed#: AN VASCULAR LAB      Chief Complaint:   right knee pain    HPI:   74 y o male community ambulator complaining of right knee pain  He has history of osgood-schlatter disease, and has struggled with intermittent bilateral knee pain his entire life  He notes that this intermittently flares up, but for the most part he notes that the pain has prohibited him from participating in sporting activities, largely things like side to side motion, including skiing  He has previously had a right sided hip replacement, and bilateral knee "scopes"  He has feelings of "crunching" in his knees, which is not new for him  At present he is admitted with gross hematuria, and is being managed for an acute UTI  Since admission, he is now experiencing acute onset of right knee pain that he has never had  He feels that the right knee is acutely swollen and painful, and that his range of motion is far more limited than it has ever been  He typically is able to bend his knee at least to  degrees when sitting in a chair, and at present he feels that he cannot flex the knee at all  He feels that it is tender to the touch  This feels different to him than normal    His pain is so severe, that he has been unable to stand, walk or transfer to a chair  He denies trauma to the right knee  No twist injuries  He has no history of gout that he is aware of  He does not feel that the left knee is tender, other than his baseline  Orthopedics has been called for further recommendations        Review Of Systems:   · Skin: Normal  · Neuro: See HPI  · Musculoskeletal: See HPI  · 14 point review of systems negative except as stated above     Past Medical History:   Past Medical History:   Diagnosis Date   • Anxiety disorder    • Atherosclerotic heart disease of native coronary artery without angina pectoris    • Benign prostatic hyperplasia without lower urinary tract symptoms • Dyslipidemia    • GERD (gastroesophageal reflux disease)    • Hypertension    • Hyperthyroidism    • Impaired fasting blood sugar    • Kidney stone    • Low back pain    • Osteoarthritis     last assesed 6-6-16   • Other chest pain    • Paresthesia of skin    • Polyneuropathy     last assesed 5-8-17   • Pure hypercholesterolemia    • 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 ([facility] [address] )    • Wears glasses        Past Surgical History:   Past Surgical History:   Procedure Laterality Date   • BACK SURGERY     • CHOLECYSTECTOMY     • COLONOSCOPY  02/06/2019   • DE ARTHRP ACETBLR/PROX FEM PROSTC AGRFT/ALGRFT Right 8/5/2019    Procedure: ARTHROPLASTY HIP TOTAL;  Surgeon: Flo Brantley MD;  Location: [facility];  Service: Orthopedics   • TONSILLECTOMY         Family History:  Family history reviewed and non-contributory  Family History   Problem Relation Age of Onset   • Arthritis Other    • Heart disease Father        Social History:  Social History     Socioeconomic History   • Marital status: /Civil Union     Spouse name: None   • Number of children: None   • Years of education: None   • Highest education level: None   Occupational History   • None   Tobacco Use   • Smoking status: Former   • Smokeless tobacco: Never   Vaping Use   • Vaping Use: Never used   Substance and Sexual Activity   • Alcohol use: Never   • Drug use: Never   • Sexual activity: None   Other Topics Concern   • None   Social History Narrative    Smoking: Non-smoker    As per [employer]     Social Determinants of Health     Financial Resource Strain: Not on file   Food Insecurity: Not on file   Transportation Needs: Not on file   Physical Activity: Not on file   Stress: Not on file   Social Connections: Not on file   Intimate Partner Violence: Not on file   Housing Stability: Not on file       Allergies:   No Known Allergies        Labs:  0   Lab Value Date/Time    HCT 43 3 03/10/2023 0604    HCT 44 4 03/09/2023 0517    HCT 47 6 03/08/2023 0743    HGB 14 4 03/10/2023 0604    HGB 14 5 03/09/2023 0517    HGB 15 8 03/08/2023 0743    INR 1 05 03/08/2023 0743    WBC 9 15 03/10/2023 0604    WBC 8 05 03/09/2023 0517    WBC 9 02 03/08/2023 0743    CRP <3 0 07/08/2019 1014       Meds:    Current Facility-Administered Medications:   •  acetaminophen (TYLENOL) tablet 650 mg, 650 mg, Oral, Q6H PRN, Veleria Elvis, CRNP, 650 mg at 03/10/23 4590  •  ampicillin (OMNIPEN) 2,000 mg in sodium chloride 0 9 % 100 mL IVPB, 2,000 mg, Intravenous, Q6H, Juan Barber MD, Last Rate: 200 mL/hr at 03/10/23 0811, 2,000 mg at 03/10/23 2659  •  atorvastatin (LIPITOR) tablet 10 mg, 10 mg, Oral, HS, Veleria Elvis, CRNP, 10 mg at 03/09/23 2103  •  diazepam (VALIUM) tablet 2 mg, 2 mg, Oral, Q8H PRN, Veleria Elvis, CRNP, 2 mg at 03/09/23 8231  •  levothyroxine tablet 150 mcg, 150 mcg, Oral, Early Morning, Veleria Elvis, CRNP, 150 mcg at 03/10/23 8091  •  oxybutynin (DITROPAN) tablet 5 mg, 5 mg, Oral, TID, Vannessa Peterson PA-C, 5 mg at 03/10/23 4422    Blood Culture:   No results found for: BLOODCX    Wound Culture:   No results found for: WOUNDCULT    Ins and Outs:  I/O last 24 hours:   In: -   Out: 2650 [Urine:2650]          Physical Exam:   /84   Pulse 69   Temp 98 1 °F (36 7 °C)   Resp 17   SpO2 94%   Gen: No acute distress, resting comfortably in bed  HEENT: Eyes clear, moist mucus membranes, hearing intact  Respiratory: No audible wheezing or stridor  Cardiovascular: Well Perfused peripherally, 2+ distal pulse  Abdomen: nondistended, no peritoneal signs  Musculoskeletal: right lower extremity  · Skin intact  · Tender to palpation over lateral and superior knee  · + palpable effusion  · AROM 10-40   · Passive ROM 10-50  · Can perform straight leg raise  · Stable to varus/valgus stress, negative lachmans, posterior draw  · Sensation intact L3-S1  · 5/5 strength to hip flexion/extension, knee flexion/extension, ankle dorsi/plantar flexion, EHL/FHL  · 2+ DP/PT pulse  · Musculature is soft and compressible, no pain with passive stretch  · Leg lengths equal      Radiology:   I personally reviewed the films  X-rays knee: osteoarthritis, without acute osseous abnormality  Procedure- Orthopedics   Maxine Young 74 y o  male MRN: [id number]  Unit/Bed#: AN VASCULAR LAB    Procedure: right knee aspiration    After sterile preparation of the skin overlying the knee local anesthetic was provided with 5cc of 1% lidocaine  An 18 gauge needle was then  inserted via a superior lateral portal   Approx 65cc of yellow fluid was aspirated and sent for gram stain, culture, synovial WBC/RBC, and crystals  Sterile dressing was then applied  Pt tolerated the procedure well and was neurovascularly intact both pre and post procedure  Vikki Smith PA-C      _*_*_*_*_*_*_*_*_*_*_*_*_*_*_*_*_*_*_*_*_*_*_*_*_*_*_*_*_*_*_*_*_*_*_*_*_*_*_*_*_*    Assessment:  74 y o  male with right knee pain and effusion  Plan:   · Weight bearing as tolerated  right lower extremity  · Will follow up on aspirate labs, pending  · PT/OT  · Pain control  · BMI 35 62   moderately obese  Recommend behavior modifications and nutrition  · Dispo: Ortho will follow  · Patients case reviewed and discussed with Dr Adarsh Cole at time of consultation       Vikki Smith PA-C

## 2023-03-10 NOTE — PROGRESS NOTES
[facility]  Progress Note - Courtney Session 1950, 74 y o  male MRN: [id number]  Unit/Bed#: S -01 Encounter: [id number]  Primary Care Provider: Daylin Camara MD   Date and time admitted to hospital: 3/7/2023  6:02 PM    * Gross hematuria  Assessment & Plan  · Appreciate urology input -> status post CBI now clamped w/ near resolution of hematuria  · Outpatient CT revealed bladder mass, highly suspicious for neoplasm -> tentative plan for TURBT later this month w/ intravesical mitomycin instillation   · Will plan to discharge with outpatient voiding trial next week  · See plan for suspected UTI below    Right leg swelling  Assessment & Plan  · Patient reports on and off issues with right knee/lower leg at various points in his life -> over the last day, reports increased swelling of the right lower leg  · Lower extremity duplex without DVT however with anterior swelling and a Baker's cyst   · Ortho evaluation  · Is unable to walk as a result, will have PT and OT evaluate    Bladder mass  Assessment & Plan  · Known bladder mass with plan for tentative TURBT later this month with mitomycin instillation    Suspected UTI  Assessment & Plan  · In the setting of gross hematuria, and urine culture positive for Enterococcus faecalis, urology recommended course of antibiotics -> sensitivities noted and will transition IV Rocephin -> IV Ampicillin  · Of note, similar growth last month    Hyperlipidemia  Assessment & Plan  · Continue statin    Hypothyroidism  Assessment & Plan  · Continue Synthroid        VTE Pharmacologic Prophylaxis: VTE Score: 3 Moderate Risk (Score 3-4) - Pharmacological DVT Prophylaxis Contraindicated  Sequential Compression Devices Ordered  Patient Centered Rounds: I performed bedside rounds with nursing staff today    Discussions with Specialists or Other Care Team Provider: arabella, rn    Education and Discussions with Family / Patient: Patient declined call to   Time Spent for Care: 45 minutes  More than 50% of total time spent on counseling and coordination of care as described above  Current Length of Stay: 3 day(s)  Current Patient Status: Inpatient   Certification Statement: The patient will continue to require additional inpatient hospital stay due to IV abx for Enterococcus UTI and Right knee pain with inability to ambulate pending ortho and PT evals  Discharge Plan: Anticipate discharge in 24-48 hrs to discharge location to be determined pending rehab evaluations  Code Status: Level 1 - Full Code    Subjective:   Patient is doing okay, still with right knee swelling and inability to ambulate  Urine is clear and denies pain in that regard  He is eating okay  Objective:     Vitals:   Temp (24hrs), Av 7 °F (37 1 °C), Min:98 1 °F (36 7 °C), Max:99 2 °F (37 3 °C)    Temp:  [98 1 °F (36 7 °C)-99 2 °F (37 3 °C)] 98 1 °F (36 7 °C)  HR:  [69-94] 69  Resp:  [17-20] 17  BP: (121-148)/(72-90) 121/84  SpO2:  [94 %-96 %] 94 %  There is no height or weight on file to calculate BMI  Input and Output Summary (last 24 hours): Intake/Output Summary (Last 24 hours) at 3/10/2023 1040  Last data filed at 3/10/2023 9755  Gross per 24 hour   Intake --   Output 1650 ml   Net -1650 ml       Physical Exam:   Physical Exam  Vitals and nursing note reviewed  Constitutional:       General: He is not in acute distress  Appearance: Normal appearance  HENT:      Head: Normocephalic  Mouth/Throat:      Mouth: Mucous membranes are moist    Eyes:      Pupils: Pupils are equal, round, and reactive to light  Cardiovascular:      Rate and Rhythm: Normal rate and regular rhythm  Heart sounds: No murmur heard  Pulmonary:      Effort: Pulmonary effort is normal  No respiratory distress  Breath sounds: Normal breath sounds  No wheezing, rhonchi or rales  Abdominal:      General: Bowel sounds are normal  There is no distension  Palpations: Abdomen is soft  Tenderness: There is no abdominal tenderness  There is no guarding  Genitourinary:     Comments: Morfin catheter dark yellow urine  Musculoskeletal:         General: Swelling (R knee, palpable effusion) present  No deformity  Cervical back: Normal range of motion  Right lower leg: No edema  Left lower leg: No edema  Skin:     Capillary Refill: Capillary refill takes less than 2 seconds  Neurological:      General: No focal deficit present  Mental Status: He is alert and oriented to person, place, and time  Mental status is at baseline  Additional Data:     Labs:  Results from last 7 days   Lab Units 03/10/23  0604   WBC Thousand/uL 9 15   HEMOGLOBIN g/dL 14 4   HEMATOCRIT % 43 3   PLATELETS Thousands/uL 161   NEUTROS PCT % 69   LYMPHS PCT % 15   MONOS PCT % 14*   EOS PCT % 0     Results from last 7 days   Lab Units 03/08/23  0743   SODIUM mmol/L 136   POTASSIUM mmol/L 4 0   CHLORIDE mmol/L 104   CO2 mmol/L 23   BUN mg/dL 19   CREATININE mg/dL 0 88   ANION GAP mmol/L 9   CALCIUM mg/dL 8 3*   GLUCOSE RANDOM mg/dL 103     Results from last 7 days   Lab Units 03/08/23  0743   INR  1 05                   Lines/Drains:  Invasive Devices     Peripheral Intravenous Line  Duration           Peripheral IV 03/09/23 Left;Proximal;Ventral (anterior) Forearm 1 day          Drain  Duration           Continuous Bladder Irrigation Three-way 2 days    Urethral Catheter Latex; Three way 20 Fr  2 days              Urinary Catheter:  Goal for removal: N/A- Discharging with Morfin         Imaging: Reviewed radiology reports from this admission including: ultrasound(s)    Recent Cultures (last 7 days):   Results from last 7 days   Lab Units 03/07/23  1825   URINE CULTURE  >100,000 cfu/ml Enterococcus faecalis*       Last 24 Hours Medication List:   Current Facility-Administered Medications   Medication Dose Route Frequency Provider Last Rate   • acetaminophen  650 mg Oral Q6H PRN Willaim Oar, CRNP     • ampicillin  2,000 mg Intravenous Q6H Marck Sommers MD 2,000 mg (03/10/23 7337)   • atorvastatin  10 mg Oral HS Willaim Oar, CRNP     • diazepam  2 mg Oral Q8H PRN Willaim Oar, CRNP     • levothyroxine  150 mcg Oral Early Morning Willaim Oar, CRNP     • lidocaine (PF)  5 mL Infiltration Once Vikki Adams PA-C     • oxybutynin  5 mg Oral TID Paty Perez PA-C          Today, Patient Was Seen By: Abdulaziz Matthew PA-C    **Please Note: This note may have been constructed using a voice recognition system  **

## 2023-03-11 VITALS
OXYGEN SATURATION: 95 % | SYSTOLIC BLOOD PRESSURE: 137 MMHG | DIASTOLIC BLOOD PRESSURE: 81 MMHG | TEMPERATURE: 98 F | RESPIRATION RATE: 20 BRPM | HEART RATE: 80 BPM

## 2023-03-11 LAB
BASOPHILS # BLD AUTO: 0.07 THOUSANDS/ÂΜL (ref 0–0.1)
BASOPHILS NFR BLD AUTO: 1 % (ref 0–1)
CRYSTALS SNV QL MICRO: NORMAL
EOSINOPHIL # BLD AUTO: 0.18 THOUSAND/ÂΜL (ref 0–0.61)
EOSINOPHIL NFR BLD AUTO: 3 % (ref 0–6)
ERYTHROCYTE [DISTWIDTH] IN BLOOD BY AUTOMATED COUNT: 13 % (ref 11.6–15.1)
HCT VFR BLD AUTO: 42.8 % (ref 36.5–49.3)
HGB BLD-MCNC: 14.2 G/DL (ref 12–17)
IMM GRANULOCYTES # BLD AUTO: 0.02 THOUSAND/UL (ref 0–0.2)
IMM GRANULOCYTES NFR BLD AUTO: 0 % (ref 0–2)
LYMPHOCYTES # BLD AUTO: 1.36 THOUSANDS/ÂΜL (ref 0.6–4.47)
LYMPHOCYTES NFR BLD AUTO: 19 % (ref 14–44)
MCH RBC QN AUTO: 30.3 PG (ref 26.8–34.3)
MCHC RBC AUTO-ENTMCNC: 33.2 G/DL (ref 31.4–37.4)
MCV RBC AUTO: 92 FL (ref 82–98)
MONOCYTES # BLD AUTO: 0.79 THOUSAND/ÂΜL (ref 0.17–1.22)
MONOCYTES NFR BLD AUTO: 11 % (ref 4–12)
NEUTROPHILS # BLD AUTO: 4.76 THOUSANDS/ÂΜL (ref 1.85–7.62)
NEUTS SEG NFR BLD AUTO: 66 % (ref 43–75)
NRBC BLD AUTO-RTO: 0 /100 WBCS
PLATELET # BLD AUTO: 173 THOUSANDS/UL (ref 149–390)
PMV BLD AUTO: 10.5 FL (ref 8.9–12.7)
RBC # BLD AUTO: 4.68 MILLION/UL (ref 3.88–5.62)
WBC # BLD AUTO: 7.18 THOUSAND/UL (ref 4.31–10.16)

## 2023-03-11 RX ORDER — AMOXICILLIN 250 MG
1 CAPSULE ORAL 2 TIMES DAILY
Qty: 60 TABLET | Refills: 0 | Status: SHIPPED | OUTPATIENT
Start: 2023-03-11 | End: 2023-04-10

## 2023-03-11 RX ORDER — AMOXICILLIN 250 MG
1 CAPSULE ORAL 2 TIMES DAILY
Status: DISCONTINUED | OUTPATIENT
Start: 2023-03-11 | End: 2023-03-11 | Stop reason: HOSPADM

## 2023-03-11 RX ORDER — NITROFURANTOIN 25; 75 MG/1; MG/1
100 CAPSULE ORAL 2 TIMES DAILY WITH MEALS
Status: DISCONTINUED | OUTPATIENT
Start: 2023-03-11 | End: 2023-03-11 | Stop reason: HOSPADM

## 2023-03-11 RX ORDER — NITROFURANTOIN 25; 75 MG/1; MG/1
100 CAPSULE ORAL 2 TIMES DAILY WITH MEALS
Qty: 5 CAPSULE | Refills: 0 | Status: SHIPPED | OUTPATIENT
Start: 2023-03-11 | End: 2023-03-14

## 2023-03-11 RX ORDER — OXYBUTYNIN CHLORIDE 5 MG/1
5 TABLET ORAL 3 TIMES DAILY
Qty: 42 TABLET | Refills: 0 | Status: SHIPPED | OUTPATIENT
Start: 2023-03-11 | End: 2023-03-25

## 2023-03-11 RX ORDER — NITROFURANTOIN 25; 75 MG/1; MG/1
100 CAPSULE ORAL 2 TIMES DAILY WITH MEALS
Status: DISCONTINUED | OUTPATIENT
Start: 2023-03-11 | End: 2023-03-11

## 2023-03-11 RX ADMIN — SENNOSIDES AND DOCUSATE SODIUM 1 TABLET: 50; 8.6 TABLET ORAL at 08:48

## 2023-03-11 RX ADMIN — NITROFURANTOIN (MONOHYDRATE/MACROCRYSTALS) 100 MG: 75; 25 CAPSULE ORAL at 08:48

## 2023-03-11 RX ADMIN — LEVOTHYROXINE SODIUM 150 MCG: 150 TABLET ORAL at 05:16

## 2023-03-11 RX ADMIN — ACETAMINOPHEN 650 MG: 325 TABLET ORAL at 08:50

## 2023-03-11 RX ADMIN — AMPICILLIN SODIUM 2000 MG: 2 INJECTION, POWDER, FOR SOLUTION INTRAMUSCULAR; INTRAVENOUS at 00:56

## 2023-03-11 RX ADMIN — OXYBUTYNIN CHLORIDE 5 MG: 5 TABLET ORAL at 08:48

## 2023-03-11 NOTE — ASSESSMENT & PLAN NOTE
· Patient reports on and off issues with right knee/lower leg at various points in his life -> over the last day, reports increased swelling of the right lower leg  · Lower extremity duplex without DVT however with anterior swelling and a Baker's cyst   · Drained by ortho at bedside 3/10, follow up on fluid culture  · Ortho evaluation appreciated  · PT/OT

## 2023-03-11 NOTE — ASSESSMENT & PLAN NOTE
· In the setting of gross hematuria, and urine culture positive for Enterococcus faecalis, urology recommended course of antibiotics -> sensitivities noted and transitioned from IV Rocephin -> IV Ampicillin  · On dc will need 3 days of Macrobid  · Of note, similar growth last month responded to [employer]

## 2023-03-11 NOTE — DISCHARGE SUMMARY
[facility]  Discharge- [address] [facility] 1950, 74 y o  male MRN: [id number]  Unit/Bed#: S -01 Encounter: [id number]  Primary Care Provider: Lizy Joseph MD   Date and time admitted to hospital: 3/7/2023  6:02 PM    * Gross hematuria  Assessment & Plan  · Appreciate urology input -> status post CBI now clamped w/ near resolution of hematuria  · Outpatient CT revealed bladder mass, highly suspicious for neoplasm -> tentative plan for TURBT later this month w/ intravesical mitomycin instillation   · Will plan to discharge with outpatient voiding trial next week    · See plan for suspected UTI below    Right leg swelling  Assessment & Plan  · Patient reports on and off issues with right knee/lower leg at various points in his life -> over the last day, reports increased swelling of the right lower leg  · Lower extremity duplex without DVT however with anterior swelling and a Baker's cyst   · Drained by ortho at bedside 3/10, follow up on fluid culture  · Ortho evaluation appreciated  · PT/OT    Bladder mass  Assessment & Plan  · Known bladder mass with plan for tentative TURBT later this month with mitomycin instillation    Suspected UTI  Assessment & Plan  · In the setting of gross hematuria, and urine culture positive for Enterococcus faecalis, urology recommended course of antibiotics -> sensitivities noted and transitioned from IV Rocephin -> IV Ampicillin  · On dc will need 3 days of Macrobid  · Of note, similar growth last month responded to Macrobid     Hyperlipidemia  Assessment & Plan  · Continue statin    Hypothyroidism  Assessment & Plan  · Continue Synthroid    Medical Problems     Resolved Problems  Date Reviewed: 3/10/2023   None       Discharging Physician / Practitioner: Marysol Magdaleno PA-C  PCP: Lizy Joseph MD  Admission Date:   Admission Orders (From admission, onward)     Ordered        03/07/23 2223  INPATIENT ADMISSION  Once Discharge Date: 03/11/23    Consultations During Hospital Stay:  · Urology     Procedures Performed:   · None     Significant Findings / Test Results:   · UC growing E  Faecalis  · CT a/p "Extensive thickening of the bladder wall and adjacent fat stranding   Some intraluminal gas is present as well as gas density extending into the superior bladder wall most likely related to Morfin placement   A small diverticulum may be evident   Findings    are suspicious for cystitis      Patient's known bladder mass is not well seen on this examination  "    Incidental Findings:   · None      Test Results Pending at Discharge (will require follow up):   · Knee fluid culture     Outpatient Tests Requested:  · Outpatient voiding trial 1 week    Complications:  no    Reason for Admission: hematuria    Hospital Course:   Martine Bentley is a 74 y o  male patient who originally presented to the hospital on 3/7/2023 due to hematuria  Patient had gross hematuria, was seen in consultation by urology who recommended CBI placement  This was likely secondary to UTI and he was placed on IV antibiotics  Final urine culture grew enterococcus  Patient to be sent home with total 5 days of antibiotics with Macrobid  He also had right knee swelling during his stay, this was drained by orthopedic surgery and sent for culture  He was seen by physical Occupational Therapy who recommended outpatient PT, this was placed prior to discharge  He will follow-up in 1 week with Morfin catheter in place for voiding trial as an outpatient with urology  Please see above list of diagnoses and related plan for additional information  Condition at Discharge: stable    Discharge Day Visit / Exam:   Subjective: Patient is feeling much better  Urine with no blood  His knee feels better after being drained with orthopedics  Awaiting PT and OT evaluation prior to discharge    Vitals: Blood Pressure: 137/81 (03/11/23 0745)  Pulse: 80 (03/11/23 0745)  Temperature: 98 °F (36 7 °C) (03/11/23 0745)  Temp Source: Oral (03/09/23 2014)  Respirations: 20 (03/11/23 0745)  SpO2: 95 % (03/11/23 0745)  Exam:   Physical Exam  Vitals and nursing note reviewed  Constitutional:       General: He is not in acute distress  Appearance: Normal appearance  HENT:      Head: Normocephalic  Mouth/Throat:      Mouth: Mucous membranes are moist    Eyes:      Pupils: Pupils are equal, round, and reactive to light  Cardiovascular:      Rate and Rhythm: Normal rate and regular rhythm  Heart sounds: No murmur heard  Pulmonary:      Effort: Pulmonary effort is normal  No respiratory distress  Breath sounds: Normal breath sounds  No wheezing, rhonchi or rales  Abdominal:      General: Bowel sounds are normal  There is no distension  Palpations: Abdomen is soft  Tenderness: There is no abdominal tenderness  There is no guarding  Genitourinary:     Comments: Morfin catheter dark yellow urine  Musculoskeletal:         General: No swelling or deformity  Cervical back: Normal range of motion  Right lower leg: No edema  Left lower leg: No edema  Skin:     Capillary Refill: Capillary refill takes less than 2 seconds  Neurological:      General: No focal deficit present  Mental Status: He is alert and oriented to person, place, and time  Mental status is at baseline  Discussion with Family: Patient declined call to   Discharge instructions/Information to patient and family:   See after visit summary for information provided to patient and family  Provisions for Follow-Up Care:  See after visit summary for information related to follow-up care and any pertinent home health orders  Disposition:   Home    Planned Readmission: no     Discharge Statement:  I spent 45 minutes discharging the patient  This time was spent on the day of discharge   I had direct contact with the patient on the day of discharge  Greater than 50% of the total time was spent examining patient, answering all patient questions, arranging and discussing plan of care with patient as well as directly providing post-discharge instructions  Additional time then spent on discharge activities  Discharge Medications:  See after visit summary for reconciled discharge medications provided to patient and/or family        **Please Note: This note may have been constructed using a voice recognition system**

## 2023-03-11 NOTE — PHYSICAL THERAPY NOTE
PHYSICAL THERAPY EVALUATION NOTE          Patient Name: Iliana GARCIA Date: 3/11/2023        AGE:   74 y o  Mrn:   [id number]  ADMIT DX:  Hematuria, unspecified type [R31 [id number]]    Past Medical History:  Past Medical History:   Diagnosis Date    Anxiety disorder     Atherosclerotic heart disease of native coronary artery without angina pectoris     Benign prostatic hyperplasia without lower urinary tract symptoms     Dyslipidemia     GERD (gastroesophageal reflux disease)     Hypertension     Hyperthyroidism     Impaired fasting blood sugar     Kidney stone     Low back pain     Osteoarthritis     last assesed 16    Other chest pain     Paresthesia of skin     Polyneuropathy     last assesed 17    Pure hypercholesterolemia     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 ([address] )     Wears glasses        Past Surgical History:  Past Surgical History:   Procedure Laterality Date    BACK SURGERY      CHOLECYSTECTOMY      COLONOSCOPY  2019    [place] ARTHRP ACETBLR/PROX FEM PROSTC AGRFT/ALGRFT Right 2019    Procedure: ARTHROPLASTY HIP TOTAL;  Surgeon: Fidencio Kulkarni MD;  [facility];  Service: Orthopedics    TONSILLECTOMY       Length Of Stay: 4        PHYSICAL THERAPY EVALUATION:    Patient's identity confirmed via 2 patient identifiers (full name and ) at start of session       23 1410   PT Last Visit   PT Visit Date 23   Note Type   Note type Evaluation   Pain Assessment   Pain Assessment Tool 0-10   Pain Score No Pain  ("just stiffness")   Effect of Pain on Daily Activities limits ease of mobility   Restrictions/Precautions   Weight Bearing Precautions Per Order Yes   RLE Weight Bearing Per Order WBAT   Braces or Orthoses Knee brace  (R knee sleeve, donned prior to ambulation)   Other Precautions WBS;Pain   Home Living   Type of [facility] [address] One level;Performs ADLs on one level; Able to live on main level with bedroom/bathroom;Stairs to enter without rails  (1 RUDY)   Bathroom Shower/Tub Walk-in shower   Bathroom Toilet Raised   Bathroom Equipment Grab bars in shower; Shower chair   Bathroom Accessibility Accessible   Home Equipment Walker;Cane  (RW)   Additional Comments Pt lives w/ his wife, supportive son lives approx 5min min away   Prior Function   Level of [place] Independent with functional mobility; Independent with ADLs; Needs assistance with IADLS  (shares IADLs w/ wife)   Lives With Spouse   Receives Help From Family   IADLs Independent with driving; Independent with meal prep; Independent with medication management   Falls in the last 6 months 1 to 4  (1 per pt)   Comments At baseline, pt amb ind w/o AD, performs ADLs ind, shares IADLs w/ wife   General   Family/Caregiver Present Yes  (pt's wife)   Cognition   Overall Cognitive Status WFL   Arousal/Participation Alert   Orientation Level Oriented X4   Memory Within functional limits   Following Commands Follows multistep commands without difficulty   Comments Pt ID via name and ; pt agreeable and motivated for PT eval and mobility   RLE Assessment   RLE Assessment X   Strength RLE   R Knee Flexion 3/5   R Knee Extension 3-/5   R Ankle Dorsiflexion 4/5   R Ankle Plantar Flexion 4/5   LLE Assessment   LLE Assessment X   Strength LLE   L Knee Flexion 4/5   L Knee Extension 4/5   L Ankle Dorsiflexion 4/5   L Ankle Plantar Flexion 4/5   Bed Mobility   Supine to Sit Unable to assess   Sit to Supine Unable to assess   Additional Comments pt OOB in recliner chair upon arrival and returned to chair at end of session   Transfers   Sit to Stand 5  Supervision   Additional items Assist x 1; Armrests   Stand to Sit 5  Supervision   Additional items Assist x 1; Armrests   Ambulation/Elevation   Gait pattern Improper Weight shift;Decreased foot clearance; Short stride;Decreased hip extension;Decreased heel strike  (R knee flexed)   Gait Assistance 5 Supervision   Additional items Assist x 1;Verbal cues   Assistive Device Rolling walker   Distance 180'   Stair Management Assistance Not tested  (pt declined trial, reports familiar w/ stair negotiation technique from PEGGY, PT provided w/ further technique education)   Ambulation/Elevation Additional Comments Pt reports feeling more steady/secure while ambulating w/ RW compared to w/o AD   Balance   Static Sitting Good   Dynamic Sitting Fair +   Static Standing Fair   Dynamic Standing [address], [address] -  (w/ RW)   Activity Tolerance   Activity Tolerance Patient tolerated treatment well   Medical Staff Made Aware Care coordination w/ OT Aleida Anderson due to pt's medical complexity, regression from mobility baseline, and cognitive/safety awareness deficits requiring two skilled clinicians, individual items assessed and goals addressed; ARI Morrison   Nurse Made Aware RN Winter   Assessment   Prognosis Good   Problem List Decreased strength;Decreased range of motion; Impaired balance;Decreased mobility   Assessment Virgilio Ferreira is a 74 y o  Male who [facility] on 3/7/23 due to blood in urine and diagnosis of gross hematuria  Orders for PT eval and treat received  Comorbidities affecting pt at time of evaluation include: Osgood-Schlatter's disease of both knees, h/o R and L PEGGY, osteoarthritis of both knees, bladder mass  Personal factors affecting DC include: ambulating w/ assistive device and positive fall history  At baseline, pt mobilizes independently w/ no AD, and w/ 1 fall(s) in the previous 6 months  Upon evaluation, pt presents w/ the following deficits: weakness, impaired balance and gait deviations  Pt currently requires supervision for transfers and supervision w/ RW for ambulation   Pt's clinical presentation is unstable/unpredictable due to need for assist w/ all phases of mobility when usually mobilizing independently, need for input for mobility technique, recent drastic decline in mobility compared to baseline and recent history of falls  From a PT/mobility standpoint, given the above findings, DC recommendation is: Home w/ OPPT  During current admission, pt will benefit from continued skilled inpatient PT in the acute care setting in order to address the above deficits and to maximize function and mobility prior to DC from acute care  Goals   Patient Goals to go home, to get his surgery   STG Expiration Date 03/21/23   Short Term Goal #1 Pt will: perform bed mobility w/ mod I to decrease pt's burden of care and increase pt's independence w/ repositioning in bed; perform transfers w/ mod I to increase pt's OOB mobility; ambulate at least 350' w/ LRAD and mod I to increase pt's ambulatory endurance/tolerance; negotiate 1 stair(s) w/ UE support and mod I to facilitate pt returning to previous living environment; increase all balance ratings by at least 1 grade to decrease pt's risk of falls   PT Treatment Day 0   Plan   Treatment/Interventions Functional transfer training;LE strengthening/ROM; Elevations; Therapeutic exercise; Endurance training;Patient/family training;Equipment eval/education; Bed mobility;Gait training; Compensatory technique education   PT Frequency 2-3x/wk   Recommendation   PT Discharge Recommendation Home with outpatient rehabilitation   Equipment Recommended [facility] Recommended Wheeled walker   Change/add to [place]? No   AM-PAC Basic Mobility Inpatient   Turning in Flat Bed Without Bedrails 3   Lying on Back to Sitting on Edge of Flat Bed Without Bedrails 3   Moving Bed to Chair 3   Standing Up From Chair Using Arms 3   Walk in Room 3   Climb 3-5 Stairs With Railing 3   Basic Mobility Inpatient Raw Score 18   Basic Mobility Standardized Score 41 05   Highest Level Of Mobility   JH-HLM Goal 6: Walk 10 steps or more   JH-HLM Achieved 7: Walk 25 feet or more   End of Consult   Patient Position at End of Consult All needs within reach; Bedside chair  (pt's wife remaining present in room)       The patient's AM-PAC Basic Mobility Inpatient Short Form Raw Score is 18  A Raw score of greater than 16 suggests the patient may benefit from discharge to home  Please also refer to the recommendation of the Physical Therapist for safe discharge planning      Pt will benefit from skilled inpatient PT during this admission in order to facilitate progress towards goals and to maximize functional independence prior to [address] rec: Kathrin Jensen, PT, DPT  03/11/23

## 2023-03-11 NOTE — PROGRESS NOTES
Progress Note - Orthopedics   Andres Valerio 74 y o  male MRN: [id number]  Unit/Bed#: S -01      Subjective:    74 y o male admitted to the hospital for gross hematuria  Patient notes mild pain relief in his right knee he is able to move his knee and ambulate on his right lower extremity  Patient did have aspiration of his right knee sent for cultures  Patient does note having a chronic history of right knee pain was treated by Dr Memo Romano who recommended right total knee arthroplasty however the patient not wish to pursue surgical intervention at this time  No acute events, no new complaints  Patient doing well  Pain well controlled  Denies fevers, chills, CP, SOB, N/V, numbness or tingling  Patient reports no issues with urination or bowel movements  Labs:  0   Lab Value Date/Time    HCT 42 8 03/11/2023 0431    HCT 43 3 03/10/2023 0604    HCT 44 4 03/09/2023 0517    HGB 14 2 03/11/2023 0431    HGB 14 4 03/10/2023 0604    HGB 14 5 03/09/2023 0517    INR 1 05 03/08/2023 0743    WBC 7 18 03/11/2023 0431    WBC 9 15 03/10/2023 0604    WBC 8 05 03/09/2023 0517    CRP <3 0 07/08/2019 1014       Meds:    Current Facility-Administered Medications:   •  acetaminophen (TYLENOL) tablet 650 mg, 650 mg, Oral, Q6H PRN, DAYAMI Morataya, 650 mg at 03/10/23 1930  •  ampicillin (OMNIPEN) 2,000 mg in sodium chloride 0 9 % 100 mL IVPB, 2,000 mg, Intravenous, Q6H, Kingsley Kaiser MD, Last Rate: 200 mL/hr at 03/11/23 0056, 2,000 mg at 03/11/23 0056  •  atorvastatin (LIPITOR) tablet 10 mg, 10 mg, Oral, HS, DAYAMI Morataya, 10 mg at 03/10/23 2104  •  diazepam (VALIUM) tablet 2 mg, 2 mg, Oral, Q8H PRN, DAYAMI Morataya, 2 mg at 03/09/23 9961  •  levothyroxine tablet 150 mcg, 150 mcg, Oral, Early Morning, DAYAMI Morataya, 150 mcg at 03/11/23 1796  •  oxybutynin (DITROPAN) tablet 5 mg, 5 mg, Oral, TID, Juliann Paget, PA-C, 5 mg at 03/10/23 2104    Blood Culture:   No results found for: BLOODCX    Wound Culture:    No results found for: WOUNDCULT    Ins and Outs:  I/O last 24 hours: In: -   Out: 1800 [Urine:1800]          Physical:  Vitals:    03/10/23 2254   BP: 115/78   Pulse:    Resp: 17   Temp: 98 2 °F (36 8 °C)   SpO2:      Musculoskeletal: right Lower Extremity    · Skin intact  No erythema or ecchymosis  · Minimal tenderness to palpation on the medial lateral joint lines 15 degrees all full extension and flexion to 115 degrees actively without pain no micromotion tenderness no pain palpation patellar tendon quadriceps tendon stable to varus and valgus stress active ankle dorsi and flexion  · Sensation intact to saphenous, sural, tibial, superficial peroneal nerve, and deep peroneal  · Motor intact to +FHL/EHL, +ankle dorsi/plantar flexion  · 2+ DP pulse, symmetric bilaterally  · Digits warm and well perfused  · Capillary refill < 2 seconds    Assessment:    72 y o male right knee pain improving symptoms  Patient doing well       Plan:  · WBAT RLE  · PT/OT  · Pain control  · DVT ppx Per primary team  · Medical co-morbidities include hypothyroidism gross hematuria hyperlipidemia, which are being managed per primary team  · Dispo: Ortho will follow   · Follow-up cultures    Andrew Rubalcava PA-C

## 2023-03-11 NOTE — OCCUPATIONAL THERAPY NOTE
Occupational Therapy Evaluation      [facility]    3/11/2023    Patient Active Problem List   Diagnosis    Primary osteoarthritis of both knees    Osgood-Schlatter's disease of both knees    Pain in both knees    Status post right hip replacement    Status post total hip replacement, left    Acute blood loss anemia    Impaired mobility and ADLs    Hypothyroidism    Difficulty sleeping    Aftercare following right hip joint replacement surgery    Obesity, morbid (HCC)    Acute leg pain, left    Gross hematuria    Hyperlipidemia    Suspected UTI    Bladder mass    Right leg swelling       Past Medical History:   Diagnosis Date    Anxiety disorder     Atherosclerotic heart disease of native coronary artery without angina pectoris     Benign prostatic hyperplasia without lower urinary tract symptoms     Dyslipidemia     GERD (gastroesophageal reflux disease)     Hypertension     Hyperthyroidism     Impaired fasting blood sugar     Kidney stone     Low back pain     Osteoarthritis     last assesed 6-6-16    Other chest pain     Paresthesia of skin     Polyneuropathy     last assesed 5-8-17    Pure hypercholesterolemia     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 ([address] )     Wears glasses        Past Surgical History:   Procedure Laterality Date    BACK SURGERY      CHOLECYSTECTOMY      COLONOSCOPY  02/06/2019    [place] ARTHRP ACETBLR/PROX FEM PROSTC AGRFT/ALGRFT Right 8/5/2019    Procedure: ARTHROPLASTY HIP TOTAL;  Surgeon: Juany Nguyễn MD;  [facility];  Service: Orthopedics    TONSILLECTOMY          03/11/23 1348   OT Last Visit   OT Visit Date 03/11/23   Note Type   Note type Evaluation   Pain Assessment   Pain Assessment Tool 0-10   Pain Score No Pain  ("just stiffness")   Effect of Pain on Daily Activities limits mobility tolerance   Restrictions/Precautions   Weight Bearing Precautions Per Order Yes   RLE Weight Bearing Per Order WBAT   Braces or Orthoses Knee brace  (R knee sleeve, donned prior to ambulation)   Other Precautions WBS;Pain  (lopez)   Home Living   Type of [address] One level;Performs ADLs on one level; Able to live on main level with bedroom/bathroom;Stairs to enter with rails  (1 RUDY)   Bathroom Shower/Tub Walk-in shower   Bathroom Toilet Raised   Bathroom Equipment Grab bars in shower; Shower chair   Bathroom Accessibility Accessible   Home Equipment Walker;Cane  (RW; no AD used at baseline)   Prior Function   Level of [place] Independent with ADLs; Independent with functional mobility; Needs assistance with IADLS  (shares IADL c wife)   Lives With Spouse   Receives Help From Family  (3 children, 1 local (lives 5 min away))   IADLs Independent with driving; Independent with meal prep; Independent with medication management   Falls in the last 6 months 1 to 4  (1 pr pt)   Vocational Retired   Comments 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   General   Additional Pertinent History Pt admitted d/t gross hematuria  , suspected UTI  known bladder mass  C/O of R knee pain, known OA and osgood-schlatters in B/L knees  (R>L )  S/p bedside aspiration yesterday   Family/Caregiver Present Yes  (spouse)   ADL   Eating Assistance 7  Independent   Grooming Assistance 7  Independent   UB Bathing Assistance 6  Modified Independent   LB Bathing Assistance 5  Supervision/Setup   UB Dressing Assistance 6  Modified independent   [address] 5  Supervision/Setup   Toileting Assistance  5  Supervision/Setup   Functional Assistance 5  Supervision/Setup   Additional Comments Pt limited by decreased stability in RLE, decreased balance     Bed Mobility   Supine to Sit Unable to assess   Sit to Supine Unable to assess   Additional Comments Pt seated OOB in recliner upon arrival/end of session   Transfers   Sit to Stand 5  Supervision   Additional items Assist x 1; Armrests   Stand to Sit 5  Supervision   Additional items Assist x 1; Armrests   Additional Comments no AD used during transfer   Functional Mobility   Functional Mobility 5  Supervision   Additional Comments Functional mobility household distance within hallway  no LOB/  reports decreased stability in R LE   Additional items Rolling walker  (not used at baseline)   Balance   Static Sitting Good   Dynamic Sitting Fair +   Static Standing Fair   Dynamic Standing Fair -   Activity Tolerance   Activity Tolerance Patient tolerated treatment well   Medical Staff Made Aware ANGELINA Sommers Daily  Care coordination c PT ayden   Nurse Made Aware FAIZAN Max   RUE Assessment   RUE Assessment WNL  (MMT 5/5 throughout)   LUE Assessment   LUE Assessment WNL  (MMT 5/5 throughout)   Hand Function   Gross Motor Coordination Functional   Fine Motor Coordination Functional   Sensation   Light Touch No apparent deficits   Vision-Basic Assessment   Current Vision Wears glasses all the time   Cognition   Overall Cognitive Status [facility]   Arousal/Participation Alert; Cooperative   Attention Within functional limits   Orientation Level Oriented X4   Memory Within functional limits   Following Commands Follows multistep commands without difficulty   Comments Pt agreeable OT session, good insight to deficits and safety awareness   Assessment   Limitation Decreased ADL status; Decreased endurance;Decreased self-care trans;Decreased high-level ADLs   Prognosis Good   Assessment Patient is a 74 y o  male seen for OT evaluation at [facility] following admission on 3/7/2023  s/p Gross hematuria  Please see above information for full comorbidities impacting functional performance  Patient presents with active orders for OT eval and treat and Up with assistance    At baseline, is (I) with ADLs, no AD Upon initial evaluation, patient is independent for UB ADLs, mod (I) for LB ADLs, and supervision for transfers and functional mobility household distance with RW  Based on functional eval, pt presents with intact  attention, intact  safety awareness, intact  problem solving skills, and intact   memory  Occupational performance is affected by the following deficits: acute change in mobility status , decreased activity tolerance  and (+) pain  Based on these findings, functional performance deficits, and medical complexity pt has been identified as a moderate complexity evaluation  Patient would benefit from 1x f/u of OT services within the acute care setting to maximize level of functional independence in the following occupational areas dressing , functional mobility, transfer to all surfaces, household management and fall prevention   From OT standpoint, recommendation at time of D/C would be return to previous environment with no rehabilitation needs  Goals   Patient Goals to go home, to get his surgery  Agreeable to OP PT   Plan   Treatment Interventions Functional transfer training; Endurance training;Equipment evaluation/education; Compensatory technique education   Goal Expiration Date 03/21/23   OT Treatment Day 0   OT Frequency 1-2x/wk   Recommendation   OT Discharge Recommendation No rehabilitation needs   Additional Comments  The patient's raw score on the AM-PAC Daily Activity Inpatient Short Form is 21  A raw score of less than 19 suggests the patient may benefit from discharge to post-acute rehabilitation services  Please refer to the recommendation of the Occupational Therapist for safe discharge planning     AM-PAC Daily Activity Inpatient   Lower Body Dressing 3   Bathing 3   Toileting 3   Upper Body Dressing 4   Grooming 4   Eating 4   Daily Activity Raw Score 21   Daily Activity Standardized Score (Calc for Raw Score >=11) 44 27   AM-[facility] Applied Cognition Inpatient Following a Speech/Presentation 4   Understanding Ordinary Conversation 4   Taking Medications 4   Remembering Where Things Are Placed or Put Away 4   Remembering List of 4-5 Errands 4   Taking Care of Complicated Tasks 4   Applied Cognition Raw Score 24   Applied Cognition Standardized Score 62 21   End of Consult   Education Provided Yes   Patient Position at End of Consult Bed/Chair alarm activated; All needs within reach; Seated edge of bed   Nurse Communication Nurse aware of consult     Pt will complete LB ADLs Mod independent   with use of LHAE as needed for increased ADL independence within 10 days  Pt will complete toileting Mod independent   with use of DME for increased ADL independence within 10 days  Pt will demonstrate proper body mechanics to complete self-care transfers and functional mobility with Mod independent  and use of AD PRN for increased safety and functional independence within 10 days  Pt will demonstrate standing tolerance of 5 min with Mod independent  and use of AD PRN for increased activity tolerance during ADL/IADL tasks within 10 days  Pt will demonstrate proper body mechanics and fall prevention strategies during 100% of tx sessions for increased safety awareness during ADL/IADLs    Pt will demonstrate OOB sitting tolerance of 2-4 hr/day for increased activity tolerance and engagement in self care tasks within 10 days     Fahad Chen

## 2023-03-11 NOTE — PLAN OF CARE
Problem: PHYSICAL THERAPY ADULT  Goal: Performs mobility at highest level of function for planned discharge setting  See evaluation for individualized goals  Description: Treatment/Interventions: Functional transfer training, LE strengthening/ROM, Elevations, Therapeutic exercise, Endurance training, Patient/family training, Equipment eval/education, Bed mobility, Gait training, Compensatory technique education  Equipment Recommended: Frantz Gaitan       See flowsheet documentation for full assessment, interventions and recommendations  3/11/2023 1556 by Carmen Luna PT  Note: Prognosis: Good  Problem List: Decreased strength, Decreased range of motion, Impaired balance, Decreased mobility  Assessment: Tarsha Morrison is a 74 y o  Male who [facility] on 3/7/23 due to blood in urine and diagnosis of gross hematuria  Orders for PT eval and treat received  Comorbidities affecting pt at time of evaluation include: Osgood-Schlatter's disease of both knees, h/o R and L PEGGY, osteoarthritis of both knees, bladder mass  Personal factors affecting DC include: ambulating w/ assistive device and positive fall history  At baseline, pt mobilizes independently w/ no AD, and w/ 1 fall(s) in the previous 6 months  Upon evaluation, pt presents w/ the following deficits: weakness, impaired balance and gait deviations  Pt currently requires supervision for transfers and supervision w/ RW for ambulation  Pt's clinical presentation is unstable/unpredictable due to need for assist w/ all phases of mobility when usually mobilizing independently, need for input for mobility technique, recent drastic decline in mobility compared to baseline and recent history of falls  From a PT/mobility standpoint, given the above findings, DC recommendation is: Home w/ OPPT   During current admission, pt will benefit from continued skilled inpatient PT in the acute care setting in order to address the above deficits and to maximize function and mobility prior to DC from acute care  PT Discharge Recommendation: Home with outpatient rehabilitation    See flowsheet documentation for full assessment

## 2023-03-13 ENCOUNTER — TRANSITIONAL CARE MANAGEMENT (OUTPATIENT)
Dept: INTERNAL MEDICINE CLINIC | Facility: OTHER | Age: 73
End: 2023-03-13

## 2023-03-13 ENCOUNTER — TELEPHONE (OUTPATIENT)
Dept: OTHER | Facility: OTHER | Age: 73
End: 2023-03-13

## 2023-03-13 ENCOUNTER — TELEMEDICINE (OUTPATIENT)
Dept: INTERNAL MEDICINE CLINIC | Facility: CLINIC | Age: 73
End: 2023-03-13

## 2023-03-13 VITALS — HEIGHT: 73 IN | WEIGHT: 270 LBS | BODY MASS INDEX: 35.78 KG/M2

## 2023-03-13 DIAGNOSIS — R31.9 HEMATURIA: Primary | ICD-10-CM

## 2023-03-13 LAB
BACTERIA SPEC BFLD CULT: NO GROWTH
GRAM STN SPEC: NORMAL

## 2023-03-13 NOTE — PROGRESS NOTES
Assessment & Plan     1  Hematuria         Subjective     Transitional Care Management Review:   Jadon Castrejon is a 74 y o  male here for TCM follow up  During the TCM phone call patient stated:  TCM Call     Date and time call was made  3/13/2023  9:43 AM    Hospital care reviewed  Records reviewed    Patient was hospitialized at  [address] [place] [address]    Date of Admission  03/07/23    Date of discharge  03/11/23    Diagnosis  gross hematruia    Disposition  Home    Current Symptoms  Constipation      TCM Call     Post hospital issues  None    Scheduled for follow up? Yes    Patients specialists  Urologist    Did you obtain your prescribed medications  Yes    I have advised the patient to call PCP with any new or worsening symptoms  Solomon Galan CMA        HPI  Review of Systems Positive  For  Hematuria  And  dysuria    Objective     Ht 6' 1" (1 854 m)   Wt 122 kg (270 lb)   BMI 35 62 kg/m²      Physical Exam  Virtual    Visit    To  Discuss  The  TCM  After  Hospitalization  For  Hematuria  Due  To  A   Bladder  Polyp  He is  Doing  Well      A  Cystoscopy  And  Removal  Of  The polyp  Is  Planned for  Next  Week  Fup  With  Urologist  Next  Week        Yoselin Kiran MD,FACP    Yoselin Kiran MD

## 2023-03-13 NOTE — TELEPHONE ENCOUNTER
Patient would like to know when he should have his catheter bag replaced/removed  Patient stated he had the catheter since 3/7/2023  Please call back to discuss

## 2023-03-13 NOTE — TELEPHONE ENCOUNTER
Pt agreed to void trial on 3/15 at 9-3pm with nurse  PT wife will be stopping in the office today to  overnight bag

## 2023-03-14 NOTE — PRE-PROCEDURE INSTRUCTIONS
Pre-Surgery Instructions:   Medication Instructions   • atorvastatin (LIPITOR) 10 mg tablet Take day of surgery  • levothyroxine 150 mcg tablet Take day of surgery  • oxybutynin (DITROPAN) 5 mg tablet Hold day of surgery  • senna-docusate sodium (SENOKOT S) 8 6-50 mg per tablet Hold day of surgery  Pt denies fever, sob, sore throat and cough  Pt verbalized understanding of shower and med instructions  Pt instructed to stop nsaids and supplements one week prior to surgery

## 2023-03-15 ENCOUNTER — PROCEDURE VISIT (OUTPATIENT)
Dept: UROLOGY | Facility: AMBULATORY SURGERY CENTER | Age: 73
End: 2023-03-15

## 2023-03-15 VITALS
DIASTOLIC BLOOD PRESSURE: 70 MMHG | HEART RATE: 74 BPM | HEIGHT: 73 IN | WEIGHT: 272.6 LBS | BODY MASS INDEX: 36.13 KG/M2 | SYSTOLIC BLOOD PRESSURE: 136 MMHG

## 2023-03-15 DIAGNOSIS — N13.8 BPH WITH OBSTRUCTION/LOWER URINARY TRACT SYMPTOMS: Primary | ICD-10-CM

## 2023-03-15 DIAGNOSIS — N40.1 BPH WITH OBSTRUCTION/LOWER URINARY TRACT SYMPTOMS: Primary | ICD-10-CM

## 2023-03-15 LAB — POST-VOID RESIDUAL VOLUME, ML POC: 197 ML

## 2023-03-15 NOTE — PROGRESS NOTES
3/15/2023    [facility]  1950  [id number]    Diagnosis  Chief Complaint    Void trial         Patient presents for void trial managed by Dr Maria Del Carmen Rosas  Follow up as scheduled for surgery  Educational handout provided and reviewed of s/s he may experience after catheter removal   ER precautions reviewed  Advised to contact the office with any questions or concerns in the meantime  Procedure Morfin removal/voiding trial    Morfin catheter removed after deflation of an intact balloon  Patient tolerated well  Encouraged patient to hydrate well and return this afternoon for post void residual   he knows he may return early if uncomfortable and unable to urinate  Patient agrees to this plan  Patient returned this afternoon  Patient states able to void  Patient voided while in office  Bladder ultrasound performed and PVR measured 197 ml  Advised AP of results of bladder scan  Advised patient to monitor his output and symptoms  Advised patient if any difficulty with urination, patient should go to the ER for evaluation  Advised if he is having issues during the day, to contact the office and patient can come into one of the offices for a PVR  Patient is understanding       Recent Results (from the past 4 hour(s))   POCT Measure PVR    Collection Time: 03/15/23  3:54 PM   Result Value Ref Range    POST-VOID RESIDUAL VOLUME, ML  mL       Vitals:    03/15/23 0853   BP: 136/70   BP Location: Left arm   Patient Position: Sitting   Cuff Size: Adult   Pulse: 74   Weight: 124 kg (272 lb 9 6 oz)   Height: 6' 1" (1 854 m)             EDUARDO HymanN, RN

## 2023-03-20 NOTE — PRE-PROCEDURE INSTRUCTIONS
Pre-Surgery Instructions:   Medication Instructions   • atorvastatin (LIPITOR) 10 mg tablet Take night before surgery   • levothyroxine 150 mcg tablet Take day of surgery  • oxybutynin (DITROPAN) 5 mg tablet Hold day of surgery  • senna-docusate sodium (SENOKOT S) 8 6-50 mg per tablet Hold day of surgery     See above

## 2023-03-23 ENCOUNTER — ANESTHESIA EVENT (OUTPATIENT)
Dept: PERIOP | Facility: HOSPITAL | Age: 73
End: 2023-03-23

## 2023-03-23 ENCOUNTER — HOSPITAL ENCOUNTER (OUTPATIENT)
Facility: HOSPITAL | Age: 73
Setting detail: OUTPATIENT SURGERY
Discharge: HOME | End: 2023-03-23
Attending: UROLOGY | Admitting: UROLOGY

## 2023-03-23 ENCOUNTER — ANESTHESIA (OUTPATIENT)
Dept: PERIOP | Facility: HOSPITAL | Age: 73
End: 2023-03-23

## 2023-03-23 VITALS
DIASTOLIC BLOOD PRESSURE: 74 MMHG | OXYGEN SATURATION: 97 % | SYSTOLIC BLOOD PRESSURE: 132 MMHG | RESPIRATION RATE: 20 BRPM | HEIGHT: 73 IN | WEIGHT: 270 LBS | HEART RATE: 79 BPM | BODY MASS INDEX: 35.78 KG/M2 | TEMPERATURE: 97.3 F

## 2023-03-23 DIAGNOSIS — C67.4 MALIGNANT NEOPLASM OF POSTERIOR WALL OF URINARY BLADDER (HCC): ICD-10-CM

## 2023-03-23 RX ORDER — ONDANSETRON 2 MG/ML
4 INJECTION INTRAMUSCULAR; INTRAVENOUS ONCE AS NEEDED
Status: DISCONTINUED | OUTPATIENT
Start: 2023-03-23 | End: 2023-03-23 | Stop reason: HOSPADM

## 2023-03-23 RX ORDER — LIDOCAINE HYDROCHLORIDE 20 MG/ML
INJECTION, SOLUTION EPIDURAL; INFILTRATION; INTRACAUDAL; PERINEURAL AS NEEDED
Status: DISCONTINUED | OUTPATIENT
Start: 2023-03-23 | End: 2023-03-23

## 2023-03-23 RX ORDER — HYDROCODONE BITARTRATE AND ACETAMINOPHEN 5; 325 MG/1; MG/1
1 TABLET ORAL EVERY 6 HOURS PRN
Qty: 6 TABLET | Refills: 0 | Status: SHIPPED | OUTPATIENT
Start: 2023-03-23 | End: 2023-04-02

## 2023-03-23 RX ORDER — HYDROCODONE BITARTRATE AND ACETAMINOPHEN 5; 325 MG/1; MG/1
2 TABLET ORAL EVERY 4 HOURS PRN
Status: DISCONTINUED | OUTPATIENT
Start: 2023-03-23 | End: 2023-03-23 | Stop reason: HOSPADM

## 2023-03-23 RX ORDER — DEXAMETHASONE SODIUM PHOSPHATE 10 MG/ML
INJECTION, SOLUTION INTRAMUSCULAR; INTRAVENOUS AS NEEDED
Status: DISCONTINUED | OUTPATIENT
Start: 2023-03-23 | End: 2023-03-23

## 2023-03-23 RX ORDER — KETOROLAC TROMETHAMINE 30 MG/ML
INJECTION, SOLUTION INTRAMUSCULAR; INTRAVENOUS AS NEEDED
Status: DISCONTINUED | OUTPATIENT
Start: 2023-03-23 | End: 2023-03-23

## 2023-03-23 RX ORDER — MAGNESIUM HYDROXIDE 1200 MG/15ML
LIQUID ORAL AS NEEDED
Status: DISCONTINUED | OUTPATIENT
Start: 2023-03-23 | End: 2023-03-23 | Stop reason: HOSPADM

## 2023-03-23 RX ORDER — PROPOFOL 10 MG/ML
INJECTION, EMULSION INTRAVENOUS AS NEEDED
Status: DISCONTINUED | OUTPATIENT
Start: 2023-03-23 | End: 2023-03-23

## 2023-03-23 RX ORDER — CEPHALEXIN 500 MG/1
500 CAPSULE ORAL 3 TIMES DAILY
Qty: 9 CAPSULE | Refills: 0 | Status: SHIPPED | OUTPATIENT
Start: 2023-03-23 | End: 2023-03-26

## 2023-03-23 RX ORDER — FENTANYL CITRATE 50 UG/ML
INJECTION, SOLUTION INTRAMUSCULAR; INTRAVENOUS AS NEEDED
Status: DISCONTINUED | OUTPATIENT
Start: 2023-03-23 | End: 2023-03-23

## 2023-03-23 RX ORDER — ONDANSETRON 2 MG/ML
INJECTION INTRAMUSCULAR; INTRAVENOUS AS NEEDED
Status: DISCONTINUED | OUTPATIENT
Start: 2023-03-23 | End: 2023-03-23

## 2023-03-23 RX ORDER — FENTANYL CITRATE/PF 50 MCG/ML
25 SYRINGE (ML) INJECTION
Status: DISCONTINUED | OUTPATIENT
Start: 2023-03-23 | End: 2023-03-23 | Stop reason: HOSPADM

## 2023-03-23 RX ORDER — SODIUM CHLORIDE 9 MG/ML
125 INJECTION, SOLUTION INTRAVENOUS CONTINUOUS
Status: DISCONTINUED | OUTPATIENT
Start: 2023-03-23 | End: 2023-03-23 | Stop reason: HOSPADM

## 2023-03-23 RX ORDER — LIDOCAINE HYDROCHLORIDE 20 MG/ML
JELLY TOPICAL ONCE
Status: DISCONTINUED | OUTPATIENT
Start: 2023-03-23 | End: 2023-03-23 | Stop reason: HOSPADM

## 2023-03-23 RX ORDER — SODIUM CHLORIDE, SODIUM LACTATE, POTASSIUM CHLORIDE, CALCIUM CHLORIDE 600; 310; 30; 20 MG/100ML; MG/100ML; MG/100ML; MG/100ML
50 INJECTION, SOLUTION INTRAVENOUS CONTINUOUS
Status: DISCONTINUED | OUTPATIENT
Start: 2023-03-23 | End: 2023-03-23 | Stop reason: HOSPADM

## 2023-03-23 RX ADMIN — PROPOFOL 50 MG: 10 INJECTION, EMULSION INTRAVENOUS at 13:04

## 2023-03-23 RX ADMIN — CEFAZOLIN 3000 MG: 10 INJECTION, POWDER, FOR SOLUTION INTRAVENOUS at 13:07

## 2023-03-23 RX ADMIN — FENTANYL CITRATE 25 MCG: 50 INJECTION INTRAMUSCULAR; INTRAVENOUS at 13:30

## 2023-03-23 RX ADMIN — ONDANSETRON 4 MG: 2 INJECTION INTRAMUSCULAR; INTRAVENOUS at 13:30

## 2023-03-23 RX ADMIN — PROPOFOL 50 MG: 10 INJECTION, EMULSION INTRAVENOUS at 13:05

## 2023-03-23 RX ADMIN — LIDOCAINE HYDROCHLORIDE 100 MG: 20 INJECTION, SOLUTION EPIDURAL; INFILTRATION; INTRACAUDAL; PERINEURAL at 13:03

## 2023-03-23 RX ADMIN — PROPOFOL 100 MG: 10 INJECTION, EMULSION INTRAVENOUS at 13:03

## 2023-03-23 RX ADMIN — SODIUM CHLORIDE, SODIUM LACTATE, POTASSIUM CHLORIDE, AND CALCIUM CHLORIDE 50 ML/HR: .6; .31; .03; .02 INJECTION, SOLUTION INTRAVENOUS at 11:15

## 2023-03-23 RX ADMIN — FENTANYL CITRATE 50 MCG: 50 INJECTION INTRAMUSCULAR; INTRAVENOUS at 13:09

## 2023-03-23 RX ADMIN — KETOROLAC TROMETHAMINE 15 MG: 30 INJECTION, SOLUTION INTRAMUSCULAR; INTRAVENOUS at 13:30

## 2023-03-23 RX ADMIN — HYDROCODONE BITARTRATE AND ACETAMINOPHEN 2 TABLET: 5; 325 TABLET ORAL at 19:01

## 2023-03-23 RX ADMIN — DEXAMETHASONE SODIUM PHOSPHATE 5 MG: 10 INJECTION, SOLUTION INTRAMUSCULAR; INTRAVENOUS at 13:03

## 2023-03-23 NOTE — ANESTHESIA POSTPROCEDURE EVALUATION
Post-Op Assessment Note    CV Status:  Stable  Pain Score: 0    Pain management: adequate     Mental Status:  Arousable   Hydration Status:  Euvolemic   PONV Controlled:  Controlled   Airway Patency:  Patent      Post Op Vitals Reviewed: Yes      Staff: CRNA         No notable events documented      BP   123/83   Temp   98 7   Pulse  56   Resp   12   SpO2   99%

## 2023-03-23 NOTE — PROCEDURES
Contacted by patient's nurse that patient is unable to void post-op and has bladder-scanned for approximately 300 ml  He is S/P TURBT today with Dr Eduardo Tam who requested that patient have a lopez catheter placed prior to being discharged home  Patient also has a history of BPH  Using sterile technique, betadine prep, and a urojet, I was able to smoothly place a 16 F coude lopez catheter without difficulty and inflated the lopez balloon with 10 ml of sterile water  He subsequently drained approximately 375 ml of slightly opaque clear-yellow urine into the collection bag  He tolerated all well, and Michelle his nurse placed a cath-secure device onto his upper right leg    Patient instructed to call the urology office for further instructions concerning catheter removal       [facility]  3/23/23  Patient encounter [id number]

## 2023-03-23 NOTE — OP NOTE
OPERATIVE REPORT  PATIENT NAME: Lenore Bautista    :  1950  MRN: [id number]  Pt Location: [facility] 01    SURGERY DATE: 3/23/2023    Surgeon(s) and Role:     Nathalia Wang MD - Primary    Preop Diagnosis:  Malignant neoplasm of posterior wall of urinary bladder ([address] ) [C67 4]    Post-Op Diagnosis Codes:     * Malignant neoplasm of posterior wall of urinary bladder ([address] ) [C67 4]    Procedure(s):  TRANSURETHRAL RESECTION OF BLADDER TUMOR (TURBT)  mitomycin     Medium size bladder tumor approximately 2- 3 cm² maximal surface area    Specimen(s):  ID Type Source Tests Collected by Time Destination   1 : bladder tumor posterior wall Tissue Urinary Bladder TISSUE EXAM Nathalia Wang MD 3/23/2023  1:27 PM    A :  Urine Urine, Cystoscopic URINE CULTURE Nathalia Wang MD 3/23/2023  1:20 PM        Estimated Blood Loss:   Minimal    Drains:  Urethral Catheter Latex 20 Fr  (Active)   Number of days: 0       Anesthesia Type:   General    Operative Indications:  Malignant neoplasm of posterior wall of urinary bladder ([address] ) [C67 4]      Operative Findings:  2 small and 1 medium size bladder tumor located along the posterior lateral wall  Total surface area of bladder tumor approximately 3 cm²  Complications:   None    Procedure and Technique:  Cystoscopy, TURBT medium sized bladder tumor    Patient Disposition:  Charlotte Duque is a 74 year-old male with a history of hematuria  Work up revealed a 3 small bladder tumors on the posterior on the baldder  Risk and benefits of transurethral resection of the recurrent bladder tumor were discussed and reviewed  Informed consent was obtained  Patient was brought to the operating room on 3/23/23  After the smooth induction of general LMA anesthesia, the patient was placed in the dorsal lithotomy position  His genitalia was prepped and draped in a sterile fashion  Intravenous antibiotics were administered    A timeout was performed with all members of the operative team confirming the patient's identity and procedure to be performed  A 22 Angolan rigid cystoscope with 30° lens was inserted  The bladder was thoroughly inspected  3 small posterior bladder tumors were identified  No other mucosal abnormalities or lesions were identified  The left ureteral orifice was also visualized with clear efflux of urine  The cystoscope was exchanged for the continuous-flow resectoscope  Transurethral resection of the medium-sized bladder tumor was performed  The tumor was sent as specimen  The base of the tumor was fulgurated with electrocautery  Hemostasis was excellent  The bladder was left full the cystoscope was removed  A 20 Angolan Morfin catheter was inserted and the bladder was drained  40 mg of intravesical mitomycin was then instilled and 40 cc of sterile water into the bladder  The catheter was capped  Overall the patient tolerated the procedure well  The patient was extubated in the operating room and transferred to the PACU in stable condition at the conclusion of the case          SIGNATURE: Gera Garcia MD  DATE: March 23, 2023  TIME: 1:50 PM

## 2023-03-23 NOTE — H&P
Lola Brown is a 64-year-old male with a reported history of gross hematuria  A CT scan reveals a mass protruding from the base of the bladder at the bladder neck  Cystoscopy was recently performed in the office revealing  A moderate size bladder tumor was identified coming from the posterior wall  This was most consistent with urothelial carcinoma the bladder  He recently was admitted almost 2 weeks ago with gross hematuria  TURBT was not performed at that time  He now presents for his TURBT  /75   Pulse 67   Temp 97 5 °F (36 4 °C) (Temporal)   Resp 20   Ht 6' 1" (1 854 m)   Wt 122 kg (270 lb)   SpO2 96%   BMI 35 62 kg/m²     On examination he is in no acute distress  Cardiac is regular  Respiratory no distress  Abdomen is soft nontender nondistended  Skin is warm  Extremities without edema  Neurologic is grossly intact and nonfocal    Impression: Bladder tumor    Plan: I recommend cystoscopy with transurethral resection of the bladder tumor along with intravesical instillation of mitomycin in the operating room  Risk of the procedure including, but not limited to, bleeding, infection, perforation, need for additional surgery as well as recurrence were discussed and reviewed  Informed consent was obtained

## 2023-03-23 NOTE — ANESTHESIA PREPROCEDURE EVALUATION
Procedure:  TRANSURETHRAL RESECTION OF BLADDER TUMOR (TURBT), mitomycin  (Bladder)    Relevant Problems   CARDIO   (+) Hyperlipidemia      ENDO   (+) Hypothyroidism      MUSCULOSKELETAL   (+) Primary osteoarthritis of both knees     +Obesity    Stress test 2018  SUMMARY:  -  Stress results: There was no chest pain during stress  -  ECG conclusions: The stress ECG was negative for ischemia and normal   -  Perfusion imaging: There were no perfusion defects   -  Gated SPECT: The calculated left ventricular ejection fraction was 63 %  Left ventricular ejection fraction was within normal limits by visual estimate  There was no left ventricular regional abnormality      IMPRESSIONS: Normal study after pharmacologic vasodilation without reproduction of symptoms  There were no significant perfusion abnormalities  Left ventricular systolic function was normal      Lab Results   Component Value Date    WBC 7 18 03/11/2023    HGB 14 2 03/11/2023    HCT 42 8 03/11/2023    MCV 92 03/11/2023     03/11/2023     Lab Results   Component Value Date    SODIUM 136 03/08/2023    K 4 0 03/08/2023     03/08/2023    CO2 23 03/08/2023    BUN 19 03/08/2023    CREATININE 0 88 03/08/2023    GLUC 103 03/08/2023    CALCIUM 8 3 (L) 03/08/2023         Physical Exam    Airway    Mallampati score: II  TM Distance: >3 FB  Neck ROM: full     Dental   No notable dental hx     Cardiovascular  Rhythm: regular, Rate: normal,     Pulmonary  Breath sounds clear to auscultation,     Other Findings        Anesthesia Plan  ASA Score- 2     Anesthesia Type- general with ASA Monitors  Additional Monitors:   Airway Plan: LMA  Comment: Risks/benefits and alternatives discussed with patient including possible PONV, sore throat, damage to teeth/lips/gums/esophagus, and possibility of rare anesthetic and surgical emergencies including but not limited to heart attack, stroke, and/or death  All questions were answered            Plan Factors-Exercise tolerance (METS): >4 METS  Chart reviewed  Existing labs reviewed  Patient summary reviewed  Patient is not a current smoker  Obstructive sleep apnea risk education given perioperatively  Induction- intravenous  Postoperative Plan- Plan for postoperative opioid use  Informed Consent- Anesthetic plan and risks discussed with patient and spouse  I personally reviewed this patient with the CRNA  Discussed and agreed on the Anesthesia Plan with the CRNA  Zara Gonzalez

## 2023-03-24 ENCOUNTER — TELEPHONE (OUTPATIENT)
Dept: UROLOGY | Facility: AMBULATORY SURGERY CENTER | Age: 73
End: 2023-03-24

## 2023-03-24 NOTE — TELEPHONE ENCOUNTER
Post Op Note    Sophy Sen is a 74 y o  male s/p TURBT performed 03/23/2023  Sophy Sen is a patient of Dr Tri Ha and is seen at the [facility]  How would you rate your pain on a scale from 1 to 10, 10 being the worst pain ever? 0  Have you had a fever? No  IHave your bowel movements been regular? No  Do you have any difficulty urinating? No  IIf the patient has a lopez- are you comfortable caring for your lopez? No Is it draining urine? Yes  Do you have any other questions or concerns that I can address at this time?  Patient has catheter placed urine is orange in color      Will contact Dr Tri Ha on lopez removal as patient was unable to urinate after surgey

## 2023-03-25 LAB — BACTERIA UR CULT: NORMAL

## 2023-03-27 ENCOUNTER — PROCEDURE VISIT (OUTPATIENT)
Dept: UROLOGY | Facility: AMBULATORY SURGERY CENTER | Age: 73
End: 2023-03-27

## 2023-03-27 VITALS
HEART RATE: 80 BPM | WEIGHT: 268.8 LBS | DIASTOLIC BLOOD PRESSURE: 88 MMHG | HEIGHT: 73 IN | BODY MASS INDEX: 35.63 KG/M2 | SYSTOLIC BLOOD PRESSURE: 148 MMHG

## 2023-03-27 DIAGNOSIS — E78.5 HYPERLIPIDEMIA, UNSPECIFIED HYPERLIPIDEMIA TYPE: ICD-10-CM

## 2023-03-27 DIAGNOSIS — N40.1 BPH WITH OBSTRUCTION/LOWER URINARY TRACT SYMPTOMS: Primary | ICD-10-CM

## 2023-03-27 DIAGNOSIS — N13.8 BPH WITH OBSTRUCTION/LOWER URINARY TRACT SYMPTOMS: Primary | ICD-10-CM

## 2023-03-27 DIAGNOSIS — E03.9 ACQUIRED HYPOTHYROIDISM: ICD-10-CM

## 2023-03-27 LAB — POST-VOID RESIDUAL VOLUME, ML POC: 82 ML

## 2023-03-27 RX ORDER — LEVOTHYROXINE SODIUM 0.15 MG/1
150 TABLET ORAL DAILY
Qty: 90 TABLET | Refills: 0 | Status: SHIPPED | OUTPATIENT
Start: 2023-03-27

## 2023-03-27 RX ORDER — ATORVASTATIN CALCIUM 10 MG/1
10 TABLET, FILM COATED ORAL
Qty: 90 TABLET | Refills: 0 | Status: SHIPPED | OUTPATIENT
Start: 2023-03-27

## 2023-03-27 NOTE — PROGRESS NOTES
"3/27/2023    Tres Dos Santos  1950  [id number]    Diagnosis  Chief Complaint    Void trial         Patient presents for void trial managed by Dr Olamide Franco  Follow up as scheduled with Dr Garibay   Educational handout provided and reviewed of s/s he may experience after catheter removal   ER precautions reviewed  Advised to contact the office in the meantime with any questions or concerns  Procedure Morfin removal/voiding trial    Morfin catheter removed after deflation of an intact balloon  Patient tolerated well  Encouraged patient to hydrate well and return this afternoon for post void residual   he knows he may return early if uncomfortable and unable to urinate  Patient agrees to this plan  Patient returned this afternoon  Patient states able to void  Patient voided while in office  Bladder ultrasound performed and PVR measured 82 ml      Recent Results (from the past 4 hour(s))   POCT Measure PVR    Collection Time: 03/27/23  2:32 PM   Result Value Ref Range    POST-VOID RESIDUAL VOLUME, ML POC 82 mL     Vitals:    03/27/23 0819   BP: 148/88   BP Location: Left arm   Patient Position: Sitting   Cuff Size: Adult   Pulse: 80   Weight: 122 kg (268 lb 12 8 oz)   Height: 6' 1\" (1 854 m)             Gris Gilbert, EDUARDON, RN    "

## 2023-03-29 NOTE — TELEPHONE ENCOUNTER
Pt called in stating he received the results from his polyps through [address] and he would like a sooner appt  He is requesting a call back at [phone]

## 2023-03-30 NOTE — TELEPHONE ENCOUNTER
Roe for Johnathon Solis reminding him of appointment for void trial on 4/3 and that Dr FRANCIS doesn't have any appointment earlier will put him on wait list Telephone Encounter by Josie Mg at 08/16/17 11:02 AM     Author:  Josie Mg Service:  (none) Author Type:  Patient      Filed:  08/16/17 11:04 AM Encounter Date:  8/16/2017 Status:  Signed     :  Josie Mg (Patient )              BLANE MCGINNIS    Patient Age: 31 year old    ACCT STATUS:   MESSAGE:[MC1.1T]   Patient states she went to  her Adapalene 0.1% cream at the pharmacy and pharmacy and the price is $200 dollars. Patient would like to know if office has any coupons or if  Can change her medication.[MC1.1M] Message confirmed with caller.[MC1.1T] Please call back. Please advise.[MC1.1M]    Next and Last Visit with Provider and Department  Next visit with PITO ECHOLS is on 08/24/2017 at  2:30 PM in [facility]  Next visit with DERMATOLOGY is on 08/24/2017 at  2:30 PM in [facility]  Last visit with PITO ECHOLS was on 08/15/2017 at  4:30 PM in [facility]  Last visit with DERMATOLOGY was on 08/15/2017 at  4:30 PM in [facility]     WEIGHT AND HEIGHT: No weight on file.   No height or weight on file to calculate BMI.    Allergies     Allergen  Reactions   • Penicillins Rash     Current outpatient prescriptions       Medication  Sig Dispense Refill   • Norgestim-Eth Estrad Triphasic (ORTHO TRI-CYCLEN, 28,) 0.18/0.215/0.25 MG-35 MCG TABS Take one pill daily 28 Tab 11   • adapalene (DIFFERIN) 0.1 % cream Apply nightly as a pea-size dose of medication to each of the five areas of the face (central forehead, chin, nose, and cheeks) 45 g 11      PHARMACY to use:[MC1.1T] n/a[MC1.1M]          Pharmacy preference(s) on file: [facility] [address]    CALL BACK INFO:[MC1.1T] Ok to leave response (including medical information) on answering machine[MC1.1M]  ROUTING:[MC1.1T] Route to covering Patient's physician/staff[MC1.1M]        PCP: No primary care provider on file.          INS: Payor: [employer] / Plan: *No Plan* / Product Type: *No Product type* / Note: This is the primary coverage, but no account was found for this location or the patient's primary location.   ADDRESS:  [address] [place] [id number][MC1.1T]       Revision History        User Key Date/Time User Provider Type Action    > MC1.1 08/16/17 11:04 AM Josie Mg Patient  Sign    M - Manual, T - Template

## 2023-04-11 NOTE — H&P (VIEW-ONLY)
4/11/2023    [address] [facility]  1950  [id number]        Assessment  High-grade possible T1 bladder cancer with squamous differentiation      Discussion  Based on pathology of high-grade bladder cancer with possible lamina propria invasion and squamous differentiation, I recommend returning to the operating room for a repeat TURBT to ensure proper staging as muscle was not present on the initial pathology  Risk of the procedure including, but not limited to, bleeding, infection, perforation, disease recurrence and need for additional treatment were discussed and reviewed  Informed consent was obtained  History of Present Illness  74 y o  male with a history of a recent TURBT for 3 small bladder tumors located along the posterior wall of the bladder  His prior history is notable for gross hematuria and no prior history of bladder cancer  Pathology from his TURBT with mitomycin reveals multiple fragments of 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 2 (P)     How often have you had to urinate again less than two hours after you finished urinating? 3 (P)     How often have you found you stopped and started again several times when you urinate? 1 (P)     How often have you found it difficult to postpone urination? 0 (P)     How often have you had a weak urinary stream? 3 (P)     How often have you had to push or strain to begin urination? 1 (P)     How many times did you most typically get up to urinate from the time you went to bed at night until the time you got up in the morning? 4 (P)     Quality of Life: If you were to spend the rest of your life with your urinary condition just the way it is now, how would you feel about that? 3 (P)     AUA SYMPTOM SCORE 14 (P)           Review of Systems  Review of Systems   Constitutional: Negative  HENT: Negative  Eyes: Negative  Respiratory: Negative  Cardiovascular: Negative  Gastrointestinal: Negative  Endocrine: Negative  Genitourinary:        Per HPI   Musculoskeletal: Negative  Skin: Negative  Allergic/Immunologic: Negative  Neurological: Negative  Hematological: Negative  Psychiatric/Behavioral: Negative            Past Medical History  Past Medical History:   Diagnosis Date   • Anxiety disorder    • Atherosclerotic heart disease of native coronary artery without angina pectoris    • Benign prostatic hyperplasia without lower urinary tract symptoms    • Dyslipidemia    • GERD (gastroesophageal reflux disease)    • Hypertension    • Hyperthyroidism    • Impaired fasting blood sugar    • Kidney stone    • Low back pain    • Osteoarthritis     last assesed 6-6-16   • Other chest pain    • Paresthesia of skin    • Polyneuropathy     last assesed 5-8-17   • Pure hypercholesterolemia    • 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 ([facility] )    • Wears glasses        Past Social History  Past Surgical History:   Procedure Laterality Date   • BACK SURGERY     • CHOLECYSTECTOMY     • COLONOSCOPY  02/06/2019   • DE ARTHRP ACETBLR/PROX FEM PROSTC AGRFT/ALGRFT Right 8/5/2019    Procedure: ARTHROPLASTY HIP TOTAL;  Surgeon: James Last MD;  Location: [facility];  Service: Orthopedics   • DE CYSTO W/REMOVAL OF LESIONS SMALL N/A 3/23/2023    Procedure: TRANSURETHRAL RESECTION OF BLADDER TUMOR (TURBT), mitomycin ;  Surgeon: Diane Virgen MD;  Location: [facility];  Service: Urology   • TONSILLECTOMY         Past Family History  Family History   Problem Relation Age of Onset   • Arthritis Other    • Heart disease Father        Past Social history  Social History     Socioeconomic History   • Marital status: /Civil Union     Spouse name: Not on file   • Number of children: Not on file   • Years of education: Not on file   • Highest education level: Not on file   Occupational History   • Not on file   Tobacco Use   • Smoking status: Former   • Smokeless tobacco: Never   Vaping Use   • Vaping Use: Never used   Substance and Sexual Activity   • Alcohol use: Never   • Drug use: Never   • Sexual activity: Not on file   Other Topics Concern   • Not on file   Social History Narrative    Smoking: Non-smoker    As per [employer]     Social Determinants of Health     Financial Resource Strain: Not on file   Food Insecurity: Not on file   Transportation Needs: Not on file   Physical Activity: Not on file   Stress: Not on file   Social Connections: Not on file   Intimate Partner Violence: Not on file   Housing Stability: Not on file       Current Medications  Current Outpatient Medications   Medication Sig Dispense Refill   • atorvastatin (LIPITOR) 10 mg tablet Take 1 tablet (10 mg total) by mouth daily at bedtime 90 tablet 0   • ciclopirox (LOPROX) 0 77 % cream APPLY TO AFFECTED AREAS ON FACE 1-2 TIMES DAILY     • hydrocortisone 2 5 % cream APPLY TO SKIN TWICE DAILY FOR 2 WEEKS THEN IF NEEDED     • levothyroxine 150 mcg tablet Take 1 tablet (150 mcg total) by mouth daily 90 tablet 0   • senna-docusate sodium (SENOKOT S) 8 6-50 mg per tablet Take 1 tablet by mouth 2 (two) times a day 60 tablet 0     No current facility-administered medications for this visit  Allergies  No Known Allergies    Past Medical History, Social History, Family History, medications and allergies were reviewed      Vitals  Vitals:    04/11/23 1035   BP: 134/78   BP Location: Right arm   Patient Position: Sitting   Cuff "Size: Standard   Pulse: 78   Resp: 18   SpO2: 94%   Weight: 122 kg (270 lb)   Height: 6' 1\" (1 854 m)       Physical Exam  Physical Exam  On examination he is in no acute distress    Gait normal   Affect normal    Results  Lab Results   Component Value Date    PSA 0 8 01/26/2023    PSA 0 5 12/03/2021    PSA 0 4 10/07/2020     Lab Results   Component Value Date    CALCIUM 8 3 (L) 03/08/2023    K 4 0 03/08/2023    CO2 23 03/08/2023     03/08/2023    BUN 19 03/08/2023    CREATININE 0 88 03/08/2023     Lab Results   Component Value Date    WBC 7 18 03/11/2023    HGB 14 2 03/11/2023    HCT 42 8 03/11/2023    MCV 92 03/11/2023     03/11/2023         Office Urine Dip  No results found for this or any previous visit (from the past 1 hour(s)) ]        "

## 2023-04-13 ENCOUNTER — APPOINTMENT (OUTPATIENT)
Dept: PREADMISSION TESTING | Facility: HOSPITAL | Age: 73
End: 2023-04-13

## 2023-04-20 RX ORDER — COVID-19 ANTIGEN TEST
KIT MISCELLANEOUS
COMMUNITY

## 2023-04-20 RX ORDER — OMEGA-3 FATTY ACIDS/FISH OIL 300-1000MG
CAPSULE ORAL
COMMUNITY

## 2023-04-20 NOTE — PRE-PROCEDURE INSTRUCTIONS
Pre-Surgery Instructions:   Medication Instructions   • atorvastatin (LIPITOR) 10 mg tablet Take night before surgery   • ciclopirox (LOPROX) 0 77 % cream Hold day of surgery  • hydrocortisone 2 5 % cream Hold day of surgery  • Ibuprofen (Advil) 200 MG CAPS Stop taking 3 days prior to surgery  • levothyroxine 150 mcg tablet Take day of surgery  • Naproxen Sodium (Aleve) 220 MG CAPS Stop taking 3 days prior to surgery  • tamsulosin (FLOMAX) 0 4 mg Take night before surgery    [facility] preop instructions reviewed with pt  Pt has surgical soap

## 2023-04-26 ENCOUNTER — ANESTHESIA EVENT (OUTPATIENT)
Dept: PERIOP | Facility: HOSPITAL | Age: 73
End: 2023-04-26

## 2023-04-26 ENCOUNTER — HOSPITAL ENCOUNTER (OUTPATIENT)
Facility: HOSPITAL | Age: 73
Setting detail: OUTPATIENT SURGERY
Discharge: HOME | End: 2023-04-26
Attending: UROLOGY | Admitting: UROLOGY

## 2023-04-26 ENCOUNTER — ANESTHESIA (OUTPATIENT)
Dept: PERIOP | Facility: HOSPITAL | Age: 73
End: 2023-04-26

## 2023-04-26 VITALS
BODY MASS INDEX: 35.78 KG/M2 | TEMPERATURE: 98.3 F | WEIGHT: 270 LBS | HEART RATE: 66 BPM | RESPIRATION RATE: 16 BRPM | DIASTOLIC BLOOD PRESSURE: 73 MMHG | OXYGEN SATURATION: 98 % | SYSTOLIC BLOOD PRESSURE: 130 MMHG | HEIGHT: 73 IN

## 2023-04-26 DIAGNOSIS — C67.4 MALIGNANT NEOPLASM OF POSTERIOR WALL OF URINARY BLADDER (HCC): ICD-10-CM

## 2023-04-26 RX ORDER — DEXAMETHASONE SODIUM PHOSPHATE 10 MG/ML
INJECTION, SOLUTION INTRAMUSCULAR; INTRAVENOUS AS NEEDED
Status: DISCONTINUED | OUTPATIENT
Start: 2023-04-26 | End: 2023-04-26

## 2023-04-26 RX ORDER — SODIUM CHLORIDE, SODIUM LACTATE, POTASSIUM CHLORIDE, CALCIUM CHLORIDE 600; 310; 30; 20 MG/100ML; MG/100ML; MG/100ML; MG/100ML
INJECTION, SOLUTION INTRAVENOUS CONTINUOUS PRN
Status: DISCONTINUED | OUTPATIENT
Start: 2023-04-26 | End: 2023-04-26

## 2023-04-26 RX ORDER — FENTANYL CITRATE 50 UG/ML
INJECTION, SOLUTION INTRAMUSCULAR; INTRAVENOUS AS NEEDED
Status: DISCONTINUED | OUTPATIENT
Start: 2023-04-26 | End: 2023-04-26

## 2023-04-26 RX ORDER — ONDANSETRON 2 MG/ML
4 INJECTION INTRAMUSCULAR; INTRAVENOUS ONCE AS NEEDED
Status: DISCONTINUED | OUTPATIENT
Start: 2023-04-26 | End: 2023-04-26 | Stop reason: HOSPADM

## 2023-04-26 RX ORDER — ALBUTEROL SULFATE 2.5 MG/3ML
2.5 SOLUTION RESPIRATORY (INHALATION) ONCE AS NEEDED
Status: DISCONTINUED | OUTPATIENT
Start: 2023-04-26 | End: 2023-04-26 | Stop reason: HOSPADM

## 2023-04-26 RX ORDER — SODIUM CHLORIDE 9 MG/ML
125 INJECTION, SOLUTION INTRAVENOUS CONTINUOUS
Status: DISCONTINUED | OUTPATIENT
Start: 2023-04-26 | End: 2023-04-26 | Stop reason: HOSPADM

## 2023-04-26 RX ORDER — MAGNESIUM HYDROXIDE 1200 MG/15ML
LIQUID ORAL AS NEEDED
Status: DISCONTINUED | OUTPATIENT
Start: 2023-04-26 | End: 2023-04-26 | Stop reason: HOSPADM

## 2023-04-26 RX ORDER — HYDROCODONE BITARTRATE AND ACETAMINOPHEN 5; 325 MG/1; MG/1
1 TABLET ORAL EVERY 6 HOURS PRN
Qty: 6 TABLET | Refills: 0 | Status: SHIPPED | OUTPATIENT
Start: 2023-04-26 | End: 2023-05-06

## 2023-04-26 RX ORDER — LIDOCAINE HYDROCHLORIDE 10 MG/ML
INJECTION, SOLUTION EPIDURAL; INFILTRATION; INTRACAUDAL; PERINEURAL AS NEEDED
Status: DISCONTINUED | OUTPATIENT
Start: 2023-04-26 | End: 2023-04-26

## 2023-04-26 RX ORDER — MEPERIDINE HYDROCHLORIDE 25 MG/ML
12.5 INJECTION INTRAMUSCULAR; INTRAVENOUS; SUBCUTANEOUS ONCE
Status: DISCONTINUED | OUTPATIENT
Start: 2023-04-26 | End: 2023-04-26 | Stop reason: HOSPADM

## 2023-04-26 RX ORDER — CEPHALEXIN 500 MG/1
500 CAPSULE ORAL 3 TIMES DAILY
Qty: 9 CAPSULE | Refills: 0 | Status: SHIPPED | OUTPATIENT
Start: 2023-04-26 | End: 2023-04-29

## 2023-04-26 RX ORDER — HYDROCODONE BITARTRATE AND ACETAMINOPHEN 5; 325 MG/1; MG/1
1 TABLET ORAL EVERY 4 HOURS PRN
Status: DISCONTINUED | OUTPATIENT
Start: 2023-04-26 | End: 2023-04-26 | Stop reason: HOSPADM

## 2023-04-26 RX ORDER — LABETALOL HYDROCHLORIDE 5 MG/ML
5 INJECTION, SOLUTION INTRAVENOUS
Status: DISCONTINUED | OUTPATIENT
Start: 2023-04-26 | End: 2023-04-26 | Stop reason: HOSPADM

## 2023-04-26 RX ORDER — FENTANYL CITRATE/PF 50 MCG/ML
25 SYRINGE (ML) INJECTION
Status: DISCONTINUED | OUTPATIENT
Start: 2023-04-26 | End: 2023-04-26 | Stop reason: HOSPADM

## 2023-04-26 RX ORDER — PROMETHAZINE HYDROCHLORIDE 25 MG/ML
12.5 INJECTION, SOLUTION INTRAMUSCULAR; INTRAVENOUS ONCE AS NEEDED
Status: DISCONTINUED | OUTPATIENT
Start: 2023-04-26 | End: 2023-04-26 | Stop reason: HOSPADM

## 2023-04-26 RX ORDER — LIDOCAINE HYDROCHLORIDE 10 MG/ML
0.5 INJECTION, SOLUTION EPIDURAL; INFILTRATION; INTRACAUDAL; PERINEURAL ONCE AS NEEDED
Status: DISCONTINUED | OUTPATIENT
Start: 2023-04-26 | End: 2023-04-26 | Stop reason: HOSPADM

## 2023-04-26 RX ORDER — PROPOFOL 10 MG/ML
INJECTION, EMULSION INTRAVENOUS AS NEEDED
Status: DISCONTINUED | OUTPATIENT
Start: 2023-04-26 | End: 2023-04-26

## 2023-04-26 RX ORDER — SODIUM CHLORIDE, SODIUM LACTATE, POTASSIUM CHLORIDE, CALCIUM CHLORIDE 600; 310; 30; 20 MG/100ML; MG/100ML; MG/100ML; MG/100ML
125 INJECTION, SOLUTION INTRAVENOUS CONTINUOUS
Status: DISCONTINUED | OUTPATIENT
Start: 2023-04-26 | End: 2023-04-26 | Stop reason: HOSPADM

## 2023-04-26 RX ORDER — ONDANSETRON 2 MG/ML
INJECTION INTRAMUSCULAR; INTRAVENOUS AS NEEDED
Status: DISCONTINUED | OUTPATIENT
Start: 2023-04-26 | End: 2023-04-26

## 2023-04-26 RX ORDER — CEFAZOLIN SODIUM 1 G/3ML
INJECTION, POWDER, FOR SOLUTION INTRAMUSCULAR; INTRAVENOUS AS NEEDED
Status: DISCONTINUED | OUTPATIENT
Start: 2023-04-26 | End: 2023-04-26

## 2023-04-26 RX ORDER — HYDROMORPHONE HCL/PF 1 MG/ML
0.5 SYRINGE (ML) INJECTION
Status: DISCONTINUED | OUTPATIENT
Start: 2023-04-26 | End: 2023-04-26 | Stop reason: HOSPADM

## 2023-04-26 RX ADMIN — SODIUM CHLORIDE, SODIUM LACTATE, POTASSIUM CHLORIDE, AND CALCIUM CHLORIDE: .6; .31; .03; .02 INJECTION, SOLUTION INTRAVENOUS at 10:39

## 2023-04-26 RX ADMIN — CEFAZOLIN 3000 MG: 1 INJECTION, POWDER, FOR SOLUTION INTRAMUSCULAR; INTRAVENOUS at 10:52

## 2023-04-26 RX ADMIN — FENTANYL CITRATE 25 MCG: 50 INJECTION INTRAMUSCULAR; INTRAVENOUS at 11:41

## 2023-04-26 RX ADMIN — SODIUM CHLORIDE, SODIUM LACTATE, POTASSIUM CHLORIDE, AND CALCIUM CHLORIDE 125 ML/HR: .6; .31; .03; .02 INJECTION, SOLUTION INTRAVENOUS at 09:54

## 2023-04-26 RX ADMIN — PHENYLEPHRINE HYDROCHLORIDE 50 MCG/MIN: 10 INJECTION INTRAVENOUS at 10:49

## 2023-04-26 RX ADMIN — HYDROCODONE BITARTRATE AND ACETAMINOPHEN 1 TABLET: 5; 325 TABLET ORAL at 12:09

## 2023-04-26 RX ADMIN — ONDANSETRON 4 MG: 2 INJECTION INTRAMUSCULAR; INTRAVENOUS at 10:46

## 2023-04-26 RX ADMIN — FENTANYL CITRATE 50 MCG: 50 INJECTION INTRAMUSCULAR; INTRAVENOUS at 10:48

## 2023-04-26 RX ADMIN — LIDOCAINE HYDROCHLORIDE 100 MG: 10 INJECTION, SOLUTION EPIDURAL; INFILTRATION; INTRACAUDAL; PERINEURAL at 10:43

## 2023-04-26 RX ADMIN — FENTANYL CITRATE 50 MCG: 50 INJECTION INTRAMUSCULAR; INTRAVENOUS at 10:55

## 2023-04-26 RX ADMIN — DEXAMETHASONE SODIUM PHOSPHATE 10 MG: 10 INJECTION, SOLUTION INTRAMUSCULAR; INTRAVENOUS at 10:46

## 2023-04-26 RX ADMIN — PROPOFOL 200 MG: 10 INJECTION, EMULSION INTRAVENOUS at 10:43

## 2023-04-26 NOTE — ANESTHESIA PREPROCEDURE EVALUATION
Procedure:  TRANSURETHRAL RESECTION OF BLADDER TUMOR (TURBT), cystoscopy with bladder biopsy (Bladder)  CYSTOSCOPY w/ bladder biopsy (Bladder)    Relevant Problems   CARDIO   (+) Hyperlipidemia      ENDO   (+) Hypothyroidism      MUSCULOSKELETAL   (+) Primary osteoarthritis of both knees     +Obesity    Stress test 2018  SUMMARY:  -  Stress results: There was no chest pain during stress  -  ECG conclusions: The stress ECG was negative for ischemia and normal   -  Perfusion imaging: There were no perfusion defects   -  Gated SPECT: The calculated left ventricular ejection fraction was 63 %  Left ventricular ejection fraction was within normal limits by visual estimate  There was no left ventricular regional abnormality      IMPRESSIONS: Normal study after pharmacologic vasodilation without reproduction of symptoms  There were no significant perfusion abnormalities  Left ventricular systolic function was normal      Lab Results   Component Value Date    WBC 7 18 03/11/2023    HGB 14 2 03/11/2023    HCT 42 8 03/11/2023    MCV 92 03/11/2023     03/11/2023     Lab Results   Component Value Date    SODIUM 136 03/08/2023    K 4 0 03/08/2023     03/08/2023    CO2 23 03/08/2023    BUN 19 03/08/2023    CREATININE 0 88 03/08/2023    GLUC 103 03/08/2023    CALCIUM 8 3 (L) 03/08/2023         Physical Exam    Airway    Mallampati score: II  TM Distance: >3 FB  Neck ROM: full     Dental   No notable dental hx     Cardiovascular  Rhythm: regular, Rate: normal,     Pulmonary  Breath sounds clear to auscultation,     Other Findings        Anesthesia Plan  ASA Score- 2     Anesthesia Type- general with ASA Monitors  Additional Monitors:   Airway Plan: LMA  Comment: Patient seen and examined  History reviewed  Patient to be done under general anesthesia with LMA and routine monitors  Risks discussed with the patient  Consent obtained          Plan Factors-Exercise tolerance (METS): >4 METS  Chart reviewed  Existing labs reviewed  Patient summary reviewed  Patient is not a current smoker  Obstructive sleep apnea risk education given perioperatively  Induction- intravenous  Postoperative Plan- Plan for postoperative opioid use  Informed Consent- Anesthetic plan and risks discussed with patient  I personally reviewed this patient with the CRNA  Discussed and agreed on the Anesthesia Plan with the CRNA  Nereida Burrell

## 2023-04-26 NOTE — OP NOTE
OPERATIVE REPORT  PATIENT NAME: Jj Sorenson    :  1950  MRN: [id number]  Pt Location: [facility] 01    SURGERY DATE: 2023    Surgeon(s) and Role:     * Dayo Padilla MD - Primary    Preop Diagnosis:  Malignant neoplasm of posterior wall of urinary bladder ([address] ) [C67 4]    Post-Op Diagnosis Codes:     * Malignant neoplasm of posterior wall of urinary bladder ([address] ) [C67 4]    Procedure: Cystoscopy, transurethral resection of bladder tumor  3 to 5 cm maximal surface area    Specimen(s):  ID Type Source Tests Collected by Time Destination   1 : Bladder Tumor Posterior Wall Tissue Urinary Bladder TISSUE EXAM Dayo Padilla MD 2023 1058    A :  Urine Urine, Cystoscopic URINE CULTURE Dayo Padilla MD 2023 1055        Estimated Blood Loss:   Minimal    Drains:  None    Anesthesia Type:   General    Operative Indications:  Malignant neoplasm of posterior wall of urinary bladder ([address] ) [C67 4]      Operative Findings:  Shaggy appearing posterior bladder wall mucosa without jasson recurrence of urothelial carcinoma  Repeat TURBT performed deep into the muscle level  Complications:   None    Procedure and Technique:  Lavinia Lesches is a 47-year-old male with a history of 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  On his recent TURBT muscle was present but uninvolved by tumor  I recommend returning to the operating room for repeat resection and confirmation of staging  Risk and benefits of the procedure discussed and reviewed  Risks including, but not limited to, bleeding, infection, perforation, recurrence, need for additional surgery were discussed and reviewed  Informed consent obtained  The patient is brought to the operating room on 2023  After the smooth induction of general LMA anesthesia, the patient was placed in the dorsolithotomy position  His genitalia prepped and draped in sterile fashion    Intravenous antibiotics were administered  Timeout was performed with all members the operative team confirming the patient's identity, and procedure to be performed  25 Swedish rigid cystoscope with 30 degree lens was passed  A 70 degree lens was utilized as well  The urethra and prostatic urethra were normal   The bladder was entered  Both ureteral orifice ease were visualized  Along the posterior bladder wall inflamed and shaggy appearing mucosa was noted without jasson recurrence of urothelial carcinoma  The cystoscope was exchanged for the continuous-flow resectoscope  Transurethral resection of the posterior bladder wall scar was performed covering 3 to 5 cm² in maximal dimension  Electrocautery was used for hemostasis  Hemostasis was excellent  The bladder was emptied and the cystoscope was removed  Overall the patient tolerated the procedure well and there were no complications  The patient was extubated in the operating room and transferred the PACU in stable condition at the conclusion of the case      Patient Disposition:  PACU stable and extubated        SIGNATURE: Marcus Vincent MD  DATE: April 26, 2023  TIME: 11:27 AM

## 2023-04-26 NOTE — INTERVAL H&P NOTE
H&P reviewed  After examining the patient I find no changes in the patients condition since the H&P had been written  Vitals:    04/26/23 0927   BP: 137/93   Pulse: 77   Resp: 18   Temp: 97 6 °F (36 4 °C)   SpO2: 97%     History of T1 high-grade urothelial carcinoma the bladder  Muscle was not present in the specimen  Squamous differentiation was noted  Recommend returning to the operating room for repeat TURBT to ensure proper staging

## 2023-04-26 NOTE — ANESTHESIA POSTPROCEDURE EVALUATION
Post-Op Assessment Note    CV Status:  Stable    Pain management: adequate     Mental Status:  Sleepy   Hydration Status:  Euvolemic   PONV Controlled:  Controlled   Airway Patency:  Patent   Two or more mitigation strategies used for obstructive sleep apnea   Post Op Vitals Reviewed: Yes      Staff: Anesthesiologist, CRNA         No notable events documented      BP      Temp      Pulse     Resp      SpO2

## 2023-04-26 NOTE — INTERVAL H&P NOTE
H&P reviewed  After examining the patient I find no changes in the patients condition since the H&P had been written  Vitals:    04/26/23 0927   BP: 137/93   Pulse: 77   Resp: 18   Temp: 97 6 °F (36 4 °C)   SpO2: 97%     On examination he is in no acute distress  Cardiac is regular  Respiratory no distress  Abdomen is soft nontender nondistended  Skin is warm    HEENT normocephalic/atraumatic, sclera anicteric

## 2023-04-27 ENCOUNTER — TELEPHONE (OUTPATIENT)
Dept: UROLOGY | Facility: AMBULATORY SURGERY CENTER | Age: 73
End: 2023-04-27

## 2023-04-27 NOTE — TELEPHONE ENCOUNTER
Post Op Note    Hayley Montero is a 74 y o  male s/p TURBT performed 04/26/2023  Hayley Montero is a patient of Dr Lani Leggett and is seen at the [facility] office  How would you rate your pain on a scale from 1 to 10, 10 being the worst pain ever? 0  Have you had a fever? No  Have your bowel movements been regular? No  Do you have any difficulty urinating? No  Do you have any other questions or concerns that I can address at this time?  Patient is scheduled for follow up 5/12 @ 2:00

## 2023-04-28 LAB — BACTERIA UR CULT: NORMAL

## 2023-05-12 ENCOUNTER — OFFICE VISIT (OUTPATIENT)
Dept: UROLOGY | Facility: AMBULATORY SURGERY CENTER | Age: 73
End: 2023-05-12

## 2023-05-12 ENCOUNTER — TELEPHONE (OUTPATIENT)
Dept: UROLOGY | Facility: AMBULATORY SURGERY CENTER | Age: 73
End: 2023-05-12

## 2023-05-12 VITALS
WEIGHT: 269 LBS | BODY MASS INDEX: 35.49 KG/M2 | HEART RATE: 76 BPM | DIASTOLIC BLOOD PRESSURE: 80 MMHG | OXYGEN SATURATION: 96 % | SYSTOLIC BLOOD PRESSURE: 120 MMHG

## 2023-05-12 DIAGNOSIS — C67.2 MALIGNANT NEOPLASM OF LATERAL WALL OF URINARY BLADDER (HCC): Primary | ICD-10-CM

## 2023-05-12 NOTE — PROGRESS NOTES
5/12/2023    [address] [facility]  1950  [id number]        Assessment  History of T1 high-grade urothelial carcinoma the bladder, repeat TURBT without evidence of malignancy with muscle present and uninvolved by cancer, BPH    Discussion  Discussed his initial pathology revealing high-grade urothelial carcinoma the bladder with probable invasion of the lamina propria  He returns to the office today after his repeat TURBT was performed  Fortunately there was no sign of cancer identified on the repeat TURBT  In addition muscle was present and uninvolved by tumor  I recommend starting intravesical BCG x6 and then repeat cystoscopy 6 weeks after his last BCG therapy  Continue tamsulosin  History of Present Illness  74 y o  male with a history of recurrent bladder cancer  His most recent recurrence was in March 2023  Pathology revealed high-grade disease with likely lamina propria invasion  Based on this pathology return to the operating room approximately 1 month later at the end of April 2023  At that time I performed repeat TURBT  This shows no evidence of recurrent disease  Muscle was present in the specimen and there was no evidence of urothelial carcinoma present  He has moderate lower urinary tract symptoms which he states are improved with tamsulosin  PSA from January 2023 is 0 8          AUA Symptom Score  AUA SYMPTOM SCORE    Flowsheet Row Most Recent Value   AUA SYMPTOM SCORE    How often have you had a sensation of not emptying your bladder completely after you finished urinating? 1 (P)     How often have you had to urinate again less than two hours after you finished urinating? 1 (P)     How often have you found you stopped and started again several times when you urinate? 1 (P)     How often have you found it difficult to postpone urination? 1 (P)     How often have you had a weak urinary stream? 1 (P)     How often have you had to push or strain to begin urination? 0 (P)     How many times did you most typically get up to urinate from the time you went to bed at night until the time you got up in the morning? 5 (P)     Quality of Life: If you were to spend the rest of your life with your urinary condition just the way it is now, how would you feel about that? 1 (P)     AUA SYMPTOM SCORE 10 (P)           Review of Systems  Review of Systems   Constitutional: Negative  HENT: Negative  Eyes: Negative  Respiratory: Negative  Cardiovascular: Negative  Gastrointestinal: Negative  Endocrine: Negative  Genitourinary:        Per HPI   Musculoskeletal: Negative  Skin: Negative  Allergic/Immunologic: Negative  Neurological: Negative  Hematological: Negative  Psychiatric/Behavioral: Negative            Past Medical History  Past Medical History:   Diagnosis Date   • Anxiety disorder    • Atherosclerotic heart disease of native coronary artery without angina pectoris    • Benign prostatic hyperplasia without lower urinary tract symptoms    • Cancer (HCC)     bladder   • Disease of thyroid gland     hypo   • Dyslipidemia    • GERD (gastroesophageal reflux disease)    • Hypertension    • Impaired fasting blood sugar    • Kidney stone    • Low back pain    • Osteoarthritis     last assesed 6-6-16   • Other chest pain    • Paresthesia of skin    • Polyneuropathy     last assesed 5-8-17   • Pure hypercholesterolemia    • 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 ([facility] )    • Wears glasses        Past Social History  Past Surgical History:   Procedure Laterality Date   • BACK SURGERY     • CHOLECYSTECTOMY     • COLONOSCOPY  02/06/2019   • CYSTOSCOPY N/A 4/26/2023    Procedure: CYSTOSCOPY w/ bladder biopsy;  Surgeon: Jordyn Phillip MD;  Location[facility];  Service: Urology   • FL ARTHRP ACETBLR/PROX FEM PROSTC AGRFT/ALGRFT Right 8/5/2019    Procedure: ARTHROPLASTY HIP TOTAL;  Surgeon: Abdoul Riley MD;  Location: [facility];  Service: Orthopedics • HI CYSTO W/REMOVAL OF LESIONS SMALL N/A 3/23/2023    Procedure: TRANSURETHRAL RESECTION OF BLADDER TUMOR (TURBT), mitomycin ;  Surgeon: Lenin Hayward MD;  Location[facility];  Service: Urology   • HI CYSTO W/REMOVAL OF LESIONS SMALL N/A 4/26/2023    Procedure: TRANSURETHRAL RESECTION OF BLADDER TUMOR (TURBT), cystoscopy with bladder biopsy;  Surgeon: Lenin Hayward MD;  Location[facility];  Service: Urology   • TONSILLECTOMY         Past Family History  Family History   Problem Relation Age of Onset   • Arthritis Other    • Heart disease Father        Past Social history  Social History     Socioeconomic History   • Marital status: /Civil Union     Spouse name: Not on file   • Number of children: Not on file   • Years of education: Not on file   • Highest education level: Not on file   Occupational History   • Not on file   Tobacco Use   • Smoking status: Former   • Smokeless tobacco: Never   Vaping Use   • Vaping Use: Never used   Substance and Sexual Activity   • Alcohol use: Never   • Drug use: Never   • Sexual activity: Not Currently     Partners: Female   Other Topics Concern   • Not on file   Social History Narrative    Smoking: Non-smoker    As per [employer]     Social Determinants of Health     Financial Resource Strain: Not on file   Food Insecurity: Not on file   Transportation Needs: Not on file   Physical Activity: Not on file   Stress: Not on file   Social Connections: Not on file   Intimate Partner Violence: Not on file   Housing Stability: Not on file       Current Medications  Current Outpatient Medications   Medication Sig Dispense Refill   • atorvastatin (LIPITOR) 10 mg tablet Take 1 tablet (10 mg total) by mouth daily at bedtime 90 tablet 0   • ciclopirox (LOPROX) 0 77 % cream APPLY TO AFFECTED AREAS ON FACE 1-2 TIMES DAILY     • hydrocortisone 2 5 % cream APPLY TO SKIN TWICE DAILY FOR 2 WEEKS THEN IF NEEDED     • levothyroxine 150 mcg tablet Take 1 tablet (150 mcg total) by mouth daily 90 tablet 0   • Naproxen Sodium 220 MG CAPS Take by mouth     • tamsulosin (FLOMAX) 0 4 mg Take 1 capsule (0 4 mg total) by mouth daily with dinner 90 capsule 3     No current facility-administered medications for this visit  Allergies  No Known Allergies    Past Medical History, Social History, Family History, medications and allergies were reviewed  Vitals  Vitals:    05/12/23 1349   BP: 120/80   BP Location: Left arm   Patient Position: Sitting   Cuff Size: Large   Pulse: 76   SpO2: 96%   Weight: 122 kg (269 lb)       Physical Exam  Physical Exam  On examination he is in no acute distress    Gait normal   Affect normal      Results  Lab Results   Component Value Date    PSA 0 8 01/26/2023    PSA 0 5 12/03/2021    PSA 0 4 10/07/2020     Lab Results   Component Value Date    CALCIUM 8 3 (L) 03/08/2023    K 4 0 03/08/2023    CO2 23 03/08/2023     03/08/2023    BUN 19 03/08/2023    CREATININE 0 88 03/08/2023     Lab Results   Component Value Date    WBC 7 18 03/11/2023    HGB 14 2 03/11/2023    HCT 42 8 03/11/2023    MCV 92 03/11/2023     03/11/2023         Office Urine Dip  No results found for this or any previous visit (from the past 1 hour(s)) ]

## 2023-05-12 NOTE — TELEPHONE ENCOUNTER
MD Asa Tolentino, RN  Start BCG X 6 intravessical for T1 High grade and recurrent disease  Thank you       FT

## 2023-05-16 NOTE — TELEPHONE ENCOUNTER
BCG 50mg - Medicare/[facility] supplemental - NO authorization required  Office stock okay to use  Please get ABN signed for 2023  *VLD 5/16/23

## 2023-05-17 ENCOUNTER — HOSPITAL ENCOUNTER (OUTPATIENT)
Dept: RADIOLOGY | Facility: HOSPITAL | Age: 73
Discharge: HOME | End: 2023-05-17
Attending: ORTHOPAEDIC SURGERY

## 2023-05-17 ENCOUNTER — OFFICE VISIT (OUTPATIENT)
Dept: OBGYN CLINIC | Facility: HOSPITAL | Age: 73
End: 2023-05-17

## 2023-05-17 VITALS
HEART RATE: 79 BPM | HEIGHT: 73 IN | DIASTOLIC BLOOD PRESSURE: 83 MMHG | BODY MASS INDEX: 35.49 KG/M2 | SYSTOLIC BLOOD PRESSURE: 165 MMHG

## 2023-05-17 DIAGNOSIS — M17.0 PRIMARY OSTEOARTHRITIS OF BOTH KNEES: ICD-10-CM

## 2023-05-17 DIAGNOSIS — M25.562 LEFT KNEE PAIN, UNSPECIFIED CHRONICITY: ICD-10-CM

## 2023-05-17 DIAGNOSIS — M25.562 LEFT KNEE PAIN, UNSPECIFIED CHRONICITY: Primary | ICD-10-CM

## 2023-05-17 RX ADMIN — BETAMETHASONE SODIUM PHOSPHATE AND BETAMETHASONE ACETATE 12 MG: 3; 3 INJECTION, SUSPENSION INTRA-ARTICULAR; INTRALESIONAL; INTRAMUSCULAR; SOFT TISSUE at 16:09

## 2023-05-17 RX ADMIN — BUPIVACAINE HYDROCHLORIDE 4 ML: 2.5 INJECTION, SOLUTION INFILTRATION; PERINEURAL at 16:09

## 2023-05-17 NOTE — PROGRESS NOTES
Assessment:   Diagnosis ICD-10-CM Associated Orders   1  Left knee pain, unspecified chronicity  [id number] [id number] XR knee 3 vw left non injury          Plan:  Reviewed today's physical exam findings and x-ray findings with patient at time of visit  X-Ray of bilateral knees were reviewed and showed severe tricompartmental osteoarthritis affecting the medial tibiofemoral compartment as evidenced by joint space narrowing, osteophyte formation and subchondral sclerosis  It was explained to the patient that based upon the clinical and radiographic findings, at this point in time, this is not a surgical problem  Treatment for the above diagnoses and associated symptoms of this nature is generally conservative including a physician directed physical therapy program, improved fitness/weight loss, anti-inflammatories, and potentially various injections  Ambulatory referral to formal outpatient physical therapy to improve range of motion and strengthening of bilateral knees  Viscosupplementation was ordered for her bilateral knees  He was offered, accepted, performed an injection(s) of cortisone to his Bilateral knee for symptomatic relief of pain and inflammation  Patient tolerated the treatment(s) well  Ice and post injection protocol advised  Weightbearing activities as tolerated  He will be seen for follow-up in After viscosupplementation is approved for re-evaluation and consideration for repeat injections as necessary  Patient expresses understanding and is in agreement with this treatment plan  The patient was given the opportunity to ask questions or present concerns  To do next visit:  No follow-ups on file  The above stated was discussed in layman's terms and the patient expressed understanding  All questions were answered to the patient's satisfaction         Scribe Attestation    I,:   am acting as a scribe while in the presence of the attending physician :       I,:   personally performed the services described in this documentation    as scribed in my presence :         Subjective:   Emperatriz Cortes is a 74 y o  male who presents today for an Initial evaluation of his bilateral knee due to chronic pain  The patient is currently treated for bladder cancer  The patient states that he was hospitalized in March 2023 due to UTI  While he was in the ED, an aspiration of 65 cc's of fluid was taken from the right knee  The patient has a h/o of right total hip arthroplasty performed on 08/05/2023  On today's presentation, the patient states that he has been experiencing worsening bilateral knee pain with prolonged sedentary positions and weightbearing activities especially ambulating stairs and getting up from a seated position  He has a h/o of [employer]  The patient states he received an injection of cortisone to provide symptomatic relief about 5 years ago  He states he also uses knee sleeves for added support  He states that he has a lead a sedentary lifestyle due to his bilateral knee pain  Patient has failed at least three months of conservative therapy including Home exercise program, over-the-counter oral pain medication, bracing, rest, ice , patient symptoms include Pain and stiffness, pain is rated at 8/10  He denies any recent bruising, numbness, paresthesias, weakness, or feelings of instability  He denies any fevers, chills, dizziness, headaches, chest pain, shortness of breath, palpitations, abdominal pain, nausea, vomiting, diarrhea, lower extremity pain/swelling/edema  Review of systems negative unless otherwise specified in HPI  Review of Systems   Constitutional: Negative for chills and fever  HENT: Negative for ear pain and sore throat  Eyes: Negative for pain and visual disturbance  Respiratory: Negative for cough and shortness of breath  Cardiovascular: Negative for chest pain and palpitations  Gastrointestinal: Negative for abdominal pain and vomiting     Genitourinary: Negative for dysuria and hematuria  Musculoskeletal: Negative for arthralgias and back pain  Skin: Negative for color change and rash  Neurological: Negative for seizures and syncope  All other systems reviewed and are negative        Past Medical History:   Diagnosis Date   • Anxiety disorder    • Atherosclerotic heart disease of native coronary artery without angina pectoris    • Benign prostatic hyperplasia without lower urinary tract symptoms    • Cancer (HCC)     bladder   • Disease of thyroid gland     hypo   • Dyslipidemia    • GERD (gastroesophageal reflux disease)    • Hypertension    • Impaired fasting blood sugar    • Kidney stone    • Low back pain    • Osteoarthritis     last assesed 6-6-16   • Other chest pain    • Paresthesia of skin    • Polyneuropathy     last assesed 5-8-17   • Pure hypercholesterolemia    • 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 ([facility] [address] )    • Wears glasses        Past Surgical History:   Procedure Laterality Date   • BACK SURGERY     • CHOLECYSTECTOMY     • COLONOSCOPY  02/06/2019   • CYSTOSCOPY N/A 4/26/2023    Procedure: CYSTOSCOPY w/ bladder biopsy;  Surgeon: Jana Cook MD;  [facility];  Service: Urology   • UT ARTHRP ACETBLR/PROX FEM PROSTC AGRFT/ALGRFT Right 8/5/2019    Procedure: ARTHROPLASTY HIP TOTAL;  Surgeon: Ryan Pham MD;  Location[facility];  Service: Orthopedics   • UT CYSTO W/REMOVAL OF LESIONS SMALL N/A 3/23/2023    Procedure: TRANSURETHRAL RESECTION OF BLADDER TUMOR (TURBT), mitomycin ;  Surgeon: Jana Cook MD;  [facility];  Service: Urology   • UT CYSTO W/REMOVAL OF LESIONS SMALL N/A 4/26/2023    Procedure: TRANSURETHRAL RESECTION OF BLADDER TUMOR (TURBT), cystoscopy with bladder biopsy;  Surgeon: Jana Cook MD;  [facility];  Service: Urology   • TONSILLECTOMY         Family History   Problem Relation Age of Onset   • Arthritis Other    • Heart disease Father Social History     Occupational History   • Not on file   Tobacco Use   • Smoking status: Former   • Smokeless tobacco: Never   Vaping Use   • Vaping Use: Never used   Substance and Sexual Activity   • Alcohol use: Never   • Drug use: Never   • Sexual activity: Not Currently     Partners: Female         Current Outpatient Medications:   •  atorvastatin (LIPITOR) 10 mg tablet, Take 1 tablet (10 mg total) by mouth daily at bedtime, Disp: 90 tablet, Rfl: 0  •  ciclopirox (LOPROX) 0 77 % cream, APPLY TO AFFECTED AREAS ON FACE 1-2 TIMES DAILY, Disp: , Rfl:   •  hydrocortisone 2 5 % cream, APPLY TO SKIN TWICE DAILY FOR 2 WEEKS THEN IF NEEDED, Disp: , Rfl:   •  levothyroxine 150 mcg tablet, Take 1 tablet (150 mcg total) by mouth daily, Disp: 90 tablet, Rfl: 0  •  Naproxen Sodium 220 MG CAPS, Take by mouth, Disp: , Rfl:   •  tamsulosin (FLOMAX) 0 4 mg, Take 1 capsule (0 4 mg total) by mouth daily with dinner, Disp: 90 capsule, Rfl: 3    No Known Allergies       Vitals:    05/17/23 1517   BP: 165/83   Pulse: 79       Objective:                    Ortho Exam  bilateral knee(s) -   Patient ambulates with antalgic gait pattern  Uses No assistive device  Genu Varus anatomical deformity  Skin is warm and dry to touch with no signs of erythema, ecchymosis, or infection  Mild generalized soft tissue swelling or effusion noted  ROM (5° - 115°)  MMT: 4/5 throughout  TTP over medial joint line, TTP over lateral joint line, TTP over pes anserine bursa, no popliteal fullness appreciated on exam   Flexor and extensor mechanisms are intact   Knee is stable to varus and valgus stress  - Lachman's  - Anterior Drawer, - Posterior Drawer  - Medial Caesar's, - Lateral Caesar's  - Pivot Shift  Patella tracks centrally with palpable crepitus  Calf compartments are soft and supple  - Manny's sign  2+ DP and PT pulses with brisk capillary refill to the toes  Sural, saphenous, tibial, superficial, and deep peroneal motor and sensory "distributions intact  Sensation light touch intact distally    Diagnostics, reviewed and taken today if performed as documented: The attending physician has personally reviewed the pertinent films in PACS and interpretation is as follows:    X-Ray of bilateral knees were reviewed and showed severe tricompartmental osteoarthritis affecting the medial tibiofemoral compartment as evidenced by joint space narrowing, osteophyte formation and subchondral sclerosis  Procedures, if performed today:    Large joint arthrocentesis: bilateral knee  Universal Protocol:  Consent: Verbal consent obtained  Risks and benefits: risks, benefits and alternatives were discussed  Consent given by: patient  Time out: Immediately prior to procedure a \"time out\" was called to verify the correct patient, procedure, equipment, support staff and site/side marked as required  Timeout called at: 5/17/2023 4:05 PM   Patient understanding: patient states understanding of the procedure being performed  Site marked: the operative site was marked  Patient identity confirmed: verbally with patient    Supporting Documentation  Indications: pain and diagnostic evaluation   Procedure Details  Location: knee - bilateral knee  Needle size: 22 G  Ultrasound guidance: no  Approach: anterolateral    Medications (Right): 4 mL bupivacaine 0 25 %; 12 mg betamethasone acetate-betamethasone sodium phosphate 6 (3-3) mg/mLMedications (Left): 4 mL bupivacaine 0 25 %; 12 mg betamethasone acetate-betamethasone sodium phosphate 6 (3-3) mg/mL   Patient tolerance: patient tolerated the procedure well with no immediate complications  Dressing:  Sterile dressing applied      Portions of the record may have been created with voice recognition software  Occasional wrong word or \"sound a like\" substitutions may have occurred due to the inherent limitations of voice recognition software    Read the chart carefully and recognize, using context, where substitutions have " occurred

## 2023-05-18 NOTE — TELEPHONE ENCOUNTER
Called [facility] to schedule BCG treatment but don't have the stock to start    He was ok with this as his son is coming in from [place]    Patient will need ABN form signed

## 2023-05-19 RX ORDER — BUPIVACAINE HYDROCHLORIDE 2.5 MG/ML
4 INJECTION, SOLUTION INFILTRATION; PERINEURAL
Status: COMPLETED | OUTPATIENT
Start: 2023-05-17 | End: 2023-05-17

## 2023-05-19 RX ORDER — BETAMETHASONE SODIUM PHOSPHATE AND BETAMETHASONE ACETATE 3; 3 MG/ML; MG/ML
12 INJECTION, SUSPENSION INTRA-ARTICULAR; INTRALESIONAL; INTRAMUSCULAR; SOFT TISSUE
Status: COMPLETED | OUTPATIENT
Start: 2023-05-17 | End: 2023-05-17

## 2023-05-22 ENCOUNTER — OFFICE VISIT (OUTPATIENT)
Dept: PODIATRY | Facility: CLINIC | Age: 73
End: 2023-05-22

## 2023-05-22 VITALS
WEIGHT: 270 LBS | SYSTOLIC BLOOD PRESSURE: 134 MMHG | DIASTOLIC BLOOD PRESSURE: 79 MMHG | HEART RATE: 79 BPM | BODY MASS INDEX: 35.78 KG/M2 | HEIGHT: 73 IN

## 2023-05-22 DIAGNOSIS — I73.9 PERIPHERAL VASCULAR DISEASE, UNSPECIFIED (HCC): Primary | ICD-10-CM

## 2023-05-22 NOTE — PROGRESS NOTES
Established patient with class findings presents for nail care  Mild discomfort related along lateral nail border of left great toenail  Vascular exam:  DP  0/4 bilateral; PT  0 4 bilateral   Dermatological exam:  Each toenail is thick and  dystrophic  Slight discomfort with palpation lateral nail border left hallux  Diagnosis: PVD  Treatment: Trimmed multiple dystrophic toenails  Superficial avulsion lateral nail border left hallux  Note: Patient has begun treatment for bladder cancer      Nail removal    Date/Time: 5/22/2023 9:30 AM  Performed by: Radha Ricardo DPM  Authorized by: Radha Ricardo DPM     Nails trimmed:     Number of nails trimmed:  10

## 2023-06-01 ENCOUNTER — EVALUATION (OUTPATIENT)
Dept: PHYSICAL THERAPY | Age: 73
End: 2023-06-01

## 2023-06-01 VITALS — OXYGEN SATURATION: 98 % | HEART RATE: 65 BPM | DIASTOLIC BLOOD PRESSURE: 76 MMHG | SYSTOLIC BLOOD PRESSURE: 128 MMHG

## 2023-06-01 DIAGNOSIS — M25.561 CHRONIC PAIN OF RIGHT KNEE: ICD-10-CM

## 2023-06-01 DIAGNOSIS — G89.29 CHRONIC PAIN OF RIGHT KNEE: ICD-10-CM

## 2023-06-01 DIAGNOSIS — M17.0 PRIMARY OSTEOARTHRITIS OF BOTH KNEES: Primary | ICD-10-CM

## 2023-06-01 DIAGNOSIS — M25.562 LEFT KNEE PAIN, UNSPECIFIED CHRONICITY: ICD-10-CM

## 2023-06-01 NOTE — PROGRESS NOTES
PT Evaluation     Today's date: 2023  Patient name: Antonino Saini  : 1950  MRN: [id number]  Referring provider: Sherly Phillip PA-C  Dx:   Encounter Diagnosis     ICD-10-CM    1  Primary osteoarthritis of both knees  M17 0 Ambulatory Referral to Physical Therapy      2  Left knee pain, unspecified chronicity  M25 562 Ambulatory Referral to Physical Therapy      3  Chronic pain of right knee  M25 561     G89 29           Start Time: 1500  Stop Time: 1600  Total time in clinic (min): 60 minutes    Assessment  Assessment details: Antonino Saini is a 74 y o  male presenting to OPPT eval with complaints of bilateral knee pain secondary to progressive severe OA  Imaging reveals tricompartmental degeneration of bilateral knees  Prior tx includes cortisone injections with no sx relief and viscosupplementation treatment 5 y o  that resulted in prolonged sx relief - plans to receive viscosupp injection on 23  Upon PT examination, pt presents with decreased R knee ext in static stance, B/L genu varus in static stance & gait, decreased B/L knee ext ROM R>L, decreased knee flex ROM L>R, patellar hypomobility R>L, B/L hamstring tightness R>L and fair LE muscular strength / endurance impairing ability to sit, stand, walk, walk on uneven surfaces squat, and stair climb  Gait analysis reveals decreased B/L knee extension in terminal swing to initial contact, resulting in crouched gait with midfoot strike and forward trunk lean  C/o medial joint line pain in L knee during gait  Pt given HEP of seated HS stretch, runner's calf stretch and seated LAQ to address deficits in ROM / strength  The patient is a fair candidate for physical therapy to achieve the following goals    Impairments: abnormal gait, abnormal or restricted ROM, activity intolerance, impaired balance, impaired physical strength, lacks appropriate home exercise program, pain with function, weight-bearing intolerance, poor posture  and poor body mechanics    Goals  STG (2-3 wks):  1  Pt's report of B/L knee pain will decrease to <5/10 during walking and stair climbing to improve functional mobility  2  Pt will demonstrate increased R knee ext to -4 degrees in order to improve gait mechanics  3  Pt will demonstrate increased L knee flexion to 120 degrees in order to promote symmetrical mobility  4  Pt will improve 5xSTS to <16 seconds to demonstrate improved LE power  5  Pt's self-perceived disability will decrease as demonstrated by a 8-point improvement in FOTO score  6  Pt will be independent with HEP as demonstrated by return demo with proper technique and execution of exercises provided  LTG (4-6 wks):  1  Pt's report of B/L knee pain will decrease to <2/10 during walking and stair climbing to improve functional mobility  2  Pt will demonstrate increased R knee ext to <-2 degrees in order to improve gait mechanics  3  Pt will demonstrate increased L knee flexion to 125 degrees in order to promote symmetrical mobility  4  Pt will improve 5xSTS to <13 seconds to demonstrate improved LE power in correlation with age-matched norms for increased independence with sit-to-stand transfers   5  Pt's self-perceived disability will decrease as demonstrated by a 15-point improvement in FOTO score  6  Pt will be independent with progressions to HEP as demonstrated by return demo with proper technique and execution of exercises provided  7   Pt will ambulate 50 feet on uneven surface (grass) to improve balance confidence with outdoor household chores      Plan  Patient would benefit from: skilled physical therapy  Planned modality interventions: biofeedback, cryotherapy, electrical stimulation/Russian stimulation, hydrotherapy, TENS, thermotherapy: hydrocollator packs, traction and ultrasound  Planned therapy interventions: abdominal trunk stabilization, activity modification, aquatic therapy, balance, balance/weight bearing training, body mechanics training, "flexibility, functional ROM exercises, graded activity, graded exercise, graded motor, gait training, home exercise program, therapeutic training, therapeutic activities, therapeutic exercise, stretching, strengthening, joint mobilization, manual therapy, massage, neuromuscular re-education, patient education and postural training  Frequency: 2x week  Duration in visits: 8  Duration in weeks: 6  Plan of Care beginning date: 6/1/2023  Plan of Care expiration date: 7/16/2023  Treatment plan discussed with: patient        Subjective Evaluation    History of Present Illness  Date of onset: 5/17/2023  Mechanism of injury: Pt is referred to OP PT by orthopedics with diagnosis of bilateral knee OA  The following HPI captured from referring provider's encounter on 5/17/23 and EMR review and confirmed with patient:    Chace Hi is a 74 y o  male who presents today for an Initial evaluation of his bilateral knee due to chronic pain  The patient is currently treated for bladder cancer  The patient states that he was hospitalized in March 2023 due to UTI  While he was in the ED, an aspiration of 65 cc's of fluid was taken from the right knee  The patient has a h/o of right total hip arthroplasty performed on 08/05/2023  On today's presentation, the patient states that he has been experiencing worsening bilateral knee pain with prolonged sedentary positions and weightbearing activities especially ambulating stairs and getting up from a seated position  He has a h/o of Osgood slaughters  The patient states he received an injection of cortisone to provide symptomatic relief about 5 years ago  He states he also uses knee sleeves for added support  He states that he has a lead a sedentary lifestyle due to his bilateral knee pain  Patient has failed at least three months of conservative therapy including Home exercise program, over-the-counter oral pain medication, bracing, rest, ice , patient symptoms include Pain and " "stiffness, pain is rated at 8/10  He denies any recent bruising, numbness, paresthesias, weakness, or feelings of instability  He denies any fevers, chills, dizziness, headaches, chest pain, shortness of breath, palpitations, abdominal pain, nausea, vomiting, diarrhea, lower extremity pain/swelling/edema  \"    PMH remarkable for Hypothyroidism, Primary osteoarthritis of both knees, Osgood-Schlatter's disease of both knees, Gross hematuria, Malignant neoplasm of posterior wall of urinary bladder ([facility] [address] ), Malignant neoplasm of lateral wall of urinary bladder ([facility] [address] ), R hip replacement, Obesity, morbid ([facility] [address] ), Hyperlipidemia    Imaging of L knee (5/19/23) per Epic EMR review:  \"LEFT KNEE     INDICATION:   M25 562: Pain in left knee      COMPARISON: Left knee x-ray dated May 29, 2019      VIEWS:  XR KNEE 3 VW LEFT NON INJURY     FINDINGS:     There is no acute fracture or dislocation      There is no joint effusion      Tricompartmental osteoarthritis with severe narrowing of the medial tibiofemoral joint space and osteophytes seen in all 3 compartments  There is mild genu varus      No lytic or blastic osseous lesion      Soft tissues are unremarkable      IMPRESSION:     No acute osseous abnormality      Tricompartmental osteoarthritic degenerative changes of the left knee with severe medial compartment joint space narrowing  \"    Imaging of R knee (3/9/2023) per Epic EMR review:   \"RIGHT KNEE and TIBIA/FIBULA     INDICATION:   swelling/pain      COMPARISON:  Right knee radiographs 5/29/2019      VIEWS:  XR KNEE 3 VW RIGHT NON INJURY, XR TIBIA FIBULA 2 VW RIGHT      FINDINGS:     There is no acute fracture or dislocation      Possible tiny joint effusion, noting poor lateral view      Moderate tricompartmental osteoarthritis as evidenced by joint space narrowing, osteophyte formation and subchondral sclerosis, progressed from prior      No lytic or blastic osseous lesion      Soft tissues are " "unremarkable      IMPRESSION:     No acute osseous abnormality      Degenerative changes as described  \"    Pt with current symptomatic complaints today:   Reports increased B/L knee pain, especially with weather changes - today is a \"better day  \" Pain is described as \"excruciating\" and \"sharp\" that limits his ability to sit, stand, walking, walking on uneven surfaces squat, and stair climb  Prior tx includes viscosupplementation treatment 5 y o  that resulted in prolonged sx relief  Did not follow up with gel injections after initial treatment 5 y o  - plans to receive viscosupp injection on 23  Pt underwent cortisone steroid injection to B/L knees on 2023 - reports no change in pain  Previously wore compression sleeves for comfort and sx relief - stopped wearing knee sleeves due to fluid retention 2/2 bladder issues  Pt currently cleared of bladder cancer and will be undergoing preventative treatment in the near future  [facility] treatment for bladder cancer starting 7/15/23 - scheduled for 6 consecutive weeks on   Previous PT at [facility] for low back pain    Quality of life: fair    Pain  Current pain ratin  At best pain ratin  At worst pain ratin  Quality: sharp and dull ache  Aggravating factors: walking and stair climbing    Social Support  Steps to enter house: yes  1  Stairs in house: yes   13  Lives in: one-story house  Lives with: spouse    Employment status: not working  Exercise history: sedentary      Diagnostic Tests  X-ray: abnormal  Treatments  Current treatment: injection treatment and medication  Patient Goals  Patient goals for therapy: decreased pain and increased strength  Patient goal: Return to golf      Current Outpatient Medications on File Prior to Visit   Medication Sig Dispense Refill   • atorvastatin (LIPITOR) 10 mg tablet Take 1 tablet (10 mg total) by mouth daily at bedtime 90 tablet 0   • ciclopirox (LOPROX) 0 77 % cream APPLY TO AFFECTED AREAS ON FACE 1-2 " TIMES DAILY     • hydrocortisone 2 5 % cream APPLY TO SKIN TWICE DAILY FOR 2 WEEKS THEN IF NEEDED     • levothyroxine 150 mcg tablet Take 1 tablet (150 mcg total) by mouth daily 90 tablet 0   • Naproxen Sodium 220 MG CAPS Take by mouth     • tamsulosin (FLOMAX) 0 4 mg Take 1 capsule (0 4 mg total) by mouth daily with dinner 90 capsule 3     No current facility-administered medications on file prior to visit  Past Surgical History:   Procedure Laterality Date   • BACK SURGERY     • CHOLECYSTECTOMY     • COLONOSCOPY  02/06/2019   • CYSTOSCOPY N/A 4/26/2023    Procedure: CYSTOSCOPY w/ bladder biopsy;  Surgeon: Jordan Stanton MD;  Location: [facility];  Service: Urology   • AL ARTHRP ACETBLR/PROX FEM PROSTC AGRFT/ALGRFT Right 8/5/2019    Procedure: ARTHROPLASTY HIP TOTAL;  Surgeon: Ricardo Ralph MD;  Location: [facility];  Service: Orthopedics   • AL CYSTO W/REMOVAL OF LESIONS SMALL N/A 3/23/2023    Procedure: TRANSURETHRAL RESECTION OF BLADDER TUMOR (TURBT), mitomycin ;  Surgeon: Jordan Stanton MD;  Location: [facility];  Service: Urology   • AL CYSTO W/REMOVAL OF LESIONS SMALL N/A 4/26/2023    Procedure: TRANSURETHRAL RESECTION OF BLADDER TUMOR (TURBT), cystoscopy with bladder biopsy;  Surgeon: Jordan Stanton MD;  Location: [facility];  Service: Urology   • TONSILLECTOMY         Objective     Static Posture     Knee   Genu varus  Palpation     Right   Hypertonic in the distal biceps femoris, distal semimembranosus and distal semitendinosus  Tenderness     Right Knee   Tenderness in the medial joint line       Active Range of Motion   Left Knee   Flexion: 115 degrees   Extension: -2 degrees     Right Knee   Flexion: 124 degrees   Extension: -8 degrees     Passive Range of Motion   Left Knee   Flexion: 115 degrees   Extension: -2 degrees     Right Knee   Flexion: 124 degrees   Extension: -8 degrees     Mobility   Patellar Mobility:   Left Knee   WFL: medial, lateral, superior and inferior  Right Knee   WFL: medial  Hypomobile: lateral, superior and inferior     Strength/Myotome Testing     Left Knee   Flexion: 4+  Extension: 4+  Quadriceps contraction: good    Right Knee   Flexion: 4+  Extension: 4  Quadriceps contraction: fair    Ambulation   Weight-Bearing Status   Weight-Bearing Status (Left): full weight bearing   Weight-Bearing Status (Right): full weight-bearing    Assistive device used: none    Ambulation: Level Surfaces   Ambulation without assistive device: independent    Observational Gait   Gait: asymmetric and crouched   Decreased walking speed and stride length  Quality of Movement During Gait   Trunk  Forward lean  Knee    Knee (Left): Positive stiff knee and varus  Knee (Right): Positive stiff knee and varus       Additional Quality of Movement During Gait Details  Lack of B/L knee extension AROM impacting terminal swing to initial contact, resulting in midfoot strike and crouched gait pattern with forward trunk lean    Functional Assessment        Comments  5xSTS: 21 6 seconds             Precautions: PMH remarkable for bladder cancer, HLD, hypothyroidism      Manuals 6/1/23 IE            Patellar mobilizations R M/L & Sup/Inf             STM / IASTM R hamstring               B/L Knee jt distraction with grade III/IV P-A mobilization                          Neuro Re-Ed                                                                                                                     Ther Ex             Bike / NS             Seated HS stretch x30sec ea            Runner's gastroc stretch x30 sec ea            Slant board gastroc / soleus stretch             Heel prop static knee ext             Heel slide stretch             Bridging             SAQ / LAQ LAQ x10 ea            Mini-squats             Reverse sled             Ecc step-down             F/L step up                          Ther Activity                                       Gait Training Overground, no AD                          Modalities             MHP - R hamstring             CP - B/L knee PRN

## 2023-06-06 ENCOUNTER — OFFICE VISIT (OUTPATIENT)
Dept: PHYSICAL THERAPY | Age: 73
End: 2023-06-06
Payer: MEDICARE

## 2023-06-06 DIAGNOSIS — M17.0 PRIMARY OSTEOARTHRITIS OF BOTH KNEES: Primary | ICD-10-CM

## 2023-06-06 DIAGNOSIS — M25.562 LEFT KNEE PAIN, UNSPECIFIED CHRONICITY: ICD-10-CM

## 2023-06-06 DIAGNOSIS — G89.29 CHRONIC PAIN OF RIGHT KNEE: ICD-10-CM

## 2023-06-06 DIAGNOSIS — M25.561 CHRONIC PAIN OF RIGHT KNEE: ICD-10-CM

## 2023-06-06 PROCEDURE — 97140 MANUAL THERAPY 1/> REGIONS: CPT | Performed by: PHYSICAL THERAPIST

## 2023-06-06 PROCEDURE — 97110 THERAPEUTIC EXERCISES: CPT | Performed by: PHYSICAL THERAPIST

## 2023-06-06 NOTE — PROGRESS NOTES
"Daily Note     Today's date: 2023  Patient name: Meenu Mukherjee  : 1950  MRN: [id number]  Referring provider: Ayden Gudino PA-C  Dx:   Encounter Diagnosis     ICD-10-CM    1  Primary osteoarthritis of both knees  M17 0       2  Left knee pain, unspecified chronicity  M25 562       3  Chronic pain of right knee  M25 561     G89 29           Start Time: 0730  Stop Time: 0800  Total time in clinic (min): 30 minutes    Subjective: Pt reports doing HEP and cycle ergometer at home - \"not doing them as much as I should  \" C/o of medial L knee pain, attributes pain to increase in activity from prior sedentary lifestyle  Notes \"3/10\" L knee pain with walking and twisting  Objective: See treatment diary below      Assessment: Tolerated treatment well  Pt demonstrated mild improvements in B/L knee ext ROM after STM and stretching focused on hamstring / calf combined with knee PROM  Followed up with gait training with cues to contract quads in initial contact  Trialed IASTM with graston tools for R hamstring - pt reported increased tenderness with graston and preferred manual soft tissue massage  Pt report decreased knee discomfort / pain at end of session  Patient would benefit from continued PT focused on ROM, flexibility and functional ROM  Plan: Continue per plan of care        Precautions: PMH remarkable for bladder cancer, HLD, hypothyroidism      Manuals 23 IE            Patellar mobilizations R M/L & Sup/Inf  3 x 30 sec ea           STM / IASTM R hamstring    IASTM / STM 10 min STM only          B/L Knee jt distraction with grade III/IV P-A mobilization             B/L knee PROM  5 min           Manual HS stretch  3 x 45 sec           Neuro Re-Ed                                                                                                                     Ther Ex             Bike / NS  x10 min           Seated HS stretch x30sec ea            Runner's gastroc stretch x30 sec ea      " Slant board gastroc / soleus stretch             Heel prop static knee ext  2 x 1 min ea           Heel slide stretch             Quad set  c heel prop 20 x 5 sec           Bridging             SAQ / LAQ LAQ x10 ea LAQ x20 ea           Mini-squats             Sled F/R   25# x 2 trips           Ecc step-down             F/L step up                          Ther Activity                                       Gait Training             Overground, no AD  x200ft                        Modalities             MHP - R hamstring             CP - B/L knee PRN

## 2023-06-09 ENCOUNTER — OFFICE VISIT (OUTPATIENT)
Dept: PHYSICAL THERAPY | Age: 73
End: 2023-06-09
Payer: MEDICARE

## 2023-06-09 DIAGNOSIS — M17.0 PRIMARY OSTEOARTHRITIS OF BOTH KNEES: Primary | ICD-10-CM

## 2023-06-09 DIAGNOSIS — G89.29 CHRONIC PAIN OF RIGHT KNEE: ICD-10-CM

## 2023-06-09 DIAGNOSIS — M25.561 CHRONIC PAIN OF RIGHT KNEE: ICD-10-CM

## 2023-06-09 DIAGNOSIS — M25.562 LEFT KNEE PAIN, UNSPECIFIED CHRONICITY: ICD-10-CM

## 2023-06-09 PROCEDURE — 97110 THERAPEUTIC EXERCISES: CPT

## 2023-06-09 PROCEDURE — 97140 MANUAL THERAPY 1/> REGIONS: CPT

## 2023-06-09 PROCEDURE — 97112 NEUROMUSCULAR REEDUCATION: CPT

## 2023-06-09 NOTE — PROGRESS NOTES
"Daily Note     Today's date: 2023  Patient name: Yesica Leon  : 1950  MRN: [id number]  Referring provider: Naomy Mayes PA-C  Dx:   Encounter Diagnosis     ICD-10-CM    1  Primary osteoarthritis of both knees  M17 0       2  Left knee pain, unspecified chronicity  M25 562       3  Chronic pain of right knee  M25 561     G89 29                      Subjective: Patient states that IASTM last time was too intense and request just regular STM this visit  He notes he is feeling overall better than a couple days ago  Objective: See treatment diary below      Assessment: Tolerated treatment well  Initiated POC on NuStep for active warmup  Progressed in reps with good tolerance  Added in step ups with notable challenge  Good response to STM  Patient would benefit from continued PT focused on ROM, flexibility and functional ROM  Plan: Continue per plan of care        Precautions: PMH remarkable for bladder cancer, HLD, hypothyroidism      Manuals 23 IE           Patellar mobilizations R M/L & Sup/Inf  3 x 30 sec ea           STM / IASTM R hamstring    IASTM / STM 10 min STM only          B/L Knee jt distraction with grade III/IV P-A mobilization             B/L knee PROM  5 min 5 min           Manual HS stretch  3 x 45 sec 3x 45 sec           Neuro Re-Ed                                                                                                                     Ther Ex             Bike / NS  x10 min x10 min          Seated HS stretch x30sec ea            Runner's gastroc stretch x30 sec ea            Slant board gastroc / soleus stretch             Heel prop static knee ext  2 x 1 min ea           Heel slide stretch             Quad set  c heel prop 20 x 5 sec c heel prop 20 sec x 5          Bridging   20x           SAQ / LAQ LAQ x10 ea LAQ x20 ea LAQ 30x          Mini-squats             Sled F/R   25# x 2 trips 25# 3 trips          Ecc step-down   8\" x 20 ea           F/L " step up                          Ther Activity                                       Gait Training             Overground, no AD  x200ft                        Modalities             MHP - R hamstring             CP - B/L knee PRN

## 2023-06-13 ENCOUNTER — OFFICE VISIT (OUTPATIENT)
Dept: PHYSICAL THERAPY | Age: 73
End: 2023-06-13
Payer: MEDICARE

## 2023-06-13 DIAGNOSIS — M17.0 PRIMARY OSTEOARTHRITIS OF BOTH KNEES: Primary | ICD-10-CM

## 2023-06-13 DIAGNOSIS — G89.29 CHRONIC PAIN OF RIGHT KNEE: ICD-10-CM

## 2023-06-13 DIAGNOSIS — M25.562 LEFT KNEE PAIN, UNSPECIFIED CHRONICITY: ICD-10-CM

## 2023-06-13 DIAGNOSIS — M25.561 CHRONIC PAIN OF RIGHT KNEE: ICD-10-CM

## 2023-06-13 PROCEDURE — 97110 THERAPEUTIC EXERCISES: CPT | Performed by: PHYSICAL THERAPIST

## 2023-06-13 PROCEDURE — 97140 MANUAL THERAPY 1/> REGIONS: CPT | Performed by: PHYSICAL THERAPIST

## 2023-06-13 NOTE — PROGRESS NOTES
"Daily Note     Today's date: 2023  Patient name: Jian Soria  : 1950  MRN: [id number]  Referring provider: Peyton Martinez PA-C  Dx:   Encounter Diagnosis     ICD-10-CM    1  Primary osteoarthritis of both knees  M17 0       2  Left knee pain, unspecified chronicity  M25 562       3  Chronic pain of right knee  M25 561     G89 29           Start Time: 0820  Stop Time: 0900  Total time in clinic (min): 40 minutes    Subjective: Pt notes increased L medial knee pain today, which decreased following aerobic warm-up on NuStep and continued to decrease with therapeutic exercise  Pt notes pain is currently worse in AM and following prolonged sitting  Objective: See treatment diary below      Assessment: Tolerated treatment well with no complaints other than fatigue  Pt reported decreased L knee pain with open and closed kinetic chain therapeutic exercises and demonstrated increased B knee ext resulting in improved gait mechanics upon end of session  Pt exhibited single epsiode of mild L knee buckling during session 2/2 to muscular fatigue following sled pulls - no LOB noted and pt independently maintained balance  Kinesiotape applied at end of session for L quad facilitation - instructed pt to wear for 3-5 days or remove if pain or skin reaction occurs  Patient would benefit from continued PT       Plan: Continue per plan of care        Precautions: PMH remarkable for bladder cancer, HLD, hypothyroidism      Manuals 23 IE          Patellar mobilizations R M/L & Sup/Inf  3 x 30 sec ea  5 min         STM / IASTM R hamstring    IASTM / STM 10 min STM only          B/L Knee jt distraction with grade III/IV P-A mobilization    L knee flexion gapping x45 sec         B/L knee PROM  5 min 5 min           Manual HS stretch  3 x 45 sec 3x 45 sec  x2 min         Kinesiotape - \"Y\" from quad tendon to M/L aspects of patella + \"U\" at base of patella    SP         Neuro Re-Ed                      " "                                                                                               Ther Ex             Bike / NS  x10 min x10 min x10min         Seated HS stretch x30sec ea            Runner's gastroc stretch x30 sec ea            Slant board gastroc / soleus stretch             Heel prop static knee ext  2 x 1 min ea           Heel slide stretch             Quad set  c heel prop 20 x 5 sec c heel prop 20 sec x 5          Bridging   20x           SLR    x20ea         SAQ / LAQ LAQ x10 ea LAQ x20 ea LAQ 30x SAQ/ LAQ x20ea         Mini-squats    x15         Sled F/R   25# x 2 trips 25# 3 trips 35# x4 trips         Ecc step-down   8\" x 20 ea           F/L step up    8\" Forward 2x15                      Ther Activity                                       Gait Training             Overground, no AD  x200ft                        Modalities             MHP - R hamstring             CP - B/L knee PRN                  "

## 2023-06-16 ENCOUNTER — OFFICE VISIT (OUTPATIENT)
Dept: PHYSICAL THERAPY | Age: 73
End: 2023-06-16
Payer: MEDICARE

## 2023-06-16 DIAGNOSIS — M17.0 PRIMARY OSTEOARTHRITIS OF BOTH KNEES: Primary | ICD-10-CM

## 2023-06-16 DIAGNOSIS — M25.561 CHRONIC PAIN OF RIGHT KNEE: ICD-10-CM

## 2023-06-16 DIAGNOSIS — G89.29 CHRONIC PAIN OF RIGHT KNEE: ICD-10-CM

## 2023-06-16 DIAGNOSIS — M25.562 LEFT KNEE PAIN, UNSPECIFIED CHRONICITY: ICD-10-CM

## 2023-06-16 PROCEDURE — 97140 MANUAL THERAPY 1/> REGIONS: CPT | Performed by: PHYSICAL THERAPIST

## 2023-06-16 PROCEDURE — 97110 THERAPEUTIC EXERCISES: CPT | Performed by: PHYSICAL THERAPIST

## 2023-06-16 NOTE — PROGRESS NOTES
"Daily Note     Today's date: 2023  Patient name: Enio Sanford  : 1950  MRN: [id number]  Referring provider: Sharon Negrete PA-C  Dx:   Encounter Diagnosis     ICD-10-CM    1  Primary osteoarthritis of both knees  M17 0       2  Left knee pain, unspecified chronicity  M25 562       3  Chronic pain of right knee  M25 561     G89 29           Start Time: 0845  Stop Time: 945  Total time in clinic (min): 60 minutes    Subjective: Notes increased B knee soreness for 2 days following last session - notes soreness has resolved today and is back to baseline  Notes strength improvements during today's session  Objective: See treatment diary below    ROM:  R knee   L knee 8-115      Assessment: Tolerated treatment well  Pt demonstrating gradual improvements in B knee ext ROM as evident by improved SAQ / LAQ / SLR and knee extension during gait  Strength improvements demonstrated by good tolerance of progressions to [facility] exercises  Still exhibits B knee ext ROM deficits and B knee pain impacting gait / functional mobility  Pt given cold pac to B knees at end of session due to increased soreness after last session  Patient would benefit from continued PT to address knee ROM / strength deficits  Plan: Continue per plan of care        Precautions: PMH remarkable for bladder cancer, HLD, hypothyroidism      Manuals 23 IE         Patellar mobilizations R M/L & Sup/Inf  3 x 30 sec ea  5 min         STM / IASTM R hamstring    IASTM / STM 10 min STM only          B/L Knee jt distraction with grade III/IV P-A mobilization    L knee flexion gapping x45 sec 3 x30 sec        B/L knee PROM  5 min 5 min           Manual HS stretch  3 x 45 sec 3x 45 sec  x2 min         Kinesiotape - \"Y\" from quad tendon to M/L aspects of patella + \"U\" at base of patella    SP         Neuro Re-Ed                                                                                                              " "       Ther Ex             Bike / NS  x10 min x10 min x10min x10 min        Seated HS stretch x30sec ea    5 x20 sec        Runner's gastroc stretch x30 sec ea            Slant board gastroc / soleus stretch     5 x20 sec        Heel prop static knee ext  2 x 1 min ea           Heel slide stretch             Quad set  c heel prop 20 x 5 sec c heel prop 20 sec x 5  x20 towel under knee        Bridging   20x           SLR    x20ea 1# x20ea        SAQ / LAQ LAQ x10 ea LAQ x20 ea LAQ 30x SAQ/ LAQ x20ea 1# x20ea        Mini-squats    x15 x20        Sled F/R   25# x 2 trips 25# 3 trips 35# x4 trips 35# x4 trips        Ecc step-down   8\" x 20 ea           F/L step up    8\" Forward 2x15                      Ther Activity                                       Gait Training             Overground, no AD  x200ft                        Modalities             MHP - R hamstring             CP - B/L knee PRN     x10 min             "

## 2023-06-20 ENCOUNTER — OFFICE VISIT (OUTPATIENT)
Dept: PHYSICAL THERAPY | Age: 73
End: 2023-06-20
Payer: MEDICARE

## 2023-06-20 DIAGNOSIS — M25.562 LEFT KNEE PAIN, UNSPECIFIED CHRONICITY: ICD-10-CM

## 2023-06-20 DIAGNOSIS — M17.0 PRIMARY OSTEOARTHRITIS OF BOTH KNEES: Primary | ICD-10-CM

## 2023-06-20 DIAGNOSIS — G89.29 CHRONIC PAIN OF RIGHT KNEE: ICD-10-CM

## 2023-06-20 DIAGNOSIS — M25.561 CHRONIC PAIN OF RIGHT KNEE: ICD-10-CM

## 2023-06-20 PROCEDURE — 97140 MANUAL THERAPY 1/> REGIONS: CPT | Performed by: PHYSICAL THERAPIST

## 2023-06-20 PROCEDURE — 97110 THERAPEUTIC EXERCISES: CPT | Performed by: PHYSICAL THERAPIST

## 2023-06-20 NOTE — PROGRESS NOTES
Daily Note     Today's date: 2023  Patient name: Geronimo Chao  : 1950  MRN: [id number]  Referring provider: Xenia Gonzáles PA-C  Dx:   Encounter Diagnosis     ICD-10-CM    1  Primary osteoarthritis of both knees  M17 0       2  Left knee pain, unspecified chronicity  M25 562       3  Chronic pain of right knee  M25 561     G89 29           Start Time: 08  Stop Time: 945  Total time in clinic (min): 50 minutes    Subjective: Pt notes improved mobility following therapy sessions, but B knee soreness is at its worst 2 days after therapy  Reports increased activity this past week - climbing stairs multiple times per day with grandchildren visiting  No reported increase in pain with increased activity at home; does not he used BHR to ascend / descend stairs  Objective: See treatment diary below      Assessment: Tolerated treatment well  Pt continues to demonstrate gradual improvements in B knee ROM as illustrated by R knee ext AROM lacking 8 degrees & L knee ext AROM lacking 3 degrees post-session  Responded well to HS / gastroc stretching and joint mobilizations per treatment diary to improve knee ext  Pt continues to make strength improvements as demonstrated by progressions to therapeutic exercises per treatment diary  Patient would benefit from continued PT to address knee ROM / strength deficits  Plan: Continue per plan of care        Precautions: PMH remarkable for bladder cancer, HLD, hypothyroidism      Manuals 23 IE        Patellar mobilizations R M/L & Sup/Inf  3 x 30 sec ea  5 min  3x30 sec       STM / IASTM R hamstring    IASTM / STM 10 min STM only          B/L Knee jt distraction with grade III/IV P-A mobilization    L knee flexion gapping x45 sec 3 x30 sec 3x30 sec:R in Anterior drawer position P-A on tibia; supine A-P on femur in 25 degree open pack           B/L knee PROM  5 min 5 min           Manual HS stretch  3 x 45 sec 3x 45 sec  x2 min "  Kinesiotape - \"Y\" from quad tendon to M/L aspects of patella + \"U\" at base of patella    SP         Neuro Re-Ed                                                                                                                     Ther Ex             Bike / NS  x10 min x10 min x10min x10 min x20 min       Seated HS stretch x30sec ea    5 x20 sec 3x30 sec       Runner's gastroc stretch x30 sec ea            Slant board gastroc / soleus stretch     5 x20 sec 3x30 sec       Heel prop static knee ext  2 x 1 min ea           Heel slide stretch             Quad set  c heel prop 20 x 5 sec c heel prop 20 sec x 5  x20 towel under knee        Bridging   20x           SLR    x20ea 1# x20ea Standing1 5# x20ea       SAQ / LAQ LAQ x10 ea LAQ x20 ea LAQ 30x SAQ/ LAQ x20ea 1# x20ea 1 5# x20ea       Mini-squats    x15 x20 2x15       Sled F/R   25# x 2 trips 25# 3 trips 35# x4 trips 35# x4 trips 45# x 4 trips       Ecc step-down   8\" x 20 ea           F/L step up    8\" Forward 2x15         Standing HS curl      1 5# 2x10       Heel raise:B      1 5# 2x10       Ther Activity                          Gait Training             Overground, no AD  x200ft                        Modalities             MHP - R hamstring             CP - B/L knee PRN     x10 min c static heel prop stretch             "

## 2023-06-23 ENCOUNTER — OFFICE VISIT (OUTPATIENT)
Dept: PHYSICAL THERAPY | Age: 73
End: 2023-06-23
Payer: MEDICARE

## 2023-06-23 DIAGNOSIS — G89.29 CHRONIC PAIN OF RIGHT KNEE: ICD-10-CM

## 2023-06-23 DIAGNOSIS — M25.561 CHRONIC PAIN OF RIGHT KNEE: ICD-10-CM

## 2023-06-23 DIAGNOSIS — M25.562 LEFT KNEE PAIN, UNSPECIFIED CHRONICITY: ICD-10-CM

## 2023-06-23 DIAGNOSIS — M17.0 PRIMARY OSTEOARTHRITIS OF BOTH KNEES: Primary | ICD-10-CM

## 2023-06-23 PROCEDURE — 97140 MANUAL THERAPY 1/> REGIONS: CPT | Performed by: PHYSICAL THERAPIST

## 2023-06-23 PROCEDURE — 97110 THERAPEUTIC EXERCISES: CPT | Performed by: PHYSICAL THERAPIST

## 2023-06-23 NOTE — PROGRESS NOTES
"Daily Note     Today's date: 2023  Patient name: Rozetta Sandhoff  : 1950  MRN: [id number]  Referring provider: Twyla Hooker PA-C  Dx:   Encounter Diagnosis     ICD-10-CM    1  Primary osteoarthritis of both knees  M17 0       2  Left knee pain, unspecified chronicity  M25 562       3  Chronic pain of right knee  M25 561     G89 29           Start Time: 1010  Stop Time: 1100  Total time in clinic (min): 50 minutes    Subjective: Not wearing knee sleeves today, as he states some of his pain may be coming from wearing the sleeves  Objective: See treatment diary below      Assessment: Tolerated treatment well  Continues to present with deficits in B knee extension ROM limited by soft tissue and joint restrictions - session focused on posterior chain flexibility and prone grade III P-A tibiofemoral joint mobilizations / PROM with improved knee ext  Pt tolerated prone positioning for joint mobilizations and PROM well - recommend continued implementation for future sessions  Strengthening into new knee ext ROM tolerated well with sled drags and TKE mini-squats c good quad contraction  Patient would benefit from continued PT to address deficits in B knee ROM and functional strength / mobility  Plan: Continue per plan of care        Precautions: PMH remarkable for bladder cancer, HLD, hypothyroidism      Manuals 23 IE       Patellar mobilizations R M/L & Sup/Inf  3 x 30 sec ea  5 min  3x30 sec       STM / IASTM R hamstring    IASTM / STM 10 min STM only          B/L Knee jt distraction with grade III/IV P-A mobilization    L knee flexion gapping x45 sec 3 x30 sec 3x30 sec:R in Anterior drawer position P-A on tibia; supine A-P on femur in 25 degree open pack     Prone  X 8 min Prone     B/L knee PROM  5 min 5 min     Prone x8 min      Manual HS stretch  3 x 45 sec 3x 45 sec  x2 min         Kinesiotape - \"Y\" from quad tendon to M/L aspects of patella + \"U\" at base of " "patella    SP         Neuro Re-Ed                                                                                                                     Ther Ex             Bike / NS  x10 min x10 min x10min x10 min x20 min x20 min      Seated HS stretch x30sec ea    5 x20 sec 3x30 sec       Runner's gastroc stretch x30 sec ea            Slant board gastroc / soleus stretch     5 x20 sec 3x30 sec 5 x 20 sec      Heel prop static knee ext  2 x 1 min ea     c heel cord strap stretch & quad set 10 x 20 sec ea      Heel slide stretch             Quad set  c heel prop 20 x 5 sec c heel prop 20 sec x 5  x20 towel under knee        Bridging   20x           SLR    x20ea 1# x20ea Standing1 5# x20ea       SAQ / LAQ LAQ x10 ea LAQ x20 ea LAQ 30x SAQ/ LAQ x20ea 1# x20ea 1 5# x20ea       Mini-squats    x15 x20 2x15 c Red TB TKE:B 2x10      Sled F/R   25# x 2 trips 25# 3 trips 35# x4 trips 35# x4 trips 45# x 4 trips 25# x 4 trips c cues for knee ext      Ecc step-down   8\" x 20 ea           F/L step up    8\" Forward 2x15         Standing HS curl      1 5# 2x10       Heel raise:B      1 5# 2x10       Knee flexion stretch on step:B       5 x 20 sec                                Ther Activity                          Gait Training             Overground, no AD  x200ft                        Modalities             MHP - R hamstring             CP - B/L knee PRN     x10 min c static heel prop stretch             "

## 2023-06-26 ENCOUNTER — TELEPHONE (OUTPATIENT)
Dept: UROLOGY | Facility: MEDICAL CENTER | Age: 73
End: 2023-06-26

## 2023-06-26 DIAGNOSIS — E03.9 ACQUIRED HYPOTHYROIDISM: ICD-10-CM

## 2023-06-26 DIAGNOSIS — E78.5 HYPERLIPIDEMIA, UNSPECIFIED HYPERLIPIDEMIA TYPE: ICD-10-CM

## 2023-06-26 NOTE — TELEPHONE ENCOUNTER
[facility] 1/6 - Medicare A & B/[employer] (verified) - NO auth required  Office stock okay to use  Please make sure 2023 ABN is signed     [facility] 6/26/23

## 2023-06-27 ENCOUNTER — EVALUATION (OUTPATIENT)
Dept: PHYSICAL THERAPY | Age: 73
End: 2023-06-27
Payer: MEDICARE

## 2023-06-27 DIAGNOSIS — M17.0 PRIMARY OSTEOARTHRITIS OF BOTH KNEES: Primary | ICD-10-CM

## 2023-06-27 DIAGNOSIS — M25.561 CHRONIC PAIN OF RIGHT KNEE: ICD-10-CM

## 2023-06-27 DIAGNOSIS — G89.29 CHRONIC PAIN OF RIGHT KNEE: ICD-10-CM

## 2023-06-27 DIAGNOSIS — M25.562 LEFT KNEE PAIN, UNSPECIFIED CHRONICITY: ICD-10-CM

## 2023-06-27 PROCEDURE — 97140 MANUAL THERAPY 1/> REGIONS: CPT | Performed by: PHYSICAL THERAPIST

## 2023-06-27 PROCEDURE — 97110 THERAPEUTIC EXERCISES: CPT | Performed by: PHYSICAL THERAPIST

## 2023-06-27 PROCEDURE — 97164 PT RE-EVAL EST PLAN CARE: CPT | Performed by: PHYSICAL THERAPIST

## 2023-06-27 RX ORDER — ATORVASTATIN CALCIUM 10 MG/1
10 TABLET, FILM COATED ORAL
Qty: 90 TABLET | Refills: 1 | Status: SHIPPED | OUTPATIENT
Start: 2023-06-27

## 2023-06-27 RX ORDER — LEVOTHYROXINE SODIUM 0.15 MG/1
150 TABLET ORAL DAILY
Qty: 90 TABLET | Refills: 1 | Status: SHIPPED | OUTPATIENT
Start: 2023-06-27

## 2023-06-27 NOTE — PROGRESS NOTES
PT Evaluation  / PT Re-evaluation    Today's date: 2023  Patient name: Ben Rodriguez  : 1950  MRN: [id number]  Referring provider: Margie Harper PA-C  Dx:   Encounter Diagnosis     ICD-10-CM    1  Primary osteoarthritis of both knees  M17 0       2  Left knee pain, unspecified chronicity  M25 562       3  Chronic pain of right knee  M25 561     G89 29           Start Time: 0945  Stop Time: 1030  Total time in clinic (min): 45 minutes    Assessment  Assessment details: Re-eval 23:  Pt notes the following improvements in functional status: improved B gastroc / hamstring flexibility and improved B knee ext ROM improving walking, transfers and stair climbing ability  Objective measures reveal improved B knee ROM & improved BLE strength and power  Pt continues to present with deficits in B knee ROM with R knee ext more limited than L resulting in the following functional limitations: ability to sit, stand, walk community distances, walk on uneven surfaces, squat, and stair climb  The patient is a good candidate for physical therapy to achieve the following goals  IE 23:  Ben Rodriguez is a 74 y o  male presenting to OPPT eval with complaints of bilateral knee pain secondary to progressive severe OA  Imaging reveals tricompartmental degeneration of bilateral knees  Prior tx includes cortisone injections with no sx relief and viscosupplementation treatment 5 y o  that resulted in prolonged sx relief - plans to receive viscosupp injection on 23  Upon PT examination, pt presents with decreased R knee ext in static stance, B/L genu varus in static stance & gait, decreased B/L knee ext ROM R>L, decreased knee flex ROM L>R, patellar hypomobility R>L, B/L hamstring tightness R>L and fair LE muscular strength / endurance impairing ability to sit, stand, walk, walk on uneven surfaces squat, and stair climb   Gait analysis reveals decreased B/L knee extension in terminal swing to initial contact, resulting in crouched gait with midfoot strike and forward trunk lean  C/o medial joint line pain in L knee during gait  Pt given HEP of seated HS stretch, runner's calf stretch and seated LAQ to address deficits in ROM / strength  The patient is a fair candidate for physical therapy to achieve the following goals  Impairments: abnormal gait, abnormal or restricted ROM, activity intolerance, impaired balance, impaired physical strength, lacks appropriate home exercise program, pain with function, weight-bearing intolerance, poor posture  and poor body mechanics    Goals  STG (2-3 wks):  1  Pt's report of B/L knee pain will decrease to <5/10 during walking and stair climbing to improve functional mobility  MET  2  Pt will demonstrate increased R knee ext to -4 degrees in order to improve gait mechanics  PARTIALLY MET  3  Pt will demonstrate increased L knee flexion to 120 degrees in order to promote symmetrical mobility  PARTIALLY MET  4  Pt will improve 5xSTS to <16 seconds to demonstrate improved LE power  MET  5  Pt's self-perceived disability will decrease as demonstrated by a 8-point improvement in FOTO score  NOT MET  6  Pt will be independent with HEP as demonstrated by return demo with proper technique and execution of exercises provided  MET  LTG (4-6 wks):  1  Pt's report of B/L knee pain will decrease to <2/10 during walking and stair climbing to improve functional mobility NOT MET  2  Pt will demonstrate increased R knee ext to <-2 degrees in order to improve gait mechanics NOT MET  3  Pt will demonstrate increased L knee flexion to 125 degrees in order to promote symmetrical mobility NOT MET  4  Pt will improve 5xSTS to <13 seconds to demonstrate improved LE power in correlation with age-matched norms for increased independence with sit-to-stand transfers NOT MET  5  Pt's self-perceived disability will decrease as demonstrated by a 15-point improvement in FOTO score NOT MET  6   Pt will be "independent with progressions to HEP as demonstrated by return demo with proper technique and execution of exercises provided NOT MET  7  Pt will ambulate 50 feet on uneven surface (grass) to improve balance confidence with outdoor household chores NOT MET      Plan  Patient would benefit from: skilled physical therapy  Planned modality interventions: biofeedback, cryotherapy, electrical stimulation/Russian stimulation, hydrotherapy, TENS, thermotherapy: hydrocollator packs, traction and ultrasound  Planned therapy interventions: abdominal trunk stabilization, activity modification, aquatic therapy, balance, balance/weight bearing training, body mechanics training, flexibility, functional ROM exercises, graded activity, graded exercise, graded motor, gait training, home exercise program, therapeutic training, therapeutic activities, therapeutic exercise, stretching, strengthening, joint mobilization, manual therapy, massage, neuromuscular re-education, patient education and postural training  Frequency: 2x week  Duration in visits: 8  Duration in weeks: 6  Plan of Care beginning date: 6/1/2023  Plan of Care expiration date: 7/16/2023  Treatment plan discussed with: patient        Subjective Evaluation    History of Present Illness  Date of onset: 5/17/2023  Mechanism of injury: Pt is referred to OP PT by orthopedics with diagnosis of bilateral knee OA  The following HPI captured from referring provider's encounter on 5/17/23 and EMR review and confirmed with patient:    Jamaal Landers is a 74 y o  male who presents today for an Initial evaluation of his bilateral knee due to chronic pain  The patient is currently treated for bladder cancer  The patient states that he was hospitalized in March 2023 due to UTI  While he was in the ED, an aspiration of 65 cc's of fluid was taken from the right knee  The patient has a h/o of right total hip arthroplasty performed on 08/05/2023   On today's presentation, the " "patient states that he has been experiencing worsening bilateral knee pain with prolonged sedentary positions and weightbearing activities especially ambulating stairs and getting up from a seated position  He has a h/o of Osgood brendan  The patient states he received an injection of cortisone to provide symptomatic relief about 5 years ago  He states he also uses knee sleeves for added support  He states that he has a lead a sedentary lifestyle due to his bilateral knee pain  Patient has failed at least three months of conservative therapy including Home exercise program, over-the-counter oral pain medication, bracing, rest, ice , patient symptoms include Pain and stiffness, pain is rated at 8/10  He denies any recent bruising, numbness, paresthesias, weakness, or feelings of instability  He denies any fevers, chills, dizziness, headaches, chest pain, shortness of breath, palpitations, abdominal pain, nausea, vomiting, diarrhea, lower extremity pain/swelling/edema  \"    PMH remarkable for Hypothyroidism, Primary osteoarthritis of both knees, Osgood-Schlatter's disease of both knees, Gross hematuria, Malignant neoplasm of posterior wall of urinary bladder ([address] ), Malignant neoplasm of lateral wall of urinary bladder ([address] ), R hip replacement, Obesity, morbid ([address] ), Hyperlipidemia    Imaging of L knee (5/19/23) per Epic EMR review:  \"LEFT KNEE     INDICATION:   M25 562: Pain in left knee      COMPARISON: Left knee x-ray dated May 29, 2019      VIEWS:  XR KNEE 3 VW LEFT NON INJURY     FINDINGS:     There is no acute fracture or dislocation      There is no joint effusion      Tricompartmental osteoarthritis with severe narrowing of the medial tibiofemoral joint space and osteophytes seen in all 3 compartments    There is mild genu varus      No lytic or blastic osseous lesion      Soft tissues are unremarkable      IMPRESSION:     No acute osseous abnormality      Tricompartmental osteoarthritic degenerative changes " "of the left knee with severe medial compartment joint space narrowing  \"    Imaging of R knee (3/9/2023) per Epic EMR review: \"RIGHT KNEE and TIBIA/FIBULA     INDICATION:   swelling/pain      COMPARISON:  Right knee radiographs 2019      VIEWS:  XR KNEE 3 VW RIGHT NON INJURY, XR TIBIA FIBULA 2 VW RIGHT      FINDINGS:     There is no acute fracture or dislocation      Possible tiny joint effusion, noting poor lateral view      Moderate tricompartmental osteoarthritis as evidenced by joint space narrowing, osteophyte formation and subchondral sclerosis, progressed from prior      No lytic or blastic osseous lesion      Soft tissues are unremarkable      IMPRESSION:     No acute osseous abnormality      Degenerative changes as described  \"    Pt with current symptomatic complaints today:   Reports increased B/L knee pain, especially with weather changes - today is a \"better day  \" Pain is described as \"excruciating\" and \"sharp\" that limits his ability to sit, stand, walking, walking on uneven surfaces squat, and stair climb  Prior tx includes viscosupplementation treatment 5 y o  that resulted in prolonged sx relief  Did not follow up with gel injections after initial treatment 5 y o  - plans to receive viscosupp injection on 23  Pt underwent cortisone steroid injection to B/L knees on 2023 - reports no change in pain  Previously wore compression sleeves for comfort and sx relief - stopped wearing knee sleeves due to fluid retention 2/2 bladder issues  Pt currently cleared of bladder cancer and will be undergoing preventative treatment in the near future  BCG treatment for bladder cancer starting 7/15/23 - scheduled for 6 consecutive weeks on   Previous PT at [facility] for low back pain    Quality of life: fair    Pain  Current pain ratin  At best pain ratin  At worst pain ratin  Quality: discomfort  Aggravating factors: walking and stair climbing    Social Support  Steps to enter " house: yes  1  Stairs in house: yes   13  Lives in: one-story house  Lives with: spouse    Employment status: not working  Exercise history: sedentary      Diagnostic Tests  X-ray: abnormal  Treatments  Current treatment: injection treatment and medication  Patient Goals  Patient goals for therapy: decreased pain and increased strength  Patient goal: Return to golf      Current Outpatient Medications on File Prior to Visit   Medication Sig Dispense Refill   • atorvastatin (LIPITOR) 10 mg tablet Take 1 tablet (10 mg total) by mouth daily at bedtime 90 tablet 0   • ciclopirox (LOPROX) 0 77 % cream APPLY TO AFFECTED AREAS ON FACE 1-2 TIMES DAILY     • hydrocortisone 2 5 % cream APPLY TO SKIN TWICE DAILY FOR 2 WEEKS THEN IF NEEDED     • levothyroxine 150 mcg tablet Take 1 tablet (150 mcg total) by mouth daily 90 tablet 0   • Naproxen Sodium 220 MG CAPS Take by mouth     • tamsulosin (FLOMAX) 0 4 mg Take 1 capsule (0 4 mg total) by mouth daily with dinner 90 capsule 3     No current facility-administered medications on file prior to visit       Past Surgical History:   Procedure Laterality Date   • BACK SURGERY     • CHOLECYSTECTOMY     • COLONOSCOPY  02/06/2019   • CYSTOSCOPY N/A 4/26/2023    Procedure: CYSTOSCOPY w/ bladder biopsy;  Surgeon: Jordy Hart MD;  Location[facility];  Service: Urology   • MS ARTHRP ACETBLR/PROX FEM PROSTC AGRFT/ALGRFT Right 8/5/2019    Procedure: ARTHROPLASTY HIP TOTAL;  Surgeon: Kellee Collins MD;  Location: [facility];  Service: Orthopedics   • MS CYSTO W/REMOVAL OF LESIONS SMALL N/A 3/23/2023    Procedure: TRANSURETHRAL RESECTION OF BLADDER TUMOR (TURBT), mitomycin ;  Surgeon: Jordy Hart MD;  Location[facility];  Service: Urology   • MS CYSTO W/REMOVAL OF LESIONS SMALL N/A 4/26/2023    Procedure: TRANSURETHRAL RESECTION OF BLADDER TUMOR (TURBT), cystoscopy with bladder biopsy;  Surgeon: Jordy Hart MD;  Location: [facility];  Service: Urology   • TONSILLECTOMY         Objective     Static Posture     Knee   Genu varus  Tenderness     Right Knee   Tenderness in the medial joint line  Active Range of Motion   Left Knee   Flexion: 115 degrees   Extension: -2 degrees     Right Knee   Flexion: 124 degrees   Extension: -5 degrees     Passive Range of Motion   Left Knee   Flexion: 115 degrees   Extension: -2 degrees     Right Knee   Flexion: 124 degrees   Extension: -5 degrees     Mobility   Patellar Mobility:   Left Knee   WFL: medial, lateral, superior and inferior  Right Knee   WFL: medial  Hypomobile: lateral, superior and inferior     Strength/Myotome Testing     Left Knee   Flexion: 4+  Extension: 5  Quadriceps contraction: good    Right Knee   Flexion: 5  Extension: 5  Quadriceps contraction: fair    Ambulation   Weight-Bearing Status   Weight-Bearing Status (Left): full weight bearing   Weight-Bearing Status (Right): full weight-bearing    Assistive device used: none    Ambulation: Level Surfaces   Ambulation without assistive device: independent    Observational Gait   Gait: asymmetric and crouched   Decreased walking speed and stride length  Quality of Movement During Gait   Trunk  Forward lean  Knee    Knee (Left): Positive stiff knee and varus  Knee (Right): Positive stiff knee and varus       Additional Quality of Movement During Gait Details  Lack of B/L knee extension AROM impacting terminal swing to initial contact, resulting in midfoot strike and crouched gait pattern with forward trunk lean    Functional Assessment        Comments  5xSTS: 15 6 seconds             Precautions: PMH remarkable for bladder cancer, HLD, hypothyroidism       Manuals 6/1/23 IE 6/6 6/9 6/13 6/16 6/20 6/23 6/27       Patellar mobilizations R M/L & Sup/Inf   3 x 30 sec ea   5 min   3x30 sec      FOTO     STM / IASTM R hamstring      IASTM / STM 10 min STM only                 B/L Knee jt distraction with grade III/IV P-A mobilization       L knee "flexion gapping x45 sec 3 x30 sec 3x30 sec:R in Anterior drawer position P-A on tibia; supine A-P on femur in 25 degree open pack       Prone  X 8 min Prone  x8 min       B/L knee PROM   5 min 5 min        Prone x8 min  Prone x5 min       Manual HS stretch   3 x 45 sec 3x 45 sec  x2 min               Kinesiotape - \"Y\" from quad tendon to M/L aspects of patella + \"U\" at base of patella       SP               Neuro Re-Ed                                                                                                                                                                                                                       Ther Ex                       Bike / NS   x10 min x10 min x10min x10 min x20 min x20 min  x15 min       Seated HS stretch x30sec ea       5 x20 sec 3x30 sec           Runner's gastroc stretch x30 sec ea                     Slant board gastroc / soleus stretch         5 x20 sec 3x30 sec 5 x 20 sec  5 x 20\"       Heel prop static knee ext   2 x 1 min ea         c heel cord strap stretch & quad set 10 x 20 sec ea         Heel slide stretch                      Quad set   c heel prop 20 x 5 sec c heel prop 20 sec x 5   x20 towel under knee             Bridging     20x                  SLR       x20ea 1# x20ea Standing1 5# x20ea           SAQ / LAQ LAQ x10 ea LAQ x20 ea LAQ 30x SAQ/ LAQ x20ea 1# x20ea 1 5# x20ea    LAQ 3# x 20        Mini-squats       x15 x20 2x15 c Red TB TKE:B 2x10  2 x 10       Sled F/R    25# x 2 trips 25# 3 trips 35# x4 trips 35# x4 trips 45# x 4 trips 25# x 4 trips c cues for knee ext  35# x 4       Ecc step-down     8\" x 20 ea                  F/L step up       8\" Forward 2x15               Standing HS curl           1 5# 2x10    3# x 20       Heel raise: B           1 5# 2x10           Knee flexion stretch on step:B             5 x 20 sec                                                         Ther Activity                                               Gait Training       "                 Overground, no AD   x200ft                                           Modalities                       MHP - R hamstring                       CP - B/L knee PRN         x10 min c static heel prop stretch

## 2023-06-30 ENCOUNTER — OFFICE VISIT (OUTPATIENT)
Dept: PHYSICAL THERAPY | Age: 73
End: 2023-06-30
Payer: MEDICARE

## 2023-06-30 DIAGNOSIS — M25.562 LEFT KNEE PAIN, UNSPECIFIED CHRONICITY: Primary | ICD-10-CM

## 2023-06-30 DIAGNOSIS — M17.0 PRIMARY OSTEOARTHRITIS OF BOTH KNEES: ICD-10-CM

## 2023-06-30 PROCEDURE — 97140 MANUAL THERAPY 1/> REGIONS: CPT

## 2023-06-30 PROCEDURE — 97110 THERAPEUTIC EXERCISES: CPT

## 2023-06-30 NOTE — PROGRESS NOTES
"Daily Note     Today's date: 2023  Patient name: Masha Najera  : 1950  MRN: [id number]  Referring provider: Eric Mariano PA-C  Dx:   Encounter Diagnosis     ICD-10-CM    1  Left knee pain, unspecified chronicity  M25 562       2  Primary osteoarthritis of both knees  M17 0                      Subjective: Pt reported \"I am really sore today from over doing it for a few days\"       Objective: See treatment diary below      Assessment: Held ankle weights due to increased pain at B knees today   Fair tolerance with ooc seated breaks       Plan: Cont with Plan of care      Precautions: PMH remarkable for bladder cancer, HLD, hypothyroidism       Manuals 23 IE      Patellar mobilizations R M/L & Sup/Inf   3 x 30 sec ea   5 min   3x30 sec      FOTO (done)     STM / IASTM R hamstring      IASTM / STM 10 min STM only                 B/L Knee jt distraction with grade III/IV P-A mobilization       L knee flexion gapping x45 sec 3 x30 sec 3x30 sec:R in Anterior drawer position P-A on tibia; supine A-P on femur in 25 degree open pack       Prone  X 8 min Prone  x8 min  NT     B/L knee PROM   5 min 5 min        Prone x8 min  Prone x5 min  5 MIN      Manual HS stretch   3 x 45 sec 3x 45 sec  x2 min               Kinesiotape - \"Y\" from quad tendon to M/L aspects of patella + \"U\" at base of patella       SP               Neuro Re-Ed                                                                                                                                                                                                                       Ther Ex                       Bike / NS   x10 min x10 min x10min x10 min x20 min x20 min  x15 min  15 MIN      Seated HS stretch x30sec ea       5 x20 sec 3x30 sec      NT     Runner's gastroc stretch x30 sec ea                NT     Slant board gastroc / soleus stretch         5 x20 sec 3x30 sec 5 x 20 sec  5 x 20\"  20SEC 5X    " "  Heel prop static knee ext   2 x 1 min ea         c heel cord strap stretch & quad set 10 x 20 sec ea    NT     Heel slide stretch                 NT     Quad set   c heel prop 20 x 5 sec c heel prop 20 sec x 5   x20 towel under knee        NT     Bridging     20x            20X      SLR       x20ea 1# x20ea Standing1 5# x20ea     20X      SAQ / LAQ LAQ x10 ea LAQ x20 ea LAQ 30x SAQ/ LAQ x20ea 1# x20ea 1 5# x20ea    LAQ 3# x 20   20X      Mini-squats       x15 x20 2x15 c Red TB TKE:B 2x10  2 x 10  20X      Sled F/R    25# x 2 trips 25# 3 trips 35# x4 trips 35# x4 trips 45# x 4 trips 25# x 4 trips c cues for knee ext  35# x 4  35# 6x      Ecc step-down     8\" x 20 ea             NT     F/L step up       8\" Forward 2x15          NT     Standing HS curl           1 5# 2x10    3# x 20 20x      Heel raise: B           1 5# 2x10           Knee flexion stretch on step:B             5 x 20 sec    20sec 5x                                                      Ther Activity                                               Gait Training                       Overground, no AD   x200ft                                           Modalities                       MHP - R hamstring                       CP - B/L knee PRN         x10 min c static heel prop stretch                 "

## 2023-07-05 ENCOUNTER — OFFICE VISIT (OUTPATIENT)
Dept: PHYSICAL THERAPY | Age: 73
End: 2023-07-05
Payer: MEDICARE

## 2023-07-05 DIAGNOSIS — M25.562 LEFT KNEE PAIN, UNSPECIFIED CHRONICITY: Primary | ICD-10-CM

## 2023-07-05 DIAGNOSIS — M17.0 PRIMARY OSTEOARTHRITIS OF BOTH KNEES: ICD-10-CM

## 2023-07-05 DIAGNOSIS — M25.561 CHRONIC PAIN OF RIGHT KNEE: ICD-10-CM

## 2023-07-05 DIAGNOSIS — G89.29 CHRONIC PAIN OF RIGHT KNEE: ICD-10-CM

## 2023-07-05 PROCEDURE — 97140 MANUAL THERAPY 1/> REGIONS: CPT | Performed by: PHYSICAL THERAPIST

## 2023-07-05 PROCEDURE — 97110 THERAPEUTIC EXERCISES: CPT | Performed by: PHYSICAL THERAPIST

## 2023-07-05 NOTE — PROGRESS NOTES
Daily Note     Today's date: 2023  Patient name: Candie Weir  : 1950  MRN: [id number]  Referring provider: Kaleigh Woodard PA-C  Dx:   Encounter Diagnosis     ICD-10-CM    1. Left knee pain, unspecified chronicity  M25.562       2. Primary osteoarthritis of both knees  M17.0       3. Chronic pain of right knee  M25.561     G89.29                      Subjective: Patient noted he is able to take Ibuprin with flow max and thus he is feeling better. Patient reported his bilateral knee pain is at 2 of 10. Objective: See treatment diary below      Assessment: Patient presents with continued progress into bilateral knee extension as reports of decrease in bilateral knee pain listed above, as well as is able to stand taller and improved with both stance and swing phases due to knee mobility into both extension and flexion. But, he lacks full bilateral knee extension due to pain, OA and bony changes that limits prolonged weight baring activities and function. Thus, PT is warranted to facilitate bilateral knee extension mobility and bilateral lower extremity strength to facilitate pain reduction and mobility progress.        Plan: Cont with Plan of care      Precautions: PMH remarkable for bladder cancer, HLD, hypothyroidism       Manuals 23 IE    Patellar mobilizations R M/L & Sup/Inf   3 x 30 sec ea   5 min   3x30 sec      FOTO (done)     STM / IASTM R hamstring      IASTM / STM 10 min STM only                 B/L Knee jt distraction with grade III/IV P-A mobilization       L knee flexion gapping x45 sec 3 x30 sec 3x30 sec:R in Anterior drawer position P-A on tibia; supine A-P on femur in 25 degree open pack       Prone  X 8 min Prone  x8 min  NT  prone x 10 min total   B/L knee PROM   5 min 5 min        Prone x8 min  Prone x5 min  5 MIN   prone extension total 10 min   Manual HS stretch   3 x 45 sec 3x 45 sec  x2 min               Kinesiotape - "Y" from quad tendon to M/L aspects of patella + "U" at base of patella       SP               Neuro Re-Ed                                                                                                                                                                                                                       Ther Ex                       Bike / NS   x10 min x10 min x10min x10 min x20 min x20 min  x15 min  15 MIN   15 min   Seated HS stretch x30sec ea       5 x20 sec 3x30 sec      NT  NT   Runner's gastroc stretch x30 sec ea                NT  NT   Slant board gastroc / soleus stretch         5 x20 sec 3x30 sec 5 x 20 sec  5 x 20"  20SEC 5X   2 nd level 20 sec x 5   Heel prop static knee ext   2 x 1 min ea         c heel cord strap stretch & quad set 10 x 20 sec ea    NT  NT   Heel slide stretch                 NT  NT   Quad set   c heel prop 20 x 5 sec c heel prop 20 sec x 5   x20 towel under knee        NT     Bridging     20x            20X   2 x 10   SLR       x20ea 1# x20ea Standing1.5# x20ea     20X   with quad set 2# x 20   SAQ / LAQ LAQ x10 ea LAQ x20 ea LAQ 30x SAQ/ LAQ x20ea 1# Elenore Schlichter 1.5# x20ea    LAQ 3# x 20   20X   2# x 20   Mini-squats       x15 x20 2x15 c Red TB TKE:B 2x10  2 x 10  20X   2 x 10   Sled F/R    25# x 2 trips 25# 3 trips 35# x4 trips 35# x4 trips 45# x 4 trips 25# x 4 trips c cues for knee ext  35# x 4  35# 6x   NT   Ecc step-down     8" x 20 ea             NT  2 x 10   F/L step up       8" Forward 2x15          NT  NT   Standing HS curl           1.5# 2x10    3# x 20 20x   NT   Heel raise: B           1.5# 2x10           Knee flexion stretch on step:B             5 x 20 sec    20sec 5x   20 sec x 5                                                   Ther Activity                                               Gait Training                       Overground, no AD   x200ft                                           Modalities                       MHP - R hamstring                     CP - B/L knee PRN         x10 min c static heel prop stretch

## 2023-07-07 ENCOUNTER — OFFICE VISIT (OUTPATIENT)
Dept: PHYSICAL THERAPY | Age: 73
End: 2023-07-07
Payer: MEDICARE

## 2023-07-07 DIAGNOSIS — G89.29 CHRONIC PAIN OF RIGHT KNEE: ICD-10-CM

## 2023-07-07 DIAGNOSIS — M17.0 PRIMARY OSTEOARTHRITIS OF BOTH KNEES: ICD-10-CM

## 2023-07-07 DIAGNOSIS — M25.561 CHRONIC PAIN OF RIGHT KNEE: ICD-10-CM

## 2023-07-07 DIAGNOSIS — M25.562 LEFT KNEE PAIN, UNSPECIFIED CHRONICITY: Primary | ICD-10-CM

## 2023-07-07 PROCEDURE — 97110 THERAPEUTIC EXERCISES: CPT | Performed by: PHYSICAL THERAPIST

## 2023-07-07 PROCEDURE — 97140 MANUAL THERAPY 1/> REGIONS: CPT | Performed by: PHYSICAL THERAPIST

## 2023-07-07 NOTE — PROGRESS NOTES
Daily Note     Today's date: 2023  Patient name: Baljeet Smith  : 1950  MRN: [id number]  Referring provider: Enedina Morataya PA-C  Dx:   Encounter Diagnosis     ICD-10-CM    1. Left knee pain, unspecified chronicity  M25.562       2. Primary osteoarthritis of both knees  M17.0       3. Chronic pain of right knee  M25.561     G89.29           Start Time: 1045  Stop Time: 1145  Total time in clinic (min): 60 minutes    Subjective: Pt states "this is the best I've felt since starting PT" and notes increased strength with transfers. Objective: See treatment diary below      Assessment: Pt continues to present with improved B gastroc / hamstring flexibility and improved B knee ext ROM improving walking, transfers and stair climbing ability, but continues to lack full B/L knee mobility into extension & flexion due to pain, OA and bony changes that impacts tolerance to standing activities. Thus, PT is warranted to facilitate bilateral knee extension mobility and bilateral lower extremity strength to facilitate pain reduction and mobility progress.      Plan: Cont with Plan of care      Precautions: PMH remarkable for bladder cancer, HLD, hypothyroidism       Manuals 23 IE    Patellar mobilizations R M/L & Sup/Inf   3 x 30 sec ea   5 min   3x30 sec      FOTO (done)      STM / IASTM R hamstring      IASTM / STM 10 min STM only                  B/L Knee jt distraction with grade III/IV P-A mobilization       L knee flexion gapping x45 sec 3 x30 sec 3x30 sec:R in Anterior drawer position P-A on tibia; supine A-P on femur in 25 degree open pack       Prone  X 8 min Prone  x8 min  NT  prone x 10 min total Prone x8 min   B/L knee PROM   5 min 5 min        Prone x8 min  Prone x5 min  5 MIN   prone extension total 10 min Prone hang R 2# x 2 min   Manual HS stretch   3 x 45 sec 3x 45 sec  x2 min                Kinesiotape - "Y" from quad tendon to M/L aspects of patella + "U" at base of patella       SP                Neuro Re-Ed                                                                                                                                                                                                                                Ther Ex                        Bike / NS   x10 min x10 min x10min x10 min x20 min x20 min  x15 min  15 MIN   15 min 10 min   Seated HS stretch x30sec ea       5 x20 sec 3x30 sec      NT  NT Standing, one foot on step 5x20 sec   Runner's gastroc stretch x30 sec ea                NT  NT    Slant board gastroc / soleus stretch         5 x20 sec 3x30 sec 5 x 20 sec  5 x 20"  20SEC 5X   2 nd level 20 sec x 5 lvl 2 5x20 sec   Heel prop static knee ext   2 x 1 min ea         c heel cord strap stretch & quad set 10 x 20 sec ea    NT  NT    Heel slide stretch                 NT  NT    Quad set   c heel prop 20 x 5 sec c heel prop 20 sec x 5   x20 towel under knee        NT   TKE blue x30   Bridging     20x            20X   2 x 10    SLR       x20ea 1# x20ea Standing1.5# x20ea     20X   with quad set 2# x 20    SAQ / LAQ LAQ x10 ea LAQ x20 ea LAQ 30x SAQ/ LAQ x20ea 1# x20ea 1.5# x20ea    LAQ 3# x 20   20X   2# x 20 LAQ 2# x 30ea   Mini-squats       x15 x20 2x15 c Red TB TKE:B 2x10  2 x 10  20X   2 x 10 Squat to 90 2x10   Sled F/R    25# x 2 trips 25# 3 trips 35# x4 trips 35# x4 trips 45# x 4 trips 25# x 4 trips c cues for knee ext  35# x 4  35# 6x   NT    Ecc step-down     8" x 20 ea             NT  2 x 10 x15ea   F/L step up       8" Forward 2x15          NT  NT    Standing HS curl           1.5# 2x10    3# x 20 20x   NT    Heel raise: B           1.5# 2x10            Knee flexion stretch on step:B             5 x 20 sec    20sec 5x   20 sec x 5     Deadmill retrowalking                     x3 min                            Ther Activity                                                 Gait Training                        Overground, no AD   x200ft                                             Modalities                        MHP - R hamstring                        CP - B/L knee PRN         x10 min c static heel prop stretch

## 2023-07-11 ENCOUNTER — APPOINTMENT (OUTPATIENT)
Dept: PHYSICAL THERAPY | Age: 73
End: 2023-07-11
Payer: MEDICARE

## 2023-07-14 ENCOUNTER — OFFICE VISIT (OUTPATIENT)
Dept: PHYSICAL THERAPY | Age: 73
End: 2023-07-14
Payer: MEDICARE

## 2023-07-14 DIAGNOSIS — M25.561 CHRONIC PAIN OF RIGHT KNEE: ICD-10-CM

## 2023-07-14 DIAGNOSIS — M25.562 LEFT KNEE PAIN, UNSPECIFIED CHRONICITY: Primary | ICD-10-CM

## 2023-07-14 DIAGNOSIS — G89.29 CHRONIC PAIN OF RIGHT KNEE: ICD-10-CM

## 2023-07-14 DIAGNOSIS — M17.0 PRIMARY OSTEOARTHRITIS OF BOTH KNEES: ICD-10-CM

## 2023-07-14 PROCEDURE — 97140 MANUAL THERAPY 1/> REGIONS: CPT | Performed by: PHYSICAL THERAPIST

## 2023-07-14 PROCEDURE — 97110 THERAPEUTIC EXERCISES: CPT | Performed by: PHYSICAL THERAPIST

## 2023-07-14 NOTE — PROGRESS NOTES
Daily Note     Today's date: 2023  Patient name: Reymundo Villarreal  : 1950  MRN: [id number]  Referring provider: Hamilton High PA-C  Dx:   Encounter Diagnosis     ICD-10-CM    1. Left knee pain, unspecified chronicity  M25.562       2. Primary osteoarthritis of both knees  M17.0       3. Chronic pain of right knee  M25.561     G89.29           Start Time: 1000  Stop Time: 1030  Total time in clinic (min): 30 minutes    Subjective: Pt notes increased bilateral knee stiffness after missing PT session on Tuesday. Objective: See treatment diary below      Assessment: Pt continues to present with improved B gastroc / hamstring flexibility and improved B knee ext ROM improving walking, transfers and stair climbing ability, but continues to lack full B/L knee mobility into extension & flexion due to pain, OA and bony changes that impacts tolerance to standing activities. Demonstrates fair eccentric quadriceps control with chair squats and eccentric step-down with noted knee pain. Pt is a good candidate for continued skilled PT to facilitate bilateral knee extension mobility and bilateral lower extremity strength to facilitate pain reduction and mobility progress.      Plan: Cont with Plan of care      Precautions: PMH remarkable for bladder cancer, HLD, hypothyroidism       Manuals 23 IE    Patellar mobilizations R M/L & Sup/Inf   3 x 30 sec ea   5 min   3x30 sec      FOTO (done)    Trialed RLE lateral belt mob w movement - NE   STM / IASTM R hamstring      IASTM / STM 10 min STM only                   B/L Knee jt distraction with grade III/IV P-A mobilization       L knee flexion gapping x45 sec 3 x30 sec 3x30 sec:R in Anterior drawer position P-A on tibia; supine A-P on femur in 25 degree open pack       Prone  X 8 min Prone  x8 min  NT  prone x 10 min total Prone x8 min Prone x 12 min   B/L knee PROM   5 min 5 min        Prone x8 min  Prone x5 min  5 MIN   prone extension total 10 min Prone hang R 2# x 2 min    Manual HS stretch   3 x 45 sec 3x 45 sec  x2 min                 Kinesiotape - "Y" from quad tendon to M/L aspects of patella + "U" at base of patella       SP                 Neuro Re-Ed                                                                                                                                                                                                                                         Ther Ex                         Bike / NS   x10 min x10 min x10min x10 min x20 min x20 min  x15 min  15 MIN   15 min 10 min    Seated HS stretch x30sec ea       5 x20 sec 3x30 sec      NT  NT Standing, one foot on step 5x20 sec Standing, one foot on step 5x20 sec   Runner's gastroc stretch x30 sec ea                NT  NT     Slant board gastroc / soleus stretch         5 x20 sec 3x30 sec 5 x 20 sec  5 x 20"  20SEC 5X   2 nd level 20 sec x 5 lvl 2 5x20 sec LVL 3 [id number]   Heel prop static knee ext   2 x 1 min ea         c heel cord strap stretch & quad set 10 x 20 sec ea    NT  NT     Heel slide stretch                 NT  NT     Quad set   c heel prop 20 x 5 sec c heel prop 20 sec x 5   x20 towel under knee        NT   TKE blue x30 [place] TKE 10# x30   Bridging     20x            20X   2 x 10     SLR       x20ea 1# x20ea Standing1.5# x20ea     20X   with quad set 2# x 20     SAQ / LAQ LAQ x10 ea LAQ x20 ea LAQ 30x SAQ/ LAQ x20ea 1# x20ea 1.5# x20ea    LAQ 3# x 20   20X   2# x 20 LAQ 2# x 30ea LAQ 3# x30ea   Mini-squats       x15 x20 2x15 c Red TB TKE:B 2x10  2 x 10  20X   2 x 10 Squat to 90 2x10 Chair squat 3 x 10, 4 sec ecc.    Sled F/R    25# x 2 trips 25# 3 trips 35# x4 trips 35# x4 trips 45# x 4 trips 25# x 4 trips c cues for knee ext  35# x 4  35# 6x   NT     Ecc step-down     8" x 20 ea             NT  2 x 10 x15ea x15ea   F/L step up       8" Forward 2x15          NT  NT     Standing HS curl           1.5# 2x10    3# x 20 20x  NT     Heel raise: B           1.5# 2x10             Knee flexion stretch on step:B             5 x 20 sec    20sec 5x   20 sec x 5      Deadmill retrowalking                     x3 min                              Ther Activity                                                   Gait Training                         Overground, no AD   x200ft                                               Modalities                         MHP - R hamstring                         CP - B/L knee PRN         x10 min c static heel prop stretch

## 2023-07-18 ENCOUNTER — TELEPHONE (OUTPATIENT)
Dept: OTHER | Facility: OTHER | Age: 73
End: 2023-07-18

## 2023-07-18 ENCOUNTER — OFFICE VISIT (OUTPATIENT)
Dept: PHYSICAL THERAPY | Age: 73
End: 2023-07-18
Payer: MEDICARE

## 2023-07-18 DIAGNOSIS — M25.561 CHRONIC PAIN OF RIGHT KNEE: ICD-10-CM

## 2023-07-18 DIAGNOSIS — G89.29 CHRONIC PAIN OF RIGHT KNEE: ICD-10-CM

## 2023-07-18 DIAGNOSIS — M25.562 LEFT KNEE PAIN, UNSPECIFIED CHRONICITY: Primary | ICD-10-CM

## 2023-07-18 DIAGNOSIS — M17.0 PRIMARY OSTEOARTHRITIS OF BOTH KNEES: ICD-10-CM

## 2023-07-18 PROCEDURE — 97110 THERAPEUTIC EXERCISES: CPT | Performed by: PHYSICAL THERAPIST

## 2023-07-18 PROCEDURE — 97140 MANUAL THERAPY 1/> REGIONS: CPT | Performed by: PHYSICAL THERAPIST

## 2023-07-18 NOTE — PROGRESS NOTES
Daily Note     Today's date: 2023  Patient name: Hazel Angel  : 1950  MRN: [id number]  Referring provider: Amairani Joseph PA-C  Dx:   Encounter Diagnosis     ICD-10-CM    1. Left knee pain, unspecified chronicity  M25.562       2. Primary osteoarthritis of both knees  M17.0       3. Chronic pain of right knee  M25.561     G89.29                      Pt treated 1:1 between [facility] & [facility] from 10:00 - 11:00am.    Subjective: Pt notes he still is lacking confidence in B knee strength and admits to being self-limiting because of this. Objective: See treatment diary below    Assessment: Pt responded well to contract-relax technique for facilitation of hamstring relaxation demonstrating improved R knee extension PROM to lacking 3 degrees after treatment. Pt continues to present with improved B gastroc / hamstring flexibility and improved B knee ext ROM improving walking, transfers and stair climbing ability, but continues to lack full B/L knee mobility into extension & flexion due to pain, OA and bony changes that impacts tolerance to standing activities. Pt is a good candidate for continued skilled PT to facilitate bilateral knee extension mobility and bilateral lower extremity strength to facilitate pain reduction and mobility progress.      Plan: Cont with Plan of care      Precautions: PMH remarkable for bladder cancer, HLD, hypothyroidism       Manuals 23 IE   7 7   Patellar mobilizations R M/L & Sup/Inf   3 x 30 sec ea   5 min   3x30 sec      FOTO (done)    Trialed RLE lateral belt mob w movement - NE    STM / IASTM R hamstring      IASTM / STM 10 min STM only                 x5 min:R   B/L Knee jt distraction with grade III/IV P-A mobilization       L knee flexion gapping x45 sec 3 x30 sec 3x30 sec:R in Anterior drawer position P-A on tibia; supine A-P on femur in 25 degree open pack       Prone  X 8 min Prone  x8 min  NT  prone x 10 min total Prone x8 min Prone x 12 min      B/L knee PROM   5 min 5 min        Prone x8 min  Prone x5 min  5 MIN   prone extension total 10 min Prone hang R 2# x 2 min     Manual HS stretch   3 x 45 sec 3x 45 sec  x2 min               Contract-relax c stretching x10 min   Kinesiotape - "Y" from quad tendon to M/L aspects of patella + "U" at base of patella       SP                  Neuro Re-Ed                                                                                                                                                                                                                                                  Ther Ex                          Bike / NS   x10 min x10 min x10min x10 min x20 min x20 min  x15 min  15 MIN   15 min 10 min  10 min   Seated HS stretch x30sec ea       5 x20 sec 3x30 sec      NT  NT Standing, one foot on step 5x20 sec Standing, one foot on step 5x20 sec    Runner's gastroc stretch x30 sec ea                NT  NT      Slant board gastroc / soleus stretch         5 x20 sec 3x30 sec 5 x 20 sec  5 x 20"  20SEC 5X   2 nd level 20 sec x 5 lvl 2 5x20 sec LVL 3 [id number]    Heel prop static knee ext   2 x 1 min ea         c heel cord strap stretch & quad set 10 x 20 sec ea    NT  NT      Heel slide stretch                 NT  NT      Quad set   c heel prop 20 x 5 sec c heel prop 20 sec x 5   x20 towel under knee        NT   TKE blue x30 [place] TKE 10# x30 Karen 12# x30   Bridging     20x            20X   2 x 10      SLR       x20ea 1# x20ea Standing1.5# x20ea     20X   with quad set 2# x 20      SAQ / LAQ LAQ x10 ea LAQ x20 ea LAQ 30x SAQ/ LAQ x20ea 1# x20ea 1.5# x20ea    LAQ 3# x 20   20X   2# x 20 LAQ 2# x 30ea LAQ 3# x30ea LAQ 7.5# 2x15ea   Mini-squats       x15 x20 2x15 c Red TB TKE:B 2x10  2 x 10  20X   2 x 10 Squat to 90 2x10 Chair squat 3 x 10, 4 sec ecc. Chair squat 2 x 10, 4 sec ecc.    Sled F/R    25# x 2 trips 25# 3 trips 35# x4 trips 35# x4 trips 45# x 4 trips 25# x 4 trips c cues for knee ext  35# x 4  35# 6x   NT   40# x4 trips   Ecc step-down     8" x 20 ea             NT  2 x 10 x15ea x15ea x20ea   F/L step up       8" Forward 2x15          NT  NT      Standing HS curl           1.5# 2x10    3# x 20 20x   NT      Heel raise: B           1.5# 2x10              Knee flexion stretch on step:B             5 x 20 sec    20sec 5x   20 sec x 5       Deadmill retrowalking                     x3 min                                Ther Activity                                                     Gait Training                          Overground, no AD   x200ft                                                 Modalities                          MHP - R hamstring                          CP - B/L knee PRN         x10 min c static heel prop stretch

## 2023-07-18 NOTE — TELEPHONE ENCOUNTER
Patient would like a call back to confirm time of appointment.  He says he was told 1130am and it is noted as 1030am

## 2023-07-19 ENCOUNTER — PROCEDURE VISIT (OUTPATIENT)
Dept: UROLOGY | Facility: AMBULATORY SURGERY CENTER | Age: 73
End: 2023-07-19
Payer: MEDICARE

## 2023-07-19 VITALS
WEIGHT: 270 LBS | BODY MASS INDEX: 35.78 KG/M2 | DIASTOLIC BLOOD PRESSURE: 66 MMHG | SYSTOLIC BLOOD PRESSURE: 120 MMHG | HEART RATE: 76 BPM | HEIGHT: 73 IN

## 2023-07-19 DIAGNOSIS — C67.2 MALIGNANT NEOPLASM OF LATERAL WALL OF URINARY BLADDER (HCC): Primary | ICD-10-CM

## 2023-07-19 LAB
SL AMB  POCT GLUCOSE, UA: NORMAL
SL AMB LEUKOCYTE ESTERASE,UA: NORMAL
SL AMB POCT BILIRUBIN,UA: NORMAL
SL AMB POCT BLOOD,UA: NORMAL
SL AMB POCT CLARITY,UA: CLEAR
SL AMB POCT COLOR,UA: YELLOW
SL AMB POCT KETONES,UA: NORMAL
SL AMB POCT NITRITE,UA: NORMAL
SL AMB POCT PH,UA: 8.5
SL AMB POCT SPECIFIC GRAVITY,UA: 1.01
SL AMB POCT URINE PROTEIN: NORMAL
SL AMB POCT UROBILINOGEN: NORMAL

## 2023-07-19 PROCEDURE — 51720 TREATMENT OF BLADDER LESION: CPT

## 2023-07-19 PROCEDURE — 81002 URINALYSIS NONAUTO W/O SCOPE: CPT

## 2023-07-19 NOTE — PROGRESS NOTES
7/19/2023    Nigel Henderson  1950  [id number]    Diagnosis    Malignant neoplasm of lateral wall of urinary bladder    Chief Complaint    1/6 BCG        Patient presents for BCG 1 of 6 managed by Dr. Chin Divers This Encounter   Procedures   • POCT urine dip     Patient instructed to call with persistent hematuria, fever, or other concerns. Procedure BCG treatment 1 of 6    Vitals:    07/19/23 1019   BP: 120/66   BP Location: Left arm   Patient Position: Sitting   Cuff Size: Adult   Pulse: 76   Weight: 122 kg (270 lb)   Height: 6' 1" (1.854 m)       Recent Results (from the past 4 hour(s))   POCT urine dip    Collection Time: 07/19/23  1:06 PM   Result Value Ref Range    LEUKOCYTE ESTERASE,UA ++     NITRITE,UA -     SL AMB POCT UROBILINOGEN -     POCT URINE PROTEIN -      PH,UA 8.5     BLOOD,UA mod     SPECIFIC GRAVITY,UA 1.010     KETONES,UA -     BILIRUBIN,UA -     GLUCOSE, UA -      COLOR,UA yellow     CLARITY,UA clear            Bladder instillation     Date/Time 7/19/2023 10:30 AM     Performed by  Souleymane Montiel RN   Authorized by Raul Hartman MD     Universal Protocol   Consent: Verbal consent obtained. Risks and benefits: risks, benefits and alternatives were discussed  Consent given by: patient  Patient understanding: patient states understanding of the procedure being performed  Patient identity confirmed: verbally with patient       Procedure Details   Patient tolerance: patient tolerated the procedure well with no immediate complications         Sterile technique employed. Patient prepped and identified. 16F red coude catheter placed. Bladder drained, residual approximately 50 mL. The following solution was instilled directly into the bladder via catheter: 1 Vial BCG. Catheter removed. Patient discharged. Patient tolerated procedure.      Administrations This Visit     BCG (DONNY BCG) intravesical suspension 50 mg     Admin Date  07/19/2023 Action  Given Dose  50 mg Route  Intravesical Administered By  Suni Cazares, RN                      EDUARDO SuarezN, RN

## 2023-07-21 ENCOUNTER — OFFICE VISIT (OUTPATIENT)
Dept: PHYSICAL THERAPY | Age: 73
End: 2023-07-21
Payer: MEDICARE

## 2023-07-21 DIAGNOSIS — M25.562 LEFT KNEE PAIN, UNSPECIFIED CHRONICITY: Primary | ICD-10-CM

## 2023-07-21 DIAGNOSIS — M25.561 CHRONIC PAIN OF RIGHT KNEE: ICD-10-CM

## 2023-07-21 DIAGNOSIS — M17.0 PRIMARY OSTEOARTHRITIS OF BOTH KNEES: ICD-10-CM

## 2023-07-21 DIAGNOSIS — G89.29 CHRONIC PAIN OF RIGHT KNEE: ICD-10-CM

## 2023-07-21 PROCEDURE — 97140 MANUAL THERAPY 1/> REGIONS: CPT | Performed by: PHYSICAL MEDICINE & REHABILITATION

## 2023-07-21 PROCEDURE — 97110 THERAPEUTIC EXERCISES: CPT | Performed by: PHYSICAL MEDICINE & REHABILITATION

## 2023-07-21 NOTE — PROGRESS NOTES
Daily Note     Today's date: 2023  Patient name: Landon Girard  : 1950  MRN: [id number]  Referring provider: Gilberto Brunson PA-C  Dx:   Encounter Diagnosis     ICD-10-CM    1. Left knee pain, unspecified chronicity  M25.562       2. Primary osteoarthritis of both knees  M17.0       3. Chronic pain of right knee  M25.561     G89.29           Start Time: 1000  Stop Time: 1100  Total time in clinic (min): 60 minutes      Subjective: Pt notes L knee feels weaker today; overall B knee pain is improving. Objective: See treatment diary below    Assessment: Pt tolerated session well. Pt instructed on performance of hip hinge for active hamstring lengthening - demonstrated good technique but with low back restrictions greater than hamstring tightness; pt did not note stretch in hamstrings during movement. Pt demonstrated improved squat technique with cueing for "vertical shins and push knees out" for decreased anterior translation of knees and activation of hip external rotators with decreased pain for transfers. Did marching in parallel bars with cueing for heel-strike, as pt demonstrates occasional shuffling gait with mid-foot contact at initial contact. Pt continues to present with improved B gastroc / hamstring flexibility and improved B knee ext ROM improving walking, transfers and stair climbing ability, but continues to lack full B/L knee mobility into extension & flexion due to pain, OA and bony changes that impacts tolerance to standing activities. Pt is a good candidate for continued skilled PT to facilitate bilateral knee extension mobility and bilateral lower extremity strength to facilitate pain reduction and mobility progress.      Plan: Cont with Plan of care      Precautions: PMH remarkable for bladder cancer, HLD, hypothyroidism       Manuals 23 IE   7   Patellar mobilizations R M/L & Sup/Inf   3 x 30 sec ea   5 min   3x30 sec      FOTO (done)    Trialed RLE lateral belt mob w movement - NE     STM / IASTM R hamstring      IASTM / STM 10 min STM only                 x5 min:R X 5 min:R   B/L Knee jt distraction with grade III/IV P-A mobilization       L knee flexion gapping x45 sec 3 x30 sec 3x30 sec:R in Anterior drawer position P-A on tibia; supine A-P on femur in 25 degree open pack       Prone  X 8 min Prone  x8 min  NT  prone x 10 min total Prone x8 min Prone x 12 min       B/L knee PROM   5 min 5 min        Prone x8 min  Prone x5 min  5 MIN   prone extension total 10 min Prone hang R 2# x 2 min      Manual HS stretch   3 x 45 sec 3x 45 sec  x2 min               Contract-relax c stretching x10 min Contract-relax c stretching x10 min   Kinesiotape - "Y" from quad tendon to M/L aspects of patella + "U" at base of patella       SP                   Neuro Re-Ed                                                                                                                                                                                                                                                           Ther Ex                           Bike / NS   x10 min x10 min x10min x10 min x20 min x20 min  x15 min  15 MIN   15 min 10 min  10 min    Seated HS stretch x30sec ea       5 x20 sec 3x30 sec      NT  NT Standing, one foot on step 5x20 sec Standing, one foot on step 5x20 sec  5 x 20"   Runner's gastroc stretch x30 sec ea                NT  NT       Slant board gastroc / soleus stretch         5 x20 sec 3x30 sec 5 x 20 sec  5 x 20"  20SEC 5X   2 nd level 20 sec x 5 lvl 2 5x20 sec LVL 3 [id number]  LVL 3 5x20"   Heel prop static knee ext   2 x 1 min ea         c heel cord strap stretch & quad set 10 x 20 sec ea    NT  NT       Heel slide stretch                 NT  NT       Quad set   c heel prop 20 x 5 sec c heel prop 20 sec x 5   x20 towel under knee        NT   TKE blue x30 Karen TKE 10# x30 Karen 12# x30    Bridging     20x            20X   2 x 10       SLR       x20ea 1# x20ea Standing1.5# x20ea     20X   with quad set 2# x 20       SAQ / LAQ LAQ x10 ea LAQ x20 ea LAQ 30x SAQ/ LAQ x20ea 1# x20ea 1.5# x20ea    LAQ 3# x 20   20X   2# x 20 LAQ 2# x 30ea LAQ 3# x30ea LAQ 7.5# 2x15ea    Mini-squats       x15 x20 2x15 c Red TB TKE:B 2x10  2 x 10  20X   2 x 10 Squat to 90 2x10 Chair squat 3 x 10, 4 sec ecc. Chair squat 2 x 10, 4 sec ecc. x20   Sled F/R    25# x 2 trips 25# 3 trips 35# x4 trips 35# x4 trips 45# x 4 trips 25# x 4 trips c cues for knee ext  35# x 4  35# 6x   NT   40# x4 trips    Ecc step-down     8" x 20 ea             NT  2 x 10 x15ea x15ea x20ea    F/L step up       8" Forward 2x15          NT  NT       Standing HS curl           1.5# 2x10    3# x 20 20x   NT       Heel raise: B           1.5# 2x10               Knee flexion stretch on step:B             5 x 20 sec    20sec 5x   20 sec x 5        Deadmill retrowalking                     x3 min      [place]arlan stretch:B              2 x 45"   Hip hinge              x10   Seated good morning              3 x 5 c cueing for technique   Marching in II bars              10# x10                 x4 trips                               Ther Activity                                                       Gait Training                           Overground, no AD   x200ft                    Agility ladder c cueing for step-over, heel strike x5 min                               Modalities                           MHP - R hamstring                           CP - B/L knee PRN         x10 min c static heel prop stretch

## 2023-07-25 ENCOUNTER — OFFICE VISIT (OUTPATIENT)
Dept: PHYSICAL THERAPY | Age: 73
End: 2023-07-25
Payer: MEDICARE

## 2023-07-25 DIAGNOSIS — M25.562 LEFT KNEE PAIN, UNSPECIFIED CHRONICITY: Primary | ICD-10-CM

## 2023-07-25 DIAGNOSIS — M17.0 PRIMARY OSTEOARTHRITIS OF BOTH KNEES: ICD-10-CM

## 2023-07-25 DIAGNOSIS — M25.561 CHRONIC PAIN OF RIGHT KNEE: ICD-10-CM

## 2023-07-25 DIAGNOSIS — G89.29 CHRONIC PAIN OF RIGHT KNEE: ICD-10-CM

## 2023-07-25 PROCEDURE — 97110 THERAPEUTIC EXERCISES: CPT | Performed by: PHYSICAL THERAPIST

## 2023-07-25 NOTE — PROGRESS NOTES
Daily Note     Today's date: 2023  Patient name: Bibiana Chairez  : 1950  MRN: [id number]  Referring provider: Justyna Snyder PA-C  Dx:   Encounter Diagnosis     ICD-10-CM    1. Left knee pain, unspecified chronicity  M25.562       2. Primary osteoarthritis of both knees  M17.0       3. Chronic pain of right knee  M25.561     G89.29           Start Time: 1000  Stop Time: 1100  Total time in clinic (min): 60 minutes      Subjective: Pt is feeling good today - notes he has been more conscious of avoiding shuffling during gait. Objective: See treatment diary below    Assessment: Pt tolerated session well. Strength improvements demonstrated by good tolerance of progression to plate-loaded machines. Pt continues to present with improved B gastroc / hamstring flexibility and improved B knee ext ROM improving walking, transfers and stair climbing ability, but continues to lack full B/L knee mobility into extension & flexion due to pain, OA and bony changes that impacts tolerance to standing activities. Pt is a good candidate for continued skilled PT to facilitate bilateral knee extension mobility and bilateral lower extremity strength to facilitate pain reduction and mobility progress.      Plan: Cont with Plan of care      Precautions: PMH remarkable for bladder cancer, HLD, hypothyroidism       Manuals 23 IE    Patellar mobilizations R M/L & Sup/Inf   3 x 30 sec ea   5 min   3x30 sec      FOTO (done)    Trialed RLE lateral belt mob w movement - NE      STM / IASTM R hamstring      IASTM / STM 10 min STM only                 x5 min:R X 5 min:R    B/L Knee jt distraction with grade III/IV P-A mobilization       L knee flexion gapping x45 sec 3 x30 sec 3x30 sec:R in Anterior drawer position P-A on tibia; supine A-P on femur in 25 degree open pack       Prone  X 8 min Prone  x8 min  NT  prone x 10 min total Prone x8 min Prone x 12 min B/L knee PROM   5 min 5 min        Prone x8 min  Prone x5 min  5 MIN   prone extension total 10 min Prone hang R 2# x 2 min       Manual HS stretch   3 x 45 sec 3x 45 sec  x2 min               Contract-relax c stretching x10 min Contract-relax c stretching x10 min    Kinesiotape - "Y" from quad tendon to M/L aspects of patella + "U" at base of patella       SP                    Neuro Re-Ed                                                                                                                                                                                                                                                                    Ther Ex                            Bike / NS   x10 min x10 min x10min x10 min x20 min x20 min  x15 min  15 MIN   15 min 10 min  10 min  20 min   Seated HS stretch x30sec ea       5 x20 sec 3x30 sec      NT  NT Standing, one foot on step 5x20 sec Standing, one foot on step 5x20 sec  5 x 20" 5 x 20"   Runner's gastroc stretch x30 sec ea                NT  NT        Slant board gastroc / soleus stretch         5 x20 sec 3x30 sec 5 x 20 sec  5 x 20"  20SEC 5X   2 nd level 20 sec x 5 lvl 2 5x20 sec LVL 3 [id number]  LVL 3 5x20" LVL 3 5x20"   Heel prop static knee ext   2 x 1 min ea         c heel cord strap stretch & quad set 10 x 20 sec ea    NT  NT        Heel slide stretch                 NT  NT        Quad set   c heel prop 20 x 5 sec c heel prop 20 sec x 5   x20 towel under knee        NT   TKE blue x30 [place] TKE 10# x30 Karen 12# x30  [place] TKE 14# x30   Bridging     20x            20X   2 x 10        SLR       x20ea 1# x20ea Standing1.5# x20ea     20X   with quad set 2# x 20        SAQ / LAQ LAQ x10 ea LAQ x20 ea LAQ 30x SAQ/ LAQ x20ea 1# x20ea 1.5# x20ea    LAQ 3# x 20   20X   2# x 20 LAQ 2# x 30ea LAQ 3# x30ea LAQ 7.5# 2x15ea  Cybex 24# 2x10   Mini-squats       x15 x20 2x15 c Red TB TKE:B 2x10  2 x 10  20X   2 x 10 Squat to 90 2x10 Chair squat 3 x 10, 4 sec ecc. Chair squat 2 x 10, 4 sec ecc. x20 Chair squat 3x10   Sled F/R    25# x 2 trips 25# 3 trips 35# x4 trips 35# x4 trips 45# x 4 trips 25# x 4 trips c cues for knee ext  35# x 4  35# 6x   NT   40# x4 trips     Ecc step-down     8" x 20 ea             NT  2 x 10 x15ea x15ea x20ea     F/L step up       8" Forward 2x15          NT  NT        Standing HS curl           1.5# 2x10    3# x 20 20x   NT     Cybex seated hamstring 50# 2x10   Heel raise: B           1.5# 2x10                Knee flexion stretch on step:B             5 x 20 sec    20sec 5x   20 sec x 5         [place] retrowalking                     x3 min       [place]arlan stretch:B              2 x 45"    Hip hinge              x10    Seated good morning              3 x 5 c cueing for technique    Marching in II bars              10# x10    Leg Press              x4 trips 70# 2x10                                Ther Activity                                                         Gait Training                            Overground, no AD   x200ft                    Agility ladder c cueing for step-over, heel strike x5 min                                 Modalities                            MHP - R hamstring                            CP - B/L knee PRN         x10 min c static heel prop stretch

## 2023-07-26 ENCOUNTER — PROCEDURE VISIT (OUTPATIENT)
Dept: UROLOGY | Facility: AMBULATORY SURGERY CENTER | Age: 73
End: 2023-07-26
Payer: MEDICARE

## 2023-07-26 VITALS
BODY MASS INDEX: 36.18 KG/M2 | DIASTOLIC BLOOD PRESSURE: 88 MMHG | HEIGHT: 73 IN | WEIGHT: 273 LBS | SYSTOLIC BLOOD PRESSURE: 142 MMHG | HEART RATE: 73 BPM

## 2023-07-26 DIAGNOSIS — C67.2 MALIGNANT NEOPLASM OF LATERAL WALL OF URINARY BLADDER (HCC): Primary | ICD-10-CM

## 2023-07-26 LAB
SL AMB  POCT GLUCOSE, UA: NORMAL
SL AMB LEUKOCYTE ESTERASE,UA: NORMAL
SL AMB POCT BILIRUBIN,UA: NORMAL
SL AMB POCT BLOOD,UA: NORMAL
SL AMB POCT CLARITY,UA: CLEAR
SL AMB POCT COLOR,UA: YELLOW
SL AMB POCT KETONES,UA: NORMAL
SL AMB POCT NITRITE,UA: NORMAL
SL AMB POCT PH,UA: 7.5
SL AMB POCT SPECIFIC GRAVITY,UA: 1.01
SL AMB POCT URINE PROTEIN: NORMAL
SL AMB POCT UROBILINOGEN: NORMAL

## 2023-07-26 PROCEDURE — 81002 URINALYSIS NONAUTO W/O SCOPE: CPT

## 2023-07-26 PROCEDURE — 51720 TREATMENT OF BLADDER LESION: CPT

## 2023-07-26 NOTE — PROGRESS NOTES
7/26/2023    Nigel Henderson  1950  [id number]    Diagnosis    Malignant neoplasm of lateral wall of urinary bladder    Chief Complaint    2/6 BCG       Patient presents for BCG 2 of 6 managed by Dr. Ness Toth This Encounter   Procedures   • POCT urine dip     Patient instructed to call with persistent hematuria, fever, or other concerns. Procedure BCG treatment 2 of 6    Vitals:    07/26/23 1137   BP: 142/88   BP Location: Left arm   Patient Position: Sitting   Cuff Size: Adult   Pulse: 73   Weight: 124 kg (273 lb)   Height: 6' 1" (1.854 m)       Recent Results (from the past 4 hour(s))   POCT urine dip    Collection Time: 07/26/23 12:42 PM   Result Value Ref Range    LEUKOCYTE ESTERASE,UA +     NITRITE,UA -     SL AMB POCT UROBILINOGEN -     POCT URINE PROTEIN -      PH,UA 7.5     BLOOD,UA Mod     SPECIFIC GRAVITY,UA 1.015     KETONES,UA -     BILIRUBIN,UA -     GLUCOSE, UA -      COLOR,UA yellow     CLARITY,UA clear            Bladder instillation     Date/Time 7/26/2023 11:30 AM     Performed by  Allie Man RN   Authorized by Jacky Hayden MD     Universal Protocol   Consent: Verbal consent obtained. Risks and benefits: risks, benefits and alternatives were discussed  Consent given by: patient  Patient understanding: patient states understanding of the procedure being performed  Patient identity confirmed: verbally with patient       Procedure Details   Patient tolerance: patient tolerated the procedure well with no immediate complications         Sterile technique employed. Patient prepped and identified. 16F red coude catheter placed. Bladder drained, residual approximately 75 mL. The following solution was instilled directly into the bladder via catheter: 1 Vial BCG. Catheter removed. Patient discharged.  Patient tolerated procedure    Administrations This Visit     BCG (DONNY BCG) intravesical suspension 50 mg     Admin Date  07/26/2023 Action  Given Dose  50 mg Route  Intravesical Administered By  Lela Rodriguez, RN                      Lela Rodriguez, BSN, RN

## 2023-07-28 ENCOUNTER — OFFICE VISIT (OUTPATIENT)
Dept: PHYSICAL THERAPY | Age: 73
End: 2023-07-28
Payer: MEDICARE

## 2023-07-28 DIAGNOSIS — G89.29 CHRONIC PAIN OF RIGHT KNEE: ICD-10-CM

## 2023-07-28 DIAGNOSIS — M25.561 CHRONIC PAIN OF RIGHT KNEE: ICD-10-CM

## 2023-07-28 DIAGNOSIS — M17.0 PRIMARY OSTEOARTHRITIS OF BOTH KNEES: ICD-10-CM

## 2023-07-28 DIAGNOSIS — M25.562 LEFT KNEE PAIN, UNSPECIFIED CHRONICITY: Primary | ICD-10-CM

## 2023-07-28 PROCEDURE — 97164 PT RE-EVAL EST PLAN CARE: CPT | Performed by: PHYSICAL THERAPIST

## 2023-07-28 PROCEDURE — 97110 THERAPEUTIC EXERCISES: CPT | Performed by: PHYSICAL THERAPIST

## 2023-07-28 NOTE — PROGRESS NOTES
PT Re-Evaluation     Today's date: 2023  Patient name: Aureliano Barfield  : 1950  MRN: [id number]  Referring provider: Katherin Muller PA-C  Dx:   Encounter Diagnosis     ICD-10-CM    1. Left knee pain, unspecified chronicity  M25.562       2. Primary osteoarthritis of both knees  M17.0       3. Chronic pain of right knee  M25.561     G89.29           Start Time: 1010  Stop Time: 1105  Total time in clinic (min): 55 minutes    Assessment  Assessment details:     Re-eval 23:  Pt notes the following improvement in functional status: improved gait mechanics with heel to toe pattern, improved calf / hamstring length and knee extension ROM, improved knee pain that decreases with activity, improved functional mobility with sit to stand transfers and stair climbing. Still notes bilateral knee pain and clicking, lacking full ROM in B knees, and feeling weakness in BLE with ADLs, household chores and yard work. Pt continues to present with deficits in B knee ROM with R knee ext more limited than L resulting in the following functional limitations: walk on uneven surfaces, prolonged activity involving bending, lifting, squatting, and walking long periods. The patient is a good candidate for physical therapy to address deficits and achieve the following goals. Re-eval 23:  Pt notes the following improvements in functional status: improved B gastroc / hamstring flexibility and improved B knee ext ROM improving walking, transfers and stair climbing ability. Objective measures reveal improved B knee ROM & improved BLE strength and power. Pt continues to present with deficits in B knee ROM with R knee ext more limited than L resulting in the following functional limitations: ability to sit, stand, walk community distances, walk on uneven surfaces, squat, and stair climb. The patient is a good candidate for physical therapy to achieve the following goals. IE 23:  Aureliano Barfield is a 74 y.o. male presenting to [facility] with complaints of bilateral knee pain secondary to progressive severe OA. Imaging reveals tricompartmental degeneration of bilateral knees. Prior tx includes cortisone injections with no sx relief and viscosupplementation treatment 3 y.o. that resulted in prolonged sx relief - plans to receive viscosupp injection on 8/26/23. Upon PT examination, pt presents with decreased R knee ext in static stance, B/L genu varus in static stance & gait, decreased B/L knee ext ROM R>L, decreased knee flex ROM L>R, patellar hypomobility R>L, B/L hamstring tightness R>L and fair LE muscular strength / endurance impairing ability to sit, stand, walk, walk on uneven surfaces squat, and stair climb. Gait analysis reveals decreased B/L knee extension in terminal swing to initial contact, resulting in crouched gait with midfoot strike and forward trunk lean. C/o medial joint line pain in L knee during gait. Pt given HEP of seated HS stretch, runner's calf stretch and seated LAQ to address deficits in ROM / strength. The patient is a fair candidate for physical therapy to achieve the following goals. Impairments: abnormal gait, abnormal or restricted ROM, activity intolerance, impaired balance, impaired physical strength, lacks appropriate home exercise program, pain with function, weight-bearing intolerance, poor posture  and poor body mechanics    Goals  STG (2-3 wks):  1. Pt's report of B/L knee pain will decrease to <5/10 during walking and stair climbing to improve functional mobility. MET. 2. Pt will demonstrate increased R knee ext to -4 degrees in order to improve gait mechanics. PARTIALLY MET  3. Pt will demonstrate increased L knee flexion to 120 degrees in order to promote symmetrical mobility. MET  4. Pt will improve 5xSTS to <16 seconds to demonstrate improved LE power. MET  5. Pt's self-perceived disability will decrease as demonstrated by a 8-point improvement in FOTO score. NOT MET  6.  Pt will be independent with HEP as demonstrated by return demo with proper technique and execution of exercises provided. MET  LTG (8-12 wks):  1. Pt's report of B/L knee pain will decrease to <2/10 during walking and stair climbing to improve functional mobility NOT MET  2. Pt will demonstrate increased R knee ext to <-2 degrees in order to improve gait mechanics NOT MET  3. Pt will demonstrate increased L knee flexion to 125 degrees in order to promote symmetrical mobility NOT MET  4. Pt will improve 5xSTS to <13 seconds to demonstrate improved LE power in correlation with age-matched norms for increased independence with sit-to-stand transfers NOT MET  5. Pt's self-perceived disability will decrease as demonstrated by a 15-point improvement in FOTO score NOT MET  6. Pt will be independent with progressions to HEP as demonstrated by return demo with proper technique and execution of exercises provided NOT MET  7.  Pt will ambulate 50 feet on uneven surface (grass) to improve balance confidence with outdoor household chores NOT MET      Plan  Patient would benefit from: skilled physical therapy  Planned modality interventions: biofeedback, cryotherapy, electrical stimulation/Russian stimulation, hydrotherapy, TENS, thermotherapy: hydrocollator packs, traction and ultrasound  Planned therapy interventions: abdominal trunk stabilization, activity modification, aquatic therapy, balance, balance/weight bearing training, body mechanics training, flexibility, functional ROM exercises, graded activity, graded exercise, graded motor, gait training, home exercise program, therapeutic training, therapeutic activities, therapeutic exercise, stretching, strengthening, joint mobilization, manual therapy, massage, neuromuscular re-education, patient education and postural training  Frequency: 2x week  Duration in visits: 8  Duration in weeks: 6  Plan of Care beginning date: 6/1/2023  Plan of Care expiration date: 7/16/2023  Treatment plan discussed with: patient        Subjective Evaluation    History of Present Illness  Date of onset: 5/17/2023  Mechanism of injury: Pt is referred to OP PT by orthopedics with diagnosis of bilateral knee OA. The following HPI captured from referring provider's encounter on 5/17/23 and EMR review and confirmed with patient:    "Landon Girard is a 74 y.o. male who presents today for an Initial evaluation of his bilateral knee due to chronic pain. The patient is currently treated for bladder cancer. The patient states that he was hospitalized in March 2023 due to UTI. While he was in the ED, an aspiration of 65 cc's of fluid was taken from the right knee. The patient has a h/o of right total hip arthroplasty performed on 08/05/2023. On today's presentation, the patient states that he has been experiencing worsening bilateral knee pain with prolonged sedentary positions and weightbearing activities especially ambulating stairs and getting up from a seated position. He has a h/o of Osgood slaughters. The patient states he received an injection of cortisone to provide symptomatic relief about 5 years ago. He states he also uses knee sleeves for added support. He states that he has a lead a sedentary lifestyle due to his bilateral knee pain. Patient has failed at least three months of conservative therapy including Home exercise program, over-the-counter oral pain medication, bracing, rest, ice , patient symptoms include Pain and stiffness, pain is rated at 8/10. He denies any recent bruising, numbness, paresthesias, weakness, or feelings of instability.  He denies any fevers, chills, dizziness, headaches, chest pain, shortness of breath, palpitations, abdominal pain, nausea, vomiting, diarrhea, lower extremity pain/swelling/edema."    PMH remarkable for Hypothyroidism, Primary osteoarthritis of both knees, Osgood-Schlatter's disease of both knees, Gross hematuria, Malignant neoplasm of posterior wall of urinary bladder ([address]), Malignant neoplasm of lateral wall of urinary bladder ([address]), R hip replacement, Obesity, morbid ([address]), Hyperlipidemia    Imaging of L knee (5/19/23) per Epic EMR review:  "LEFT KNEE     INDICATION:   M25.562: Pain in left knee.     COMPARISON: Left knee x-ray dated May 29, 2019.     VIEWS:  XR KNEE 3 VW LEFT NON INJURY     FINDINGS:     There is no acute fracture or dislocation.     There is no joint effusion.     Tricompartmental osteoarthritis with severe narrowing of the medial tibiofemoral joint space and osteophytes seen in all 3 compartments. There is mild genu varus.     No lytic or blastic osseous lesion.     Soft tissues are unremarkable.     IMPRESSION:     No acute osseous abnormality.     Tricompartmental osteoarthritic degenerative changes of the left knee with severe medial compartment joint space narrowing."    Imaging of R knee (3/9/2023) per Epic EMR review:  "RIGHT KNEE and TIBIA/FIBULA     INDICATION:   swelling/pain.     COMPARISON:  Right knee radiographs 5/29/2019.     VIEWS:  XR KNEE 3 VW RIGHT NON INJURY, XR TIBIA FIBULA 2 VW RIGHT      FINDINGS:     There is no acute fracture or dislocation.     Possible tiny joint effusion, noting poor lateral view.     Moderate tricompartmental osteoarthritis as evidenced by joint space narrowing, osteophyte formation and subchondral sclerosis, progressed from prior.     No lytic or blastic osseous lesion.     Soft tissues are unremarkable.     IMPRESSION:     No acute osseous abnormality.     Degenerative changes as described."    Pt with current symptomatic complaints today:   Reports increased B/L knee pain, especially with weather changes - today is a "better day." Pain is described as "excruciating" and "sharp" that limits his ability to sit, stand, walking, walking on uneven surfaces squat, and stair climb. Prior tx includes viscosupplementation treatment 3 y.o. that resulted in prolonged sx relief.  Did not follow up with gel injections after initial treatment 3 y.o. - plans to receive viscosupp injection on 23. Pt underwent cortisone steroid injection to B/L knees on 2023 - reports no change in pain. Previously wore compression sleeves for comfort and sx relief - stopped wearing knee sleeves due to fluid retention 2/2 bladder issues. Pt currently cleared of bladder cancer and will be undergoing preventative treatment in the near future. BCG treatment for bladder cancer starting 7/15/23 - scheduled for 6 consecutive weeks on . Previous PT at [facility] for low back pain.   Quality of life: fair    Pain  Current pain ratin  At best pain ratin  At worst pain ratin-7  Quality: discomfort  Aggravating factors: walking and stair climbing    Social Support  Steps to enter house: yes  1  Stairs in house: yes   13  Lives in: one-story house  Lives with: spouse    Employment status: not working  Exercise history: sedentary      Diagnostic Tests  X-ray: abnormal  Treatments  Current treatment: injection treatment and medication  Patient Goals  Patient goals for therapy: decreased pain and increased strength  Patient goal: Return to golf      Current Outpatient Medications on File Prior to Visit   Medication Sig Dispense Refill   • atorvastatin (LIPITOR) 10 mg tablet Take 1 tablet (10 mg total) by mouth daily at bedtime 90 tablet 1   • levothyroxine 150 mcg tablet Take 1 tablet (150 mcg total) by mouth daily 90 tablet 1   • ciclopirox (LOPROX) 0.77 % cream APPLY TO AFFECTED AREAS ON FACE 1-2 TIMES DAILY     • hydrocortisone 2.5 % cream APPLY TO SKIN TWICE DAILY FOR 2 WEEKS THEN IF NEEDED     • Naproxen Sodium 220 MG CAPS Take by mouth     • tamsulosin (FLOMAX) 0.4 mg Take 1 capsule (0.4 mg total) by mouth daily with dinner 90 capsule 3     Current Facility-Administered Medications on File Prior to Visit   Medication Dose Route Frequency Provider Last Rate Last Admin   • BCG (DONNY BCG) intravesical suspension 50 mg  50 mg Intravesical Weekly Nolon DAYAMI Trujillo   50 mg at 07/26/23 1240     Past Surgical History:   Procedure Laterality Date   • BACK SURGERY     • CHOLECYSTECTOMY     • COLONOSCOPY  02/06/2019   • CYSTOSCOPY N/A 4/26/2023    Procedure: CYSTOSCOPY w/ bladder biopsy;  Surgeon: Elyse Sevilla MD;  Location: [facility];  Service: Urology   • NH ARTHRP ACETBLR/PROX FEM PROSTC AGRFT/ALGRFT Right 8/5/2019    Procedure: ARTHROPLASTY HIP TOTAL;  Surgeon: Byron Hutson MD;  Location: [facility];  Service: Orthopedics   • NH CYSTO W/REMOVAL OF LESIONS SMALL N/A 3/23/2023    Procedure: TRANSURETHRAL RESECTION OF BLADDER TUMOR (TURBT), mitomycin ;  Surgeon: Elyse Sevilla MD;  Location: [facility];  Service: Urology   • NH CYSTO W/REMOVAL OF LESIONS SMALL N/A 4/26/2023    Procedure: TRANSURETHRAL RESECTION OF BLADDER TUMOR (TURBT), cystoscopy with bladder biopsy;  Surgeon: Elyse Sevilla MD;  Location: [facility];  Service: Urology   • TONSILLECTOMY         Objective     OBJECTIVE DATA IN BOLD INDICATIVE OF FINDINGS ON 7/28/23 RE-EVAL:    Static Posture     Knee   Genu varus. Tenderness     Right Knee   Tenderness in the medial joint line. Active Range of Motion   Left Knee   Flexion: 115 degrees 120 degrees  Extension: 2 degrees 0 degrees    Right Knee   Flexion: 124 degrees 125 degrees  Extension: 5 degrees 5 degrees    Passive Range of Motion   Left Knee   Flexion: 115 degrees 120 deg  Extension: 2 degrees 0 deg    Right Knee   Flexion: 124 degrees 125 deg  Extension: 5 degrees 5 deg    Mobility   Patellar Mobility:   Left Knee   WFL: medial, lateral, superior and inferior.      Right Knee   WFL: medial  Hypomobile: lateral, superior and inferior     Strength/Myotome Testing     Left Knee   Flexion: 4+ 5  Extension: 5  Quadriceps contraction: good    Right Knee   Flexion: 5  Extension: 5  Quadriceps contraction: fair good    Ambulation   Weight-Bearing Status Weight-Bearing Status (Left): full weight bearing   Weight-Bearing Status (Right): full weight-bearing    Assistive device used: none    Ambulation: Level Surfaces   Ambulation without assistive device: independent    Observational Gait   Gait: asymmetric and crouched   Decreased walking speed and stride length. Quality of Movement During Gait   Trunk  Forward lean. Knee    Knee (Left): Positive stiff knee and varus. Knee (Right): Positive stiff knee and varus.      Additional Quality of Movement During Gait Details  Lack of B/L knee extension AROM impacting terminal swing to initial contact, resulting in midfoot strike and crouched gait pattern with forward trunk lean Lacking bilateral knee extension ROM impacting stance phase, yet pt improving mechanics with heel strike at initial contact    Functional Assessment    Comments  5xSTS: 15.6 seconds 20.3 seconds with significantly improved eccentric control      Flowsheet Rows    Flowsheet Row Most Recent Value   PT/OT G-Codes    Current Score 49   Projected Score 55             Precautions: PMH remarkable for bladder cancer, HLD, hypothyroidism       Manuals 6/1/23 IE 6/6 6/9 6/13 6/16 6/20 6/23 6/27 6/30 7/5 7/7 7/14 7/18 7/20 7/25 7/28   Patellar mobilizations R M/L & Sup/Inf   3 x 30 sec ea   5 min   3x30 sec      FOTO (done)    Trialed RLE lateral belt mob w movement - NE       STM / IASTM R hamstring      IASTM / STM 10 min STM only                 x5 min:R X 5 min:R     B/L Knee jt distraction with grade III/IV P-A mobilization       L knee flexion gapping x45 sec 3 x30 sec 3x30 sec:R in Anterior drawer position P-A on tibia; supine A-P on femur in 25 degree open pack       Prone  X 8 min Prone  x8 min  NT  prone x 10 min total Prone x8 min Prone x 12 min         B/L knee PROM   5 min 5 min        Prone x8 min  Prone x5 min  5 MIN   prone extension total 10 min Prone hang R 2# x 2 min        Manual HS stretch   3 x 45 sec 3x 45 sec  x2 min               Contract-relax c stretching x10 min Contract-relax c stretching x10 min     Kinesiotape - "Y" from quad tendon to M/L aspects of patella + "U" at base of patella       SP                     Neuro Re-Ed                                                                                                                                                                                                                                                                             Ther Ex                             Bike / NS   x10 min x10 min x10min x10 min x20 min x20 min  x15 min  15 MIN   15 min 10 min  10 min  20 min 15 min   Seated HS stretch x30sec ea       5 x20 sec 3x30 sec      NT  NT Standing, one foot on step 5x20 sec Standing, one foot on step 5x20 sec  5 x 20" 5 x 20" c heel cord strap 10 x 10"   Runner's gastroc stretch x30 sec ea                NT  NT         Slant board gastroc / soleus stretch         5 x20 sec 3x30 sec 5 x 20 sec  5 x 20"  20SEC 5X   2 nd level 20 sec x 5 lvl 2 5x20 sec LVL 3 [id number]  LVL 3 5x20" LVL 3 5x20" LVL 3 5x20"   Heel prop static knee ext   2 x 1 min ea         c heel cord strap stretch & quad set 10 x 20 sec ea    NT  NT         Heel slide stretch                 NT  NT         Quad set   c heel prop 20 x 5 sec c heel prop 20 sec x 5   x20 towel under knee        NT   TKE blue x30 [place] TKE 10# x30 Karen 12# x30  Karen TKE 14# x30    Bridging     20x            20X   2 x 10         SLR       x20ea 1# x20ea Standing1.5# x20ea     20X   with quad set 2# x 20         SAQ / LAQ LAQ x10 ea LAQ x20 ea LAQ 30x SAQ/ LAQ x20ea 1# x20ea 1.5# x20ea    LAQ 3# x 20   20X   2# x 20 LAQ 2# x 30ea LAQ 3# x30ea LAQ 7.5# 2x15ea  Leg ext 24# 2x10 Leg ext 24# 2x10   Mini-squats       x15 x20 2x15 c Red TB TKE:B 2x10  2 x 10  20X   2 x 10 Squat to 90 2x10 Chair squat 3 x 10, 4 sec ecc. Chair squat 2 x 10, 4 sec ecc.  x20 Chair squat 3x10 20" chair x20  18" chair x5   Sled F/R    25# x 2 trips 25# 3 trips 35# x4 trips 35# x4 trips 45# x 4 trips 25# x 4 trips c cues for knee ext  35# x 4  35# 6x   NT   40# x4 trips   Ginny Pancoast / bwd walk 15# x8 trips   Ecc step-down     8" x 20 ea             NT  2 x 10 x15ea x15ea x20ea      F/L step up       8" Forward 2x15          NT  NT         Standing HS curl           1.5# 2x10    3# x 20 20x   NT     Cybex seated hamstring 50# 2x10 Cybex hamstring 55# 2x10   Heel raise: B           1.5# 2x10                 Knee flexion stretch on step:B             5 x 20 sec    20sec 5x   20 sec x 5          [place] retrowalking                     x3 min        Harlan stretch:B              2 x 45"     Hip hinge              x10     Seated good morning              3 x 5 c cueing for technique     Marching in II bars              10# x10     Leg Press              x4 trips 70# 2x10 80# 2x15                                 Ther Activity                                                           Gait Training                             Overground, no AD   x200ft                    Agility ladder c cueing for step-over, heel strike x5 min                                   Modalities                             MHP - R hamstring                             CP - B/L knee PRN         x10 min c static heel prop stretch

## 2023-07-31 ENCOUNTER — OFFICE VISIT (OUTPATIENT)
Dept: PODIATRY | Facility: CLINIC | Age: 73
End: 2023-07-31
Payer: MEDICARE

## 2023-07-31 VITALS
RESPIRATION RATE: 18 BRPM | WEIGHT: 274 LBS | SYSTOLIC BLOOD PRESSURE: 147 MMHG | DIASTOLIC BLOOD PRESSURE: 88 MMHG | HEART RATE: 69 BPM | HEIGHT: 73 IN | BODY MASS INDEX: 36.31 KG/M2

## 2023-07-31 DIAGNOSIS — I73.9 PERIPHERAL VASCULAR DISEASE, UNSPECIFIED (HCC): Primary | ICD-10-CM

## 2023-07-31 PROCEDURE — G0127 TRIM NAIL(S): HCPCS | Performed by: PODIATRIST

## 2023-07-31 NOTE — PROGRESS NOTES
Established patient with class findings presents for nail care. Vascular exam:  DP  0/4 bilateral; PT  0 4 bilateral   Dermatological exam:  Each toenail is thick and  dystrophic. Diagnosis: PVD  Treatment: Trimmed multiple dystrophic toenails.     Nail removal    Date/Time: 7/31/2023 9:15 AM    Performed by: Urmila Reddy DPM  Authorized by: Urmila Reddy DPM    Nails trimmed:     Number of nails trimmed:  10

## 2023-08-01 ENCOUNTER — APPOINTMENT (OUTPATIENT)
Dept: PHYSICAL THERAPY | Age: 73
End: 2023-08-01
Payer: MEDICARE

## 2023-08-02 ENCOUNTER — TELEPHONE (OUTPATIENT)
Dept: UROLOGY | Facility: CLINIC | Age: 73
End: 2023-08-02

## 2023-08-02 PROCEDURE — 81001 URINALYSIS AUTO W/SCOPE: CPT | Performed by: NURSE PRACTITIONER

## 2023-08-02 PROCEDURE — 87186 SC STD MICRODIL/AGAR DIL: CPT | Performed by: NURSE PRACTITIONER

## 2023-08-02 PROCEDURE — 87086 URINE CULTURE/COLONY COUNT: CPT | Performed by: NURSE PRACTITIONER

## 2023-08-02 PROCEDURE — 87077 CULTURE AEROBIC IDENTIFY: CPT | Performed by: NURSE PRACTITIONER

## 2023-08-02 NOTE — TELEPHONE ENCOUNTER
Patient called stating they had an appointment today and it was mentioned an antibiotic will be called in for him sense he has a UTI.   Please advise

## 2023-08-03 NOTE — TELEPHONE ENCOUNTER
Spoke to patient and advised antibiotic was sent last evening. Patient states he already picked up the medication. Advised to take that antibiotic until we receive the results of the urine culture to determine if antibiotic needs to be changed. Patient is understanding.

## 2023-08-04 ENCOUNTER — OFFICE VISIT (OUTPATIENT)
Dept: PHYSICAL THERAPY | Age: 73
End: 2023-08-04
Payer: MEDICARE

## 2023-08-04 DIAGNOSIS — M17.0 PRIMARY OSTEOARTHRITIS OF BOTH KNEES: ICD-10-CM

## 2023-08-04 DIAGNOSIS — M25.561 CHRONIC PAIN OF RIGHT KNEE: ICD-10-CM

## 2023-08-04 DIAGNOSIS — G89.29 CHRONIC PAIN OF RIGHT KNEE: ICD-10-CM

## 2023-08-04 DIAGNOSIS — M25.562 LEFT KNEE PAIN, UNSPECIFIED CHRONICITY: Primary | ICD-10-CM

## 2023-08-04 PROCEDURE — 97140 MANUAL THERAPY 1/> REGIONS: CPT | Performed by: PHYSICAL THERAPIST

## 2023-08-04 PROCEDURE — 97110 THERAPEUTIC EXERCISES: CPT | Performed by: PHYSICAL THERAPIST

## 2023-08-04 NOTE — PROGRESS NOTES
Daily Note     Today's date: 2023  Patient name: Hazel Angel  : 1950  MRN: [id number]  Referring provider: Amairani Joseph PA-C  Dx:   Encounter Diagnosis     ICD-10-CM    1. Left knee pain, unspecified chronicity  M25.562       2. Primary osteoarthritis of both knees  M17.0       3. Chronic pain of right knee  M25.561     G89.29           Start Time: 915  Stop Time: 945  Total time in clinic (min): 30 minutes    Subjective: Pt notes he isn't feeling great today. Missed Tuesday's visit due to a urinary tract infection; currently on antibiotics. Noted increased stiffness today; took ibuprofen prior to session. Objective: See treatment diary below      Assessment: Tolerated treatment well focused on knee ROM / posterior chain flexibility and LE functional strengthening. Pt continues to present with deficits in B knee ROM with R knee ext more limited than L resulting in the following functional limitations: walk on uneven surfaces, prolonged activity involving bending, lifting, squatting, and walking long periods. Pt responded well to additional time spent stretching calves / hamstrings with improved bilateral knee extension; good carryover to improved knee extension with gait. Patient would benefit from continued PT      Plan: Continue per plan of care.       Precautions: PMH remarkable for bladder cancer, HLD, hypothyroidism       Manuals 23 IE   7/ 7/ 7 8/4   Patellar mobilizations R M/L & Sup/Inf   3 x 30 sec ea   5 min   3x30 sec      FOTO (done)    Trialed RLE lateral belt mob w movement - NE        STM / IASTM R hamstring      IASTM / STM 10 min STM only                 x5 min:R X 5 min:R      B/L Knee jt distraction with grade III/IV P-A mobilization       L knee flexion gapping x45 sec 3 x30 sec 3x30 sec:R in Anterior drawer position P-A on tibia; supine A-P on femur in 25 degree open pack       Prone  X 8 min Prone  x8 min  NT  prone x 10 min total Prone x8 min Prone x 12 min          B/L knee PROM   5 min 5 min        Prone x8 min  Prone x5 min  5 MIN   prone extension total 10 min Prone hang R 2# x 2 min         Manual HS stretch   3 x 45 sec 3x 45 sec  x2 min               Contract-relax c stretching x10 min Contract-relax c stretching x10 min      Kinesiotape - "Y" from quad tendon to M/L aspects of patella + "U" at base of patella       SP                      Neuro Re-Ed                                                                                                                                                                                                                                                                                      Ther Ex                              Bike / NS   x10 min x10 min x10min x10 min x20 min x20 min  x15 min  15 MIN   15 min 10 min  10 min  20 min 15 min    Seated HS stretch x30sec ea       5 x20 sec 3x30 sec      NT  NT Standing, one foot on step 5x20 sec Standing, one foot on step 5x20 sec  5 x 20" 5 x 20" c heel cord strap 10 x 10" c heel cord strap  10x10":L  20x10":R; leg elevated on stool   Runner's gastroc stretch x30 sec ea                NT  NT          Slant board gastroc / soleus stretch         5 x20 sec 3x30 sec 5 x 20 sec  5 x 20"  20SEC 5X   2 nd level 20 sec x 5 lvl 2 5x20 sec LVL 3 [id number]  LVL 3 5x20" LVL 3 5x20" LVL 3 5x20" LVL 3 5x20"   Heel prop static knee ext   2 x 1 min ea         c heel cord strap stretch & quad set 10 x 20 sec ea    NT  NT          Heel slide stretch                 NT  NT          Quad set   c heel prop 20 x 5 sec c heel prop 20 sec x 5   x20 towel under knee        NT   TKE blue x30 [place] TKE 10# x30 [place] 12# x30  Karen TKE 14# x30     Bridging     20x            20X   2 x 10          SLR       x20ea 1# x20ea Standing1.5# x20ea     20X   with quad set 2# x 20          SAQ / LAQ LAQ x10 ea LAQ x20 ea LAQ 30x SAQ/ LAQ x20ea 1# x20ea 1.5# x20ea    LAQ 3# x 20   20X   2# x 20 LAQ 2# x 30ea LAQ 3# [place] 7.5# 2x15ea  Leg ext 24# 2x10 Leg ext 24# 2x10    Mini-squats       x15 x20 2x15 c Red TB TKE:B 2x10  2 x 10  20X   2 x 10 Squat to 90 2x10 Chair squat 3 x 10, 4 sec ecc. Chair squat 2 x 10, 4 sec ecc. x20 Chair squat 3x10 20" chair x20  18" chair x5    Sled F/R    25# x 2 trips 25# 3 trips 35# x4 trips 35# x4 trips 45# x 4 trips 25# x 4 trips c cues for knee ext  35# x 4  35# 6x   NT   40# x4 trips   [place] / bwd walk 15# x8 trips    Ecc step-down     8" x 20 ea             NT  2 x 10 x15ea x15ea x20ea       F/L step up       8" Forward 2x15          NT  NT          Standing HS curl           1.5# 2x10    3# x 20 20x   NT     Cybex seated hamstring 50# 2x10 Cybex hamstring 55# 2x10 Supine manual HS curls c 4 sec ecc x10ea   Heel raise: B           1.5# 2x10                  Knee flexion stretch on step:B             5 x 20 sec    20sec 5x   20 sec x 5           Deadmill retrowalking                     x3 min         Harlan stretch:B              2 x 45"      Hip hinge              x10      Seated good morning              3 x 5 c cueing for technique      Marching in II bars              10# x10      Leg Press              x4 trips 70# 2x10 80# 2x15 75# x20                                  Ther Activity                                                             Gait Training                              Overground, no AD   x200ft                    Agility ladder c cueing for step-over, heel strike x5 min                                     Modalities                              MHP - R hamstring                              CP - B/L knee PRN         x10 min c static heel prop stretch

## 2023-08-06 ENCOUNTER — TELEPHONE (OUTPATIENT)
Dept: UROLOGY | Facility: AMBULATORY SURGERY CENTER | Age: 73
End: 2023-08-06

## 2023-08-06 DIAGNOSIS — N39.0 URINARY TRACT INFECTION WITHOUT HEMATURIA, SITE UNSPECIFIED: Primary | ICD-10-CM

## 2023-08-06 RX ORDER — SULFAMETHOXAZOLE AND TRIMETHOPRIM 800; 160 MG/1; MG/1
1 TABLET ORAL EVERY 12 HOURS SCHEDULED
Qty: 10 TABLET | Refills: 0 | Status: SHIPPED | OUTPATIENT
Start: 2023-08-06 | End: 2023-08-11

## 2023-08-07 ENCOUNTER — TELEPHONE (OUTPATIENT)
Dept: UROLOGY | Facility: AMBULATORY SURGERY CENTER | Age: 73
End: 2023-08-07

## 2023-08-07 DIAGNOSIS — N39.0 URINARY TRACT INFECTION WITHOUT HEMATURIA, SITE UNSPECIFIED: Primary | ICD-10-CM

## 2023-08-07 NOTE — TELEPHONE ENCOUNTER
Spoke with provider ( Dr. Harsha Arias ) in office, BCG to be cancelled for now, have pt take bactrim and then retest and the schedule Bcg as appropriate. Pt made aware of above and Culture placed in chart for once pt is done with antibiotic. Nurses please reschedule pt for BCG. - thanks     ----- Message from [id number] sent at 8/6/2023  1:24 PM EDT -----  Received final sensitivity. Patient should stop Keflex, and Bactrim sent to his pharmacy.

## 2023-08-08 ENCOUNTER — OFFICE VISIT (OUTPATIENT)
Dept: PHYSICAL THERAPY | Age: 73
End: 2023-08-08
Payer: MEDICARE

## 2023-08-08 DIAGNOSIS — M25.561 CHRONIC PAIN OF RIGHT KNEE: ICD-10-CM

## 2023-08-08 DIAGNOSIS — G89.29 CHRONIC PAIN OF RIGHT KNEE: ICD-10-CM

## 2023-08-08 DIAGNOSIS — M25.562 LEFT KNEE PAIN, UNSPECIFIED CHRONICITY: Primary | ICD-10-CM

## 2023-08-08 DIAGNOSIS — M17.0 PRIMARY OSTEOARTHRITIS OF BOTH KNEES: ICD-10-CM

## 2023-08-08 PROCEDURE — 97110 THERAPEUTIC EXERCISES: CPT | Performed by: PHYSICAL THERAPIST

## 2023-08-08 NOTE — TELEPHONE ENCOUNTER
Pt is concerned about the frequent UTI's he is getting after the catheter changes. He wants to talk to someone about this and wonders why this seems to happen to him.      Pt can be reached at [phone]

## 2023-08-08 NOTE — PROGRESS NOTES
Daily Note     Today's date: 2023  Patient name: Boni Hough  : 1950  MRN: [id number]  Referring provider: Zulay Gil PA-C  Dx:   Encounter Diagnosis     ICD-10-CM    1. Left knee pain, unspecified chronicity  M25.562       2. Primary osteoarthritis of both knees  M17.0       3. Chronic pain of right knee  M25.561     G89.29           Start Time: 0900  Stop Time: 930  Total time in clinic (min): 30 minutes    Subjective: Pt reports he started a new antibiotic yesterday for current UTI. Objective: See treatment diary below      Assessment: Tolerated treatment well focused on knee ROM / posterior chain flexibility and LE functional strengthening. Pt demonstrated neutral response to addition of MHP bilateral hamstrings prior to stretching, yet continues to demonstrate improved bilateral knee extension with prolonged hamstring / calf stretching. Pt continues to present with deficits in B knee ROM with R knee ext more limited than L resulting in the following functional limitations: walk on uneven surfaces, prolonged activity involving bending, lifting, squatting, and walking long periods. Patient would benefit from continued PT      Plan: Continue per plan of care.       Precautions: PMH remarkable for bladder cancer, HLD, hypothyroidism       Manuals 23 IE  6  7 7 8/ 8   Patellar mobilizations R M/L & Sup/Inf   3 x 30 sec ea   5 min   3x30 sec      FOTO (done)    Trialed RLE lateral belt mob w movement - NE         STM / IASTM R hamstring      IASTM / STM 10 min STM only                 x5 min:R X 5 min:R       B/L Knee jt distraction with grade III/IV P-A mobilization       L knee flexion gapping x45 sec 3 x30 sec 3x30 sec:R in Anterior drawer position P-A on tibia; supine A-P on femur in 25 degree open pack       Prone  X 8 min Prone  x8 min  NT  prone x 10 min total Prone x8 min Prone x 12 min           B/L knee PROM   5 min 5 min        Prone x8 min  Prone x5 min  5 MIN   prone extension total 10 min Prone hang R 2# x 2 min          Manual HS stretch   3 x 45 sec 3x 45 sec  x2 min               Contract-relax c stretching x10 min Contract-relax c stretching x10 min       Kinesiotape - "Y" from quad tendon to M/L aspects of patella + "U" at base of patella       SP                       Neuro Re-Ed                                                                                                                                                                                                                                                                                               Ther Ex                               Bike / NS   x10 min x10 min x10min x10 min x20 min x20 min  x15 min  15 MIN   15 min 10 min  10 min  20 min 15 min  10 min   Seated HS stretch x30sec ea       5 x20 sec 3x30 sec      NT  NT Standing, one foot on step 5x20 sec Standing, one foot on step 5x20 sec  5 x 20" 5 x 20" c heel cord strap 10 x 10" c heel cord strap  10x10":L  20x10":R; leg elevated on stool c heel cord strap & leg elevated on stool 10x10":B    Seated foot on ground 2 x 45":B   Runner's gastroc stretch x30 sec ea                NT  NT           Slant board gastroc / soleus stretch         5 x20 sec 3x30 sec 5 x 20 sec  5 x 20"  20SEC 5X   2 nd level 20 sec x 5 lvl 2 5x20 sec LVL 3 [id number]  LVL 3 5x20" LVL 3 5x20" LVL 3 5x20" LVL 3 5x20" LVL 3 5x20"   Heel prop static knee ext   2 x 1 min ea         c heel cord strap stretch & quad set 10 x 20 sec ea    NT  NT           Heel slide stretch                 NT  NT           Quad set   c heel prop 20 x 5 sec c heel prop 20 sec x 5   x20 towel under knee        NT   TKE blue x30 [place] TKE 10# x30 [place] 12# x30  Karen TKE 14# x30   [place] TKE 12# x30   Bridging     20x            20X   2 x 10           SLR       x20ea 1# x20ea Standing1.5# x20ea     20X   with quad set 2# x 20           SAQ / LAQ LAQ x10 ea LAQ x20 ea LAQ 30x SAQ/ LAQ x20ea 1# x20ea 1.5# x20ea    LAQ 3# x 20   20X   2# x 20 LAQ 2# x 30ea LAQ 3# [place] 7.5# 2x15ea  Leg ext 24# 2x10 Leg ext 24# 2x10     Mini-squats       x15 x20 2x15 c Red TB TKE:B 2x10  2 x 10  20X   2 x 10 Squat to 90 2x10 Chair squat 3 x 10, 4 sec ecc. Chair squat 2 x 10, 4 sec ecc. x20 Chair squat 3x10 20" chair x20  18" chair x5  18" chair squat x25   Sled F/R    25# x 2 trips 25# 3 trips 35# x4 trips 35# x4 trips 45# x 4 trips 25# x 4 trips c cues for knee ext  35# x 4  35# 6x   NT   40# x4 trips   [place] / bwd walk 15# x8 trips     Ecc step-down     8" x 20 ea             NT  2 x 10 x15ea x15ea x20ea        F/L step up       8" Forward 2x15          NT  NT           Standing HS curl           1.5# 2x10    3# x 20 20x   NT     Cybex seated hamstring 50# 2x10 Cybex hamstring 55# 2x10 Supine manual HS curls c 4 sec ecc x10ea 3# x20   Heel raise: B           1.5# 2x10                   Knee flexion stretch on step:B             5 x 20 sec    20sec 5x   20 sec x 5            Deadmill retrowalking                     x3 min          Harlan stretch:B              2 x 45"       Hip hinge              x10       Seated good morning              3 x 5 c cueing for technique       Marching in II bars              10# x10       Leg Press              x4 trips 70# 2x10 80# 2x15 75# x20                                    Ther Activity                                                               Gait Training                               Overground, no AD   x200ft                    Agility ladder c cueing for step-over, heel strike x5 min                                       Modalities                               MHP - R hamstring                            10 min prone   CP - B/L knee PRN         x10 min c static heel prop stretch

## 2023-08-08 NOTE — TELEPHONE ENCOUNTER
Patient has had 2 doses of 6 BCG - he had to reschedule 2 BCG treatments as he has infections - is there something we can give patient to ensure he can continue with BCG treatments

## 2023-08-09 NOTE — TELEPHONE ENCOUNTER
Received call from patient stating that he has been waiting for a call from the office in regards to urine testing that he needs to have done.

## 2023-08-10 NOTE — TELEPHONE ENCOUNTER
Spoke to patient and he will be finished with his antibiotic on Friday. Advised for patient to go for urine testing on 8/15  ensure UC will be finalized prior to his appointment on 8/16. Called and left message per communication form advising patient of going for urine testing on Monday, August 14 instead of Tuesday, so results can be finalized by his appointment on Wednesday. Advised if he is not able to go, we can just check his urine in the office prior to BCG treatment. Office number provided.

## 2023-08-11 ENCOUNTER — OFFICE VISIT (OUTPATIENT)
Dept: PHYSICAL THERAPY | Age: 73
End: 2023-08-11
Payer: MEDICARE

## 2023-08-11 DIAGNOSIS — M25.561 CHRONIC PAIN OF RIGHT KNEE: ICD-10-CM

## 2023-08-11 DIAGNOSIS — M17.0 PRIMARY OSTEOARTHRITIS OF BOTH KNEES: ICD-10-CM

## 2023-08-11 DIAGNOSIS — G89.29 CHRONIC PAIN OF RIGHT KNEE: ICD-10-CM

## 2023-08-11 DIAGNOSIS — M25.562 LEFT KNEE PAIN, UNSPECIFIED CHRONICITY: Primary | ICD-10-CM

## 2023-08-11 PROCEDURE — 97110 THERAPEUTIC EXERCISES: CPT | Performed by: PHYSICAL THERAPIST

## 2023-08-11 NOTE — PROGRESS NOTES
Daily Note     Today's date: 2023  Patient name: Baljeet Smith  : 1950  MRN: [id number]  Referring provider: Enedina Morataya PA-C  Dx:   Encounter Diagnosis     ICD-10-CM    1. Left knee pain, unspecified chronicity  M25.562       2. Primary osteoarthritis of both knees  M17.0       3. Chronic pain of right knee  M25.561     G89.29           Start Time: 0930  Stop Time: 1000  Total time in clinic (min): 30 minutes    Subjective: Pt reports bilateral knees are feeling good; notes functional improvements with transfers. Objective: See treatment diary below      Assessment: Tolerated treatment well focused on knee ROM / posterior chain flexibility and LE functional strengthening. Presents with bilateral calf / hamstring tightness limiting terminal knee extension, yet gradually improving. Pt continues to demonstrate positive response to posterior heel cord & hamstring stretching with improved knee extension following treatment with carryover to improved knee ext with gait. Pt continues to present with deficits in B knee ROM with R knee ext more limited than L resulting in the following functional limitations: walk on uneven surfaces, prolonged activity involving bending, lifting, squatting, and walking long periods. Patient would benefit from continued PT      Plan: Continue per plan of care.       Precautions: PMH remarkable for bladder cancer, HLD, hypothyroidism       Manuals 23 IE  6  7/ 7/ 8/ 8/ 8   Patellar mobilizations R M/L & Sup/Inf   3 x 30 sec ea   5 min   3x30 sec      FOTO (done)    Trialed RLE lateral belt mob w movement - NE          STM / IASTM R hamstring      IASTM / STM 10 min STM only                 x5 min:R X 5 min:R        B/L Knee jt distraction with grade III/IV P-A mobilization       L knee flexion gapping x45 sec 3 x30 sec 3x30 sec:R in Anterior drawer position P-A on tibia; supine A-P on femur in 25 degree open pack       Prone  X 8 min Prone  x8 min  NT  prone x 10 min total Prone x8 min Prone x 12 min            B/L knee PROM   5 min 5 min        Prone x8 min  Prone x5 min  5 MIN   prone extension total 10 min Prone hang R 2# x 2 min           Manual HS stretch   3 x 45 sec 3x 45 sec  x2 min               Contract-relax c stretching x10 min Contract-relax c stretching x10 min        Kinesiotape - "Y" from quad tendon to M/L aspects of patella + "U" at base of patella       SP                        Neuro Re-Ed                                                                                                                                                                                                                                                                                                        Ther Ex                                Bike / NS   x10 min x10 min x10min x10 min x20 min x20 min  x15 min  15 MIN   15 min 10 min  10 min  20 min 15 min  10 min 10 min   Seated HS stretch x30sec ea       5 x20 sec 3x30 sec      NT  NT Standing, one foot on step 5x20 sec Standing, one foot on step 5x20 sec  5 x 20" 5 x 20" c heel cord strap 10 x 10" c heel cord strap  10x10":L  20x10":R; leg elevated on stool c heel cord strap & leg elevated on stool 10x10":B    Seated foot on ground 2 x 45":B c heel cord strap & leg elevated on stool 10x10":B    Seated foot on ground 2 x 45":B   Runner's gastroc stretch x30 sec ea                NT  NT            Slant board gastroc / soleus stretch         5 x20 sec 3x30 sec 5 x 20 sec  5 x 20"  20SEC 5X   2 nd level 20 sec x 5 lvl 2 5x20 sec LVL 3 [id number]  LVL 3 5x20" LVL 3 5x20" LVL 3 5x20" LVL 3 5x20" LVL 3 5x20" LVL 3 5x20"   Heel prop static knee ext   2 x 1 min ea         c heel cord strap stretch & quad set 10 x 20 sec ea    NT  NT            Heel slide stretch                 NT  NT            Quad set   c heel prop 20 x 5 sec c heel prop 20 sec x 5   x20 towel under knee        NT   TKE blue x30 [place] TKE 10# x30 [place] 12# x30  [place] TKE 14# x30   Karen TKE 12# x30 [place] 14# x30   Bridging     20x            20X   2 x 10            SLR       x20ea 1# x20ea Standing1.5# x20ea     20X   with quad set 2# x 20            SAQ / LAQ LAQ x10 ea LAQ x20 ea LAQ 30x SAQ/ LAQ x20ea 1# x20ea 1.5# x20ea    LAQ 3# x 20   20X   2# x 20 LAQ 2# x 30ea LAQ 3# x30ea LAQ 7.5# 2x15ea  Leg ext 24# 2x10 Leg ext 24# 2x10   Leg ext 24# 2x15   Mini-squats       x15 x20 2x15 c Red TB TKE:B 2x10  2 x 10  20X   2 x 10 Squat to 90 2x10 Chair squat 3 x 10, 4 sec ecc. Chair squat 2 x 10, 4 sec ecc. x20 Chair squat 3x10 20" chair x20  18" chair x5  18" chair squat x25 18" chair squat 3x10   Sled F/R    25# x 2 trips 25# 3 trips 35# x4 trips 35# x4 trips 45# x 4 trips 25# x 4 trips c cues for knee ext  35# x 4  35# 6x   NT   40# x4 trips   [place] [employer] / bwd walk 15# x8 trips      Ecc step-down     8" x 20 ea             NT  2 x 10 x15ea x15ea x20ea         F/L step up       8" Forward 2x15          NT  NT            Standing HS curl           1.5# 2x10    3# x 20 20x   NT     Cybex seated hamstring 50# 2x10 Cybex hamstring 55# 2x10 Supine manual HS curls c 4 sec ecc x10ea 3# x20 Cybex hamstring 50# 2x15   Heel raise: B           1.5# 2x10                    Knee flexion stretch on step:B             5 x 20 sec    20sec 5x   20 sec x 5             Deadmill retrowalking                     x3 min           Harlan stretch:B              2 x 45"        Hip hinge              x10        Seated good morning              3 x 5 c cueing for technique        Marching in II bars              10# x10        Leg Press              x4 trips 70# 2x10 80# 2x15 75# x20                                      Ther Activity                                                                 Gait Training                                Overground, no AD   x200ft                    Agility ladder c cueing for step-over, heel strike x5 min                                         Modalities                                MHP - R hamstring                            10 min prone    CP - B/L knee PRN         x10 min c static heel prop stretch

## 2023-08-14 ENCOUNTER — APPOINTMENT (OUTPATIENT)
Dept: LAB | Age: 73
End: 2023-08-14
Payer: MEDICARE

## 2023-08-14 PROCEDURE — 87086 URINE CULTURE/COLONY COUNT: CPT

## 2023-08-15 ENCOUNTER — OFFICE VISIT (OUTPATIENT)
Dept: PHYSICAL THERAPY | Age: 73
End: 2023-08-15
Payer: MEDICARE

## 2023-08-15 DIAGNOSIS — M17.0 PRIMARY OSTEOARTHRITIS OF BOTH KNEES: ICD-10-CM

## 2023-08-15 DIAGNOSIS — M25.561 CHRONIC PAIN OF RIGHT KNEE: ICD-10-CM

## 2023-08-15 DIAGNOSIS — M25.562 LEFT KNEE PAIN, UNSPECIFIED CHRONICITY: Primary | ICD-10-CM

## 2023-08-15 DIAGNOSIS — G89.29 CHRONIC PAIN OF RIGHT KNEE: ICD-10-CM

## 2023-08-15 LAB — BACTERIA UR CULT: NORMAL

## 2023-08-15 PROCEDURE — 97112 NEUROMUSCULAR REEDUCATION: CPT | Performed by: PHYSICAL THERAPIST

## 2023-08-15 PROCEDURE — 97110 THERAPEUTIC EXERCISES: CPT | Performed by: PHYSICAL THERAPIST

## 2023-08-15 NOTE — PROGRESS NOTES
Daily Note     Today's date: 8/15/2023  Patient name: Star Champagne  : 1950  MRN: [id number]  Referring provider: Sarah Alex PA-C  Dx:   Encounter Diagnosis     ICD-10-CM    1. Left knee pain, unspecified chronicity  M25.562       2. Primary osteoarthritis of both knees  M17.0       3. Chronic pain of right knee  M25.561     G89.29                      Subjective: Patient reported he has bilateral knee baker's cysts, which his left popliteal region is larger and limits flexion based functional activities. Patient noted he re starts his bladder cancer treatments later this week. Patient noted he has minimal pain in bilateral knees with left knee pain > right. Patient noted he uses topical treatment on bilateral knees for pain reduction. Objective: See treatment diary below      Assessment: Patient exhibits right > left knee knee extension deficit with stretching, arom, and resistance activities that limit prolonged weight baring activities. Patient continues to exhibit short term mobility progress with current self stretching, arom and strengthening activities thus PT recommends to continue promoting stretching, arom and strengthening to create prolonged symptom reduction and functional progress. Thus, PT is warranted to facilitate bilateral knee mobility and symptom progress to promote functional improvements with all weight baring based functional activities. Plan: Continue per plan of care. PT POC to continue to the end of week 25.      Precautions: PMH remarkable for bladder cancer, HLD, hypothyroidism       Manuals 8/15 6/6 6  7/ 7/ 8 8   Patellar mobilizations R M/L & Sup/Inf   3 x 30 sec ea   5 min   3x30 sec      FOTO (done)    Trialed RLE lateral belt mob w movement - NE        STM / IASTM R hamstring      IASTM / STM 10 min STM only                 x5 min:R X 5 min:R      B/L Knee jt distraction with grade III/IV P-A mobilization       L knee flexion gapping x45 sec 3 x30 sec 3x30 sec:R in Anterior drawer position P-A on tibia; supine A-P on femur in 25 degree open pack       Prone  X 8 min Prone  x8 min  NT  prone x 10 min total Prone x8 min Prone x 12 min          B/L knee PROM   5 min 5 min        Prone x8 min  Prone x5 min  5 MIN   prone extension total 10 min Prone hang R 2# x 2 min         Manual HS stretch   3 x 45 sec 3x 45 sec  x2 min               Contract-relax c stretching x10 min Contract-relax c stretching x10 min      Kinesiotape - "Y" from quad tendon to M/L aspects of patella + "U" at base of patella       SP                      Neuro Re-Ed                                                                                                                                                                                                                                                                                      Ther Ex                              Bike / NS  10 min x10 min x10 min x10min x10 min x20 min x20 min  x15 min  15 MIN   15 min 10 min  10 min  20 min 10 min 10 min   Seated HS stretch c heel cord strap & leg elevated on stool 10x10":B    Seated foot on ground 2 x 45": B       5 x20 sec 3x30 sec      NT  NT Standing, one foot on step 5x20 sec Standing, one foot on step 5x20 sec  5 x 20" 5 x 20" c heel cord strap & leg elevated on stool 10x10":B    Seated foot on ground 2 x 45":B c heel cord strap & leg elevated on stool 10x10":B    Seated foot on ground 2 x 45":B   Runner's gastroc stretch x30 sec ea                NT  NT          Slant board gastroc / soleus stretch  LVL 3 5x20"       5 x20 sec 3x30 sec 5 x 20 sec  5 x 20"  20SEC 5X   2 nd level 20 sec x 5 lvl 2 5x20 sec LVL 3 [id number]  LVL 3 5x20" LVL 3 5x20" LVL 3 5x20" LVL 3 5x20"   Heel prop static knee ext   2 x 1 min ea         c heel cord strap stretch & quad set 10 x 20 sec ea    NT  NT          Heel slide stretch                 NT  NT          Standing TKE:B:  Karen 14 # x 30 c heel prop 20 x 5 sec c heel prop 20 sec x 5   x20 towel under knee        NT   TKE blue x30 [place] TKE 10# x30 [place] 12# x30  Karen TKE 14# x30 [place] TKE 12# x30 Karen 14# x30   Bridging     20x            20X   2 x 10          SLR       x20ea 1# x20ea Standing1.5# x20ea     20X   with quad set 2# x 20          SAQ / LAQ Leg extension 24# x 30 LAQ x20 ea LAQ 30x SAQ/ LAQ x20ea 1# x20ea 1.5# x20ea    LAQ 3# x 20   20X   2# x 20 LAQ 2# x 30ea LAQ 3# x30ea LAQ 7.5# 2x15ea  Leg ext 24# 2x10  Leg ext 24# 2x15   Mini-squats  18" chair squat 3x10     x15 x20 2x15 c Red TB TKE:B 2x10  2 x 10  20X   2 x 10 Squat to 90 2x10 Chair squat 3 x 10, 4 sec ecc. Chair squat 2 x 10, 4 sec ecc. x20 Chair squat 3x10 18" chair squat x25 18" chair squat 3x10   Sled F/R    25# x 2 trips 25# 3 trips 35# x4 trips 35# x4 trips 45# x 4 trips 25# x 4 trips c cues for knee ext  35# x 4  35# 6x   NT   40# x4 trips       Ecc step-down     8" x 20 ea             NT  2 x 10 x15ea x15ea x20ea       F/L step up       8" Forward 2x15          NT  NT          Standing HS curl  Cybex hamstring 50# 2x15         1.5# 2x10    3# x 20 20x   NT     Cybex seated hamstring 50# 2x10 3# x20 Cybex hamstring 50# 2x15   Heel raise: B           1.5# 2x10                  Knee flexion stretch on step:B             5 x 20 sec    20sec 5x   20 sec x 5           Deadmill retrowalking                     x3 min         Harlan stretch:B              2 x 45"      Hip hinge              x10      Seated good morning              3 x 5 c cueing for technique      Marching in II bars              10# x10      Leg Press seat bottom #8 and backrest # 5 70# x 30             x4 trips 70# 2x10                                    Ther Activity                                                             Gait Training                              Overground, no AD   x200ft                    Agility ladder c cueing for step-over, heel strike x5 min                                     Modalities                              MHP - R hamstring                          10 min prone    CP - B/L knee PRN         x10 min c static heel prop stretch

## 2023-08-16 ENCOUNTER — PROCEDURE VISIT (OUTPATIENT)
Dept: UROLOGY | Facility: AMBULATORY SURGERY CENTER | Age: 73
End: 2023-08-16
Payer: MEDICARE

## 2023-08-16 VITALS
DIASTOLIC BLOOD PRESSURE: 80 MMHG | HEART RATE: 75 BPM | BODY MASS INDEX: 35.73 KG/M2 | WEIGHT: 269.6 LBS | SYSTOLIC BLOOD PRESSURE: 132 MMHG | HEIGHT: 73 IN

## 2023-08-16 DIAGNOSIS — C67.2 MALIGNANT NEOPLASM OF LATERAL WALL OF URINARY BLADDER (HCC): Primary | ICD-10-CM

## 2023-08-16 DIAGNOSIS — N39.0 URINARY TRACT INFECTION WITHOUT HEMATURIA, SITE UNSPECIFIED: ICD-10-CM

## 2023-08-16 LAB
SL AMB  POCT GLUCOSE, UA: NORMAL
SL AMB LEUKOCYTE ESTERASE,UA: NORMAL
SL AMB POCT BILIRUBIN,UA: NORMAL
SL AMB POCT BLOOD,UA: NORMAL
SL AMB POCT CLARITY,UA: CLEAR
SL AMB POCT COLOR,UA: YELLOW
SL AMB POCT KETONES,UA: NORMAL
SL AMB POCT NITRITE,UA: NORMAL
SL AMB POCT PH,UA: 5
SL AMB POCT SPECIFIC GRAVITY,UA: 1.03
SL AMB POCT URINE PROTEIN: NORMAL
SL AMB POCT UROBILINOGEN: NORMAL

## 2023-08-16 PROCEDURE — 81002 URINALYSIS NONAUTO W/O SCOPE: CPT

## 2023-08-16 PROCEDURE — 51720 TREATMENT OF BLADDER LESION: CPT

## 2023-08-16 NOTE — PROGRESS NOTES
8/16/2023    Nigel Henderson  1950  [id number]    Diagnosis    Malignant neoplasm of lateral wall of urinary bladder    Chief Complaint    3/6 BCG       Patient presents for [facility] by Dr. Joanna Sinclair This Encounter   Procedures   • POCT urine dip     Patient instructed to call with persistent hematuria, fever, or other concerns. Procedure BCG treatment 3 of 6    Vitals:    08/16/23 1117   BP: 132/80   BP Location: Left arm   Patient Position: Sitting   Cuff Size: Adult   Pulse: 75   Weight: 122 kg (269 lb 9.6 oz)   Height: 6' 1" (1.854 m)       Recent Results (from the past 4 hour(s))   POCT urine dip    Collection Time: 08/16/23 11:45 AM   Result Value Ref Range    LEUKOCYTE ESTERASE,UA ++     NITRITE,UA -     SL AMB POCT UROBILINOGEN -     POCT URINE PROTEIN -      PH,UA 5.0     BLOOD,UA Mod     SPECIFIC GRAVITY,UA 1.030     KETONES,UA -     BILIRUBIN,UA -     GLUCOSE, UA -      COLOR,UA yellow     CLARITY,UA clear            Bladder instillation     Date/Time 8/16/2023 11:30 AM     Performed by  Luis Carlos Becerra RN   Authorized by Ramona Lynn MD     Universal Protocol   Consent: Verbal consent obtained. Risks and benefits: risks, benefits and alternatives were discussed  Consent given by: patient  Patient understanding: patient states understanding of the procedure being performed  Patient identity confirmed: verbally with patient       Procedure Details   Patient tolerance: patient tolerated the procedure well with no immediate complications         Sterile technique employed. Patient prepped and identified. 16F red coude catheter placed. Bladder drained, residual approximately 25 mL. The following solution was instilled directly into the bladder via catheter: 1 Vial BCG. Catheter removed. Patient discharged.  Patient tolerated procedure    Administrations This Visit     BCG (DONNY BCG) intravesical suspension 50 mg     Admin Date  08/16/2023 Action  Given Dose  50 mg Route  Intravesical Administered By  Juan Duque, RN                      Juan Duque, BSN, RN

## 2023-08-18 ENCOUNTER — OFFICE VISIT (OUTPATIENT)
Dept: PHYSICAL THERAPY | Age: 73
End: 2023-08-18
Payer: MEDICARE

## 2023-08-18 DIAGNOSIS — M25.562 LEFT KNEE PAIN, UNSPECIFIED CHRONICITY: Primary | ICD-10-CM

## 2023-08-18 DIAGNOSIS — G89.29 CHRONIC PAIN OF RIGHT KNEE: ICD-10-CM

## 2023-08-18 DIAGNOSIS — M17.0 PRIMARY OSTEOARTHRITIS OF BOTH KNEES: ICD-10-CM

## 2023-08-18 DIAGNOSIS — M25.561 CHRONIC PAIN OF RIGHT KNEE: ICD-10-CM

## 2023-08-18 PROCEDURE — 97110 THERAPEUTIC EXERCISES: CPT | Performed by: SPECIALIST/TECHNOLOGIST

## 2023-08-18 NOTE — PROGRESS NOTES
Daily Note     Today's date: 2023  Patient name: Reymundo Villarreal  : 1950  MRN: [id number]  Referring provider: Hamilton High PA-C  Dx:   Encounter Diagnosis     ICD-10-CM    1. Left knee pain, unspecified chronicity  M25.562       2. Primary osteoarthritis of both knees  M17.0       3. Chronic pain of right knee  M25.561     G89.29           Start Time: 0915  Stop Time: 1015  Total time in clinic (min): 60 minutes    Subjective: Pt reports irritation from bakers cyst today. Objective: See treatment diary below    Assessment: Pt progressing well with PRE program without exacerbation of B knee OA symptoms. Tolerated treatment well. Patient demonstrated fatigue post treatment, exhibited good technique with therapeutic exercises and would benefit from continued PT    Plan: Continue per plan of care. Progress treatment as tolerated. Pt expresses interest in looking into gym for continued exercise after discharge from PT.       Precautions: PMH remarkable for bladder cancer, HLD, hypothyroidism       Manuals 8/15 8/18              8/8 8/11   Patellar mobilizations R M/L & Sup/Inf                     STM / IASTM R hamstring                        B/L Knee jt distraction with grade III/IV P-A mobilization                     B/L knee PROM                     Manual HS stretch                     Kinesiotape - "Y" from quad tendon to M/L aspects of patella + "U" at base of patella                     Neuro Re-Ed                                                                                                                                                                                                     Ther Ex                     Bike / NS  10 min 10' L3              10 min 10 min   Seated HS stretch c heel cord strap & leg elevated on stool 10x10":B    Seated foot on ground 2 x 45":B heel cord strap & leg elevated on stool 10x10":B    Seated foot on ground 2 x 45":B              c heel cord strap & leg elevated on stool 10x10":B    Seated foot on ground 2 x 45":B c heel cord strap & leg elevated on stool 10x10":B    Seated foot on ground 2 x 45":B   Runner's gastroc stretch x30 sec ea  x30sec                  Slant board gastroc / soleus stretch  LVL 3 5x20"  L3 5x20s              LVL 3 5x20" LVL 3 5x20"   Heel prop static knee ext                     Heel slide stretch                     Standing TKE:B:  Karen 14 # x 30  [place] 14# 30x              Karen TKE 12# x30 [place] 14# x30   Bridging                     SLR                     SAQ / LAQ Leg extension 24# x 30 Knee ext 24# 30x               Leg ext 24# 2x15   Mini-squats  18" chair squat 3x10 cair w 1 foam 3x10              18" chair squat x25 18" chair squat 3x10   Sled F/R                      Ecc step-down                     F/L step up                     Standing HS curl  Cybex hamstring 50# 2x15  Cybex HS 50# 3x10              3# x20 Cybex hamstring 50# 2x15   Heel raise: B                      Knee flexion stretch on step:B                       Deadmill retrowalking                      Harlan stretch:B                    Hip hinge                    Seated good morning                    Marching in II bars                    Leg Press seat bottom #8 and backrest # 5 70# x 30 70# 3x10                                         Ther Activity                                             Gait Training                      Overground, no AD               Agility ladder c cueing for step-over, heel strike x5 min                                     Modalities                              MHP - R hamstring                          10 min prone    CP - B/L knee PRN

## 2023-08-21 RX ORDER — ZOSTER VACCINE RECOMBINANT, ADJUVANTED 50 MCG/0.5
KIT INTRAMUSCULAR
COMMUNITY
End: 2023-08-22

## 2023-08-22 ENCOUNTER — OFFICE VISIT (OUTPATIENT)
Dept: PHYSICAL THERAPY | Age: 73
End: 2023-08-22
Payer: MEDICARE

## 2023-08-22 ENCOUNTER — PROCEDURE VISIT (OUTPATIENT)
Dept: OBGYN CLINIC | Facility: HOSPITAL | Age: 73
End: 2023-08-22
Payer: MEDICARE

## 2023-08-22 VITALS
HEIGHT: 73 IN | DIASTOLIC BLOOD PRESSURE: 85 MMHG | HEART RATE: 77 BPM | BODY MASS INDEX: 36.05 KG/M2 | SYSTOLIC BLOOD PRESSURE: 137 MMHG | WEIGHT: 272 LBS

## 2023-08-22 DIAGNOSIS — M25.562 LEFT KNEE PAIN, UNSPECIFIED CHRONICITY: Primary | ICD-10-CM

## 2023-08-22 DIAGNOSIS — M17.0 BILATERAL PRIMARY OSTEOARTHRITIS OF KNEE: Primary | ICD-10-CM

## 2023-08-22 DIAGNOSIS — M17.0 PRIMARY OSTEOARTHRITIS OF BOTH KNEES: ICD-10-CM

## 2023-08-22 DIAGNOSIS — G89.29 CHRONIC PAIN OF RIGHT KNEE: ICD-10-CM

## 2023-08-22 DIAGNOSIS — M25.561 CHRONIC PAIN OF RIGHT KNEE: ICD-10-CM

## 2023-08-22 PROCEDURE — 97110 THERAPEUTIC EXERCISES: CPT

## 2023-08-22 PROCEDURE — 20610 DRAIN/INJ JOINT/BURSA W/O US: CPT | Performed by: PHYSICIAN ASSISTANT

## 2023-08-22 NOTE — PROGRESS NOTES
Daily Note     Today's date: 2023  Patient name: Kellen Cobos  : 1950  MRN: [id number]  Referring provider: Katelyn Rizvi PA-C  Dx:   Encounter Diagnosis     ICD-10-CM    1. Left knee pain, unspecified chronicity  M25.562       2. Primary osteoarthritis of both knees  M17.0       3. Chronic pain of right knee  M25.561     G89.29           Start Time: 1010  Stop Time: 1110  Total time in clinic (min): 60 minutes    Subjective: Patient reports 3/10 knee pain today and mostly has pain when there is bad weather. He stated that he's noticed improvements in hamstring flexibility and would like to discuss a potential discharge. Patient is receiving L knee injections this afternoon. Objective: See treatment diary below      Assessment: Dandre tolerated treatment session very well and demonstrates improvements in L knee strength, ROM, and pain. He had good recall of entire exercise program and would benefit from continued PT interventions. Plan: Continue with POC, potential discharge next visit.       Precautions: PMH remarkable for bladder cancer, HLD, hypothyroidism       Manuals 8/15 6/6 6/9 6/13 6/16 6/20 6/23  6/27  6/30  7 7   Patellar mobilizations R M/L & Sup/Inf   3 x 30 sec ea   5 min   3x30 sec      FOTO (done)    Trialed RLE lateral belt mob w movement - NE         STM / IASTM R hamstring      IASTM / STM 10 min STM only                 x5 min:R X 5 min:R       B/L Knee jt distraction with grade III/IV P-A mobilization       L knee flexion gapping x45 sec 3 x30 sec 3x30 sec:R in Anterior drawer position P-A on tibia; supine A-P on femur in 25 degree open pack       Prone  X 8 min Prone  x8 min  NT  prone x 10 min total Prone x8 min Prone x 12 min           B/L knee PROM   5 min 5 min        Prone x8 min  Prone x5 min  5 MIN   prone extension total 10 min Prone hang R 2# x 2 min          Manual HS stretch   3 x 45 sec 3x 45 sec  x2 min             Contract-relax c stretching x10 min Contract-relax c stretching x10 min       Kinesiotape - "Y" from quad tendon to M/L aspects of patella + "U" at base of patella       SP                       Neuro Re-Ed                                                                                                                                                                                                                                                                                               Ther Ex                               Bike / NS  10 min x10 min x10 min x10min x10 min x20 min x20 min  x15 min  15 MIN   15 min 10 min  10 min  20 min 10 min 10 min 15 min   Seated HS stretch c heel cord strap & leg elevated on stool 10x10":B    Seated foot on ground 2 x 45": B       5 x20 sec 3x30 sec      NT  NT Standing, one foot on step 5x20 sec Standing, one foot on step 5x20 sec  5 x 20" 5 x 20" c heel cord strap & leg elevated on stool 10x10":B    Seated foot on ground 2 x 45":B c heel cord strap & leg elevated on stool 10x10":B    Seated foot on ground 2 x 45":B c heel cord strap & leg elevated on stool 10x10":B    Seated foot on ground 2 x 45":B   Runner's gastroc stretch x30 sec ea                NT  NT        NT   Slant board gastroc / soleus stretch  LVL 3 5x20"       5 x20 sec 3x30 sec 5 x 20 sec  5 x 20"  20SEC 5X   2 nd level 20 sec x 5 lvl 2 5x20 sec LVL 3 [id number]  LVL 3 5x20" LVL 3 5x20" LVL 3 5x20" LVL 3 5x20"  LVL 3 5x20"   Heel prop static knee ext   2 x 1 min ea         c heel cord strap stretch & quad set 10 x 20 sec ea    NT  NT           Heel slide stretch                 NT  NT           Standing TKE:B:  Karen 14 # x 30 c heel prop 20 x 5 sec c heel prop 20 sec x 5   x20 towel under knee        NT   TKE blue x30 [place] TKE 10# x30 [place] 12# x30  [place] TKE 14# x30 [place] TKE 12# x30 Karen 14# x30 [place] 14 # x 30   Bridging     20x            20X   2 x 10           SLR       x20ea 1# [place] ra[place] Anthonyst Standing1.5# x20ea     20X   with quad set 2# x 20           SAQ / LAQ Leg extension 24# x 30 LAQ x20 ea LAQ 30x SAQ/ LAQ x20ea 1# x20ea 1.5# x20ea    LAQ 3# x 20   20X   2# x 20 LAQ 2# x 30ea LAQ 3# [place] 7.5# 2x15ea  Leg ext 24# 2x10  Leg ext 24# 2x15 Leg ext 24# 2x15   Mini-squats  18" chair squat 3x10     x15 x20 2x15 c Red TB TKE:B 2x10  2 x 10  20X   2 x 10 Squat to 90 2x10 Chair squat 3 x 10, 4 sec ecc. Chair squat 2 x 10, 4 sec ecc. x20 Chair squat 3x10 18" chair squat x25 18" chair squat 3x10 18" chair squat 3x10   Sled F/R    25# x 2 trips 25# 3 trips 35# x4 trips 35# x4 trips 45# x 4 trips 25# x 4 trips c cues for knee ext  35# x 4  35# 6x   NT   40# x4 trips        Ecc step-down     8" x 20 ea             NT  2 x 10 x15ea x15ea x20ea        F/L step up       8" Forward 2x15          NT  NT           Standing HS curl  Cybex hamstring 50# 2x15         1.5# 2x10    3# x 20 20x   NT     Cybex seated hamstring 50# 2x10 3# x20 Cybex hamstring 50# 2x15  Cybex hamstring 50# 2x20   Heel raise: B           1.5# 2x10                   Knee flexion stretch on step:B             5 x 20 sec    20sec 5x   20 sec x 5            Deadmill retrowalking                     x3 min          Harlan stretch:B              2 x 45"       Hip hinge              x10       Seated good morning              3 x 5 c cueing for technique       Marching in II bars              10# x10       Leg Press seat bottom #8 and backrest # 5 70# x 30             x4 trips 70# 2x10   70# x30                                   Ther Activity                                                               Gait Training                               Overground, no AD   x200ft                    Agility ladder c cueing for step-over, heel strike x5 min                                       Modalities                               MHP - R hamstring                          10 min prone     CP - B/L knee PRN         x10 min c static heel prop stretch

## 2023-08-22 NOTE — PROGRESS NOTES
Subjective;    74year-old patient with known osteoarthritis of both knees  Has been approved to receive Synvisc 1 injections both knees.     Past Medical History:   Diagnosis Date   • Anxiety disorder    • Atherosclerotic heart disease of native coronary artery without angina pectoris    • Benign prostatic hyperplasia without lower urinary tract symptoms    • Cancer (HCC)     bladder   • Disease of thyroid gland     hypo   • Dyslipidemia    • GERD (gastroesophageal reflux disease)    • Hypertension    • Impaired fasting blood sugar    • Kidney stone    • Low back pain    • Osteoarthritis     last assesed 6-6-16   • Other chest pain    • Paresthesia of skin    • Polyneuropathy     last assesed 5-8-17   • Pure hypercholesterolemia    • 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 ([address])    • Wears glasses        Past Surgical History:   Procedure Laterality Date   • BACK SURGERY     • CHOLECYSTECTOMY     • COLONOSCOPY  02/06/2019   • CYSTOSCOPY N/A 4/26/2023    Procedure: CYSTOSCOPY w/ bladder biopsy;  Surgeon: Mariano Taylor MD;  Location: [facility];  Service: Urology   • AK ARTHRP ACETBLR/PROX FEM PROSTC AGRFT/ALGRFT Right 8/5/2019    Procedure: ARTHROPLASTY HIP TOTAL;  Surgeon: Ho Alas MD;  Location: [facility];  Service: Orthopedics   • AK CYSTO W/REMOVAL OF LESIONS SMALL N/A 3/23/2023    Procedure: TRANSURETHRAL RESECTION OF BLADDER TUMOR (TURBT), mitomycin ;  Surgeon: Mariano Taylor MD;  Location[facility];  Service: Urology   • AK CYSTO W/REMOVAL OF LESIONS SMALL N/A 4/26/2023    Procedure: TRANSURETHRAL RESECTION OF BLADDER TUMOR (TURBT), cystoscopy with bladder biopsy;  Surgeon: Mariano Taylor MD;  Location: [facility];  Service: Urology   • TONSILLECTOMY         Family History   Problem Relation Age of Onset   • Arthritis Other    • Heart disease Father        Social History     Tobacco Use   • Smoking status: Former   • Smokeless tobacco: Never   Vaping Use   • Vaping Use: Never used   Substance Use Topics   • Alcohol use: Never   • Drug use: Never     Exam;    Neither knee has fluid distention or an effusion  He has no overlying bruising redness or palpable warmth of either knee    Large joint arthrocentesis: R knee  Procedure Details  Location: knee - R knee (Lateral suprapatellar approach)  Needle size: 18 G (RP 27 G)  Medications administered: 48 mg hylan 48 MG/6ML    Patient tolerance: patient tolerated the procedure well with no immediate complications  Dressing:  Sterile dressing applied    Large joint arthrocentesis: L knee  Procedure Details  Location: knee - L knee (Medial supracondylar approach)  Needle size: 18 G (RP 27 G)  Medications administered: 48 mg hylan 48 MG/6ML    Patient tolerance: patient tolerated the procedure well with no immediate complications  Dressing:  Sterile dressing applied      Impression;    Bilateral knee osteoarthritis  Bilateral knee pain    Plan;    Synvisc 1 administered, crosstable technique  Return appointment in 3 months  Allowed to receive Synvisc 1 in 6 months and a day    Entire experience was supervised by and plan formulated by the attending surgeon it was my privilege to assist him in the delivery of this man's care

## 2023-08-23 ENCOUNTER — PROCEDURE VISIT (OUTPATIENT)
Dept: UROLOGY | Facility: AMBULATORY SURGERY CENTER | Age: 73
End: 2023-08-23
Payer: MEDICARE

## 2023-08-23 VITALS
HEART RATE: 81 BPM | SYSTOLIC BLOOD PRESSURE: 132 MMHG | WEIGHT: 268 LBS | DIASTOLIC BLOOD PRESSURE: 84 MMHG | BODY MASS INDEX: 35.52 KG/M2 | HEIGHT: 73 IN

## 2023-08-23 DIAGNOSIS — N39.0 URINARY TRACT INFECTION WITHOUT HEMATURIA, SITE UNSPECIFIED: ICD-10-CM

## 2023-08-23 DIAGNOSIS — C67.2 MALIGNANT NEOPLASM OF LATERAL WALL OF URINARY BLADDER (HCC): Primary | ICD-10-CM

## 2023-08-23 LAB
SL AMB  POCT GLUCOSE, UA: NORMAL
SL AMB LEUKOCYTE ESTERASE,UA: NORMAL
SL AMB POCT BILIRUBIN,UA: NORMAL
SL AMB POCT BLOOD,UA: NORMAL
SL AMB POCT CLARITY,UA: CLEAR
SL AMB POCT COLOR,UA: YELLOW
SL AMB POCT KETONES,UA: NORMAL
SL AMB POCT NITRITE,UA: NORMAL
SL AMB POCT PH,UA: 7.5
SL AMB POCT SPECIFIC GRAVITY,UA: 1.02
SL AMB POCT URINE PROTEIN: NORMAL
SL AMB POCT UROBILINOGEN: NORMAL

## 2023-08-23 PROCEDURE — 81002 URINALYSIS NONAUTO W/O SCOPE: CPT

## 2023-08-23 PROCEDURE — 51720 TREATMENT OF BLADDER LESION: CPT

## 2023-08-23 NOTE — PROGRESS NOTES
8/23/2023    Nigel Henderson  1950  [id number]    Diagnosis    Malignant neoplasm of lateral wall of urinary bladder    Chief Complaint    4/6 BCG       Patient presents for BCG 4 of 6 managed by Dr. Neida Dunlap This Encounter   Procedures   • POCT urine dip     Patient instructed to call with persistent hematuria, fever, or other concerns. Procedure BCG treatment 4 of 6    Vitals:    08/23/23 1126   BP: 132/84   BP Location: Left arm   Patient Position: Sitting   Cuff Size: Adult   Pulse: 81   Weight: 122 kg (268 lb)   Height: 6' 1" (1.854 m)       Recent Results (from the past 4 hour(s))   POCT urine dip    Collection Time: 08/23/23 11:53 AM   Result Value Ref Range    LEUKOCYTE ESTERASE,UA +     NITRITE,UA -     SL AMB POCT UROBILINOGEN -     POCT URINE PROTEIN -      PH,UA 7.5     BLOOD,UA -     SPECIFIC GRAVITY,UA 1.025     KETONES,UA -     BILIRUBIN,UA -     GLUCOSE, UA -      COLOR,UA yellow     CLARITY,UA clear            Bladder instillation     Date/Time 8/23/2023 11:30 AM     Performed by  Lucero Rinaldi RN   Authorized by Karen Call MD     Universal Protocol   Consent: Verbal consent obtained. Risks and benefits: risks, benefits and alternatives were discussed  Consent given by: patient  Patient understanding: patient states understanding of the procedure being performed  Patient identity confirmed: verbally with patient       Procedure Details   Patient tolerance: patient tolerated the procedure well with no immediate complications         Sterile technique employed. Patient prepped and identified. 16F red coude catheter placed. Bladder drained, residual approximately 50 mL. The following solution was instilled directly into the bladder via catheter: 1 Vial BCG. Catheter removed. Patient discharged.  Patient tolerated procedure    Administrations This Visit     BCG (DONNY BCG) intravesical suspension 50 mg     Admin Date  08/23/2023 Action  Given Dose  50 mg Route  Intravesical Administered By  Claudia Chadwick, RN                    Claudia Chadwick, BSN, RN

## 2023-08-25 ENCOUNTER — EVALUATION (OUTPATIENT)
Dept: PHYSICAL THERAPY | Age: 73
End: 2023-08-25
Payer: MEDICARE

## 2023-08-25 DIAGNOSIS — M25.561 CHRONIC PAIN OF RIGHT KNEE: ICD-10-CM

## 2023-08-25 DIAGNOSIS — M17.0 PRIMARY OSTEOARTHRITIS OF BOTH KNEES: ICD-10-CM

## 2023-08-25 DIAGNOSIS — G89.29 CHRONIC PAIN OF RIGHT KNEE: ICD-10-CM

## 2023-08-25 DIAGNOSIS — M25.562 LEFT KNEE PAIN, UNSPECIFIED CHRONICITY: Primary | ICD-10-CM

## 2023-08-25 PROCEDURE — 97110 THERAPEUTIC EXERCISES: CPT | Performed by: PHYSICAL THERAPIST

## 2023-08-25 PROCEDURE — 97750 PHYSICAL PERFORMANCE TEST: CPT | Performed by: PHYSICAL THERAPIST

## 2023-08-25 NOTE — PROGRESS NOTES
PT Re-Evaluation  / PT Discharge    Today's date: 2023  Patient name: Singh Brannon  : 1950  MRN: [id number]  Referring provider: Hitesh Valencia PA-C  Dx:   Encounter Diagnosis     ICD-10-CM    1. Left knee pain, unspecified chronicity  M25.562       2. Primary osteoarthritis of both knees  M17.0       3. Chronic pain of right knee  M25.561     G89.29                      Assessment  Assessment details:   PT Reassessment: 23. Patient noted the following progress since onset of PT: hamstring and knee mobility, bilateral knee pain reduction, bilateral knee strength, walking, stair climbing, and stair climbing. But, he noted the following deficits that still persists: left knee popliteal region, prolonged walking, repeated transfers, gait deficits with decrease in bilateral knee extension with mid stance and stair climbing. Patient agrees with PT POC to d/c to hep due to functional and impairment progress. Thus, patient provided with final written and verbal hep and d/c to hep. Re-eval 23:  Pt notes the following improvement in functional status: improved gait mechanics with heel to toe pattern, improved calf / hamstring length and knee extension ROM, improved knee pain that decreases with activity, improved functional mobility with sit to stand transfers and stair climbing. Still notes bilateral knee pain and clicking, lacking full ROM in B knees, and feeling weakness in BLE with ADLs, household chores and yard work. Pt continues to present with deficits in B knee ROM with R knee ext more limited than L resulting in the following functional limitations: walk on uneven surfaces, prolonged activity involving bending, lifting, squatting, and walking long periods. The patient is a good candidate for physical therapy to address deficits and achieve the following goals.     Re-eval 23:  Pt notes the following improvements in functional status: improved B gastroc / hamstring flexibility and improved B knee ext ROM improving walking, transfers and stair climbing ability. Objective measures reveal improved B knee ROM & improved BLE strength and power. Pt continues to present with deficits in B knee ROM with R knee ext more limited than L resulting in the following functional limitations: ability to sit, stand, walk community distances, walk on uneven surfaces, squat, and stair climb. The patient is a good candidate for physical therapy to achieve the following goals. IE 6/1/23:  Jennifer Andersen is a 74 y.o. male presenting to OPPT eval with complaints of bilateral knee pain secondary to progressive severe OA. Imaging reveals tricompartmental degeneration of bilateral knees. Prior tx includes cortisone injections with no sx relief and viscosupplementation treatment 3 y.o. that resulted in prolonged sx relief - plans to receive viscosupp injection on 8/26/23. Upon PT examination, pt presents with decreased R knee ext in static stance, B/L genu varus in static stance & gait, decreased B/L knee ext ROM R>L, decreased knee flex ROM L>R, patellar hypomobility R>L, B/L hamstring tightness R>L and fair LE muscular strength / endurance impairing ability to sit, stand, walk, walk on uneven surfaces squat, and stair climb. Gait analysis reveals decreased B/L knee extension in terminal swing to initial contact, resulting in crouched gait with midfoot strike and forward trunk lean. C/o medial joint line pain in L knee during gait. Pt given HEP of seated HS stretch, runner's calf stretch and seated LAQ to address deficits in ROM / strength. The patient is a fair candidate for physical therapy to achieve the following goals.   Impairments: abnormal gait, abnormal or restricted ROM, activity intolerance, impaired balance, impaired physical strength, lacks appropriate home exercise program, pain with function, weight-bearing intolerance, poor posture  and poor body mechanics    Goals  STG (2-3 wks):  1. Pt's report of B/L knee pain will decrease to <5/10 during walking and stair climbing to improve functional mobility. MET. 2. Pt will demonstrate increased R knee ext to -4 degrees in order to improve gait mechanics. PARTIALLY MET  3. Pt will demonstrate increased L knee flexion to 120 degrees in order to promote symmetrical mobility. MET  4. Pt will improve 5xSTS to <16 seconds to demonstrate improved LE power. MET  5. Pt's self-perceived disability will decrease as demonstrated by a 8-point improvement in FOTO score. MET  6. Pt will be independent with HEP as demonstrated by return demo with proper technique and execution of exercises provided. MET  LTG (8-12 wks):  1. Pt's report of B/L knee pain will decrease to <2/10 during walking and stair climbing to improve functional mobility Partially MET  2. Pt will demonstrate increased R knee ext to <-2 degrees in order to improve gait mechanics PArtially MET  3. Pt will demonstrate increased L knee flexion to 125 degrees in order to promote symmetrical mobility Partially MET  4. Pt will improve 5xSTS to <13 seconds to demonstrate improved LE power in correlation with age-matched norms for increased independence with sit-to-stand transfers NOT MET  5. Pt's self-perceived disability will decrease as demonstrated by a 15-point improvement in FOTO score. MET  6. Pt will be independent with progressions to HEP as demonstrated by return demo with proper technique and execution of exercises provided Partially MET  7.  Pt will ambulate 50 feet on uneven surface (grass) to improve balance confidence with outdoor household chores Partially MET      Plan  Patient would benefit from: skilled physical therapy  Planned modality interventions: biofeedback, cryotherapy, electrical stimulation/Russian stimulation, hydrotherapy, TENS, thermotherapy: hydrocollator packs, traction and ultrasound  Planned therapy interventions: abdominal trunk stabilization, activity modification, aquatic therapy, balance, balance/weight bearing training, body mechanics training, flexibility, functional ROM exercises, graded activity, graded exercise, graded motor, gait training, home exercise program, therapeutic training, therapeutic activities, therapeutic exercise, stretching, strengthening, joint mobilization, manual therapy, massage, neuromuscular re-education, patient education and postural training  Frequency: 2x week  Duration in visits: 8  Duration in weeks: 6  Plan of Care beginning date: 6/1/2023  Plan of Care expiration date: 7/16/2023  Treatment plan discussed with: patient        Subjective Evaluation    History of Present Illness  Date of onset: 5/17/2023  Mechanism of injury: Pt is referred to OP PT by orthopedics with diagnosis of bilateral knee OA. The following HPI captured from referring provider's encounter on 5/17/23 and EMR review and confirmed with patient:    "Sixto Franz is a 74 y.o. male who presents today for an Initial evaluation of his bilateral knee due to chronic pain. The patient is currently treated for bladder cancer. The patient states that he was hospitalized in March 2023 due to UTI. While he was in the ED, an aspiration of 65 cc's of fluid was taken from the right knee. The patient has a h/o of right total hip arthroplasty performed on 08/05/2023. On today's presentation, the patient states that he has been experiencing worsening bilateral knee pain with prolonged sedentary positions and weightbearing activities especially ambulating stairs and getting up from a seated position. He has a h/o of Osgood slaughters. The patient states he received an injection of cortisone to provide symptomatic relief about 5 years ago. He states he also uses knee sleeves for added support. He states that he has a lead a sedentary lifestyle due to his bilateral knee pain. Patient has failed at least three months of conservative therapy including Home exercise program, over-the-counter oral pain medication, bracing, rest, ice , patient symptoms include Pain and stiffness, pain is rated at 8/10. He denies any recent bruising, numbness, paresthesias, weakness, or feelings of instability. He denies any fevers, chills, dizziness, headaches, chest pain, shortness of breath, palpitations, abdominal pain, nausea, vomiting, diarrhea, lower extremity pain/swelling/edema."    PMH remarkable for Hypothyroidism, Primary osteoarthritis of both knees, Osgood-Schlatter's disease of both knees, Gross hematuria, Malignant neoplasm of posterior wall of urinary bladder ([address]), Malignant neoplasm of lateral wall of urinary bladder ([address]), R hip replacement, Obesity, morbid ([address]), Hyperlipidemia    Imaging of L knee (5/19/23) per Epic EMR review:  "LEFT KNEE     INDICATION:   M25.562: Pain in left knee.     COMPARISON: Left knee x-ray dated May 29, 2019.     VIEWS:  XR KNEE 3 VW LEFT NON INJURY     FINDINGS:     There is no acute fracture or dislocation.     There is no joint effusion.     Tricompartmental osteoarthritis with severe narrowing of the medial tibiofemoral joint space and osteophytes seen in all 3 compartments.   There is mild genu varus.     No lytic or blastic osseous lesion.     Soft tissues are unremarkable.     IMPRESSION:     No acute osseous abnormality.     Tricompartmental osteoarthritic degenerative changes of the left knee with severe medial compartment joint space narrowing."    Imaging of R knee (3/9/2023) per Epic EMR review:  "RIGHT KNEE and TIBIA/FIBULA     INDICATION:   swelling/pain.     COMPARISON:  Right knee radiographs 5/29/2019.     VIEWS:  XR KNEE 3 VW RIGHT NON INJURY, XR TIBIA FIBULA 2 VW RIGHT      FINDINGS:     There is no acute fracture or dislocation.     Possible tiny joint effusion, noting poor lateral view.     Moderate tricompartmental osteoarthritis as evidenced by joint space narrowing, osteophyte formation and subchondral sclerosis, progressed from prior.     No lytic or blastic osseous lesion.     Soft tissues are unremarkable.     IMPRESSION:     No acute osseous abnormality.     Degenerative changes as described."    Pt with current symptomatic complaints today:   Reports increased B/L knee pain, especially with weather changes - today is a "better day." Pain is described as "excruciating" and "sharp" that limits his ability to sit, stand, walking, walking on uneven surfaces squat, and stair climb. Prior tx includes viscosupplementation treatment 3 y.o. that resulted in prolonged sx relief. Did not follow up with gel injections after initial treatment 3 y.o. - plans to receive viscosupp injection on 23. Pt underwent cortisone steroid injection to B/L knees on 2023 - reports no change in pain. Previously wore compression sleeves for comfort and sx relief - stopped wearing knee sleeves due to fluid retention 2/2 bladder issues. Pt currently cleared of bladder cancer and will be undergoing preventative treatment in the near future. BCG treatment for bladder cancer starting 7/15/23 - scheduled for 6 consecutive weeks on . [facility] at [facility] for [facility] pain.   Quality of life: fair    Pain  Current pain ratin  At best pain ratin  At worst pain ratin-7  Quality: discomfort  Aggravating factors: walking and stair climbing    Social Support  Steps to enter house: yes  1  Stairs in house: yes   13  Lives in: one-story house  Lives with: spouse    Employment status: not working  Exercise history: sedentary      Diagnostic Tests  X-ray: abnormal  Treatments  Current treatment: injection treatment and medication  Patient Goals  Patient goals for therapy: decreased pain and increased strength  Patient goal: Return to golf      Current Outpatient Medications on File Prior to Visit   Medication Sig Dispense Refill   • atorvastatin (LIPITOR) 10 mg tablet Take 1 tablet (10 mg total) by mouth daily at bedtime 90 tablet 1   • levothyroxine 150 mcg tablet Take 1 tablet (150 mcg total) by mouth daily 90 tablet 1   • ciclopirox (LOPROX) 0.77 % cream APPLY TO AFFECTED AREAS ON FACE 1-2 TIMES DAILY     • hydrocortisone 2.5 % cream APPLY TO SKIN TWICE DAILY FOR 2 WEEKS THEN IF NEEDED     • tamsulosin (FLOMAX) 0.4 mg Take 1 capsule (0.4 mg total) by mouth daily with dinner 90 capsule 3     Current Facility-Administered Medications on File Prior to Visit   Medication Dose Route Frequency Provider Last Rate Last Admin   • BCG (DONNY BCG) intravesical suspension 50 mg  50 mg Intravesical Weekly Guillerminamichelle Restrepo, CRNP   50 mg at 08/23/23 1154     Past Surgical History:   Procedure Laterality Date   • BACK SURGERY     • CHOLECYSTECTOMY     • COLONOSCOPY  02/06/2019   • CYSTOSCOPY N/A 4/26/2023    Procedure: CYSTOSCOPY w/ bladder biopsy;  Surgeon: Salena Bishop MD;  Location: [facility];  Service: Urology   • VT ARTHRP ACETBLR/PROX FEM PROSTC AGRFT/ALGRFT Right 8/5/2019    Procedure: ARTHROPLASTY HIP TOTAL;  Surgeon: Tico Zacarias MD;  Location: [facility];  Service: Orthopedics   • VT CYSTO W/REMOVAL OF LESIONS SMALL N/A 3/23/2023    Procedure: TRANSURETHRAL RESECTION OF BLADDER TUMOR (TURBT), mitomycin ;  Surgeon: Salena Bishop MD;  Location[facility];  Service: Urology   • VT CYSTO W/REMOVAL OF LESIONS SMALL N/A 4/26/2023    Procedure: TRANSURETHRAL RESECTION OF BLADDER TUMOR (TURBT), cystoscopy with bladder biopsy;  Surgeon: Salena Bishop MD;  Location: [facility];  Service: Urology   • TONSILLECTOMY         Objective     OBJECTIVE DATA IN BOLD INDICATIVE OF FINDINGS ON RE-EVAL:    Static Posture     Knee   Genu varus. Tenderness     Right Knee   Tenderness in the medial joint line.      Active Range of Motion   Left Knee   Flexion: 115 degrees 120 degrees  Extension: 2 degrees 0 degrees    Right Knee   Flexion: 124 degrees 125 degrees  Extension: 5 degrees 5 degrees    Passive Range of Motion   Left Knee   Flexion: 115 degrees 122 deg  Extension: 2 degrees 0 deg    Right Knee   Flexion: 124 degrees 126 deg  Extension: 5 degrees 5 deg    Mobility   Patellar Mobility:   Left Knee   WFL: medial, lateral, superior and inferior. Right Knee   WFL: medial  Hypomobile: lateral, superior and inferior     Strength/Myotome Testing     Left Knee   Flexion: 4+ 5  Extension: 5  Quadriceps contraction: good    Right Knee   Flexion: 5  Extension: 5  Quadriceps contraction: fair good    Ambulation   Weight-Bearing Status   Weight-Bearing Status (Left): full weight bearing   Weight-Bearing Status (Right): full weight-bearing    Assistive device used: none    Ambulation: Level Surfaces   Ambulation without assistive device: independent    Observational Gait   Gait: asymmetric and crouched   Decreased walking speed and stride length. Quality of Movement During Gait   Trunk  Forward lean. Knee    Knee (Left): Positive stiff knee and varus. Knee (Right): Positive stiff knee and varus.      Additional Quality of Movement During Gait Details  Lack of B/L knee extension AROM impacting terminal swing to initial contact, resulting in midfoot strike and crouched gait pattern with forward trunk lean Lacking bilateral knee extension ROM impacting stance phase, yet pt improving mechanics with heel strike at initial contact    Functional Assessment    Comments  5xSTS: 15.6 seconds 18.23 seconds with significantly improved eccentric control             Precautions: PMH remarkable for bladder cancer, HLD, hypothyroidism       Manuals 8/15 6/6 6/9 6/13 6/16 6/20 6/23 6/27 6/30  7/5 7/7 7/14 7/18 7/20 7/25 8/8 8/11 8/25   Patellar mobilizations R M/L & Sup/Inf   3 x 30 sec ea   5 min   3x30 sec      FOTO (done)    Trialed RLE lateral belt mob w movement - NE         STM / IASTM R hamstring      IASTM / STM 10 min STM only                 x5 min:R X 5 min:R       B/L Knee jt distraction with grade III/IV P-A mobilization       L knee flexion gapping x45 sec 3 x30 sec 3x30 sec:R in Anterior drawer position P-A on tibia; supine A-P on femur in 25 degree open pack       Prone  X 8 min Prone  x8 min  NT  prone x 10 min total Prone x8 min Prone x 12 min           B/L knee PROM   5 min 5 min        Prone x8 min  Prone x5 min  5 MIN   prone extension total 10 min Prone hang R 2# x 2 min          Manual HS stretch   3 x 45 sec 3x 45 sec  x2 min               Contract-relax c stretching x10 min Contract-relax c stretching x10 min       Kinesiotape - "Y" from quad tendon to M/L aspects of patella + "U" at base of patella       SP                       Neuro Re-Ed                                                                                                                                                                                                                                                                                               Ther Ex                               Bike / NS  10 min x10 min x10 min x10min x10 min x20 min x20 min  x15 min  15 MIN   15 min 10 min  10 min  20 min 10 min 10 min 15 min   Seated HS stretch c heel cord strap & leg elevated on stool 10x10":B    Seated foot on ground 2 x 45": B       5 x20 sec 3x30 sec      NT  NT Standing, one foot on step 5x20 sec Standing, one foot on step 5x20 sec  5 x 20" 5 x 20" c heel cord strap & leg elevated on stool 10x10":B    Seated foot on ground 2 x 45":B c heel cord strap & leg elevated on stool 10x10":B    Seated foot on ground 2 x 45":B c heel cord strap & leg elevated on stool 10x10":B    Seated foot on ground 2 x 45":B   Runner's gastroc stretch x30 sec ea                NT  NT        NT   Slant board gastroc / soleus stretch  LVL 3 5x20"       5 x20 sec 3x30 sec 5 x 20 sec  5 x 20"  20SEC 5X   2 nd level 20 sec x 5 lvl 2 5x20 sec LVL 3 [id number]  LVL 3 5x20" LVL 3 5x20" LVL 3 5x20" LVL 3 5x20"  LVL 3 5x20"   Heel prop static knee ext   2 x 1 min ea         c heel cord strap stretch & quad set 10 x 20 sec ea    NT  NT           Heel slide stretch                 NT  NT           Standing TKE:B:  [place] 14 # x 30 c heel prop 20 x 5 sec c heel prop 20 sec x 5   x20 towel under knee        NT   TKE blue x30 [place] TKE 10# x30 [place] 12# x30  Karen TKE 14# x30 [place] TKE 12# x30 Karen 14# x30    Bridging     20x            20X   2 x 10           SLR       x20ea 1# x20ea Standing1.5# x20ea     20X   with quad set 2# x 20           SAQ / LAQ Leg extension 24# x 30 LAQ x20 ea LAQ 30x SAQ/ LAQ x20ea 1# x20ea 1.5# x20ea    LAQ 3# x 20   20X   2# x 20 LAQ 2# x 30ea LAQ 3# x30ea LAQ 7.5# 2x15ea  Leg ext 24# 2x10  Leg ext 24# 2x15 Leg ext 24# 2x15   Mini-squats  18" chair squat 3x10     x15 x20 2x15 c Red TB TKE:B 2x10  2 x 10  20X   2 x 10 Squat to 90 2x10 Chair squat 3 x 10, 4 sec ecc. Chair squat 2 x 10, 4 sec ecc. x20 Chair squat 3x10 18" chair squat x25 18" chair squat 3x10 18" chair squat 2x10   Sled F/R    25# x 2 trips 25# 3 trips 35# x4 trips 35# x4 trips 45# x 4 trips 25# x 4 trips c cues for knee ext  35# x 4  35# 6x   NT   40# x4 trips        Ecc step-down     8" x 20 ea             NT  2 x 10 x15ea x15ea x20ea        F/L step up       8" Forward 2x15          NT  NT           Standing HS curl  Cybex hamstring 50# 2x15         1.5# 2x10    3# x 20 20x   NT     Cybex seated hamstring 50# 2x10 3# x20 Cybex hamstring 50# 2x15  Cybex hamstring 50# 2x20   Heel raise: B           1.5# 2x10                   Knee flexion stretch on step:B             5 x 20 sec    20sec 5x   20 sec x 5            Deadmill retrowalking                     x3 min          Harlan stretch:B              2 x 45"       Hip hinge              x10       Seated good morning              3 x 5 c cueing for technique       Marching in II bars              10# x10       Leg Press seat bottom #8 and backrest # 5 70# x 30             x4 trips 70# 2x10   70# x30                                   Ther Activity                                                     Gait Training                               Overground, no AD   x200ft                    Agility ladder c cueing for step-over, heel strike x5 min                                       Modalities                               MHP - R hamstring                          10 min prone     CP - B/L knee PRN         x10 min c static heel prop stretch

## 2023-08-30 ENCOUNTER — PROCEDURE VISIT (OUTPATIENT)
Dept: UROLOGY | Facility: AMBULATORY SURGERY CENTER | Age: 73
End: 2023-08-30
Payer: MEDICARE

## 2023-08-30 VITALS
HEIGHT: 73 IN | BODY MASS INDEX: 36.23 KG/M2 | HEART RATE: 76 BPM | WEIGHT: 273.4 LBS | DIASTOLIC BLOOD PRESSURE: 82 MMHG | SYSTOLIC BLOOD PRESSURE: 130 MMHG

## 2023-08-30 DIAGNOSIS — C67.2 MALIGNANT NEOPLASM OF LATERAL WALL OF URINARY BLADDER (HCC): Primary | ICD-10-CM

## 2023-08-30 LAB
SL AMB  POCT GLUCOSE, UA: NORMAL
SL AMB LEUKOCYTE ESTERASE,UA: NORMAL
SL AMB POCT BILIRUBIN,UA: NORMAL
SL AMB POCT BLOOD,UA: NORMAL
SL AMB POCT CLARITY,UA: CLEAR
SL AMB POCT COLOR,UA: YELLOW
SL AMB POCT KETONES,UA: NORMAL
SL AMB POCT NITRITE,UA: NORMAL
SL AMB POCT PH,UA: 6.5
SL AMB POCT SPECIFIC GRAVITY,UA: 1.01
SL AMB POCT URINE PROTEIN: NORMAL
SL AMB POCT UROBILINOGEN: NORMAL

## 2023-08-30 PROCEDURE — 81002 URINALYSIS NONAUTO W/O SCOPE: CPT

## 2023-08-30 PROCEDURE — 51720 TREATMENT OF BLADDER LESION: CPT

## 2023-08-30 NOTE — PROGRESS NOTES
8/30/2023    Lay Dos Santos  1950  [id number]    Diagnosis    Malignant neoplasm of lateral wall of urinary bladder    Chief Complaint    5/6 BCG       Patient presents for BCG 5 of 6 managed by Dr. Selena Beltran This Encounter   Procedures   • POCT urine dip     Patient instructed to call with persistent hematuria, fever, or other concerns. Procedure BCG treatment 5 of 6    Vitals:    08/30/23 1126   BP: 130/82   BP Location: Left arm   Patient Position: Sitting   Cuff Size: Adult   Pulse: 76   Weight: 124 kg (273 lb 6.4 oz)   Height: 6' 1" (1.854 m)       Recent Results (from the past 4 hour(s))   POCT urine dip    Collection Time: 08/30/23 11:51 AM   Result Value Ref Range    LEUKOCYTE ESTERASE,UA -     NITRITE,UA -     SL AMB POCT UROBILINOGEN -     POCT URINE PROTEIN -      PH,UA 6.5     BLOOD,UA -     SPECIFIC GRAVITY,UA 1.015     KETONES,UA -     BILIRUBIN,UA -     GLUCOSE, UA -      COLOR,UA yellow     CLARITY,UA clear            Bladder instillation     Date/Time 8/30/2023 11:30 AM     Performed by  Pepper Blunt RN   Authorized by Samm Mack MD     Universal Protocol   Consent: Verbal consent obtained. Risks and benefits: risks, benefits and alternatives were discussed  Consent given by: patient  Patient understanding: patient states understanding of the procedure being performed  Patient identity confirmed: verbally with patient       Procedure Details   Patient tolerance: patient tolerated the procedure well with no immediate complications         Sterile technique employed. Patient prepped and identified. 16F red coude catheter placed. Bladder drained, residual approximately 10 mL. The following solution was instilled directly into the bladder via catheter: 1 Vial BCG. Catheter removed. Patient discharged.  Patient tolerated procedure    Administrations This Visit     BCG (DONNY BCG) intravesical suspension 50 mg     Admin Date  08/30/2023 Action  Given Dose  50 mg Route  Intravesical Administered By  Lucero Rinaldi, RN                      Lucero Rinaldi, BSN, RN

## 2023-09-05 ENCOUNTER — TELEPHONE (OUTPATIENT)
Dept: INTERNAL MEDICINE CLINIC | Facility: CLINIC | Age: 73
End: 2023-09-05

## 2023-09-05 DIAGNOSIS — E03.9 HYPOTHYROIDISM: Primary | ICD-10-CM

## 2023-09-05 DIAGNOSIS — E78.5 HYPERLIPIDEMIA: ICD-10-CM

## 2023-09-05 DIAGNOSIS — Z13.0 SCREENING FOR DEFICIENCY ANEMIA: ICD-10-CM

## 2023-09-05 NOTE — TELEPHONE ENCOUNTER
Patient has an appointment next week and would like blood work done before appointment. He has been feeling very lethargic lately.

## 2023-09-06 ENCOUNTER — PROCEDURE VISIT (OUTPATIENT)
Dept: UROLOGY | Facility: AMBULATORY SURGERY CENTER | Age: 73
End: 2023-09-06
Payer: MEDICARE

## 2023-09-06 VITALS
OXYGEN SATURATION: 94 % | WEIGHT: 273.4 LBS | SYSTOLIC BLOOD PRESSURE: 126 MMHG | DIASTOLIC BLOOD PRESSURE: 60 MMHG | HEART RATE: 76 BPM | BODY MASS INDEX: 36.23 KG/M2 | HEIGHT: 73 IN

## 2023-09-06 DIAGNOSIS — C67.2 MALIGNANT NEOPLASM OF LATERAL WALL OF URINARY BLADDER (HCC): Primary | ICD-10-CM

## 2023-09-06 LAB
SL AMB  POCT GLUCOSE, UA: NORMAL
SL AMB LEUKOCYTE ESTERASE,UA: NORMAL
SL AMB POCT BILIRUBIN,UA: NORMAL
SL AMB POCT BLOOD,UA: NORMAL
SL AMB POCT CLARITY,UA: NORMAL
SL AMB POCT COLOR,UA: YELLOW
SL AMB POCT KETONES,UA: NORMAL
SL AMB POCT NITRITE,UA: NORMAL
SL AMB POCT PH,UA: 7.5
SL AMB POCT SPECIFIC GRAVITY,UA: 1.01
SL AMB POCT URINE PROTEIN: NORMAL
SL AMB POCT UROBILINOGEN: NORMAL

## 2023-09-06 PROCEDURE — 51720 TREATMENT OF BLADDER LESION: CPT

## 2023-09-06 PROCEDURE — 81002 URINALYSIS NONAUTO W/O SCOPE: CPT

## 2023-09-06 NOTE — PROGRESS NOTES
9/6/2023    Nigel Henderson  1950  [id number]    Diagnosis    Malignant neoplasm of lateral wall of urinary bladder    Chief Complaint    6/6 BCG       Patient presents for BCG 6 of 6 managed by [address] [place] [address] This Encounter   Procedures   • POCT urine dip     Patient instructed to call with persistent hematuria, fever, or other concerns. Procedure BCG treatment 6 of 6    Vitals:    09/06/23 1139   BP: 126/60   BP Location: Left arm   Patient Position: Sitting   Cuff Size: Adult   Pulse: 76   SpO2: 94%   Weight: 124 kg (273 lb 6.4 oz)   Height: 6' 1" (1.854 m)       Recent Results (from the past 4 hour(s))   POCT urine dip    Collection Time: 09/06/23  1:40 PM   Result Value Ref Range    LEUKOCYTE ESTERASE,UA ++     NITRITE,UA -     SL AMB POCT UROBILINOGEN -     POCT URINE PROTEIN -      PH,UA 7.5     BLOOD,UA -     SPECIFIC GRAVITY,UA 1.010     KETONES,UA -     BILIRUBIN,UA -     GLUCOSE, UA -      COLOR,UA yellow     CLARITY,UA clear            Bladder instillation     Date/Time 9/6/2023 11:00 AM     Performed by  Unique Crowder RN   Authorized by Mariano Davidson MD     [place] Protocol   Consent: Verbal consent obtained. Risks and benefits: risks, benefits and alternatives were discussed  Consent given by: patient  Patient understanding: patient states understanding of the procedure being performed  Patient identity confirmed: verbally with patient       Procedure Details   Patient tolerance: patient tolerated the procedure well with no immediate complications         Sterile technique employed. Patient prepped and identified. 16F red coude catheter placed. Bladder drained, residual approximately 25 mL. The following solution was instilled directly into the bladder via catheter: 1 Vial BCG. Catheter removed. Patient discharged.  Patient tolerated procedure    Administrations This Visit     BCG (DONNY BCG) intravesical suspension 50 mg     Admin Date  09/06/2023 Action  Given Dose  50 mg Route  Intravesical Administered By  Dot Ramirez, RN                      Dot Ramirez, BSN, RN

## 2023-09-08 ENCOUNTER — APPOINTMENT (OUTPATIENT)
Dept: LAB | Age: 73
End: 2023-09-08
Payer: MEDICARE

## 2023-09-08 DIAGNOSIS — E78.5 HYPERLIPIDEMIA: ICD-10-CM

## 2023-09-08 DIAGNOSIS — Z13.0 SCREENING FOR DEFICIENCY ANEMIA: ICD-10-CM

## 2023-09-08 DIAGNOSIS — E03.9 HYPOTHYROIDISM: ICD-10-CM

## 2023-09-08 LAB
ALBUMIN SERPL BCP-MCNC: 3.8 G/DL (ref 3.5–5)
ALP SERPL-CCNC: 60 U/L (ref 34–104)
ALT SERPL W P-5'-P-CCNC: 14 U/L (ref 7–52)
ANION GAP SERPL CALCULATED.3IONS-SCNC: 9 MMOL/L
AST SERPL W P-5'-P-CCNC: 18 U/L (ref 13–39)
BASOPHILS # BLD AUTO: 0.08 THOUSANDS/ÂΜL (ref 0–0.1)
BASOPHILS NFR BLD AUTO: 1 % (ref 0–1)
BILIRUB SERPL-MCNC: 1.18 MG/DL (ref 0.2–1)
BUN SERPL-MCNC: 18 MG/DL (ref 5–25)
CALCIUM SERPL-MCNC: 8.9 MG/DL (ref 8.4–10.2)
CHLORIDE SERPL-SCNC: 103 MMOL/L (ref 96–108)
CHOLEST SERPL-MCNC: 111 MG/DL
CO2 SERPL-SCNC: 28 MMOL/L (ref 21–32)
CREAT SERPL-MCNC: 1.02 MG/DL (ref 0.6–1.3)
EOSINOPHIL # BLD AUTO: 0.17 THOUSAND/ÂΜL (ref 0–0.61)
EOSINOPHIL NFR BLD AUTO: 3 % (ref 0–6)
ERYTHROCYTE [DISTWIDTH] IN BLOOD BY AUTOMATED COUNT: 12.8 % (ref 11.6–15.1)
GFR SERPL CREATININE-BSD FRML MDRD: 71 ML/MIN/1.73SQ M
GLUCOSE P FAST SERPL-MCNC: 96 MG/DL (ref 65–99)
HCT VFR BLD AUTO: 47.2 % (ref 36.5–49.3)
HDLC SERPL-MCNC: 37 MG/DL
HGB BLD-MCNC: 15.9 G/DL (ref 12–17)
IMM GRANULOCYTES # BLD AUTO: 0.01 THOUSAND/UL (ref 0–0.2)
IMM GRANULOCYTES NFR BLD AUTO: 0 % (ref 0–2)
LDLC SERPL CALC-MCNC: 56 MG/DL (ref 0–100)
LYMPHOCYTES # BLD AUTO: 1.19 THOUSANDS/ÂΜL (ref 0.6–4.47)
LYMPHOCYTES NFR BLD AUTO: 21 % (ref 14–44)
MCH RBC QN AUTO: 31.1 PG (ref 26.8–34.3)
MCHC RBC AUTO-ENTMCNC: 33.7 G/DL (ref 31.4–37.4)
MCV RBC AUTO: 92 FL (ref 82–98)
MONOCYTES # BLD AUTO: 0.63 THOUSAND/ÂΜL (ref 0.17–1.22)
MONOCYTES NFR BLD AUTO: 11 % (ref 4–12)
NEUTROPHILS # BLD AUTO: 3.67 THOUSANDS/ÂΜL (ref 1.85–7.62)
NEUTS SEG NFR BLD AUTO: 64 % (ref 43–75)
NONHDLC SERPL-MCNC: 74 MG/DL
NRBC BLD AUTO-RTO: 0 /100 WBCS
PLATELET # BLD AUTO: 199 THOUSANDS/UL (ref 149–390)
PMV BLD AUTO: 11.4 FL (ref 8.9–12.7)
POTASSIUM SERPL-SCNC: 4.2 MMOL/L (ref 3.5–5.3)
PROT SERPL-MCNC: 7 G/DL (ref 6.4–8.4)
RBC # BLD AUTO: 5.12 MILLION/UL (ref 3.88–5.62)
SODIUM SERPL-SCNC: 140 MMOL/L (ref 135–147)
T4 FREE SERPL-MCNC: 1.31 NG/DL (ref 0.61–1.12)
TRIGL SERPL-MCNC: 89 MG/DL
TSH SERPL DL<=0.05 MIU/L-ACNC: 0.54 UIU/ML (ref 0.45–4.5)
WBC # BLD AUTO: 5.75 THOUSAND/UL (ref 4.31–10.16)

## 2023-09-08 PROCEDURE — 85025 COMPLETE CBC W/AUTO DIFF WBC: CPT

## 2023-09-08 PROCEDURE — 80053 COMPREHEN METABOLIC PANEL: CPT

## 2023-09-08 PROCEDURE — 84443 ASSAY THYROID STIM HORMONE: CPT

## 2023-09-08 PROCEDURE — 84439 ASSAY OF FREE THYROXINE: CPT

## 2023-09-08 PROCEDURE — 36415 COLL VENOUS BLD VENIPUNCTURE: CPT

## 2023-09-08 PROCEDURE — 80061 LIPID PANEL: CPT

## 2023-09-22 ENCOUNTER — OFFICE VISIT (OUTPATIENT)
Dept: INTERNAL MEDICINE CLINIC | Facility: CLINIC | Age: 73
End: 2023-09-22
Payer: MEDICARE

## 2023-09-22 VITALS
OXYGEN SATURATION: 96 % | TEMPERATURE: 97 F | BODY MASS INDEX: 36.45 KG/M2 | SYSTOLIC BLOOD PRESSURE: 136 MMHG | HEART RATE: 70 BPM | WEIGHT: 275 LBS | HEIGHT: 73 IN | DIASTOLIC BLOOD PRESSURE: 81 MMHG

## 2023-09-22 DIAGNOSIS — Z00.00 ENCOUNTER FOR MEDICARE ANNUAL WELLNESS EXAM: Primary | ICD-10-CM

## 2023-09-22 PROCEDURE — G0439 PPPS, SUBSEQ VISIT: HCPCS | Performed by: INTERNAL MEDICINE

## 2023-09-22 NOTE — PROGRESS NOTES
Assessment and Plan:     Problem List Items Addressed This Visit    None  Visit Diagnoses     Encounter for Medicare annual wellness exam    -  Primary           Preventive health issues were discussed with patient, and age appropriate screening tests were ordered as noted in patient's After Visit Summary. Personalized health advice and appropriate referrals for health education or preventive services given if needed, as noted in patient's After Visit Summary.      History of Present Illness:     Patient presents for a Medicare Wellness Visit    HPI   Patient Care Team:  Wander Mcarthur MD as PCP - General (Internal Medicine)  Jannet Mayes MD     Review of Systems:     Review of Systems     Problem List:     Patient Active Problem List   Diagnosis   • Primary osteoarthritis of both knees   • Osgood-Schlatter's disease of both knees   • Pain in both knees   • Status post right hip replacement   • Status post total hip replacement, left   • Acute blood loss anemia   • Impaired mobility and ADLs   • Hypothyroidism   • Difficulty sleeping   • Aftercare following right hip joint replacement surgery   • Obesity, morbid ([address])   • Acute leg pain, left   • Gross hematuria   • Hyperlipidemia   • Suspected UTI   • Bladder mass   • Right leg swelling   • Malignant neoplasm of posterior wall of urinary bladder (HCC)   • Malignant neoplasm of lateral wall of urinary bladder ([address])      Past Medical and Surgical History:     Past Medical History:   Diagnosis Date   • Anxiety disorder    • Atherosclerotic heart disease of native coronary artery without angina pectoris    • Benign prostatic hyperplasia without lower urinary tract symptoms    • Cancer ([address])     bladder   • Disease of thyroid gland     hypo   • Dyslipidemia    • GERD (gastroesophageal reflux disease)    • Hypertension    • Impaired fasting blood sugar    • Kidney stone    • Low back pain    • Osteoarthritis     last assesed 6-6-16   • Other chest pain    • Paresthesia of skin    • Polyneuropathy     last assesed 5-8-17   • Pure hypercholesterolemia    • 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 ([address])    • Wears glasses      Past Surgical History:   Procedure Laterality Date   • BACK SURGERY     • CHOLECYSTECTOMY     • COLONOSCOPY  02/06/2019   • CYSTOSCOPY N/A 4/26/2023    Procedure: CYSTOSCOPY w/ bladder biopsy;  Surgeon: Gerald Odonnell MD;  [facility];  Service: Urology   • [place] ARTHRP ACETBLR/PROX FEM PROSTC AGRFT/ALGRFT Right 8/5/2019    Procedure: ARTHROPLASTY HIP TOTAL;  Surgeon: Nury Parks MD;  [facility];  Service: Orthopedics   • [place] CYSTO W/REMOVAL OF LESIONS SMALL N/A 3/23/2023    Procedure: TRANSURETHRAL RESECTION OF BLADDER TUMOR (TURBT), mitomycin ;  Surgeon: Gerald Odonnell MD;  [facility];  Service: Urology   • [place] CYSTO W/REMOVAL OF LESIONS SMALL N/A 4/26/2023    Procedure: TRANSURETHRAL RESECTION OF BLADDER TUMOR (TURBT), cystoscopy with bladder biopsy;  Surgeon: Gerald Odonnell MD;  [facility];  Service: Urology   • TONSILLECTOMY        Family History:     Family History   Problem Relation Age of Onset   • Arthritis Other    • Heart disease Father       Social History:     Social History     Socioeconomic History   • Marital status: /Civil Union     Spouse name: Not on file   • Number of children: Not on file   • Years of education: Not on file   • Highest education level: Not on file   Occupational History   • Not on file   Tobacco Use   • Smoking status: Former   • Smokeless tobacco: Never   Vaping Use   • Vaping Use: Never used   Substance and Sexual Activity   • Alcohol use: Never   • Drug use: Never   • Sexual activity: Not Currently     Partners: Female   Other Topics Concern   • Not on file   Social History Narrative    Smoking: Non-smoker    As per [employer]     Social Determinants of Health     Financial Resource Strain: Low Risk  (9/15/2023) Overall Financial Resource Strain (CARDIA)    • Difficulty of Paying Living Expenses: Not hard at all   Food Insecurity: Not on file   Transportation Needs: No Transportation Needs (9/15/2023)    PRAPARE - Transportation    • Lack of Transportation (Medical): No    • Lack of Transportation (Non-Medical): No   Physical Activity: Not on file   Stress: Not on file   Social Connections: Not on file   Intimate Partner Violence: Not on file   Housing Stability: Not on file      Medications and Allergies:     Current Outpatient Medications   Medication Sig Dispense Refill   • atorvastatin (LIPITOR) 10 mg tablet Take 1 tablet (10 mg total) by mouth daily at bedtime 90 tablet 1   • levothyroxine 150 mcg tablet Take 1 tablet (150 mcg total) by mouth daily 90 tablet 1   • ciclopirox (LOPROX) 0.77 % cream APPLY TO AFFECTED AREAS ON FACE 1-2 TIMES DAILY     • hydrocortisone 2.5 % cream APPLY TO SKIN TWICE DAILY FOR 2 WEEKS THEN IF NEEDED     • tamsulosin (FLOMAX) 0.4 mg Take 1 capsule (0.4 mg total) by mouth daily with dinner 90 capsule 3     No current facility-administered medications for this visit.      No Known Allergies   Immunizations:     Immunization History   Administered Date(s) Administered   • COVID-19 PFIZER VACCINE 0.3 ML IM 02/21/2021, 03/12/2021, 10/07/2021   • COVID-19 Pfizer vac (Wilberto-sucrose, gray cap) 12 yr+ IM 04/30/2022, 04/30/2022   • Influenza, high dose seasonal 0.7 mL 10/12/2020, 09/07/2021   • Pneumococcal 11/04/2016   • Pneumococcal Conjugate 13-Valent 01/18/2016   • Pneumococcal Conjugate PCV 7 11/04/2016   • Tdap 05/31/2013, 08/13/2018   • Zoster Vaccine Recombinant 08/22/2018, 12/04/2018      Health Maintenance:         Topic Date Due   • Colorectal Cancer Screening  02/06/2028   • Hepatitis C Screening  Completed         Topic Date Due   • Pneumococcal Vaccine: 65+ Years (2 - PPSV23 if available, else PCV20) 01/18/2017   • COVID-19 Vaccine (6 - Pfizer series) 06/25/2022   • Influenza Vaccine (1) 09/01/2023      Medicare Screening Tests and Risk Assessments:     Saira Pérez is here for his Subsequent Wellness visit. Last Medicare Wellness visit information reviewed, patient interviewed and updates made to the record today. Health Risk Assessment:   Patient rates overall health as very good. Patient feels that their physical health rating is same. Patient is satisfied with their life. Eyesight was rated as slightly worse. Hearing was rated as same. Patient feels that their emotional and mental health rating is same. Patients states they are sometimes angry. Patient states they are often unusually tired/fatigued. Pain experienced in the last 7 days has been some. Patient's pain rating has been 4/10. Patient states that he has experienced no weight loss or gain in last 6 months. Fall Risk Screening: In the past year, patient has experienced: no history of falling in past year      Home Safety:  Patient does not have trouble with stairs inside or outside of their home. Patient has working smoke alarms and has no working carbon monoxide detector. Home safety hazards include: none. Nutrition:   Current diet is Limited junk food. Medications:   Patient is not currently taking any over-the-counter supplements. Patient is able to manage medications. Activities of Daily Living (ADLs)/Instrumental Activities of Daily Living (IADLs):   Walk and transfer into and out of bed and chair?: Yes  Dress and groom yourself?: Yes    Bathe or shower yourself?: Yes    Feed yourself? Yes  Do your laundry/housekeeping?: Yes  Manage your money, pay your bills and track your expenses?: Yes  Make your own meals?: Yes    Do your own shopping?: Yes    Previous Hospitalizations:   Any hospitalizations or ED visits within the last 12 months?: Yes    How many hospitalizations have you had in the last year?: 1-2    Advance Care Planning:   Living will: Yes    Durable POA for healthcare: Yes    Advanced directive:  Yes PREVENTIVE SCREENINGS      Cardiovascular Screening:    General: Screening Not Indicated and History Lipid Disorder      Diabetes Screening:     General: Screening Current      Colorectal Cancer Screening:     General: Screening Current      Prostate Cancer Screening:    General: Screening Not Indicated      Abdominal Aortic Aneurysm (AAA) Screening:    Risk factors include: age between 70-75 yo and tobacco use        Lung Cancer Screening:     General: Screening Not Indicated      Hepatitis C Screening:    General: Screening Current    Screening, Brief Intervention, and Referral to Treatment (SBIRT)    Screening  Typical number of drinks in a day: 0  Typical number of drinks in a week: 1  Interpretation: Low risk drinking behavior. AUDIT-C Screenin) How often did you have a drink containing alcohol in the past year? monthly or less  2) How many drinks did you have on a typical day when you were drinking in the past year? 1 to 2  3) How often did you have 6 or more drinks on one occasion in the past year? never    AUDIT-C Score: 1  Interpretation: Score 0-3 (male): Negative screen for alcohol misuse    Single Item Drug Screening:  How often have you used an illegal drug (including marijuana) or a prescription medication for non-medical reasons in the past year? never    Single Item Drug Screen Score: 0  Interpretation: Negative screen for possible drug use disorder    No results found. Physical Exam:     There were no vitals taken for this visit.     Physical Exam     Asya Jones MD

## 2023-10-03 ENCOUNTER — TELEPHONE (OUTPATIENT)
Dept: INTERNAL MEDICINE CLINIC | Facility: CLINIC | Age: 73
End: 2023-10-03

## 2023-10-09 ENCOUNTER — OFFICE VISIT (OUTPATIENT)
Dept: PODIATRY | Facility: CLINIC | Age: 73
End: 2023-10-09
Payer: MEDICARE

## 2023-10-09 VITALS — RESPIRATION RATE: 18 BRPM | HEIGHT: 73 IN | BODY MASS INDEX: 36.28 KG/M2

## 2023-10-09 DIAGNOSIS — I73.9 PERIPHERAL VASCULAR DISEASE, UNSPECIFIED (HCC): Primary | ICD-10-CM

## 2023-10-09 DIAGNOSIS — G62.9 PERIPHERAL NERVE DISORDER: ICD-10-CM

## 2023-10-09 PROCEDURE — G0127 TRIM NAIL(S): HCPCS | Performed by: PODIATRIST

## 2023-10-09 PROCEDURE — 99213 OFFICE O/P EST LOW 20 MIN: CPT | Performed by: PODIATRIST

## 2023-10-09 RX ORDER — GABAPENTIN 300 MG/1
300 CAPSULE ORAL
Qty: 30 CAPSULE | Refills: 0 | Status: SHIPPED | OUTPATIENT
Start: 2023-10-09 | End: 2023-11-08

## 2023-10-09 NOTE — PROGRESS NOTES
Established patient with class findings presents for nail care. He also complains of numbness in the soles of his feet that is affecting his balance. Patient is fearful of falling. He notes that he also has knee arthritis. I personally reviewed a comprehensive metabolic panel dated 0/7/1961. BUN and creatinine were within normal limits. Fasting glucose was 96. Neurologic exam: Sensorium absent soles of both feet per [place] Christopher monofilament. Vascular exam:  DP  0/4 bilateral; PT  0 4 bilateral   Dermatological exam:  Each toenail is thick and  dystrophic. Diagnosis: PVD; peripheral neuropathy  Treatment: Trimmed multiple dystrophic toenails. Explained to patient that his symptoms are most consistent with peripheral neuropathy in the soles of his feet. PVD and arthritic disorder exacerbates and complicates treatment. Patient placed on gabapentin 300 mg at bedtime. He is rescheduled in 30 days.     Nail removal    Date/Time: 10/9/2023 9:00 AM    Performed by: Rena Daniel DPM  Authorized by: Rena Daniel DPM    Nails trimmed:     Number of nails trimmed:  10

## 2023-11-30 ENCOUNTER — OFFICE VISIT (OUTPATIENT)
Dept: OBGYN CLINIC | Facility: HOSPITAL | Age: 73
End: 2023-11-30
Payer: MEDICARE

## 2023-11-30 VITALS
SYSTOLIC BLOOD PRESSURE: 145 MMHG | HEIGHT: 73 IN | BODY MASS INDEX: 36.31 KG/M2 | WEIGHT: 274 LBS | HEART RATE: 86 BPM | DIASTOLIC BLOOD PRESSURE: 83 MMHG

## 2023-11-30 DIAGNOSIS — R26.9 GAIT ABNORMALITY: ICD-10-CM

## 2023-11-30 DIAGNOSIS — G89.29 CHRONIC PAIN OF BOTH KNEES: ICD-10-CM

## 2023-11-30 DIAGNOSIS — M25.561 CHRONIC PAIN OF BOTH KNEES: ICD-10-CM

## 2023-11-30 DIAGNOSIS — M17.0 BILATERAL PRIMARY OSTEOARTHRITIS OF KNEE: Primary | ICD-10-CM

## 2023-11-30 DIAGNOSIS — M25.562 CHRONIC PAIN OF BOTH KNEES: ICD-10-CM

## 2023-11-30 DIAGNOSIS — M62.81 QUADRICEPS WEAKNESS: ICD-10-CM

## 2023-11-30 PROCEDURE — 99213 OFFICE O/P EST LOW 20 MIN: CPT | Performed by: ORTHOPAEDIC SURGERY

## 2023-11-30 RX ORDER — MELOXICAM 15 MG/1
15 TABLET ORAL DAILY
Qty: 30 TABLET | Refills: 2 | Status: SHIPPED | OUTPATIENT
Start: 2023-11-30

## 2023-11-30 NOTE — PROGRESS NOTES
Assessment:   Diagnosis ICD-10-CM Associated Orders   1. Bilateral primary osteoarthritis of knee  M17.0 Ambulatory Referral to Physical Therapy      2. Chronic pain of both knees  M25.561 Ambulatory Referral to Physical Therapy    M25.562     G89.29       3. Quadriceps weakness  M62.81 Ambulatory Referral to Physical Therapy      4. Gait abnormality  R26.9 Ambulatory Referral to Physical Therapy          Plan:  Diagnosis, treatment options and associated risks were discussed with the patient including no treatment, nonsurgical treatment and potential for surgical intervention. The patient was given the opportunity to ask questions regarding each. Recommend a course of physical therapy for quadricep strengthening and hamstring flexibility. Will prescribe Mobic 15 mg to take 1 tablet once a day with food which will be sent to his pharmacy of record. Weightbearing and activity as tolerated. Counseled on weight loss measures and general wellness. Future candidate for an elective total knee arthroplasty. To do next visit:  Return in about 6 months (around 5/30/2024) for re-check, or sooner if symptoms worsen or fail to improve. The above stated was discussed in layman's terms and the patient expressed understanding. All questions were answered to the patient's satisfaction. Scribe Attestation      I,:  Micah Boyd am acting as a scribe while in the presence of the attending physician.:       I,:  Dolores Villanueva MD personally performed the services described in this documentation    as scribed in my presence.:               Subjective:   Yesenia Prabhakar is a 74 y.o. male who presents today with his wife for repeat evaluation of his bilateral knees, known advanced OA. He returns today with pain. At his visit 3 months ago both knees were injected with synviscONE with only about 1 week of relief. Previous cortisone injections also only provided about 1 week of relief.   He had injections over the past several years with relief, but unfortunately his recent injections failed to provide adequate relief. He is taking an OTC glucosamine supplement. Today he feels rather well. He has difficulty ambulating stairs due to lack of power and feels unstable on stairs. He has weight bearing pain and has stiffness getting up after prolonged sedentary positions.   Pain scale 8/10    Review of systems negative unless otherwise specified in HPI  Review of Systems    Past Medical History:   Diagnosis Date    Anxiety disorder     Atherosclerotic heart disease of native coronary artery without angina pectoris     Benign prostatic hyperplasia without lower urinary tract symptoms     Cancer (HCC)     bladder    Disease of thyroid gland     hypo    Dyslipidemia     GERD (gastroesophageal reflux disease)     Hypertension     Impaired fasting blood sugar     Kidney stone     Low back pain     Osteoarthritis     last assesed 6-6-16    Other chest pain     Paresthesia of skin     Polyneuropathy     last assesed 5-8-17    Pure hypercholesterolemia     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 ([address])     Wears glasses        Past Surgical History:   Procedure Laterality Date    BACK SURGERY      CHOLECYSTECTOMY      COLONOSCOPY  02/06/2019    CYSTOSCOPY N/A 4/26/2023    Procedure: CYSTOSCOPY w/ bladder biopsy;  Surgeon: Lex Escobedo MD;  [facility];  Service: Urology    [place] ARTHRP ACETBLR/PROX FEM PROSTC AGRFT/ALGRFT Right 8/5/2019    Procedure: ARTHROPLASTY HIP TOTAL;  Surgeon: Isabell John MD;  [facility];  Service: Orthopedics    [place] CYSTO W/REMOVAL OF LESIONS SMALL N/A 3/23/2023    Procedure: TRANSURETHRAL RESECTION OF BLADDER TUMOR (TURBT), mitomycin ;  Surgeon: Lex Escobedo MD;  [facility];  Service: Urology    MO CYSTO W/REMOVAL OF LESIONS SMALL N/A 4/26/2023    Procedure: TRANSURETHRAL RESECTION OF BLADDER TUMOR (TURBT), cystoscopy with bladder biopsy;  Surgeon: Karen Call MD;  [facility];  Service: Urology    TONSILLECTOMY         Family History   Problem Relation Age of Onset    Arthritis Other     Heart disease Father        Social History     Occupational History    Not on file   Tobacco Use    Smoking status: Former    Smokeless tobacco: Never   Vaping Use    Vaping Use: Never used   Substance and Sexual Activity    Alcohol use: Never    Drug use: Never    Sexual activity: Not Currently     Partners: Female         Current Outpatient Medications:     atorvastatin (LIPITOR) 10 mg tablet, Take 1 tablet (10 mg total) by mouth daily at bedtime, Disp: 90 tablet, Rfl: 1    levothyroxine 150 mcg tablet, Take 1 tablet (150 mcg total) by mouth daily, Disp: 90 tablet, Rfl: 1    tamsulosin (FLOMAX) 0.4 mg, Take 1 capsule (0.4 mg total) by mouth daily with dinner, Disp: 90 capsule, Rfl: 3    No Known Allergies         Vitals:    11/30/23 1241   BP: 145/83   Pulse: 86       Body mass index is 36.15 kg/m². Wt Readings from Last 3 Encounters:   11/30/23 124 kg (274 lb)   09/22/23 125 kg (275 lb)   09/06/23 124 kg (273 lb 6.4 oz)       Objective:                    Right Knee Exam     Tenderness   The patient is experiencing tenderness in the medial joint line. Range of Motion   Extension:  5   Flexion:  110 (with crepitation and stiffness)     Other   Erythema: absent  Sensation: normal  Swelling: mild  Effusion: no effusion present      Left Knee Exam     Tenderness   The patient is experiencing tenderness in the medial joint line. Range of Motion   Extension:  5   Flexion:  110 (with crepitation and stiffness)     Other   Erythema: absent  Sensation: normal  Swelling: mild  Effusion: no effusion present    Comments:     Both knees have bony protuberances anteriorly over tibial tubercles which are non-tender  Both lower extremities are in varus alignment            Diagnostics, reviewed and taken today if performed as documented:    None performed        Procedures, if performed today:    Procedures    None performed      Portions of the record may have been created with voice recognition software. Occasional wrong word or "sound a like" substitutions may have occurred due to the inherent limitations of voice recognition software. Read the chart carefully and recognize, using context, where substitutions have occurred.

## 2023-12-07 ENCOUNTER — EVALUATION (OUTPATIENT)
Dept: PHYSICAL THERAPY | Facility: OTHER | Age: 73
End: 2023-12-07
Payer: MEDICARE

## 2023-12-07 DIAGNOSIS — G89.29 CHRONIC PAIN OF BOTH KNEES: ICD-10-CM

## 2023-12-07 DIAGNOSIS — M25.562 CHRONIC PAIN OF BOTH KNEES: ICD-10-CM

## 2023-12-07 DIAGNOSIS — R26.9 GAIT ABNORMALITY: ICD-10-CM

## 2023-12-07 DIAGNOSIS — M17.0 BILATERAL PRIMARY OSTEOARTHRITIS OF KNEE: Primary | ICD-10-CM

## 2023-12-07 DIAGNOSIS — M25.561 CHRONIC PAIN OF BOTH KNEES: ICD-10-CM

## 2023-12-07 PROCEDURE — 97110 THERAPEUTIC EXERCISES: CPT

## 2023-12-07 PROCEDURE — 97161 PT EVAL LOW COMPLEX 20 MIN: CPT

## 2023-12-07 NOTE — PROGRESS NOTES
PT Evaluation     Today's date: 2023  Patient name: No Black  : 1950  MRN: [id number]  Referring provider: Sherri Maurer  Dx:   Encounter Diagnosis     ICD-10-CM    1. Bilateral primary osteoarthritis of knee  M17.0 Ambulatory Referral to Physical Therapy     PT plan of care cert/re-cert      2. Chronic pain of both knees  M25.561 Ambulatory Referral to Physical Therapy    M25.562 PT plan of care cert/re-cert    D12.45       3. Gait abnormality  R26.9 Ambulatory Referral to Physical Therapy     PT plan of care cert/re-cert          Start Time: 0900  Stop Time: 09  Total time in clinic (min): 55 minutes    Assessment  Assessment details: No Black is a 74 y.o. male presenting to OPPT initial evaluation with chief complaint of bilateral knee pain for the last 5 years of insidious onset. Notes decreased confidence in balance and strength with functional mobility activities such as stair climbing. Notes difficulty with walking long distances & prolonged standing. Pain is described as an "arthritic ache" but more concerned with lack of strength and confidence. Pt had recent bout of PT focused on flexibility, LE strength, functional strength and functional mobility with good improvement, but did not follow-up with HEP with consistency. Upon PT exam, pt presents with deficits to bilateral knee ROM, myofascial restrictions to bilateral hamstring, calf, and adductor musculature, decreased bilateral hip rotational motion, and fair balance impacting functional strength and mobility. Pt given HEP to address strength, mobility and balance deficits. The patient is a good candidate for physical therapy to achieve the following goals.       Impairments: abnormal gait, abnormal or restricted ROM, activity intolerance, impaired balance, impaired physical strength, lacks appropriate home exercise program, pain with function and poor body mechanics    Goals  STG (6 weeks):  Pt will decrease pain to <3/10 with upright functional activity. Pt will report a 50% improvement in knee confidence compared to initial evaluation. Pt will demonstrate increased B/L knee ext to <5 in order to improve gait mechanics  Pt will demonstrate increased hip ER strength to 4/5 in order to improve biomechanics of hip / knee. Pt's self-perceived disability will decrease as demonstrated by a >5-point improvement in FOTO score. Pt will be independent with HEP as demonstrated by return demo with proper technique and execution of exercises provided. LTG (12 weeks):  Pt will walk 0.5 mile without increase knee pain in order to improve functional independence. Pt will report a 75% improvement in knee confidence compared to initial evaluation. Pt will demonstrate increased B/L knee ext to 0 in order to improve gait mechanics  Pt will demonstrate increased hip ER strength to 5/5 in order to improve biomechanics of hip / knee. Pt's self-perceived disability will decrease as demonstrated by a FOTO score >70. Pt will be independent with progressions to HEP as demonstrated by return demo with proper technique and execution of exercises provided.         Plan  Patient would benefit from: skilled physical therapy and PT eval  Planned modality interventions: biofeedback, electrical stimulation/Russian stimulation, TENS, thermotherapy: hydrocollator packs, traction, ultrasound and cryotherapy  Planned therapy interventions: abdominal trunk stabilization, activity modification, balance, balance/weight bearing training, body mechanics training, flexibility, functional ROM exercises, graded activity, graded exercise, graded motor, gait training, home exercise program, therapeutic training, therapeutic activities, therapeutic exercise, stretching, strengthening, joint mobilization, manual therapy, massage, neuromuscular re-education, patient education, postural training, IASTM and kinesiology taping  Frequency: 2x week  Duration in weeks: 12  Plan of Care beginning date: 2023  Plan of Care expiration date: 3/6/2024  Treatment plan discussed with: patient        Subjective Evaluation    History of Present Illness  Mechanism of injury: Vy Rubio is a 74 y.o. male presenting to OPPT initial evaluation with chief complaint of bilateral knee pain for the last 5 years of insidious onset. Notes decreased confidence in balance and strength with functional mobility activities such as stair climbing. Notes difficulty with walking long distances & prolonged standing. Pain is described as an "arthritic ache" but more concerned with lack of strength and confidence. Pt has started taking meloxicam and has noted significant improvements in pain. Previous CSI with no symptoms relief. Denies sleep disturbances. Denies numbness / tingling. Pt had recent bout of PT focused on flexibility, LE strength, functional strength and functional mobility.            Patient Goals  Patient goals for therapy: increased strength, independence with ADLs/IADLs, increased motion, improved balance and decreased pain  Patient goal: Increase confidence with LE  Pain  Current pain ratin  At best pain ratin  At worst pain ratin    Social Support  Stairs in house: yes   13  Lives in: one-story house  Lives with: spouse    Employment status: not working  Treatments  Previous treatment: physical therapy, medication and injection treatment        Objective    Palpation: Prominent bilateral tibial tuberosities; TTP medial joint line    Range of motion:      AROM PROM   L knee 5-120    R knee     Hip flexion WNL WNL   Hip IR  L 25 R 35   Hip ER L 40 R 0 L 45 R 40   Ankle DF  L 10 R 5 knee ext  L 15 R 12 knee flex   Hip ABd  L 35 R 30     Patellar mobility: Hypomobile superior / inferior    Articular mobility:  Tibiofemoral posterior glide: hypomobile  Tibiofemoral anterior glide: hypomobile  Tibiofemoral internal rotation: hypomobile  Tibiofemoral external rotation: normal    Muscle length testing:   (+) 90/90 Hamstring   (+) SLR    Strength:   Left  Right   Knee Ext 4+/5 4+/5   Knee Flex 5/5 5/5   Hip Flex 4-/5 4-/5   Hip Abd 4/5 4/5   Hip ER 4/5 Unable to get into testing position 2/2 joint restriction, 4/5 strength resisted at 0 deg ER   Ankle PF 5/5 5/5   Ankle DF 5/5 5/5     Functional Movement:  SLS Balance: R 20 sec L 5 sec  5xSTS: 17.03sec    Gait assessment: Demonstrates inability to complete terminal swing to initial contact secondary to decreased bilateral knee extension ROM with crouched gait; midfoot contact rather than heel strike       POC expires Unit limit Auth Expiration date PT/OT + Visit Limit?   3/6/2024 BOMN N/a BOMN                               Precautions: none      Visit #: 1 IE            Date: 12/7/23            Manuals             Lateral glide: B hips             Tibiofemoral glides PA             Patellar glides sup/inf             Manual adductor stretch                                       Neuro Re-Ed             Pelvic tilts PA                          Sidelying hip ER/IR c ankle weight             Bridging                          Standing clamshells c short foot             Lateral band walks             Monster walks                          Standing TKE             Eccentric step-downs F/L                          RDL             Seated GM                          SLS balance             Rockerboard             Ther Ex             Seated hamstring / gastroc stretch HEP            Slant board calf stretch             FFE knee flexion stretch                          LAQ             Standing hamstring curls                          Leg press                          Standing hip flexor march                          Ther Activity             Chair squat 20" HEP            Step-ups F/L                                       Gait Training                                       Modalities

## 2023-12-08 ENCOUNTER — TELEPHONE (OUTPATIENT)
Dept: OBGYN CLINIC | Facility: HOSPITAL | Age: 73
End: 2023-12-08

## 2023-12-11 ENCOUNTER — OFFICE VISIT (OUTPATIENT)
Dept: PODIATRY | Facility: CLINIC | Age: 73
End: 2023-12-11
Payer: MEDICARE

## 2023-12-11 VITALS
DIASTOLIC BLOOD PRESSURE: 80 MMHG | WEIGHT: 274 LBS | HEIGHT: 73 IN | SYSTOLIC BLOOD PRESSURE: 134 MMHG | BODY MASS INDEX: 36.31 KG/M2 | HEART RATE: 77 BPM | RESPIRATION RATE: 18 BRPM

## 2023-12-11 DIAGNOSIS — G62.9 PERIPHERAL NERVE DISORDER: Primary | ICD-10-CM

## 2023-12-11 PROCEDURE — 99212 OFFICE O/P EST SF 10 MIN: CPT | Performed by: PODIATRIST

## 2023-12-11 NOTE — PROGRESS NOTES
The patient presents to assess his response to gabapentin. He notes that after a few weeks, he was very comfortable. He has discontinued the medication and has no discomfort at this time. No additional treatment needed in regards to foot and nerve pain. Patient is rescheduled in 10 weeks for palliative toenail care.

## 2023-12-12 ENCOUNTER — OFFICE VISIT (OUTPATIENT)
Dept: PHYSICAL THERAPY | Facility: OTHER | Age: 73
End: 2023-12-12
Payer: MEDICARE

## 2023-12-12 DIAGNOSIS — G89.29 CHRONIC PAIN OF BOTH KNEES: ICD-10-CM

## 2023-12-12 DIAGNOSIS — M25.562 CHRONIC PAIN OF BOTH KNEES: ICD-10-CM

## 2023-12-12 DIAGNOSIS — M25.561 CHRONIC PAIN OF BOTH KNEES: ICD-10-CM

## 2023-12-12 DIAGNOSIS — M17.0 BILATERAL PRIMARY OSTEOARTHRITIS OF KNEE: Primary | ICD-10-CM

## 2023-12-12 DIAGNOSIS — R26.9 GAIT ABNORMALITY: ICD-10-CM

## 2023-12-12 PROCEDURE — 97110 THERAPEUTIC EXERCISES: CPT

## 2023-12-12 PROCEDURE — 97140 MANUAL THERAPY 1/> REGIONS: CPT

## 2023-12-12 PROCEDURE — 97530 THERAPEUTIC ACTIVITIES: CPT

## 2023-12-12 NOTE — PROGRESS NOTES
Daily Note     Today's date: 2023  Patient name: Melodie Zambrano  : 1950  MRN: [id number]  Referring provider: Lexi Perez,*  Dx:   Encounter Diagnosis     ICD-10-CM    1. Bilateral primary osteoarthritis of knee  M17.0       2. Chronic pain of both knees  M25.561     M25.562     G89.29       3. Gait abnormality  R26.9           Start Time: 1050  Stop Time: 1145  Total time in clinic (min): 55 minutes    Subjective: Pt notes no change since IE. Objective: See treatment diary below      Assessment: Tolerated initiation of treatment plan well. Session focused on myofascial restrictions and flexibility of bilateral LE with manual techniques and therapeutic exercises per flowsheet. Noted mild improvements in B knee extension ROM following lengthening of calves / hamstrings / adductors. Pt demonstrates global LE weakness during chair squats impacting ability to perform STS transfers and stair climbing - next session treatment focus directed towards functional LE strengthening. Patient demonstrated fatigue post treatment and would benefit from continued PT      Plan: Continue per plan of care.       POC expires Unit limit Auth Expiration date PT/OT + Visit Limit?   3/6/2024 BOMN N/a BOMN                               Precautions: none      Visit #: 1 IE 2           Date: 23           Manuals             Lateral glide: B hips  SP           Tibiofemoral glides PA             Patellar glides sup/inf             Manual hamstring / calf / adductor stretch  SP                                     Neuro Re-Ed             Pelvic tilts PA                          Sidelying hip ER/IR c ankle weight             Bridging                          Standing clamshells c short foot             Lateral band walks             Monster walks                          Standing TKE             Eccentric step-downs F/L                          RDL  10# 3x10           Seated GM  10# 3x10 SLS balance             Rockerboard             Ther Ex             Bike  10 min           Seated hamstring / gastroc stretch HEP            Slant board calf stretch  3x45"           FFE knee flexion stretch                          LAQ             Standing hamstring curls                          Leg press                          Standing hip flexor march                          Ther Activity             Chair squat 20" HEP 1x10 c increasing difficulty           Step-ups F/L                                       Gait Training                                       Modalities

## 2023-12-14 ENCOUNTER — OFFICE VISIT (OUTPATIENT)
Dept: PHYSICAL THERAPY | Facility: OTHER | Age: 73
End: 2023-12-14
Payer: MEDICARE

## 2023-12-14 DIAGNOSIS — M17.0 BILATERAL PRIMARY OSTEOARTHRITIS OF KNEE: Primary | ICD-10-CM

## 2023-12-14 DIAGNOSIS — G89.29 CHRONIC PAIN OF BOTH KNEES: ICD-10-CM

## 2023-12-14 DIAGNOSIS — M25.561 CHRONIC PAIN OF BOTH KNEES: ICD-10-CM

## 2023-12-14 DIAGNOSIS — M25.562 CHRONIC PAIN OF BOTH KNEES: ICD-10-CM

## 2023-12-14 PROCEDURE — 97530 THERAPEUTIC ACTIVITIES: CPT

## 2023-12-14 PROCEDURE — 97110 THERAPEUTIC EXERCISES: CPT

## 2023-12-14 NOTE — PROGRESS NOTES
Daily Note     Today's date: 2023  Patient name: Erick Barrera  : 1950  MRN: [id number]  Referring provider: Junito Jeong,*  Dx:   Encounter Diagnosis     ICD-10-CM    1. Bilateral primary osteoarthritis of knee  M17.0       2. Chronic pain of both knees  M25.561     M25.562     G89.29             Start Time: 1140  Stop Time: 1235  Total time in clinic (min): 55 minutes    Subjective: Pt notes sitting in same position for 1.5 hour today; increased knee stiffness. Objective: See treatment diary below      Assessment: Tolerated initiation of treatment plan well. Session focused on functional LE strengthening to improve walking, transfers and stair negotiation; pt noted decreased B knee stiffness following session. Pt appropriately challenged demonstrating difficulty with 20" chair squats at end of session. Continues to present with decreased B knee ROM, strength & balance deficits impacting functional mobility. Patient demonstrated fatigue post treatment and would benefit from continued PT      Plan: Continue per plan of care.       POC expires Unit limit Auth Expiration date PT/OT + Visit Limit?   3/6/2024 BOMN N/a BOMN                               Precautions: none      Visit #: 1 IE 2 3          Date: 23          Manuals             Lateral glide: B hips  SP           Tibiofemoral glides PA             Patellar glides sup/inf             Manual hamstring / calf / adductor stretch  SP                                     Neuro Re-Ed             Pelvic tilts PA                          Sidelying hip ER/IR c ankle weight             Bridging   x30                       Standing clamshells c short foot             Lateral band walks             Monster walks                          Standing TKE   PTB x30ea          Eccentric step-downs F/L   4" x15ea                       RDL  10# 3x10           Seated GM  10# 3x10                        SLS balance   Tandem stance 2x30"          Rockerboard             Ther Ex             Bike  10 min 10 min          Seated hamstring / gastroc stretch HEP            Slant board calf stretch  3x45"           FFE knee flexion stretch                          SAQ   2.5# x30ea          LAQ   PTB x30ea          Standing hamstring curls                          Leg press   70# 3x10                       Standing hip flexor march                          Ther Activity             Chair squat 20" HEP 1x10 c increasing difficulty 2x10          Step-ups F/L                                       Gait Training                                       Modalities

## 2023-12-22 ENCOUNTER — TELEPHONE (OUTPATIENT)
Dept: INTERNAL MEDICINE CLINIC | Facility: CLINIC | Age: 73
End: 2023-12-22

## 2023-12-22 ENCOUNTER — TELEPHONE (OUTPATIENT)
Age: 73
End: 2023-12-22

## 2023-12-22 NOTE — TELEPHONE ENCOUNTER
Caller: patient    Doctor: cyril     Reason for call: patient would like to know if he is allowed to take claritin along with the prescribed medication being that he just got diagnosed with covid.  Please advise.    Call back#: [phone]

## 2023-12-22 NOTE — TELEPHONE ENCOUNTER
My YOB: 1950. I'm calling on behalf of myself and my wife, Aniya Gipson and her birthday is July 24th, 1949. We were both patients of Doctor [facility]  and we were both just tested positive for COVID and my wife even spent time in the at [facility] yesterday. So you'll have that information, but I'd like to speak to somebody about the I just tested positive last night and I also need to set an appointment up with Doctor Tom  or Doctor Noguera since I know to Vaishali's leaving. So I would appreciate getting all this stuff kind of transpired with you and we get a plan of action going here. My telephone number is area code [phone]. My recall was returned yesterday, but my wife and I were at [facility] that she was being treated for dizziness and Vertigo and I didn't have a chance to finish the call with the person I was speaking with. So I'm just returning on that same number that she called me again, [phone].  Patient spoke to Dr. Gallagher about interaction with medication that was prescribed to him to take with Claritin D, which he spoke to pharmacist and this is OK.  Currently taking Meloxacam and Claritin D and his maintenance medications.     Mild  case of COVID.  He had all his shots.        Also patient made an appointment to see you in January.

## 2023-12-29 DIAGNOSIS — E03.9 ACQUIRED HYPOTHYROIDISM: ICD-10-CM

## 2023-12-29 DIAGNOSIS — E78.5 HYPERLIPIDEMIA, UNSPECIFIED HYPERLIPIDEMIA TYPE: ICD-10-CM

## 2023-12-29 RX ORDER — LEVOTHYROXINE SODIUM 0.15 MG/1
150 TABLET ORAL DAILY
Qty: 90 TABLET | Refills: 0 | Status: SHIPPED | OUTPATIENT
Start: 2023-12-29

## 2023-12-29 RX ORDER — ATORVASTATIN CALCIUM 10 MG/1
10 TABLET, FILM COATED ORAL
Qty: 90 TABLET | Refills: 0 | Status: SHIPPED | OUTPATIENT
Start: 2023-12-29

## 2024-01-08 ENCOUNTER — APPOINTMENT (OUTPATIENT)
Dept: PHYSICAL THERAPY | Facility: OTHER | Age: 74
End: 2024-01-08
Payer: MEDICARE

## 2024-01-10 ENCOUNTER — OFFICE VISIT (OUTPATIENT)
Dept: INTERNAL MEDICINE CLINIC | Facility: CLINIC | Age: 74
End: 2024-01-10
Payer: MEDICARE

## 2024-01-10 VITALS
WEIGHT: 273 LBS | TEMPERATURE: 98.5 F | DIASTOLIC BLOOD PRESSURE: 86 MMHG | HEIGHT: 73 IN | OXYGEN SATURATION: 98 % | SYSTOLIC BLOOD PRESSURE: 138 MMHG | BODY MASS INDEX: 36.18 KG/M2 | HEART RATE: 77 BPM

## 2024-01-10 DIAGNOSIS — C67.2 MALIGNANT NEOPLASM OF LATERAL WALL OF URINARY BLADDER (HCC): ICD-10-CM

## 2024-01-10 DIAGNOSIS — E66.01 OBESITY, MORBID (HCC): ICD-10-CM

## 2024-01-10 DIAGNOSIS — R41.89 COGNITIVE DECLINE: ICD-10-CM

## 2024-01-10 DIAGNOSIS — I73.9 PERIPHERAL VASCULAR DISEASE, UNSPECIFIED (HCC): ICD-10-CM

## 2024-01-10 DIAGNOSIS — E03.9 ACQUIRED HYPOTHYROIDISM: ICD-10-CM

## 2024-01-10 DIAGNOSIS — R06.00 DYSPNEA, UNSPECIFIED TYPE: Primary | ICD-10-CM

## 2024-01-10 DIAGNOSIS — K90.89 OTHER SPECIFIED INTESTINAL MALABSORPTION: ICD-10-CM

## 2024-01-10 DIAGNOSIS — E78.5 HYPERLIPIDEMIA, UNSPECIFIED HYPERLIPIDEMIA TYPE: ICD-10-CM

## 2024-01-10 LAB
SARS-COV-2 AG UPPER RESP QL IA: NEGATIVE
VALID CONTROL: NORMAL

## 2024-01-10 PROCEDURE — 87811 SARS-COV-2 COVID19 W/OPTIC: CPT | Performed by: INTERNAL MEDICINE

## 2024-01-10 PROCEDURE — 99214 OFFICE O/P EST MOD 30 MIN: CPT | Performed by: INTERNAL MEDICINE

## 2024-01-10 NOTE — PROGRESS NOTES
Assessment/Plan:           1. Dyspnea, unspecified type  Comments:  Chronic dyspnea uncertain etiology.  Blood tests were ordered.  Orders:  -     B-Type Natriuretic Peptide(BNP); Future  -     D-dimer, quantitative; Future  -     XR chest pa & lateral; Future; Expected date: 01/10/2024  -     POCT Rapid Covid Ag    2. Cognitive decline  Comments:  This is probably age-related    3. Other specified intestinal malabsorption  -     Vitamin B12; Future  -     Vitamin D 25 hydroxy; Future  -     Methylmalonic acid, serum; Future    4. Hyperlipidemia, unspecified hyperlipidemia type  Comments:  Stable continue atorvastatin 10 mg daily.  Orders:  -     CBC and differential; Future  -     Comprehensive metabolic panel; Future  -     Lipid panel; Future  -     Urinalysis with microscopic    5. Acquired hypothyroidism  Comments:  Continue current dose of levothyroxine at 150 mcg's.    6. Peripheral vascular disease, unspecified (HCC)    7. 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 Obesity, morbid (HCC)           1. Dyspnea, unspecified type      2. Cognitive decline      3. Other specified intestinal malabsorption      4. Hyperlipidemia, unspecified hyperlipidemia type      5. Acquired hypothyroidism         No problem-specific Assessment & Plan notes found for this encounter.           Subjective:      Patient ID: Solis Dos Santos is a 73 y.o. male.    HPI    The following portions of the patient's history were reviewed and updated as appropriate: He  has a past medical history of Anxiety disorder, Atherosclerotic heart disease of native coronary artery without angina pectoris, Benign prostatic hyperplasia without lower urinary tract symptoms, Cancer (HCC), Clotting disorder (HCC) (1/25/2023), Disease of thyroid gland, Dyslipidemia, GERD (gastroesophageal reflux disease), Hypertension, Impaired fasting blood sugar, Kidney stone, Low back pain, Obesity, Osteoarthritis, Other chest pain, Paresthesia  of skin, Polyneuropathy, Pure hypercholesterolemia, 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  He   Patient Active Problem List    Diagnosis Date Noted    Peripheral vascular disease, unspecified ([facility]) 01/10/2024    Malignant neoplasm of lateral wall of urinary bladder ([facility]) 05/12/2023    Malignant neoplasm of posterior wall of urinary bladder ([facility]) 03/23/2023    Suspected UTI 03/09/2023    Bladder mass 03/09/2023    Right leg swelling 03/09/2023    Gross hematuria 03/08/2023    Hyperlipidemia 03/08/2023    Acute leg pain, left 04/26/2021    Obesity, morbid ([facility]) 04/14/2021    Aftercare following right hip joint replacement surgery 11/05/2019    Difficulty sleeping 08/13/2019    Acute blood loss anemia 08/09/2019    Impaired mobility and ADLs 08/09/2019    Hypothyroidism 08/09/2019    Status post total hip replacement, left 08/08/2019    Status post right hip replacement 08/07/2019    Primary osteoarthritis of both knees 05/29/2019    Osgood-Schlatter's disease of both knees 05/29/2019    Pain in both knees 05/29/2019     He  has a past surgical history that includes Back surgery; Tonsillectomy; Cholecystectomy; pr arthrp acetblr/prox fem prostc agrft/algrft (Right, 08/05/2019); Colonoscopy (02/06/2019); pr cysto w/removal of lesions small (N/A, 03/23/2023); pr cysto w/removal of lesions small (N/A, 04/26/2023); CYSTOSCOPY (N/A, 04/26/2023); Joint replacement (August 2019); and Hip surgery (August 2019).  His family history includes Arthritis in his mother and other; Heart disease in his father.  He  reports that he quit smoking about 36 years ago. His smoking use included cigarettes. He started smoking about 57 years ago. He has a 20.8 pack-year smoking history. He has never used smokeless tobacco. He reports that he does not currently use alcohol. He reports that he does not currently use drugs.  Current Outpatient Medications   Medication  "Sig Dispense Refill    atorvastatin (LIPITOR) 10 mg tablet Take 1 tablet (10 mg total) by mouth daily at bedtime 90 tablet 0    levothyroxine 150 mcg tablet Take 1 tablet (150 mcg total) by mouth daily 90 tablet 0    meloxicam (Mobic) 15 mg tablet Take 1 tablet (15 mg total) by mouth daily 30 tablet 2    tamsulosin (FLOMAX) 0.4 mg Take 1 capsule (0.4 mg total) by mouth daily with dinner 90 capsule 3     No current facility-administered medications for this visit.     Current Outpatient Medications on File Prior to Visit   Medication Sig    atorvastatin (LIPITOR) 10 mg tablet Take 1 tablet (10 mg total) by mouth daily at bedtime    levothyroxine 150 mcg tablet Take 1 tablet (150 mcg total) by mouth daily    meloxicam (Mobic) 15 mg tablet Take 1 tablet (15 mg total) by mouth daily    tamsulosin (FLOMAX) 0.4 mg Take 1 capsule (0.4 mg total) by mouth daily with dinner     No current facility-administered medications on file prior to visit.     There are no discontinued medications.   He has No Known Allergies..    Review of Systems   Constitutional:  Negative for appetite change, chills, fatigue and fever.   HENT:  Negative for sore throat and trouble swallowing.    Eyes:  Negative for redness.   Respiratory:  Positive for shortness of breath.    Cardiovascular:  Negative for chest pain and palpitations.   Gastrointestinal:  Negative for abdominal pain, constipation and diarrhea.   Genitourinary:  Negative for dysuria and hematuria.   Musculoskeletal:  Negative for back pain and neck pain.   Skin:  Negative for rash.   Neurological:  Negative for seizures, weakness and headaches.   Hematological:  Negative for adenopathy.   Psychiatric/Behavioral:  Negative for confusion. The patient is not nervous/anxious.          Objective:      /86 (BP Location: Left arm, Patient Position: Sitting, Cuff Size: Standard)   Pulse 77   Temp 98.5 °F (36.9 °C) (Temporal)   Ht 6' 1\" (1.854 m)   Wt 124 kg (273 lb)   SpO2 98%   " BMI 36.02 kg/m²     Results Reviewed       None            Recent Results (from the past 1344 hour(s))   POCT Rapid Covid Ag    Collection Time: 01/10/24  3:03 PM   Result Value Ref Range    POCT SARS-CoV-2 Ag Negative Negative    VALID CONTROL Valid         Physical Exam  Constitutional:       General: He is not in acute distress.     Appearance: Normal appearance. He is obese.   HENT:      Head: Normocephalic and atraumatic.      Nose: Nose normal.      Mouth/Throat:      Mouth: Mucous membranes are moist.   Eyes:      Extraocular Movements: Extraocular movements intact.      Pupils: Pupils are equal, round, and reactive to light.   Cardiovascular:      Rate and Rhythm: Normal rate and regular rhythm.      Pulses: Normal pulses.      Heart sounds: Normal heart sounds. No murmur heard.     No friction rub.   Pulmonary:      Effort: Pulmonary effort is normal. No respiratory distress.      Breath sounds: Normal breath sounds. No wheezing.   Abdominal:      General: Abdomen is flat. Bowel sounds are normal. There is no distension.      Palpations: Abdomen is soft. There is no mass.      Tenderness: There is no abdominal tenderness. There is no guarding.   Musculoskeletal:         General: Normal range of motion.      Cervical back: Normal range of motion and neck supple.   Skin:     General: Skin is warm.   Neurological:      General: No focal deficit present.      Mental Status: He is alert and oriented to person, place, and time. Mental status is at baseline.      Cranial Nerves: No cranial nerve deficit.   Psychiatric:         Mood and Affect: Mood normal.         Behavior: Behavior normal.

## 2024-01-11 ENCOUNTER — APPOINTMENT (OUTPATIENT)
Dept: PHYSICAL THERAPY | Facility: OTHER | Age: 74
End: 2024-01-11
Payer: MEDICARE

## 2024-01-15 ENCOUNTER — TELEPHONE (OUTPATIENT)
Dept: INTERNAL MEDICINE CLINIC | Facility: CLINIC | Age: 74
End: 2024-01-15

## 2024-01-15 ENCOUNTER — CLINICAL SUPPORT (OUTPATIENT)
Dept: INTERNAL MEDICINE CLINIC | Facility: CLINIC | Age: 74
End: 2024-01-15
Payer: MEDICARE

## 2024-01-15 ENCOUNTER — APPOINTMENT (OUTPATIENT)
Dept: LAB | Age: 74
End: 2024-01-15
Payer: MEDICARE

## 2024-01-15 ENCOUNTER — APPOINTMENT (OUTPATIENT)
Dept: PHYSICAL THERAPY | Facility: OTHER | Age: 74
End: 2024-01-15
Payer: MEDICARE

## 2024-01-15 ENCOUNTER — APPOINTMENT (OUTPATIENT)
Dept: RADIOLOGY | Age: 74
End: 2024-01-15
Payer: MEDICARE

## 2024-01-15 DIAGNOSIS — K90.89 OTHER SPECIFIED INTESTINAL MALABSORPTION: ICD-10-CM

## 2024-01-15 DIAGNOSIS — E55.9 VITAMIN D DEFICIENCY: Primary | ICD-10-CM

## 2024-01-15 DIAGNOSIS — R06.00 DYSPNEA, UNSPECIFIED TYPE: ICD-10-CM

## 2024-01-15 DIAGNOSIS — E78.5 HYPERLIPIDEMIA, UNSPECIFIED HYPERLIPIDEMIA TYPE: ICD-10-CM

## 2024-01-15 DIAGNOSIS — E53.8 B12 DEFICIENCY: Primary | ICD-10-CM

## 2024-01-15 LAB
25(OH)D3 SERPL-MCNC: 15.2 NG/ML (ref 30–100)
ALBUMIN SERPL BCP-MCNC: 3.9 G/DL (ref 3.5–5)
ALP SERPL-CCNC: 54 U/L (ref 34–104)
ALT SERPL W P-5'-P-CCNC: 15 U/L (ref 7–52)
ANION GAP SERPL CALCULATED.3IONS-SCNC: 9 MMOL/L
AST SERPL W P-5'-P-CCNC: 20 U/L (ref 13–39)
BACTERIA UR QL AUTO: ABNORMAL /HPF
BASOPHILS # BLD AUTO: 0.08 THOUSANDS/ÂΜL (ref 0–0.1)
BASOPHILS NFR BLD AUTO: 2 % (ref 0–1)
BILIRUB SERPL-MCNC: 1.17 MG/DL (ref 0.2–1)
BILIRUB UR QL STRIP: NEGATIVE
BNP SERPL-MCNC: 108 PG/ML (ref 0–100)
BUN SERPL-MCNC: 22 MG/DL (ref 5–25)
CALCIUM SERPL-MCNC: 8.7 MG/DL (ref 8.4–10.2)
CHLORIDE SERPL-SCNC: 104 MMOL/L (ref 96–108)
CHOLEST SERPL-MCNC: 117 MG/DL
CLARITY UR: CLEAR
CO2 SERPL-SCNC: 26 MMOL/L (ref 21–32)
COLOR UR: ABNORMAL
CREAT SERPL-MCNC: 0.86 MG/DL (ref 0.6–1.3)
D DIMER PPP FEU-MCNC: 0.47 UG/ML FEU
EOSINOPHIL # BLD AUTO: 0.22 THOUSAND/ÂΜL (ref 0–0.61)
EOSINOPHIL NFR BLD AUTO: 5 % (ref 0–6)
ERYTHROCYTE [DISTWIDTH] IN BLOOD BY AUTOMATED COUNT: 13 % (ref 11.6–15.1)
GFR SERPL CREATININE-BSD FRML MDRD: 84 ML/MIN/1.73SQ M
GLUCOSE P FAST SERPL-MCNC: 95 MG/DL (ref 65–99)
GLUCOSE UR STRIP-MCNC: NEGATIVE MG/DL
HCT VFR BLD AUTO: 49.2 % (ref 36.5–49.3)
HDLC SERPL-MCNC: 42 MG/DL
HGB BLD-MCNC: 15.8 G/DL (ref 12–17)
HGB UR QL STRIP.AUTO: NEGATIVE
IMM GRANULOCYTES # BLD AUTO: 0.01 THOUSAND/UL (ref 0–0.2)
IMM GRANULOCYTES NFR BLD AUTO: 0 % (ref 0–2)
KETONES UR STRIP-MCNC: NEGATIVE MG/DL
LDLC SERPL CALC-MCNC: 60 MG/DL (ref 0–100)
LEUKOCYTE ESTERASE UR QL STRIP: NEGATIVE
LYMPHOCYTES # BLD AUTO: 1.14 THOUSANDS/ÂΜL (ref 0.6–4.47)
LYMPHOCYTES NFR BLD AUTO: 24 % (ref 14–44)
MCH RBC QN AUTO: 30.4 PG (ref 26.8–34.3)
MCHC RBC AUTO-ENTMCNC: 32.1 G/DL (ref 31.4–37.4)
MCV RBC AUTO: 95 FL (ref 82–98)
MONOCYTES # BLD AUTO: 0.56 THOUSAND/ÂΜL (ref 0.17–1.22)
MONOCYTES NFR BLD AUTO: 12 % (ref 4–12)
MUCOUS THREADS UR QL AUTO: ABNORMAL
NEUTROPHILS # BLD AUTO: 2.85 THOUSANDS/ÂΜL (ref 1.85–7.62)
NEUTS SEG NFR BLD AUTO: 57 % (ref 43–75)
NITRITE UR QL STRIP: NEGATIVE
NON-SQ EPI CELLS URNS QL MICRO: ABNORMAL /HPF
NONHDLC SERPL-MCNC: 75 MG/DL
NRBC BLD AUTO-RTO: 0 /100 WBCS
PH UR STRIP.AUTO: 6.5 [PH]
PLATELET # BLD AUTO: 189 THOUSANDS/UL (ref 149–390)
PMV BLD AUTO: 11.7 FL (ref 8.9–12.7)
POTASSIUM SERPL-SCNC: 4.3 MMOL/L (ref 3.5–5.3)
PROT SERPL-MCNC: 7.1 G/DL (ref 6.4–8.4)
PROT UR STRIP-MCNC: NEGATIVE MG/DL
RBC # BLD AUTO: 5.2 MILLION/UL (ref 3.88–5.62)
RBC #/AREA URNS AUTO: ABNORMAL /HPF
SODIUM SERPL-SCNC: 139 MMOL/L (ref 135–147)
SP GR UR STRIP.AUTO: 1.02 (ref 1–1.03)
TRIGL SERPL-MCNC: 76 MG/DL
UROBILINOGEN UR STRIP-ACNC: 2 MG/DL
VIT B12 SERPL-MCNC: 132 PG/ML (ref 180–914)
WBC # BLD AUTO: 4.86 THOUSAND/UL (ref 4.31–10.16)
WBC #/AREA URNS AUTO: ABNORMAL /HPF

## 2024-01-15 PROCEDURE — 80053 COMPREHEN METABOLIC PANEL: CPT

## 2024-01-15 PROCEDURE — 71046 X-RAY EXAM CHEST 2 VIEWS: CPT

## 2024-01-15 PROCEDURE — 96372 THER/PROPH/DIAG INJ SC/IM: CPT | Performed by: INTERNAL MEDICINE

## 2024-01-15 PROCEDURE — 82607 VITAMIN B-12: CPT

## 2024-01-15 PROCEDURE — 85379 FIBRIN DEGRADATION QUANT: CPT

## 2024-01-15 PROCEDURE — 85025 COMPLETE CBC W/AUTO DIFF WBC: CPT

## 2024-01-15 PROCEDURE — 83880 ASSAY OF NATRIURETIC PEPTIDE: CPT

## 2024-01-15 PROCEDURE — 80061 LIPID PANEL: CPT

## 2024-01-15 PROCEDURE — 36415 COLL VENOUS BLD VENIPUNCTURE: CPT

## 2024-01-15 PROCEDURE — 83918 ORGANIC ACIDS TOTAL QUANT: CPT

## 2024-01-15 PROCEDURE — 82306 VITAMIN D 25 HYDROXY: CPT

## 2024-01-15 PROCEDURE — 81001 URINALYSIS AUTO W/SCOPE: CPT | Performed by: INTERNAL MEDICINE

## 2024-01-15 RX ORDER — MELATONIN
1000 DAILY
Qty: 90 TABLET | Refills: 3 | Status: SHIPPED | OUTPATIENT
Start: 2024-01-15

## 2024-01-15 RX ORDER — CYANOCOBALAMIN 1000 UG/ML
1000 INJECTION, SOLUTION INTRAMUSCULAR; SUBCUTANEOUS
Status: SHIPPED | OUTPATIENT
Start: 2024-01-15

## 2024-01-15 RX ADMIN — CYANOCOBALAMIN 1000 MCG: 1000 INJECTION, SOLUTION INTRAMUSCULAR; SUBCUTANEOUS at 14:15

## 2024-01-15 NOTE — TELEPHONE ENCOUNTER
----- Message from Riki Noguera MD sent at 1/15/2024  1:16 PM EST -----  Please ask him to come in for B12 injection today.  ----- Message -----  From: Lab, Background User  Sent: 1/15/2024  11:50 AM EST  To: Riki Noguera MD

## 2024-01-18 ENCOUNTER — OFFICE VISIT (OUTPATIENT)
Dept: PHYSICAL THERAPY | Facility: OTHER | Age: 74
End: 2024-01-18
Payer: MEDICARE

## 2024-01-18 DIAGNOSIS — M17.0 PRIMARY OSTEOARTHRITIS OF BOTH KNEES: Primary | ICD-10-CM

## 2024-01-18 PROCEDURE — 97110 THERAPEUTIC EXERCISES: CPT

## 2024-01-18 PROCEDURE — 97530 THERAPEUTIC ACTIVITIES: CPT

## 2024-01-18 NOTE — PROGRESS NOTES
Patient: Ishaan Nichols Date: 2023   : 2017    6 year old female      OUTPATIENT WOUND CARE PROGRESS NOTE    Supervising Wound Care / Hyperbaric Medicine Physician: Dr. 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  Dad states rash started out as a blister that popped, and has since been worsening with growing outwards some clear discharge with erythema around.  Dad had been putting on Vaseline and SensiCare skin protectant barrier that has not helped.  Dad states no purulence, no fevers, no lower urine output, no change in p.o., no pain anywhere, no eye redness/discharge, no ear pain, no complaints of sore throat, no other rashes, no increased work of breathing, no abdominal pain.  The patient's parents state that the patient patient never had symptoms of this.  No illnesses in the last 4 to 6 weeks that the patient has had.  [place] around [facility] around the beginning of .  No bug bites, being out in tall grass, animal bites, scratches.,  Patient is wheelchair-bound.  Dad states that patient will Army crawl a lot around and is mostly on her front.  The patient does have a pressure relieving wheelchair cushion although the patient's parents state that the child does not spend much time in the chair         The patient is known to the [facility] wound clinic.  The patient last followed  "Daily Note     Today's date: 2024  Patient name: Solis Dos Santos  : 1950  MRN: [id number]  Referring provider: Leonard Gallagher,*  Dx:   Encounter Diagnosis     ICD-10-CM    1. Primary osteoarthritis of both knees  M17.0               Start Time: 925  Stop Time: 1004  Total time in clinic (min): 39 minutes    Subjective: Pt returns to PT for first session since 2023 after bout of COVID. Reports he was vitamin B12 & D3 deficient, began supplementing and notes he is \"feeling much better\" - both general health and knees.      Objective: See treatment diary below      Assessment: Tolerated treatment well. Pt demonstrated good exercise tolerance today with good form and proper execution of exercises provided. Mild SOB noted at 8 minute melania on bike, pt able to complete 12 minutes of cardio with good recovery to baseline within 30 seconds. Pt continues to demonstrate decreased B knee ROM & LE strength impacting functional mobility.  Patient demonstrated fatigue post treatment and would benefit from continued PT      Plan: Continue per plan of care.      POC expires Unit limit Auth Expiration date PT/OT + Visit Limit?   3/6/2024 BOMN N/a BOMN                             Visit/Unit Tracking  AUTH Status:  Date            No Used 1 2 3 4            Remaining                      Precautions: none      Visit #: 1 IE 2 3 4         Date: 23         Manuals             Lateral glide: B hips  SP           Tibiofemoral glides PA             Patellar glides sup/inf             Manual hamstring / calf / adductor stretch  SP                                     Neuro Re-Ed             Pelvic tilts PA                          Sidelying hip ER/IR c ankle weight             Bridging   x30                       Standing clamshells c short foot             Lateral band walks             Monster walks                          Standing TKE   PTB x30ea [place] x30ea       " "  Eccentric step-downs F/L   4\" x15ea                       RDL  10# 3x10           Seated GM  10# 3x10                        SLS balance   Tandem stance 2x30\"          Rockerboard             Ther Ex             Bike  10 min 10 min 12 min         Seated hamstring / gastroc stretch HEP            Slant board calf stretch  3x45\"           FFE knee flexion stretch                          SLR    0# 2x10         SAQ   2.5# x30ea 5# 2x15ea         LAQ   PTB x30ea 9# 3x15ea         Standing hamstring curls                          Leg press   70# 3x10 80# 3x10                      Standing hip flexor march    0# x15ea  4# x15ea                      Ther Activity             Chair squat 20\" HEP 1x10 c increasing difficulty 2x10 GTB k' 2x10         Step-ups F/L    F x10ea                                   Gait Training                                       Modalities                                               " up there for a weekly follow up apt. The parents do not have new c/o except that they are concerned that wound has not healed yet.  Offloading is obviously a concern here as there is a large callus on the patient's buttock that I debrided today..  The mother and father also stated that they are coming to [facility] wound department because it is close to to where they are staying.  He also stated that they are almost essentially homeless and are staying at relatives houses.  The couple also have other children at home).     Reviewed notes from IP admission due to the above wound 6/16-6/17/2023 and APN/RN wound care notes.       The latest plan of care from [facility] was to apply Silver Nitrate to wound edges; will add Venelex to promote wound healing; will continue with Ca Alginate to wound bed and continue with hydrocolloid dressing to manage callus/raised edges possible due to friction/excess moisture; will continue with no sting barrier film to periwound skin    Update 8-.  Wound is little bit smaller today.  Patient was excisionally debrided and cauterized.  Father advised to keep offloading the patient.  Return to the department in 1 week.  Otherwise no change to wound orders.    Update 8- RZ Wound continues to improve. Wound a little wet proximally. Discontinue medihoney continue alginate and border foam. Pt was excisionally debrided today and cauterized.  Otherwise no change to wound orders continue offloading return to the department one week.    Update 9-6-2023 RZ.  Patient is measuring a little bigger today.  We are adding collagen to the wound base.  Patient excisionally debrided today.      Update 9- RZ.  Patient is looking a little bit smaller today.  The patient was excisionally debrided today.  We are treating with Clarissa alginate foam border.  Return in 1 week, continue to offload.    Update 9- RZ.  Patient is about the same.  Patient was excisionally debrided today.   No change to wound orders.  She builds the callus around the wound very, very quickly.  Patient's father encouraged to offload with waffle cushion and roho..    My plan of care is as below:    Current Treatment Regimen:  Dressing:  Clarissa alginate and border foam  Frequency:  Every other day   Changed by:  Family    Every 2 hours repositioning, waffle cushion for offloading    Review of Systems:  A comprehensive 14 point review of systems was negative.    The patient was excisionally debrided today.    Except for sacral pressure ulcer    Past Medical History:   Diagnosis Date   • Feeding by G-tube (CMD)    • History of urostomy    • Horseshoe kidney    • Hydrocephalus (CMD)    • Idiopathic scoliosis 06/16/2023   • Neurogenic bladder    • Spina bifida (CMD)      Past Surgical History:   Procedure Laterality Date   • Cecostomy      Chait tube   • Gastrostomy tube placement     • Leg tendon surgery Left    • Ureterostomy N/A     Midline     Social History     Socioeconomic History   • Marital status: Single     Spouse name: Not on file   • Number of children: Not on file   • Years of education: Not on file   • Highest education level: Not on file   Occupational History   • Not on file   Tobacco Use   • Smoking status: Never     Passive exposure: Never   • Smokeless tobacco: Never   Substance and Sexual Activity   • Alcohol use: Not on file   • Drug use: Not on file   • Sexual activity: Not on file   Other Topics Concern   • Not on file   Social History Narrative   • Not on file     Social Determinants of Health     Financial Resource Strain: Not on file   Food Insecurity: Not on file   Transportation Needs: Not on file   Physical Activity: Not on file   Stress: Not on file   Social Connections: Not on file   Intimate Partner Violence: Not on file     Family History   Problem Relation Age of Onset   • Depression Father    • Anxiety disorder Father    • Autism spectrum disorder Sister    • Autism spectrum disorder Sister     • Autism spectrum disorder Brother    • Congestive Heart Failure Maternal Grandfather        Current Outpatient Medications   Medication Sig   • triamcinolone (ARISTOCORT) 0.1 % cream Apply topically 2 times daily.     No current facility-administered medications for this encounter.        ALLERGIES:  Latex    OBJECTIVE:  Vital Signs:    Visit Vitals  /69 (BP Location: LUE - Left upper extremity, Patient Position: Sitting)   Pulse 101   Temp 98.4 °F (36.9 °C)   Resp (!) 14         Physical Exam:      General appearance: Alert, well-developed, well-nourished child and in no distress  Head:   normocephalic without obvious abnormality  Pulmonary: normal respiratory effort  Cardiac: Heart:  regular rhythm  Abdomen: soft, non-tender  Neurologic:  alert moving upper extremities, paraplegic,  Extremities: open wound noted right buttock, SIOMARA sacral area, with callus, and epiboly positive drainage no foul odor, no periwound cellulitis                                            Wound Measurements Per Flowsheet:       Wound Sacrum Right (Active)   Wound Care Team Consult Date 08/14/23 08/14/23 1254   Wound Length (cm) 0.6 cm 09/13/23 1339   Wound Width (cm) 0.6 cm 09/13/23 1339   Wound Depth (cm) 0.2 cm 09/13/23 1339   Wound Surface Area (cm^2) 0.36 cm^2 09/13/23 1339   Wound Volume (cm^3) 0.072 cm^3 09/13/23 1339   Wound Volume Change (Initial) -0.59 cm3 09/13/23 1339   Wound Volume % Change (Initial) -89.09 % 09/13/23 1339   Wound Volume Change (30 days) -0.59 cm3 09/13/23 1339   Wound Volume % Change (30 days) -89.09 % 09/13/23 1339   Number of days: 62         PROCEDURE:        Debridement: Excisional Debridement.  Informed consent obtained.  Location:  Time out was completed.  Patient, procedure, and site verified.  Anesthesia-  Topical  Tool-  Curette  Size-  Less than or equal to 20 sq cm  Total debrided area was 0.36 sq cm   Tissue Type Excised-  Subcutaneous fibrin slough callus and epiboly  Hemostasis  achieved-silver nitrate  Patient procedure-  tolerated well, no complications.  Patient stable upon completion of procedure.  Wound dimensions unchanged post procedure.        Procedure was Performed by:  Physician     Laboratory assessments reviewed:  No results found for: \"PAB\"   Albumin (g/dL)   Date Value   06/16/2023 2.9 (L)      No results available in last 24 hours    Lab Results   Component Value Date    WBC 6.2 06/16/2023    GLUCOSE 138 (H) 06/16/2023    CREATININE 0.44 06/16/2023        Culture results:  No results found for: \"SDES\" No results found for: \"CULT\"     Diagnostic Assessments Reviewed:  Medical records in [facility] were reviewed.    Nutritional Assessment:  I agree with the nursing nutrition assessment in the flowsheet.    Wound treatment goals are palliative:  No    DIAGNOSES:      Pressure injury right buttock stage III L89.313      Medical Decision Making:   The patient's medical records in [facility] were reviewed.  This pressure ulcer is obviously due to inadequate offloading.  I did palpate the patient's wheelchair cushion and it seems pretty soft.  Not sure if maybe had gel mattress overlay may help with this patient, according to the family the patient does not spend a lot of time laying around but is crawling around on her front rather.  Their semihomelessness may be a factor as well.  Patient's father encouraged to offload the patient with the waffle cushion.  Plan of Care:  Every 2 hours repositioning    Alginate, border foam change every other day

## 2024-01-19 ENCOUNTER — TELEPHONE (OUTPATIENT)
Dept: INTERNAL MEDICINE CLINIC | Facility: CLINIC | Age: 74
End: 2024-01-19

## 2024-01-19 LAB — METHYLMALONATE SERPL-SCNC: 310 NMOL/L (ref 0–378)

## 2024-01-19 NOTE — TELEPHONE ENCOUNTER
I gave him the message regarding the Vitamin D on 1/15 and I confirmed with him that he started it this day. He did come in also to get B12 shot this day. He will  the b12 supplement when weather is better

## 2024-01-19 NOTE — TELEPHONE ENCOUNTER
----- Message from Riki Noguera MD sent at 1/19/2024 12:49 PM EST -----  Please confirm with him that he is taking his vitamin D supplement  He should also start taking vitamin B12 orally 500 mcg daily.  ----- Message -----  From: Lab, Background User  Sent: 1/15/2024  11:50 AM EST  To: Riki Noguera MD

## 2024-01-22 ENCOUNTER — OFFICE VISIT (OUTPATIENT)
Dept: PHYSICAL THERAPY | Facility: OTHER | Age: 74
End: 2024-01-22
Payer: MEDICARE

## 2024-01-22 DIAGNOSIS — M17.0 PRIMARY OSTEOARTHRITIS OF BOTH KNEES: Primary | ICD-10-CM

## 2024-01-22 PROCEDURE — 97140 MANUAL THERAPY 1/> REGIONS: CPT

## 2024-01-22 PROCEDURE — 97110 THERAPEUTIC EXERCISES: CPT

## 2024-01-22 PROCEDURE — 97112 NEUROMUSCULAR REEDUCATION: CPT

## 2024-01-22 NOTE — PROGRESS NOTES
"Daily Note     Today's date: 2024  Patient name: Solis Dos Santos  : 1950  MRN: [id number]  Referring provider: Leonard Gallagher,*  Dx:   Encounter Diagnosis     ICD-10-CM    1. Primary osteoarthritis of both knees  M17.0             Start Time: 1015  Stop Time: 1115  Total time in clinic (min): 60 minutes    Subjective: Pt notes \"today is a bad day.\"      Objective: See treatment diary below      Assessment: Tolerated treatment well. Pt demonstrating decreased L hip flexion / IR range today with minimal improvements following manuals. Pt noted anterior R knee pain with STS today, made worse with cueing for hip external rotation. Noted decreased NM control of bilateral hip Abd/ER musculature as well. Pt noted absence of R knee pain with R single-leg leg press which he attributes to linear movement feeling better than torque movement during squat with band. Continue to utilize leg press for improved exercises tolerance. Pt continues to demonstrate decreased B knee ROM & LE strength impacting functional mobility. Patient demonstrated fatigue post treatment and would benefit from continued PT      Plan: Continue per plan of care.      POC expires Unit limit Auth Expiration date PT/OT + Visit Limit?   3/6/2024 BOMN N/a BOMN                             Visit/Unit Tracking  AUTH Status:  Date           No Used 1 2 3 4 5           Remaining                      Precautions: none      Visit #: 1 IE 2 3 4 5        Date: 23        Manuals             Lateral glide: B hips  SP   3x30:L        Tibiofemoral glides PA             Patellar glides sup/inf             Manual hamstring / calf / adductor stretch  SP           STM     Foam roll B HS/calves SP                     Neuro Re-Ed             Pelvic tilts PA                          Sidelying hip ER/IR c ankle weight             Bridging   x30                       Standing clamshells c short foot     SL " "seated hip ABD GTB k' 2x20ea        Lateral band walks    GTB k' 4x15' nv        Monster walks                          Standing TKE   PTB x30ea          Eccentric step-downs F/L   4\" x15ea                       RDL  10# 3x10           Seated GM  10# 3x10                        SLS balance   Tandem stance 2x30\"  nv        Rockerboard     nv        Ther Ex             Bike  10 min 10 min 12 min 10 min        Seated hamstring / gastroc stretch HEP            Slant board calf stretch  3x45\"   2x45\"ea        FFE knee flexion stretch                          SLR    0# 2x10         SAQ   2.5# x30ea 5# 2x15ea 4# 2x20ea        LAQ   PTB x30ea 9# 3x15ea 4#+GTB 2x15ea        Standing hamstring curls                          Leg press   70# 3x10 80# 3x10 40# x20:R    60# x12:R                     Standing hip flexor march    0# x15ea  4# x15ea                      Ther Activity             Chair squat 20\" HEP 1x10 c increasing difficulty 2x10 GTB k' 2x10 GTB k' 18\" 2x12        Step-ups F/L    F x10ea nv                                  Gait Training                                       Modalities                                               "

## 2024-01-25 ENCOUNTER — OFFICE VISIT (OUTPATIENT)
Dept: PHYSICAL THERAPY | Facility: OTHER | Age: 74
End: 2024-01-25
Payer: MEDICARE

## 2024-01-25 DIAGNOSIS — M17.0 PRIMARY OSTEOARTHRITIS OF BOTH KNEES: Primary | ICD-10-CM

## 2024-01-25 PROCEDURE — 97140 MANUAL THERAPY 1/> REGIONS: CPT

## 2024-01-25 PROCEDURE — 97112 NEUROMUSCULAR REEDUCATION: CPT

## 2024-01-25 NOTE — PROGRESS NOTES
"Daily Note     Today's date: 2024  Patient name: Solis Dos Santos  : 1950  MRN: [id number]  Referring provider: Leonard Gallagher,*  Dx:   Encounter Diagnosis     ICD-10-CM    1. Primary osteoarthritis of both knees  M17.0               Start Time: 955  Stop Time: 1020  Total time in clinic (min): 25 minutes    Subjective: Pt notes R knee pain laterally with lateral calf tenderness today.      Objective: See treatment diary below      Assessment: Tolerated treatment well. Session focused on myofascial restrictions, LE strength & dynamic balance with good tolerance. Noted hypertonicity of lateral R gastroc that improved mildly with IASTM and stretching. Pt demonstrates good strength progressions per flowsheet. Demonstrates fair dynamic balance worse with lateral movement. Patient would benefit from continued PT      Plan: Continue per plan of care.      POC expires Unit limit Auth Expiration date PT/OT + Visit Limit?   3/6/2024 BOMN N/a BOMN                             Visit/Unit Tracking  AUTH Status:  Date          No Used 1 2 3 4 5 6          Remaining                      Precautions: none      Visit #: 1 IE 2 3 4 5 6       Date: 23       Manuals             Lateral glide: B hips  SP   3x30:L        Tibiofemoral glides PA             Patellar glides sup/inf             Manual hamstring / calf / adductor stretch  SP           STM     Foam roll B HS/calves SP IASTM R calf SP                    Neuro Re-Ed             Pelvic tilts PA                          Sidelying hip ER/IR c ankle weight             Bridging   x30                       Standing clamshells c short foot     SL seated hip ABD GTB k' 2x20ea        Sidelying clamshells     nv        Lateral band walks    GTB k' 4x15' nv nv       Monster walks                          Standing TKE   PTB x30ea   nv       Eccentric step-downs F/L   4\" x15ea                       RDL  10# " "3x10           Seated GM  10# 3x10                        SLS balance   Tandem stance 2x30\"  nv nv       Rockerboard                          Angelic lateral step-overs      2 8\" hurdles x10ea       Figure-8 Weave Fwd/Back      X30\"    X30\" c perturbations       Ther Ex             Bike  10 min 10 min 12 min 10 min 10 min       Seated hamstring / gastroc stretch HEP            Slant board calf stretch  3x45\"   2x45\"ea 2x45\"ea       FFE knee flexion stretch                          SLR    0# 2x10         SAQ   2.5# x30ea 5# 2x15ea 4# 2x20ea        LAQ   PTB x30ea 9# 3x15ea 4#+GTB 2x15ea 5#+GTB 2x15ea       Standing hamstring curls                          Leg press   70# 3x10 80# 3x10 40# x20:R    60# x12:R 65# 2x12:R                    Standing hip flexor march    0# x15ea  4# x15ea                      Ther Activity             Chair squat 20\" HEP 1x10 c increasing difficulty 2x10 GTB k' 2x10 GTB k' 18\" 2x12        Step-ups F/L    F x10ea nv 8\" 10# x15ea                                 Gait Training                                       Modalities                                               "

## 2024-01-29 ENCOUNTER — OFFICE VISIT (OUTPATIENT)
Dept: PHYSICAL THERAPY | Facility: OTHER | Age: 74
End: 2024-01-29
Payer: MEDICARE

## 2024-01-29 DIAGNOSIS — M17.0 PRIMARY OSTEOARTHRITIS OF BOTH KNEES: Primary | ICD-10-CM

## 2024-01-29 PROCEDURE — 97110 THERAPEUTIC EXERCISES: CPT

## 2024-01-29 PROCEDURE — 97112 NEUROMUSCULAR REEDUCATION: CPT

## 2024-01-29 NOTE — PROGRESS NOTES
"Daily Note     Today's date: 2024  Patient name: Solis Dos Santos  : 1950  MRN: [id number]  Referring provider: Leonard Gallagher,*  Dx:   Encounter Diagnosis     ICD-10-CM    1. Primary osteoarthritis of both knees  M17.0                 Start Time: 0950  Stop Time: 1050  Total time in clinic (min): 60 minutes    Subjective: Pt notes no new complaints today - pt reports no calf tightness today.      Objective: See treatment diary below      Assessment: Tolerated treatment well. Session focused on LE strength / mobility & dynamic balance with good tolerance. Pt demonstrating good strength progressions per flowsheet. Demonstrates fair dynamic balance with agility ladder walk-through; continue to challenge patient in order to improve balance confidence. Patient would benefit from continued PT      Plan: Continue per plan of care.      POC expires Unit limit Auth Expiration date PT/OT + Visit Limit?   3/6/2024 BOMN N/a BOMN                             Visit/Unit Tracking  AUTH Status:  Date         No Used 1 2 3 4 5 6 7         Remaining                      Precautions: none      Visit #: 1 IE 2 3 4 5 6 7      Date: 23      Manuals             Lateral glide: B hips  SP   3x30:L        Tibiofemoral glides PA             Patellar glides sup/inf             Manual hamstring / calf / adductor stretch  SP           STM     Foam roll B HS/calves SP IASTM R calf SP                    Neuro Re-Ed             Pelvic tilts PA                          Sidelying hip ER/IR c ankle weight             Bridging   x30                       Standing clamshells c short foot     SL seated hip ABD GTB k' 2x20ea        Standing hip ABd       Light mini a' 2x15      Lateral band walks    GTB k' 4x15' nv nv Light mini a' 4x15'      Monster walks                          Standing TKE   PTB x30ea   nv Karen 10# x25ea      Eccentric step-downs F/L   4\" " "x15ea                       RDL  10# 3x10           Seated GM  10# 3x10                        SLS balance   Tandem stance 2x30\"  nv nv       Rockerboard                          Angelic lateral step-overs      2 8\" hurdles x10ea       Figure-8 Weave Fwd/Back      X30\"    X30\" c perturbations       Agility ladder walkthrough:   - 1 in each  - 2 in each  - Lateral        X 2 trips ea      Ther Ex             Bike  10 min 10 min 12 min 10 min 10 min 10 min      Seated hamstring / gastroc stretch HEP      10x10\"      Slant board calf stretch  3x45\"   2x45\"ea 2x45\"ea 2x45\"ea      FFE knee flexion stretch                          SLR    0# 2x10         SAQ   2.5# x30ea 5# 2x15ea 4# 2x20ea        LAQ   PTB x30ea 9# 3x15ea 4#+GTB 2x15ea 5#+GTB 2x15ea Karen 15# 22\" seat 1x15ea      Standing hamstring curls                          Leg press   70# 3x10 80# 3x10 40# x20:R    60# x12:R 65# 2x12:R 2:1 60# 2x15                   Standing hip flexor march    0# x15ea  4# x15ea         Standing Tib Raise       0# x25                   Ther Activity             Chair squat 20\" HEP 1x10 c increasing difficulty 2x10 GTB k' 2x10 GTB k' 18\" 2x12        Step-ups F/L    F x10ea nv 8\" 10# x15ea                                 Gait Training                                       Modalities                                               "

## 2024-02-01 ENCOUNTER — OFFICE VISIT (OUTPATIENT)
Dept: PHYSICAL THERAPY | Facility: OTHER | Age: 74
End: 2024-02-01
Payer: MEDICARE

## 2024-02-01 DIAGNOSIS — M17.0 PRIMARY OSTEOARTHRITIS OF BOTH KNEES: Primary | ICD-10-CM

## 2024-02-01 PROCEDURE — 97112 NEUROMUSCULAR REEDUCATION: CPT

## 2024-02-01 PROCEDURE — 97110 THERAPEUTIC EXERCISES: CPT

## 2024-02-01 NOTE — PROGRESS NOTES
"Daily Note     Today's date: 2024  Patient name: Solis Dos Santos  : 1950  MRN: [id number]  Referring provider: Leonard Gallagher,*  Dx:   Encounter Diagnosis     ICD-10-CM    1. Primary osteoarthritis of both knees  M17.0                   Start Time: 0945  Stop Time: 1030  Total time in clinic (min): 45 minutes    Subjective: Pt notes he is feeling a little more stiff today.      Objective: See treatment diary below      Assessment: Tolerated treatment well. Session focused on LE strength / mobility & dynamic balance with good tolerance. Pt demonstrating good strength progressions per flowsheet. Balance appropriately challenges with progressions per flowsheet. Patient would benefit from continued PT      Plan: Continue per plan of care.      POC expires Unit limit Auth Expiration date PT/OT + Visit Limit?   3/6/2024 BOMN N/a BOMN                             Visit/Unit Tracking  AUTH Status:  Date        No Used 1 2 3 4 5 6 7 8        Remaining                      Precautions: none      Visit #: 1 IE 2 3 4 5 6 7 8     Date: 23     Manuals             Lateral glide: B hips  SP   3x30:L        Tibiofemoral glides PA             Patellar glides sup/inf             Manual hamstring / calf / adductor stretch  SP           STM     Foam roll B HS/calves SP IASTM R calf SP                    Neuro Re-Ed             Standing clamshells c short foot     SL seated hip ABD GTB k' 2x20ea        Standing hip ABd       Light mini a' 2x15 Med mini a' 2x15     Lateral band walks    GTB k' 4x15' nv nv Light mini a' 4x15'      Monster walks                          Standing TKE   PTB x30ea   nv Karen 10# x25ea Karen 12# x25ea     Eccentric step-downs F/L   4\" x15ea                       RDL  10# 3x10           Seated GM  10# 3x10                        SLS balance   Tandem stance 2x30\"  nv nv       Rockerboard                        " "  Angelic lateral step-overs      2 8\" hurdles x10ea       Figure-8 Weave Fwd/Back      X30\"    X30\" c perturbations       Agility ladder walkthrough:   - 1 in each  - 2 in each  - Lateral        X 2 trips ea X 2 trips ea  + Icky shuffle     Ther Ex             Bike  10 min 10 min 12 min 10 min 10 min 10 min 10 min     Seated hamstring / gastroc stretch HEP      10x10\" 3x30\"     Slant board calf stretch  3x45\"   2x45\"ea 2x45\"ea 2x45\"ea 2x45\"ea     FFE knee flexion stretch                          SLR    0# 2x10         SAQ   2.5# x30ea 5# 2x15ea 4# 2x20ea        LAQ   PTB x30ea 9# 3x15ea 4#+GTB 2x15ea 5#+GTB 2x15ea Karen 15# 22\" seat 1x15ea 10# + GTB 2x25     Seated  hamstring curls        [place] 15# 22\" seat 2x15ea                  Leg press   70# 3x10 80# 3x10 40# x20:R    60# x12:R 65# 2x12:R 2:1 60# 2x15 2:1 65# 2x15ea                  Standing hip flexor march    0# x15ea  4# x15ea         Standing Tib Raise       0# x25                   Ther Activity             Chair squat 20\" HEP 1x10 c increasing difficulty 2x10 GTB k' 2x10 GTB k' 18\" 2x12        Step-ups F/L    F x10ea nv 8\" 10# x15ea                                 Gait Training                                       Modalities                                               "

## 2024-02-05 ENCOUNTER — OFFICE VISIT (OUTPATIENT)
Dept: PHYSICAL THERAPY | Facility: OTHER | Age: 74
End: 2024-02-05
Payer: MEDICARE

## 2024-02-05 ENCOUNTER — RA CDI HCC (OUTPATIENT)
Dept: OTHER | Facility: HOSPITAL | Age: 74
End: 2024-02-05

## 2024-02-05 DIAGNOSIS — M17.0 PRIMARY OSTEOARTHRITIS OF BOTH KNEES: Primary | ICD-10-CM

## 2024-02-05 PROCEDURE — 97110 THERAPEUTIC EXERCISES: CPT

## 2024-02-05 PROCEDURE — 97112 NEUROMUSCULAR REEDUCATION: CPT

## 2024-02-05 NOTE — PROGRESS NOTES
"Daily Note     Today's date: 2024  Patient name: Solis Dos Santos  : 1950  MRN: [id number]  Referring provider: Leonard Gallagher,*  Dx:   Encounter Diagnosis     ICD-10-CM    1. Primary osteoarthritis of both knees  M17.0                     Start Time: 09  Stop Time: 1005  Total time in clinic (min): 25 minutes    Subjective: Pt notes improving balance confidence.      Objective: See treatment diary below      Assessment: Tolerated treatment well. Session focused on LE strength / mobility & dynamic balance with good tolerance. Pt demonstrating good strength progressions per flowsheet. Balance appropriately challenged with progressions per flowsheet - still demonstrates fair dynamic balance and decreased balance confidence impacting ambulation on uneven surfaces, stair negotiation and ADLs. Patient would benefit from continued PT      Plan: Continue per plan of care.      POC expires Unit limit Auth Expiration date PT/OT + Visit Limit?   3/6/2024 BOMN N/a BOMN                             Visit/Unit Tracking  AUTH Status:  Date       No Used 1 2 3 4 5 6 7 8 9       Remaining                      Precautions: none      Visit #: 1 IE 2 3 4 5 6 7 8 9    Date: 23    Manuals             Lateral glide: B hips  SP   3x30:L        Tibiofemoral glides PA             Patellar glides sup/inf             Manual hamstring / calf / adductor stretch  SP           STM     Foam roll B HS/calves SP IASTM R calf SP                    Neuro Re-Ed             Standing clamshells c short foot     SL seated hip ABD GTB k' 2x20ea        Standing hip ABd       Light mini a' 2x15 Med mini a' 2x15     Lateral band walks    GTB k' 4x15' nv nv Light mini a' 4x15'      Monster walks                          Standing TKE   PTB x30ea   nv [place] 10# x25ea Karen 12# x25ea     Eccentric step-downs F/L   4\" x15ea                       RDL  10# " "3x10           Seated GM  10# 3x10                        SLS balance   Tandem stance 2x30\"  nv nv   5 x 10\"    Rockerboard                          Angelic lateral step-overs      2 8\" hurdles x10ea       Figure-8 Weave Fwd/Back      X30\"    X30\" c perturbations       Agility ladder walkthrough:   - 1 in each  - 2 in each  - Lateral        X 2 trips ea X 2 trips ea  + Icky shuffle X 2 trips ea + Icky shuffle    Ther Ex             Bike  10 min 10 min 12 min 10 min 10 min 10 min 10 min 10 min    Seated hamstring / gastroc stretch HEP      10x10\" 3x30\"     Slant board calf stretch  3x45\"   2x45\"ea 2x45\"ea 2x45\"ea 2x45\"ea     FFE knee flexion stretch                          SLR    0# 2x10         SAQ   2.5# x30ea 5# 2x15ea 4# 2x20ea        LAQ   PTB x30ea 9# 3x15ea 4#+GTB 2x15ea 5#+GTB 2x15ea Karen 15# 22\" seat 1x15ea 10# + GTB 2x25 10# + BTB 2x30ea    Seated  hamstring curls        Karen 15# 22\" seat 2x15ea                  Leg press   70# 3x10 80# 3x10 40# x20:R    60# x12:R 65# 2x12:R 2:1 60# 2x15 2:1 65# 2x15ea 2:1 70# 3x15ea                 Standing hip flexor march    0# x15ea  4# x15ea         Standing Tib Raise       0# x25                   Ther Activity             Chair squat 20\" HEP 1x10 c increasing difficulty 2x10 GTB k' 2x10 GTB k' 18\" 2x12        Step-ups F/L    F x10ea nv 8\" 10# x15ea                                 Gait Training                                       Modalities                                               "

## 2024-02-08 ENCOUNTER — OFFICE VISIT (OUTPATIENT)
Dept: PHYSICAL THERAPY | Facility: OTHER | Age: 74
End: 2024-02-08
Payer: MEDICARE

## 2024-02-08 DIAGNOSIS — M17.0 PRIMARY OSTEOARTHRITIS OF BOTH KNEES: Primary | ICD-10-CM

## 2024-02-08 PROCEDURE — 97110 THERAPEUTIC EXERCISES: CPT

## 2024-02-08 PROCEDURE — 97112 NEUROMUSCULAR REEDUCATION: CPT

## 2024-02-08 NOTE — PROGRESS NOTES
Daily Note     Today's date: 2024  Patient name: Solis Dos Santos  : 1950  MRN: [id number]  Referring provider: Leonard Gallagher,*  Dx:   Encounter Diagnosis     ICD-10-CM    1. Primary osteoarthritis of both knees  M17.0             Start Time: 935  Stop Time: 1030  Total time in clinic (min): 55 minutes    Subjective: Pt notes significant improvement in balance confidence when shoes are off.      Objective: See treatment diary below      Assessment: Tolerated treatment well. Session focused on LE strength / mobility & dynamic balance with good tolerance. Pt demonstrates a significant improvement in tandem stance balance today with shoes removes - able to maintain balance c perturbations. Recommendation made for patient to visit a local running store requesting shoes with a lower drop, wider toe box for improved balance similar to when barefoot. Patient would benefit from continued PT      Plan: Continue per plan of care.      POC expires Unit limit Auth Expiration date PT/OT + Visit Limit?   3/6/2024 BOMN N/a BOMN                             Visit/Unit Tracking  AUTH Status:  Date  2/ 2     No Used 1 2 3 4 5 6 7 8 9 10      Remaining                      Precautions: none      Visit #: 1 IE 2 3 4 5 6 7 8 9 10   Date: 23 2 2/ 2/8   Manuals             Lateral glide: B hips  SP   3x30:L        Tibiofemoral glides PA             Patellar glides sup/inf             Manual hamstring / calf / adductor stretch  SP           STM     Foam roll B HS/calves SP IASTM R calf SP                    Neuro Re-Ed             Standing clamshells c short foot     SL seated hip ABD GTB k' 2x20ea        Standing hip ABd       Light mini a' 2x15 Med mini a' 2x15  Med mini a' 2x20ea   Lateral band walks    GTB k' 4x15' nv nv Light mini a' 4x15'      Monster walks                          Standing TKE   PTB x30ea   nv Karen 10# x25ea  "Karen 12# x25ea     Eccentric step-downs F/L   4\" x15ea                       RDL  10# 3x10           Seated GM  10# 3x10                        SLS balance   Tandem stance 2x30\"  nv nv   5 x 10\" 5 x 10\" c TRX support   Tandem balance c perturbations          2x20\"ea                Angelic lateral step-overs      2 8\" hurdles x10ea       Figure-8 Weave Fwd/Back      X30\"    X30\" c perturbations       Agility ladder walkthrough:   - 1 in each  - 2 in each  - Lateral        X 2 trips ea X 2 trips ea  + Icky shuffle X 2 trips ea + Icky shuffle    Ther Ex             Bike  10 min 10 min 12 min 10 min 10 min 10 min 10 min 10 min 10 min   Seated hamstring / gastroc stretch HEP      10x10\" 3x30\"     Slant board calf stretch  3x45\"   2x45\"ea 2x45\"ea 2x45\"ea 2x45\"ea     FFE knee flexion stretch                          SLR    0# 2x10         SAQ   2.5# x30ea 5# 2x15ea 4# 2x20ea        LAQ   PTB x30ea 9# 3x15ea 4#+GTB 2x15ea 5#+GTB 2x15ea [place] 15# 22\" seat 1x15ea 10# + GTB 2x25 10# + BTB 2x30ea 12# + GTB 2x30ea   Seated  hamstring curls        [place] 15# 22\" seat 2x15ea                  Leg press   70# 3x10 80# 3x10 40# x20:R    60# x12:R 65# 2x12:R 2:1 60# 2x15 2:1 65# 2x15ea 2:1 70# 3x15ea # 3x15    2:1 70# 1x12ea                Standing hip flexor march    0# x15ea  4# x15ea      Med mini x20ea   Standing Tib Raise       0# x25                   Ther Activity             Chair squat 20\" HEP 1x10 c increasing difficulty 2x10 GTB k' 2x10 GTB k' 18\" 2x12     15# 2x12   Step-ups F/L    F x10ea nv 8\" 10# x15ea                                 Gait Training                                       Modalities                                               "

## 2024-02-09 ENCOUNTER — OFFICE VISIT (OUTPATIENT)
Dept: INTERNAL MEDICINE CLINIC | Facility: CLINIC | Age: 74
End: 2024-02-09
Payer: MEDICARE

## 2024-02-09 VITALS
HEIGHT: 73 IN | WEIGHT: 269 LBS | OXYGEN SATURATION: 97 % | HEART RATE: 77 BPM | DIASTOLIC BLOOD PRESSURE: 84 MMHG | SYSTOLIC BLOOD PRESSURE: 132 MMHG | TEMPERATURE: 98.4 F | BODY MASS INDEX: 35.65 KG/M2

## 2024-02-09 DIAGNOSIS — R07.89 OTHER CHEST PAIN: Primary | ICD-10-CM

## 2024-02-09 DIAGNOSIS — R06.00 DYSPNEA, UNSPECIFIED TYPE: ICD-10-CM

## 2024-02-09 DIAGNOSIS — C67.4 MALIGNANT NEOPLASM OF POSTERIOR WALL OF URINARY BLADDER (HCC): ICD-10-CM

## 2024-02-09 DIAGNOSIS — E53.8 B12 DEFICIENCY: ICD-10-CM

## 2024-02-09 DIAGNOSIS — E55.9 VITAMIN D DEFICIENCY: ICD-10-CM

## 2024-02-09 PROCEDURE — 96372 THER/PROPH/DIAG INJ SC/IM: CPT | Performed by: INTERNAL MEDICINE

## 2024-02-09 PROCEDURE — 93000 ELECTROCARDIOGRAM COMPLETE: CPT | Performed by: INTERNAL MEDICINE

## 2024-02-09 PROCEDURE — 99214 OFFICE O/P EST MOD 30 MIN: CPT | Performed by: INTERNAL MEDICINE

## 2024-02-09 RX ORDER — MELATONIN
1000 DAILY
Qty: 90 TABLET | Refills: 3 | Status: SHIPPED | OUTPATIENT
Start: 2024-02-09

## 2024-02-09 RX ADMIN — CYANOCOBALAMIN 1000 MCG: 1000 INJECTION, SOLUTION INTRAMUSCULAR; SUBCUTANEOUS at 12:55

## 2024-02-09 NOTE — PROGRESS NOTES
Assessment/Plan:           1. Other chest pain  -     POCT ECG  -     Ambulatory Referral to Cardiology; Future    2. B12 deficiency  Comments:  Importance of B12 supplementation discussed.  Orders:  -     cyanocobalamin (VITAMIN B-12) 500 MCG tablet; Take 1 tablet (500 mcg total) by mouth daily  -     Vitamin B12; Future; Expected date: 03/11/2024    3. Dyspnea, unspecified type    4. Malignant neoplasm of posterior wall of urinary bladder (HCC)    5. Vitamin D deficiency  -     cholecalciferol (VITAMIN D3) 1,000 units tablet; Take 1 tablet (1,000 Units total) by mouth daily  -     Vitamin D 25 hydroxy; Future; Expected date: 03/11/2024           1. Other chest pain      2. B12 deficiency      3. Dyspnea, unspecified type         No problem-specific Assessment & Plan notes found for this encounter.           Subjective:      Patient ID: Solis Dos Santos is a 73 y.o. male.    HPI    The following portions of the patient's history were reviewed and updated as appropriate: He  has a past medical history of Anxiety disorder, Atherosclerotic heart disease of native coronary artery without angina pectoris, Benign prostatic hyperplasia without lower urinary tract symptoms, Cancer (HCC), Clotting disorder (HCC) (1/25/2023), Disease of thyroid gland, Dyslipidemia, GERD (gastroesophageal reflux disease), Hypertension, Impaired fasting blood sugar, Kidney stone, Low back pain, Obesity, Osteoarthritis, Other chest pain, Paresthesia of skin, Polyneuropathy, Pure hypercholesterolemia, 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 bladder (HCC) 03/23/2023    Suspected UTI 03/09/2023    Bladder mass 03/09/2023    Right  leg swelling 03/09/2023    Gross hematuria 03/08/2023    Hyperlipidemia 03/08/2023    Acute leg pain, left 04/26/2021    Obesity, morbid (HCC) 04/14/2021    Aftercare following right hip joint replacement surgery 11/05/2019    Difficulty sleeping 08/13/2019    Acute blood loss anemia 08/09/2019    Impaired mobility and ADLs 08/09/2019    Hypothyroidism 08/09/2019    Status post total hip replacement, left 08/08/2019    Status post right hip replacement 08/07/2019    Primary osteoarthritis of both knees 05/29/2019    Osgood-Schlatter's disease of both knees 05/29/2019    Pain in both knees 05/29/2019     He  has a past surgical history that includes Back surgery; Tonsillectomy; Cholecystectomy; pr arthrp acetblr/prox fem prostc agrft/algrft (Right, 08/05/2019); Colonoscopy (02/06/2019); pr cysto w/removal of lesions small (N/A, 03/23/2023); pr cysto w/removal of lesions small (N/A, 04/26/2023); CYSTOSCOPY (N/A, 04/26/2023); Joint replacement (August 2019); and Hip surgery (August 2019).  His family history includes Arthritis in his mother and other; Heart disease in his father.  He  reports that he quit smoking about 36 years ago. His smoking use included cigarettes. He started smoking about 57 years ago. He has a 20.8 pack-year smoking history. He has never used smokeless tobacco. He reports that he does not currently use alcohol. He reports that he does not currently use drugs.  Current Outpatient Medications   Medication Sig Dispense Refill    cholecalciferol (VITAMIN D3) 1,000 units tablet Take 1 tablet (1,000 Units total) by mouth daily 90 tablet 3    cyanocobalamin (VITAMIN B-12) 500 MCG tablet Take 1 tablet (500 mcg total) by mouth daily 100 tablet 3    meloxicam (Mobic) 15 mg tablet Take 1 tablet (15 mg total) by mouth daily (Patient not taking: Reported on 3/21/2024) 30 tablet 2    atorvastatin (LIPITOR) 10 mg tablet Take 1 tablet (10 mg total) by mouth daily at bedtime 90 tablet 0    atorvastatin (LIPITOR)  10 mg tablet Take 1 tablet (10 mg total) by mouth daily at bedtime 90 tablet 0    levothyroxine 150 mcg tablet Take 1 tablet (150 mcg total) by mouth daily 90 tablet 0    levothyroxine 150 mcg tablet Take 1 tablet (150 mcg total) by mouth daily 90 tablet 0    pantoprazole (PROTONIX) 40 mg tablet Take 1 tablet (40 mg total) by mouth daily before breakfast 30 tablet 2    tamsulosin (FLOMAX) 0.4 mg Take 1 capsule (0.4 mg total) by mouth daily with dinner 90 capsule 0     Current Facility-Administered Medications   Medication Dose Route Frequency Provider Last Rate Last Admin    cyanocobalamin injection 1,000 mcg  1,000 mcg Intramuscular Q30 Days Riki Noguera MD   1,000 mcg at 02/09/24 1255     Current Outpatient Medications on File Prior to Visit   Medication Sig    meloxicam (Mobic) 15 mg tablet Take 1 tablet (15 mg total) by mouth daily (Patient not taking: Reported on 3/21/2024)     Current Facility-Administered Medications on File Prior to Visit   Medication    cyanocobalamin injection 1,000 mcg     Medications Discontinued During This Encounter   Medication Reason    cholecalciferol (VITAMIN D3) 1,000 units tablet Reorder      He has No Known Allergies..    Review of Systems   Constitutional:  Negative for appetite change, chills, fatigue and fever.   HENT:  Negative for sore throat and trouble swallowing.    Eyes:  Negative for redness.   Respiratory:  Positive for shortness of breath.    Cardiovascular:  Positive for chest pain. Negative for palpitations.   Gastrointestinal:  Negative for abdominal pain, constipation and diarrhea.   Genitourinary:  Negative for dysuria and hematuria.   Musculoskeletal:  Negative for back pain and neck pain.   Skin:  Negative for rash.   Neurological:  Negative for seizures, weakness and headaches.   Hematological:  Negative for adenopathy.   Psychiatric/Behavioral:  Negative for confusion. The patient is not nervous/anxious.          Objective:      /84 (BP Location:  "Left arm, Patient Position: Sitting, Cuff Size: Standard)   Pulse 77   Temp 98.4 °F (36.9 °C) (Temporal)   Ht 6' 1\" (1.854 m)   Wt 122 kg (269 lb)   SpO2 97%   BMI 35.49 kg/m²     Results Reviewed       None            Recent Results (from the past 1344 hour(s))   Stress strip    Collection Time: 02/29/24  1:39 PM   Result Value Ref Range    Protocol Name OSWALDO WALK     Exercise duration (min) 3 min    Exercise duration (sec) 0 sec    Post Peak Systolic  mmHg    Max Diastolic Bp 76 mmHg    Peak  BPM    Max Predicted Heart Rate 145 BPM    Reason for Termination TEST COMPLETE...     Test Indication Dyspnea with Exercise  Chest Discomfort       Target Hr Formular (220 - Age)*100%     Arrhy During Ex      ECG Interp Before Ex      ECG Interp during Ex      Ex Summary Comment      Chest Pain Statement none     Overall Hr Response To Exercise      Overall BP Response To Exercise     NM myocardial perfusion spect (rx stress and/or rest)    Collection Time: 02/29/24  2:57 PM   Result Value Ref Range    Baseline HR 70 bpm    Baseline /80 mmHg    O2 sat rest 98 %    Stress peak  bpm    Post peak  mmHg    O2 sat peak 98 %    Recovery HR 88 bpm    Recovery /78 mmHg    O2 sat recovery 96 %    Max  bpm    Max HR Percent 80 %    Rate Pressure Product 13,176.0     Angina Index 0     Rest Nuclear Isotope Dose 15.70 mCi    Stress Nuclear Isotope Dose 48.10 mCi    Stress/rest perfusion ratio 0.85     EF (%) 70 %   Vitamin B12    Collection Time: 03/11/24  9:56 AM   Result Value Ref Range    Vitamin B-12 323 180 - 914 pg/mL   Vitamin D 25 hydroxy    Collection Time: 03/11/24  9:56 AM   Result Value Ref Range    Vit D, 25-Hydroxy 18.8 (L) 30.0 - 100.0 ng/mL   Echo complete w/ contrast if indicated    Collection Time: 04/05/24  1:54 PM   Result Value Ref Range    BSA 2.47 m2    A4C EF 53 %    LVIDd 4.00 cm    LVIDS 2.90 cm    IVSd 1.10 cm    LVPWd 1.10 cm    FS 28 28 - 44    MV E' Tissue " Velocity Septal 10 cm/s    LA Volume Index (BP) 30.8 mL/m2    E/A ratio 0.85     E wave deceleration time 201 ms    MV Peak E Timothy 64 cm/s    MV Peak A Timothy 0.75 m/s    RVID d 4.1 cm    Tricuspid annular plane systolic excursion 2.60 cm    LA size 5.3 cm    LA length (A2C) 6.70 cm    LA volume (BP) 76 mL    RAA A4C 24.8 cm2    MV stenosis pressure 1/2 time 58 ms    MV valve area p 1/2 method 3.79     TR Peak Timothy 2.5 m/s    Triscuspid Valve Regurgitation Peak Gradient 25.0 mmHg    Ao root 4.00 cm    Asc Ao 3.9 cm    Tricuspid valve peak regurgitation velocity 2.48 m/s    Left ventricular stroke volume (2D) 38.00 mL    IVS 1.1 cm    LEFT VENTRICLE SYSTOLIC VOLUME (MOD BIPLANE) 2D 33 mL    LV DIASTOLIC VOLUME (MOD BIPLANE) 2D 70 mL    Left Atrium Area-systolic Four Chamber 21.3 cm2    Left Atrium Area-systolic Apical Two Chamber 28 cm2    LVSV, 2D 38 mL    LV EF 60     Est. RA pres 5.0 mmHg    Right Ventricular Peak Systolic Pressure 30.00 mmHg        Physical Exam  Constitutional:       General: He is not in acute distress.     Appearance: Normal appearance. He is obese.   HENT:      Head: Normocephalic and atraumatic.      Nose: Nose normal.      Mouth/Throat:      Mouth: Mucous membranes are moist.   Eyes:      Extraocular Movements: Extraocular movements intact.      Pupils: Pupils are equal, round, and reactive to light.   Cardiovascular:      Rate and Rhythm: Normal rate and regular rhythm.      Pulses: Normal pulses.      Heart sounds: Normal heart sounds. No murmur heard.     No friction rub.   Pulmonary:      Effort: Pulmonary effort is normal. No respiratory distress.      Breath sounds: Normal breath sounds. No wheezing.   Abdominal:      General: Abdomen is flat. Bowel sounds are normal. There is no distension.      Palpations: Abdomen is soft. There is no mass.      Tenderness: There is no abdominal tenderness. There is no guarding.   Musculoskeletal:         General: Normal range of motion.      Cervical  back: Normal range of motion.   Neurological:      General: No focal deficit present.      Mental Status: He is alert and oriented to person, place, and time. Mental status is at baseline.      Cranial Nerves: No cranial nerve deficit.   Psychiatric:         Mood and Affect: Mood normal.         Behavior: Behavior normal.

## 2024-02-12 ENCOUNTER — OFFICE VISIT (OUTPATIENT)
Dept: PHYSICAL THERAPY | Facility: OTHER | Age: 74
End: 2024-02-12
Payer: MEDICARE

## 2024-02-12 DIAGNOSIS — M17.0 PRIMARY OSTEOARTHRITIS OF BOTH KNEES: Primary | ICD-10-CM

## 2024-02-12 PROCEDURE — 97112 NEUROMUSCULAR REEDUCATION: CPT

## 2024-02-12 NOTE — PROGRESS NOTES
"Daily Note     Today's date: 2024  Patient name: Solis Dos Santos  : 1950  MRN: [id number]  Referring provider: Leonard Gallagher,*  Dx:   Encounter Diagnosis     ICD-10-CM    1. Primary osteoarthritis of both knees  M17.0               Start Time: 945  Stop Time: 1000  Total time in clinic (min): 15 minutes    Subjective: Pt notes he still feels his balance is shaky. Would like to look into possible gym opportunities near him for continued strengthening independently.      Objective: See treatment diary below      Assessment: Tolerated treatment well. Session focused on LE strength / mobility & dynamic balance with good tolerance. Pt demonstrating improved static and dynamic balance today as demonstrated by increased SLS time and independent dynamic balance training without supervision. Patient would benefit from continued PT      Plan: Continue per plan of care.       POC expires Unit limit Auth Expiration date PT/OT + Visit Limit?   3/6/2024 BOMN N/a BOMN                             Visit/Unit Tracking  AUTH Status:  Date     No Used 1 2 3 4 5 6 7 8 9 10 11     Remaining                      Precautions: none      Visit #: 7 8 9 10 11   Date:    Manuals        Lateral glide: B hips        Tibiofemoral glides PA        Patellar glides sup/inf        Manual hamstring / calf / adductor stretch                        Neuro Re-Ed        Standing clamshells c short foot        Standing hip ABd Light mini a' 2x15 Med mini a' 2x15  Med mini a' 2x20ea Med mini a' 2x20ea   Lateral band walks Light mini a' 4x15'       Monster walks                Standing TKE Karen 10# x25ea Karen 12# x25ea      Eccentric step-downs F/L                RDL        Seated GM                SLS balance   5 x 10\" 5 x 10\" c TRX support OG 3x20\"ea   Tandem balance c perturbations    2x20\"ea 2x20\"ea           Angelic lateral step-overs        Figure-8 " "Weave Fwd/Back        Agility ladder walkthrough:   - 1 in each  - 2 in each  - Lateral  X 2 trips ea X 2 trips ea  + Icky shuffle X 2 trips ea + Icky shuffle  X 2 trips ea   Ther Ex        Bike 10 min 10 min 10 min 10 min 10 min   Seated hamstring / gastroc stretch 10x10\" 3x30\"      Slant board calf stretch 2x45\"ea 2x45\"ea      FFE knee flexion stretch                SLR        SAQ        LAQ [place] 15# 22\" seat 1x15ea 10# + GTB 2x25 10# + BTB 2x30ea 12# + GTB 2x30ea    Seated  hamstring curls  [place] 15# 22\" seat 2x15ea              Leg press 2:1 60# 2x15 2:1 65# 2x15ea 2:1 70# 3x15ea # 3x15    2:1 70# 1x12ea # 3x10    75# x10ea           Standing hip flexor march    Med mini x20ea    Standing Tib Raise 0# x25               Ther Activity        Chair squat 20\"    15# 2x12 15# 3x12   Step-ups F/L                        Gait Training                        Modalities                                "

## 2024-02-15 ENCOUNTER — OFFICE VISIT (OUTPATIENT)
Dept: PHYSICAL THERAPY | Facility: OTHER | Age: 74
End: 2024-02-15
Payer: MEDICARE

## 2024-02-15 DIAGNOSIS — M17.0 PRIMARY OSTEOARTHRITIS OF BOTH KNEES: Primary | ICD-10-CM

## 2024-02-15 PROCEDURE — 97110 THERAPEUTIC EXERCISES: CPT

## 2024-02-15 PROCEDURE — 97112 NEUROMUSCULAR REEDUCATION: CPT

## 2024-02-15 NOTE — PROGRESS NOTES
"Daily Note     Today's date: 2/15/2024  Patient name: Solis Dos Santos  : 1950  MRN: [id number]  Referring provider: Leonard Gallagher,*  Dx:   Encounter Diagnosis     ICD-10-CM    1. Primary osteoarthritis of both knees  M17.0                 Start Time: 1000  Stop Time: 1030  Total time in clinic (min): 30 minutes    Subjective: Pt notes he feels fatigued from shoveling snow two days ago.      Objective: See treatment diary below      Assessment: Tolerated treatment well. Session focused on LE strength / mobility & balance with good tolerance. Pt demonstrating improved static and dynamic balance today as demonstrated by progressions per flowsheet. Pt continues to note decreased balance confidence and B knee pain impacting functional mobility. Attempted 4\" ecc step down with noted weakness in eccentric control. Patient would benefit from continued PT      Plan: Continue per plan of care.       POC expires Unit limit Auth Expiration date PT/OT + Visit Limit?   3/6/2024 BOMN N/a BOMN                             Visit/Unit Tracking  AUTH Status:  Date  12   No Used 1 2 3 4 5 6 7 8 9 10 11 2/15    Remaining                      Precautions: none      Visit #: 7 8 9 10 11 12   Date: 1/29 2/1 2/5 2/8 2/12 2/15   Manuals         Lateral glide: B hips         Tibiofemoral glides PA         Patellar glides sup/inf         Manual hamstring / calf / adductor stretch                           Neuro Re-Ed         Standing clamshells c short foot         Standing hip ABd Light mini a' 2x15 Med mini a' 2x15  Med mini a' 2x20ea Med mini a' 2x20ea Med mini a' 3x20ea   Lateral band walks Light mini a' 4x15'     Med mini a' 2x30'   Monster walks                  Standing TKE [place] 10# x25ea [place] 12# x25ea       Eccentric step-downs F/L      4\" L 2x5ea            RDL         Seated GM                  SLS balance   5 x 10\" 5 x 10\" c TRX support OG 3x20\"ea OG " "2x30\"    Green 2x15\"   Tandem balance c perturbations    2x20\"ea 2x20\"ea             Angelic lateral step-overs       \"I_I\" in & out stepping x5ea   Figure-8 Weave Fwd/Back         Agility ladder walkthrough:   - 1 in each  - 2 in each  - Lateral  X 2 trips ea X 2 trips ea  + Icky shuffle X 2 trips ea + Icky shuffle  X 2 trips ea    Ther Ex         Bike 10 min 10 min 10 min 10 min 10 min 10 min   Seated hamstring / gastroc stretch 10x10\" 3x30\"       Slant board calf stretch 2x45\"ea 2x45\"ea       FFE knee flexion stretch                  SLR         SAQ         LAQ [place] 15# 22\" seat 1x15ea 10# + GTB 2x25 10# + BTB 2x30ea 12# + GTB 2x30ea     Seated  hamstring curls  [place] 15# 22\" seat 2x15ea                Leg press 2:1 60# 2x15 2:1 65# 2x15ea 2:1 70# 3x15ea # 3x15    2:1 70# 1x12ea # 3x10    75# x10ea # 5x8            Standing hip flexor march    Med mini x20ea     Standing Tib Raise 0# x25                 Ther Activity         Chair squat 20\"    15# 2x12 15# 3x12 nv   Step-ups F/L                           Gait Training                           Modalities                                   "

## 2024-02-17 DIAGNOSIS — Z00.6 ENCOUNTER FOR EXAMINATION FOR NORMAL COMPARISON OR CONTROL IN CLINICAL RESEARCH PROGRAM: ICD-10-CM

## 2024-02-19 ENCOUNTER — OFFICE VISIT (OUTPATIENT)
Dept: PODIATRY | Facility: CLINIC | Age: 74
End: 2024-02-19
Payer: MEDICARE

## 2024-02-19 ENCOUNTER — OFFICE VISIT (OUTPATIENT)
Dept: PHYSICAL THERAPY | Facility: OTHER | Age: 74
End: 2024-02-19
Payer: MEDICARE

## 2024-02-19 VITALS
DIASTOLIC BLOOD PRESSURE: 89 MMHG | HEIGHT: 73 IN | SYSTOLIC BLOOD PRESSURE: 140 MMHG | RESPIRATION RATE: 18 BRPM | WEIGHT: 269 LBS | BODY MASS INDEX: 35.65 KG/M2 | HEART RATE: 77 BPM

## 2024-02-19 DIAGNOSIS — I73.9 PERIPHERAL VASCULAR DISEASE, UNSPECIFIED (HCC): Primary | ICD-10-CM

## 2024-02-19 DIAGNOSIS — M17.0 PRIMARY OSTEOARTHRITIS OF BOTH KNEES: Primary | ICD-10-CM

## 2024-02-19 PROCEDURE — G0127 TRIM NAIL(S): HCPCS | Performed by: PODIATRIST

## 2024-02-19 PROCEDURE — 97110 THERAPEUTIC EXERCISES: CPT

## 2024-02-19 NOTE — PROGRESS NOTES
Established patient with class findings presents for nail care.  Vascular exam:  DP  0/4 bilateral; PT  0 4 bilateral   Dermatological exam:  Each toenail is thick and  dystrophic.  Diagnosis: PVD  Treatment: Trimmed multiple dystrophic toenails.     Nail removal    Date/Time: 2/19/2024 10:15 AM    Performed by: Tal Rosales DPM  Authorized by: Tal Rosales DPM    Nails trimmed:     Number of nails trimmed:  10

## 2024-02-19 NOTE — PROGRESS NOTES
"Daily Note     Today's date: 2024  Patient name: Solis Dos Santos  : 1950  MRN: [id number]  Referring provider: Leonard Gallagher,*  Dx:   Encounter Diagnosis     ICD-10-CM    1. Primary osteoarthritis of both knees  M17.0                   Start Time: 1400  Stop Time: 1445  Total time in clinic (min): 45 minutes    Subjective: Pt notes he continues to be limited by fear of clicking / cracking knees and balance deficits.      Objective: See treatment diary below      Assessment: Tolerated treatment well. Session focused on LE strength / mobility & balance with good tolerance. Pt continues to note decreased balance confidence and B knee pain impacting functional mobility. Added FFESS with good tolerance of anterior loading of knee. Added Norwegian squat hold with no change in pain. Patient would benefit from continued PT      Plan: Continue per plan of care.       POC expires Unit limit Auth Expiration date PT/OT + Visit Limit?   3/6/2024 BOMN N/a BOMN                             Visit/Unit Tracking  AUTH Status:  Date  12 13   No Used 7 8 9 10 11 2/15 2/19    Remaining                 Precautions: none      Visit #: 7 8 9 10 11 12 13   Date: 1/29 2/1 2/5 2/8 2/12 2/15 2/19   Manuals          Lateral glide: B hips          Tibiofemoral glides PA          Patellar glides sup/inf          Manual hamstring / calf / adductor stretch                              Neuro Re-Ed          Standing clamshells c short foot          Standing hip ABd Light mini a' 2x15 Med mini a' 2x15  Med mini a' 2x20ea Med mini a' 2x20ea Med mini a' 3x20ea    Lateral band walks Light mini a' 4x15'     Med mini a' 2x30'    Monster walks                    Standing TKE [place] 10# x25ea [place] 12# x25ea        Eccentric step-downs F/L      4\" L 2x5ea    FFESS       2x10   Norwegian Squat hold       5x10\"             RDL          Seated GM          H/L adductor ball squeeze isometric       15x5\"   Bridging c ball " "squeeze       2x10                       SLS balance   5 x 10\" 5 x 10\" c TRX support OG 3x20\"ea OG 2x30\"    Green 2x15\" Blue 3x30\"   Tandem balance c perturbations    2x20\"ea 2x20\"ea               Angelic lateral step-overs       \"I_I\" in & out stepping x5ea    Figure-8 Weave Fwd/Back          Agility ladder walkthrough:   - 1 in each  - 2 in each  - Lateral  X 2 trips ea X 2 trips ea  + Icky shuffle X 2 trips ea + Icky shuffle  X 2 trips ea     Ther Ex          Bike 10 min 10 min 10 min 10 min 10 min 10 min 10 min   Seated hamstring / gastroc stretch 10x10\" 3x30\"        Slant board calf stretch 2x45\"ea 2x45\"ea        FFE knee flexion stretch                    SLR          SAQ          LAQ [place] 15# 22\" seat 1x15ea 10# + GTB 2x25 10# + BTB 2x30ea 12# + GTB 2x30ea      Seated  hamstring curls  [place] 15# 22\" seat 2x15ea                  Leg press 2:1 60# 2x15 2:1 65# 2x15ea 2:1 70# 3x15ea # 3x15    2:1 70# 1x12ea # 3x10    75# x10ea # 5x8 # x20, 12, 8             Standing hip flexor march    Med mini x20ea      Standing Tib Raise 0# x25                   Ther Activity          Chair squat 20\"    15# 2x12 15# 3x12 nv 2x10   Step-ups F/L                              Gait Training                              Modalities                                      "

## 2024-02-21 ENCOUNTER — OFFICE VISIT (OUTPATIENT)
Dept: INTERNAL MEDICINE CLINIC | Facility: CLINIC | Age: 74
End: 2024-02-21
Payer: MEDICARE

## 2024-02-21 VITALS
HEIGHT: 73 IN | WEIGHT: 279 LBS | SYSTOLIC BLOOD PRESSURE: 132 MMHG | TEMPERATURE: 98.7 F | BODY MASS INDEX: 36.98 KG/M2 | HEART RATE: 72 BPM | OXYGEN SATURATION: 92 % | DIASTOLIC BLOOD PRESSURE: 69 MMHG

## 2024-02-21 DIAGNOSIS — G89.29 CHRONIC PAIN OF BOTH KNEES: ICD-10-CM

## 2024-02-21 DIAGNOSIS — E78.5 HYPERLIPIDEMIA, UNSPECIFIED HYPERLIPIDEMIA TYPE: ICD-10-CM

## 2024-02-21 DIAGNOSIS — M25.562 CHRONIC PAIN OF BOTH KNEES: ICD-10-CM

## 2024-02-21 DIAGNOSIS — K21.9 GASTROESOPHAGEAL REFLUX DISEASE WITHOUT ESOPHAGITIS: ICD-10-CM

## 2024-02-21 DIAGNOSIS — I20.9 ANGINA PECTORIS (HCC): Primary | ICD-10-CM

## 2024-02-21 DIAGNOSIS — M25.561 CHRONIC PAIN OF BOTH KNEES: ICD-10-CM

## 2024-02-21 PROCEDURE — 99214 OFFICE O/P EST MOD 30 MIN: CPT | Performed by: INTERNAL MEDICINE

## 2024-02-21 RX ORDER — PANTOPRAZOLE SODIUM 40 MG/1
40 TABLET, DELAYED RELEASE ORAL
Qty: 30 TABLET | Refills: 2 | Status: SHIPPED | OUTPATIENT
Start: 2024-02-21 | End: 2024-08-19

## 2024-02-21 NOTE — PROGRESS NOTES
Assessment/Plan:           1. Angina pectoris (HCC)  Comments:  Stress test was ordered.  Has cardiology appointment pending.  Orders:  -     NM myocardial perfusion spect (rx stress and/or rest); Future; Expected date: 02/21/2024    2. Chronic pain of both knees  Comments:  Minimizing NSAID usage discussed.    3. Gastroesophageal reflux disease without esophagitis  Comments:  Pantoprazole was prescribed.  Orders:  -     pantoprazole (PROTONIX) 40 mg tablet; Take 1 tablet (40 mg total) by mouth daily before breakfast    4. Hyperlipidemia, unspecified hyperlipidemia type  Comments:  Stable continue current medications labs reviewed.           1. Angina pectoris (HCC)         No problem-specific Assessment & Plan notes found for this encounter.           Subjective:      Patient ID: Solis Dos Santos is a 73 y.o. male.    HPI    The following portions of the patient's history were reviewed and updated as appropriate: He  has a past medical history of Anxiety disorder, Atherosclerotic heart disease of native coronary artery without angina pectoris, Benign prostatic hyperplasia without lower urinary tract symptoms, Cancer ([facility]), Clotting disorder ([facility]) (1/25/2023), Disease of thyroid gland, Dyslipidemia, GERD (gastroesophageal reflux disease), Hypertension, Impaired fasting blood sugar, Kidney stone, Low back pain, Obesity, Osteoarthritis, Other chest pain, Paresthesia of skin, Polyneuropathy, Pure hypercholesterolemia, 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  He   Patient Active Problem List    Diagnosis Date Noted    Peripheral vascular disease, unspecified ([facility]) 01/10/2024    Malignant neoplasm of lateral wall of urinary bladder (HCC) 05/12/2023    Malignant neoplasm of posterior wall of urinary bladder ([facility]) 03/23/2023    Suspected UTI 03/09/2023    Bladder mass 03/09/2023    Right leg swelling 03/09/2023    Gross hematuria 03/08/2023     Hyperlipidemia 03/08/2023    Acute leg pain, left 04/26/2021    Obesity, morbid (HCC) 04/14/2021    Aftercare following right hip joint replacement surgery 11/05/2019    Difficulty sleeping 08/13/2019    Acute blood loss anemia 08/09/2019    Impaired mobility and ADLs 08/09/2019    Hypothyroidism 08/09/2019    Status post total hip replacement, left 08/08/2019    Status post right hip replacement 08/07/2019    Primary osteoarthritis of both knees 05/29/2019    Osgood-Schlatter's disease of both knees 05/29/2019    Pain in both knees 05/29/2019     He  has a past surgical history that includes Back surgery; Tonsillectomy; Cholecystectomy; pr arthrp acetblr/prox fem prostc agrft/algrft (Right, 08/05/2019); Colonoscopy (02/06/2019); pr cysto w/removal of lesions small (N/A, 03/23/2023); pr cysto w/removal of lesions small (N/A, 04/26/2023); CYSTOSCOPY (N/A, 04/26/2023); Joint replacement (August 2019); and Hip surgery (August 2019).  His family history includes Arthritis in his mother and other; Heart disease in his father.  He  reports that he quit smoking about 36 years ago. His smoking use included cigarettes. He started smoking about 57 years ago. He has a 20.8 pack-year smoking history. He has never used smokeless tobacco. He reports that he does not currently use alcohol. He reports that he does not currently use drugs.  Current Outpatient Medications   Medication Sig Dispense Refill    atorvastatin (LIPITOR) 10 mg tablet Take 1 tablet (10 mg total) by mouth daily at bedtime 90 tablet 0    cholecalciferol (VITAMIN D3) 1,000 units tablet Take 1 tablet (1,000 Units total) by mouth daily 90 tablet 3    cyanocobalamin (VITAMIN B-12) 500 MCG tablet Take 1 tablet (500 mcg total) by mouth daily 100 tablet 3    levothyroxine 150 mcg tablet Take 1 tablet (150 mcg total) by mouth daily 90 tablet 0    meloxicam (Mobic) 15 mg tablet Take 1 tablet (15 mg total) by mouth daily (Patient taking differently: Take 15 mg by mouth  every other day) 30 tablet 2    pantoprazole (PROTONIX) 40 mg tablet Take 1 tablet (40 mg total) by mouth daily before breakfast 30 tablet 2    tamsulosin (FLOMAX) 0.4 mg Take 1 capsule (0.4 mg total) by mouth daily with dinner 90 capsule 3     Current Facility-Administered Medications   Medication Dose Route Frequency Provider Last Rate Last Admin    cyanocobalamin injection 1,000 mcg  1,000 mcg Intramuscular Q30 Days Riki Noguera MD   1,000 mcg at 02/09/24 1255     Current Outpatient Medications on File Prior to Visit   Medication Sig    atorvastatin (LIPITOR) 10 mg tablet Take 1 tablet (10 mg total) by mouth daily at bedtime    cholecalciferol (VITAMIN D3) 1,000 units tablet Take 1 tablet (1,000 Units total) by mouth daily    cyanocobalamin (VITAMIN B-12) 500 MCG tablet Take 1 tablet (500 mcg total) by mouth daily    levothyroxine 150 mcg tablet Take 1 tablet (150 mcg total) by mouth daily    meloxicam (Mobic) 15 mg tablet Take 1 tablet (15 mg total) by mouth daily (Patient taking differently: Take 15 mg by mouth every other day)    tamsulosin (FLOMAX) 0.4 mg Take 1 capsule (0.4 mg total) by mouth daily with dinner     Current Facility-Administered Medications on File Prior to Visit   Medication    cyanocobalamin injection 1,000 mcg     There are no discontinued medications.   He has No Known Allergies..    Review of Systems   Constitutional:  Negative for appetite change, chills, fatigue and fever.   HENT:  Negative for sore throat and trouble swallowing.    Eyes:  Negative for redness.   Respiratory:  Positive for shortness of breath.    Cardiovascular:  Positive for chest pain. Negative for palpitations.   Gastrointestinal:  Negative for abdominal pain, constipation and diarrhea.   Genitourinary:  Negative for dysuria and hematuria.   Musculoskeletal:  Negative for back pain and neck pain.   Skin:  Negative for rash.   Neurological:  Negative for seizures, weakness and headaches.   Hematological:  Negative  "for adenopathy.   Psychiatric/Behavioral:  Negative for confusion. The patient is not nervous/anxious.          Objective:      /69 (BP Location: Left arm, Patient Position: Sitting, Cuff Size: Large)   Pulse 72   Temp 98.7 °F (37.1 °C) (Temporal)   Ht 6' 1\" (1.854 m)   Wt 127 kg (279 lb)   SpO2 92%   BMI 36.81 kg/m²     Results Reviewed       None            Recent Results (from the past 1344 hour(s))   POCT Rapid Covid Ag    Collection Time: 01/10/24  3:03 PM   Result Value Ref Range    POCT SARS-CoV-2 Ag Negative Negative    VALID CONTROL Valid    Urinalysis with microscopic    Collection Time: 01/15/24  9:41 AM   Result Value Ref Range    Color, UA Light Yellow     Clarity, UA Clear     Specific Gravity, UA 1.017 1.003 - 1.030    pH, UA 6.5 4.5, 5.0, 5.5, 6.0, 6.5, 7.0, 7.5, 8.0    Leukocytes, UA Negative Negative    Nitrite, UA Negative Negative    Protein, UA Negative Negative mg/dl    Glucose, UA Negative Negative mg/dl    Ketones, UA Negative Negative mg/dl    Urobilinogen, UA 2.0 (A) <2.0 mg/dl mg/dl    Bilirubin, UA Negative Negative    Occult Blood, UA Negative Negative    RBC, UA 1-2 None Seen, 1-2 /hpf    WBC, UA 1-2 None Seen, 1-2 /hpf    Epithelial Cells Occasional None Seen, Occasional /hpf    Bacteria, UA None Seen None Seen, Occasional /hpf    MUCUS THREADS Occasional (A) None Seen   CBC and differential    Collection Time: 01/15/24  9:41 AM   Result Value Ref Range    WBC 4.86 4.31 - 10.16 Thousand/uL    RBC 5.20 3.88 - 5.62 Million/uL    Hemoglobin 15.8 12.0 - 17.0 g/dL    Hematocrit 49.2 36.5 - 49.3 %    MCV 95 82 - 98 fL    MCH 30.4 26.8 - 34.3 pg    MCHC 32.1 31.4 - 37.4 g/dL    RDW 13.0 11.6 - 15.1 %    MPV 11.7 8.9 - 12.7 fL    Platelets 189 149 - 390 Thousands/uL    nRBC 0 /100 WBCs    Neutrophils Relative 57 43 - 75 %    Immat GRANS % 0 0 - 2 %    Lymphocytes Relative 24 14 - 44 %    Monocytes Relative 12 4 - 12 %    Eosinophils Relative 5 0 - 6 %    Basophils Relative 2 (H) 0 " - 1 %    Neutrophils Absolute 2.85 1.85 - 7.62 Thousands/µL    Immature Grans Absolute 0.01 0.00 - 0.20 Thousand/uL    Lymphocytes Absolute 1.14 0.60 - 4.47 Thousands/µL    Monocytes Absolute 0.56 0.17 - 1.22 Thousand/µL    Eosinophils Absolute 0.22 0.00 - 0.61 Thousand/µL    Basophils Absolute 0.08 0.00 - 0.10 Thousands/µL   Comprehensive metabolic panel    Collection Time: 01/15/24  9:41 AM   Result Value Ref Range    Sodium 139 135 - 147 mmol/L    Potassium 4.3 3.5 - 5.3 mmol/L    Chloride 104 96 - 108 mmol/L    CO2 26 21 - 32 mmol/L    ANION GAP 9 mmol/L    BUN 22 5 - 25 mg/dL    Creatinine 0.86 0.60 - 1.30 mg/dL    Glucose, Fasting 95 65 - 99 mg/dL    Calcium 8.7 8.4 - 10.2 mg/dL    AST 20 13 - 39 U/L    ALT 15 7 - 52 U/L    Alkaline Phosphatase 54 34 - 104 U/L    Total Protein 7.1 6.4 - 8.4 g/dL    Albumin 3.9 3.5 - 5.0 g/dL    Total Bilirubin 1.17 (H) 0.20 - 1.00 mg/dL    eGFR 84 ml/min/1.73sq m   Lipid panel    Collection Time: 01/15/24  9:41 AM   Result Value Ref Range    Cholesterol 117 See Comment mg/dL    Triglycerides 76 See Comment mg/dL    HDL, Direct 42 >=40 mg/dL    LDL Calculated 60 0 - 100 mg/dL    Non-HDL-Chol (CHOL-HDL) 75 mg/dl   B-Type Natriuretic Peptide(BNP)    Collection Time: 01/15/24  9:41 AM   Result Value Ref Range     (H) 0 - 100 pg/mL   D-dimer, quantitative    Collection Time: 01/15/24  9:41 AM   Result Value Ref Range    D-Dimer, Quant 0.47 <0.50 ug/ml FEU   Vitamin B12    Collection Time: 01/15/24  9:41 AM   Result Value Ref Range    Vitamin B-12 132 (L) 180 - 914 pg/mL   Vitamin D 25 hydroxy    Collection Time: 01/15/24  9:41 AM   Result Value Ref Range    Vit D, 25-Hydroxy 15.2 (L) 30.0 - 100.0 ng/mL   Methylmalonic acid, serum    Collection Time: 01/15/24  9:41 AM   Result Value Ref Range    Methylmalonic Acid, S 310 0 - 378 nmol/L        Physical Exam  Constitutional:       General: He is not in acute distress.     Appearance: Normal appearance.   HENT:      Head:  Normocephalic and atraumatic.      Nose: Nose normal.      Mouth/Throat:      Mouth: Mucous membranes are moist.   Eyes:      Extraocular Movements: Extraocular movements intact.      Pupils: Pupils are equal, round, and reactive to light.   Cardiovascular:      Rate and Rhythm: Normal rate and regular rhythm.      Pulses: Normal pulses.      Heart sounds: Normal heart sounds. No murmur heard.     No friction rub.   Pulmonary:      Effort: Pulmonary effort is normal. No respiratory distress.      Breath sounds: Normal breath sounds. No wheezing.   Abdominal:      General: Abdomen is flat. Bowel sounds are normal. There is no distension.      Palpations: Abdomen is soft. There is no mass.      Tenderness: There is no abdominal tenderness. There is no guarding.   Musculoskeletal:         General: Normal range of motion.      Cervical back: Normal range of motion.   Skin:     General: Skin is warm.   Neurological:      General: No focal deficit present.      Mental Status: He is alert and oriented to person, place, and time. Mental status is at baseline.      Cranial Nerves: No cranial nerve deficit.   Psychiatric:         Mood and Affect: Mood normal.         Behavior: Behavior normal.

## 2024-02-22 ENCOUNTER — OFFICE VISIT (OUTPATIENT)
Dept: PHYSICAL THERAPY | Facility: OTHER | Age: 74
End: 2024-02-22
Payer: MEDICARE

## 2024-02-22 DIAGNOSIS — M17.0 PRIMARY OSTEOARTHRITIS OF BOTH KNEES: Primary | ICD-10-CM

## 2024-02-22 PROCEDURE — 97112 NEUROMUSCULAR REEDUCATION: CPT

## 2024-02-22 PROCEDURE — 97530 THERAPEUTIC ACTIVITIES: CPT

## 2024-02-22 PROCEDURE — 97110 THERAPEUTIC EXERCISES: CPT

## 2024-02-22 NOTE — PROGRESS NOTES
"Daily Note     Today's date: 2024  Patient name: Solis Dos Santos  : 1950  MRN: [id number]  Referring provider: Leonard Gallagher,*  Dx:   Encounter Diagnosis     ICD-10-CM    1. Primary osteoarthritis of both knees  M17.0               Start Time: 945  Stop Time: 1030  Total time in clinic (min): 45 minutes    Subjective: Pt notes he has stopped taking Meloxicam due to cardiac concerns. Notes he wishes to begin treadmill walking at home.      Objective: See treatment diary below      Assessment: Tolerated treatment well. Session focused on LE strength / mobility & balance with good tolerance. Added retro walking on treadmill in which pt reports increased quad stimulus during exercise. Pt demonstrating improving strength and balance as demonstrated by progressions per flowsheet. Patient would benefit from continued PT      Plan: Continue per plan of care.       POC expires Unit limit Auth Expiration date PT/OT + Visit Limit?   3/6/2024 BOMN N/a BOMN                             Visit/Unit Tracking  AUTH Status:  Date  12 13   No Used 7 8 9 10 11 2/15 2/19    Remaining                 Precautions: none      Visit #: 7 8 9 10 11 12 13 14   Date: 1/29 2/1 2/5 2/8 2/12 2/15 2/19 2/22   Manuals           Lateral glide: B hips           Tibiofemoral glides PA           Patellar glides sup/inf           Manual hamstring / calf / adductor stretch                                 Neuro Re-Ed           Standing clamshells c short foot           Standing hip ABd Light mini a' 2x15 Med mini a' 2x15  Med mini a' 2x20ea Med mini a' 2x20ea Med mini a' 3x20ea     Lateral band walks Light mini a' 4x15'     Med mini a' 2x30'     Monster walks                      Standing TKE [place] 10# x25ea [place] 12# x25ea         Eccentric step-downs F/L      4\" L 2x5ea  4\" L 3x5ea   FFESS       2x10    [place] Squat hold       5x10\"               RDL           Seated GM           H/L adductor ball squeeze " "isometric       15x5\"    Bridging c ball squeeze       2x10                          SLS balance   5 x 10\" 5 x 10\" c TRX support OG 3x20\"ea OG 2x30\"    Green 2x15\" Blue 3x30\" Airex tandem 2x30\"ea    Blue SLS 2x30\"ea   Tandem balance c perturbations    2x20\"ea 2x20\"ea                 Angelic lateral step-overs       \"I_I\" in & out stepping x5ea     Figure-8 Weave Fwd/Back           Agility ladder walkthrough:   - 1 in each  - 2 in each  - Lateral  X 2 trips ea X 2 trips ea  + Icky shuffle X 2 trips ea + Icky shuffle  X 2 trips ea      Ther Ex           Bike 10 min 10 min 10 min 10 min 10 min 10 min 10 min 10   Deadmill retro        X5 min   Seated hamstring / gastroc stretch 10x10\" 3x30\"         Slant board calf stretch 2x45\"ea 2x45\"ea      2x45\"ea   FFE knee flexion stretch                      SLR           SAQ           LAQ [place] 15# 22\" seat 1x15ea 10# + GTB 2x25 10# + BTB 2x30ea 12# + GTB 2x30ea       Seated  hamstring curls  [place] 15# 22\" seat 2x15ea                    Leg press 2:1 60# 2x15 2:1 65# 2x15ea 2:1 70# 3x15ea # 3x15    2:1 70# 1x12ea # 3x10    75# x10ea # 5x8 # x20, 12, 8 # 3x20              Standing hip flexor march    Med mini x20ea       Standing Tib Raise 0# x25                     Ther Activity           Chair squat 20\"    15# 2x12 15# 3x12 nv 2x10    Step-ups F/L                                 Gait Training                                 Modalities                                         "

## 2024-02-26 ENCOUNTER — OFFICE VISIT (OUTPATIENT)
Dept: PHYSICAL THERAPY | Facility: OTHER | Age: 74
End: 2024-02-26
Payer: MEDICARE

## 2024-02-26 DIAGNOSIS — M17.0 PRIMARY OSTEOARTHRITIS OF BOTH KNEES: Primary | ICD-10-CM

## 2024-02-26 PROCEDURE — 97110 THERAPEUTIC EXERCISES: CPT

## 2024-02-26 PROCEDURE — 97112 NEUROMUSCULAR REEDUCATION: CPT

## 2024-02-26 NOTE — PROGRESS NOTES
"Daily Note     Today's date: 2024  Patient name: Solis Dos Santos  : 1950  MRN: [id number]  Referring provider: Leonard Gallagher,*  Dx:   Encounter Diagnosis     ICD-10-CM    1. Primary osteoarthritis of both knees  M17.0                 Start Time: 945  Stop Time: 1015  Total time in clinic (min): 30 minutes    Subjective: Pt notes anxiety regarding upcoming stress test.      Objective: See treatment diary below      Assessment: Tolerated treatment well. Session focused on LE strength / mobility & balance with good tolerance. Added incline TM today in preparation for upcoming stress test. Tolerated 6 min up to 5.0 grade with increased fatigue at end of exercise; required multiple seated rest breaks today due to increase fatigue. Continues to be limited functionally by L knee pain during weight-bearing exercises and decreased balance confidence. Patient would benefit from continued PT      Plan: Continue per plan of care.       POC expires Unit limit Auth Expiration date PT/OT + Visit Limit?   3/6/2024 BOMN N/a BOMN                             Visit/Unit Tracking  AUTH Status:  Date 1/29 2/1 2/5 2/8 2/12 12 13 14 15   No Used 7 8 9 10 11 2/15 2/19 2/22 2/26    Remaining                   Precautions: none      Visit #: 10 11 12 13 14 15   Date: 2/8 2/12 2/15 2/19 2/22 2/26   Manuals         Lateral glide: B hips         Tibiofemoral glides PA         Patellar glides sup/inf         Manual hamstring / calf / adductor stretch                           Neuro Re-Ed         Standing clamshells c short foot         Standing hip ABd Med mini a' 2x20ea Med mini a' 2x20ea Med mini a' 3x20ea      Lateral band walks   Med mini a' 2x30'      Monster walks                  Standing TKE         Eccentric step-downs F/L   4\" L 2x5ea  4\" L 3x5ea nv   FFESS    2x10     [place] Squat hold    5x10\"              RDL         Seated GM         H/L adductor ball squeeze isometric    15x5\"     Bridging c ball squeeze    " "2x10                       SLS balance 5 x 10\" c TRX support OG 3x20\"ea OG 2x30\"    Green 2x15\" Blue 3x30\" Airex tandem 2x30\"ea    Blue SLS 2x30\"ea Green SLS 2x30\"ea   Tandem balance c perturbations 2x20\"ea 2x20\"ea                Angelic lateral step-overs    \"I_I\" in & out stepping x5ea   \"I_I\" in & out stepping 4\"x10   Figure-8 Weave Fwd/Back         Agility ladder walkthrough:   - 1 in each  - 2 in each  - Lateral   X 2 trips ea       Ther Ex         Bike 10 min 10 min 10 min 10 min 10    Deadmill retro     X5 min TM gradual ramp-up to incline 5.0  x6 min  Retro X3 min   Seated hamstring / gastroc stretch         Slant board calf stretch     2x45\"ea    FFE knee flexion stretch                  SLR         SAQ         LAQ 12# + GTB 2x30ea        Seated  hamstring curls                  Leg press # 3x15    2:1 70# 1x12ea # 3x10    75# x10ea # 5x8 # x20, 12, 8 # 3x20 # 3x15            Standing hip flexor march Med mini x20ea        Standing Tib Raise                  Ther Activity         Chair squat 20\" 15# 2x12 15# 3x12 nv 2x10  15# 3x12   Step-ups F/L                           Gait Training                           Modalities                                   "

## 2024-02-27 ENCOUNTER — APPOINTMENT (OUTPATIENT)
Dept: LAB | Age: 74
End: 2024-02-27

## 2024-02-27 DIAGNOSIS — Z00.6 ENCOUNTER FOR EXAMINATION FOR NORMAL COMPARISON OR CONTROL IN CLINICAL RESEARCH PROGRAM: ICD-10-CM

## 2024-02-27 PROCEDURE — 36415 COLL VENOUS BLD VENIPUNCTURE: CPT

## 2024-02-29 ENCOUNTER — APPOINTMENT (OUTPATIENT)
Dept: PHYSICAL THERAPY | Facility: OTHER | Age: 74
End: 2024-02-29
Payer: MEDICARE

## 2024-02-29 ENCOUNTER — HOSPITAL ENCOUNTER (OUTPATIENT)
Dept: NON INVASIVE DIAGNOSTICS | Facility: CLINIC | Age: 74
Discharge: HOME | End: 2024-02-29
Payer: MEDICARE

## 2024-02-29 DIAGNOSIS — I20.9 ANGINA PECTORIS (HCC): ICD-10-CM

## 2024-02-29 LAB
MAX HR PERCENT: 80 %
MAX HR: 117 BPM
NUC STRESS EJECTION FRACTION: 70 %
RATE PRESSURE PRODUCT: NORMAL
SL CV REST NUCLEAR ISOTOPE DOSE: 15.7 MCI
SL CV STRESS NUCLEAR ISOTOPE DOSE: 48.1 MCI
SL CV STRESS RECOVERY BP: NORMAL MMHG
SL CV STRESS RECOVERY HR: 88 BPM
SL CV STRESS RECOVERY O2 SAT: 96 %
STRESS ANGINA INDEX: 0
STRESS BASELINE BP: NORMAL MMHG
STRESS BASELINE HR: 70 BPM
STRESS O2 SAT REST: 98 %
STRESS PEAK HR: 108 BPM
STRESS POST O2 SAT PEAK: 98 %
STRESS POST PEAK BP: 122 MMHG
STRESS/REST PERFUSION RATIO: 0.85

## 2024-02-29 PROCEDURE — A9502 TC99M TETROFOSMIN: HCPCS

## 2024-02-29 PROCEDURE — 78452 HT MUSCLE IMAGE SPECT MULT: CPT | Performed by: INTERNAL MEDICINE

## 2024-02-29 PROCEDURE — 78452 HT MUSCLE IMAGE SPECT MULT: CPT

## 2024-02-29 PROCEDURE — 93017 CV STRESS TEST TRACING ONLY: CPT

## 2024-02-29 PROCEDURE — G1004 CDSM NDSC: HCPCS

## 2024-02-29 PROCEDURE — 93018 CV STRESS TEST I&R ONLY: CPT | Performed by: INTERNAL MEDICINE

## 2024-02-29 PROCEDURE — 93016 CV STRESS TEST SUPVJ ONLY: CPT | Performed by: INTERNAL MEDICINE

## 2024-02-29 RX ORDER — REGADENOSON 0.08 MG/ML
0.4 INJECTION, SOLUTION INTRAVENOUS ONCE
Status: COMPLETED | OUTPATIENT
Start: 2024-02-29 | End: 2024-02-29

## 2024-02-29 RX ADMIN — REGADENOSON 0.4 MG: 0.08 INJECTION, SOLUTION INTRAVENOUS at 13:44

## 2024-03-01 LAB
CHEST PAIN STATEMENT: NORMAL
MAX DIASTOLIC BP: 76 MMHG
MAX PREDICTED HEART RATE: 145 BPM
PROTOCOL NAME: NORMAL
REASON FOR TERMINATION: NORMAL
STRESS POST EXERCISE DUR MIN: 3 MIN
STRESS POST EXERCISE DUR SEC: 0 SEC
STRESS POST PEAK HR: 117 BPM
STRESS POST PEAK SYSTOLIC BP: 140 MMHG
TARGET HR FORMULA: NORMAL

## 2024-03-07 ENCOUNTER — OFFICE VISIT (OUTPATIENT)
Dept: PHYSICAL THERAPY | Facility: OTHER | Age: 74
End: 2024-03-07
Payer: MEDICARE

## 2024-03-07 DIAGNOSIS — M17.0 PRIMARY OSTEOARTHRITIS OF BOTH KNEES: Primary | ICD-10-CM

## 2024-03-07 PROCEDURE — 97112 NEUROMUSCULAR REEDUCATION: CPT

## 2024-03-07 PROCEDURE — 97110 THERAPEUTIC EXERCISES: CPT

## 2024-03-07 NOTE — PROGRESS NOTES
"Daily Note     Today's date: 3/7/2024  Patient name: Solis Dos Santos  : 1950  MRN: [id number]  Referring provider: Leonard Gallagher,*  Dx:   Encounter Diagnosis     ICD-10-CM    1. Primary osteoarthritis of both knees  M17.0                   Start Time: 1138  Stop Time: 1216  Total time in clinic (min): 38 minutes    Subjective: Pt notes high levels of B knee pain following PT session & stress test last week that dissipated through this week. Reports he took meloxicam for pain.       Objective: See treatment diary below      Assessment: Tolerated treatment well. Session focused on LE strength / mobility & balance with good tolerance. Decreased load on leg press for pain modulation. Noted difficulty with dynamic balance exercises today with shoes off.  Patient would benefit from continued PT      Plan: Continue per plan of care.       POC expires Unit limit Auth Expiration date PT/OT + Visit Limit?   3/6/2024 BOMN N/a BOMN                             Visit/Unit Tracking  AUTH Status:  Date  12 13 14 15 16   No Used 7 8 9 10 11 2/15 2/19 2/22 2/26 3/7    Remaining                    Precautions: none      Visit #: 10 11 12 13 14 15 16   Date: 2/8 2/12 2/15 2/19 2/22 2/26 3/7   Manuals          Lateral glide: B hips          Tibiofemoral glides PA          Patellar glides sup/inf          Manual hamstring / calf / adductor stretch                              Neuro Re-Ed          Standing clamshells c short foot          Standing hip ABd Med mini a' 2x20ea Med mini a' 2x20ea Med mini a' 3x20ea       Lateral band walks   Med mini a' 2x30'    Med mini a' x25 steps ea   Monster walks                    Standing TKE          Eccentric step-downs F/L   4\" L 2x5ea  4\" L 3x5ea nv    FFESS    2x10      [place] Squat hold    5x10\"                RDL          Seated GM          H/L adductor ball squeeze isometric    15x5\"      Bridging c ball squeeze    2x10                          SLS " "balance 5 x 10\" c TRX support OG 3x20\"ea OG 2x30\"    Green 2x15\" Blue 3x30\" Airex tandem 2x30\"ea    Blue SLS 2x30\"ea Green SLS 2x30\"ea Green SLS 4x30\"ea   Tandem balance c perturbations 2x20\"ea 2x20\"ea                  Angelic lateral step-overs    \"I_I\" in & out stepping x5ea   \"I_I\" in & out stepping 4\"x10 I_I in & out stepping 4\"x10   Figure-8 Weave Fwd/Back          Agility ladder walkthrough:   - 1 in each  - 2 in each  - Lateral   X 2 trips ea     X 2 trips ea   Ther Ex          Bike 10 min 10 min 10 min 10 min 10  X8 min   Deadmill retro     X5 min TM gradual ramp-up to incline 5.0  x6 min  Retro X3 min X4 min   Seated hamstring / gastroc stretch          Slant board calf stretch     2x45\"ea     FFE knee flexion stretch                    SLR          SAQ          LAQ 12# + GTB 2x30ea      10# + MTB 3x15 ea    Seated  hamstring curls                    Leg press # 3x15    2:1 70# 1x12ea # 3x10    75# x10ea # 5x8 # x20, 12, 8 # 3x20 # 3x15 DL 95# 3x15             Standing hip flexor march Med mini x20ea      Med mini x20ea   Standing Tib Raise                    Ther Activity          Chair squat 20\" 15# 2x12 15# 3x12 nv 2x10  15# 3x12    Step-ups F/L                              Gait Training                              Modalities                                      "

## 2024-03-10 NOTE — PROGRESS NOTES
"Daily Note     Today's date: 3/11/2024  Patient name: Solis Dos Santos  : 1950  MRN: [id number]  Referring provider: Leonard Gallagher,*  Dx:   Encounter Diagnosis     ICD-10-CM    1. Primary osteoarthritis of both knees  M17.0           Start Time: 1215  Stop Time: 1308  Total time in clinic (min): 53 minutes    Subjective: \"I like all the stuff we did today - it's helping my confidence with balance.\"      Objective: See treatment diary below      Assessment: Tolerated treatment well. Session focused on LE strength / mobility & balance with good tolerance. Pt noted anterior knee pain with 4\" and 2\" forward step-downs demonstrating poor tolerance to anterior knee translation and decreased eccentric quad control bilaterally. Pt is gradually improving static balance - added single leg RDL c UE support & B-stance trunk rotations with fair balance. Patient would benefit from continued PT      Plan: Continue per plan of care.       POC expires Unit limit Auth Expiration date PT/OT + Visit Limit?   3/6/2024 BOMN N/a BOMN                             Visit/Unit Tracking  AUTH Status:  Date  12 13 14 15 16 17   No Used 7 8 9 10 11 2/15 2/19 2/22 2/26 3/7 3/11    Remaining                     Precautions: none      Visit #: 10 11 12 13 14 15 16 17   Date: 2/8 2/12 2/15 2/19 2/22 2/26 3/7 3/11   Manuals           Lateral glide: B hips           Tibiofemoral glides PA           Patellar glides sup/inf           Manual hamstring / calf / adductor stretch                                 Neuro Re-Ed           Standing clamshells c short foot           Standing hip ABd Med mini a' 2x20ea Med mini a' 2x20ea Med mini a' 3x20ea        Lateral band walks   Med mini a' 2x30'    Med mini a' x25 steps ea Med mini a' 4x15'ea   Monster walks                      Standing TKE           Eccentric step-downs F/L   4\" L 2x5ea  4\" L 3x5ea nv  6\" L 2x10ea   FFESS    2x10       [place] Squat hold    5x10\"          " "        RDL           Seated GM           H/L adductor ball squeeze isometric    15x5\"       Bridging c ball squeeze    2x10                             SLS balance 5 x 10\" c TRX support OG 3x20\"ea OG 2x30\"    Green 2x15\" Blue 3x30\" Airex tandem 2x30\"ea    Blue SLS 2x30\"ea Green SLS 2x30\"ea Green SLS 4x30\"ea Overground 4x30\"ea   B-stance trunk rotations        x10ea   SL RDL        2x8ea c hand rail   Tandem balance c perturbations 2x20\"ea 2x20\"ea                    Angelic lateral step-overs    \"I_I\" in & out stepping x5ea   \"I_I\" in & out stepping 4\"x10 I_I in & out stepping 4\"x10    Figure-8 Weave Fwd/Back           Agility ladder walkthrough:   - 1 in each  - 2 in each  - Lateral   X 2 trips ea     X 2 trips ea    Ther Ex           Bike 10 min 10 min 10 min 10 min 10  X8 min X10 min   Deadmill retro     X5 min TM gradual ramp-up to incline 5.0  x6 min  Retro X3 min X4 min    Seated hamstring / gastroc stretch           Slant board calf stretch     2x45\"ea   3x45\" ke/kf   FFE knee flexion stretch                      SLR           SAQ           LAQ 12# + GTB 2x30ea      10# + MTB 3x15 ea  Ancore 20# x30ea   Seated  hamstring curls                      Leg press # 3x15    2:1 70# 1x12ea # 3x10    75# x10ea # 5x8 # x20, 12, 8 # 3x20 # 3x15 DL 95# 3x15 100# 3x15              Standing hip flexor march Med mini x20ea      Med mini x20ea    Standing Tib Raise                      Ther Activity           Chair squat 20\" 15# 2x12 15# 3x12 nv 2x10  15# 3x12     Step-ups F/L                                 Gait Training                                 Modalities                                         "

## 2024-03-11 ENCOUNTER — OFFICE VISIT (OUTPATIENT)
Dept: PHYSICAL THERAPY | Facility: OTHER | Age: 74
End: 2024-03-11
Payer: MEDICARE

## 2024-03-11 ENCOUNTER — APPOINTMENT (OUTPATIENT)
Dept: LAB | Age: 74
End: 2024-03-11
Payer: MEDICARE

## 2024-03-11 DIAGNOSIS — E53.8 B12 DEFICIENCY: ICD-10-CM

## 2024-03-11 DIAGNOSIS — E55.9 VITAMIN D DEFICIENCY: ICD-10-CM

## 2024-03-11 DIAGNOSIS — M17.0 PRIMARY OSTEOARTHRITIS OF BOTH KNEES: Primary | ICD-10-CM

## 2024-03-11 LAB
25(OH)D3 SERPL-MCNC: 18.8 NG/ML (ref 30–100)
VIT B12 SERPL-MCNC: 323 PG/ML (ref 180–914)

## 2024-03-11 PROCEDURE — 97112 NEUROMUSCULAR REEDUCATION: CPT

## 2024-03-11 PROCEDURE — 82306 VITAMIN D 25 HYDROXY: CPT

## 2024-03-11 PROCEDURE — 82607 VITAMIN B-12: CPT

## 2024-03-11 PROCEDURE — 36415 COLL VENOUS BLD VENIPUNCTURE: CPT

## 2024-03-11 PROCEDURE — 97110 THERAPEUTIC EXERCISES: CPT

## 2024-03-14 ENCOUNTER — OFFICE VISIT (OUTPATIENT)
Dept: PHYSICAL THERAPY | Facility: OTHER | Age: 74
End: 2024-03-14
Payer: MEDICARE

## 2024-03-14 DIAGNOSIS — M17.0 PRIMARY OSTEOARTHRITIS OF BOTH KNEES: Primary | ICD-10-CM

## 2024-03-14 PROCEDURE — 97110 THERAPEUTIC EXERCISES: CPT

## 2024-03-14 PROCEDURE — 97140 MANUAL THERAPY 1/> REGIONS: CPT

## 2024-03-14 NOTE — PROGRESS NOTES
"Daily Note     Today's date: 3/14/2024  Patient name: Solis Dos Santos  : 1950  MRN: [id number]  Referring provider: Leonard Gallagher,*  Dx:   Encounter Diagnosis     ICD-10-CM    1. Primary osteoarthritis of both knees  M17.0             Start Time: 09  Stop Time: 1030  Total time in clinic (min): 45 minutes    Subjective: Pt reports no changes.       Objective: See treatment diary below      Assessment: Tolerated treatment well. Noted hypertonicity local to B popliteus, L> R, with improvements following STM. Demonstrates decreased R IR hip ROM, minimal improvements following manuals. Pt reported decreased knee pain with STS transfer at end of session following manual therapy and stretching. Patient would benefit from continued PT      Plan: Continue per plan of care.       POC expires Unit limit Auth Expiration date PT/OT + Visit Limit?   3/6/2024 BOMN N/a BOMN                             Visit/Unit Tracking  AUTH Status:  Date  12 13 14 15 16 17 18   No Used 7 8 9 10 11 2/15 2/19 2/22 2/26 3/7 3/11 3/14    Remaining                      Precautions: none      Visit #: 14 15 16 17 18    Date: 2/22 2/26 3/7 3/11 3/14   Manuals        PROM: B knees     Prone knee extension    SFP   Lateral glide: B hips     GIV 3x30\" SFP   Tibiofemoral glides PA        Patellar glides sup/inf        STM / IASTM     B popliteus    SFP                   Neuro Re-Ed        Standing clamshells c short foot        Standing hip ABd        Lateral band walks   Med mini a' x25 steps ea Med mini a' 4x15'ea    Monster walks                Standing TKE        Eccentric step-downs F/L 4\" L 3x5ea nv  6\" L 2x10ea    FFESS        [place] Squat hold                RDL        Seated GM        H/L adductor ball squeeze isometric        Bridging c ball squeeze                        SLS balance Airex tandem 2x30\"ea    Blue SLS 2x30\"ea Green SLS 2x30\"ea Green SLS 4x30\"ea Overground 4x30\"ea Overground 4x30\"ea " "  B-stance trunk rotations    x10ea    SL RDL    2x8ea c hand rail    Tandem balance c perturbations                Angelic lateral step-overs  \"I_I\" in & out stepping 4\"x10 I_I in & out stepping 4\"x10     Figure-8 Weave Fwd/Back        Agility ladder walkthrough:   - 1 in each  - 2 in each  - Lateral    X 2 trips ea     Ther Ex        Bike 10  X8 min X10 min X10 min   Deadmill retro X5 min TM gradual ramp-up to incline 5.0  x6 min  Retro X3 min X4 min     Seated hamstring / gastroc stretch        Slant board calf stretch 2x45\"ea   3x45\" ke/kf 3x45\" ke/kf   Supine quad S     2x45\"ea           SLR        SAQ        LAQ   10# + MTB 3x15 ea  Ancore 20# x30ea    Seated  hamstring curls                Leg press # 3x20 # 3x15 DL 95# 3x15 100# 3x15            Standing hip flexor march   Med mini x20ea     Standing Tib Raise                Ther Activity        Chair squat 20\"  15# 3x12      Step-ups F/L                        Gait Training                        Modalities                                "

## 2024-03-16 LAB
APOB+LDLR+PCSK9 GENE MUT ANL BLD/T: NOT DETECTED
BRCA1+BRCA2 DEL+DUP + FULL MUT ANL BLD/T: NOT DETECTED
MLH1+MSH2+MSH6+PMS2 GN DEL+DUP+FUL M: NOT DETECTED

## 2024-03-17 NOTE — PROGRESS NOTES
"Daily Note     Today's date: 3/18/2024  Patient name: Solis Dos Santos  : 1950  MRN: [id number]  Referring provider: Leonard Gallagher,*  Dx:   Encounter Diagnosis     ICD-10-CM    1. Primary osteoarthritis of both knees  M17.0               Start Time: 846  Stop Time: 09  Total time in clinic (min): 53 minutes    Subjective: Pt reports the manual therapy last week seemed to help.      Objective: See treatment diary below      Assessment: Tolerated treatment well. Session initiated with STM of B popliteus. Pt demonstrated decrease in B knee pain when performing leg press and functional strengthening exercises today. Leg press CA of 130# for 15 reps today. Patient would benefit from continued PT      Plan: Continue per plan of care.       POC expires Unit limit Auth Expiration date PT/OT + Visit Limit?   3/6/2024 BOMN N/a BOMN                             Visit/Unit Tracking  AUTH Status:  Date  12 13 14 15 16 17 18 19   No Used 7 8 9 10 11 2/15 2/19 2/22 2/26 3/7 3/11 3/14 3/18    Remaining                       Precautions: none      Visit #: 14 15 16 17 18  19   Date: 2/22 2/26 3/7 3/11 3/14 3/18   Manuals         PROM: B knees     Prone knee extension    SFP    Lateral glide: B hips     GIV 3x30\" SFP nv   Tibiofemoral glides PA         Patellar glides sup/inf         STM / IASTM     B popliteus    SFP B popliteus     SFP                     Neuro Re-Ed         Standing clamshells c short foot         Standing hip ABd         Lateral band walks   Med mini a' x25 steps ea Med mini a' 4x15'ea  Med mini a' 4x15'ea   Monster walks                  Standing TKE         Step-ups      8\" 3x6ea   Eccentric step-downs F/L 4\" L 3x5ea nv  6\" L 2x10ea  4\" L 2x10; p!   FFESS         [place] Squat hold                  RDL         Seated GM         H/L adductor ball squeeze isometric         Bridging c ball squeeze                           SLS balance Airex tandem 2x30\"ea    Blue SLS 2x30\"ea " "Green SLS 2x30\"ea Green SLS 4x30\"ea Overground 4x30\"ea Overground 4x30\"ea Blue foam 3x30\"ea   B-stance trunk rotations    x10ea     SL RDL    2x8ea c hand rail     Tandem balance c perturbations                  Angelic lateral step-overs  \"I_I\" in & out stepping 4\"x10 I_I in & out stepping 4\"x10      Figure-8 Weave Fwd/Back         Agility ladder walkthrough:   - 1 in each  - 2 in each  - Lateral    X 2 trips ea      Ther Ex         Bike 10  X8 min X10 min X10 min X10 min   Deadmill retro X5 min TM gradual ramp-up to incline 5.0  x6 min  Retro X3 min X4 min               1/2 Kneeling KB hip opener      nv   Seated hamstring / gastroc stretch         Slant board calf stretch 2x45\"ea   3x45\" ke/kf 3x45\" ke/kf 3x45\" ke/kf   Supine quad S     2x45\"ea             SLR         SAQ         LAQ   10# + MTB 3x15 ea  Ancore 20# x30ea     Seated  hamstring curls                  Leg press # 3x20 # 3x15 DL 95# 3x15 100# 3x15  130# 2x15            Standing hip flexor march   Med mini x20ea      Standing Tib Raise                  Ther Activity         Chair squat 20\"  15# 3x12       Step-ups F/L                           Gait Training                           Modalities                                   "

## 2024-03-18 ENCOUNTER — OFFICE VISIT (OUTPATIENT)
Dept: PHYSICAL THERAPY | Facility: OTHER | Age: 74
End: 2024-03-18
Payer: MEDICARE

## 2024-03-18 DIAGNOSIS — M17.0 PRIMARY OSTEOARTHRITIS OF BOTH KNEES: Primary | ICD-10-CM

## 2024-03-18 PROCEDURE — 97112 NEUROMUSCULAR REEDUCATION: CPT

## 2024-03-18 PROCEDURE — 97110 THERAPEUTIC EXERCISES: CPT

## 2024-03-18 PROCEDURE — 97140 MANUAL THERAPY 1/> REGIONS: CPT

## 2024-03-21 ENCOUNTER — CONSULT (OUTPATIENT)
Dept: CARDIOLOGY CLINIC | Facility: CLINIC | Age: 74
End: 2024-03-21
Payer: MEDICARE

## 2024-03-21 ENCOUNTER — OFFICE VISIT (OUTPATIENT)
Dept: PHYSICAL THERAPY | Facility: OTHER | Age: 74
End: 2024-03-21
Payer: MEDICARE

## 2024-03-21 VITALS
SYSTOLIC BLOOD PRESSURE: 114 MMHG | WEIGHT: 277 LBS | BODY MASS INDEX: 36.71 KG/M2 | HEIGHT: 73 IN | DIASTOLIC BLOOD PRESSURE: 82 MMHG | OXYGEN SATURATION: 94 % | HEART RATE: 85 BPM

## 2024-03-21 DIAGNOSIS — R06.83 SNORING: Primary | ICD-10-CM

## 2024-03-21 DIAGNOSIS — M17.0 PRIMARY OSTEOARTHRITIS OF BOTH KNEES: Primary | ICD-10-CM

## 2024-03-21 DIAGNOSIS — R07.89 OTHER CHEST PAIN: ICD-10-CM

## 2024-03-21 PROCEDURE — 99204 OFFICE O/P NEW MOD 45 MIN: CPT | Performed by: INTERNAL MEDICINE

## 2024-03-21 PROCEDURE — 97110 THERAPEUTIC EXERCISES: CPT

## 2024-03-21 PROCEDURE — 97140 MANUAL THERAPY 1/> REGIONS: CPT

## 2024-03-21 NOTE — PROGRESS NOTES
"Daily Note     Today's date: 3/21/2024  Patient name: Solis Dos Santos  : 1950  MRN: [id number]  Referring provider: Leonard Gallagher,*  Dx:   Encounter Diagnosis     ICD-10-CM    1. Primary osteoarthritis of both knees  M17.0               Start Time: 945  Stop Time: 1045  Total time in clinic (min): 60 minutes    Subjective: Pt reports the last few days \"were rough;\" reports he had chest pain the last few days and has visit with cardiac today.      Objective: See treatment diary below      Assessment: Tolerated treatment well. Lighter session today 2/2 to cardiac complaints; session focused on soft tissue mobilization, hip ROM and light LE strengthening. Tolerated EPAT well. Patient would benefit from continued PT      Plan: Continue per plan of care.       POC expires Unit limit Auth Expiration date PT/OT + Visit Limit?   3/6/2024 BOMN N/a BOMN                             Visit/Unit Tracking  AUTH Status:  Date 14 15 16 17 18 19 20   No Used 2/22 2/26 3/7 3/11 3/14 3/18 3/21    Remaining                 Precautions: none      Visit #: 14 15 16 17 18  19 20   Date: 2/22 2/26 3/7 3/11 3/14 3/18 3/21   Manuals          PROM: B knees     Prone knee extension    SFP     Lateral glide: B hips     GIV 3x30\" SFP nv GIV 3x30\":L  SFP   Tibiofemoral glides PA          Patellar glides sup/inf          STM / IASTM     B popliteus    SFP B popliteus     SFP    EPAT       B poplitues 2.0 bars, 21 Hz, 2000 pulses    SFP             Neuro Re-Ed          Standing clamshells c short foot          Standing hip ABd          Lateral band walks   Med mini a' x25 steps ea Med mini a' 4x15'ea  Med mini a' 4x15'ea    Monster walks          Locked clamshells       x15ea                       Standing TKE          Step-ups      8\" 3x6ea    Eccentric step-downs F/L 4\" L 3x5ea nv  6\" L 2x10ea  4\" L 2x10; p!    FFESS          [place] Squat hold                    RDL          Seated GM          H/L adductor ball squeeze " "isometric          Bridging c ball squeeze                              SLS balance Airex tandem 2x30\"ea    Blue SLS 2x30\"ea Green SLS 2x30\"ea Green SLS 4x30\"ea Overground 4x30\"ea Overground 4x30\"ea Blue foam 3x30\"ea    B-stance trunk rotations    x10ea      SL RDL    2x8ea c hand rail      Tandem balance c perturbations                    Angelic lateral step-overs  \"I_I\" in & out stepping 4\"x10 I_I in & out stepping 4\"x10       Figure-8 Weave Fwd/Back          Agility ladder walkthrough:   - 1 in each  - 2 in each  - Lateral    X 2 trips ea       Ther Ex          Bike 10  X8 min X10 min X10 min X10 min X10 min   Deadmill retro X5 min TM gradual ramp-up to incline 5.0  x6 min  Retro X3 min X4 min                 1/2 Kneeling KB hip opener      nv    Seated hamstring / gastroc stretch          Slant board calf stretch 2x45\"ea   3x45\" ke/kf 3x45\" ke/kf 3x45\" ke/kf 3x30\"   Supine quad S     2x45\"ea               SLR          SAQ          LAQ   10# + MTB 3x15 ea  Ancore 20# x30ea      Seated  hamstring curls                    Leg press # 3x20 # 3x15 DL 95# 3x15 100# 3x15  130# 2x15 100# x15  105# x15  110# x15             Standing hip flexor march   Med mini x20ea       Standing Tib Raise                    Ther Activity          Chair squat 20\"  15# 3x12        Step-ups F/L                              Gait Training                              Modalities                                      "

## 2024-03-21 NOTE — PROGRESS NOTES
Cardiology     Clinic Visit Note  Solis Dos Santos 73 y.o. male   MRN: [id number]    Assessment and Plan      Chest tightness   - Sporadic throughout the week without any clear correlating factors   - cardiovascular risk factors include: male gender and age   - Cardiac cath in 2010 without coronary artery disease   - Nuclear stress test 2/29/2024 demonstrated no perfusion defects.   - FLP from 1/2024 demonstrated total cholesterol 117, LDL 60, Tags 76, HDL 42  - Continues on atorvastatin   - reassured patient that stress test showed no perfusion abnormalities   - transthoracic echo ordered     Dyspnea on exertion  - worsening over the last few months   - worse with walking up stairs; however able to participate in physical therapy   - no orthopnea, lightheadedness, syncope   - Nuclear stress test from 2/29/2024 demonstrated no perfusion defects   - transthoracic echo ordered     HLD   - FLP from 1/2024 demonstrated total cholesterol 117, LDL 60, Tags 76, HDL 42  - at goal of LDL <100   - Continue Atorvastatin 10 mg daily     Snoring   - wife states that patient snores and stops breathing at night   - never been evaluated for sleep apnea   - sleep study ordered         We will review echo results and if normal, follow up as needed. If abnormal we will schedule another appointment.      Chief Complaint: Chest pressure   Subjective     History of Present Illness:  Patient is a 73 year old male with pmh of hypothyroidism, bladder cancer, HLD who presents to the office due to ongoing chest pain. He describes it as a tightness in the chest. He also noted dyspnea on exertion. This is especially notable when going up and down 12 steps. He goes to physical therapy for knees that need to be replaced and does not have shortness of breath during these sessions. These symptoms have been ongoing for years and has gotten worse since the onset of COVID. He became extremely sedentary and never got back to his exercise. 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  Occasionally happens laying in bed or at the computer. No more than twice a week. It is very dull and mild in the background. Pressing on the chest doesn't make it worse. Something else distracts him from it. When he was taking meloxicam he did not feel it at all. It is very sporadic and intermittent.    Patient had cardiac cath in  that demonstrated normal coronary arteries. FLP from 2024 demonstrated total cholesterol 117, LDL 60, Tags 76, HDL 42    Bladder cancer was treated with chemo in his bladder through a catheter.  He is waiting on follow up cystoscopy.       Previous Cardiology Workup:  TREADMILL STRESS  Results for orders placed during the hospital encounter of 18    Stress test only, exercise    Narrative  [place] [id number] [phone]    Stress Electrocardiography during Exercise    Name: CAYETANO HERNÁNDEZ  MR #: [id number]  Account #: [id number]  Study date: 2018  : 1950  Age: 68 years  Gender: Male  Height: 74 in  Weight: 286 lb  BSA: 2.53 m squared    Allergies: ALLERGIES NOT ON FILE    Diagnosis: R07.89 - Other chest pain    RN:  Claire Uribe RN  Primary Physician:  Ok Tom MD  Referring Physician:  Ok Tom MD  Interpreting Physician:  Milla Posey DO  Group:  [facility]  Report Prepared By::  Janie Tinajero  Technician:  Janie Tinajero  Technician:  Lydia Lopez    CLINICAL QUESTION: Detection of coronary artery disease.    HISTORY: CHEST PAIN, B/L TINGLING IN ARMS, HTN, DYSLIPIDEMIA, FORMER SMOKER [QUIT ], CARDIAC CATHETERIZATION [NORMAL], FAMILY HISTORY MOTHER AFIB, FATHER VAVLE REPLACEMENT ,PACEMAKER,MI. The patient is a 68 year old  male.  Chest pain status: recent onset chest pain, radiation to arm(s). Coronary artery disease risk factors: dyslipidemia and family history of premature coronary artery  disease. Medications: a lipid lowering agent and thyroid medications.    PHYSICAL EXAM: Baseline physical exam screening: no wheezes audible.    REST ECG: Normal sinus rhythm. The ECG showed incomplete right bundle branch block.    PROCEDURE: Treadmill exercise testing was performed, using the Dara protocol. Stress electrocardiographic evaluation was performed.    DARA PROTOCOL:  HR bpm SBP mmHg DBP mmHg Symptoms  Baseline 77 132 84 none  Stage 1 122 154 80 mild fatigue  Stage 2 148 166 90 mild fatigue  Immediate 150 190 90 same as above  Recovery 1 103 190 90 subsiding  Recovery 2 93 160 90 none  Recovery 3 94 138 86 none  PRE TEST POX 97%, PEAK POX 97%. No medications or fluids given.    STRESS SUMMARY: Duration of exercise was 5 min and 32 sec. The patient exercised to protocol stage 2. Maximal work rate was 7 METs. Functional capacity was slightly decreased (20% to 30%). Maximal heart rate during stress was 150 bpm ( 100  % of maximal predicted heart rate). The heart rate response to stress was normal. There was normal resting blood pressure with an appropriate response to stress. The rate-pressure product for the peak heart rate and blood pressure was  29223. There was no chest pain during stress. The stress test was terminated due to achievement of target heart rate and mild fatigue. There were 1 mm ST depressions noted inferolaterally at peak exercise. The stress ECG was consistent  with myocardial ischemia. There were no stress arrhythmias or conduction abnormalities.    SUMMARY:  -  Stress results: Duration of exercise was 5 min and 32 sec. Functional capacity was slightly decreased (20% to 30%). Target heart rate was achieved. There was no chest pain during stress.  -  ECG conclusions: There were 1 mm ST depressions noted inferolaterally at peak exercise. The stress ECG was consistent with myocardial ischemia.    IMPRESSIONS: Abnormal study after maximal exercise without reproduction of symptoms. To  fully assess presence of ischemia, the test may be repeated with imaging.    Prepared and signed by    Milla Posey DO  Signed 09/14/2018 11:56:10     ----------------------------------------------------------------------------------------------  NUCLEAR STRESS TEST: No results found for this or any previous visit.    Results for orders placed during the hospital encounter of 02/29/24    NM myocardial perfusion spect (rx stress and/or rest)    Interpretation Summary    Stress ECG: No ST deviation is noted.    Stress Function: Left ventricular function post-stress is normal. Stress ejection fraction is 70%.    Perfusion Defect Conclusion: The stress/rest perfusion ratio is 0.85. There is no evidence of transient ischemic dilation (TID).    Perfusion: There are no perfusion defects.    No evidence of ischemia or infarction by pharmacologic vasodilatory nuclear stress testing.      --------------------------------------------------------------------------------  CATH:  No results found for this or any previous visit.    --------------------------------------------------------------------------------  ECHO:   No results found for this or any previous visit.    No results found for this or any previous visit.    --------------------------------------------------------------------------------  HOLTER  No results found for this or any previous visit.    --------------------------------------------------------------------------------  CAROTIDS  No results found for this or any previous visit.       ---------------------------------------------------------------------------------  Review of Systems   Constitutional:  Negative for chills and fever.   HENT:  Negative for ear pain and sore throat.    Eyes:  Negative for pain and visual disturbance.   Respiratory:  Negative for cough and shortness of breath.    Cardiovascular:  Negative for chest pain and palpitations.        Chest pressure intermittently    Gastrointestinal:   Negative for abdominal pain and vomiting.   Genitourinary:  Negative for dysuria and hematuria.   Musculoskeletal:  Negative for arthralgias and back pain.   Skin:  Negative for color change and rash.   Neurological:  Negative for seizures and syncope.   All other systems reviewed and are negative.        Current Outpatient Medications:     atorvastatin (LIPITOR) 10 mg tablet, Take 1 tablet (10 mg total) by mouth daily at bedtime, Disp: 90 tablet, Rfl: 0    cholecalciferol (VITAMIN D3) 1,000 units tablet, Take 1 tablet (1,000 Units total) by mouth daily, Disp: 90 tablet, Rfl: 3    cyanocobalamin (VITAMIN B-12) 500 MCG tablet, Take 1 tablet (500 mcg total) by mouth daily, Disp: 100 tablet, Rfl: 3    levothyroxine 150 mcg tablet, Take 1 tablet (150 mcg total) by mouth daily, Disp: 90 tablet, Rfl: 0    pantoprazole (PROTONIX) 40 mg tablet, Take 1 tablet (40 mg total) by mouth daily before breakfast, Disp: 30 tablet, Rfl: 2    tamsulosin (FLOMAX) 0.4 mg, Take 1 capsule (0.4 mg total) by mouth daily with dinner, Disp: 90 capsule, Rfl: 3    meloxicam (Mobic) 15 mg tablet, Take 1 tablet (15 mg total) by mouth daily (Patient not taking: Reported on 3/21/2024), Disp: 30 tablet, Rfl: 2    Current Facility-Administered Medications:     cyanocobalamin injection 1,000 mcg, 1,000 mcg, Intramuscular, Q30 Days, Riki Noguera MD, 1,000 mcg at 02/09/24 1255  Past Medical History:   Diagnosis Date    Anxiety disorder     Atherosclerotic heart disease of native coronary artery without angina pectoris     Benign prostatic hyperplasia without lower urinary tract symptoms     Cancer (HCC)     bladder    Clotting disorder (HCC) 1/25/2023    Blood in urine    Disease of thyroid gland     hypo    Dyslipidemia     GERD (gastroesophageal reflux disease)     Hypertension     Impaired fasting blood sugar     Kidney stone     Low back pain     Obesity     Osteoarthritis     last assesed 6-6-16    Other chest pain     Paresthesia of skin      Polyneuropathy     last assesed 17    Pure hypercholesterolemia     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)     Urinary tract infection     Wears glasses      Past Surgical History:   Procedure Laterality Date    BACK SURGERY      CHOLECYSTECTOMY      COLONOSCOPY  2019    CYSTOSCOPY N/A 2023    Procedure: CYSTOSCOPY w/ bladder biopsy;  Surgeon: Geo Bentley MD;  Location: [facility];  Service: Urology    HIP SURGERY  2019    Replacement    JOINT REPLACEMENT  2019    Hip replacement    TX ARTHRP ACETBLR/PROX FEM PROSTC AGRFT/ALGRFT Right 2019    Procedure: ARTHROPLASTY HIP TOTAL;  Surgeon: Leonard Gallagher MD;  Location: [facility];  Service: Orthopedics    TX CYSTO W/REMOVAL OF LESIONS SMALL N/A 2023    Procedure: TRANSURETHRAL RESECTION OF BLADDER TUMOR (TURBT), mitomycin ;  Surgeon: Geo Bentley MD;  Location[facility];  Service: Urology    TX CYSTO W/REMOVAL OF LESIONS SMALL N/A 2023    Procedure: TRANSURETHRAL RESECTION OF BLADDER TUMOR (TURBT), cystoscopy with bladder biopsy;  Surgeon: Geo Bentley MD;  Location[facility];  Service: Urology    TONSILLECTOMY       Social History     Socioeconomic History    Marital status: /Civil Union     Spouse name: Not on file    Number of children: Not on file    Years of education: Not on file    Highest education level: Not on file   Occupational History    Not on file   Tobacco Use    Smoking status: Former     Current packs/day: 0.00     Average packs/day: 1 pack/day for 20.8 years (20.8 ttl pk-yrs)     Types: Cigarettes     Start date: 1967     Quit date: 1987     Years since quittin.4    Smokeless tobacco: Never   Vaping Use    Vaping status: Never Used   Substance and Sexual Activity    Alcohol use: Not Currently    Drug use: Not Currently    Sexual activity: Not Currently     Partners: Female     Birth control/protection: None   Other  "Topics Concern    Not on file   Social History Narrative    Smoking: Non-smoker    [facility]     Social Determinants of Health     Financial Resource Strain: Low Risk  (9/15/2023)    Overall Financial Resource Strain (CARDIA)     Difficulty of Paying Living Expenses: Not hard at all   Food Insecurity: Not on file   Transportation Needs: No Transportation Needs (9/15/2023)    PRAPARE - Transportation     Lack of Transportation (Medical): No     Lack of Transportation (Non-Medical): No   Physical Activity: Not on file   Stress: Not on file   Social Connections: Not on file   Intimate Partner Violence: Not on file   Housing Stability: Not on file     Family History   Problem Relation Age of Onset    Arthritis Other     Heart disease Father     Arthritis Mother      No Known Allergies    Objective     Vitals:    03/21/24 1257   BP: 114/82   Pulse: 85   SpO2: 94%   Weight: 126 kg (277 lb)   Height: 6' 1\" (1.854 m)       Physical exam:     Physical Exam  Vitals and nursing note reviewed.   Constitutional:       General: He is not in acute distress.     Appearance: He is well-developed. He is obese.   HENT:      Head: Normocephalic and atraumatic.   Eyes:      Conjunctiva/sclera: Conjunctivae normal.   Neck:      Vascular: No carotid bruit.   Cardiovascular:      Rate and Rhythm: Normal rate and regular rhythm.      Heart sounds: No murmur heard.     No friction rub. No gallop.   Pulmonary:      Effort: Pulmonary effort is normal. No respiratory distress.      Breath sounds: Normal breath sounds.   Abdominal:      Palpations: Abdomen is soft.      Tenderness: There is no abdominal tenderness.   Musculoskeletal:         General: No swelling.      Cervical back: Neck supple.   Skin:     General: Skin is warm and dry.      Capillary Refill: Capillary refill takes less than 2 seconds.   Neurological:      Mental Status: He is alert.   Psychiatric:         Mood and Affect: Mood normal.           ==  PLEASE " NOTE:  This encounter was completed utilizing the [employer] Speech Voice Recognition Software. Grammatical errors, random word insertions, pronoun errors and incomplete sentences are occasional consequences of the system due to software limitations, ambient noise and hardware issues.These may be missed by proof reading prior to affixing electronic signature. Any questions or concerns about the content, text or information contained within the body of this dictation should be directly addressed to the physician for clarification. Please do not hesitate to call me directly if you have any any questions or concerns.

## 2024-03-25 ENCOUNTER — OFFICE VISIT (OUTPATIENT)
Dept: PHYSICAL THERAPY | Facility: OTHER | Age: 74
End: 2024-03-25
Payer: MEDICARE

## 2024-03-25 DIAGNOSIS — M17.0 PRIMARY OSTEOARTHRITIS OF BOTH KNEES: Primary | ICD-10-CM

## 2024-03-25 PROCEDURE — 97140 MANUAL THERAPY 1/> REGIONS: CPT

## 2024-03-25 PROCEDURE — 97110 THERAPEUTIC EXERCISES: CPT

## 2024-03-25 NOTE — PROGRESS NOTES
"Daily Note     Today's date: 3/25/2024  Patient name: Solis Dos Santos  : 1950  MRN: [id number]  Referring provider: Leonard Gallagher,*  Dx:   Encounter Diagnosis     ICD-10-CM    1. Primary osteoarthritis of both knees  M17.0                 Start Time: 853  Stop Time: 946  Total time in clinic (min): 53 minutes    Subjective: Cardiac consult on 3/21/24 reveals no perfusion abnormalities following stress test. Transthoracic echo has been ordered. Pt plans to begin taking meloxicam today.      Objective: See treatment diary below      Assessment: Tolerated treatment well. Session focused on LE strengthening, tolerated well by patient. Pt demonstrating gradual improvements in strength as demonstrated by 120# LP x15 reps. No complaints of SOB or CLARK through session.  Pt given HEP of seated gastroc S c strap, seated HS S and seated hip ER S. Patient would benefit from continued PT      Plan: Continue per plan of care.       POC expires Unit limit Auth Expiration date PT/OT + Visit Limit?   3/6/2024 BOMN N/a BOMN                             Visit/Unit Tracking  AUTH Status:  Date 14 15 16 17 18 19 20 21   No Used 2/22 2/26 3/7 3/11 3/14 3/18 3/21 3/25    Remaining                  Precautions: none      Visit #:    Date: 3/14 3/18 3/21 3/25   Manuals       PROM: B knees Prone knee extension    SFP      Lateral glide: B hips GIV 3x30\" SFP nv GIV 3x30\":L  SFP    Tibiofemoral glides PA       Patellar glides sup/inf       STM / IASTM B popliteus    SFP B popliteus     SFP  B popliteus / calves SFP   EPAT   B poplitues 2.0 bars, 21 Hz, 2000 pulses    SFP           Neuro Re-Ed       Standing clamshells c short foot       Standing hip ABd       Lateral band walks  Med mini a' 4x15'ea  Med mini a' 2x40'ea   Monster walks       Locked clamshells   x15ea                  Standing TKE       Step-ups  8\" 3x6ea     Eccentric step-downs F/L  4\" L 2x10; p!     FFESS       [place] Squat hold              RDL " "      Seated GM       H/L adductor ball squeeze isometric       Bridging c ball squeeze                     SLS balance Overground 4x30\"ea Blue foam 3x30\"ea     B-stance trunk rotations       SL RDL       Tandem balance c perturbations              Angelic lateral step-overs       Figure-8 Weave Fwd/Back       Agility ladder walkthrough:   - 1 in each  - 2 in each  - Lateral        Ther Ex       Bike X10 min X10 min X10 min X10 min   Deadmill retro              1/2 Kneeling KB hip opener  nv     Seated hamstring / gastroc stretch       Slant board calf stretch 3x45\" ke/kf 3x45\" ke/kf 3x30\" 3x30\"ea   Supine quad S 2x45\"ea      Seated figure-4 hip ER S    2x30\"ea          SLR       SAQ       LAQ       Seated  hamstring curls              Leg press  130# 2x15 100# x15  105# x15  110# x15 # increase by 5# increments  X15ea          Standing hip flexor march       Standing Tib Raise    + HF march iso hold 3x10ea          Ther Activity       Chair squat 20\"       Step-ups F/L                     Gait Training                     Modalities                             "

## 2024-03-28 ENCOUNTER — OFFICE VISIT (OUTPATIENT)
Dept: PHYSICAL THERAPY | Facility: OTHER | Age: 74
End: 2024-03-28
Payer: MEDICARE

## 2024-03-28 DIAGNOSIS — M17.0 PRIMARY OSTEOARTHRITIS OF BOTH KNEES: Primary | ICD-10-CM

## 2024-03-28 PROCEDURE — 97110 THERAPEUTIC EXERCISES: CPT

## 2024-03-28 PROCEDURE — 97112 NEUROMUSCULAR REEDUCATION: CPT

## 2024-03-28 NOTE — PROGRESS NOTES
"Daily Note     Today's date: 3/28/2024  Patient name: Solis Dos Santos  : 1950  MRN: [id number]  Referring provider: Leonard Gallagher,*  Dx:   Encounter Diagnosis     ICD-10-CM    1. Primary osteoarthritis of both knees  M17.0             Start Time: 942  Stop Time: 1035  Total time in clinic (min): 53 minutes    Subjective: Pt reports he was able to move a vanity sink on his own this past weekend, which he was impressed with.      Objective: See treatment diary below      Assessment: Tolerated treatment well. Session focused on LE strengthening, functional strength and balance. Pt tolerating strength progressions well with good carryover to functional activity / daily tasks as demonstrated by ability to move a heavy object this past weekend without increased pain. Patient would benefit from continued PT      Plan: Continue per plan of care.       POC expires Unit limit Auth Expiration date PT/OT + Visit Limit?   3/6/2024 BOMN N/a BOMN                             Visit/Unit Tracking  AUTH Status:  Date 14 15 16 17 18    No Used 2/22 2/26 3/7 3/11 3/14 3/18 3/21 3/25 3/28    Remaining                   Precautions: none      Visit #:    Date: 3/14 3/18 3/21 3/25 3/28   Manuals        PROM: B knees Prone knee extension    SFP       Lateral glide: B hips GIV 3x30\" SFP nv GIV 3x30\":L  SFP     Tibiofemoral glides PA        Patellar glides sup/inf        STM / IASTM B popliteus    SFP B popliteus     SFP  B popliteus / calves SFP B popliteus / calves SFP   EPAT   B poplitues 2.0 bars, 21 Hz, 2000 pulses    SFP             Neuro Re-Ed        Standing clamshells c short foot        Standing hip ABd        Lateral band walks  Med mini a' 4x15'ea  Med mini a' 2x40'ea    Monster walks        Locked clamshells   x15ea                     Standing TKE        Step-ups  8\" 3x6ea      Eccentric step-downs F/L  4\" L 2x10; p!      FFESS        [place] Squat hold                RDL      " "  Seated GM        H/L adductor ball squeeze isometric        Bridging c ball squeeze                        SLS balance Overground 4x30\"ea Blue foam 3x30\"ea   OG 4x30\"   B-stance trunk rotations        SL RDL        Tandem balance c perturbations                Angelic lateral step-overs        Figure-8 Weave Fwd/Back        Agility ladder walkthrough:   - 1 in each  - 2 in each  - Lateral         Ther Ex        Bike X10 min X10 min X10 min X10 min X10 min   Deadmill retro                1/2 Kneeling KB hip opener  nv      Seated hamstring / gastroc stretch     2x45\"ea   Slant board calf stretch 3x45\" ke/kf 3x45\" ke/kf 3x30\" 3x30\"ea 2x45\"ea   Supine quad S 2x45\"ea       Seated figure-4 hip ER S    2x30\"ea            SLR        SAQ        LAQ     Ancore 20# 3x15   Seated  hamstring curls                Leg press  130# 2x15 100# x15  105# x15  110# x15 # increase by 5# increments  X15ea 90# x15  100# x15  110# x15  120# x15  130# x15           Standing hip flexor march        Standing Tib Raise    + HF march iso hold 3x10ea            Ther Activity        Chair squat 20\"        Step-ups F/L        Deadlift     8\" elevated 25# DB 2x15           Gait Training                        Modalities                                "

## 2024-03-30 DIAGNOSIS — E78.5 HYPERLIPIDEMIA, UNSPECIFIED HYPERLIPIDEMIA TYPE: ICD-10-CM

## 2024-03-30 DIAGNOSIS — E03.9 ACQUIRED HYPOTHYROIDISM: ICD-10-CM

## 2024-03-30 DIAGNOSIS — R06.81 WITNESSED EPISODE OF APNEA: ICD-10-CM

## 2024-03-30 DIAGNOSIS — R06.83 SNORING: ICD-10-CM

## 2024-03-30 DIAGNOSIS — G47.39 OTHER SLEEP APNEA: Primary | ICD-10-CM

## 2024-04-01 ENCOUNTER — OFFICE VISIT (OUTPATIENT)
Dept: PHYSICAL THERAPY | Facility: OTHER | Age: 74
End: 2024-04-01
Payer: MEDICARE

## 2024-04-01 DIAGNOSIS — M17.0 PRIMARY OSTEOARTHRITIS OF BOTH KNEES: Primary | ICD-10-CM

## 2024-04-01 DIAGNOSIS — E78.5 HYPERLIPIDEMIA, UNSPECIFIED HYPERLIPIDEMIA TYPE: ICD-10-CM

## 2024-04-01 DIAGNOSIS — E03.9 ACQUIRED HYPOTHYROIDISM: ICD-10-CM

## 2024-04-01 PROCEDURE — 97112 NEUROMUSCULAR REEDUCATION: CPT

## 2024-04-01 PROCEDURE — 97110 THERAPEUTIC EXERCISES: CPT

## 2024-04-01 RX ORDER — ATORVASTATIN CALCIUM 10 MG/1
10 TABLET, FILM COATED ORAL
Qty: 90 TABLET | Refills: 0 | Status: SHIPPED | OUTPATIENT
Start: 2024-04-01

## 2024-04-01 RX ORDER — LEVOTHYROXINE SODIUM 0.15 MG/1
150 TABLET ORAL DAILY
Qty: 90 TABLET | Refills: 0 | Status: SHIPPED | OUTPATIENT
Start: 2024-04-01

## 2024-04-01 NOTE — PROGRESS NOTES
"Daily Note     Today's date: 2024  Patient name: Solis Dos Santos  : 1950  MRN: [id number]  Referring provider: Leonard Gallagher,*  Dx:   Encounter Diagnosis     ICD-10-CM    1. Primary osteoarthritis of both knees  M17.0               Start Time: 938  Stop Time: 1031  Total time in clinic (min): 53 minutes    Subjective: Pt reports this weekend was \"bad\" - took meloxicam last night and feels fine today.      Objective: See treatment diary below      Assessment: Tolerated treatment well. Session focused on LE strengthening, functional strength and balance. Pt demonstrating improved dynamic balance with step-ups when shoes were removed compared to when shoes on. Balance and stability better on RLE compared LLE.  Patient would benefit from continued PT      Plan: Continue per plan of care.       POC expires Unit limit Auth Expiration date PT/OT + Visit Limit?   3/6/2024 BOMN N/a BOMN                             Visit/Unit Tracking  AUTH Status:  Date 14 15 16 17 18  20    No Used 2/22 2/26 3/7 3/11 3/14 3/18 3/21 3/25 3/28 4/1    Remaining                    Precautions: none      Visit #: 18  19 20 21 22 23   Date: 3/14 3/18 3/21 3/25 3/28 4/1   Manuals         PROM: B knees Prone knee extension    SFP        Lateral glide: B hips GIV 3x30\" SFP nv GIV 3x30\":L  SFP      Tibiofemoral glides PA         Patellar glides sup/inf         STM / IASTM B popliteus    SFP B popliteus     SFP  B popliteus / calves SFP B popliteus / calves SFP    EPAT   B poplitues 2.0 bars, 21 Hz, 2000 pulses    SFP               Neuro Re-Ed         Standing clamshells c short foot         Standing hip ABd         Lateral band walks  Med mini a' 4x15'ea  Med mini a' 2x40'ea     Monster walks         Locked clamshells   x15ea                        Standing TKE         Step-ups  8\" 3x6ea       Eccentric step-downs F/L  4\" L 2x10; p!       FFESS         [place] Squat hold                  RDL         Seated GM       " "  H/L adductor ball squeeze isometric         Bridging c ball squeeze                           SLS balance Overground 4x30\"ea Blue foam 3x30\"ea   OG 4x30\"    B-stance trunk rotations         SL RDL         Tandem balance c perturbations                  Angelic lateral step-overs         Figure-8 Weave Fwd/Back         Agility ladder walkthrough:   - 1 in each  - 2 in each  - Lateral          Ther Ex         Bike X10 min X10 min X10 min X10 min X10 min X15 min   Deadmill retro                  1/2 Kneeling KB hip opener  nv       Seated hamstring / gastroc stretch     2x45\"ea    Slant board calf stretch 3x45\" ke/kf 3x45\" ke/kf 3x30\" 3x30\"ea 2x45\"ea 2x45\"ea   Supine quad S 2x45\"ea        Seated figure-4 hip ER S    2x30\"[place]              SLR         SAQ         LAQ     Ancore 20# 3x15    Seated  hamstring curls                  Leg press  130# 2x15 100# x15  105# x15  110# x15 # increase by 5# increments  X15ea 90# x15  100# x15  110# x15  120# x15  130# x15 90# x15  100# x15  110# x15  120# x15  130# x15            Standing hip flexor march         Standing Tib Raise    + HF march iso hold 3x10ea              Ther Activity         Chair squat 20\"         Step-ups F/L      6\" foam box c march 3x8   Deadlift     8\" elevated 25# DB 2x15             Gait Training                           Modalities                                   "

## 2024-04-01 NOTE — TELEPHONE ENCOUNTER
Patient called and stated needs refill on medication sent to [facility] pharmacy on [facility]     Levothyroxine 150 mcg     Atorvastatin 10 mg       Sent to [facility] on [facility]     Last ov's 02/09/2024  Next ov's 09/27/2024

## 2024-04-02 RX ORDER — ATORVASTATIN CALCIUM 10 MG/1
10 TABLET, FILM COATED ORAL
Qty: 90 TABLET | Refills: 0 | Status: SHIPPED | OUTPATIENT
Start: 2024-04-02

## 2024-04-02 RX ORDER — LEVOTHYROXINE SODIUM 0.15 MG/1
150 TABLET ORAL DAILY
Qty: 90 TABLET | Refills: 0 | Status: SHIPPED | OUTPATIENT
Start: 2024-04-02

## 2024-04-04 ENCOUNTER — OFFICE VISIT (OUTPATIENT)
Dept: PHYSICAL THERAPY | Facility: OTHER | Age: 74
End: 2024-04-04
Payer: MEDICARE

## 2024-04-04 DIAGNOSIS — M17.0 PRIMARY OSTEOARTHRITIS OF BOTH KNEES: Primary | ICD-10-CM

## 2024-04-04 PROCEDURE — 97112 NEUROMUSCULAR REEDUCATION: CPT

## 2024-04-04 PROCEDURE — 97110 THERAPEUTIC EXERCISES: CPT

## 2024-04-04 NOTE — PROGRESS NOTES
"Daily Note     Today's date: 2024  Patient name: Solis Dos Santos  : 1950  MRN: [id number]  Referring provider: Leonard Gallagher,*  Dx:   Encounter Diagnosis     ICD-10-CM    1. Primary osteoarthritis of both knees  M17.0                 Start Time: 945  Stop Time: 1030  Total time in clinic (min): 45 minutes    Subjective: Pt reports B knee pain today is \"not that bad.\"      Objective: See treatment diary below      Assessment: Tolerated treatment well. Session focused on LE strengthening, functional strength and balance. Added core challenge to dynamic balance exercises with good tolerance. Patient would benefit from continued PT      Plan: Continue per plan of care.       POC expires Unit limit Auth Expiration date PT/OT + Visit Limit?   3/6/2024 BOMN N/a BOMN                             Visit/Unit Tracking  AUTH Status:  Date 14 15 16 17 18 19 20    No Used 2/22 2/26 3/7 3/11 3/14 3/18 3/21 3/25 3/28 4/1 4/4    Remaining                     Precautions: none      Visit #:    Date: 3/25 3/28 4/1 4/4   Manuals       PROM: B knees       Lateral glide: B hips       Tibiofemoral glides PA       Patellar glides sup/inf       STM / IASTM B popliteus / calves SFP B popliteus / calves SFP     EPAT              Neuro Re-Ed       Standing clamshells c short foot       Standing hip ABd       Lateral band walks Med mini a' 2x40'ea      Monster walks       Locked clamshells       Pball Hip Hikes    x10ea          Standing TKE       Step-ups       Eccentric step-downs F/L       FFESS       Serbian Squat hold              RDL       Seated GM       H/L adductor ball squeeze isometric       Bridging c ball squeeze                     SLS balance  OG 4x30\"  OG 10x10\"   B-stance trunk rotations       SL RDL       Tandem balance c perturbations              Angelic lateral step-overs       Figure-8 Weave Fwd/Back       Agility ladder walkthrough:   - 1 in each  - 2 in each  - Lateral      " "  Ther Ex       Bike X10 min X10 min X15 min X10 min   Deadmill retro              1/2 Kneeling KB hip opener       Seated hamstring / gastroc stretch  2x45\"ea     Slant board calf stretch 3x30\"ea 2x45\"ea 2x45\"ea    Supine quad S       Seated figure-4 hip ER S 2x30\"siena             SLR       SAQ       LAQ  Ancore 20# 3x15     Seated  hamstring curls              Leg press # increase by 5# increments  X15ea 90# x15  100# x15  110# x15  120# x15  130# x15 90# x15  100# x15  110# x15  120# x15  130# x15 95# x15  105# x15  115# x15  125# x15  150# x12          Standing hip flexor march March in place on foam box 2x10ea   Suitcase carries    20# x3 trips ea   Pallof press + walkouts    Med mini a' + GTB x5ea          Standing Tib Raise + HF march iso hold 3x10ea             Ther Activity       Chair squat 20\"       Step-ups F/L   6\" foam box c march 3x8 6\" foam box 2x10ea   Deadlift  8\" elevated 25# DB 2x15            Gait Training                     Modalities                             "

## 2024-04-05 ENCOUNTER — HOSPITAL ENCOUNTER (OUTPATIENT)
Dept: NON INVASIVE DIAGNOSTICS | Facility: CLINIC | Age: 74
Discharge: HOME | End: 2024-04-05
Payer: MEDICARE

## 2024-04-05 VITALS
SYSTOLIC BLOOD PRESSURE: 114 MMHG | WEIGHT: 277 LBS | BODY MASS INDEX: 36.71 KG/M2 | DIASTOLIC BLOOD PRESSURE: 82 MMHG | HEIGHT: 73 IN | HEART RATE: 85 BPM

## 2024-04-05 DIAGNOSIS — R07.89 OTHER CHEST PAIN: ICD-10-CM

## 2024-04-05 LAB
AORTIC ROOT: 4 CM
APICAL FOUR CHAMBER EJECTION FRACTION: 53 %
ASCENDING AORTA: 3.9 CM
BSA FOR ECHO PROCEDURE: 2.47 M2
E WAVE DECELERATION TIME: 201 MS
E/A RATIO: 0.85
FRACTIONAL SHORTENING: 28 (ref 28–44)
INTERVENTRICULAR SEPTUM IN DIASTOLE (PARASTERNAL SHORT AXIS VIEW): 1.1 CM
INTERVENTRICULAR SEPTUM: 1.1 CM (ref 0.6–1.1)
LAAS-AP2: 28 CM2
LAAS-AP4: 21.3 CM2
LEFT ATRIUM SIZE: 5.3 CM
LEFT ATRIUM VOLUME (MOD BIPLANE): 76 ML
LEFT ATRIUM VOLUME INDEX (MOD BIPLANE): 30.8 ML/M2
LEFT INTERNAL DIMENSION IN SYSTOLE: 2.9 CM (ref 2.1–4)
LEFT VENTRICULAR INTERNAL DIMENSION IN DIASTOLE: 4 CM (ref 3.5–6)
LEFT VENTRICULAR POSTERIOR WALL IN END DIASTOLE: 1.1 CM
LEFT VENTRICULAR STROKE VOLUME: 38 ML
LVSV (TEICH): 38 ML
MV E'TISSUE VEL-SEP: 10 CM/S
MV PEAK A VEL: 0.75 M/S
MV PEAK E VEL: 64 CM/S
MV STENOSIS PRESSURE HALF TIME: 58 MS
MV VALVE AREA P 1/2 METHOD: 3.79
RA PRESSURE ESTIMATED: 5 MMHG
RIGHT ATRIUM AREA SYSTOLE A4C: 24.8 CM2
RIGHT VENTRICLE ID DIMENSION: 4.1 CM
RV PSP: 30 MMHG
SL CV LEFT ATRIUM LENGTH A2C: 6.7 CM
SL CV LV EF: 60
SL CV PED ECHO LEFT VENTRICLE DIASTOLIC VOLUME (MOD BIPLANE) 2D: 70 ML
SL CV PED ECHO LEFT VENTRICLE SYSTOLIC VOLUME (MOD BIPLANE) 2D: 33 ML
TR MAX PG: 25 MMHG
TR PEAK VELOCITY: 2.5 M/S
TRICUSPID ANNULAR PLANE SYSTOLIC EXCURSION: 2.6 CM
TRICUSPID VALVE PEAK REGURGITATION VELOCITY: 2.48 M/S

## 2024-04-05 PROCEDURE — 93306 TTE W/DOPPLER COMPLETE: CPT | Performed by: INTERNAL MEDICINE

## 2024-04-05 PROCEDURE — 93306 TTE W/DOPPLER COMPLETE: CPT

## 2024-04-08 ENCOUNTER — OFFICE VISIT (OUTPATIENT)
Dept: PHYSICAL THERAPY | Facility: OTHER | Age: 74
End: 2024-04-08
Payer: MEDICARE

## 2024-04-08 DIAGNOSIS — M17.0 PRIMARY OSTEOARTHRITIS OF BOTH KNEES: Primary | ICD-10-CM

## 2024-04-08 PROCEDURE — 97530 THERAPEUTIC ACTIVITIES: CPT

## 2024-04-08 PROCEDURE — 97110 THERAPEUTIC EXERCISES: CPT

## 2024-04-08 PROCEDURE — 97112 NEUROMUSCULAR REEDUCATION: CPT

## 2024-04-08 NOTE — PROGRESS NOTES
Daily Note     Today's date: 2024  Patient name: Solis Dos Santos  : 1950  MRN: [id number]  Referring provider: Leonard Gallagher,*  Dx:   Encounter Diagnosis     ICD-10-CM    1. Primary osteoarthritis of both knees  M17.0                   Start Time: 1345  Stop Time: 1445  Total time in clinic (min): 60 minutes    Subjective: After being prompted by PT, pt notes potential history of leg length discrepancy following hip replacement multiple years ago; wore a L heel lift many years ago and stopped.      Objective: See treatment diary below    R 94 cm  L 93 cm    Assessment: Tolerated treatment well. Session focused on assessment of foot posture and gait, as patient demonstrates over-pronation of R foot in static standing as well as asymmetrical terminal knee extension in standing with R knee remaining slightly flexed. Asymmetrical foot posture prompted PT to measure LLD, as pt demonstrates shorter LLE by 1cm. Added 7 mm heel lift to L shoe, pt noted feeling more balanced following intervention and demonstrated improved SLS balance. Patient would benefit from continued PT      Plan: Continue per plan of care.       POC expires Unit limit Auth Expiration date PT/OT + Visit Limit?   3/6/2024 BOMN N/a BOMN                             Visit/Unit Tracking  AUTH Status:  Date 14 15 16 17 18 19 20 21 22 23 24 25   No Used 2/22 2/26 3/7 3/11 3/14 3/18 3/21 3/25 3/28 4/1 4/4 4/8    Remaining                      Precautions: none      Visit #:  22 23 24 25   Date: 3/25 3/28 4/1 4/4 4/8   Manuals        PROM: B knees        Lateral glide: B hips        Tibiofemoral glides PA        Patellar glides sup/inf        STM / IASTM B popliteus / calves SFP B popliteus / calves SFP      EPAT                Neuro Re-Ed        Standing clamshells c short foot        Standing hip ABd        Lateral band walks Med mini a' 2x40'ea       Monster walks        Locked clamshells        Pball Hip Hikes    x10ea           "  Standing TKE        Step-ups        Eccentric step-downs F/L        FFESS        Chinese Squat hold                RDL        Seated GM        H/L adductor ball squeeze isometric        Bridging c ball squeeze                        SLS balance  OG 4x30\"  OG 10x10\" OG 5x20\"    Standing marches c tidal tank 3x30\"   B-stance trunk rotations        SL RDL        Tandem balance c perturbations                Angelic lateral step-overs        Figure-8 Weave Fwd/Back        Agility ladder walkthrough:   - 1 in each  - 2 in each  - Lateral         Ther Ex        Bike X10 min X10 min X15 min X10 min X10 min   Deadmill retro                1/2 Kneeling KB hip opener        Seated hamstring / gastroc stretch  2x45\"ea      Slant board calf stretch 3x30\"ea 2x45\"ea 2x45\"ea     Supine quad S        Seated figure-4 hip ER S 2x30\"ea               SLR        SAQ        LAQ  Ancore 20# 3x15      Seated  hamstring curls                Leg press # increase by 5# increments  X15ea 90# x15  100# x15  110# x15  120# x15  130# x15 90# x15  100# x15  110# x15  120# x15  130# x15 95# x15  105# x15  115# x15  125# x15  150# x12            Standing hip flexor march March in place on foam box 2x10ea    Suitcase carries    20# x3 trips ea    Pallof press + walkouts    Med mini a' + GTB x5ea            Standing Tib Raise + HF march iso hold 3x10ea               Ther Activity        Chair squat 20\"        Step-ups F/L   6\" foam box c march 3x8 6\" foam box 2x10ea    Deadlift  8\" elevated 25# DB 2x15              Foot posture & gait assessment     SFP   Gait Training                        Modalities                                "

## 2024-04-10 DIAGNOSIS — N40.1 BENIGN PROSTATIC HYPERPLASIA WITH WEAK URINARY STREAM: ICD-10-CM

## 2024-04-10 DIAGNOSIS — R39.12 BENIGN PROSTATIC HYPERPLASIA WITH WEAK URINARY STREAM: ICD-10-CM

## 2024-04-10 RX ORDER — TAMSULOSIN HYDROCHLORIDE 0.4 MG/1
0.4 CAPSULE ORAL
Qty: 90 CAPSULE | Refills: 0 | Status: SHIPPED | OUTPATIENT
Start: 2024-04-10

## 2024-04-11 ENCOUNTER — OFFICE VISIT (OUTPATIENT)
Dept: PHYSICAL THERAPY | Facility: OTHER | Age: 74
End: 2024-04-11
Payer: MEDICARE

## 2024-04-11 DIAGNOSIS — M17.0 PRIMARY OSTEOARTHRITIS OF BOTH KNEES: Primary | ICD-10-CM

## 2024-04-11 PROCEDURE — 97112 NEUROMUSCULAR REEDUCATION: CPT

## 2024-04-11 PROCEDURE — 97110 THERAPEUTIC EXERCISES: CPT

## 2024-04-11 NOTE — PROGRESS NOTES
"Daily Note     Today's date: 2024  Patient name: Solis Dos Santos  : 1950  MRN: [id number]  Referring provider: Leonard Gallagher,*  Dx:   Encounter Diagnosis     ICD-10-CM    1. Primary osteoarthritis of both knees  M17.0                     Start Time: 1123  Stop Time: 1146  Total time in clinic (min): 23 minutes    Subjective: Pt notes significant improvements in balance / stability since inclusion of heel L lift.      Objective: See treatment diary below      Assessment: Tolerated treatment well. Continuation of functional strength & dynamic balance training today, noted improved balance & balance confidence since adding L heel lift. Added 1/2 kneeling lateral lunge for hip mobility. Patient would benefit from continued PT      Plan: Continue per plan of care.       POC expires Unit limit Auth Expiration date PT/OT + Visit Limit?   3/6/2024 BOMN N/a BOMN                             Visit/Unit Tracking  AUTH Status:  Date 19 20 21 22 23 24 25 26   No Used 3/18 3/21 3/25 3/28 4/1 4/4 4/8 4/11    Remaining                  Precautions: none      Visit #:    Date: 3/25 3/28 4/1 4/4 4/8 4/11   Manuals         PROM: B knees         Lateral glide: B hips         Tibiofemoral glides PA         Patellar glides sup/inf         STM / IASTM B popliteus / calves SFP B popliteus / calves SFP       EPAT                  Neuro Re-Ed         Standing clamshells c short foot         Standing hip ABd         Lateral band walks Med mini a' 2x40'ea     Heavy mini a' 6x10'   Monster walks         Locked clamshells         Pball Hip Hikes    x10ea              Standing TKE         Step-ups         Eccentric step-downs F/L         FFESS         French Squat hold                  RDL         Seated GM         H/L adductor ball squeeze isometric         Bridging c ball squeeze                           SLS balance  OG 4x30\"  OG 10x10\" OG 5x20\"    Standing marches c tidal tank 3x30\" OG 3x20\"   B-stance " "trunk rotations         SL RDL         Tandem balance c perturbations                  Angelic lateral step-overs         Figure-8 Weave Fwd/Back         Agility ladder walkthrough:   - 1 in each  - 2 in each  - Lateral       X1 trip ea   Ther Ex         Bike X10 min X10 min X15 min X10 min X10 min X10 min   Deadmill retro                  1/2 Kneeling hip opener      10x10\"ea   Seated hamstring / gastroc stretch  2x45\"ea       Slant board calf stretch 3x30\"ea 2x45\"ea 2x45\"ea   2x45\"   Supine quad S         Seated figure-4 hip ER S 2x30\"ea                 SLR         SAQ         LAQ  Ancore 20# 3x15       Seated  hamstring curls                  Leg press # increase by 5# increments  X15ea 90# x15  100# x15  110# x15  120# x15  130# x15 90# x15  100# x15  110# x15  120# x15  130# x15 95# x15  105# x15  115# x15  125# x15  150# x12  95# x15  105# x15  115# x15  125# x15  130# x15            Standing hip flexor march March in place on foam box 2x10ea     Suitcase carries    20# x3 trips ea     Pallof press + walkouts    Med mini a' + GTB x5ea              Standing Tib Raise + HF march iso hold 3x10ea                 Ther Activity         Chair squat 20\"         Step-ups F/L   6\" foam box c march 3x8 6\" foam box 2x10ea  6\" box + Airex 3x10ea   Deadlift  8\" elevated 25# DB 2x15                Foot posture & gait assessment     SFP    Gait Training                           Modalities                                   "

## 2024-04-15 ENCOUNTER — OFFICE VISIT (OUTPATIENT)
Dept: PHYSICAL THERAPY | Facility: OTHER | Age: 74
End: 2024-04-15
Payer: MEDICARE

## 2024-04-15 DIAGNOSIS — M17.0 PRIMARY OSTEOARTHRITIS OF BOTH KNEES: Primary | ICD-10-CM

## 2024-04-15 PROCEDURE — 97110 THERAPEUTIC EXERCISES: CPT

## 2024-04-15 PROCEDURE — 97112 NEUROMUSCULAR REEDUCATION: CPT

## 2024-04-15 NOTE — PROGRESS NOTES
Daily Note     Today's date: 4/15/2024  Patient name: Solis Dos Santos  : 1950  MRN: [id number]  Referring provider: Leonard Gallagher,*  Dx:   Encounter Diagnosis     ICD-10-CM    1. Primary osteoarthritis of both knees  M17.0             Start Time: 839  Stop Time: 917  Total time in clinic (min): 38 minutes    Subjective: Pt reports the L knee still does not feel recovered from last visit.      Objective: See treatment diary below      Assessment: Tolerated treatment well. Pt skipped leg press exercise today due to increased irritability of L knee. Pt noted relief in sx following cyriax distraction MWM of L knee; continues to be limited by L knee WB tolerance resulting in pain secondary to degenerative changes. Patient would benefit from continued PT      Plan: Continue per plan of care.       POC expires Unit limit Auth Expiration date PT/OT + Visit Limit?   3/6/2024 BOMN N/a BOMN                             Visit/Unit Tracking  AUTH Status:  Date    No Used 3/18 3/21 3/25 3/28 4/1 4/4 4/8 4/11 4/15    Remaining                   Precautions: none      Visit #:    Date: 3/25 3/28 4/1 4/4 4/8 4/11 4/15   Manuals          PROM: B knees          Lateral glide: B hips          Tibiofemoral glides PA       Cyriax distraction MWM x3 min    SFP   Patellar glides sup/inf          STM / IASTM B popliteus / calves SFP B popliteus / calves SFP        EPAT                    Neuro Re-Ed          Standing clamshells c short foot          Standing hip ABd          Lateral band walks Med mini a' 2x40'ea     Heavy mini a' 6x10'    Monster walks          Locked clamshells          Pball Hip Hikes    x10ea                Standing TKE          Step-ups          Eccentric step-downs F/L          FFESS          Austrian Squat hold                    RDL          Seated GM          H/L adductor ball squeeze isometric          Bridging c ball squeeze                            "   SLS balance  OG 4x30\"  OG 10x10\" OG 5x20\"    Standing marches c tidal tank 3x30\" OG 3x20\" OG 3x30\"       B-stance trunk rotations          SL RDL          Tandem balance c perturbations       OG 2x30\"             Angelic lateral step-overs          Figure-8 Weave Fwd/Back          Agility ladder walkthrough:   - 1 in each  - 2 in each  - Lateral       X1 trip ea    Ther Ex          Bike X10 min X10 min X15 min X10 min X10 min X10 min X10 min   Deadmill retro       2 X 2min             1/2 Kneeling hip opener      10x10\"ea    Seated hamstring / gastroc stretch  2x45\"ea        Slant board calf stretch 3x30\"ea 2x45\"ea 2x45\"ea   2x45\"    Supine quad S          Seated figure-4 hip ER S 2x30\"ea                   SLR          SAQ          LAQ  Ancore 20# 3x15        Seated  hamstring curls                    Leg press # increase by 5# increments  X15ea 90# x15  100# x15  110# x15  120# x15  130# x15 90# x15  100# x15  110# x15  120# x15  130# x15 95# x15  105# x15  115# x15  125# x15  150# x12  95# x15  105# x15  115# x15  125# x15  130# x15              Standing hip flexor march March in place on foam box 2x10ea      Suitcase carries    20# x3 trips ea      Pallof press + walkouts    Med mini a' + GTB x5ea   GTB x10ea             Standing Tib Raise + HF march iso hold 3x10ea                   Ther Activity          Chair squat 20\"          Step-ups F/L   6\" foam box c march 3x8 6\" foam box 2x10ea  6\" box + Airex 3x10ea 6\" box x10ea    + Airex x10ea   Deadlift  8\" elevated 25# DB 2x15                  Foot posture & gait assessment     SFP     Gait Training                              Modalities                                      "

## 2024-04-18 ENCOUNTER — OFFICE VISIT (OUTPATIENT)
Dept: PHYSICAL THERAPY | Facility: OTHER | Age: 74
End: 2024-04-18
Payer: MEDICARE

## 2024-04-18 DIAGNOSIS — M17.0 PRIMARY OSTEOARTHRITIS OF BOTH KNEES: Primary | ICD-10-CM

## 2024-04-18 PROCEDURE — 97110 THERAPEUTIC EXERCISES: CPT

## 2024-04-18 PROCEDURE — 97140 MANUAL THERAPY 1/> REGIONS: CPT

## 2024-04-18 PROCEDURE — 97112 NEUROMUSCULAR REEDUCATION: CPT

## 2024-04-18 NOTE — PROGRESS NOTES
"Daily Note     Today's date: 2024  Patient name: Solis Dos Santos  : 1950  MRN: [id number]  Referring provider: Leonard Gallagher,*  Dx:   Encounter Diagnosis     ICD-10-CM    1. Primary osteoarthritis of both knees  M17.0             Start Time: 1123  Stop Time: 1216  Total time in clinic (min): 53 minutes    Subjective: Pt reports feeling much better with later time of day session.      Objective: See treatment diary below      Assessment: Tolerated treatment well. Added M4 Tank forward sled pushes and backwards walking with no complaints other than muscle fatigue. Patient would benefit from continued PT      Plan: Continue per plan of care.       POC expires Unit limit Auth Expiration date PT/OT + Visit Limit?   3/6/2024 BOMN N/a BOMN                             Visit/Unit Tracking  AUTH Status:  Date    No Used 3/18 3/21 3/25 3/28 4/1 4/4 4/8 4/11 4/15 4/18    Remaining                    Precautions: none      Visit #:    Date: 3/25 3/28 4/1 4/4 4/8 4/11 4/15 4/18   Manuals           PROM: B knees           Lateral glide: B hips           Tibiofemoral glides PA       Cyriax distraction MWM x3 min    SFP    Patellar glides sup/inf           STM / IASTM B popliteus / calves SFP B popliteus / calves SFP      B genu + popliteus    SFP   EPAT                      Neuro Re-Ed           Standing clamshells c short foot           Standing hip ABd           Lateral band walks Med mini a' 2x40'ea     Heavy mini a' 6x10'     Monster walks           Locked clamshells           Pball Hip Hikes    x10ea                  Standing TKE           Step-ups           Eccentric step-downs F/L           FFESS           Liberian Squat hold                      RDL           Seated GM           H/L adductor ball squeeze isometric           Bridging c ball squeeze                                 SLS balance  OG 4x30\"  OG 10x10\" OG 5x20\"    Standing marches c tidal " "tank 3x30\" OG 3x20\" OG 3x30\"        B-stance trunk rotations           SL RDL           Tandem balance c perturbations       OG 2x30\"               Angelic lateral step-overs           Figure-8 Weave Fwd/Back           Agility ladder walkthrough:   - 1 in each  - 2 in each  - Lateral       X1 trip ea     Ther Ex           Bike X10 min X10 min X15 min X10 min X10 min X10 min X10 min 10 min   Deadmill retro       2 X 2min               1/2 Kneeling hip opener      10x10\"ea     Seated hamstring / gastroc stretch  2x45\"ea         Slant board calf stretch 3x30\"ea 2x45\"ea 2x45\"ea   2x45\"     Supine quad S           Seated figure-4 hip ER S 2x30\"ea                     SLR           SAQ           LAQ  Ancore 20# 3x15         Seated  hamstring curls                      Leg press # increase by 5# increments  X15ea 90# x15  100# x15  110# x15  120# x15  130# x15 90# x15  100# x15  110# x15  120# x15  130# x15 95# x15  105# x15  115# x15  125# x15  150# x12  95# x15  105# x15  115# x15  125# x15  130# x15  110# 3x15              Standing hip flexor march March in place on foam box 2x10ea       Suitcase carries    20# x3 trips ea       Pallof press + walkouts    Med mini a' + GTB x5ea   GTB x10ea               Standing Tib Raise + HF march iso hold 3x10ea                     Ther Activity           Chair squat 20\"           Step-ups F/L   6\" foam box c march 3x8 6\" foam box 2x10ea  6\" box + Airex 3x10ea 6\" box x10ea    + Airex x10ea 6\" box c SLS hold 2x10ea   Deadlift  8\" elevated 25# DB 2x15         M4 Tank        Lvl 3 + Ananth    4x90'                         Foot posture & gait assessment     SFP      Gait Training                                 Modalities                                         "

## 2024-04-22 ENCOUNTER — OFFICE VISIT (OUTPATIENT)
Dept: PHYSICAL THERAPY | Facility: OTHER | Age: 74
End: 2024-04-22
Payer: MEDICARE

## 2024-04-22 DIAGNOSIS — M17.0 PRIMARY OSTEOARTHRITIS OF BOTH KNEES: Primary | ICD-10-CM

## 2024-04-22 PROCEDURE — 97110 THERAPEUTIC EXERCISES: CPT

## 2024-04-22 PROCEDURE — 97112 NEUROMUSCULAR REEDUCATION: CPT

## 2024-04-22 NOTE — PROGRESS NOTES
Daily Note     Today's date: 2024  Patient name: Solis Dos Santos  : 1950  MRN: [id number]  Referring provider: Leonard Gallagher,*  Dx:   Encounter Diagnosis     ICD-10-CM    1. Primary osteoarthritis of both knees  M17.0               Start Time: 953  Stop Time: 1046  Total time in clinic (min): 53 minutes    Subjective: Pt reports no knee issues this past weekend; reported being anxious because he was experiencing discomfort in prostate region. Reports no discomfort today, has appointment with urology in late May.      Objective: See treatment diary below      Assessment: Tolerated treatment well. Pt continues to demonstrate limitations in functional LE strength and balance with primary complaint of decreased balance confidence and L knee instability secondary to degenerative changes. Patient would benefit from continued PT      Plan: Continue per plan of care.       POC expires Unit limit Auth Expiration date PT/OT + Visit Limit?   3/6/2024 BOMN N/a BOMN                             Visit/Unit Tracking  AUTH Status:  Date  24 25 26 27 28 29   No Used 3/18 3/21 3/25 3/28 4/1 4/4 4/8 4/11 4/15 4/18 4/22    Remaining                     Precautions: none      Visit #:  23 24 25 26 27 28 29   Date: 3/25 3/28 4/1 4/4 4/8 4/11 4/15 4/18 4/22   Manuals            PROM: B knees            Lateral glide: B hips            Tibiofemoral glides PA       Cyriax distraction MWM x3 min    SFP  Cyriax distraction MWM x3 min    SFP   Patellar glides sup/inf            STM / IASTM B popliteus / calves SFP B popliteus / calves SFP      B genu + popliteus    SFP    EPAT                        Neuro Re-Ed            Standing clamshells c short foot            Standing hip ABd            Lateral band walks Med mini a' 2x40'ea     Heavy mini a' 6x10'      Monster walks            Locked clamshells            Pball Hip Hikes    x10ea                    Standing TKE            Step-ups           "  Eccentric step-downs F/L            FFESS            [place] Squat hold                        RDL            Seated GM            H/L adductor ball squeeze isometric            Bridging c ball squeeze                                    SLS balance  OG 4x30\"  OG 10x10\" OG 5x20\"    Standing marches c tidal tank 3x30\" OG 3x20\" OG 3x30\"      OG 2x30\"ea   B-stance trunk rotations            SL RDL         12\" + cone  2x10ea c UE support   Tandem balance c perturbations       OG 2x30\"                 Angelic lateral step-overs            Figure-8 Weave Fwd/Back            Agility ladder walkthrough:   - 1 in each  - 2 in each  - Lateral       X1 trip ea      Ther Ex            Bike X10 min X10 min X15 min X10 min X10 min X10 min X10 min 10 min 10 min   Deadmill retro       2 X 2min                 1/2 Kneeling hip opener      10x10\"ea      Seated hamstring / gastroc stretch  2x45\"ea       3x30\"   Slant board calf stretch 3x30\"ea 2x45\"ea 2x45\"ea   2x45\"   3x30\"   Supine quad S            Seated figure-4 hip ER S 2x30\"ea        3x30\"               SLR            SAQ            LAQ  Ancore 20# 3x15          Seated  hamstring curls                        Leg press # increase by 5# increments  X15ea 90# x15  100# x15  110# x15  120# x15  130# x15 90# x15  100# x15  110# x15  120# x15  130# x15 95# x15  105# x15  115# x15  125# x15  150# x12  95# x15  105# x15  115# x15  125# x15  130# x15  110# 3x15 115# 3x15               Standing hip flexor march March in place on foam box 2x10ea        Suitcase carries    20# x3 trips ea        Pallof press + walkouts    Med mini a' + GTB x5ea   GTB x10ea                 Standing Tib Raise + HF march iso hold 3x10ea                       Ther Activity            Chair squat 20\"            Step-ups F/L   6\" foam box c march 3x8 6\" foam box 2x10ea  6\" box + Airex 3x10ea 6\" box x10ea    + Airex x10ea 6\" box c SLS hold 2x10ea    Deadlift  8\" elevated 25# DB 2x15          M4 Tank      "   Lvl 3 + Ananth    4x90'                            Foot posture & gait assessment     SFP       Gait Training                                    Modalities

## 2024-04-24 ENCOUNTER — TELEPHONE (OUTPATIENT)
Dept: INTERNAL MEDICINE CLINIC | Facility: CLINIC | Age: 74
End: 2024-04-24

## 2024-04-24 NOTE — TELEPHONE ENCOUNTER
----- Message from Riki Noguera MD sent at 4/23/2024  6:00 PM EDT -----  Please make appointment for June.

## 2024-04-25 ENCOUNTER — OFFICE VISIT (OUTPATIENT)
Dept: PHYSICAL THERAPY | Facility: OTHER | Age: 74
End: 2024-04-25
Payer: MEDICARE

## 2024-04-25 DIAGNOSIS — M17.0 PRIMARY OSTEOARTHRITIS OF BOTH KNEES: Primary | ICD-10-CM

## 2024-04-25 PROCEDURE — 97112 NEUROMUSCULAR REEDUCATION: CPT

## 2024-04-25 PROCEDURE — 97110 THERAPEUTIC EXERCISES: CPT

## 2024-04-25 NOTE — PROGRESS NOTES
Daily Note     Today's date: 2024  Patient name: Solis Dos Sanots  : 1950  MRN: [id number]  Referring provider: Leonard Gallagher,*  Dx:   Encounter Diagnosis     ICD-10-CM    1. Primary osteoarthritis of both knees  M17.0               Start Time: 854  Stop Time: 947  Total time in clinic (min): 53 minutes    Subjective: Pt requesting to transition to 1x/wk until he meets with orthopedic surgeon.       Objective: See treatment diary below      Assessment: Tolerated treatment well. Session focused on LE strength and balance. Pt continues to demonstrate limitations in functional LE strength and balance with primary complaint of decreased balance confidence and L knee instability secondary to degenerative changes. PT & patient agreeable to transitioning to 1x/wk until pt meets with orthopedic surgeon; pt hopeful to get TKA for problematic L knee. Patient would benefit from continued PT      Plan: Continue per plan of care.       POC expires Unit limit Auth Expiration date PT/OT + Visit Limit?   3/6/2024 BOMN N/a BOMN                             Visit/Unit Tracking  AUTH Status:  Date 19 20 21 22 23 24 25 26 27 28 29   No Used 3/18 3/21 3/25 3/28 4/1 4/4 4/8 4/11 4/15 4/18 4/22    Remaining                     Precautions: none      Visit #: 24 25 26 27 28 29 30   Date: 4/4 4/8 4/11 4/15 4/18 4/22 4/24   Manuals          PROM: B knees          Lateral glide: B hips          Tibiofemoral glides PA    Cyriax distraction MWM x3 min    SFP  Cyriax distraction MWM x3 min    SFP    Patellar glides sup/inf          STM / IASTM     B genu + popliteus    SFP     EPAT                    Neuro Re-Ed          Standing clamshells c short foot          Standing hip ABd          Lateral band walks   Heavy mini a' 6x10'       Monster walks          Locked clamshells          Pball Hip Hikes x10ea                   Standing TKE          Step-ups          Eccentric step-downs F/L          FFESS          [place] Squat  "hold                    RDL          Seated GM          H/L adductor ball squeeze isometric          Bridging c ball squeeze                              SLS balance OG 10x10\" OG 5x20\"    Standing marches c tidal tank 3x30\" OG 3x20\" OG 3x30\"      OG 2x30\"ea OG 3x30\"   B-stance trunk rotations          SL RDL      12\" + cone  2x10ea c UE support    Tandem balance c perturbations    OG 2x30\"                Angelic lateral step-overs          Figure-8 Weave Fwd/Back          Agility ladder walkthrough:   - 1 in each  - 2 in each  - Lateral    X1 trip ea       Ther Ex          Bike X10 min X10 min X10 min X10 min 10 min 10 min 10 min   Deadmill retro    2 X 2min                1/2 Kneeling hip opener   10x10\"ea       Seated hamstring / gastroc stretch      3x30\"    Slant board calf stretch   2x45\"   3x30\"    Supine quad S          Seated figure-4 hip ER S      3x30\"              SLR          SAQ          LAQ       MTB 3x20   Seated  hamstring curls       Standing 2.5# 2x15ea             Leg press 95# x15  105# x15  115# x15  125# x15  150# x12  95# x15  105# x15  115# x15  125# x15  130# x15  110# 3x15 115# 3x15 120# 4x10             Standing hip flexor march March in place on foam box 2x10ea         Suitcase carries 20# x3 trips ea         Pallof press + walkouts Med mini a' + GTB x5ea   GTB x10ea                Standing Tib Raise                    Ther Activity          Chair squat 20\"          Step-ups F/L 6\" foam box 2x10ea  6\" box + Airex 3x10ea 6\" box x10ea    + Airex x10ea 6\" box c SLS hold 2x10ea  6\" lateral 3x10ea   Deadlift          M4 Tank     Lvl 3 + Ananth    4x90'                         Foot posture & gait assessment  SFP        Gait Training                              Modalities                                      "

## 2024-04-29 ENCOUNTER — OFFICE VISIT (OUTPATIENT)
Dept: PHYSICAL THERAPY | Facility: OTHER | Age: 74
End: 2024-04-29
Payer: MEDICARE

## 2024-04-29 ENCOUNTER — OFFICE VISIT (OUTPATIENT)
Dept: PODIATRY | Facility: CLINIC | Age: 74
End: 2024-04-29
Payer: MEDICARE

## 2024-04-29 VITALS
HEART RATE: 74 BPM | DIASTOLIC BLOOD PRESSURE: 80 MMHG | RESPIRATION RATE: 18 BRPM | WEIGHT: 277 LBS | SYSTOLIC BLOOD PRESSURE: 131 MMHG | HEIGHT: 73 IN | BODY MASS INDEX: 36.71 KG/M2

## 2024-04-29 DIAGNOSIS — M17.0 PRIMARY OSTEOARTHRITIS OF BOTH KNEES: Primary | ICD-10-CM

## 2024-04-29 DIAGNOSIS — I73.9 PERIPHERAL VASCULAR DISEASE, UNSPECIFIED (HCC): Primary | ICD-10-CM

## 2024-04-29 PROCEDURE — G0127 TRIM NAIL(S): HCPCS | Performed by: PODIATRIST

## 2024-04-29 PROCEDURE — 97110 THERAPEUTIC EXERCISES: CPT

## 2024-04-29 NOTE — PROGRESS NOTES
Established patient with class findings presents for nail care.  Vascular exam:  DP  0/4 bilateral; PT  0 4 bilateral   Dermatological exam:  Each toenail is thick and  dystrophic.  Diagnosis:  PVD  Treatment: Trimmed multiple dystrophic toenails.     Nail removal    Date/Time: 4/29/2024 10:00 AM    Performed by: Tal Rosales DPM  Authorized by: Tal Rosales DPM    Nails trimmed:     Number of nails trimmed:  10

## 2024-04-29 NOTE — PROGRESS NOTES
Daily Note     Today's date: 2024  Patient name: Solis Dos Santos  : 1950  MRN: [id number]  Referring provider: Leonard Gallagher,*  Dx:   Encounter Diagnosis     ICD-10-CM    1. Primary osteoarthritis of both knees  M17.0                 Start Time: 1124  Stop Time: 1147  Total time in clinic (min): 23 minutes    Subjective: Pt reports continued L knee pain.      Objective: See treatment diary below      Assessment: Tolerated treatment well. Session focused on LE strength and balance; tolerated strength progressions well with no complaints other than muscle fatigue impacting balance training. Pt continues to demonstrate limitations in functional LE strength and balance with primary complaint of decreased balance confidence and L knee instability secondary to degenerative changes. Patient would benefit from continued PT      Plan: Continue per plan of care.       POC expires Unit limit Auth Expiration date PT/OT + Visit Limit?   3/6/2024 BOMN N/a BOMN                             Visit/Unit Tracking  AUTH Status:  Date 19 20 21 22 23 24 25 26 27 28 29 30 31   No Used 3/18 3/21 3/25 3/28 4/1 4/4 4/8 4/11 4/15 4/18 4/22 4/24 4/29    Remaining                       Precautions: none      Visit #: 24 25 26 27 28 29 30 31   Date: 4/4 4/8 4/11 4/15 4/18 4/22 4/24 4/29   Manuals           PROM: B knees           Lateral glide: B hips           Tibiofemoral glides PA    Cyriax distraction MWM x3 min    SFP  Cyriax distraction MWM x3 min    SFP     Patellar glides sup/inf           STM / IASTM     B genu + popliteus    SFP      EPAT                      Neuro Re-Ed           Standing clamshells c short foot           Standing hip ABd           Lateral band walks   Heavy mini a' 6x10'        Monster walks           Locked clamshells           Pball Hip Hikes x10ea                     Standing TKE           Step-ups           Eccentric step-downs F/L           FFESS           [place]ri[place] Squat hold                  "     RDL           Seated GM           H/L adductor ball squeeze isometric           Bridging c ball squeeze                                 SLS balance OG 10x10\" OG 5x20\"    Standing marches c tidal tank 3x30\" OG 3x20\" OG 3x30\"      OG 2x30\"ea OG 3x30\" OG 3x30\"   B-stance trunk rotations           SL RDL      12\" + cone  2x10ea c UE support     Tandem balance c perturbations    OG 2x30\"                  Angelic lateral step-overs           Figure-8 Weave Fwd/Back           Agility ladder walkthrough:   - 1 in each  - 2 in each  - Lateral    X1 trip ea        Ther Ex           Bike X10 min X10 min X10 min X10 min 10 min 10 min 10 min 10 min   Deadmill retro    2 X 2min                  1/2 Kneeling hip opener   10x10\"ea        Seated hamstring / gastroc stretch      3x30\"     Slant board calf stretch   2x45\"   3x30\"     Supine quad S           Seated figure-4 hip ER S      3x30\"                SLR           SAQ           LAQ       MTB 3x20 10# MTB 3x20   Seated  hamstring curls       Standing 2.5# 2x15ea               Leg press 95# x15  105# x15  115# x15  125# x15  150# x12  95# x15  105# x15  115# x15  125# x15  130# x15  110# 3x15 115# 3x15 120# 4x10 120# x10  125# 2x10  130# x10              Standing hip flexor march March in place on foam box 2x10ea          Suitcase carries 20# x3 trips ea          Pallof press + walkouts Med mini a' + GTB x5ea   GTB x10ea                  Standing Tib Raise                      Ther Activity           Chair squat 20\"           Step-ups F/L 6\" foam box 2x10ea  6\" box + Airex 3x10ea 6\" box x10ea    + Airex x10ea 6\" box c SLS hold 2x10ea  6\" lateral 3x10ea 6\" F x20ea   Deadlift           M4 Tank     Lvl 3 + Ananth    4x90'                            Foot posture & gait assessment  SFP         Gait Training                                 Modalities                                         "

## 2024-05-08 ENCOUNTER — OFFICE VISIT (OUTPATIENT)
Dept: PHYSICAL THERAPY | Facility: OTHER | Age: 74
End: 2024-05-08
Payer: MEDICARE

## 2024-05-08 DIAGNOSIS — M17.0 PRIMARY OSTEOARTHRITIS OF BOTH KNEES: Primary | ICD-10-CM

## 2024-05-08 PROCEDURE — 97140 MANUAL THERAPY 1/> REGIONS: CPT

## 2024-05-08 PROCEDURE — 97110 THERAPEUTIC EXERCISES: CPT

## 2024-05-08 NOTE — PROGRESS NOTES
"Daily Note     Today's date: 2024  Patient name: Solis Dos Santos  : 1950  MRN: [id number]  Referring provider: Leonard Gallagher,*  Dx:   Encounter Diagnosis     ICD-10-CM    1. Primary osteoarthritis of both knees  M17.0                   Start Time: 915  Stop Time: 953  Total time in clinic (min): 38 minutes    Subjective: Pt reports he has been lethargic lately, not feeling well enough to perform exercises at home. Notes high amounts of pain in L knee \"in sides of knee.\"      Objective: See treatment diary below      Assessment: Tolerated treatment well. Demonstrated an improvement in L knee pain today following lateral tibial glides with good carryover to pain-free strengthening. Continue with manuals as needed for pain relief. Patient would benefit from continued PT      Plan: Continue per plan of care.       POC expires Unit limit Auth Expiration date PT/OT + Visit Limit?   3/6/2024 BOMN N/a BOMN                             Visit/Unit Tracking  AUTH Status:  Date 19 20 21 22 23 24 25 26 27 28 29 30 31 32   No Used 3/18 3/21 3/25 3/28 4/1 4/4 4/8 4/11 4/15 4/18 4/22 4/24 4/29 5/8    Remaining                        Precautions: none      Visit #: 24 25 26 27 28 29 30 31 32   Date: 4/4 4/8 4/11 4/15 4/18 4/22 4/24 4/29 5/8   Manuals            PROM: B knees            Lateral glide: B hips            Lateral tibial glides         GIII 2x30\":B    Sustained hold + quad sets:B    SFP   Tibiofemoral glides PA    Cyriax distraction MWM x3 min    SFP  Cyriax distraction MWM x3 min    SFP      Patellar glides sup/inf            STM / IASTM     B genu + popliteus    SFP       EPAT                        Neuro Re-Ed            Standing clamshells c short foot            Standing hip ABd            Lateral band walks   Heavy mini a' 6x10'         Monster walks            Locked clamshells            Pball Hip Hikes x10ea                       Standing TKE            Step-ups            Eccentric " "step-downs F/L            FFESS            [place] Squat hold                        RDL            Seated GM            H/L adductor ball squeeze isometric            Bridging c ball squeeze                                    SLS balance OG 10x10\" OG 5x20\"    Standing marches c tidal tank 3x30\" OG 3x20\" OG 3x30\"      OG 2x30\"ea OG 3x30\" OG 3x30\" OG 3x30\"   B-stance trunk rotations            SL RDL      12\" + cone  2x10ea c UE support      Tandem balance c perturbations    OG 2x30\"                    Angelic lateral step-overs            Figure-8 Weave Fwd/Back            Agility ladder walkthrough:   - 1 in each  - 2 in each  - Lateral    X1 trip ea         Ther Ex            Bike X10 min X10 min X10 min X10 min 10 min 10 min 10 min 10 min 10 min   Deadmill retro    2 X 2min                    1/2 Kneeling hip opener   10x10\"ea         Seated hamstring / gastroc stretch      3x30\"      Slant board calf stretch   2x45\"   3x30\"      Supine quad S            Seated figure-4 hip ER S      3x30\"                  SLR            SAQ         10# 3x20   LAQ       MTB 3x20 10# MTB 3x20 BTB 3x30   Seated  hamstring curls       Standing 2.5# 2x15ea                 Leg press 95# x15  105# x15  115# x15  125# x15  150# x12  95# x15  105# x15  115# x15  125# x15  130# x15  110# 3x15 115# 3x15 120# 4x10 120# x10  125# 2x10  130# x10                Standing hip flexor march March in place on foam box 2x10ea           Suitcase carries 20# x3 trips ea           Pallof press + walkouts Med mini a' + GTB x5ea   GTB x10ea                    Standing Tib Raise                        Ther Activity            Chair squat 20\"         + band lateral tib glide x10    No pain   Step-ups F/L 6\" foam box 2x10ea  6\" box + Airex 3x10ea 6\" box x10ea    + Airex x10ea 6\" box c SLS hold 2x10ea  6\" lateral 3x10ea 6\" F x20ea    Deadlift            M4 Tank     Lvl 3 + Ananth    4x90'                               Foot posture & gait assessment  SFP        "   Gait Training                                    Modalities

## 2024-05-15 ENCOUNTER — APPOINTMENT (OUTPATIENT)
Dept: PHYSICAL THERAPY | Facility: OTHER | Age: 74
End: 2024-05-15
Payer: MEDICARE

## 2024-05-17 ENCOUNTER — OFFICE VISIT (OUTPATIENT)
Dept: URGENT CARE | Age: 74
End: 2024-05-17
Payer: MEDICARE

## 2024-05-17 ENCOUNTER — APPOINTMENT (OUTPATIENT)
Dept: RADIOLOGY | Age: 74
End: 2024-05-17
Payer: MEDICARE

## 2024-05-17 VITALS
SYSTOLIC BLOOD PRESSURE: 150 MMHG | DIASTOLIC BLOOD PRESSURE: 98 MMHG | HEIGHT: 73 IN | BODY MASS INDEX: 36.71 KG/M2 | TEMPERATURE: 98.8 F | HEART RATE: 80 BPM | RESPIRATION RATE: 24 BRPM | WEIGHT: 277 LBS | OXYGEN SATURATION: 100 %

## 2024-05-17 DIAGNOSIS — R05.9 COUGH, UNSPECIFIED TYPE: ICD-10-CM

## 2024-05-17 DIAGNOSIS — J20.9 ACUTE BRONCHITIS, UNSPECIFIED ORGANISM: Primary | ICD-10-CM

## 2024-05-17 PROCEDURE — 71046 X-RAY EXAM CHEST 2 VIEWS: CPT

## 2024-05-17 PROCEDURE — G0463 HOSPITAL OUTPT CLINIC VISIT: HCPCS

## 2024-05-17 PROCEDURE — 99213 OFFICE O/P EST LOW 20 MIN: CPT

## 2024-05-17 RX ORDER — AMOXICILLIN AND CLAVULANATE POTASSIUM 875; 125 MG/1; MG/1
1 TABLET, FILM COATED ORAL EVERY 12 HOURS SCHEDULED
Qty: 10 TABLET | Refills: 0 | Status: SHIPPED | OUTPATIENT
Start: 2024-05-17 | End: 2024-05-22

## 2024-05-17 RX ORDER — PREDNISONE 20 MG/1
20 TABLET ORAL DAILY
Qty: 5 TABLET | Refills: 0 | Status: SHIPPED | OUTPATIENT
Start: 2024-05-17 | End: 2024-05-22

## 2024-05-17 RX ORDER — PREDNISONE 20 MG/1
20 TABLET ORAL ONCE
Status: COMPLETED | OUTPATIENT
Start: 2024-05-17 | End: 2024-05-17

## 2024-05-17 RX ADMIN — PREDNISONE 20 MG: 20 TABLET ORAL at 20:31

## 2024-05-18 NOTE — PROGRESS NOTES
[facility]        NAME: Solis Dos Santos is a 73 y.o. male  : 1950    MRN: [id number]  DATE: May 17, 2024  TIME: 8:23 PM    Assessment and Plan   Acute bronchitis, unspecified organism [J20.9]  1. Acute bronchitis, unspecified organism  predniSONE tablet 20 mg    predniSONE 20 mg tablet    amoxicillin-clavulanate (AUGMENTIN) 875-125 mg per tablet      2. Cough, unspecified type  XR chest pa & lateral        CXR reviewed, no acute cardiopulmonary disease identified, pending final read per radiology.    Patient Instructions     Please take Augmentin 2 times daily for the next 5 days.  Please take prednisone daily for the next 5 days.  Ensure adequate fluid intake.  Follow up with PCP in 3-5 days.  Proceed to  ER if symptoms worsen.    If tests are performed, our office will contact you with results only if changes need to made to the care plan discussed with you at the visit. [facility]    Chief Complaint     Chief Complaint   Patient presents with    Cough     Pt states he has a cough for about one week. Pt states otc medications are not helping. Wife has bronchitis.          History of Present Illness       73-year-old male presenting for evaluation of persisting cough.  Patient states over the past week he has been experiencing a dry cough associate with runny nose and congestion.  He has been taking Robitussin DM with minimal relief.  He denies any chest pain, shortness of breath, fevers or chills.  Patient notes that his wife is currently being treated for bronchitis with steroids and antibiotics.    Cough  Associated symptoms include rhinorrhea. Pertinent negatives include no chest pain, fever or shortness of breath.       Review of Systems   Review of Systems   Constitutional:  Negative for fever.   HENT:  Positive for congestion and rhinorrhea.    Respiratory:  Positive for cough. Negative for shortness of breath.    Cardiovascular:  Negative for  chest pain.   All other systems reviewed and are negative.        Current Medications       Current Outpatient Medications:     amoxicillin-clavulanate (AUGMENTIN) 875-125 mg per tablet, Take 1 tablet by mouth every 12 (twelve) hours for 5 days, Disp: 10 tablet, Rfl: 0    predniSONE 20 mg tablet, Take 1 tablet (20 mg total) by mouth daily for 5 days, Disp: 5 tablet, Rfl: 0    atorvastatin (LIPITOR) 10 mg tablet, Take 1 tablet (10 mg total) by mouth daily at bedtime, Disp: 90 tablet, Rfl: 0    atorvastatin (LIPITOR) 10 mg tablet, Take 1 tablet (10 mg total) by mouth daily at bedtime, Disp: 90 tablet, Rfl: 0    cholecalciferol (VITAMIN D3) 1,000 units tablet, Take 1 tablet (1,000 Units total) by mouth daily, Disp: 90 tablet, Rfl: 3    cyanocobalamin (VITAMIN B-12) 500 MCG tablet, Take 1 tablet (500 mcg total) by mouth daily, Disp: 100 tablet, Rfl: 3    levothyroxine 150 mcg tablet, Take 1 tablet (150 mcg total) by mouth daily, Disp: 90 tablet, Rfl: 0    levothyroxine 150 mcg tablet, Take 1 tablet (150 mcg total) by mouth daily, Disp: 90 tablet, Rfl: 0    meloxicam (Mobic) 15 mg tablet, Take 1 tablet (15 mg total) by mouth daily (Patient not taking: Reported on 3/21/2024), Disp: 30 tablet, Rfl: 2    pantoprazole (PROTONIX) 40 mg tablet, Take 1 tablet (40 mg total) by mouth daily before breakfast, Disp: 30 tablet, Rfl: 2    tamsulosin (FLOMAX) 0.4 mg, Take 1 capsule (0.4 mg total) by mouth daily with dinner, Disp: 90 capsule, Rfl: 0    Current Facility-Administered Medications:     cyanocobalamin injection 1,000 mcg, 1,000 mcg, Intramuscular, Q30 Days, Riki Noguera MD, 1,000 mcg at 02/09/24 1255    predniSONE tablet 20 mg, 20 mg, Oral, Once,     Current Allergies     Allergies as of 05/17/2024    (No Known Allergies)            The following portions of the patient's history were reviewed and updated as appropriate: allergies, current medications, past family history, past medical history, past social history, past  surgical history and problem list.     Past Medical History:   Diagnosis Date    Anxiety disorder     Atherosclerotic heart disease of native coronary artery without angina pectoris     Benign prostatic hyperplasia without lower urinary tract symptoms     Cancer (HCC)     bladder    Clotting disorder (HCC) 1/25/2023    Blood in urine    Disease of thyroid gland     hypo    Dyslipidemia     GERD (gastroesophageal reflux disease)     Hypertension     Impaired fasting blood sugar     Kidney stone     Low back pain     Obesity     Osteoarthritis     last assesed 6-6-16    Other chest pain     Paresthesia of skin     Polyneuropathy     last assesed 5-8-17    Pure hypercholesterolemia     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)     Urinary tract infection     Wears glasses        Past Surgical History:   Procedure Laterality Date    BACK SURGERY      CHOLECYSTECTOMY      COLONOSCOPY  02/06/2019    CYSTOSCOPY N/A 04/26/2023    Procedure: CYSTOSCOPY w/ bladder biopsy;  Surgeon: Geo Bentley MD;  Location: [facility];  Service: Urology    HIP SURGERY  August 2019    Replacement    JOINT REPLACEMENT  August 2019    Hip replacement    OK ARTHRP ACETBLR/PROX FEM PROSTC AGRFT/ALGRFT Right 08/05/2019    Procedure: ARTHROPLASTY HIP TOTAL;  Surgeon: Leonard Gallagher MD;  Location: [facility];  Service: Orthopedics    OK CYSTO W/REMOVAL OF LESIONS SMALL N/A 03/23/2023    Procedure: TRANSURETHRAL RESECTION OF BLADDER TUMOR (TURBT), mitomycin ;  Surgeon: Geo Bentley MD;  Location: [facility];  Service: Urology    OK CYSTO W/REMOVAL OF LESIONS SMALL N/A 04/26/2023    Procedure: TRANSURETHRAL RESECTION OF BLADDER TUMOR (TURBT), cystoscopy with bladder biopsy;  Surgeon: Geo Bentley MD;  Location: [facility];  Service: Urology    TONSILLECTOMY         Family History   Problem Relation Age of Onset    Arthritis Other     Heart disease Father     Arthritis Mother   "        Medications have been verified.        Objective   /98   Pulse 80   Temp 98.8 °F (37.1 °C)   Resp (!) 24   Ht 6' 1\" (1.854 m)   Wt 126 kg (277 lb)   SpO2 100%   BMI 36.55 kg/m²        Physical Exam     Physical Exam  Vitals and nursing note reviewed.   Constitutional:       General: He is not in acute distress.     Appearance: Normal appearance. He is not ill-appearing, toxic-appearing or diaphoretic.   HENT:      Head: Normocephalic and atraumatic.      Nose: Congestion present. No rhinorrhea.      Mouth/Throat:      Mouth: Mucous membranes are moist.   Eyes:      General:         Right eye: No discharge.         Left eye: No discharge.   Cardiovascular:      Rate and Rhythm: Normal rate and regular rhythm.      Pulses: Normal pulses.      Heart sounds: Normal heart sounds. No murmur heard.     No friction rub. No gallop.   Pulmonary:      Effort: Pulmonary effort is normal. No respiratory distress.      Breath sounds: No stridor. Wheezing (scattered throughout) present. No rhonchi or rales.   Chest:      Chest wall: No tenderness.   Abdominal:      General: Bowel sounds are normal.      Palpations: Abdomen is soft.      Tenderness: There is no abdominal tenderness.   Skin:     General: Skin is warm and dry.   Neurological:      Mental Status: He is alert.   Psychiatric:         Mood and Affect: Mood normal.         Behavior: Behavior normal.                   "

## 2024-05-18 NOTE — PATIENT INSTRUCTIONS
Please take Augmentin 2 times daily for the next 5 days.  Please take prednisone daily for the next 5 days.  Ensure adequate fluid intake.  Follow-up with PCP if symptoms persist.

## 2024-05-22 ENCOUNTER — APPOINTMENT (OUTPATIENT)
Dept: PHYSICAL THERAPY | Facility: OTHER | Age: 74
End: 2024-05-22
Payer: MEDICARE

## 2024-05-29 ENCOUNTER — PROCEDURE VISIT (OUTPATIENT)
Dept: UROLOGY | Facility: AMBULATORY SURGERY CENTER | Age: 74
End: 2024-05-29
Payer: MEDICARE

## 2024-05-29 VITALS
DIASTOLIC BLOOD PRESSURE: 80 MMHG | BODY MASS INDEX: 36.71 KG/M2 | OXYGEN SATURATION: 98 % | SYSTOLIC BLOOD PRESSURE: 150 MMHG | HEART RATE: 72 BPM | HEIGHT: 73 IN | WEIGHT: 277 LBS

## 2024-05-29 DIAGNOSIS — C67.2 MALIGNANT NEOPLASM OF LATERAL WALL OF URINARY BLADDER (HCC): Primary | ICD-10-CM

## 2024-05-29 LAB
SL AMB  POCT GLUCOSE, UA: NORMAL
SL AMB LEUKOCYTE ESTERASE,UA: NORMAL
SL AMB POCT BILIRUBIN,UA: NORMAL
SL AMB POCT BLOOD,UA: NORMAL
SL AMB POCT CLARITY,UA: CLEAR
SL AMB POCT COLOR,UA: YELLOW
SL AMB POCT KETONES,UA: NORMAL
SL AMB POCT NITRITE,UA: NORMAL
SL AMB POCT PH,UA: 7.5
SL AMB POCT SPECIFIC GRAVITY,UA: 1
SL AMB POCT URINE PROTEIN: NORMAL
SL AMB POCT UROBILINOGEN: 0.2

## 2024-05-29 PROCEDURE — 81002 URINALYSIS NONAUTO W/O SCOPE: CPT | Performed by: UROLOGY

## 2024-05-29 PROCEDURE — 88112 CYTOPATH CELL ENHANCE TECH: CPT | Performed by: STUDENT IN AN ORGANIZED HEALTH CARE EDUCATION/TRAINING PROGRAM

## 2024-05-29 PROCEDURE — 52000 CYSTOURETHROSCOPY: CPT | Performed by: UROLOGY

## 2024-05-29 NOTE — PROGRESS NOTES
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  He underwent TURBT in March 2023.  He then returned to the operating room 1 month later in April 2023.  I performed a repeat TURBT.  There is no evidence of residual/recurrent disease and muscle was present and uninvolved in the specimen.  He then received BCG completed in the fall 2023..  He returns today for surveillance cystoscopy.  His last upper tract imaging was a CT renal protocol in February 2023.  This was the initial scan showing a 3.3 x 2.6 cm bladder mass.  After had BCG the patient did not return for scheduled cystoscopy.  He states that [place] was [place].  He denies any gross hematuria.  He last smoked in the 1980s.    His most recent PSA from January 2023 is 0.8.  His PSA dating back to 2020 is 0.4.      Male cystoscopy procedure note:  Risk and benefits of flexible cystoscopy were discussed. Informed consent was obtained. The patient was placed in the supine position. His genitalia was prepped and draped in a sterile fashion. Viscous lidocaine jelly was instilled into the urethra and flexible cystoscopy was then performed. The urethra, prostatic urethra, and bladder were all thoroughly inspected there was no evidence of mucosal abnormalities or lesions.  Prior biopsy scar along the posterior wall was identified and was without recurrence.  The bladder wall was quite thickened with multiple cellules and a small diverticulum.  Both ureteral orifices were visualized with clear efflux of urine. Retroflexion was normal. Overall this was a negative cystoscopy without recurrent urothelial carcinoma.    Impression: History of high-grade T1 urothelial carcinoma of the bladder without recurrence    Plan: I provided  the patient with reassurance that cystoscopic evaluation in the office today is within normal limits.  Urine cytology was sent from the office today.  Follow-up in 6 months with his next cystoscopy.  Unfortunately BCG maintenance is not available at this time secondary to a national shortage.  Patient was instructed to call sooner if he has gross hematuria.

## 2024-05-30 ENCOUNTER — APPOINTMENT (OUTPATIENT)
Dept: PHYSICAL THERAPY | Facility: OTHER | Age: 74
End: 2024-05-30
Payer: MEDICARE

## 2024-05-30 ENCOUNTER — RA CDI HCC (OUTPATIENT)
Dept: OTHER | Facility: HOSPITAL | Age: 74
End: 2024-05-30

## 2024-05-30 ENCOUNTER — OFFICE VISIT (OUTPATIENT)
Dept: OBGYN CLINIC | Facility: HOSPITAL | Age: 74
End: 2024-05-30
Payer: MEDICARE

## 2024-05-30 VITALS
HEART RATE: 71 BPM | SYSTOLIC BLOOD PRESSURE: 165 MMHG | WEIGHT: 274 LBS | DIASTOLIC BLOOD PRESSURE: 97 MMHG | BODY MASS INDEX: 36.31 KG/M2 | HEIGHT: 73 IN

## 2024-05-30 DIAGNOSIS — M17.11 PRIMARY OSTEOARTHRITIS OF RIGHT KNEE: ICD-10-CM

## 2024-05-30 DIAGNOSIS — M25.561 CHRONIC PAIN OF RIGHT KNEE: Primary | ICD-10-CM

## 2024-05-30 DIAGNOSIS — M25.562 CHRONIC PAIN OF LEFT KNEE: ICD-10-CM

## 2024-05-30 DIAGNOSIS — G89.29 CHRONIC PAIN OF RIGHT KNEE: Primary | ICD-10-CM

## 2024-05-30 DIAGNOSIS — M17.12 PRIMARY OSTEOARTHRITIS OF LEFT KNEE: ICD-10-CM

## 2024-05-30 DIAGNOSIS — G89.29 CHRONIC PAIN OF LEFT KNEE: ICD-10-CM

## 2024-05-30 PROBLEM — Z96.641 AFTERCARE FOLLOWING RIGHT HIP JOINT REPLACEMENT SURGERY: Status: RESOLVED | Noted: 2019-11-05 | Resolved: 2024-05-30

## 2024-05-30 PROBLEM — D62 ACUTE BLOOD LOSS ANEMIA: Status: RESOLVED | Noted: 2019-08-09 | Resolved: 2024-05-30

## 2024-05-30 PROBLEM — R39.89 SUSPECTED UTI: Status: RESOLVED | Noted: 2023-03-09 | Resolved: 2024-05-30

## 2024-05-30 PROBLEM — Z47.1 AFTERCARE FOLLOWING RIGHT HIP JOINT REPLACEMENT SURGERY: Status: RESOLVED | Noted: 2019-11-05 | Resolved: 2024-05-30

## 2024-05-30 PROCEDURE — 99213 OFFICE O/P EST LOW 20 MIN: CPT | Performed by: ORTHOPAEDIC SURGERY

## 2024-05-30 NOTE — PROGRESS NOTES
73 y.o.male presents for evaluation.  Few weeks back while off meloxicam he began to experience significant weightbearing pain in both knees.  The pain was at the level joint, the pain was made worse bearing weight, pain increases with increased activities.  It began to interfere with his balance and ability to get off the floor when he sat down.  He is to resume meloxicam wishes to resume physical therapy and attempt to correct his problem missed a pain level of both knee joints, the pain is made worse bearing weight, the pain increases with increased activities    Review of Systems  Review of systems negative unless otherwise specified in HPI    Past Medical History  Past Medical History:   Diagnosis Date    Acute blood loss anemia 08/09/2019    Aftercare following right hip joint replacement surgery 11/05/2019    Anxiety disorder     Atherosclerotic heart disease of native coronary artery without angina pectoris     Benign prostatic hyperplasia without lower urinary tract symptoms     Cancer (HCC)     bladder    Clotting disorder (HCC) 1/25/2023    Blood in urine    Disease of thyroid gland     hypo    Dyslipidemia     GERD (gastroesophageal reflux disease)     Hypertension     Impaired fasting blood sugar     Kidney stone     Low back pain     Obesity     Osteoarthritis     last assesed 6-6-16    Other chest pain     Paresthesia of skin     Polyneuropathy     last assesed 5-8-17    Pure hypercholesterolemia     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)     Suspected UTI 03/09/2023    Urinary tract infection     Wears glasses        Past Surgical History  Past Surgical History:   Procedure Laterality Date    BACK SURGERY      CHOLECYSTECTOMY      COLONOSCOPY  02/06/2019    CYSTOSCOPY N/A 04/26/2023    Procedure: CYSTOSCOPY w/ bladder biopsy;  Surgeon: Geo Bentley MD;  Location: [facility];  Service: Urology    HIP SURGERY  August 2019    Replacement    JOINT REPLACEMENT   August 2019    Hip replacement    NC ARTHRP ACETBLR/PROX FEM PROSTC AGRFT/ALGRFT Right 08/05/2019    Procedure: ARTHROPLASTY HIP TOTAL;  Surgeon: Leonard Gallagher MD;  Location: [facility];  Service: Orthopedics    NC CYSTO W/REMOVAL OF LESIONS SMALL N/A 03/23/2023    Procedure: TRANSURETHRAL RESECTION OF BLADDER TUMOR (TURBT), mitomycin ;  Surgeon: Geo Bentley MD;  Location: [facility];  Service: Urology    NC CYSTO W/REMOVAL OF LESIONS SMALL N/A 04/26/2023    Procedure: TRANSURETHRAL RESECTION OF BLADDER TUMOR (TURBT), cystoscopy with bladder biopsy;  Surgeon: Geo Bentley MD;  Location: [facility];  Service: Urology    TONSILLECTOMY         Current Medications  Current Outpatient Medications on File Prior to Visit   Medication Sig Dispense Refill    atorvastatin (LIPITOR) 10 mg tablet Take 1 tablet (10 mg total) by mouth daily at bedtime 90 tablet 0    cholecalciferol (VITAMIN D3) 1,000 units tablet Take 1 tablet (1,000 Units total) by mouth daily 90 tablet 3    ciclopirox (LOPROX) 0.77 % cream APPLY TO AFFECTED AREAS ON FACE 1-2 TIMES DAILY      cyanocobalamin (VITAMIN B-12) 500 MCG tablet Take 1 tablet (500 mcg total) by mouth daily 100 tablet 3    hydrocortisone 2.5 % cream APPLY TO SKIN TWICE DAILY FOR TWO WEEKS, THEN AS NEEDED      levothyroxine 150 mcg tablet Take 1 tablet (150 mcg total) by mouth daily 90 tablet 0    meloxicam (Mobic) 15 mg tablet Take 1 tablet (15 mg total) by mouth daily (Patient not taking: Reported on 3/21/2024) 30 tablet 2    Omega-3 Fatty Acids (FISH OIL PO)       pantoprazole (PROTONIX) 40 mg tablet Take 1 tablet (40 mg total) by mouth daily before breakfast 30 tablet 2    tamsulosin (FLOMAX) 0.4 mg Take 1 capsule (0.4 mg total) by mouth daily with dinner 90 capsule 0    TURMERIC PO        Current Facility-Administered Medications on File Prior to Visit   Medication Dose Route Frequency Provider Last Rate Last Admin    cyanocobalamin injection 1,000 mcg  1,000  mcg Intramuscular Q30 Days Riki Noguera MD   1,000 mcg at 02/09/24 1255       Recent Labs (HCT,HGB,PT,INR,ESR,CRP,GLU,HgA1C)  0   Lab Value Date/Time    HCT 49.2 01/15/2024 0941    HGB 15.8 01/15/2024 0941    WBC 4.86 01/15/2024 0941    INR 1.05 03/08/2023 0743    CRP <3.0 07/08/2019 1014    HGBA1C 5.8 07/08/2019 1014         Physical exam  General: Awake, Alert, Oriented  Eyes: Pupils equal, round and reactive to light  Heart: regular rate and rhythm  Lungs: No audible wheezing  Abdomen: soft  Exam confirms genu varum bilaterally.  Neither knee is effused.  Each knee has a large tibial tubercle which has minimal tenderness.  Each knee has bony enlargement tenderness medially.  Each he has crepitation flexion-extension.  Knee that is palpable at the synovium    Imaging  None performed today    Procedure  None indicated performed today    Assessment/Plan:   73 y.o.male who has symptomatic osteoarthritis in both knees.  Pain and dysfunction are his symptoms.  All diagnoses were discussed with the patient.  Treatment option including risk, benefits, return discussed in detail.  Wellness was discussed in detail.  Continue meloxicam and physical therapy.  Office follow-up in mid July would be my recommendation.  I did ask this patient give consideration to elective knee replacement as I think it would effectively cure him of the symptoms from one of his knees

## 2024-06-03 ENCOUNTER — EVALUATION (OUTPATIENT)
Dept: PHYSICAL THERAPY | Facility: OTHER | Age: 74
End: 2024-06-03
Payer: MEDICARE

## 2024-06-03 DIAGNOSIS — M25.562 CHRONIC PAIN OF BOTH KNEES: ICD-10-CM

## 2024-06-03 DIAGNOSIS — M25.561 CHRONIC PAIN OF BOTH KNEES: ICD-10-CM

## 2024-06-03 DIAGNOSIS — G89.29 CHRONIC PAIN OF BOTH KNEES: ICD-10-CM

## 2024-06-03 DIAGNOSIS — M17.0 PRIMARY OSTEOARTHRITIS OF BOTH KNEES: Primary | ICD-10-CM

## 2024-06-03 DIAGNOSIS — M17.0 BILATERAL PRIMARY OSTEOARTHRITIS OF KNEE: ICD-10-CM

## 2024-06-03 PROCEDURE — 97110 THERAPEUTIC EXERCISES: CPT

## 2024-06-03 PROCEDURE — 97140 MANUAL THERAPY 1/> REGIONS: CPT

## 2024-06-03 PROCEDURE — 88112 CYTOPATH CELL ENHANCE TECH: CPT | Performed by: STUDENT IN AN ORGANIZED HEALTH CARE EDUCATION/TRAINING PROGRAM

## 2024-06-03 RX ORDER — MELOXICAM 15 MG/1
15 TABLET ORAL DAILY
Qty: 30 TABLET | Refills: 0 | Status: SHIPPED | OUTPATIENT
Start: 2024-06-03

## 2024-06-03 NOTE — PROGRESS NOTES
"PT Evaluation     Today's date: 2024  Patient name: Solis Dos Santos  : 1950  MRN: [id number]  Referring provider: Leonard Gallagher,*  Dx:   Encounter Diagnosis     ICD-10-CM    1. Primary osteoarthritis of both knees  M17.0             Start Time: 1101  Stop Time: 1154  Total time in clinic (min): 53 minutes    Assessment  Impairments: abnormal gait, abnormal or restricted ROM, activity intolerance, impaired balance, impaired physical strength, lacks appropriate home exercise program, pain with function, weight-bearing intolerance and poor body mechanics    Assessment details: Re-eval 6/3/24: Pt continues to present with s/s consistent with medial compartment OA bilaterally, including decreased B knee ROM, genu varum, good quad / hamstring MMT but fair functional LE strength due to weightbearing intolerance. Pt continues to be functionally limited in prolonged walking, prolonged standing, ADLs and transfers. Trialed lateral posting in pt's shoes today which made a difference in pain during walking & STS; slight improvements in standing posture genu varus as well as genu varus during gait. Given chronic degenerative process of current pathology, pt is a good candidate for skilled maintenance PT in order to achieve the following goals.     IE: Solis Dos Santos is a 73 y.o. male presenting to OPPT initial evaluation with chief complaint of bilateral knee pain for the last 5 years of insidious onset. Notes decreased confidence in balance and strength with functional mobility activities such as stair climbing. Notes difficulty with walking long distances & prolonged standing. Pain is described as an \"arthritic ache\" but more concerned with lack of strength and confidence. Pt had recent bout of PT focused on flexibility, LE strength, functional strength and functional mobility with good improvement, but did not follow-up with HEP with consistency. Upon PT exam, pt presents with deficits to bilateral " knee ROM, myofascial restrictions to bilateral hamstring, calf, and adductor musculature, decreased bilateral hip rotational motion, and fair balance impacting functional strength and mobility. Pt given HEP to address strength, mobility and balance deficits.  The patient is a good candidate for physical therapy to achieve the following goals.        Goals  STG (6 weeks):  Pt will decrease pain to <3/10 with upright functional activity.  Pt will report a 50% improvement in knee confidence compared to initial evaluation.  Pt will demonstrate increased B/L knee ext to <5 in order to improve gait mechanics  Pt will demonstrate increased hip ER strength to 4/5 in order to improve biomechanics of hip / knee.  Pt's self-perceived disability will decrease as demonstrated by a >5-point improvement in FOTO score.    Pt will be independent with HEP as demonstrated by return demo with proper technique and execution of exercises provided.     LTG (12 weeks):  Pt will walk 0.5 mile without increase knee pain in order to improve functional independence.  Pt will report a 75% improvement in knee confidence compared to initial evaluation.  Pt will demonstrate increased hip ER strength to 5/5 in order to improve biomechanics of hip / knee.  Pt's self-perceived disability will decrease as demonstrated by a FOTO score >70.  Pt will be independent with progressions to HEP as demonstrated by return demo with proper technique and execution of exercises provided.        Plan  Patient would benefit from: skilled physical therapy and PT eval  Planned modality interventions: biofeedback, electrical stimulation/Russian stimulation, TENS, thermotherapy: hydrocollator packs, traction, ultrasound and cryotherapy    Planned therapy interventions: abdominal trunk stabilization, activity modification, balance, balance/weight bearing training, body mechanics training, flexibility, functional ROM exercises, graded activity, graded exercise, graded  "motor, gait training, home exercise program, therapeutic training, therapeutic activities, therapeutic exercise, stretching, strengthening, joint mobilization, manual therapy, massage, neuromuscular re-education, patient education, postural training, IASTM and kinesiology taping    Frequency: 2x week  Duration in weeks: 12  Plan of Care beginning date: 6/3/2024  Plan of Care expiration date: 10/1/2024  Treatment plan discussed with: patient        Subjective Evaluation    History of Present Illness  Mechanism of injury: Solis Dos Santos is a 73 y.o. male presenting to OPPT initial evaluation with chief complaint of bilateral knee pain for the last 5 years of insidious onset. Notes decreased confidence in balance and strength with functional mobility activities such as stair climbing. Notes difficulty with walking long distances & prolonged standing. Pain is described as an \"arthritic ache\" but more concerned with lack of strength and confidence. Pt has started taking meloxicam and has noted significant improvements in pain. Previous CSI with no symptoms relief. Denies sleep disturbances. Denies numbness / tingling.     Pt had recent bout of PT focused on flexibility, LE strength, functional strength and functional mobility.           Patient Goals  Patient goals for therapy: increased strength, independence with ADLs/IADLs, increased motion, improved balance and decreased pain  Patient goal: Increase confidence with LE  Pain  Current pain ratin  At best pain ratin  At worst pain ratin    Social Support  Stairs in house: yes   13  Lives in: one-story house  Lives with: spouse    Employment status: not working  Treatments  Previous treatment: physical therapy, medication and injection treatment        Objective    Palpation: Prominent bilateral tibial tuberosities; TTP medial joint line    Range of motion:      AROM PROM   L knee 5-120    R knee     Hip flexion WNL WNL   Hip IR  L 25 R 35   Hip ER L " "40 R 0 L 45 R 40   Ankle DF  L 10 R 5 knee ext  L 15 R 12 knee flex   Hip ABd  L 35 R 30     Patellar mobility: Hypomobile superior / inferior    Articular mobility:  Tibiofemoral posterior glide: hypomobile  Tibiofemoral anterior glide: hypomobile  Tibiofemoral internal rotation: hypomobile  Tibiofemoral external rotation: normal    Muscle length testing:   (+) 90/90 Hamstring   (+) SLR    Strength:   Left  Right   Knee Ext 5/5 5/5   Knee Flex 5/5 5/5   Hip Flex 4-/5 4-/5   Hip Abd 4/5 4/5   Hip ER 4/5 4/5   Ankle PF 5/5 5/5   Ankle DF 5/5 5/5     Functional Movement:  SLS Balance: R 20 sec L 5 sec test nv  5xSTS: 17.03sec test nv    Gait assessment: Demonstrates inability to complete terminal swing to initial contact secondary to decreased bilateral knee extension ROM with crouched gait; midfoot contact rather than heel strike       POC expires Unit limit Auth Expiration date PT/OT + Visit Limit?   10/1/24 BOMN N/a BOMN                               Precautions: none    POC expires Unit limit Auth Expiration date PT/OT + Visit Limit?   3/6/2024 BOMN N/a BOMN                                              Visit/Unit Tracking  AUTH Status:  Date 19 20 21 22 23 24 25 26 27 28 29 30 31 32 33   No Used 3/18 3/21 3/25 3/28 4/1 4/4 4/8 4/11 4/15 4/18 4/22 4/24 4/29 5/8 6/3     Remaining                                          Precautions: none        Visit #: 31 32 33            Date: 4/29 5/8 1- 6/3 2-  3-  4-  5-  6-  7-  8-  9-  10-    RE   SFP         RE   Manuals                 PROM: B knees                 Lateral glide: B hips                 Lateral tibial glides   GIII 2x30\":B     Sustained hold + quad sets:B     SFP             Tibiofemoral glides PA     Cyriax distraction MWM x2 min    SFP            Patellar glides sup/inf                 STM / IASTM                 EPAT                                   Neuro Re-Ed                 Standing clamshells c short foot                 Standing hip ABd              " "   Lateral band walks                 Monster walks                 Locked clamshells                 Pball Hip Hikes                                   Standing TKE                 Step-ups                 Eccentric step-downs F/L                 FFESS                 [place] Squat hold                                   RDL                 Seated GM                 H/L band ABd     Vheavy 20x5\"            Bridges     C vheavy k' hip Abd  2x12                                                SLS balance OG 3x30\" OG 3x30\"             B-stance trunk rotations                 SL RDL                 Tandem balance c perturbations                                   Angelic lateral step-overs                 Figure-8 Weave Fwd/Back                 Agility ladder walkthrough:   - 1 in each  - 2 in each  - Lateral                  Ther Ex                 Bike 10 min 10 min 10 min            Deadmill retro                                   1/2 Kneeling hip opener                 Seated hamstring / gastroc stretch                 Slant board calf stretch                 Supine quad S                 Seated figure-4 hip ER S                                   SLR                 SAQ   10# 3x20             LAQ 10# MTB 3x20 BTB 3x30             Seated  hamstring curls                                   Leg press 120# x10  125# 2x10  130# x10                                 Standing hip flexor march                 Suitcase carries                 Pallof press + walkouts                                   Standing Tib Raise                                   Ther Activity                 Chair squat 20\"   + band lateral tib glide x10     No pain X10; dec p! C lateral wedging            Step-ups F/L 6\" F x20ea               Deadlift                 M4 Tank                                                     Foot posture & gait assessment                 Gait Training                                                     Modalities            "

## 2024-06-03 NOTE — TELEPHONE ENCOUNTER
Patient called to request a refill for their Meloxicam advised a refill was requested on 6/3/24 and is pending approval. Patient verbalized understanding and is in agreement.

## 2024-06-05 ENCOUNTER — OFFICE VISIT (OUTPATIENT)
Dept: PHYSICAL THERAPY | Facility: OTHER | Age: 74
End: 2024-06-05
Payer: MEDICARE

## 2024-06-05 DIAGNOSIS — M17.0 PRIMARY OSTEOARTHRITIS OF BOTH KNEES: Primary | ICD-10-CM

## 2024-06-05 PROCEDURE — 97140 MANUAL THERAPY 1/> REGIONS: CPT

## 2024-06-05 PROCEDURE — 97110 THERAPEUTIC EXERCISES: CPT

## 2024-06-05 NOTE — PROGRESS NOTES
"Daily Note     Today's date: 2024  Patient name: Solis Dos Santos  : 1950  MRN: [id number]  Referring provider: Leonard Gallagher,*  Dx:   Encounter Diagnosis     ICD-10-CM    1. Primary osteoarthritis of both knees  M17.0           Start Time: 1123  Stop Time: 1201  Total time in clinic (min): 38 minutes    Subjective: Pt notes improved B knee pain since adding lateral wedge to footwear last visit. Feels more stiff today.      Objective: See treatment diary below      Assessment: Tolerated treatment well. Further evaluation reveals tight B adductors, R worse than L with reproduction of lateral knee pain during tensioning. Also noted hypertonic and tender L psoas muscle; pt educated on hip flexor tightness impacting standing posture and gait. Patient would benefit from continued PT      Plan: Continue per plan of care.      POC expires Unit limit Auth Expiration date PT/OT + Visit Limit?   10/1/24 BOMN N/a BOMN                                  Visit/Unit Tracking  AUTH Status:  Date 19 20 21 22 23 24 25 26 27 28 29 30 31 32 33 34   No Used 3/18 3/21 3/25 3/28 4/1 4/4 4/8 4/11 4/15 4/18 4/22 4/24 4/29 5/8 6/3 6/5     Remaining                                           Precautions: none        Visit #: 31 32 33 34           Date:  1- 6/3 2-  3-  4-  5-  6-  7-  8-  9-  10-    RE   SFP         RE   Manuals                 PROM: B knees                 Lateral glide: B hips                 Lateral tibial glides   GIII 2x30\":B     Sustained hold + quad sets:B     SFP             Tibiofemoral glides PA     Cyriax distraction MWM x2 min    SFP            Patellar glides sup/inf                 STM / IASTM      L psoas MFR    B adductor foam roll & stretch 3x30\"    SFP           EPAT                                   Neuro Re-Ed                 Standing clamshells c short foot                 Standing hip ABd                 Lateral band walks                 Monster walks                 Locked " "clamshells                 Pball Hip Hikes                                   Standing TKE                 Step-ups                 Eccentric step-downs F/L                 FFESS                 [place] Squat hold                                   RDL                 Seated GM                 H/L band ABd     Vheavy 20x5\"            Bridges     C vheavy k' hip Abd  2x12                                                SLS balance OG 3x30\" OG 3x30\"             B-stance trunk rotations                 SL RDL                 Tandem balance c perturbations                                   Angelic lateral step-overs                 Figure-8 Weave Fwd/Back                 Agility ladder walkthrough:   - 1 in each  - 2 in each  - Lateral                  Ther Ex                 Bike 10 min 10 min 10 min 10 min           Deadmill retro                                   1/2 Kneeling hip opener                 Seated hamstring / gastroc stretch                 Harlan S    3x30\":B           Slant board calf stretch                 Supine quad S                 Seated figure-4 hip ER S                                   SLR                 SAQ   10# 3x20             LAQ 10# MTB 3x20 BTB 3x30             Seated  hamstring curls                                   Leg press 120# x10  125# 2x10  130# x10                                 Standing hip flexor march                 Suitcase carries                 Pallof press + walkouts                                   Standing Tib Raise                                   Ther Activity                 Chair squat 20\"   + band lateral tib glide x10     No pain X10; dec p! C lateral wedging            Step-ups F/L 6\" F x20ea               Deadlift                 M4 Tank                                                     Foot posture & gait assessment                 Gait Training                                                     Modalities                                                   "

## 2024-06-10 ENCOUNTER — OFFICE VISIT (OUTPATIENT)
Dept: PHYSICAL THERAPY | Facility: OTHER | Age: 74
End: 2024-06-10
Payer: MEDICARE

## 2024-06-10 DIAGNOSIS — M17.0 PRIMARY OSTEOARTHRITIS OF BOTH KNEES: Primary | ICD-10-CM

## 2024-06-10 PROCEDURE — 97110 THERAPEUTIC EXERCISES: CPT

## 2024-06-10 NOTE — PROGRESS NOTES
"Daily Note     Today's date: 6/10/2024  Patient name: Solis Dos Santos  : 1950  MRN: [id number]  Referring provider: Leonard Gallagher,*  Dx:   Encounter Diagnosis     ICD-10-CM    1. Primary osteoarthritis of both knees  M17.0             Start Time: 1048  Stop Time: 1111  Total time in clinic (min): 23 minutes    Subjective: Pt reporting increased B knee stiffness after sitting for a long time at [employer] yesterday.      Objective: See treatment diary below      Assessment: Tolerated treatment well. Noted decreased tenderness upon palpation to L psoas. Session focused on B knee ROM and hip ABdER strengthening with good carryover to functional strengthening. Patient would benefit from continued PT      Plan: Continue per plan of care.      POC expires Unit limit Auth Expiration date PT/OT + Visit Limit?   10/1/24 BOMN N/a BOMN                                  Visit/Unit Tracking  AUTH Status:  Date 19 20 21 22 23 24 25 26 27 28 29 30 31 32 33 34 35   No Used 3/18 3/21 3/25 3/28 4/1 4/4 4/8 4/11 4/15 4/18 4/22 4/24 4/29 5/8 6/3 6/5 6/10     Remaining                                            Precautions: none        Visit #: 31 32 33 34 35          Date: - 6/3 2-  3- 6/10 4-  5-  6-  7-  8-  -  10-    RE   SFP         RE   Manuals                 PROM: B knees       Flexion gapping x1' ea          Lateral glide: B hips                 Lateral tibial glides   GIII 2x30\":B     Sustained hold + quad sets:B     SFP             Tibiofemoral glides PA     Cyriax distraction MWM x2 min    SFP            Patellar glides sup/inf                 STM / IASTM      L psoas MFR    B adductor foam roll & stretch 3x30\"    SFP L psoas MFR          EPAT                                   Neuro Re-Ed                 Standing clamshells c short foot                 Standing hip ABd                 Lateral band walks       GTB k' 4x20'ea          Monster walks                 Locked clamshells               " "  Pball Hip Hikes                                   Standing TKE                 Step-ups                 Eccentric step-downs F/L                 FFESS                 [place] Squat hold                                   RDL                 Seated GM                 H/L band ABd     Vheavy 20x5\"  Vheavy k' 20x5\"          Bridges     C vheavy k' hip Abd  2x12                                                SLS balance OG 3x30\" OG 3x30\"             B-stance trunk rotations                 SL RDL                 Tandem balance c perturbations                                   Angelic lateral step-overs                 Figure-8 Weave Fwd/Back                 Agility ladder walkthrough:   - 1 in each  - 2 in each  - Lateral                  Ther Ex                 Bike 10 min 10 min 10 min 10 min 10 min          Deadmill retro                                   1/2 Kneeling hip opener                 Seated hamstring / gastroc stretch                 Harlan S    3x30\":B           Slant board calf stretch                 Supine quad S                 Seated figure-4 hip ER S                 Standing hip flexor S     10x10\"                            SLR                 SAQ   10# 3x20             LAQ 10# MTB 3x20 BTB 3x30             Seated  hamstring curls                                   Leg press 120# x10  125# 2x10  130# x10                                 Standing hip flexor march                 Suitcase carries                 Pallof press + walkouts                                   Standing Tib Raise                                   Ther Activity                 Chair squat 20\"   + band lateral tib glide x10     No pain X10; dec p! C lateral wedging  3x10          Step-ups F/L 6\" F x20ea               Deadlift                 M4 Tank                                                     Foot posture & gait assessment                 Gait Training                                                     Modalities         "

## 2024-06-13 ENCOUNTER — OFFICE VISIT (OUTPATIENT)
Dept: PHYSICAL THERAPY | Facility: OTHER | Age: 74
End: 2024-06-13
Payer: MEDICARE

## 2024-06-13 DIAGNOSIS — M17.0 PRIMARY OSTEOARTHRITIS OF BOTH KNEES: Primary | ICD-10-CM

## 2024-06-13 PROCEDURE — 97140 MANUAL THERAPY 1/> REGIONS: CPT

## 2024-06-13 PROCEDURE — 97110 THERAPEUTIC EXERCISES: CPT

## 2024-06-13 NOTE — PROGRESS NOTES
"Daily Note     Today's date: 2024  Patient name: Solis Dos Santos  : 1950  MRN: [id number]  Referring provider: Leonard Gallagher,*  Dx:   Encounter Diagnosis     ICD-10-CM    1. Primary osteoarthritis of both knees  M17.0             Start Time: 1115  Stop Time: 1208  Total time in clinic (min): 53 minutes    Subjective: Pt contemplative of coming to PT today, notes his L hip flexor flared-up Tuesday afternoon causing difficulty with walking and weightbearing. No pain in sitting. Feels sx have resolved today. Notes he has been wearing a back brace that helps with sx. Pt reports a fall 2 years ago in the shower in which he landed on his R hip.      Objective: See treatment diary below      Assessment: Tolerated treatment well. Re-evaluation today reveals hypomobile R SIJ with potential R PI; (+) seated & standing flexion testing pre-manuals. Negative re-testing following R SIJ distraction HVLA & R SIJ PA HVLA. Still demonstrating longer RLE with supine to sit (modified with foam roll under knees 2/2 lack of R knee extension in both positions - has benefited from L heel lift and will be scheduled for orthotics IE. Patient would benefit from continued PT      Plan: Continue per plan of care.      POC expires Unit limit Auth Expiration date PT/OT + Visit Limit?   10/1/24 BOMN N/a BOMN                                  Visit/Unit Tracking  AUTH Status:  Date  24 25 26 27 28 29 30 31 32 33 34 35 36   No Used 3/18 3/21 3/25 3/28 4/1 4/4 4/8 4/11 4/15 4/18 4/22 4/24 4/29 5/8 6/3 6/5 6/10 6/13     Remaining                                             Precautions: none        Visit #: 31 32 33 34 35 36         Date: - 6/3 2-  3- 6/10 4- -  -  -  8-  -  10-    RE   SFP   SFP      RE   Manuals                 PROM: B knees       Flexion gapping x1' ea          Lateral glide: B hips                 Lateral tibial glides   GIII 2x30\":B     Sustained hold + quad sets:B     SFP  " "           R SIJ distraction      gV SFP         R SIJ PA      gV SFP         Tibiofemoral glides PA     Cyriax distraction MWM x2 min    SFP            Patellar glides sup/inf                 STM / IASTM      L psoas MFR    B adductor foam roll & stretch 3x30\"    SFP L psoas MFR          EPAT                                   Neuro Re-Ed                 Standing clamshells c short foot                 Standing hip ABd                 Lateral band walks       GTB k' 4x20'ea          Monster walks                 Locked clamshells                 Pball Hip Hikes                                   Standing TKE                 Step-ups                 Eccentric step-downs F/L                 FFESS                 [place] Squat hold                                   RDL                 Seated GM                                TrA bracing      HEP         TrA marches      HEP         H/L band ABd     Vheavy 20x5\"  Vheavy k' 20x5\" HEP         Bridges     C vheavy k' hip Abd  2x12   HEP                                             SLS balance OG 3x30\" OG 3x30\"             B-stance trunk rotations                 SL RDL                 Tandem balance c perturbations                                   Angelic lateral step-overs                 Figure-8 Weave Fwd/Back                 Agility ladder walkthrough:   - 1 in each  - 2 in each  - Lateral                  Ther Ex                 Bike 10 min 10 min 10 min 10 min 10 min          Deadmill retro                                   1/2 Kneeling hip opener                 Seated hamstring / gastroc stretch                 Harlan S    3x30\":B           Slant board calf stretch                 Supine 90/90 hamstring S      HEP         Prone quad S        HEP         Seated figure-4 hip ER S                 Standing hip flexor S     10x10\" HEP                           SLR                 SAQ   10# 3x20             LAQ 10# MTB 3x20 BTB 3x30             Seated  hamstring curls      " "                             Leg press 120# x10  125# 2x10  130# x10                                 Standing hip flexor march                 Suitcase carries                 Pallof press + walkouts                                   Standing Tib Raise                                   Ther Activity                 Chair squat 20\"   + band lateral tib glide x10     No pain X10; dec p! C lateral wedging  3x10          Step-ups F/L 6\" F x20ea               Deadlift                 M4 Tank                                                     Foot posture & gait assessment                 Gait Training                                                     Modalities                                                             "

## 2024-06-17 ENCOUNTER — OFFICE VISIT (OUTPATIENT)
Dept: PHYSICAL THERAPY | Facility: OTHER | Age: 74
End: 2024-06-17
Payer: MEDICARE

## 2024-06-17 DIAGNOSIS — M17.0 PRIMARY OSTEOARTHRITIS OF BOTH KNEES: Primary | ICD-10-CM

## 2024-06-17 PROCEDURE — 97140 MANUAL THERAPY 1/> REGIONS: CPT

## 2024-06-17 PROCEDURE — 97110 THERAPEUTIC EXERCISES: CPT

## 2024-06-17 NOTE — PROGRESS NOTES
"Daily Note     Today's date: 2024  Patient name: Solis Dos Santos  : 1950  MRN: [id number]  Referring provider: Leonard Gallagher,*  Dx:   Encounter Diagnosis     ICD-10-CM    1. Primary osteoarthritis of both knees  M17.0               Start Time: 1045  Stop Time: 1123  Total time in clinic (min): 38 minutes    Subjective: Pt woke up Friday AM and noted increased bilaterally low back pain with bilateral radicular sx, as well as B knee pain. Can sit and lie pain-free, notes pain upon standing.      Objective: See treatment diary below      Assessment: Tolerated treatment well. Pt noted improvements in back pain today following gentle ROM exercises. Pain location across bilateral iliac crests, restriction ROM globally. Pt to follow-up with Dr. Gallagher this Wednesday regarding B knee pain.  Patient would benefit from continued PT      Plan: Continue per plan of care.      POC expires Unit limit Auth Expiration date PT/OT + Visit Limit?   10/1/24 BOMN N/a BOMN                                  Visit/Unit Tracking  AUTH Status:  Date 19 20 21 22 23 24 25 26 27 28 29 30 31 32 33 34 35 36 37   No Used 3/18 3/21 3/25 3/28 4/1 4/4 4/8 4/11 4/15 4/18 4/22 4/24 4/29 5/8 6/3 6/5 6/10 6/13 6/17     Remaining                                              Precautions: none        Visit #: 31 32 33 34 35 36 37        Date: - 6/3 2-  3- 6/10 4-  6-  -  8-  9-  10-    RE   SFP   SFP      RE   Manuals                 PROM: B knees       Flexion gapping x1' ea          Lateral glide: B hips                 Lateral tibial glides   GIII 2x30\":B     Sustained hold + quad sets:B     SFP             R SIJ distraction      gV SFP         R SIJ PA      gV SFP         Tibiofemoral glides PA     Cyriax distraction MWM x2 min    SFP            Patellar glides sup/inf                 STM / IASTM      L psoas MFR    B adductor foam roll & stretch 3x30\"    SFP L psoas MFR          EPAT                       " "            Neuro Re-Ed                 Standing clamshells c short foot                 Standing hip ABd                 Lateral band walks       GTB k' 4x20'ea          Monster walks                 Locked clamshells                 Pball Hip Hikes                                   Standing TKE                 Step-ups                 Eccentric step-downs F/L                 FFESS                 [place] Squat hold                                   RDL                 Seated GM                                TrA bracing      HEP         TrA marches      HEP         H/L band ABd     Vheavy 20x5\"  Vheavy k' 20x5\" HEP         Bridges     C vheavy k' hip Abd  2x12   HEP                                             SLS balance OG 3x30\" OG 3x30\"             B-stance trunk rotations                 SL RDL                 Tandem balance c perturbations                                   Angelic lateral step-overs                 Figure-8 Weave Fwd/Back                 Agility ladder walkthrough:   - 1 in each  - 2 in each  - Lateral                  Ther Ex                 Bike 10 min 10 min 10 min 10 min 10 min          Deadmill retro                                   1/2 Kneeling hip opener                 Seated hamstring / gastroc stretch         2x45\"ea        Harlan S    3x30\":B           Slant board calf stretch                 Supine 90/90 hamstring S      HEP         Prone quad S        HEP         Seated figure-4 hip ER S                 Standing hip flexor S     10x10\" HEP 10x10\"                          SLR                 SAQ   10# 3x20             LAQ 10# MTB 3x20 BTB 3x30             Seated  hamstring curls                                   Leg press 120# x10  125# 2x10  130# x10                                 Standing hip flexor march                 Suitcase carries                 Pallof press + walkouts                                   Standing Tib Raise                                   Standing lumbar " "ext       2x5        Seated lumbar flex       X20  X20 R/L        LTR       x30                       Ther Activity                 Chair squat 20\"   + band lateral tib glide x10     No pain X10; dec p! C lateral wedging  3x10          Step-ups F/L 6\" F x20ea               Deadlift                 M4 Tank                                                     Foot posture & gait assessment                 Gait Training                                                     Modalities                                                             "

## 2024-06-19 ENCOUNTER — OFFICE VISIT (OUTPATIENT)
Dept: OBGYN CLINIC | Facility: HOSPITAL | Age: 74
End: 2024-06-19
Payer: MEDICARE

## 2024-06-19 VITALS
DIASTOLIC BLOOD PRESSURE: 98 MMHG | HEART RATE: 76 BPM | BODY MASS INDEX: 36.31 KG/M2 | HEIGHT: 73 IN | SYSTOLIC BLOOD PRESSURE: 153 MMHG | WEIGHT: 274 LBS

## 2024-06-19 DIAGNOSIS — M25.562 CHRONIC PAIN OF BOTH KNEES: Primary | ICD-10-CM

## 2024-06-19 DIAGNOSIS — M54.41 ACUTE BILATERAL LOW BACK PAIN WITH BILATERAL SCIATICA: ICD-10-CM

## 2024-06-19 DIAGNOSIS — M17.12 PRIMARY OSTEOARTHRITIS OF LEFT KNEE: ICD-10-CM

## 2024-06-19 DIAGNOSIS — M25.561 CHRONIC PAIN OF BOTH KNEES: Primary | ICD-10-CM

## 2024-06-19 DIAGNOSIS — M54.42 ACUTE BILATERAL LOW BACK PAIN WITH BILATERAL SCIATICA: ICD-10-CM

## 2024-06-19 DIAGNOSIS — M17.11 PRIMARY OSTEOARTHRITIS OF RIGHT KNEE: ICD-10-CM

## 2024-06-19 DIAGNOSIS — G89.29 CHRONIC PAIN OF BOTH KNEES: Primary | ICD-10-CM

## 2024-06-19 PROCEDURE — 99213 OFFICE O/P EST LOW 20 MIN: CPT | Performed by: ORTHOPAEDIC SURGERY

## 2024-06-19 NOTE — PROGRESS NOTES
Assessment:  1. Chronic pain of both knees        2. Primary osteoarthritis of right knee        3. Primary osteoarthritis of left knee        4. Acute bilateral low back pain with bilateral sciatica  Ambulatory referral to Spine & Pain Management    Ambulatory referral to Orthopedic Surgery          Plan:  Left > right knee osteoarthritis  Low back pain with bilateral leg pain  Hx lumbar surgery  Patient would like to pursue total knee arthroplasty yet his lumbar spine has recently flared  Patient referred to Spine and Pain for further evaluation and treatment  Patient referred to  for surgical evaluation  Follow up as needed  Patient welcome to return to discuss potential total knee arthroplasty or conservative care for knee or hips at any point yet lumbar spine complaints current take priority    To do next visit:  Return if symptoms worsen or fail to improve.    The above stated was discussed in layman's terms and the patient expressed understanding.  All questions were answered to the patient's satisfaction.       Scribe Attestation      I,:  Giorgio Garcia am acting as a scribe while in the presence of the attending physician.:       I,:  Leonard Gallagher MD personally performed the services described in this documentation    as scribed in my presence.:               Subjective:   Solis Dos Santos is a 73 y.o. male who presents for follow up of bilateral knees.  He continues to have multiple issues.  Today he complains of left > right generalized knee pain, left hip pain, low back pain with bilateral radiating leg symptoms to the feet.  Most activity aggravates while rest alleviates.  His left knee pain effects his daily activity and sleep.  He does participate in physical therapy for lumbar spine with limited progress.  He does wear back brace recently.    He has history of lumbar surgery in 1987 with .      Review of systems negative unless otherwise specified in HPI    Past  Medical History:   Diagnosis Date    Acute blood loss anemia 08/09/2019    Aftercare following right hip joint replacement surgery 11/05/2019    Anxiety disorder     Atherosclerotic heart disease of native coronary artery without angina pectoris     Benign prostatic hyperplasia without lower urinary tract symptoms     Cancer (HCC)     bladder    Clotting disorder (HCC) 1/25/2023    Blood in urine    Disease of thyroid gland     hypo    Dyslipidemia     GERD (gastroesophageal reflux disease)     Hypertension     Impaired fasting blood sugar     Kidney stone     Low back pain     Obesity     Osteoarthritis     last assesed 6-6-16    Other chest pain     Paresthesia of skin     Polyneuropathy     last assesed 5-8-17    Pure hypercholesterolemia     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)     Suspected UTI 03/09/2023    Urinary tract infection     Wears glasses        Past Surgical History:   Procedure Laterality Date    BACK SURGERY      CHOLECYSTECTOMY      COLONOSCOPY  02/06/2019    CYSTOSCOPY N/A 04/26/2023    Procedure: CYSTOSCOPY w/ bladder biopsy;  Surgeon: Geo Bentley MD;  Location: [facility];  Service: Urology    HIP SURGERY  August 2019    Replacement    JOINT REPLACEMENT  August 2019    Hip replacement    ME ARTHRP ACETBLR/PROX FEM PROSTC AGRFT/ALGRFT Right 08/05/2019    Procedure: ARTHROPLASTY HIP TOTAL;  Surgeon: Leonard Gallagher MD;  Location: [facility];  Service: Orthopedics    ME CYSTO W/REMOVAL OF LESIONS SMALL N/A 03/23/2023    Procedure: TRANSURETHRAL RESECTION OF BLADDER TUMOR (TURBT), mitomycin ;  Surgeon: Geo Bentley MD;  Location: [facility];  Service: Urology    ME CYSTO W/REMOVAL OF LESIONS SMALL N/A 04/26/2023    Procedure: TRANSURETHRAL RESECTION OF BLADDER TUMOR (TURBT), cystoscopy with bladder biopsy;  Surgeon: Geo Bentley MD;  Location: [facility];  Service: Urology    TONSILLECTOMY         Family History   Problem Relation  Age of Onset    Arthritis Other     Heart disease Father     Arthritis Mother        Social History     Occupational History    Not on file   Tobacco Use    Smoking status: Former     Current packs/day: 0.00     Average packs/day: 1 pack/day for 20.8 years (20.8 ttl pk-yrs)     Types: Cigarettes     Start date: 1967     Quit date: 1987     Years since quittin.6    Smokeless tobacco: Never   Vaping Use    Vaping status: Never Used   Substance and Sexual Activity    Alcohol use: Not Currently    Drug use: Not Currently    Sexual activity: Not Currently     Partners: Female     Birth control/protection: None         Current Outpatient Medications:     atorvastatin (LIPITOR) 10 mg tablet, Take 1 tablet (10 mg total) by mouth daily at bedtime, Disp: 90 tablet, Rfl: 0    cholecalciferol (VITAMIN D3) 1,000 units tablet, Take 1 tablet (1,000 Units total) by mouth daily, Disp: 90 tablet, Rfl: 3    ciclopirox (LOPROX) 0.77 % cream, APPLY TO AFFECTED AREAS ON FACE 1-2 TIMES DAILY, Disp: , Rfl:     cyanocobalamin (VITAMIN B-12) 500 MCG tablet, Take 1 tablet (500 mcg total) by mouth daily, Disp: 100 tablet, Rfl: 3    hydrocortisone 2.5 % cream, APPLY TO SKIN TWICE DAILY FOR TWO WEEKS, THEN AS NEEDED, Disp: , Rfl:     levothyroxine 150 mcg tablet, Take 1 tablet (150 mcg total) by mouth daily, Disp: 90 tablet, Rfl: 0    meloxicam (Mobic) 15 mg tablet, Take 1 tablet (15 mg total) by mouth daily, Disp: 30 tablet, Rfl: 0    Omega-3 Fatty Acids (FISH OIL PO), , Disp: , Rfl:     pantoprazole (PROTONIX) 40 mg tablet, Take 1 tablet (40 mg total) by mouth daily before breakfast, Disp: 30 tablet, Rfl: 2    tamsulosin (FLOMAX) 0.4 mg, Take 1 capsule (0.4 mg total) by mouth daily with dinner, Disp: 90 capsule, Rfl: 0    TURMERIC PO, , Disp: , Rfl:     Current Facility-Administered Medications:     cyanocobalamin injection 1,000 mcg, 1,000 mcg, Intramuscular, Q30 Days, Riki Noguera MD, 1,000 mcg at 24 1255    No Known  "Allergies         Vitals:    06/19/24 1407   BP: 153/98   Pulse: 76       Objective:  Physical exam  General: Awake, Alert, Oriented  Eyes: Pupils equal, round and reactive to light  Heart: regular rate and rhythm  Lungs: No audible wheezing  Abdomen: soft                    Ortho Exam  Left knee:  Varus alignment  No erythema or ecchymosis  No effusion or swelling  Normal strength  Good ROM with crepitus   Calf compartments soft and supple  Sensation intact  Toes are warm sensate and mobile      Diagnostics, reviewed and taken today if performed as documented:    None performed     Procedures, if performed today:    Procedures    None performed      Portions of the record may have been created with voice recognition software.  Occasional wrong word or \"sound a like\" substitutions may have occurred due to the inherent limitations of voice recognition software.  Read the chart carefully and recognize, using context, where substitutions have occurred.    "

## 2024-06-20 ENCOUNTER — APPOINTMENT (OUTPATIENT)
Dept: PHYSICAL THERAPY | Facility: OTHER | Age: 74
End: 2024-06-20
Payer: MEDICARE

## 2024-06-20 ENCOUNTER — OFFICE VISIT (OUTPATIENT)
Dept: PHYSICAL THERAPY | Facility: OTHER | Age: 74
End: 2024-06-20
Payer: MEDICARE

## 2024-06-20 DIAGNOSIS — M17.0 PRIMARY OSTEOARTHRITIS OF BOTH KNEES: Primary | ICD-10-CM

## 2024-06-20 PROCEDURE — 97110 THERAPEUTIC EXERCISES: CPT

## 2024-06-20 PROCEDURE — 97112 NEUROMUSCULAR REEDUCATION: CPT

## 2024-06-20 NOTE — PROGRESS NOTES
"Daily Note     Today's date: 2024  Patient name: Solis Dos Santos  : 1950  MRN: [id number]  Referring provider: Leonard Gallagher,*  Dx:   Encounter Diagnosis     ICD-10-CM    1. Primary osteoarthritis of both knees  M17.0           Start Time: 1115  Stop Time: 1153  Total time in clinic (min): 38 minutes    Subjective: Pt reporting increased low back pain today impacting functional upright mobility and sleep.      Objective: See treatment diary below      Assessment: Tolerated treatment well. Pt demonstrates increase neural tension bilaterally, scaitic and femoral nerve distributions. Sx improved following nerve glides, standing lumbar extension. Patient would benefit from continued PT      Plan: Continue per plan of care.      POC expires Unit limit Auth Expiration date PT/OT + Visit Limit?   10/1/24 BOMN N/a BOMN                                  Visit/Unit Tracking  AUTH Status:  Date 26 27 28 29 30 31 32 33 34 35 36 37 38   No Used 4/11 4/15 4/18 4/22 4/24 4/29 5/8 6/3 6/5 6/10 6/13 6/17 6/20     Remaining                                 Precautions: none        Visit #: 31 32 33 34 35 36 37 38       Date:  1- 6/3 2-  3- 6/10 4-  5-  6- -  -  -  10-    RE   SFP   SFP      RE   Manuals                 PROM: B knees       Flexion gapping x1' ea          Lateral glide: B hips                 Lateral tibial glides   GIII 2x30\":B     Sustained hold + quad sets:B     SFP             R SIJ distraction      gV SFP         R SIJ PA      gV SFP         Tibiofemoral glides PA     Cyriax distraction MWM x2 min    SFP            Patellar glides sup/inf                 STM / IASTM      L psoas MFR    B adductor foam roll & stretch 3x30\"    SFP L psoas MFR          EPAT                                   Neuro Re-Ed                 Standing clamshells c short foot                 Standing hip ABd                 Lateral band walks       GTB k' 4x20'ea          Monster walks           " "      Locked clamshells                 Pball Hip Hikes                                   Standing TKE                 Step-ups                 Eccentric step-downs F/L                 FFESS                 [place] Squat hold                                   RDL                 Seated GM                                TrA bracing      HEP         TrA marches      HEP         H/L band ABd     Vheavy 20x5\"  Vheavy k' 20x5\" HEP         Bridges     C vheavy k' hip Abd  2x12   HEP                                             SLS balance OG 3x30\" OG 3x30\"             B-stance trunk rotations                 SL RDL                 Tandem balance c perturbations                                   Angelic lateral step-overs                 Figure-8 Weave Fwd/Back                 Agility ladder walkthrough:   - 1 in each  - 2 in each  - Lateral                                 Slump sciatic glides        SL 3x10ea  DBL 3x10       Standing femoral nerve glides        3x10ea       Ther Ex                 Bike 10 min 10 min 10 min 10 min 10 min          Deadmill retro                                   1/2 Kneeling hip opener                 Seated hamstring / gastroc stretch         2x45\"[place]        Harlan S    3x30\":B           Slant board calf stretch                 Supine 90/90 hamstring S      HEP         Prone quad S        HEP         Seated figure-4 hip ER S                 Standing hip flexor S     10x10\" HEP 10x10\"                          SLR                 SAQ   10# 3x20             LAQ 10# MTB 3x20 BTB 3x30             Seated  hamstring curls                                   Leg press 120# x10  125# 2x10  130# x10                                 Standing hip flexor march                 Suitcase carries                 Pallof press + walkouts                                   Standing Tib Raise                                   Standing lumbar ext       2x5 3x10       Seated lumbar flex       X20  X20 R/L x20       LTR " "      x30                       Ther Activity                 Chair squat 20\"   + band lateral tib glide x10     No pain X10; dec p! C lateral wedging  3x10          Step-ups F/L 6\" F x20ea               Deadlift                 M4 Tank                                                     Foot posture & gait assessment                 Gait Training                                                     Modalities                  MHP          Lumbar x10 min                                   "

## 2024-06-24 ENCOUNTER — OFFICE VISIT (OUTPATIENT)
Dept: INTERNAL MEDICINE CLINIC | Facility: CLINIC | Age: 74
End: 2024-06-24
Payer: MEDICARE

## 2024-06-24 ENCOUNTER — APPOINTMENT (OUTPATIENT)
Dept: PHYSICAL THERAPY | Facility: OTHER | Age: 74
End: 2024-06-24
Payer: MEDICARE

## 2024-06-24 ENCOUNTER — TELEPHONE (OUTPATIENT)
Age: 74
End: 2024-06-24

## 2024-06-24 VITALS
SYSTOLIC BLOOD PRESSURE: 122 MMHG | DIASTOLIC BLOOD PRESSURE: 80 MMHG | TEMPERATURE: 98.3 F | WEIGHT: 276 LBS | OXYGEN SATURATION: 95 % | BODY MASS INDEX: 36.58 KG/M2 | HEART RATE: 74 BPM | HEIGHT: 73 IN

## 2024-06-24 DIAGNOSIS — M51.36 DDD (DEGENERATIVE DISC DISEASE), LUMBAR: Primary | ICD-10-CM

## 2024-06-24 DIAGNOSIS — M17.0 PRIMARY OSTEOARTHRITIS OF BOTH KNEES: ICD-10-CM

## 2024-06-24 PROBLEM — M51.369 DDD (DEGENERATIVE DISC DISEASE), LUMBAR: Status: ACTIVE | Noted: 2024-06-24

## 2024-06-24 PROCEDURE — 99214 OFFICE O/P EST MOD 30 MIN: CPT | Performed by: INTERNAL MEDICINE

## 2024-06-24 PROCEDURE — G2211 COMPLEX E/M VISIT ADD ON: HCPCS | Performed by: INTERNAL MEDICINE

## 2024-06-24 RX ORDER — NAPROXEN 500 MG/1
500 TABLET ORAL 2 TIMES DAILY WITH MEALS
Qty: 20 TABLET | Refills: 0 | Status: SHIPPED | OUTPATIENT
Start: 2024-06-24

## 2024-06-24 NOTE — TELEPHONE ENCOUNTER
Patient called and stated that they scheduled MRI for 7/5.  Patient would like to know if Xanax can be called into pharmacy for MRI.  Please advise.

## 2024-06-24 NOTE — PROGRESS NOTES
Assessment/Plan:             1. DDD (degenerative disc disease), lumbar  -     naproxen (Naprosyn) 500 mg tablet; Take 1 tablet (500 mg total) by mouth 2 (two) times a day with meals  -     MRI lumbar spine wo contrast; Future; Expected date: 06/24/2024  2. Primary osteoarthritis of both knees  Comments:  continue PT         Subjective:      Patient ID: Solis Dos Santos is a 73 y.o. male.    Back pain, goes to both legs, no urinary or bowel disturbances, no recent trauma    Back Pain  Associated symptoms include leg pain. Pertinent negatives include no abdominal pain, chest pain, fever, headaches or weakness.   Leg Pain         The following portions of the patient's history were reviewed and updated as appropriate: He  has a past medical history of Acute blood loss anemia (08/09/2019), Aftercare following right hip joint replacement surgery (11/05/2019), Anxiety disorder, Atherosclerotic heart disease of native coronary artery without angina pectoris, Benign prostatic hyperplasia without lower urinary tract symptoms, Cancer ([facility]), Clotting disorder ([facility]) (1/25/2023), Disease of thyroid gland, Dyslipidemia, GERD (gastroesophageal reflux disease), Hypertension, Impaired fasting blood sugar, Kidney stone, Low back pain, Obesity, Osteoarthritis, Other chest pain, Paresthesia of skin, Polyneuropathy, Pure hypercholesterolemia, 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  He   Patient Active Problem List    Diagnosis Date Noted    DDD (degenerative disc disease), lumbar 06/24/2024    Primary osteoarthritis of left knee 05/30/2024    Primary osteoarthritis of right knee 05/30/2024    Peripheral vascular disease, unspecified ([facility]) 01/10/2024    Malignant neoplasm of lateral wall of urinary bladder ([facility]) 05/12/2023    Malignant neoplasm of posterior wall of urinary bladder ([facility]) 03/23/2023    Bladder mass 03/09/2023     Right leg swelling 03/09/2023    Gross hematuria 03/08/2023    Hyperlipidemia 03/08/2023    Chronic pain of left knee 04/26/2021    Obesity, morbid (HCC) 04/14/2021    Difficulty sleeping 08/13/2019    Impaired mobility and ADLs 08/09/2019    Hypothyroidism 08/09/2019    Status post total hip replacement, left 08/08/2019    Status post right hip replacement 08/07/2019    Primary osteoarthritis of both knees 05/29/2019    Osgood-Schlatter's disease of both knees 05/29/2019    Pain in both knees 05/29/2019     He  has a past surgical history that includes Back surgery; Tonsillectomy; Cholecystectomy; pr arthrp acetblr/prox fem prostc agrft/algrft (Right, 08/05/2019); Colonoscopy (02/06/2019); pr cysto w/removal of lesions small (N/A, 03/23/2023); pr cysto w/removal of lesions small (N/A, 04/26/2023); CYSTOSCOPY (N/A, 04/26/2023); Joint replacement (August 2019); and Hip surgery (August 2019).  His family history includes Arthritis in his mother and other; Heart disease in his father.  He  reports that he quit smoking about 36 years ago. His smoking use included cigarettes. He started smoking about 57 years ago. He has a 20.8 pack-year smoking history. He has never used smokeless tobacco. He reports that he does not currently use alcohol. He reports that he does not currently use drugs.  Current Outpatient Medications   Medication Sig Dispense Refill    atorvastatin (LIPITOR) 10 mg tablet Take 1 tablet (10 mg total) by mouth daily at bedtime 90 tablet 0    cholecalciferol (VITAMIN D3) 1,000 units tablet Take 1 tablet (1,000 Units total) by mouth daily 90 tablet 3    cyanocobalamin (VITAMIN B-12) 500 MCG tablet Take 1 tablet (500 mcg total) by mouth daily 100 tablet 3    hydrocortisone 2.5 % cream APPLY TO SKIN TWICE DAILY FOR TWO WEEKS, THEN AS NEEDED      levothyroxine 150 mcg tablet Take 1 tablet (150 mcg total) by mouth daily 90 tablet 0    naproxen (Naprosyn) 500 mg tablet Take 1 tablet (500 mg total) by mouth 2  (two) times a day with meals 20 tablet 0    Omega-3 Fatty Acids (FISH OIL PO)       tamsulosin (FLOMAX) 0.4 mg Take 1 capsule (0.4 mg total) by mouth daily with dinner 90 capsule 0    ciclopirox (LOPROX) 0.77 % cream APPLY TO AFFECTED AREAS ON FACE 1-2 TIMES DAILY (Patient not taking: Reported on 6/24/2024)      pantoprazole (PROTONIX) 40 mg tablet Take 1 tablet (40 mg total) by mouth daily before breakfast (Patient not taking: Reported on 6/24/2024) 30 tablet 2    TURMERIC PO        Current Facility-Administered Medications   Medication Dose Route Frequency Provider Last Rate Last Admin    cyanocobalamin injection 1,000 mcg  1,000 mcg Intramuscular Q30 Days Riki Noguera MD   1,000 mcg at 02/09/24 1255     Current Outpatient Medications on File Prior to Visit   Medication Sig    atorvastatin (LIPITOR) 10 mg tablet Take 1 tablet (10 mg total) by mouth daily at bedtime    cholecalciferol (VITAMIN D3) 1,000 units tablet Take 1 tablet (1,000 Units total) by mouth daily    cyanocobalamin (VITAMIN B-12) 500 MCG tablet Take 1 tablet (500 mcg total) by mouth daily    hydrocortisone 2.5 % cream APPLY TO SKIN TWICE DAILY FOR TWO WEEKS, THEN AS NEEDED    levothyroxine 150 mcg tablet Take 1 tablet (150 mcg total) by mouth daily    Omega-3 Fatty Acids (FISH OIL PO)     tamsulosin (FLOMAX) 0.4 mg Take 1 capsule (0.4 mg total) by mouth daily with dinner    ciclopirox (LOPROX) 0.77 % cream APPLY TO AFFECTED AREAS ON FACE 1-2 TIMES DAILY (Patient not taking: Reported on 6/24/2024)    pantoprazole (PROTONIX) 40 mg tablet Take 1 tablet (40 mg total) by mouth daily before breakfast (Patient not taking: Reported on 6/24/2024)    TURMERIC PO     [DISCONTINUED] meloxicam (Mobic) 15 mg tablet Take 1 tablet (15 mg total) by mouth daily (Patient not taking: Reported on 6/24/2024)     Current Facility-Administered Medications on File Prior to Visit   Medication    cyanocobalamin injection 1,000 mcg     He has No Known Allergies..    Review  "of Systems   Constitutional:  Negative for chills and fever.   HENT:  Negative for congestion, ear pain and sore throat.    Eyes:  Negative for pain.   Respiratory:  Negative for cough and shortness of breath.    Cardiovascular:  Negative for chest pain and leg swelling.   Gastrointestinal:  Negative for abdominal pain, nausea and vomiting.   Endocrine: Negative for polyuria.   Genitourinary:  Negative for difficulty urinating, frequency and urgency.   Musculoskeletal:  Positive for arthralgias and back pain.   Skin:  Negative for rash.   Neurological:  Negative for weakness and headaches.   Psychiatric/Behavioral:  Negative for sleep disturbance. The patient is not nervous/anxious.          Objective:      /80 (BP Location: Left arm, Patient Position: Sitting, Cuff Size: Standard)   Pulse 74   Temp 98.3 °F (36.8 °C) (Temporal)   Ht 6' 1\" (1.854 m)   Wt 125 kg (276 lb)   SpO2 95%   BMI 36.41 kg/m²     Recent Results (from the past 1344 hour(s))   POCT urine dip    Collection Time: 05/29/24  8:59 AM   Result Value Ref Range    LEUKOCYTE ESTERASE,UA -     NITRITE,UA -     SL AMB POCT UROBILINOGEN 0.2     POCT URINE PROTEIN -      PH,UA 7.5     BLOOD,UA -     SPECIFIC GRAVITY,UA 1.005     KETONES,UA -     BILIRUBIN,UA -     GLUCOSE, UA -      COLOR,UA yellow     CLARITY,UA clear    Cytology, urine    Collection Time: 05/29/24  9:32 AM   Result Value Ref Range    Case Report       Non-gynecologic Cytology                          Case: [id number]                                  Authorizing Provider:  Geo Bentley MD  Collected:           05/29/2024 0932              Ordering Location:     [facility]        Received:            05/29/2024 0932                                     [facility]                                                            Pathologist:           Edward Willard DO                                                          Specimen:    Urine, Clean Catch       "                                                                   Final Diagnosis       A. Urine, Clean Catch:  Atypical urothelial cells (AUC) - see comment.  - Very rare atypical urothelial cells.  - Benign squamous cells.  - Scattered  neutrophils and a few red blood cells.    Satisfactory for evaluation.    Comment:  The above diagnostic category is from the recently published book, The Tracie System for Reporting Urinary Cytology, and is in keeping with the ongoing effort for utilization of standardized diagnostic terminology in urine cytology.*    *The Tracie System for Reporting Urinary Cytology. Elodia Madden, Pia Glasgow, Brian Charles; 2016.         Note       Screening performed at [facility], [address] [place] [address].      Gross Description       A. 45mL, yellow, clear, received in [facility]         Additional Information       [employer]'s FDA approved ,  and ThinPrep Imaging Duo System are utilized with strict adherence to the 's instruction manual to prepare gynecologic and non-gynecologic cytology specimens for the production of ThinPrep slides as well as for gynecologic ThinPrep imaging. These processes have been validated by our laboratory and/or by the .    These tests were developed and their performance characteristics determined by [facility] or [facility]. They may not be cleared or approved by the U.S. Food and Drug Administration. The FDA has determined that such clearance or approval is not necessary. These tests are used for clinical purposes. They should not be regarded as investigational or for research. This laboratory has been approved by CLIA 88, designated as a high-complexity laboratory and is qualified to perform these tests.    Interpretation performed at [facility], [address] [place] [address].          Physical Exam  Constitutional:       Appearance: Normal  appearance.   HENT:      Head: Normocephalic.      Right Ear: External ear normal.      Left Ear: External ear normal.      Nose: Nose normal. No congestion.      Mouth/Throat:      Mouth: Mucous membranes are moist.      Pharynx: Oropharynx is clear. No oropharyngeal exudate or posterior oropharyngeal erythema.   Eyes:      Extraocular Movements: Extraocular movements intact.      Conjunctiva/sclera: Conjunctivae normal.   Cardiovascular:      Rate and Rhythm: Normal rate and regular rhythm.      Heart sounds: Normal heart sounds. No murmur heard.  Pulmonary:      Effort: Pulmonary effort is normal.      Breath sounds: Normal breath sounds. No wheezing or rales.   Abdominal:      General: Abdomen is flat. There is no distension.      Palpations: Abdomen is soft.      Tenderness: There is no abdominal tenderness.   Musculoskeletal:      Cervical back: Normal range of motion and neck supple.      Right lower leg: No edema.      Left lower leg: No edema.   Lymphadenopathy:      Cervical: No cervical adenopathy.   Skin:     General: Skin is warm.   Neurological:      General: No focal deficit present.      Mental Status: He is alert and oriented to person, place, and time.

## 2024-06-25 DIAGNOSIS — E03.9 ACQUIRED HYPOTHYROIDISM: ICD-10-CM

## 2024-06-25 DIAGNOSIS — E78.5 HYPERLIPIDEMIA, UNSPECIFIED HYPERLIPIDEMIA TYPE: ICD-10-CM

## 2024-06-25 DIAGNOSIS — F41.9 ANXIETY: Primary | ICD-10-CM

## 2024-06-25 RX ORDER — ALPRAZOLAM 0.5 MG/1
0.5 TABLET ORAL ONCE
Qty: 2 TABLET | Refills: 0 | Status: SHIPPED | OUTPATIENT
Start: 2024-06-25 | End: 2024-06-25

## 2024-06-25 RX ORDER — ATORVASTATIN CALCIUM 10 MG/1
10 TABLET, FILM COATED ORAL
Qty: 90 TABLET | Refills: 1 | Status: SHIPPED | OUTPATIENT
Start: 2024-06-25

## 2024-06-25 RX ORDER — LEVOTHYROXINE SODIUM 0.15 MG/1
150 TABLET ORAL DAILY
Qty: 90 TABLET | Refills: 1 | Status: SHIPPED | OUTPATIENT
Start: 2024-06-25

## 2024-06-27 ENCOUNTER — APPOINTMENT (OUTPATIENT)
Dept: PHYSICAL THERAPY | Facility: OTHER | Age: 74
End: 2024-06-27
Payer: MEDICARE

## 2024-07-01 ENCOUNTER — APPOINTMENT (OUTPATIENT)
Dept: PHYSICAL THERAPY | Facility: OTHER | Age: 74
End: 2024-07-01
Payer: MEDICARE

## 2024-07-05 ENCOUNTER — TELEPHONE (OUTPATIENT)
Age: 74
End: 2024-07-05

## 2024-07-05 ENCOUNTER — HOSPITAL ENCOUNTER (OUTPATIENT)
Dept: RADIOLOGY | Age: 74
Discharge: HOME | End: 2024-07-05
Payer: MEDICARE

## 2024-07-05 DIAGNOSIS — M51.36 DDD (DEGENERATIVE DISC DISEASE), LUMBAR: ICD-10-CM

## 2024-07-05 PROCEDURE — 72148 MRI LUMBAR SPINE W/O DYE: CPT

## 2024-07-05 NOTE — TELEPHONE ENCOUNTER
Caller: patient    Doctor: alec    Reason for call: would like to provider to know that he got MRI done today     Call back#: [phone]

## 2024-07-08 ENCOUNTER — OFFICE VISIT (OUTPATIENT)
Dept: PODIATRY | Facility: CLINIC | Age: 74
End: 2024-07-08
Payer: MEDICARE

## 2024-07-08 VITALS
HEART RATE: 80 BPM | HEIGHT: 73 IN | WEIGHT: 276 LBS | DIASTOLIC BLOOD PRESSURE: 84 MMHG | SYSTOLIC BLOOD PRESSURE: 157 MMHG | RESPIRATION RATE: 18 BRPM | BODY MASS INDEX: 36.58 KG/M2

## 2024-07-08 DIAGNOSIS — I73.9 PERIPHERAL VASCULAR DISEASE, UNSPECIFIED (HCC): Primary | ICD-10-CM

## 2024-07-08 PROCEDURE — G0127 TRIM NAIL(S): HCPCS | Performed by: PODIATRIST

## 2024-07-08 PROCEDURE — RECHECK: Performed by: PODIATRIST

## 2024-07-08 NOTE — PROGRESS NOTES
Established patient with class findings presents for nail care.  Vascular exam:  DP  0/4 bilateral; PT  0 4 bilateral   Dermatological exam:  Each toenail is thick and  dystrophic.  Diagnosis:  PVD  Treatment: Trimmed multiple dystrophic toenails.     Nail removal    Date/Time: 7/8/2024 10:00 AM    Performed by: Tal Rosales DPM  Authorized by: Tal Rosales DPM    Nails trimmed:     Number of nails trimmed:  10

## 2024-07-10 ENCOUNTER — OFFICE VISIT (OUTPATIENT)
Dept: OBGYN CLINIC | Facility: HOSPITAL | Age: 74
End: 2024-07-10
Attending: ORTHOPAEDIC SURGERY
Payer: MEDICARE

## 2024-07-10 ENCOUNTER — HOSPITAL ENCOUNTER (OUTPATIENT)
Dept: RADIOLOGY | Facility: HOSPITAL | Age: 74
Discharge: HOME | End: 2024-07-10
Attending: ORTHOPAEDIC SURGERY
Payer: MEDICARE

## 2024-07-10 VITALS
BODY MASS INDEX: 36.52 KG/M2 | HEART RATE: 79 BPM | SYSTOLIC BLOOD PRESSURE: 127 MMHG | DIASTOLIC BLOOD PRESSURE: 84 MMHG | WEIGHT: 275.57 LBS | HEIGHT: 73 IN

## 2024-07-10 DIAGNOSIS — M54.42 ACUTE BILATERAL LOW BACK PAIN WITH BILATERAL SCIATICA: ICD-10-CM

## 2024-07-10 DIAGNOSIS — M54.41 ACUTE BILATERAL LOW BACK PAIN WITH BILATERAL SCIATICA: ICD-10-CM

## 2024-07-10 DIAGNOSIS — M48.061 SPINAL STENOSIS OF LUMBAR REGION WITHOUT NEUROGENIC CLAUDICATION: Primary | ICD-10-CM

## 2024-07-10 DIAGNOSIS — M51.36 DDD (DEGENERATIVE DISC DISEASE), LUMBAR: ICD-10-CM

## 2024-07-10 PROCEDURE — 72110 X-RAY EXAM L-2 SPINE 4/>VWS: CPT

## 2024-07-10 PROCEDURE — 99214 OFFICE O/P EST MOD 30 MIN: CPT | Performed by: ORTHOPAEDIC SURGERY

## 2024-07-10 RX ORDER — COVID-19 ANTIGEN TEST
KIT MISCELLANEOUS
COMMUNITY

## 2024-07-10 NOTE — PROGRESS NOTES
Assessment & Plan/Medical Decision Makin y.o. male with Bilateral Radicular Leg Pain and Ambulatory Dysfunction and imaging findings most notable for lumbar degenerative disc disease and spinal stenosis.         The clinical, physical and imaging findings were reviewed with the patient.  Solis  has a constellation of findings consistent with Lumbar Radiculopathy and Ambulatory Dysfunction in the setting of lumbar degenerative disease and spinal stenosis.      We discussed the treatment options including physical therapy, at home exercises, activity modifications, chiropractic medicine, oral medications, interventional spine procedures.  At this time recommend continued conservative treatments in the form of focussed physical therapy.  If patient does not improve we did discuss surgical intervention.     Referral placed for COMPREHENSIVE SPINE PHYSICAL THERAPY to work on core strengthening, lumbar ROM, strengthening, and stretching exercises.    Patient instructed to return to office/ER sooner if symptoms are not improving, getting worse, or new worrisome/neurologic symptoms arise.  Follow up in 6 weeks.      Subjective:      Chief Complaint: Back Pain    HPI:  Solis Dos Satnos is a 73 y.o. male presenting for initial visit with chief complaint of bilateral lower extremity weakness.  Patient notes disc herniation L4-L5 subsequent surgery  and intermittent back pain since. Acutely he notes bilateral leg pain and weakness that started 6 weeks ago. He has had 2 recent falls and is currently ambulating with a walker.      Describes pain as achy in nature.  Located in bilateral legs and low back. Numbness . Pain is worse with standing and improves with sitting.    Denies any jasson trauma. Denies fever or chills, no night sweats. Denies any bladder or bowel changes.      Conservative therapy includes the following:   Medications: none    Injections: not tried     Physical Therapy: has been attending for  bilateral knee OA and balance  Chiropractic Medicine: has not attempted  Accupunture/Massage Therapy: has not attempted   These therapeutic modalities were ineffective at providing sustained pain relief/functional improvement.     Nicotine dependent: No  Occupation: retired  Living situation: Lives with family   ADLs: patient is able to perform     Objective:     Family History   Problem Relation Age of Onset    Arthritis Other     Heart disease Father     Arthritis Mother        Past Medical History:   Diagnosis Date    Acute blood loss anemia 08/09/2019    Aftercare following right hip joint replacement surgery 11/05/2019    Anxiety disorder     Atherosclerotic heart disease of native coronary artery without angina pectoris     Benign prostatic hyperplasia without lower urinary tract symptoms     Cancer (HCC)     bladder    Clotting disorder ([facility]) 1/25/2023    Blood in urine    Disease of thyroid gland     hypo    Dyslipidemia     GERD (gastroesophageal reflux disease)     Hypertension     Impaired fasting blood sugar     Kidney stone     Low back pain     Obesity     Osteoarthritis     last assesed 6-6-16    Other chest pain     Paresthesia of skin     Polyneuropathy     last assesed 5-8-17    Pure hypercholesterolemia     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 ([facility])     Suspected UTI 03/09/2023    Urinary tract infection     Wears glasses        Current Outpatient Medications   Medication Sig Dispense Refill    atorvastatin (LIPITOR) 10 mg tablet Take 1 tablet (10 mg total) by mouth daily at bedtime 90 tablet 1    cholecalciferol (VITAMIN D3) 1,000 units tablet Take 1 tablet (1,000 Units total) by mouth daily 90 tablet 3    cyanocobalamin (VITAMIN B-12) 500 MCG tablet Take 1 tablet (500 mcg total) by mouth daily 100 tablet 3    hydrocortisone 2.5 % cream APPLY TO SKIN TWICE DAILY FOR TWO WEEKS, THEN AS NEEDED      levothyroxine 150 mcg tablet Take 1 tablet (150 mcg total) by mouth  daily 90 tablet 1    Naproxen Sodium (Aleve) 220 MG CAPS Take by mouth      tamsulosin (FLOMAX) 0.4 mg Take 1 capsule (0.4 mg total) by mouth daily with dinner 90 capsule 0    ALPRAZolam (XANAX) 0.5 mg tablet Take 1 tablet (0.5 mg total) by mouth 1 (one) time for 1 dose 1 hour before MRI 2 tablet 0    ciclopirox (LOPROX) 0.77 % cream APPLY TO AFFECTED AREAS ON FACE 1-2 TIMES DAILY (Patient not taking: Reported on 6/24/2024)      naproxen (Naprosyn) 500 mg tablet Take 1 tablet (500 mg total) by mouth 2 (two) times a day with meals (Patient not taking: Reported on 7/10/2024) 20 tablet 0     Current Facility-Administered Medications   Medication Dose Route Frequency Provider Last Rate Last Admin    cyanocobalamin injection 1,000 mcg  1,000 mcg Intramuscular Q30 Days Riki Noguera MD   1,000 mcg at 02/09/24 1255       Past Surgical History:   Procedure Laterality Date    BACK SURGERY      CHOLECYSTECTOMY      COLONOSCOPY  02/06/2019    CYSTOSCOPY N/A 04/26/2023    Procedure: CYSTOSCOPY w/ bladder biopsy;  Surgeon: Geo Bentley MD;  Location: [facility];  Service: Urology    HIP SURGERY  August 2019    Replacement    JOINT REPLACEMENT  August 2019    Hip replacement    UT ARTHRP ACETBLR/PROX FEM PROSTC AGRFT/ALGRFT Right 08/05/2019    Procedure: ARTHROPLASTY HIP TOTAL;  Surgeon: Leonard Gallagher MD;  Location: [facility];  Service: Orthopedics    UT CYSTO W/REMOVAL OF LESIONS SMALL N/A 03/23/2023    Procedure: TRANSURETHRAL RESECTION OF BLADDER TUMOR (TURBT), mitomycin ;  Surgeon: Geo Bentley MD;  Location: [facility];  Service: Urology    UT CYSTO W/REMOVAL OF LESIONS SMALL N/A 04/26/2023    Procedure: TRANSURETHRAL RESECTION OF BLADDER TUMOR (TURBT), cystoscopy with bladder biopsy;  Surgeon: Geo Bentley MD;  Location: [facility];  Service: Urology    TONSILLECTOMY         Social History     Socioeconomic History    Marital status: /Civil Union     Spouse name: Not on file     "Number of children: Not on file    Years of education: Not on file    Highest education level: Not on file   Occupational History    Not on file   Tobacco Use    Smoking status: Former     Current packs/day: 0.00     Average packs/day: 1 pack/day for 20.8 years (20.8 ttl pk-yrs)     Types: Cigarettes     Start date: 1967     Quit date: 1987     Years since quittin.7    Smokeless tobacco: Never   Vaping Use    Vaping status: Never Used   Substance and Sexual Activity    Alcohol use: Not Currently    Drug use: Not Currently    Sexual activity: Not Currently     Partners: Female     Birth control/protection: None   Other Topics Concern    Not on file   Social History Narrative    Smoking: Non-smoker    As per [employer]     Social Determinants of Health     Financial Resource Strain: Low Risk  (9/15/2023)    Overall Financial Resource Strain (CARDIA)     Difficulty of Paying Living Expenses: Not hard at all   Food Insecurity: Not on file   Transportation Needs: No Transportation Needs (9/15/2023)    PRAPARE - Transportation     Lack of Transportation (Medical): No     Lack of Transportation (Non-Medical): No   Physical Activity: Not on file   Stress: Not on file   Social Connections: Not on file   Intimate Partner Violence: Not on file   Housing Stability: Not on file       No Known Allergies    Review of Systems  General- denies fever/chills  HEENT- denies hearing loss or sore throat  Eyes- denies eye pain or visual disturbances, denies red eyes  Respiratory- denies cough or SOB  Cardio- denies chest pain or palpitations  GI- denies abdominal pain  Endocrine- denies urinary frequency  Urinary- denies pain with urination  Musculoskeletal- Negative except noted above  Skin- denies rashes or wounds  Neurological- denies dizziness or headache  Psychiatric- denies anxiety or difficulty concentrating    Physical Exam  /84   Pulse 79   Ht 6' 1\" (1.854 m)   Wt 125 kg (275 lb 9.2 oz)   BMI 36.36 " kg/m²     General/Constitutional: No apparent distress: well-nourished and well developed.  Lymphatic: No appreciable lymphadenopathy  Respiratory: Non-labored breathing  Vascular: No edema, swelling or tenderness, except as noted in detailed exam.  Integumentary: No impressive skin lesions present, except as noted in detailed exam.  Psych: Normal mood and affect, oriented to person, place and time.  MSK: normal other than stated in HPI and exam  Gait & balance: no evidence of myelopathic gait, ambulates with a Walker and shuffle gate    Lumbar spine range of motion:  -Forward flexion to 90  -Extension to neutral  -Lateral bend 25 right, 25 left  -Rotation 25 right, 25 left  There tenderness with palpation along lumbar paraspinal musculature, no midline tenderness     Neurologic:    Lower Extremity Motor Function    Right  Left    Iliopsoas  4/5  4/5    Quadriceps 4/5 4/5   Tibialis anterior  4+/5  4+/5    EHL  5/5  5/5    Gastroc. muscle  4/5  4/5    Heel rise  3/5  3/5    Toe rise  4/5  4/5      Sensory: light touch is intact to bilateral upper and lower extremities     Reflexes:    Right Left   Patellar 0 0   Achilles 0 0   Babinski neg neg     Other tests:  Straight Leg Raise: negative   Dino SI: negative  DOROTHEA SI: negative  Greater troch: no tenderness   Internal/external hip ROM: intact, no pain   Flexion/extension knee ROM: intact, no pain   Vascular: WWP extremities, 2+DP bilateral      Diagnostic Tests   IMAGING: I have personally reviewed the images and these are my findings:  Lumbar Spine X-rays from 7/10/2024: multi level lumbar spondylosis with loss of disc height, osteophyte formation and facet hypertrophy, L4-5 spondylolisthesis, no appreciated lytic/blastic lesions, no obvious instability    Lumbar Spine MRI from 7/5/24: multi level lumbar disc degeneration with disc desiccation, loss of disc height, facet and ligamentum hypertrophy,  L1-3 central and bilateral lateral recess stenosis, L3-4 and  "L4-5 left sided lateral recess stenosis with bilateral foraminal stenosis     Electronic Medical Records were reviewed including PCP and physical therapy notes    Procedures, if performed today     None performed       Portions of the record may have been created with voice recognition software.  Occasional wrong word or \"sound a like\" substitutions may have occurred due to the inherent limitations of voice recognition software.  Read the chart carefully and recognize, using context, where substitutions have occurred.    "

## 2024-07-16 DIAGNOSIS — R39.12 BENIGN PROSTATIC HYPERPLASIA WITH WEAK URINARY STREAM: ICD-10-CM

## 2024-07-16 DIAGNOSIS — N40.1 BENIGN PROSTATIC HYPERPLASIA WITH WEAK URINARY STREAM: ICD-10-CM

## 2024-07-16 RX ORDER — TAMSULOSIN HYDROCHLORIDE 0.4 MG/1
0.4 CAPSULE ORAL
Qty: 90 CAPSULE | Refills: 0 | Status: SHIPPED | OUTPATIENT
Start: 2024-07-16

## 2024-07-18 ENCOUNTER — EVALUATION (OUTPATIENT)
Dept: PHYSICAL THERAPY | Facility: OTHER | Age: 74
End: 2024-07-18
Payer: MEDICARE

## 2024-07-18 DIAGNOSIS — M48.061 SPINAL STENOSIS OF LUMBAR REGION WITHOUT NEUROGENIC CLAUDICATION: ICD-10-CM

## 2024-07-18 PROCEDURE — 97162 PT EVAL MOD COMPLEX 30 MIN: CPT

## 2024-07-18 PROCEDURE — 97110 THERAPEUTIC EXERCISES: CPT

## 2024-07-18 NOTE — PROGRESS NOTES
PT Evaluation     Today's date: 2024  Patient name: Solis Dos Santos  : 1950  MRN: [id number]  Referring provider: Pieter Barfield MD  Dx:   Encounter Diagnosis     ICD-10-CM    1. Spinal stenosis of lumbar region without neurogenic claudication  M48.061 Ambulatory Referral to Physical Therapy          Start Time: 944  Stop Time: 1055  Total time in clinic (min): 71 minutes    Assessment  Impairments: abnormal gait, abnormal muscle firing, abnormal or restricted ROM, activity intolerance, impaired physical strength, lacks appropriate home exercise program, pain with function, weight-bearing intolerance, poor posture  and poor body mechanics    Assessment details: Solis Dos Santos is a 73 y.o. male presenting to OPPT initial evaluation with chief complaint of recent episode of low back pain for the last 6 weeks of insidious onset. Today he reports no pain sitting, mild discomfort lying, no pain with walking - primary complaint today is decreased balance, lack of stability with walking, lack of balance confidence. Pt had two falls - one occurrence was sliding out of bed landing on his buttocks, other instance was loss of balance when turning in which he hit his head against desk, no LOC, dizziness. Began walking short distances in the home the last two days. Has adaptive equipment at home in bath, two steps to enter home, stairs in home. Has not tried to climb stairs due to fear and weakness. Pt has noted mild L hip / groin pain the last two days. Absence of numbness / tingling today - sole complaint in legs is weakness.    Upon PT exam, pt presents with lumbar flexion bias, decreased dynamic lumbar stability, decreased L hip ROM, decreased tibialis anterior & plantarflexion strength, poor static & dynamic balance and poor balance confidence impacting all upright functional mobility and participation in community / [employer]. PLOF ambulating with no AD. The patient is a fair candidate for physical  therapy to achieve the following goals given co-morbidities (B knee OA, L hip OA).        Goals  STG (4 weeks):  Pt will decrease pain to low back pain to <2/10 during activity  Pt will demonstrate increased global L hip ROM by >5 deg  Pt will demonstrate increased B DF MMT to >4/5 in order to improve gait  Pt will be able to ambulate household distances without RW.  Pt will report >40% self-perceived balance confidence.  Pt's self-perceived disability will decrease as demonstrated by a >5-point improvement in FOTO score.    Pt will be independent with HEP as demonstrated by return demo with proper technique and execution of exercises provided.     LTG (16 weeks):  Pt will have absence of pain for 2 consecutive sessions  Pt will demonstrate increased global L hip ROM by >10 deg  Pt will demonstrate increased B DF MMT to >5/5 in order to improve gait  Pt will report >70% self-perceived balance confidence.  Pt will be able to ambulate community distances (>300 ft) without RW.  Pt's self-perceived disability will decrease as demonstrated by a FOTO score >60.  Pt will be independent with progressions to HEP as demonstrated by return demo with proper technique and execution of exercises provided.        Plan  Patient would benefit from: skilled physical therapy and PT eval  Planned modality interventions: biofeedback, electrical stimulation/Russian stimulation, TENS, thermotherapy: hydrocollator packs, traction, ultrasound and cryotherapy    Planned therapy interventions: abdominal trunk stabilization, activity modification, balance, balance/weight bearing training, body mechanics training, flexibility, functional ROM exercises, graded activity, graded exercise, graded motor, gait training, home exercise program, therapeutic training, therapeutic activities, therapeutic exercise, stretching, strengthening, joint mobilization, manual therapy, massage, neuromuscular re-education, patient education, postural training, IASTM  "and kinesiology taping    Frequency: 2x week  Duration in weeks: 24  Plan of Care beginning date: 7/18/2024  Plan of Care expiration date: 1/14/2025  Treatment plan discussed with: patient      Subjective Evaluation    History of Present Illness  Mechanism of injury: The following HPI captured from referring provider's encounter and EMR review and confirmed with patient:  \" Solis Dos Santos is a 73 y.o. male presenting for initial visit with chief complaint of bilateral lower extremity weakness.  Patient notes disc herniation L4-L5 subsequent surgery 1987 and intermittent back pain since. Acutely he notes bilateral leg pain and weakness that started 6 weeks ago. He has had 2 recent falls and is currently ambulating with a walker.       Describes pain as achy in nature.  Located in bilateral legs and low back. Numbness . Pain is worse with standing and improves with sitting.\"    Pt presents today with current symptomatic complaints including -   Solis Dos Santos is a 73 y.o. male presenting to OPPT initial evaluation with chief complaint of recent episode of low back pain for the last 6 weeks of insidious onset. Today he reports no pain sitting, mild discomfort lying, no pain with walking - primary complaint today is decreased balance, lack of stability with walking, lack of balance confidence. Pt had two falls - one occurrence was sliding out of bed landing on his buttocks, other instance was loss of balance when turning in which he hit his head against desk, no LOC, dizziness. Began walking short distances in the home the last two days. Has adaptive equipment at home in bath, two steps to enter home, stairs in home. Has not tried to climb stairs due to fear and weakness. Pt has noted mild L hip / groin pain the last two days. Absence of numbness / tingling today - sole complaint in legs is weakness.      Current self-perceived balance confidence: 10%          Patient Goals  Patient goal: Improve balance " "confidence  Pain  Current pain ratin  At best pain ratin  At worst pain ratin      Diagnostic Tests  X-ray: normal  MRI studies: abnormal  Treatments  Previous treatment: physical therapy        Objective    Flowsheet Rows      Flowsheet Row Most Recent Value   PT/OT G-Codes    Current Score 44   Projected Score 56        Postural Observation: Decreased lumbar lordosis, mild L scoliosis    Palpation: Absence of tenderness    Lumbar ROM:     AROM   Flexion To 90   Extension To neutral   Rotation nt   Sidebending nt     Reproduction of anterior lower leg pain with forward flexion; absence of pain when cued to brace core before bending    Hip ROM:   PROM:L PROM:R   Flexion 100 120   ER (90/90) 30 45   IR (90/90) 0 40       Strength:   Left Right   Hip flexion 4/5 4/5   Knee extension 4+/5 4+/5   Knee flexion 4+/5 4+/5   DF 4-/5 4-/5   PF 3/5 3/5       Neurologic tests:  (-) LQS Dermatomes  (-) LQS Myotomes  Patellar reflex (L2-4): 0 bilat  Achilles reflex (L5-S1): 0 bilat    Clinical tests:  (+) L FADIR      Functional Assessment:  5xSTS: 20.88\"  Gait - Decreased DF R>L, resulting in a R foot toe-drag when ambulating with RW; improvements with cueing for \"march and land heel first\"         POC expires Unit limit Auth Expiration date PT/OT + Visit Limit?   24 BOMN N/A BOMN                           Visit/Unit Tracking  AUTH Status:  Date 7/18              No - Medicare Used 1 2 3 4 5 6 7 8 9 10 - RE      Remaining  9                  Precautions: fall risk      Visit #: 1 ie            Date:              Manuals             Hip LAD:L SFP            Hip lateral distraction SFP                         Neuro Re-Ed             TrA marches             Curl-up             Deadbug progressions             Pball pressdown                          Pallof press HEP                         Tandem stance HEP            SLS                          Ther Ex             Seated flexion HEP                       "   SKC             DKC                          Seated  > standing marches             Seated > standing DF HEP            LAQ                          Ther Activity             TUG nv                         Chair squats HEP                         Gait Training                                       Modalities

## 2024-07-22 ENCOUNTER — OFFICE VISIT (OUTPATIENT)
Dept: PHYSICAL THERAPY | Facility: OTHER | Age: 74
End: 2024-07-22
Payer: MEDICARE

## 2024-07-22 DIAGNOSIS — M48.061 SPINAL STENOSIS OF LUMBAR REGION WITHOUT NEUROGENIC CLAUDICATION: Primary | ICD-10-CM

## 2024-07-22 PROCEDURE — 97110 THERAPEUTIC EXERCISES: CPT

## 2024-07-22 PROCEDURE — 97140 MANUAL THERAPY 1/> REGIONS: CPT

## 2024-07-22 PROCEDURE — 97112 NEUROMUSCULAR REEDUCATION: CPT

## 2024-07-22 NOTE — PROGRESS NOTES
"Daily Note     Today's date: 2024  Patient name: Solis Dos Santos  : 1950  MRN: [id number]  Referring provider: Leonard Gallagher,*  Dx:   Encounter Diagnosis     ICD-10-CM    1. Spinal stenosis of lumbar region without neurogenic claudication  M48.061           Start Time: 1453  Stop Time: 1531  Total time in clinic (min): 38 minutes    Subjective: Pt notes a \"zinger\" from L glute down the leg yesterday. Also noted some residual tightness in L hip following LAD last visit.      Objective: See treatment diary below      Assessment: Tolerated treatment well. Pt demonstrating increased neural tension in L sciatic nerve with SLR testing today - improved mobility following seated slump glides. Improved force production of L hip flexor following multiple repetitions of manual resisted hip flexion in hook-lying. Continues to demonstrate L foot drag during gait secondary to tibA weakness; improved gait with verbal cueing to \"march the leg up.\" Patient demonstrated fatigue post treatment and would benefit from continued PT      Plan: Continue per plan of care.      POC expires Unit limit Auth Expiration date PT/OT + Visit Limit?   24 BOMN N/A BOMN                           Visit/Unit Tracking  AUTH Status:  Date              No - Medicare Used 1 2 3 4 5 6 7 8 9 10 - RE      Remaining  9 8                 Precautions: fall risk      Visit #: 1 ie 2           Date:             Manuals             Hip LAD:L SFP gII SFP           Hip lateral distraction SFP                         Neuro Re-Ed             TrA bracing  7.5# KB 20x5\"           TrA marches  X10  X10 MRE           Curl-up             Deadbug progressions             Pball pressdown                          Pallof press HEP                         Tandem stance HEP            SLS                          Seated slump gliders  3x15:L           Ther Ex             Seated flexion HEP Pball x10 repeated    10x10\"     5x20\":R       " "                 SKC  10x10\"           DKC  Manual 10x10\"                        Seated  > standing marches             Seated > standing DF HEP Seated x10  2x10 MRE           LAQ                          Ther Activity             TUG nv                         Chair squats HEP                         Gait Training                                       Modalities                                            "

## 2024-07-25 ENCOUNTER — OFFICE VISIT (OUTPATIENT)
Dept: PHYSICAL THERAPY | Facility: OTHER | Age: 74
End: 2024-07-25
Payer: MEDICARE

## 2024-07-25 DIAGNOSIS — M48.061 SPINAL STENOSIS OF LUMBAR REGION WITHOUT NEUROGENIC CLAUDICATION: Primary | ICD-10-CM

## 2024-07-25 PROCEDURE — 97110 THERAPEUTIC EXERCISES: CPT

## 2024-07-25 PROCEDURE — 97116 GAIT TRAINING THERAPY: CPT

## 2024-07-25 NOTE — PROGRESS NOTES
"Daily Note     Today's date: 2024  Patient name: Solis Dos Santos  : 1950  MRN: [id number]  Referring provider: Leonard Gallagher,*  Dx:   Encounter Diagnosis     ICD-10-CM    1. Spinal stenosis of lumbar region without neurogenic claudication  M48.061             Start Time: 1345  Stop Time: 1443  Total time in clinic (min): 58 minutes    Subjective: Pt reports he is worried that he will never be able to walk normally again.       Objective: See treatment diary below      Assessment: Tolerated treatment well. Session focused on muscle flexibility and gait training with SPC today. Pt c/o \"hip flexor\" pain, presenting with C-sign for groin pain - educated pt that this is likely more indicative of hip joint OA / acetabular impingement. Held on hip mobilizations today per pt's request, notes excessive soreness following mobilizations. Pt demonstrating good dynamic balance ambulating with SPC, decreased toe drag noted. Given permission to practice in-home, still recommend walker for community distances.  Patient demonstrated fatigue post treatment and would benefit from continued PT      Plan: Continue per plan of care.      POC expires Unit limit Auth Expiration date PT/OT + Visit Limit?   24 BOMN N/A BOMN                           Visit/Unit Tracking  AUTH Status:  Date             No - Medicare Used 1 2 3 4 5 6 7 8 9 10 - RE      Remaining  9 8 7                Precautions: fall risk      Visit #: 1 ie 2 3          Date:            Manuals             Hip LAD:L SFP gII SFP           Hip lateral distraction SFP                         Neuro Re-Ed             TrA bracing  7.5# KB 20x5\"           TrA marches  X10  X10 MRE           Curl-up             Deadbug progressions             Pball pressdown                          Pallof press HEP                         Tandem stance HEP            SLS                          Seated slump gliders  3x15:L 3x15:B          Ther " "Ex             Seated flexion HEP Pball x10 repeated    10x10\"     5x20\":R Pball 30x10\"                           SKC  10x10\" 10x10\"          DKC  Manual 10x10\" 10x10\"                       Harlan S   3x30\":B          SLR HS S   3x30\":B                       Seated  > standing marches             Seated > standing DF HEP Seated x10  2x10 MRE           LAQ                          Ther Activity             TUG nv                         Chair squats HEP                         Gait Training             with SPC   SFP                       Modalities                                            "

## 2024-07-29 ENCOUNTER — OFFICE VISIT (OUTPATIENT)
Dept: PHYSICAL THERAPY | Facility: OTHER | Age: 74
End: 2024-07-29
Payer: MEDICARE

## 2024-07-29 ENCOUNTER — OFFICE VISIT (OUTPATIENT)
Dept: PHYSICAL THERAPY | Facility: OTHER | Age: 74
End: 2024-07-29
Payer: COMMERCIAL

## 2024-07-29 DIAGNOSIS — M48.061 SPINAL STENOSIS OF LUMBAR REGION WITHOUT NEUROGENIC CLAUDICATION: Primary | ICD-10-CM

## 2024-07-29 PROCEDURE — 97760 ORTHOTIC MGMT&TRAING 1ST ENC: CPT

## 2024-07-29 PROCEDURE — 97110 THERAPEUTIC EXERCISES: CPT

## 2024-07-29 PROCEDURE — 97140 MANUAL THERAPY 1/> REGIONS: CPT

## 2024-07-29 NOTE — PROGRESS NOTES
"Daily Note     Today's date: 2024  Patient name: Solis Dos Santos  : 1950  MRN: [id number]  Referring provider: Leonard Gallagher,*  Dx:   Encounter Diagnosis     ICD-10-CM    1. Spinal stenosis of lumbar region without neurogenic claudication  M48.061               Start Time: 0945  Stop Time: 1023  Total time in clinic (min): 38 minutes    Subjective: Pt says he woke up feeling stiff this AM; has been able to get around with SPC, notes \"I need to think more about walking.\"      Objective: See treatment diary below      Assessment: Tolerated treatment well. Orthotics initial evaluation conducted today. Rest of session focused on flexibility, foot / ankle strengthening in order to improve foot drag, and pre-gait exercises.  Patient demonstrated fatigue post treatment and would benefit from continued PT      Plan: Continue per plan of care.      POC expires Unit limit Auth Expiration date PT/OT + Visit Limit?   24 BOMN N/A BOMN                           Visit/Unit Tracking  AUTH Status:  Date            No - Medicare Used 1 2 3 4 5 6 7 8 9 10 - RE      Remaining  9 8 7 6               Precautions: fall risk      Visit #: 1 ie 2 3 4         Date:           Manuals             Hip LAD:L SFP gII SFP           Hip lateral distraction SFP   gIII SFP                      Neuro Re-Ed             TrA bracing  7.5# KB 20x5\"           TrA marches  X10  X10 MRE           Curl-up             Deadbug progressions             Pball pressdown                          Pallof press HEP                         Tandem stance HEP            SLS                          Seated slump gliders  3x15:L 3x15:B          Ther Ex             Bike    8 min         Seated flexion HEP Pball x10 repeated    10x10\"     5x20\":R Pball 30x10\"                           SKC  10x10\" 10x10\"          DKC  Manual 10x10\" 10x10\"                       Prone quad S    3x30\":B         Harlan S   3x30\":B  " "        SLR HS S   3x30\":B 3x30\":B                      Seated  > standing marches    8\" step taps 2x10ea         Seated > standing DF HEP Seated x10  2x10 MRE           LAQ             H/L hip abd     GTB X20ea SL c iso hold         TB eversion    OTB 2x15         TB DF    BTB 3x12                      Ther Activity             TUG nv                         Chair squats HEP                                      Gait Training             with SPC   SFP                       Modalities                                            "

## 2024-07-29 NOTE — PROGRESS NOTES
Orthotic Evaluation    Today's date: 24  Patient name: Solis Dos Santos  : 1950  MRN: [id number]  Referring provider: Riki Noguera MD  Dx:   Encounter Diagnosis     ICD-10-CM    1. Spinal stenosis of lumbar region without neurogenic claudication  M48.061           Start Time: 930  Stop Time: 945  Total time in clinic (min): 15 minutes     Subjective:    Dandre presents today for orthotic evaluation. he complains of decreased ROM, balance dysfunction, impaired sensation, and ambulatory dysfunction with functional activities including ambulation, transfers, and leisure. The patient plans to use his orthotic for walking and standing. The orthotic will be placed in a wide, size 12.    Objective:    Age: 73 y.o.  Weight: 275    Foot Posture Index Score    Right Foot  Talar dome - 0  Malleolar curve - -1   Calcaneal eversion - 0  Talonavicular bulge - 0  Medial longitudinal arch - -1  Too many toes - 0  Total Score R = -2    Left Foot  Talar dome - 0  Malleolar curve - -1   Calcaneal eversion - 0  Talonavicular bulge - 0  Medial longitudinal arch - -1  Too many toes - 0  Total Score L = -2    Reference Values  Normal =    0 to +5  Pronated =  +6 to +9 Highly Pronated =  10+  Supinated =   -1 to -4 Highly Supinated = -5 to -12    Objective    Leg length ASIS to medial malleolus: R 94 cm L 93 cm  Flexible supinated foot  Gait assessment: neutral, controlled foot; LLE inversion 2/2 to L4 distribution weakness (tibA & peroneals)    Assessment:    Patient requires custom foot orthosis with L 5mm heel lift, plantar fascia groove, and UCB cut heel cup to correct altered gait mechanics. Patient is not currently controlled by his current shoe wear. Patient was educated on the design of the custom orthotic and it's ability to offload his current pathology. Patient was also educated on potential shoe wear changes with device, break-in period, and skin checks to avoid potential skin break down.     Orthotic goals:  1)  Patient will have custom foot orthoses fitted to his shoe.   2) Patient will be compliant with break-in period of custom foot orthoses as prescribed by PT.   3) Patient will be compliant with custom foot orthoses use as prescribed by PT.     Plan:    Planned therapy interventions: orthotic fitting/training  Duration in visits: 2

## 2024-08-01 ENCOUNTER — OFFICE VISIT (OUTPATIENT)
Dept: PHYSICAL THERAPY | Facility: OTHER | Age: 74
End: 2024-08-01
Payer: MEDICARE

## 2024-08-01 DIAGNOSIS — M48.061 SPINAL STENOSIS OF LUMBAR REGION WITHOUT NEUROGENIC CLAUDICATION: Primary | ICD-10-CM

## 2024-08-01 PROCEDURE — 97112 NEUROMUSCULAR REEDUCATION: CPT

## 2024-08-01 PROCEDURE — 97110 THERAPEUTIC EXERCISES: CPT

## 2024-08-01 NOTE — PROGRESS NOTES
"Daily Note     Today's date: 2024  Patient name: Solis Dos Santos  : 1950  MRN: [id number]  Referring provider: Leonard Gallagher,*  Dx:   Encounter Diagnosis     ICD-10-CM    1. Spinal stenosis of lumbar region without neurogenic claudication  M48.061                 Start Time: 1016  Stop Time: 1054  Total time in clinic (min): 38 minutes    Subjective: Pt continues to note increased L groin pain 1-2 days following hip mobilizations.       Objective: See treatment diary below      Assessment: Tolerated treatment well. No change in L groin pain following femoral nerve glides. Tolerated progressions to DF strengthening well; continue to challenge dynamic balance with addition of pre-gait exercises in upcoming visits.  Patient demonstrated fatigue post treatment and would benefit from continued PT      Plan: Continue per plan of care.      POC expires Unit limit Auth Expiration date PT/OT + Visit Limit?   24 BOMN N/A BOMN                           Visit/Unit Tracking  AUTH Status:  Date           No - Medicare Used 1 2 3 4 5 6 7 8 9 10 - RE      Remaining  9 8 7 6 5              Precautions: fall risk      Visit #: 1 ie 2 3 4 5        Date:          Manuals             Hip LAD:L SFP gII SFP           Hip lateral distraction SFP   gIII SFP                      Neuro Re-Ed             TrA bracing  7.5# KB 20x5\"   C PPT  x30        TrA marches  X10  X10 MRE           Curl-up             Deadbug progressions             Pball pressdown                          Pallof press HEP                         Tandem stance HEP            SLS                          Prone femoral nerve glides     3x15:L        Seated slump gliders  3x15:L 3x15:B  3x15:B        Ther Ex             Bike    8 min 8 min        Seated flexion HEP Pball x10 repeated    10x10\"     5x20\":R Pball 30x10\"      Pball x40                     SKC  10x10\" 10x10\"  5x30\"        DKC  Manual 10x10\" " "10x10\"  5x30\"                     Prone quad S    3x30\":B         Harlan S   3x30\":B          SLR HS S   3x30\":B 3x30\":B                      Seated  > standing marches    8\" step taps 2x10ea 8\" step taps 3# 2x10ea        Seated > standing DF HEP Seated x10  2x10 MRE           LAQ             H/L hip abd     GTB X20ea SL c iso hold         TB eversion    OTB 2x15 GTB 3x15        TB DF    BTB 3x12 MTB 3x15                     Ther Activity             TUG nv                         Chair squats HEP                                      Gait Training             with SPC   SFP                       Modalities                                            "

## 2024-08-05 ENCOUNTER — APPOINTMENT (OUTPATIENT)
Dept: PHYSICAL THERAPY | Facility: OTHER | Age: 74
End: 2024-08-05
Payer: MEDICARE

## 2024-08-07 ENCOUNTER — APPOINTMENT (OUTPATIENT)
Dept: PHYSICAL THERAPY | Facility: OTHER | Age: 74
End: 2024-08-07
Payer: MEDICARE

## 2024-08-08 ENCOUNTER — CONSULT (OUTPATIENT)
Dept: PAIN MEDICINE | Facility: CLINIC | Age: 74
End: 2024-08-08
Payer: MEDICARE

## 2024-08-08 ENCOUNTER — TRANSCRIBE ORDERS (OUTPATIENT)
Dept: PAIN MEDICINE | Facility: CLINIC | Age: 74
End: 2024-08-08

## 2024-08-08 ENCOUNTER — OFFICE VISIT (OUTPATIENT)
Dept: PHYSICAL THERAPY | Facility: OTHER | Age: 74
End: 2024-08-08
Payer: MEDICARE

## 2024-08-08 VITALS
WEIGHT: 277 LBS | DIASTOLIC BLOOD PRESSURE: 84 MMHG | RESPIRATION RATE: 16 BRPM | HEIGHT: 73 IN | BODY MASS INDEX: 36.71 KG/M2 | HEART RATE: 80 BPM | SYSTOLIC BLOOD PRESSURE: 124 MMHG

## 2024-08-08 DIAGNOSIS — M54.42 ACUTE BILATERAL LOW BACK PAIN WITH BILATERAL SCIATICA: ICD-10-CM

## 2024-08-08 DIAGNOSIS — M48.061 SPINAL STENOSIS OF LUMBAR REGION WITHOUT NEUROGENIC CLAUDICATION: Primary | ICD-10-CM

## 2024-08-08 DIAGNOSIS — M54.41 ACUTE BILATERAL LOW BACK PAIN WITH BILATERAL SCIATICA: ICD-10-CM

## 2024-08-08 PROCEDURE — 97110 THERAPEUTIC EXERCISES: CPT

## 2024-08-08 PROCEDURE — 99204 OFFICE O/P NEW MOD 45 MIN: CPT | Performed by: PHYSICAL MEDICINE & REHABILITATION

## 2024-08-08 PROCEDURE — 97140 MANUAL THERAPY 1/> REGIONS: CPT

## 2024-08-08 NOTE — PROGRESS NOTES
Assessment  1. Acute bilateral low back pain with bilateral sciatica        Plan  Mr. Dos Santos is a pleasant 74-year-old male with significant past medical history of a lumbar surgery performed in 1987 presents to [facility] for initial evaluation regarding lumbar spinal stenosis with neurogenic claudication and referral from Dr. Gallagher.  He has already been seen by Dr. Barfield and is pending a surgical procedure possibly in September 2024.  However since his previous visit with Dr. Barfield till today he has been participating in conservative measures with physical therapy with significant improvements in his symptoms and has improved his DME from a walker to now ambulating primarily with a cane.  He reports improvements in bilateral foot drop.  He will be seen by Dr. Barfield tomorrow August 9, 2024 at 9 AM to discuss next steps.  During today's evaluation review of MRI does demonstrate multilevel varying degrees of mild to moderate spinal stenosis notable throughout the lumbar spine from L1-L5.  For now advised patient we will follow-up as needed and I will touch base with Dr. Barfield tomorrow after his appointment.  If no surgery is advised and patient would like to consider epidural steroid injection would consider an L5-S1 LESI but for now we will just afia available as needed.  All questions answered, patient is agreeable with plan.in     My impressions and treatment recommendations were discussed in detail with the patient who verbalized understanding and had no further questions.  Discharge instructions were provided. I personally saw and examined the patient and I agree with the above discussed plan of care.    No orders of the defined types were placed in this encounter.    No orders of the defined types were placed in this encounter.      History of Present Illness    Solis Dos Santos is a 74 y.o. male presents to [facility] and [facility] for initial evaluation  regarding low back pain with radiating symptoms into bilateral lower extremities.  Denies any significant inciting event or recent trauma.  Today reports his referral from Dr. Gallagher regarding lumbar spinal stenosis with neurogenic claudication and is pending a follow-up visit with Dr. Barfield tomorrow August 9, 2024.  Today patient reports moderate pain rated 3-5 out of 10 and described as an occasional numbness, shooting, dull ache into bilateral lower extremities.  He reports significant improvements with physical therapy since his visit with Dr. Barfield.  He was ambulating with a rolling walker and is now improved to a cane.  States symptoms are worse with standing, bending, exercise, coughing.  Reports moderate relief with physical therapy.  Denies smoking, marijuana or alcohol use.  Currently using Tylenol, Voltaren gel and naproxen with some relief.  Presents today for initial evaluation.    I have personally reviewed and/or updated the patient's past medical history, past surgical history, family history, social history, current medications, allergies, and vital signs today.     Review of Systems   Constitutional:  Negative for fever and unexpected weight change.   HENT:  Negative for trouble swallowing.    Eyes:  Negative for visual disturbance.   Respiratory:  Negative for shortness of breath and wheezing.    Cardiovascular:  Negative for chest pain and palpitations.   Gastrointestinal:  Negative for constipation, diarrhea, nausea and vomiting.   Endocrine: Negative for cold intolerance, heat intolerance and polydipsia.   Genitourinary:  Negative for difficulty urinating and frequency.   Musculoskeletal:  Positive for back pain, gait problem and joint swelling. Negative for arthralgias and myalgias.        B/L Hip & BLE Pain   Skin:  Negative for rash.   Neurological:  Positive for weakness. Negative for dizziness, seizures, syncope and headaches.   Hematological:  Does not bruise/bleed easily.    Psychiatric/Behavioral:  Negative for dysphoric mood.    All other systems reviewed and are negative.      Patient Active Problem List   Diagnosis    Primary osteoarthritis of both knees    Osgood-Schlatter's disease of both knees    Pain in both knees    Status post right hip replacement    Status post total hip replacement, left    Impaired mobility and ADLs    Hypothyroidism    Difficulty sleeping    Obesity, morbid (HCC)    Chronic pain of left knee    Gross hematuria    Hyperlipidemia    Bladder mass    Right leg swelling    Malignant neoplasm of posterior wall of urinary bladder (HCC)    Malignant neoplasm of lateral wall of urinary bladder (HCC)    Peripheral vascular disease, unspecified (HCC)    Primary osteoarthritis of left knee    Primary osteoarthritis of right knee    DDD (degenerative disc disease), lumbar       Past Medical History:   Diagnosis Date    Acute blood loss anemia 08/09/2019    Aftercare following right hip joint replacement surgery 11/05/2019    Anxiety disorder     Atherosclerotic heart disease of native coronary artery without angina pectoris     Benign prostatic hyperplasia without lower urinary tract symptoms     Cancer (HCC)     bladder    Clotting disorder (HCC) 1/25/2023    Blood in urine    Disease of thyroid gland     hypo    Dyslipidemia     GERD (gastroesophageal reflux disease)     Hypertension     Impaired fasting blood sugar     Kidney stone     Low back pain     Obesity     Osteoarthritis     last assesed 6-6-16    Other chest pain     Paresthesia of skin     Polyneuropathy     last assesed 5-8-17    Pure hypercholesterolemia     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)     Suspected UTI 03/09/2023    Urinary tract infection     Wears glasses        Past Surgical History:   Procedure Laterality Date    BACK SURGERY      CHOLECYSTECTOMY      COLONOSCOPY  02/06/2019    CYSTOSCOPY N/A 04/26/2023    Procedure: CYSTOSCOPY w/ bladder biopsy;   Surgeon: Geo Bentley MD;  Location: [facility];  Service: Urology    HIP SURGERY  2019    Replacement    JOINT REPLACEMENT  2019    Hip replacement    AR ARTHRP ACETBLR/PROX FEM PROSTC AGRFT/ALGRFT Right 2019    Procedure: ARTHROPLASTY HIP TOTAL;  Surgeon: Leonard Gallagher MD;  Location: [facility];  Service: Orthopedics    AR CYSTO W/REMOVAL OF LESIONS SMALL N/A 2023    Procedure: TRANSURETHRAL RESECTION OF BLADDER TUMOR (TURBT), mitomycin ;  Surgeon: Geo Bentley MD;  Location: [facility];  Service: Urology    AR CYSTO W/REMOVAL OF LESIONS SMALL N/A 2023    Procedure: TRANSURETHRAL RESECTION OF BLADDER TUMOR (TURBT), cystoscopy with bladder biopsy;  Surgeon: Geo Bentley MD;  Location: [facility];  Service: Urology    TONSILLECTOMY         Family History   Problem Relation Age of Onset    Arthritis Other     Heart disease Father     Arthritis Mother        Social History     Occupational History    Not on file   Tobacco Use    Smoking status: Former     Current packs/day: 0.00     Average packs/day: 1 pack/day for 20.8 years (20.8 ttl pk-yrs)     Types: Cigarettes     Start date: 1967     Quit date: 1987     Years since quittin.7    Smokeless tobacco: Never   Vaping Use    Vaping status: Never Used   Substance and Sexual Activity    Alcohol use: Not Currently    Drug use: Not Currently    Sexual activity: Not Currently     Partners: Female     Birth control/protection: None       Current Outpatient Medications on File Prior to Visit   Medication Sig    atorvastatin (LIPITOR) 10 mg tablet Take 1 tablet (10 mg total) by mouth daily at bedtime    cholecalciferol (VITAMIN D3) 1,000 units tablet Take 1 tablet (1,000 Units total) by mouth daily    ciclopirox (LOPROX) 0.77 % cream     cyanocobalamin (VITAMIN B-12) 500 MCG tablet Take 1 tablet (500 mcg total) by mouth daily    hydrocortisone 2.5 % cream APPLY TO SKIN TWICE DAILY FOR TWO WEEKS,  "THEN AS NEEDED    levothyroxine 150 mcg tablet Take 1 tablet (150 mcg total) by mouth daily    Naproxen Sodium (Aleve) 220 MG CAPS Take by mouth    tamsulosin (FLOMAX) 0.4 mg Take 1 capsule (0.4 mg total) by mouth daily with dinner    ALPRAZolam (XANAX) 0.5 mg tablet Take 1 tablet (0.5 mg total) by mouth 1 (one) time for 1 dose 1 hour before MRI    naproxen (Naprosyn) 500 mg tablet Take 1 tablet (500 mg total) by mouth 2 (two) times a day with meals (Patient not taking: Reported on 7/10/2024)     Current Facility-Administered Medications on File Prior to Visit   Medication    cyanocobalamin injection 1,000 mcg       No Known Allergies    Physical Exam    /84   Pulse 80   Resp 16   Ht 6' 1\" (1.854 m)   Wt 126 kg (277 lb)   BMI 36.55 kg/m²     Constitutional: normal, well developed, well nourished, alert, in no distress and non-toxic and no overt pain behavior.  Eyes: anicteric  HEENT: grossly intact  Neck: supple, symmetric, trachea midline and no masses   Pulmonary:even and unlabored  Cardiovascular:No edema or pitting edema present  Skin:Normal without rashes or lesions and well hydrated  Psychiatric:Mood and affect appropriate  Neurologic:Cranial Nerves II-XII grossly intact  Musculoskeletal:ambulates with cane and walker, tenderness to palpation bilateral lumbar paraspinals, decreased active and passive range of motion with lumbar extension, MMT 4+ out of 5 bilateral lower extremities, DTRs absent bilateral patellar and Achilles reflexes, negative straight leg raise bilaterally    Imaging    MRI LUMBAR SPINE WITHOUT CONTRAST     INDICATION: M51.36: Other intervertebral disc degeneration, lumbar region.     COMPARISON: Lumbar spine radiographs 4/26/2021.     TECHNIQUE:  Multiplanar, multisequence imaging of the lumbar spine was performed. .        IMAGE QUALITY:  Diagnostic     FINDINGS:     For the purposes of this dictation, the last well-formed disc space is labeled as L5-S1. Recommend correlation " with dedicated thoracic spine imaging to count the ribs prior to any future intervention to ensure accuracy/consistency of numbering.     VERTEBRAL BODIES: Mild S-shaped curvature of the thoracolumbar spine. Mild stepwise retrolisthesis from L1-L4, with mild, grade 1 anterolisthesis of L4 on L5. No acute fracture.     SACRUM: No acute abnormality.     DISTAL CORD AND CONUS:  Normal size and signal within the distal cord and conus.     PARASPINAL SOFT TISSUES: No acute abnormality.     LOWER THORACIC DISC SPACES: Spondylotic changes without acute critical central canal stenosis.     LUMBAR DISC SPACES: Multilevel degenerative disc disease, greatest and moderate at L2-L3.     L1-L2: Disc bulge. Mild facet arthropathy. Moderate bilateral neuroforaminal narrowing. Mild-moderate spinal canal stenosis.     L2-L3: Disc bulge. Moderate facet arthropathy, right greater than left, with ligamentum flavum hypertrophy. Moderate bilateral neuroforaminal narrowing. Moderate spinal canal stenosis.     L3-L4: Disc bulge. Moderate facet arthropathy with ligamentum flavum hypertrophy. Moderate-marked bilateral neuroforaminal narrowing. Mild-moderate spinal canal stenosis.     L4-L5: Disc bulge. Marked facet arthropathy. Marked left and moderate-marked right neuroforaminal narrowing. Mild spinal canal stenosis.     L5-S1: Marked facet arthropathy. No significant neuroforaminal narrowing or spinal canal stenosis.     OTHER FINDINGS: Scattered T2 hyperintensities in the kidneys statistically represent cysts.     IMPRESSION:     Multilevel lumbar spine spondylotic changes with up to moderate central spinal canal stenosis and up to marked neuroforaminal narrowing, as detailed above.        Workstation performed: [id number]

## 2024-08-08 NOTE — PROGRESS NOTES
"Daily Note     Today's date: 2024  Patient name: Solis Dos Santos  : 1950  MRN: [id number]  Referring provider: Leonard Gallagher,*  Dx:   Encounter Diagnosis     ICD-10-CM    1. Spinal stenosis of lumbar region without neurogenic claudication  M48.061                   Start Time: 1108  Stop Time: 1204  Total time in clinic (min): 56 minutes    Subjective: \"I have no pain today.\"      Objective: See treatment diary below      Assessment: Tolerated treatment well. Session focused on lumbar mobility, balance and continued LE strengthening. Added standing hip abd / ext with notable fatigue in musculature. Pt notes difficulty with L SLS due to hip OA. Patient demonstrated fatigue post treatment and would benefit from continued PT      Plan: Continue per plan of care.      POC expires Unit limit Auth Expiration date PT/OT + Visit Limit?   24 BOMN N/A BOMN                           Visit/Unit Tracking  AUTH Status:  Date          No - Medicare Used 1 2 3 4 5 6 7 8 9 10 - RE      Remaining  9 8 7 6 5 4             Precautions: fall risk      Visit #: 1 ie 2 3 4 5 6       Date:         Manuals             Hip LAD:L SFP gII SFP           Hip lateral distraction SFP   gIII SFP                      Neuro Re-Ed             TrA bracing  7.5# KB 20x5\"   C PPT  x30        TrA marches  X10  X10 MRE           Curl-up             Deadbug progressions             Pball pressdown                          Pallof press HEP                         Tandem stance HEP            SLS                          Prone femoral nerve glides     3x15:L        Seated slump gliders  3x15:L 3x15:B  3x15:B                     Biodex LOS      static x4       Ther Ex             Bike    8 min 8 min 10 min       Seated flexion HEP Pball x10 repeated    10x10\"     5x20\":R Pball 30x10\"      Pball x40 Pball 10x10\"                    SKC  10x10\" 10x10\"  5x30\" Manual 3x30\"ea       DKC  " "Manual 10x10\" 10x10\"  5x30\" Manual 10x10\"                    Prone quad S    3x30\":B         Harlan S   3x30\":B          SLR HS S   3x30\":B 3x30\":B                      Seated  > standing marches    8\" step taps 2x10ea 8\" step taps 3# 2x10ea 8\" step tap 3# 3x12       Seated > standing DF HEP Seated x10  2x10 MRE           Standing hip Abd / Ext      3# 2x10ea       LAQ             H/L hip abd     GTB X20ea SL c iso hold         TB eversion    OTB 2x15 GTB 3x15        TB DF    BTB 3x12 MTB 3x15 BTB 3x20                    Ther Activity             TUG nv                         Chair squats HEP                                      Gait Training             with SPC   SFP                       Modalities                                            "

## 2024-08-09 ENCOUNTER — OFFICE VISIT (OUTPATIENT)
Dept: OBGYN CLINIC | Facility: HOSPITAL | Age: 74
End: 2024-08-09
Payer: MEDICARE

## 2024-08-09 VITALS
HEART RATE: 87 BPM | HEIGHT: 73 IN | SYSTOLIC BLOOD PRESSURE: 138 MMHG | BODY MASS INDEX: 36.82 KG/M2 | DIASTOLIC BLOOD PRESSURE: 92 MMHG | WEIGHT: 277.78 LBS

## 2024-08-09 DIAGNOSIS — M51.36 DDD (DEGENERATIVE DISC DISEASE), LUMBAR: Primary | ICD-10-CM

## 2024-08-09 PROCEDURE — 99212 OFFICE O/P EST SF 10 MIN: CPT | Performed by: ORTHOPAEDIC SURGERY

## 2024-08-10 NOTE — PROGRESS NOTES
Assessment & Plan/Medical Decision Makin y.o. male with Bilateral Radicular Leg Pain and Ambulatory Dysfunction and imaging findings most notable for lumbar degenerative disc disease and spinal stenosis.         The clinical, physical and imaging findings were reviewed with the patient.  Solis  has a constellation of findings consistent with Lumbar Radiculopathy and Ambulatory Dysfunction in the setting of lumbar degenerative disease and spinal stenosis.      We discussed the treatment options including physical therapy, at home exercises, activity modifications, chiropractic medicine, oral medications, interventional spine procedures.  At this time recommend continued conservative treatments in the form of focussed physical therapy.  Considering patient has had improvement in his symptoms we will continue with therapy.  We did discuss that he still has weakness in his lower extremities and is still at risk for fall.  Would recommend continuing with assistive device as needed with close clinical follow-up.    Patient instructed to return to office/ER sooner if symptoms are not improving, getting worse, or new worrisome/neurologic symptoms arise.  Follow up in 3 months.      Subjective:      Chief Complaint: Back Pain    HPI:  Solis Dos Santos is a 74 y.o. male presenting for initial visit with chief complaint of bilateral lower extremity weakness.  Patient notes disc herniation L4-L5 subsequent surgery  and intermittent back pain since. Acutely he notes bilateral leg pain and weakness that started 6 weeks ago. He has had 2 recent falls and is currently ambulating with a walker.      Describes pain as achy in nature.  Located in bilateral legs and low back. Numbness . Pain is worse with standing and improves with sitting.    Denies any jasson trauma. Denies fever or chills, no night sweats. Denies any bladder or bowel changes.      Conservative therapy includes the following:   Medications: none     Injections: not tried     Physical Therapy: has been attending for bilateral knee OA and balance  Chiropractic Medicine: has not attempted  Accupunture/Massage Therapy: has not attempted   These therapeutic modalities were ineffective at providing sustained pain relief/functional improvement.     Nicotine dependent: No  Occupation: retired  Living situation: Lives with family   ADLs: patient is able to perform     8/9/2024 update  Patient is here for follow-up.  He has been doing physical therapy with significant benefit in his radicular pain symptoms.  He does continue with subjective weakness in his lower extremities but notes he thinks this is getting better.    Objective:     Family History   Problem Relation Age of Onset    Arthritis Other     Heart disease Father     Arthritis Mother        Past Medical History:   Diagnosis Date    Acute blood loss anemia 08/09/2019    Aftercare following right hip joint replacement surgery 11/05/2019    Anxiety disorder     Atherosclerotic heart disease of native coronary artery without angina pectoris     Benign prostatic hyperplasia without lower urinary tract symptoms     Cancer (HCC)     bladder    Clotting disorder (HCC) 1/25/2023    Blood in urine    Disease of thyroid gland     hypo    Dyslipidemia     GERD (gastroesophageal reflux disease)     Hypertension     Impaired fasting blood sugar     Kidney stone     Low back pain     Obesity     Osteoarthritis     last assesed 6-6-16    Other chest pain     Paresthesia of skin     Polyneuropathy     last assesed 5-8-17    Pure hypercholesterolemia     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)     Suspected UTI 03/09/2023    Urinary tract infection     Wears glasses        Current Outpatient Medications   Medication Sig Dispense Refill    ALPRAZolam (XANAX) 0.5 mg tablet Take 1 tablet (0.5 mg total) by mouth 1 (one) time for 1 dose 1 hour before MRI 2 tablet 0    atorvastatin (LIPITOR) 10 mg  tablet Take 1 tablet (10 mg total) by mouth daily at bedtime 90 tablet 1    cholecalciferol (VITAMIN D3) 1,000 units tablet Take 1 tablet (1,000 Units total) by mouth daily 90 tablet 3    ciclopirox (LOPROX) 0.77 % cream       cyanocobalamin (VITAMIN B-12) 500 MCG tablet Take 1 tablet (500 mcg total) by mouth daily 100 tablet 3    hydrocortisone 2.5 % cream APPLY TO SKIN TWICE DAILY FOR TWO WEEKS, THEN AS NEEDED      levothyroxine 150 mcg tablet Take 1 tablet (150 mcg total) by mouth daily 90 tablet 1    naproxen (Naprosyn) 500 mg tablet Take 1 tablet (500 mg total) by mouth 2 (two) times a day with meals (Patient not taking: Reported on 7/10/2024) 20 tablet 0    Naproxen Sodium (Aleve) 220 MG CAPS Take by mouth      tamsulosin (FLOMAX) 0.4 mg Take 1 capsule (0.4 mg total) by mouth daily with dinner 90 capsule 0     Current Facility-Administered Medications   Medication Dose Route Frequency Provider Last Rate Last Admin    cyanocobalamin injection 1,000 mcg  1,000 mcg Intramuscular Q30 Days Riki Noguera MD   1,000 mcg at 02/09/24 1255       Past Surgical History:   Procedure Laterality Date    BACK SURGERY      CHOLECYSTECTOMY      COLONOSCOPY  02/06/2019    CYSTOSCOPY N/A 04/26/2023    Procedure: CYSTOSCOPY w/ bladder biopsy;  Surgeon: Geo Bentley MD;  Location: [facility];  Service: Urology    HIP SURGERY  August 2019    Replacement    JOINT REPLACEMENT  August 2019    Hip replacement    AL ARTHRP ACETBLR/PROX FEM PROSTC AGRFT/ALGRFT Right 08/05/2019    Procedure: ARTHROPLASTY HIP TOTAL;  Surgeon: Leonard Gallagher MD;  Location: [facility];  Service: Orthopedics    AL CYSTO W/REMOVAL OF LESIONS SMALL N/A 03/23/2023    Procedure: TRANSURETHRAL RESECTION OF BLADDER TUMOR (TURBT), mitomycin ;  Surgeon: Geo Bentley MD;  Location: [facility];  Service: Urology    AL CYSTO W/REMOVAL OF LESIONS SMALL N/A 04/26/2023    Procedure: TRANSURETHRAL RESECTION OF BLADDER TUMOR (TURBT), cystoscopy with  bladder biopsy;  Surgeon: Geo Bentley MD;  Location: [facility];  Service: Urology    TONSILLECTOMY         Social History     Socioeconomic History    Marital status: /Civil Union     Spouse name: Not on file    Number of children: Not on file    Years of education: Not on file    Highest education level: Not on file   Occupational History    Not on file   Tobacco Use    Smoking status: Former     Current packs/day: 0.00     Average packs/day: 1 pack/day for 20.8 years (20.8 ttl pk-yrs)     Types: Cigarettes     Start date: 1967     Quit date: 1987     Years since quittin.8    Smokeless tobacco: Never   Vaping Use    Vaping status: Never Used   Substance and Sexual Activity    Alcohol use: Not Currently    Drug use: Not Currently    Sexual activity: Not Currently     Partners: Female     Birth control/protection: None   Other Topics Concern    Not on file   Social History Narrative    Smoking: Non-smoker    As per [employer]     Social Determinants of Health     Financial Resource Strain: Low Risk  (9/15/2023)    Overall Financial Resource Strain (CARDIA)     Difficulty of Paying Living Expenses: Not hard at all   Food Insecurity: Not on file   Transportation Needs: No Transportation Needs (9/15/2023)    PRAPARE - Transportation     Lack of Transportation (Medical): No     Lack of Transportation (Non-Medical): No   Physical Activity: Not on file   Stress: Not on file   Social Connections: Not on file   Intimate Partner Violence: Not on file   Housing Stability: Not on file       No Known Allergies    Review of Systems  General- denies fever/chills  HEENT- denies hearing loss or sore throat  Eyes- denies eye pain or visual disturbances, denies red eyes  Respiratory- denies cough or SOB  Cardio- denies chest pain or palpitations  GI- denies abdominal pain  Endocrine- denies urinary frequency  Urinary- denies pain with urination  Musculoskeletal- Negative except noted above  Skin-  "denies rashes or wounds  Neurological- denies dizziness or headache  Psychiatric- denies anxiety or difficulty concentrating    Physical Exam  /92   Pulse 87   Ht 6' 1\" (1.854 m)   Wt 126 kg (277 lb 12.5 oz)   BMI 36.65 kg/m²     General/Constitutional: No apparent distress: well-nourished and well developed.  Lymphatic: No appreciable lymphadenopathy  Respiratory: Non-labored breathing  Vascular: No edema, swelling or tenderness, except as noted in detailed exam.  Integumentary: No impressive skin lesions present, except as noted in detailed exam.  Psych: Normal mood and affect, oriented to person, place and time.  MSK: normal other than stated in HPI and exam  Gait & balance: no evidence of myelopathic gait, ambulates with a Walker and shuffle gate    Lumbar spine range of motion:  -Forward flexion to 90  -Extension to neutral  -Lateral bend 25 right, 25 left  -Rotation 25 right, 25 left  There tenderness with palpation along lumbar paraspinal musculature, no midline tenderness     Neurologic:    Lower Extremity Motor Function    Right  Left    Iliopsoas  4/5  4/5    Quadriceps 4/5 4/5   Tibialis anterior  4+/5  4+/5    EHL  5/5  5/5    Gastroc. muscle  4/5  4/5    Heel rise  3/5  3/5    Toe rise  4/5  4/5      Sensory: light touch is intact to bilateral upper and lower extremities     Reflexes:    Right Left   Patellar 0 0   Achilles 0 0   Babinski neg neg     Other tests:  Straight Leg Raise: negative   Dino SI: negative  DOROTHEA SI: negative  Greater troch: no tenderness   Internal/external hip ROM: intact, no pain   Flexion/extension knee ROM: intact, no pain   Vascular: WWP extremities, 2+DP bilateral      Diagnostic Tests   IMAGING: I have personally reviewed the images and these are my findings:  Lumbar Spine X-rays from 7/10/2024: multi level lumbar spondylosis with loss of disc height, osteophyte formation and facet hypertrophy, L4-5 spondylolisthesis, no appreciated lytic/blastic lesions, no " "obvious instability    Lumbar Spine MRI from 7/5/24: multi level lumbar disc degeneration with disc desiccation, loss of disc height, facet and ligamentum hypertrophy,  L1-3 central and bilateral lateral recess stenosis, L3-4 and L4-5 left sided lateral recess stenosis with bilateral foraminal stenosis     Electronic Medical Records were reviewed including PCP and physical therapy notes    Procedures, if performed today     None performed       Portions of the record may have been created with voice recognition software.  Occasional wrong word or \"sound a like\" substitutions may have occurred due to the inherent limitations of voice recognition software.  Read the chart carefully and recognize, using context, where substitutions have occurred.    "

## 2024-08-12 ENCOUNTER — OFFICE VISIT (OUTPATIENT)
Dept: PHYSICAL THERAPY | Facility: OTHER | Age: 74
End: 2024-08-12
Payer: MEDICARE

## 2024-08-12 DIAGNOSIS — M48.061 SPINAL STENOSIS OF LUMBAR REGION WITHOUT NEUROGENIC CLAUDICATION: Primary | ICD-10-CM

## 2024-08-12 PROCEDURE — 97140 MANUAL THERAPY 1/> REGIONS: CPT

## 2024-08-12 PROCEDURE — 97110 THERAPEUTIC EXERCISES: CPT

## 2024-08-12 NOTE — PROGRESS NOTES
"Daily Note     Today's date: 2024  Patient name: Solis Dos Santos  : 1950  MRN: [id number]  Referring provider: Leonard Gallagher,*  Dx:   Encounter Diagnosis     ICD-10-CM    1. Spinal stenosis of lumbar region without neurogenic claudication  M48.061                     Start Time: 1015  Stop Time: 1108  Total time in clinic (min): 53 minutes    Subjective: Pt had follow-up with ortho on 24 - given improvements in pain, radicular sx and LE strength, pt to hold on lumbar surgery at this time. \"I have no pain, I feel like it's all muscular.\"      Objective: See treatment diary below      Assessment: Tolerated treatment well. Session focused on lumbar mobility, LE flexibility and continued LE strengthening. Noted tighter L hip flexor / quad compared bilaterally. Patient demonstrated fatigue post treatment and would benefit from continued PT      Plan: Continue per plan of care.      POC expires Unit limit Auth Expiration date PT/OT + Visit Limit?   24 BOMN N/A BOMN                           Visit/Unit Tracking  AUTH Status:  Date         No - Medicare Used 1 2 3 4 5 6 7 8 9 10 - RE      Remaining  9 8 7 6 5 4 3            Precautions: fall risk      Visit #: 1 ie 2 3 4 5 6 7      Date:        Manuals             Hip LAD:L SFP gII SFP           Hip lateral distraction SFP   gIII SFP                      Neuro Re-Ed             TrA bracing  7.5# KB 20x5\"   C PPT  x30        TrA marches  X10  X10 MRE           Curl-up             Deadbug progressions             Pball pressdown                          Pallof press HEP                         Tandem stance HEP            SLS                          Prone femoral nerve glides     3x15:L        Seated slump gliders  3x15:L 3x15:B  3x15:B                     Biodex LOS      static x4 Static x4       Ther Ex             Bike    8 min 8 min 10 min 10 min      Seated flexion HEP Pball " "x10 repeated    10x10\"     5x20\":R Pball 30x10\"      Pball x40 Pball 10x10\"       LTR       x30      SKC  10x10\" 10x10\"  5x30\" Manual 3x30\"ea       DKC  Manual 10x10\" 10x10\"  5x30\" Manual 10x10\" Manual 4x30\"                   Prone quad S    3x30\":B   3x30\"ea      Harlan S   3x30\":B    3x30\"ea      SLR HS S   3x30\":B 3x30\":B                      Seated  > standing marches    8\" step taps 2x10ea 8\" step taps 3# 2x10ea 8\" step tap 3# 3x12 8\" step tap 4# 3x10ea      Seated > standing DF HEP Seated x10  2x10 MRE           Standing hip Abd / Ext      3# 2x10ea 4# 3x8ea      LAQ             H/L hip abd     GTB X20ea SL c iso hold         TB eversion    OTB 2x15 GTB 3x15        TB DF    BTB 3x12 MTB 3x15 BTB 3x20 Med loop 3x15                   Ther Activity             TUG nv                         Chair squats HEP                                      Gait Training             with SPC   SFP                       Modalities                                            "

## 2024-08-15 ENCOUNTER — OFFICE VISIT (OUTPATIENT)
Dept: PHYSICAL THERAPY | Facility: OTHER | Age: 74
End: 2024-08-15
Payer: MEDICARE

## 2024-08-15 ENCOUNTER — OFFICE VISIT (OUTPATIENT)
Dept: PHYSICAL THERAPY | Facility: OTHER | Age: 74
End: 2024-08-15
Payer: COMMERCIAL

## 2024-08-15 DIAGNOSIS — M48.061 SPINAL STENOSIS OF LUMBAR REGION WITHOUT NEUROGENIC CLAUDICATION: Primary | ICD-10-CM

## 2024-08-15 PROCEDURE — L3010 FOOT LONGITUDINAL ARCH SUPPO: HCPCS

## 2024-08-15 PROCEDURE — 97110 THERAPEUTIC EXERCISES: CPT

## 2024-08-15 NOTE — PROGRESS NOTES
"Daily Note     Today's date: 8/15/2024  Patient name: Solis Dos Santos  : 1950  MRN: [id number]  Referring provider: Loenard Gallagher,*  Dx:   Encounter Diagnosis     ICD-10-CM    1. Spinal stenosis of lumbar region without neurogenic claudication  M48.061             Start Time: 1038  Stop Time: 1116  Total time in clinic (min): 38 minutes    Subjective: \"I'm surprised at how fast I've regained my function, still feel 50-60% back to myself.\"      Objective: See treatment diary below    FOTO 8/15/24: 52    Assessment: Tolerated treatment well. Session focused on lumbar mobility, LE flexibility and continued LE strengthening. Pt demonstrating weaker L hip flexor with SLR exercise today. Patient demonstrated fatigue post treatment and would benefit from continued PT      Plan: Continue per plan of care.      POC expires Unit limit Auth Expiration date PT/OT + Visit Limit?   24 BOMN N/A BOMN                           Visit/Unit Tracking  AUTH Status:  Date 7/18 7/22 7/25 7/29 8/1 8/8 8/12 8/15       No - Medicare Used 1 2 3 4 5 6 7 8 9 10 - RE      Remaining  9 8 7 6 5 4 3 2           Precautions: fall risk      Visit #: 1 ie 2 3 4 5 6 7 8     Date:  7/18 7/22 7/25 7/29 8/1 8/8 8/12 8/15     Manuals             Hip LAD:L SFP gII SFP           Hip lateral distraction SFP   gIII SFP                      Neuro Re-Ed             TrA bracing  7.5# KB 20x5\"   C PPT  x30        TrA marches  X10  X10 MRE           Curl-up             Deadbug progressions             Pball pressdown                          Pallof press HEP                         Tandem stance HEP            SLS                          Prone femoral nerve glides     3x15:L        Seated slump gliders  3x15:L 3x15:B  3x15:B                     Biodex LOS      static x4 Static x4       Ther Ex             Bike    8 min 8 min 10 min 10 min 10 min     Seated flexion HEP Pball x10 repeated    10x10\"     5x20\":R Pball 30x10\"      Pball x40 Pball " "10x10\"       LTR       x30      SKC  10x10\" 10x10\"  5x30\" Manual 3x30\"ea       DKC  Manual 10x10\" 10x10\"  5x30\" Manual 10x10\" Manual 4x30\" Manual 4x30\"                  Prone quad S    3x30\":B   3x30\"ea      Harlan S   3x30\":B    3x30\"ea      SLR HS S   3x30\":B 3x30\":B                      Seated  > standing marches    8\" step taps 2x10ea 8\" step taps 3# 2x10ea 8\" step tap 3# 3x12 8\" step tap 4# 3x10ea nv     Seated > standing DF HEP Seated x10  2x10 MRE           Standing hip Abd / Ext      3# 2x10ea 4# 3x8ea 4# 3x10ea     SLR        0# 3x18     LAQ        GTB 2x25     H/L hip abd     GTB X20ea SL c iso hold    Bridge butterfly OTB 3x10     TB eversion    OTB 2x15 GTB 3x15        TB DF    BTB 3x12 MTB 3x15 BTB 3x20 Med loop 3x15 nv                  Ther Activity             TUG nv                         Chair squats HEP                                      Gait Training             with SPC   SFP                       Modalities                                            "

## 2024-08-19 ENCOUNTER — OFFICE VISIT (OUTPATIENT)
Dept: PHYSICAL THERAPY | Facility: OTHER | Age: 74
End: 2024-08-19
Payer: MEDICARE

## 2024-08-19 DIAGNOSIS — M48.061 SPINAL STENOSIS OF LUMBAR REGION WITHOUT NEUROGENIC CLAUDICATION: Primary | ICD-10-CM

## 2024-08-19 PROCEDURE — 97110 THERAPEUTIC EXERCISES: CPT

## 2024-08-19 NOTE — PROGRESS NOTES
"Daily Note     Today's date: 2024  Patient name: Solis Dos Santos  : 1950  MRN: [id number]  Referring provider: Leonard Gallagher,*  Dx:   Encounter Diagnosis     ICD-10-CM    1. Spinal stenosis of lumbar region without neurogenic claudication  M48.061               Start Time: 1015  Stop Time: 1108  Total time in clinic (min): 53 minutes    Subjective: \"I still feel tight throughout the front and back of my thighs.\"      Objective: See treatment diary below      Assessment: Tolerated treatment well. Session focused on LE flexibility and continued LE strengthening. Added leg press and step-ups with good tolerance, no complaints other than muscle fatigue. Patient demonstrated fatigue post treatment and would benefit from continued PT      Plan: Continue per plan of care.      POC expires Unit limit Auth Expiration date PT/OT + Visit Limit?   24 BOMN N/A BOMN                           Visit/Unit Tracking  AUTH Status:  Date 7/18 7/22 7/25 7/29 8/1 8/8 8/12 8/15 8/19      No - Medicare Used 1 2 3 4 5 6 7 8 9 10 - RE      Remaining  9 8 7 6 5 4 3 2 1          Precautions: fall risk      Visit #: 1 ie 2 3 4 5 6 7 8 9    Date:  7/18 7/22 7/25 7/29 8/1 8/8 8/12 8/15 8/19    Manuals             Hip LAD:L SFP gII SFP           Hip lateral distraction SFP   gIII SFP                      Neuro Re-Ed             TrA bracing  7.5# KB 20x5\"   C PPT  x30        TrA marches  X10  X10 MRE           Curl-up             Deadbug progressions             Pball pressdown                          Pallof press HEP                         Tandem stance HEP            SLS                          Prone femoral nerve glides     3x15:L        Seated slump gliders  3x15:L 3x15:B  3x15:B                     Biodex LOS      static x4 Static x4       Ther Ex             Bike    8 min 8 min 10 min 10 min 10 min 10 min    Seated flexion HEP Pball x10 repeated    10x10\"     5x20\":R Pball 30x10\"      Pball x40 Pball 10x10\"     " "  LTR       x30      SKC  10x10\" 10x10\"  5x30\" Manual 3x30\"ea       DKC  Manual 10x10\" 10x10\"  5x30\" Manual 10x10\" Manual 4x30\" Manual 4x30\"                  Prone quad S    3x30\":B   3x30\"ea  3x30\"ea    Harlan S   3x30\":B    3x30\"ea  3x30\"ea    SLR HS S   3x30\":B 3x30\":B     3x30\"ea                 Seated  > standing marches    8\" step taps 2x10ea 8\" step taps 3# 2x10ea 8\" step tap 3# 3x12 8\" step tap 4# 3x10ea nv     Seated > standing DF HEP Seated x10  2x10 MRE           Standing hip Abd / Ext      3# 2x10ea 4# 3x8ea 4# 3x10ea     SLR        0# 3x18     LAQ        GTB 2x25     H/L hip abd     GTB X20ea SL c iso hold    Bridge butterfly OTB 3x10     TB eversion    OTB 2x15 GTB 3x15        TB DF    BTB 3x12 MTB 3x15 BTB 3x20 Med loop 3x15 nv                  Leg press         90# 3x10    Ther Activity             TUG nv                         Chair squats HEP            Step-ups         3x10ea                 Gait Training             with SPC   SFP                       Modalities             MHP         L thigh    SFP                      "

## 2024-08-21 ENCOUNTER — OFFICE VISIT (OUTPATIENT)
Dept: PHYSICAL THERAPY | Facility: OTHER | Age: 74
End: 2024-08-21
Payer: MEDICARE

## 2024-08-21 DIAGNOSIS — M48.061 SPINAL STENOSIS OF LUMBAR REGION WITHOUT NEUROGENIC CLAUDICATION: Primary | ICD-10-CM

## 2024-08-21 PROCEDURE — 97110 THERAPEUTIC EXERCISES: CPT

## 2024-08-21 NOTE — PROGRESS NOTES
"PT Re-Evaluation     Today's date: 2024  Patient name: Solis Dos Santos  : 1950  MRN: [id number]  Referring provider: Leonard Gallagher,*  Dx:   Encounter Diagnosis     ICD-10-CM    1. Spinal stenosis of lumbar region without neurogenic claudication  M48.061           Start Time: 1111  Stop Time: 1134  Total time in clinic (min): 23 minutes    Assessment  Impairments: abnormal gait, abnormal muscle firing, abnormal or restricted ROM, activity intolerance, impaired physical strength, lacks appropriate home exercise program, pain with function, weight-bearing intolerance, poor posture  and poor body mechanics    Assessment details: RE 24: Re-eval reveals significant improvements in low back pain as well as improvements in lumbar mobility & lower extremity strength impacting functional mobility. Pt is comfortably ambulating with SPC at this time, wishes to return to no AD (PLOF). Biggest barriers at this time include continued strength deficits in tibialis anterior & calves, L hip impingement / OA, as well as poor balance and decreased balance confidence. Self-perceived balance confidence has improved from \"10%\" at IE to \"30%\" today. Pt is a good candidate for continued skilled PT to achieve the following goals.    Solis Dos Santos is a 73 y.o. male presenting to OPPT initial evaluation with chief complaint of recent episode of low back pain for the last 6 weeks of insidious onset. Today he reports no pain sitting, mild discomfort lying, no pain with walking - primary complaint today is decreased balance, lack of stability with walking, lack of balance confidence. Pt had two falls - one occurrence was sliding out of bed landing on his buttocks, other instance was loss of balance when turning in which he hit his head against desk, no LOC, dizziness. Began walking short distances in the home the last two days. Has adaptive equipment at home in bath, two steps to enter home, stairs in home. Has not " tried to climb stairs due to fear and weakness. Pt has noted mild L hip / groin pain the last two days. Absence of numbness / tingling today - sole complaint in legs is weakness.    Upon PT exam, pt presents with lumbar flexion bias, decreased dynamic lumbar stability, decreased L hip ROM, decreased tibialis anterior & plantarflexion strength, poor static & dynamic balance and poor balance confidence impacting all upright functional mobility and participation in community / [employer]. PLOF ambulating with no AD. The patient is a fair candidate for physical therapy to achieve the following goals given co-morbidities (B knee OA, L hip OA).        Goals  STG (4 weeks):  Pt will decrease pain to low back pain to <2/10 during activity MET  Pt will demonstrate increased global L hip ROM by >5 deg PROGRESSING  Pt will demonstrate increased B DF MMT to >4/5 in order to improve gait MET  Pt will be able to ambulate household distances without RW. MET  Pt will report >40% self-perceived balance confidence. PROGRESSING  Pt's self-perceived disability will decrease as demonstrated by a >5-point improvement in FOTO score.  MET  Pt will be independent with HEP as demonstrated by return demo with proper technique and execution of exercises provided. MET     LTG (16 weeks):  Pt will have absence of pain for 2 consecutive sessions  Pt will demonstrate increased global L hip ROM by >10 deg  Pt will demonstrate increased B DF MMT to >5/5 in order to improve gait  Pt will report >70% self-perceived balance confidence.  Pt will be able to ambulate community distances (>300 ft) without RW.  Pt's self-perceived disability will decrease as demonstrated by a FOTO score >60.  Pt will be independent with progressions to HEP as demonstrated by return demo with proper technique and execution of exercises provided.        Plan  Patient would benefit from: skilled physical therapy and PT eval  Planned modality interventions: biofeedback, electrical  "stimulation/[place] stimulation, TENS, thermotherapy: hydrocollator packs, traction, ultrasound and cryotherapy    Planned therapy interventions: abdominal trunk stabilization, activity modification, balance, balance/weight bearing training, body mechanics training, flexibility, functional ROM exercises, graded activity, graded exercise, graded motor, gait training, home exercise program, therapeutic training, therapeutic activities, therapeutic exercise, stretching, strengthening, joint mobilization, manual therapy, massage, neuromuscular re-education, patient education, postural training, IASTM and kinesiology taping    Frequency: 2x week  Duration in weeks: 24  Plan of Care beginning date: 7/18/2024  Plan of Care expiration date: 1/14/2025  Treatment plan discussed with: patient      Subjective Evaluation    History of Present Illness  Mechanism of injury: The following HPI captured from referring provider's encounter and EMR review and confirmed with patient:  \" Solis Dos Santos is a 73 y.o. male presenting for initial visit with chief complaint of bilateral lower extremity weakness.  Patient notes disc herniation L4-L5 subsequent surgery 1987 and intermittent back pain since. Acutely he notes bilateral leg pain and weakness that started 6 weeks ago. He has had 2 recent falls and is currently ambulating with a walker.       Describes pain as achy in nature.  Located in bilateral legs and low back. Numbness . Pain is worse with standing and improves with sitting.\"    Pt presents today with current symptomatic complaints including -   Solis Dos Santos is a 73 y.o. male presenting to OPPT initial evaluation with chief complaint of recent episode of low back pain for the last 6 weeks of insidious onset. Today he reports no pain sitting, mild discomfort lying, no pain with walking - primary complaint today is decreased balance, lack of stability with walking, lack of balance confidence. Pt had two falls - one " "occurrence was sliding out of bed landing on his buttocks, other instance was loss of balance when turning in which he hit his head against desk, no LOC, dizziness. Began walking short distances in the home the last two days. Has adaptive equipment at home in bath, two steps to enter home, stairs in home. Has not tried to climb stairs due to fear and weakness. Pt has noted mild L hip / groin pain the last two days. Absence of numbness / tingling today - sole complaint in legs is weakness.      Current self-perceived balance confidence: 10%    24: Current self-perceived balance confidence: 30%      Patient Goals  Patient goal: Improve balance confidence  Pain  Current pain ratin  At best pain ratin  At worst pain ratin      Diagnostic Tests  X-ray: normal  MRI studies: abnormal  Treatments  Previous treatment: physical therapy        Objective      Postural Observation: Decreased lumbar lordosis, mild L scoliosis    Palpation: Absence of tenderness    Lumbar ROM:     AROM   Flexion To 90, hands to mid-shin   Extension To neutral, ~5 deg   Rotation nt   Sidebending nt     Reproduction of anterior lower leg pain with forward flexion; absence of pain when cued to brace core before bending    Hip ROM:   PROM:L PROM:R   Flexion 100 120 120   ER (90/90) 30 30 45   IR (90/90) 0 0 40       Strength:   Left Right   Hip flexion 4/5 4+/5 4/5 4+/5   Knee extension 4+/5 5/5 4+/5 5/5   Knee flexion 4+/5 5/5 4+/5 5/5   DF 4-/5 4/5 4-/5 4/5   PF 3/5 3+/5 3/5 3+/5       Neurologic tests:  (-) LQS Dermatomes  (-) LQS Myotomes  Patellar reflex (L2-4): 0 bilat  Achilles reflex (L5-S1): 0 bilat    Clinical tests:  (+) L FADIR      Functional Assessment:  5xSTS: 20.88\"  Gait - Decreased DF R>L, resulting in a R foot toe-drag when ambulating with RW; improvements with cueing for \"march and land heel first\"         POC expires Unit limit Auth Expiration date PT/OT + Visit Limit?   24 BOMN N/A BOMN                       " "    Visit/Unit Tracking  AUTH Status:  Date 8/21              No - Medicare Used 10 - RE               Remaining  0                  Precautions: fall risk      Visit #: 1 ie 2 3 4 5 6 7 8 9 10   Date:  7/18 7/22 7/25 7/29 8/1 8/8 8/12 8/15 8/19 8/21   Manuals             Hip LAD:L SFP gII SFP           Hip lateral distraction SFP   gIII SFP                      Neuro Re-Ed             TrA bracing  7.5# KB 20x5\"   C PPT  x30        TrA marches  X10  X10 MRE           Curl-up             Deadbug progressions             Pball pressdown                          Pallof press HEP                         Tandem stance HEP            SLS                          Prone femoral nerve glides     3x15:L        Seated slump gliders  3x15:L 3x15:B  3x15:B                     Biodex LOS      static x4 Static x4       Ther Ex             Bike    8 min 8 min 10 min 10 min 10 min 10 min    Treadmill walking          6 min   Seated flexion HEP Pball x10 repeated    10x10\"     5x20\":R Pball 30x10\"      Pball x40 Pball 10x10\"       LTR       x30      SKC  10x10\" 10x10\"  5x30\" Manual 3x30\"ea       DKC  Manual 10x10\" 10x10\"  5x30\" Manual 10x10\" Manual 4x30\" Manual 4x30\"                  Prone quad S    3x30\":B   3x30\"ea  3x30\"ea    Harlan S   3x30\":B    3x30\"ea  3x30\"ea    SLR HS S   3x30\":B 3x30\":B     3x30\"ea 3x30\"ea                Seated  > standing marches    8\" step taps 2x10ea 8\" step taps 3# 2x10ea 8\" step tap 3# 3x12 8\" step tap 4# 3x10ea nv  8\" step tap 5# 3x10ea   Seated > standing DF HEP Seated x10  2x10 MRE           Standing hip Abd / Ext      3# 2x10ea 4# 3x8ea 4# 3x10ea     SLR        0# 3x18     LAQ        GTB 2x25     H/L hip abd     GTB X20ea SL c iso hold    Bridge butterfly OTB 3x10     TB eversion    OTB 2x15 GTB 3x15        TB DF    BTB 3x12 MTB 3x15 BTB 3x20 Med loop 3x15 nv                  Leg press         90# 3x10    Ther Activity             TUG nv                         Chair squats HEP            Step-ups  " "       3x10ea    Step-overs          4\" 3x10; p! With L SLS                Gait Training             with SPC   SFP                       Modalities             P         L thigh    SFP                      "

## 2024-08-22 ENCOUNTER — APPOINTMENT (OUTPATIENT)
Dept: PHYSICAL THERAPY | Facility: OTHER | Age: 74
End: 2024-08-22
Payer: MEDICARE

## 2024-08-23 ENCOUNTER — OFFICE VISIT (OUTPATIENT)
Dept: PHYSICAL THERAPY | Facility: OTHER | Age: 74
End: 2024-08-23
Payer: MEDICARE

## 2024-08-23 DIAGNOSIS — M48.061 SPINAL STENOSIS OF LUMBAR REGION WITHOUT NEUROGENIC CLAUDICATION: Primary | ICD-10-CM

## 2024-08-23 PROCEDURE — 97110 THERAPEUTIC EXERCISES: CPT

## 2024-08-23 NOTE — PROGRESS NOTES
"Daily Note     Today's date: 2024  Patient name: Solis Dos Santos  : 1950  MRN: [id number]  Referring provider: Leonard Gallagher,*  Dx:   Encounter Diagnosis     ICD-10-CM    1. Spinal stenosis of lumbar region without neurogenic claudication  M48.061           Start Time: 1046  Stop Time: 1124  Total time in clinic (min): 38 minutes    Subjective: Pt reports lumbar pain yesterday after carrying trash bag with LUE; no pain today.      Objective: See treatment diary below      Assessment: Tolerated treatment well focused on LE strengthening, tolerated well. Pt continues to note \"C sign\" into L hip with weightbearing - recommended to pt that he follow up with orthopedics for L hip examination. Pt declines hip mobilizations as soreness is too excessive for days to follow. Patient demonstrated fatigue post treatment, exhibited good technique with therapeutic exercises, and would benefit from continued PT      Plan: Continue per plan of care.      POC expires Unit limit Auth Expiration date PT/OT + Visit Limit?   24 BOMN N/A BOMN                           Visit/Unit Tracking  AUTH Status:  Date              No - Medicare Used 10 - RE 11              Remaining  0 9                 Precautions: fall risk      Visit #: 1 ie 2 3 4 5 6 7 8 9 10 11   Date:  7/18 7/22 7/25 7/29 8/1 8/8 8/12 8/15 8/19 8/21 8/23   Manuals              Hip LAD:L SFP gII SFP            Hip lateral distraction SFP   gIII SFP                        Neuro Re-Ed              TrA bracing  7.5# KB 20x5\"   C PPT  x30         TrA marches  X10  X10 MRE            Curl-up              Deadbug progressions              Pball pressdown                            Pallof press HEP                           Tandem stance HEP             SLS                            Prone femoral nerve glides     3x15:L         Seated slump gliders  3x15:L 3x15:B  3x15:B                       Biodex LOS      static x4 Static x4        Ther Ex     " "         Bike    8 min 8 min 10 min 10 min 10 min 10 min  10 min   Treadmill walking          6 min    Seated flexion HEP Pball x10 repeated    10x10\"     5x20\":R Pball 30x10\"      Pball x40 Pball 10x10\"        LTR       x30       SKC  10x10\" 10x10\"  5x30\" Manual 3x30\"ea        DKC  Manual 10x10\" 10x10\"  5x30\" Manual 10x10\" Manual 4x30\" Manual 4x30\"      Lumbar extension in standing           x20                 Prone quad S    3x30\":B   3x30\"ea  3x30\"ea     Harlan S   3x30\":B    3x30\"ea  3x30\"ea     SLR HS S   3x30\":B 3x30\":B     3x30\"ea 3x30\"ea                  Seated  > standing marches    8\" step taps 2x10ea 8\" step taps 3# 2x10ea 8\" step tap 3# 3x12 8\" step tap 4# 3x10ea nv  8\" step tap 5# 3x10ea    Seated > standing DF HEP Seated x10  2x10 MRE            Standing hip Abd / Ext      3# 2x10ea 4# 3x8ea 4# 3x10ea      SLR        0# 3x18      LAQ        GTB 2x25   MTB x40ea   H/L hip abd     GTB X20ea SL c iso hold    Bridge butterfly OTB 3x10   MTB 40x5\"    Clamshells MTB 3x10ea   TB eversion    OTB 2x15 GTB 3x15         TB DF    BTB 3x12 MTB 3x15 BTB 3x20 Med loop 3x15 nv   MTB x30                 Leg press         90# 3x10     Ther Activity              TUG nv                           Chair squats HEP             Step-ups         3x10ea     Step-overs          4\" 3x10; p! With L SLS                  Gait Training              with SPC   SFP                         Modalities              MHP         L thigh    SFP                           POC expires Unit limit Auth Expiration date PT/OT + Visit Limit?   1/14/24 BOMN N/A BOMN                           Visit/Unit Tracking  AUTH Status:  Date 8/21              No - Medicare Used 10 - RE               Remaining  0                  Precautions: fall risk      Visit #: 1 ie 2 3 4 5 6 7 8 9 10   Date:  7/18 7/22 7/25 7/29 8/1 8/8 8/12 8/15 8/19 8/21   Manuals             Hip LAD:L SFP gII SFP           Hip lateral distraction SFP   gIII SFP                    " "  Neuro Re-Ed             TrA bracing  7.5# KB 20x5\"   C PPT  x30        TrA marches  X10  X10 MRE           Curl-up             Deadbug progressions             Pball pressdown                          Pallof press HEP                         Tandem stance HEP            SLS                          Prone femoral nerve glides     3x15:L        Seated slump gliders  3x15:L 3x15:B  3x15:B                     Biodex LOS      static x4 Static x4       Ther Ex             Bike    8 min 8 min 10 min 10 min 10 min 10 min    Treadmill walking          6 min   Seated flexion HEP Pball x10 repeated    10x10\"     5x20\":R Pball 30x10\"      Pball x40 Pball 10x10\"       LTR       x30      SKC  10x10\" 10x10\"  5x30\" Manual 3x30\"ea       DKC  Manual 10x10\" 10x10\"  5x30\" Manual 10x10\" Manual 4x30\" Manual 4x30\"                  Prone quad S    3x30\":B   3x30\"ea  3x30\"ea    Harlan S   3x30\":B    3x30\"ea  3x30\"ea    SLR HS S   3x30\":B 3x30\":B     3x30\"ea 3x30\"ea                Seated  > standing marches    8\" step taps 2x10ea 8\" step taps 3# 2x10ea 8\" step tap 3# 3x12 8\" step tap 4# 3x10ea nv  8\" step tap 5# 3x10ea   Seated > standing DF HEP Seated x10  2x10 MRE           Standing hip Abd / Ext      3# 2x10ea 4# 3x8ea 4# 3x10ea     SLR        0# 3x18     LAQ        GTB 2x25     H/L hip abd     GTB X20ea SL c iso hold    Bridge butterfly OTB 3x10     TB eversion    OTB 2x15 GTB 3x15        TB DF    BTB 3x12 MTB 3x15 BTB 3x20 Med loop 3x15 nv                  Leg press         90# 3x10    Ther Activity             TUG nv                         Chair squats HEP            Step-ups         3x10ea    Step-overs          4\" 3x10; p! With L SLS                Gait Training             with SPC   SFP                       Modalities             MHP         L thigh    SFP                      "

## 2024-08-26 ENCOUNTER — OFFICE VISIT (OUTPATIENT)
Dept: PHYSICAL THERAPY | Facility: OTHER | Age: 74
End: 2024-08-26
Payer: MEDICARE

## 2024-08-26 DIAGNOSIS — M48.061 SPINAL STENOSIS OF LUMBAR REGION WITHOUT NEUROGENIC CLAUDICATION: Primary | ICD-10-CM

## 2024-08-26 PROCEDURE — 97110 THERAPEUTIC EXERCISES: CPT

## 2024-08-26 NOTE — PROGRESS NOTES
"Daily Note     Today's date: 2024  Patient name: Solis Dos Santos  : 1950  MRN: [id number]  Referring provider: Leonard Gallagher,*  Dx:   Encounter Diagnosis     ICD-10-CM    1. Spinal stenosis of lumbar region without neurogenic claudication  M48.061                      Subjective: Pt reports he is doing well today, feels he has made more progress than he initially thought he would. Pt reports his procedure that was scheduled for early Sept was postponed 3 months       Objective: See treatment diary below      Assessment: Tolerated treatment well. Pt performed all exercises without issue, continue to progress to tolerance. Pt showed good form with exercises throughout session. Pt continues to progress exercise reps without issue, consider increasing resistance/weight nv. Pt would benefit from continued focus on strengthening exercises to increase function. Patient demonstrated fatigue post treatment, exhibited good technique with therapeutic exercises, and would benefit from continued PT      Plan: Continue per plan of care.      POC expires Unit limit Auth Expiration date PT/OT + Visit Limit?   24 BOMN N/A BOMN                           Visit/Unit Tracking  AUTH Status:  Date             No - Medicare Used 10 - RE 11 12             Remaining  0 9 8                Precautions: fall risk      Visit #: 1 ie 2 3 4 5 6 7 8 9 10 11 12   Date:  7/18 7/22 7/25 7/29 8/1 8/8 8/12 8/15 8/19 8/21 8/23 8/26   Manuals               Hip LAD:L SFP gII SFP             Hip lateral distraction SFP   gIII SFP                          Neuro Re-Ed               TrA bracing  7.5# KB 20x5\"   C PPT  x30          TrA marches  X10  X10 MRE             Curl-up               Deadbug progressions               Pball pressdown                              Pallof press HEP                             Tandem stance HEP              SLS                              Prone femoral nerve glides     3x15:L        " "  Seated slump gliders  3x15:L 3x15:B  3x15:B                         Biodex LOS      static x4 Static x4         Ther Ex               Bike    8 min 8 min 10 min 10 min 10 min 10 min  10 min 10 min   Treadmill walking          6 min     Seated flexion HEP Pball x10 repeated    10x10\"     5x20\":R Pball 30x10\"      Pball x40 Pball 10x10\"         LTR       x30        SKC  10x10\" 10x10\"  5x30\" Manual 3x30\"ea         DKC  Manual 10x10\" 10x10\"  5x30\" Manual 10x10\" Manual 4x30\" Manual 4x30\"       Lumbar extension in standing           x20 x20                  Prone quad S    3x30\":B   3x30\"ea  3x30\"ea      Harlan S   3x30\":B    3x30\"ea  3x30\"ea      SLR HS S   3x30\":B 3x30\":B     3x30\"ea 3x30\"ea  3x30\" ea                  Seated  > standing marches    8\" step taps 2x10ea 8\" step taps 3# 2x10ea 8\" step tap 3# 3x12 8\" step tap 4# 3x10ea nv  8\" step tap 5# 3x10ea     Seated > standing DF HEP Seated x10  2x10 MRE             Standing hip Abd / Ext      3# 2x10ea 4# 3x8ea 4# 3x10ea       SLR        0# 3x18       LAQ        GTB 2x25   MTB x40ea MTB x50 ea   H/L hip abd     GTB X20ea SL c iso hold    Bridge butterfly OTB 3x10   MTB 40x5\"    Clamshells MTB 3x10ea MTB 40x5\"     Clamshells MTB 3x10ea   TB eversion    OTB 2x15 GTB 3x15          TB DF    BTB 3x12 MTB 3x15 BTB 3x20 Med loop 3x15 nv   MTB x30 MTB 40x                  Leg press         90# 3x10      Ther Activity               TUG nv                             Chair squats HEP              Step-ups         3x10ea      Step-overs          4\" 3x10; p! With L SLS                    Gait Training               with SPC   SFP                           Modalities               MHP         L thigh    SFP                              " No

## 2024-08-28 ENCOUNTER — OFFICE VISIT (OUTPATIENT)
Dept: PHYSICAL THERAPY | Facility: OTHER | Age: 74
End: 2024-08-28
Payer: MEDICARE

## 2024-08-28 DIAGNOSIS — M48.061 SPINAL STENOSIS OF LUMBAR REGION WITHOUT NEUROGENIC CLAUDICATION: Primary | ICD-10-CM

## 2024-08-28 PROCEDURE — 97110 THERAPEUTIC EXERCISES: CPT

## 2024-08-28 NOTE — PROGRESS NOTES
"Daily Note     Today's date: 2024  Patient name: Solis Dos Santos  : 1950  MRN: [id number]  Referring provider: Leonard Gallagher,*  Dx:   Encounter Diagnosis     ICD-10-CM    1. Spinal stenosis of lumbar region without neurogenic claudication  M48.061             Start Time: 1024  Stop Time: 1117  Total time in clinic (min): 53 minutes    Subjective: Pt reports he has been walking around the house without SPC.      Objective: See treatment diary below      Assessment: Tolerated treatment well. Pt reported decreased difficulty with weighted step-ups today; appropriately challenged with side-stepping and pallof press step-outs. Patient demonstrated fatigue post treatment, exhibited good technique with therapeutic exercises, and would benefit from continued PT      Plan: Continue per plan of care.      POC expires Unit limit Auth Expiration date PT/OT + Visit Limit?   24 BOMN N/A BOMN                           Visit/Unit Tracking  AUTH Status:  Date            No - Medicare Used 10 - RE 11 12 13            Remaining  0 9 8 7               Precautions: fall risk      Visit #: 1 ie 2 3 4 5 6 7 8 9 10 11 12 13   Date:  7/18 7/22 7/25 7/29 8/1 8/8 8/12 8/15 8/19 8/21 8/23 8/26 8/28   Manuals                Hip LAD:L SFP gII SFP              Hip lateral distraction SFP   gIII SFP                            Neuro Re-Ed                TrA bracing  7.5# KB 20x5\"   C PPT  x30           TrA marches  X10  X10 MRE              Curl-up                Deadbug progressions                Pball pressdown                                Lateral band walk             OTB f' 3x15'   Pallof press HEP            MTB c step-out 2x8ea                   Tandem stance HEP               SLS                                Prone femoral nerve glides     3x15:L           Seated slump gliders  3x15:L 3x15:B  3x15:B                           Biodex LOS      static x4 Static x4          Ther Ex              " "  Bike    8 min 8 min 10 min 10 min 10 min 10 min  10 min 10 min    Treadmill walking          6 min   7 min   Seated flexion HEP Pball x10 repeated    10x10\"     5x20\":R Pball 30x10\"      Pball x40 Pball 10x10\"          LTR       x30         SKC  10x10\" 10x10\"  5x30\" Manual 3x30\"ea          DKC  Manual 10x10\" 10x10\"  5x30\" Manual 10x10\" Manual 4x30\" Manual 4x30\"     Manual 3x30\"   Lumbar extension in standing           x20 x20                    Prone quad S    3x30\":B   3x30\"ea  3x30\"ea       Harlan S   3x30\":B    3x30\"ea  3x30\"ea       SLR HS S   3x30\":B 3x30\":B     3x30\"ea 3x30\"ea  3x30\" ea 3x30\"ea                   Seated  > standing marches    8\" step taps 2x10ea 8\" step taps 3# 2x10ea 8\" step tap 3# 3x12 8\" step tap 4# 3x10ea nv  8\" step tap 5# 3x10ea      Seated > standing DF HEP Seated x10  2x10 MRE              Standing hip Abd / Ext      3# 2x10ea 4# 3x8ea 4# 3x10ea        SLR        0# 3x18        LAQ        GTB 2x25   MTB x40ea MTB x50 ea    H/L hip abd     GTB X20ea SL c iso hold    Bridge butterfly OTB 3x10   MTB 40x5\"    Clamshells MTB 3x10ea MTB 40x5\"     Clamshells MTB 3x10ea    TB eversion    OTB 2x15 GTB 3x15           TB DF    BTB 3x12 MTB 3x15 BTB 3x20 Med loop 3x15 nv   MTB x30 MTB 40x                    Leg press         90# 3x10       Ther Activity                TUG nv                               Chair squats HEP               Step-ups         3x10ea    5# a' 3x8ea   Step-overs          4\" 3x10; p! With L SLS                      Gait Training                with SPC   SFP                             Modalities                MHP         L thigh    SFP                                "

## 2024-08-29 ENCOUNTER — APPOINTMENT (OUTPATIENT)
Dept: PHYSICAL THERAPY | Facility: OTHER | Age: 74
End: 2024-08-29
Payer: MEDICARE

## 2024-08-30 ENCOUNTER — OFFICE VISIT (OUTPATIENT)
Dept: PHYSICAL THERAPY | Facility: OTHER | Age: 74
End: 2024-08-30
Payer: MEDICARE

## 2024-08-30 DIAGNOSIS — M48.061 SPINAL STENOSIS OF LUMBAR REGION WITHOUT NEUROGENIC CLAUDICATION: Primary | ICD-10-CM

## 2024-08-30 PROCEDURE — 97110 THERAPEUTIC EXERCISES: CPT

## 2024-08-30 NOTE — PROGRESS NOTES
"Daily Note     Today's date: 2024  Patient name: Solis Dos Santos  : 1950  MRN: [id number]  Referring provider: Leonard Gallagher,*  Dx:   Encounter Diagnosis     ICD-10-CM    1. Spinal stenosis of lumbar region without neurogenic claudication  M48.061               Start Time: 1016  Stop Time: 1054  Total time in clinic (min): 38 minutes    Subjective: Pt reports he went the entire day yesterday without SPC, \"felt great.\"      Objective: See treatment diary below      Assessment: Tolerated treatment well. Pt demonstrating improving LE strength as demonstrating by flowsheet progressions. Continue to challenge strength, dynamic balance and endurance. Patient demonstrated fatigue post treatment, exhibited good technique with therapeutic exercises, and would benefit from continued PT      Plan: Continue per plan of care.      POC expires Unit limit Auth Expiration date PT/OT + Visit Limit?   24 BOMN N/A BOMN                           Visit/Unit Tracking  AUTH Status:  Date           No - Medicare Used 10 - RE 11 12 13 14           Remaining  0 9 8 7 6              Precautions: fall risk      Visit #: 4 5 6 7 8 9 10 11 12 13 14   Date:  7/29 8/1 8/8 8/12 8/15 8/19 8/21 8/23 8/26 8/28 8/30   Manuals              Hip LAD:L              Hip lateral distraction gIII SFP                           Neuro Re-Ed              TrA bracing  C PPT  x30            TrA marches              Curl-up              Deadbug progressions              Pball pressdown                            Lateral band walk          OTB f' 3x15'    Pallof press          MTB c step-out 2x8ea OTB k' + MTB step-out 2x10ea                 Tandem stance              SLS                            Prone femoral nerve glides  3x15:L            Seated slump gliders  3x15:B                          Biodex LOS   static x4 Static x4           Ther Ex              Bike 8 min 8 min 10 min 10 min 10 min 10 min  10 min 10 " "min     Treadmill walking       6 min   7 min 8 min   Seated flexion  Pball x40 Pball 10x10\"           LTR    x30          SKC  5x30\" Manual 3x30\"ea           DKC  5x30\" Manual 10x10\" Manual 4x30\" Manual 4x30\"     Manual 3x30\" Manual 3x30\"   Lumbar extension in standing        x20 x20                   Prone quad S 3x30\":B   3x30\"ea  3x30\"ea        Harlan S    3x30\"ea  3x30\"ea        SLR HS S 3x30\":B     3x30\"ea 3x30\"ea  3x30\" ea 3x30\"ea 3x30\"ea                 Seated  > standing marches 8\" step taps 2x10ea 8\" step taps 3# 2x10ea 8\" step tap 3# 3x12 8\" step tap 4# 3x10ea nv  8\" step tap 5# 3x10ea       Seated > standing DF              Standing hip Abd / Ext   3# 2x10ea 4# 3x8ea 4# 3x10ea         SLR     0# 3x18         LAQ     GTB 2x25   MTB x40ea MTB x50 ea  MTB 3x15ea   H/L hip abd GTB X20ea SL c iso hold    Bridge butterfly OTB 3x10   MTB 40x5\"    Clamshells MTB 3x10ea MTB 40x5\"     Clamshells MTB 3x10ea     TB eversion OTB 2x15 GTB 3x15            TB DF BTB 3x12 MTB 3x15 BTB 3x20 Med loop 3x15 nv   MTB x30 MTB 40x  nv                 Leg press      90# 3x10     90#   95#  100#  105#  110#  115#     X10 reps ea   Ther Activity              TUG                            Chair squats              Step-ups      3x10ea    5# a' 3x8ea nv   Step-overs       4\" 3x10; p! With L SLS                     Gait Training              with SPC                            Modalities              MHP      L thigh    SFP                               "

## 2024-09-04 ENCOUNTER — OFFICE VISIT (OUTPATIENT)
Dept: PHYSICAL THERAPY | Facility: OTHER | Age: 74
End: 2024-09-04
Payer: MEDICARE

## 2024-09-04 DIAGNOSIS — M48.061 SPINAL STENOSIS OF LUMBAR REGION WITHOUT NEUROGENIC CLAUDICATION: Primary | ICD-10-CM

## 2024-09-04 PROCEDURE — 97110 THERAPEUTIC EXERCISES: CPT

## 2024-09-04 NOTE — PROGRESS NOTES
Daily Note     Today's date: 2024  Patient name: Solis Dos Santos  : 1950  MRN: [id number]  Referring provider: Leonard Gallagher,*  Dx:   Encounter Diagnosis     ICD-10-CM    1. Spinal stenosis of lumbar region without neurogenic claudication  M48.061                 Start Time: 1039  Stop Time: 1117  Total time in clinic (min): 38 minutes    Subjective: Pt reports he woke up with tight quads today.      Objective: See treatment diary below      Assessment: Tolerated treatment well. Pt demonstrating improving LE strength as demonstrating by flowsheet progressions. Continue to challenge strength, dynamic balance and endurance. Patient demonstrated fatigue post treatment, exhibited good technique with therapeutic exercises, and would benefit from continued PT      Plan: Continue per plan of care.      POC expires Unit limit Auth Expiration date PT/OT + Visit Limit?   24 BOMN N/A BOMN                           Visit/Unit Tracking  AUTH Status:  Date          No - Medicare Used 10 - RE 11 12 13 14 15          Remaining  0 9 8 7 6 5             Precautions: fall risk      Visit #: 4 5 6 7 8 9 10 11 12 13 14 15   Date:  7/29 8/1 8/8 8/12 8/15 8/19 8/21 8/23 8/26 8/28 8/30 9/4   Manuals               Hip LAD:L               Hip lateral distraction gIII SFP                             Neuro Re-Ed               TrA bracing  C PPT  x30             TrA marches               Curl-up               Deadbug progressions               Pball pressdown                              Lateral band walk          OTB f' 3x15'  OTB k' 5x15'   Pallof press          MTB c step-out 2x8ea OTB k' + MTB step-out 2x10ea                   Tandem stance               SLS                              Prone femoral nerve glides  3x15:L             Seated slump gliders  3x15:B                            Biodex LOS   static x4 Static x4            Ther Ex               Bike 8 min 8 min 10 min 10 min 10  "min 10 min  10 min 10 min   10 min   Treadmill walking       6 min   7 min 8 min    Seated flexion  Pball x40 Pball 10x10\"            LTR    x30           SKC  5x30\" Manual 3x30\"ea            DKC  5x30\" Manual 10x10\" Manual 4x30\" Manual 4x30\"     Manual 3x30\" Manual 3x30\"    Lumbar extension in standing        x20 x20                     Prone quad S 3x30\":B   3x30\"ea  3x30\"ea      2x45\"ea   Harlan S    3x30\"ea  3x30\"ea      3x30\"ea   SLR HS S 3x30\":B     3x30\"ea 3x30\"ea  3x30\" ea 3x30\"ea 3x30\"ea 3x30\"ea   PPU            2x10                  Seated  > standing marches 8\" step taps 2x10ea 8\" step taps 3# 2x10ea 8\" step tap 3# 3x12 8\" step tap 4# 3x10ea nv  8\" step tap 5# 3x10ea        Seated > standing DF               Standing hip Abd / Ext   3# 2x10ea 4# 3x8ea 4# 3x10ea       5# ext 3x10   SLR     0# 3x18          LAQ     GTB 2x25   MTB x40ea MTB x50 ea  MTB 3x15ea nv   H/L hip abd GTB X20ea SL c iso hold    Bridge butterfly OTB 3x10   MTB 40x5\"    Clamshells MTB 3x10ea MTB 40x5\"     Clamshells MTB 3x10ea      TB eversion OTB 2x15 GTB 3x15             TB DF BTB 3x12 MTB 3x15 BTB 3x20 Med loop 3x15 nv   MTB x30 MTB 40x  nv nv                  Leg press      90# 3x10     90#   95#  100#  105#  110#  115#     X10 reps ea    Ther Activity               TUG                              Chair squats               Step-ups      3x10ea    5# a' 3x8ea nv 5# a' 3x10   Step-overs       4\" 3x10; p! With L SLS                       Gait Training               with SPC                              Modalities               MHP      L thigh    SFP                                 "

## 2024-09-06 ENCOUNTER — OFFICE VISIT (OUTPATIENT)
Dept: PHYSICAL THERAPY | Facility: OTHER | Age: 74
End: 2024-09-06
Payer: MEDICARE

## 2024-09-06 DIAGNOSIS — M48.061 SPINAL STENOSIS OF LUMBAR REGION WITHOUT NEUROGENIC CLAUDICATION: Primary | ICD-10-CM

## 2024-09-06 PROCEDURE — 97110 THERAPEUTIC EXERCISES: CPT

## 2024-09-06 PROCEDURE — 97112 NEUROMUSCULAR REEDUCATION: CPT

## 2024-09-06 NOTE — PROGRESS NOTES
Daily Note     Today's date: 2024  Patient name: Solis Dos Santos  : 1950  MRN: [id number]  Referring provider: Leonard Gallagher,*  Dx:   Encounter Diagnosis     ICD-10-CM    1. Spinal stenosis of lumbar region without neurogenic claudication  M48.061                            Subjective: Pt reports soreness in L hip following LV, states that he is bone-on-bone in this joint and certain exercises in PT seem to flare pain for 2 days post.      Objective: See treatment diary below      Assessment: Tolerated treatment well. Pt demonstrating improving LE strength as demonstrating by flowsheet progressions. Continue to challenge strength, dynamic balance and endurance. Patient demonstrated fatigue post treatment, exhibited good technique with therapeutic exercises, and would benefit from continued PT.      Plan: Continue per plan of care.       POC expires Unit limit Auth Expiration date PT/OT + Visit Limit?   24 BOMN N/A BOMN                           Visit/Unit Tracking  AUTH Status:  Date          No - Medicare Used 10 - RE 11 12 13 14 15          Remaining  0 9 8 7 6 5             Precautions: fall risk      Visit #: 16  6 7 8 9 10 11 12 13 14 15   Date:  9/6  8/8 8/12 8/15 8/19 8/21 8/23 8/26 8/28 8/30 9/4   Manuals               Hip LAD:L               Hip lateral distraction                              Neuro Re-Ed               TrA bracing               TrA marches               Curl-up               Deadbug progressions               Pball pressdown                              Lateral band walk          OTB f' 3x15'  OTB k' 5x15'   Pallof press MTB step-out 2x10ea           MTB c step-out 2x8ea OTB k' + MTB step-out 2x10ea                   Tandem stance               SLS                              Prone femoral nerve glides               Seated slump gliders                              Biodex LOS   static x4 Static x4            Ther Ex               Bike  "10 min  10 min 10 min 10 min 10 min  10 min 10 min   10 min   Treadmill walking       6 min   7 min 8 min    Seated flexion   Pball 10x10\"            LTR    x30           SKC   Manual 3x30\"ea            DKC   Manual 10x10\" Manual 4x30\" Manual 4x30\"     Manual 3x30\" Manual 3x30\"    Lumbar extension in standing        x20 x20                     Prone quad S NV   3x30\"ea  3x30\"ea      2x45\"ea   Harlan S 3x30\" ea   3x30\"ea  3x30\"ea      3x30\"ea   SLR HS S 3x30\" ea     3x30\"ea 3x30\"ea  3x30\" ea 3x30\"ea 3x30\"ea 3x30\"ea   PPU 2x10           2x10                  Seated  > standing marches   8\" step tap 3# 3x12 8\" step tap 4# 3x10ea nv  8\" step tap 5# 3x10ea        Seated > standing DF               Standing hip Abd / Ext   3# 2x10ea 4# 3x8ea 4# 3x10ea       5# ext 3x10   SLR     0# 3x18          LAQ MTB 3x20 ea    GTB 2x25   MTB x40ea MTB x50 ea  MTB 3x15ea nv   H/L hip abd     Bridge butterfly OTB 3x10   MTB 40x5\"    Clamshells MTB 3x10ea MTB 40x5\"     Clamshells MTB 3x10ea      TB eversion               TB DF NV  BTB 3x20 Med loop 3x15 nv   MTB x30 MTB 40x  nv nv                  Leg press NV     90# 3x10     90#   95#  100#  105#  110#  115#     X10 reps ea    Ther Activity               TUG                              Chair squats               Step-ups      3x10ea    5# a' 3x8ea nv 5# a' 3x10   Step-overs       4\" 3x10; p! With L SLS                       Gait Training               with SPC                              Modalities               MHP      L thigh    SFP                                 "

## 2024-09-09 ENCOUNTER — APPOINTMENT (OUTPATIENT)
Dept: PHYSICAL THERAPY | Facility: OTHER | Age: 74
End: 2024-09-09
Payer: MEDICARE

## 2024-09-11 ENCOUNTER — OFFICE VISIT (OUTPATIENT)
Dept: PHYSICAL THERAPY | Facility: OTHER | Age: 74
End: 2024-09-11
Payer: MEDICARE

## 2024-09-11 DIAGNOSIS — M48.061 SPINAL STENOSIS OF LUMBAR REGION WITHOUT NEUROGENIC CLAUDICATION: Primary | ICD-10-CM

## 2024-09-11 PROCEDURE — 97110 THERAPEUTIC EXERCISES: CPT

## 2024-09-13 ENCOUNTER — OFFICE VISIT (OUTPATIENT)
Dept: PHYSICAL THERAPY | Facility: OTHER | Age: 74
End: 2024-09-13
Payer: MEDICARE

## 2024-09-13 DIAGNOSIS — M48.061 SPINAL STENOSIS OF LUMBAR REGION WITHOUT NEUROGENIC CLAUDICATION: Primary | ICD-10-CM

## 2024-09-13 PROCEDURE — 97110 THERAPEUTIC EXERCISES: CPT

## 2024-09-13 NOTE — PROGRESS NOTES
Daily Note     Today's date: 2024  Patient name: Solis Dos Santos  : 1950  MRN: [id number]  Referring provider: Leonard Gallagher,*  Dx:   Encounter Diagnosis     ICD-10-CM    1. Spinal stenosis of lumbar region without neurogenic claudication  M48.061                     Start Time: 1008  Stop Time: 1046  Total time in clinic (min): 38 minutes    Subjective: Pt reports he has made a conscious effort to increase daily step count.      Objective: See treatment diary below      Assessment: Tolerated treatment well focused on flexibility and LE strengthening. Pt continues to present with C-sign L hip / groin pain; prompted pt to schedule follow-up with orthopedics regarding L hip OA. Patient demonstrated fatigue post treatment, exhibited good technique with therapeutic exercises, and would benefit from continued PT.      Plan: Continue per plan of care.       POC expires Unit limit Auth Expiration date PT/OT + Visit Limit?   24 BOMN N/A BOMN                           Visit/Unit Tracking  AUTH Status:  Date       No - Medicare Used 10 -  11 12 13 14 15 16 17 18       Remaining  0 9 8 7 6 5 4 3 2          Precautions: fall risk      Visit #: 10 11 12 13 14 15 16 17   Date:     Manuals           Hip LAD:L           Hip lateral distraction                      Neuro Re-Ed           TrA bracing           TrA marches           Curl-up           Deadbug progressions           Pball pressdown                      Lateral band walk    OTB f' 3x15'  OTB k' 5x15'  LOTB a' 5x15'   Pallof press    MTB c step-out 2x8ea OTB k' + MTB step-out 2x10ea                 Tandem stance           SLS                      Prone femoral nerve glides           Seated slump gliders                      Biodex LOS           Ther Ex           Bike  10 min 10 min   10 min 10 min 10 min   Treadmill walking 6 min   7 min 8 min      Seated flexion    "        LTR           SKC           DKC    Manual 3x30\" Manual 3x30\"      Lumbar extension in standing  x20 x20                   Prone quad S      2x45\"ea 2x45\"ea 3x30\"   Harlan S      3x30\"ea 3x30\"ea Manual s/l 3x30\":L   SLR HS S 3x30\"ea  3x30\" ea 3x30\"ea 3x30\"ea 3x30\"ea 3x30\"ea 3x30\"ea   PPU      2x10 2x10               Seated  > standing marches 8\" step tap 5# 3x10ea       Standing march x20ea   Seated > standing DF           Standing hip Abd / Ext      5# ext 3x10     SLR           LAQ  MTB x40ea MTB x50 ea  MTB 3x15ea nv Seated 20# x50    H/L hip abd  MTB 40x5\"    Clamshells MTB 3x10ea MTB 40x5\"     Clamshells MTB 3x10ea        TB eversion           TB DF  MTB x30 MTB 40x  nv nv MTB 40x Med loop 3x10              Leg press     90#   95#  100#  105#  110#  115#     X10 reps ea      Ther Activity           TUG                      Chair squats           Step-ups    5# a' 3x8ea nv 5# a' 3x10     Step-overs 4\" 3x10; p! With L SLS                     Gait Training           with SPC           BOOST TM       70% BW, 4XL, x5min walk               Modalities           MHP                               "

## 2024-09-16 ENCOUNTER — OFFICE VISIT (OUTPATIENT)
Dept: PODIATRY | Facility: CLINIC | Age: 74
End: 2024-09-16
Payer: MEDICARE

## 2024-09-16 ENCOUNTER — OFFICE VISIT (OUTPATIENT)
Dept: PHYSICAL THERAPY | Facility: OTHER | Age: 74
End: 2024-09-16
Payer: MEDICARE

## 2024-09-16 VITALS
HEIGHT: 73 IN | BODY MASS INDEX: 37.24 KG/M2 | WEIGHT: 281 LBS | DIASTOLIC BLOOD PRESSURE: 92 MMHG | SYSTOLIC BLOOD PRESSURE: 150 MMHG | RESPIRATION RATE: 18 BRPM

## 2024-09-16 DIAGNOSIS — M48.061 SPINAL STENOSIS OF LUMBAR REGION WITHOUT NEUROGENIC CLAUDICATION: Primary | ICD-10-CM

## 2024-09-16 DIAGNOSIS — I73.9 PERIPHERAL VASCULAR DISEASE, UNSPECIFIED (HCC): Primary | ICD-10-CM

## 2024-09-16 PROCEDURE — RECHECK: Performed by: PODIATRIST

## 2024-09-16 PROCEDURE — 97110 THERAPEUTIC EXERCISES: CPT

## 2024-09-16 PROCEDURE — 97140 MANUAL THERAPY 1/> REGIONS: CPT

## 2024-09-16 PROCEDURE — G0127 TRIM NAIL(S): HCPCS | Performed by: PODIATRIST

## 2024-09-16 NOTE — PROGRESS NOTES
Daily Note     Today's date: 2024  Patient name: Solis Dos Santos  : 1950  MRN: [id number]  Referring provider: Leonard Gallagher,*  Dx:   Encounter Diagnosis     ICD-10-CM    1. Spinal stenosis of lumbar region without neurogenic claudication  M48.061                     Start Time: 1024  Stop Time: 1102  Total time in clinic (min): 38 minutes    Subjective: Pt reports he was standing at [employer] and weight-shifted to the left, resulting in a sharp pain in his L hip.      Objective: See treatment diary below      Assessment: Tolerated treatment well focused on flexibility and LE strengthening. Pt continues to present with C-sign L hip / groin pain; prompted pt to schedule follow-up with orthopedics regarding L hip OA. Added LAD for L hip today with minimal to no change in sx. Patient demonstrated fatigue post treatment, exhibited good technique with therapeutic exercises, and would benefit from continued PT.      Plan: Continue per plan of care.  RE nv.     POC expires Unit limit Auth Expiration date PT/OT + Visit Limit?   24 BOMN N/A BOMN                           Visit/Unit Tracking  AUTH Status:  Date  9     No - Medicare Used 10 - RE 11 12 13 14 15 16 17 18 19      Remaining  0 9 8 7 6 5 4 3 2 1 RE        Precautions: fall risk      Visit #: 10 11 12 13 14 15 16 17 18   Date:   916   Manuals            Hip LAD:L         SFP   Hip lateral distraction                        Neuro Re-Ed            TrA bracing            TrA marches            Curl-up            Deadbug progressions            Pball pressdown                        Lateral band walk    OTB f' 3x15'  OTB k' 5x15'  LOTB a' 5x15'    Pallof press    MTB c step-out 2x8ea OTB k' + MTB step-out 2x10ea    MTB 2x10ea               Tandem stance            SLS                        Prone femoral nerve glides            Seated slump gliders                 "        Biodex LOS            Ther Ex            Bike  10 min 10 min   10 min 10 min 10 min 10 min   Treadmill walking 6 min   7 min 8 min       Seated flexion            LTR            SKC            DKC    Manual 3x30\" Manual 3x30\"       Lumbar extension in standing  x20 x20                     Prone quad S      2x45\"ea 2x45\"ea 3x30\"    Harlan S      3x30\"ea 3x30\"ea Manual s/l 3x30\":L    SLR HS S 3x30\"ea  3x30\" ea 3x30\"ea 3x30\"ea 3x30\"ea 3x30\"ea 3x30\"ea    PPU      2x10 2x10                 Seated  > standing marches 8\" step tap 5# 3x10ea       Standing march x20ea    Seated > standing DF            Standing hip Abd / Ext      5# ext 3x10      SLR            LAQ  MTB x40ea MTB x50 ea  MTB 3x15ea nv Seated 20# x50  MTB + BTB 2x30   H/L hip abd  MTB 40x5\"    Clamshells MTB 3x10ea MTB 40x5\"     Clamshells MTB 3x10ea         TB eversion            TB DF  MTB x30 MTB 40x  nv nv MTB 40x Med loop 3x10                Leg press     90#   95#  100#  105#  110#  115#     X10 reps ea    95#  100#  105#  110#  115#     X10 reps ea   Ther Activity            TUG                        Chair squats         Wall squats c pball 3x10   Step-ups    5# a' 3x8ea nv 5# a' 3x10      Step-overs 4\" 3x10; p! With L SLS                       Gait Training            with SPC            BOOST TM       70% BW, 4XL, x5min walk                 Modalities            MHP                                 "

## 2024-09-16 NOTE — PROGRESS NOTES
Established patient with class findings presents for nail care.  Vascular exam:  DP  0/4 bilateral; PT  0 4 bilateral   Dermatological exam:  Each toenail is thick and  dystrophic.  Diagnosis:  PVD  Treatment: Trimmed multiple dystrophic toenails.     Nail removal    Date/Time: 9/16/2024 10:00 AM    Performed by: Tal Rosales DPM  Authorized by: Tal Rosales DPM    Nails trimmed:     Number of nails trimmed:  10

## 2024-09-19 ENCOUNTER — OFFICE VISIT (OUTPATIENT)
Dept: PHYSICAL THERAPY | Facility: OTHER | Age: 74
End: 2024-09-19
Payer: MEDICARE

## 2024-09-19 DIAGNOSIS — M48.061 SPINAL STENOSIS OF LUMBAR REGION WITHOUT NEUROGENIC CLAUDICATION: Primary | ICD-10-CM

## 2024-09-19 PROCEDURE — 97110 THERAPEUTIC EXERCISES: CPT

## 2024-09-19 NOTE — PROGRESS NOTES
"PT Re-Evaluation     Today's date: 2024  Patient name: Solis Dos Santos  : 1950  MRN: [id number]  Referring provider: Leonard Gallagher,*  Dx:   Encounter Diagnosis     ICD-10-CM    1. Spinal stenosis of lumbar region without neurogenic claudication  M48.061             Start Time: 1148  Stop Time: 1226  Total time in clinic (min): 38 minutes    Assessment  Impairments: abnormal gait, abnormal muscle firing, abnormal or restricted ROM, activity intolerance, impaired physical strength, lacks appropriate home exercise program, pain with function, weight-bearing intolerance, poor posture  and poor body mechanics    Assessment details: RE 24: Re-eval reveals absence of low back pain with improvements in lumbar mobility, LE strength / flexibility and functional mobility. Pt is back to PLOF with household ambulation no AD; continues to use SPC for community distances. Biggest barriers at this time include continued strength deficits, B knee OA, L hip impingement / OA, as well as poor balance and decreased balance confidence. Self-perceived balance confidence has improved to \"60%\" today. Pt is a good candidate for continued skilled PT to achieve the following goals    RE 24: Re-eval reveals significant improvements in low back pain as well as improvements in lumbar mobility & lower extremity strength impacting functional mobility. Pt is comfortably ambulating with SPC at this time, wishes to return to no AD (PLOF). Biggest barriers at this time include continued strength deficits in tibialis anterior & calves, L hip impingement / OA, as well as poor balance and decreased balance confidence. Self-perceived balance confidence has improved from \"10%\" at IE to \"30%\" today. Pt is a good candidate for continued skilled PT to achieve the following goals.    Solis Dos Santos is a 73 y.o. male presenting to OPPT initial evaluation with chief complaint of recent episode of low back pain for the last 6 " weeks of insidious onset. Today he reports no pain sitting, mild discomfort lying, no pain with walking - primary complaint today is decreased balance, lack of stability with walking, lack of balance confidence. Pt had two falls - one occurrence was sliding out of bed landing on his buttocks, other instance was loss of balance when turning in which he hit his head against desk, no LOC, dizziness. Began walking short distances in the home the last two days. Has adaptive equipment at home in bath, two steps to enter home, stairs in home. Has not tried to climb stairs due to fear and weakness. Pt has noted mild L hip / groin pain the last two days. Absence of numbness / tingling today - sole complaint in legs is weakness.    Upon PT exam, pt presents with lumbar flexion bias, decreased dynamic lumbar stability, decreased L hip ROM, decreased tibialis anterior & plantarflexion strength, poor static & dynamic balance and poor balance confidence impacting all upright functional mobility and participation in community / [facility]. PLOF ambulating with no AD. The patient is a fair candidate for physical therapy to achieve the following goals given co-morbidities (B knee OA, L hip OA).        Goals  STG (4 weeks):  Pt will decrease pain to low back pain to <2/10 during activity MET  Pt will demonstrate increased global L hip ROM by >5 deg PROGRESSING  Pt will demonstrate increased B DF MMT to >4/5 in order to improve gait MET  Pt will be able to ambulate household distances without RW. MET  Pt will report >40% self-perceived balance confidence. MET  Pt's self-perceived disability will decrease as demonstrated by a >5-point improvement in FOTO score.  MET  Pt will be independent with HEP as demonstrated by return demo with proper technique and execution of exercises provided. MET     LTG (16 weeks):  Pt will have absence of pain for 2 consecutive sessions PROGRESSING  Pt will demonstrate increased global L hip ROM by >10 deg  "PROGRESSING  Pt will demonstrate increased B DF MMT to >5/5 in order to improve gait PROGRESSING  Pt will report >70% self-perceived balance confidence. PROGRESSING  Pt will be able to ambulate community distances (>300 ft) without RW. PROGRESSING  Pt's self-perceived disability will decrease as demonstrated by a FOTO score >60. PROGRESSING  Pt will be independent with progressions to HEP as demonstrated by return demo with proper technique and execution of exercises provided.        Plan  Patient would benefit from: skilled physical therapy and PT eval  Planned modality interventions: biofeedback, electrical stimulation/Russian stimulation, TENS, thermotherapy: hydrocollator packs, traction, ultrasound and cryotherapy    Planned therapy interventions: abdominal trunk stabilization, activity modification, balance, balance/weight bearing training, body mechanics training, flexibility, functional ROM exercises, graded activity, graded exercise, graded motor, gait training, home exercise program, therapeutic training, therapeutic activities, therapeutic exercise, stretching, strengthening, joint mobilization, manual therapy, massage, neuromuscular re-education, patient education, postural training, IASTM and kinesiology taping    Frequency: 2x week  Duration in weeks: 24  Plan of Care beginning date: 7/18/2024  Plan of Care expiration date: 1/14/2025  Treatment plan discussed with: patient      Subjective Evaluation    History of Present Illness  Mechanism of injury: The following HPI captured from referring provider's encounter and EMR review and confirmed with patient:  \" Solis Dos Santos is a 73 y.o. male presenting for initial visit with chief complaint of bilateral lower extremity weakness.  Patient notes disc herniation L4-L5 subsequent surgery 1987 and intermittent back pain since. Acutely he notes bilateral leg pain and weakness that started 6 weeks ago. He has had 2 recent falls and is currently ambulating " "with a walker.       Describes pain as achy in nature.  Located in bilateral legs and low back. Numbness . Pain is worse with standing and improves with sitting.\"    Pt presents today with current symptomatic complaints including -   Solis Dos Santos is a 73 y.o. male presenting to OPPT initial evaluation with chief complaint of recent episode of low back pain for the last 6 weeks of insidious onset. Today he reports no pain sitting, mild discomfort lying, no pain with walking - primary complaint today is decreased balance, lack of stability with walking, lack of balance confidence. Pt had two falls - one occurrence was sliding out of bed landing on his buttocks, other instance was loss of balance when turning in which he hit his head against desk, no LOC, dizziness. Began walking short distances in the home the last two days. Has adaptive equipment at home in bath, two steps to enter home, stairs in home. Has not tried to climb stairs due to fear and weakness. Pt has noted mild L hip / groin pain the last two days. Absence of numbness / tingling today - sole complaint in legs is weakness.      Current self-perceived balance confidence: 10%    24: Current self-perceived balance confidence: 30%    24: Current self-perceived balance confidence: 60%      Patient Goals  Patient goal: Improve balance confidence  Pain  Current pain ratin  At best pain ratin  At worst pain ratin      Diagnostic Tests  X-ray: normal  MRI studies: abnormal  Treatments  Previous treatment: physical therapy        Objective      Postural Observation: Decreased lumbar lordosis, mild L scoliosis    Palpation: Absence of tenderness    Lumbar ROM:     AROM   Flexion To 90, hands to mid-shin   Extension To neutral, 10 deg   Rotation nt   Sidebending nt     Reproduction of anterior lower leg pain with forward flexion; absence of pain when cued to brace core before bending    Hip ROM:   PROM:L PROM:R   Flexion 100 120 120   ER " "(90/90) 30 30 45   IR (90/90) 0 5 40       Strength:   Left Right   Hip flexion 4/5 4+/5 4/5 4+/5   Knee extension 4+/5 5/5 4+/5 5/5   Knee flexion 4+/5 5/5 4+/5 5/5   DF 4-/5 4+/5 4-/5 5/5   PF 3/5 3+/5 3/5 3+/5       Neurologic tests:  (-) LQS Dermatomes  (-) LQS Myotomes  Patellar reflex (L2-4): 0 bilat  Achilles reflex (L5-S1): 0 bilat    Clinical tests:  (+) L FADIR      Functional Assessment:  5xSTS: 20.88\" 18.13\"  Gait - Decreased DF R>L, resulting in a R foot toe-drag when ambulating with RW; improvements with cueing for \"march and land heel first\"         POC expires Unit limit Auth Expiration date PT/OT + Visit Limit?   1/14/24 BOMN N/A BOMN                             Visit/Unit Tracking  AUTH Status:  Date 8/21 8/23 8/26 8/28 8/30 9/4 9/6 9/11 9/13 9/16  9/19     No - Medicare Used 10 - RE 11 12 13 14 15 16 17 18 19  20       Remaining  0 9 8 7 6 5 4 3 2 1 0-RE           Precautions: fall risk        Visit #: 10 11 12 13 14 15 16 17 18 19   Date:  8/21 8/23 8/26 8/28 8/30 9/4 9/11 9/13 9/16 9/19   Manuals                      Hip LAD:L                 SFP    Hip lateral distraction                                             Neuro Re-Ed                      TrA bracing                      TrA marches                      Curl-up                      Deadbug progressions                      Pball pressdown                                             Lateral band walk       OTB f' 3x15'   OTB k' 5x15'   LOTB a' 5x15'      Pallof press       MTB c step-out 2x8ea OTB k' + MTB step-out 2x10ea       MTB 2x10ea                           Tandem stance                      SLS                                             Prone femoral nerve glides                      Seated slump gliders                                             Biodex LOS                      Ther Ex                      Bike   10 min 10 min     10 min 10 min 10 min 10 min 10 min   Treadmill walking 6 min     7 min 8 min            Seated " "flexion                      LTR                      SKC                      DKC       Manual 3x30\" Manual 3x30\"            Lumbar extension in standing   x20 x20                                       Prone quad S           2x45\"ea 2x45\"ea 3x30\"   3x30\"ea   Harlan S           3x30\"ea 3x30\"ea Manual s/l 3x30\":L      SLR HS S 3x30\"ea   3x30\" ea 3x30\"ea 3x30\"ea 3x30\"ea 3x30\"ea 3x30\"ea   3x30\"ea   PPU           2x10 2x10                               Seated  > standing marches 8\" step tap 5# 3x10ea             Standing march x20ea      Seated > standing DF                      Standing hip Abd / Ext           5# ext 3x10          SLR                      LAQ   MTB x40ea MTB x50 ea   MTB 3x15ea nv Seated 20# x50   MTB + BTB 2x30    H/L hip abd   MTB 40x5\"     Clamshells MTB 3x10ea MTB 40x5\"      Clamshells MTB 3x10ea                TB eversion                      TB DF   MTB x30 MTB 40x   nv nv MTB 40x Med loop 3x10                             Leg press         90#   95#  100#  105#  110#  115#      X10 reps ea       95#  100#  105#  110#  115#      X10 reps ea    Ther Activity                      TUG                                             Chair squats                 Wall squats c pball 3x10    Step-ups       5# a' 3x8ea nv 5# a' 3x10       5# a' 3x10ea   Step-overs 4\" 3x10; p! With L SLS                                           Gait Training                      with SPC                      BOOST TM             70% BW, 4XL, x5min walk                               Modalities                      MHP                                                   "

## 2024-09-23 ENCOUNTER — RA CDI HCC (OUTPATIENT)
Dept: OTHER | Facility: HOSPITAL | Age: 74
End: 2024-09-23

## 2024-09-23 ENCOUNTER — OFFICE VISIT (OUTPATIENT)
Dept: PHYSICAL THERAPY | Facility: OTHER | Age: 74
End: 2024-09-23
Payer: MEDICARE

## 2024-09-23 DIAGNOSIS — M48.061 SPINAL STENOSIS OF LUMBAR REGION WITHOUT NEUROGENIC CLAUDICATION: Primary | ICD-10-CM

## 2024-09-23 PROCEDURE — 97110 THERAPEUTIC EXERCISES: CPT

## 2024-09-23 NOTE — PROGRESS NOTES
"Daily Note     Today's date: 2024  Patient name: Solis Dos Santos  : 1950  MRN: [id number]  Referring provider: Leonard Gallagher,*  Dx:   Encounter Diagnosis     ICD-10-CM    1. Spinal stenosis of lumbar region without neurogenic claudication  M48.061           Start Time: 1445  Stop Time: 1530  Total time in clinic (min): 45 minutes      Subjective: Patient reports that he is \"not having a good day the past two days\" and notes that he was going to cx today's appt but did not in hope that stretching and exercising would provide him with some relief. Patient notes that the majority of his sxs are located in B quads and L hip.      Objective: See treatment diary below      Assessment: Tolerated treatment fairly well. Focused on B LE stretching and light strengthening exercises due to subjective findings of patient experiencing exacerbated sxs today. Patient did not experience any worsening of symptoms throughout today's session. Improvements in B quad tightness noted post treatment, but not to the extent that patient has experienced in the past. No increases in sxs noted at the conclusion of session. Continue to progress patient as able. Patient would benefit from continued PT to address remaining lumbar deficits in order to maximize overall functional ability.      Plan: Continue per plan of care.      POC expires Unit limit Auth Expiration date PT/OT + Visit Limit?   24 BOMN N/A BOMN                             Visit/Unit Tracking  AUTH Status:  Date  9 9/6    No - Medicare Used 10 - RE 11 12 13 14 15 16 17 18 19  20  21     Remaining  0 9 8 7 6 5 4 3 2 1 0-RE           Precautions: fall risk        Visit #: 10 11 12 13 14 15 16 17 18 19 20   Date:   9   Manuals                       Hip LAD:L                 SFP     Hip lateral distraction                                               Neuro " "Re-Ed                       TrA bracing                       TrA marches                       Curl-up                       Deadbug progressions                       Pball pressdown                                               Lateral band walk       OTB f' 3x15'   OTB k' 5x15'   LOTB a' 5x15'       Pallof press       MTB c step-out 2x8ea OTB k' + MTB step-out 2x10ea       MTB 2x10ea                             Tandem stance                       SLS                                               Prone femoral nerve glides                       Seated slump gliders                                               Biodex LOS                       Ther Ex                       Bike   10 min 10 min     10 min 10 min 10 min 10 min 10 min 10 min   Treadmill walking 6 min     7 min 8 min             Seated flexion                       LTR                       SKC                    10x10\"   DKC       Manual 3x30\" Manual 3x30\"             Lumbar extension in standing   x20 x20                                         Prone quad S           2x45\"ea 2x45\"ea 3x30\"   3x30\"ea 3x30\" ea   Harlan S           3x30\"ea 3x30\"ea Manual s/l 3x30\":L       SLR HS S 3x30\"ea   3x30\" ea 3x30\"ea 3x30\"ea 3x30\"ea 3x30\"ea 3x30\"ea   3x30\"ea 3x30\" ea   PPU           2x10 2x10                                 Seated  > standing marches 8\" step tap 5# 3x10ea             Standing march x20ea    Supine march x20 ea   Seated > standing DF                       Standing hip Abd / Ext           5# ext 3x10           SLR                       LAQ   MTB x40ea MTB x50 ea   MTB 3x15ea nv Seated 20# x50   MTB + BTB 2x30  MTB 3x15ea   H/L hip abd   MTB 40x5\"     Clamshells MTB 3x10ea MTB 40x5\"      Clamshells MTB 3x10ea                 TB eversion                       TB DF   MTB x30 MTB 40x   nv nv MTB 40x Med loop 3x10                               Leg press         90#   95#  100#  105#  110#  115#      X10 reps ea       95#  100#  105#  110#  115#    " "  X10 reps ea     Ther Activity                       TUG                                               Chair squats                 Wall squats c pball 3x10  Wall squats c pball 3x10   Step-ups       5# a' 3x8ea nv 5# a' 3x10       5# a' 3x10ea    Step-overs 4\" 3x10; p! With L SLS                                             Gait Training                       with SPC                       BOOST TM             70% BW, 4XL, x5min walk                                 Modalities                       MHP                                                       "

## 2024-09-25 ENCOUNTER — OFFICE VISIT (OUTPATIENT)
Dept: PHYSICAL THERAPY | Facility: OTHER | Age: 74
End: 2024-09-25
Payer: MEDICARE

## 2024-09-25 DIAGNOSIS — M25.552 PAIN IN LEFT HIP: Primary | ICD-10-CM

## 2024-09-25 DIAGNOSIS — M48.061 SPINAL STENOSIS OF LUMBAR REGION WITHOUT NEUROGENIC CLAUDICATION: Primary | ICD-10-CM

## 2024-09-25 PROCEDURE — 97110 THERAPEUTIC EXERCISES: CPT

## 2024-09-25 PROCEDURE — 97140 MANUAL THERAPY 1/> REGIONS: CPT

## 2024-09-25 NOTE — PROGRESS NOTES
Daily Note     Today's date: 2024  Patient name: Solis Dos Santos  : 1950  MRN: [id number]  Referring provider: Leonard Gallagher,*  Dx:   Encounter Diagnosis     ICD-10-CM    1. Spinal stenosis of lumbar region without neurogenic claudication  M48.061           Start Time: 955  Stop Time: 1048  Total time in clinic (min): 53 minutes      Subjective: Patient reports he was having a bad day on Monday, L hip is still bothering him. Reports he's back to using the cane in the house.       Objective: See treatment diary below      Assessment: Tolerated treatment fairly well. Added gentle manual hip mobilizations today with improvements in pt's L hip pain and improved internal rotation ROM. Added belt self-mobilization for pt to attempt at home. Patient would benefit from continued PT to address remaining lumbar deficits in order to maximize overall functional ability.      Plan: Continue per plan of care.      POC expires Unit limit Auth Expiration date PT/OT + Visit Limit?   24 BOMN N/A BOMN                             Visit/Unit Tracking  AUTH Status:  Date  9 9/6    No - Medicare Used 10 - RE 11 12 13 14 15 16 17 18 19   -RE   22     Remaining  0 9 8 7 6 5 4 3 2 1 0  9 8         Precautions: fall risk        Visit #: 10 11 12 13 14 15 16 17 18 19 20    Date:   9   Manuals                        Hip LAD:L                 SFP      Hip lateral distraction                     Lateral / inferior   gII SFP                            Neuro Re-Ed                        TrA bracing                        TrA marches                        Curl-up                        Deadbug progressions                        Pball pressdown                                                 Lateral band walk       OTB f' 3x15'   OTB k' 5x15'   LOTB a' 5x15'        Pallof press       MTB c step-out  "2x8ea OTB k' + MTB step-out 2x10ea       MTB 2x10ea                               Tandem stance                        SLS                                                 Prone femoral nerve glides                        Seated slump gliders                                                 Biodex LOS                        Ther Ex                        Bike   10 min 10 min     10 min 10 min 10 min 10 min 10 min 10 min 10 min   Treadmill walking 6 min     7 min 8 min              Seated flexion                        LTR                        SKC                    10x10\" 10x10\"   DKC       Manual 3x30\" Manual 3x30\"              Lumbar extension in standing   x20 x20                  Belt self hip mobilization            Inferior / lateral x2\" ea                            Prone quad S           2x45\"ea 2x45\"ea 3x30\"   3x30\"ea 3x30\" ea    Harlan S           3x30\"ea 3x30\"ea Manual s/l 3x30\":L        SLR HS S 3x30\"ea   3x30\" ea 3x30\"ea 3x30\"ea 3x30\"ea 3x30\"ea 3x30\"ea   3x30\"ea 3x30\" ea    PPU           2x10 2x10                                   Seated  > standing marches 8\" step tap 5# 3x10ea             Standing march x20ea    Supine march x20 ea    Seated > standing DF                        Standing hip Abd / Ext           5# ext 3x10            SLR                        LAQ   MTB x40ea MTB x50 ea   MTB 3x15ea nv Seated 20# x50   MTB + BTB 2x30  MTB 3x15ea    H/L hip abd   MTB 40x5\"     Clamshells MTB 3x10ea MTB 40x5\"      Clamshells MTB 3x10ea               MTB 3x20   TB eversion                        TB DF   MTB x30 MTB 40x   nv nv MTB 40x Med loop 3x10                                 Leg press         90#   95#  100#  105#  110#  115#      X10 reps ea       95#  100#  105#  110#  115#      X10 reps ea      Ther Activity                        TUG                                                 Chair squats                 Wall squats c pball 3x10  Wall squats c pball 3x10    Step-ups       5# a' 3x8ea nv 5# " "a' 3x10       5# a' 3x10ea     Step-overs 4\" 3x10; p! With L SLS                                               Gait Training                        with SPC                        BOOST TM             70% BW, 4XL, x5min walk                                   Modalities                        [facility]                                                         "

## 2024-09-27 ENCOUNTER — OFFICE VISIT (OUTPATIENT)
Dept: PHYSICAL THERAPY | Facility: OTHER | Age: 74
End: 2024-09-27
Payer: MEDICARE

## 2024-09-27 ENCOUNTER — OFFICE VISIT (OUTPATIENT)
Dept: INTERNAL MEDICINE CLINIC | Facility: CLINIC | Age: 74
End: 2024-09-27
Payer: MEDICARE

## 2024-09-27 VITALS
BODY MASS INDEX: 36.58 KG/M2 | OXYGEN SATURATION: 96 % | TEMPERATURE: 98 F | DIASTOLIC BLOOD PRESSURE: 84 MMHG | WEIGHT: 276 LBS | HEIGHT: 73 IN | SYSTOLIC BLOOD PRESSURE: 132 MMHG | HEART RATE: 72 BPM

## 2024-09-27 DIAGNOSIS — M17.0 PRIMARY OSTEOARTHRITIS OF BOTH KNEES: ICD-10-CM

## 2024-09-27 DIAGNOSIS — E03.9 ACQUIRED HYPOTHYROIDISM: Primary | ICD-10-CM

## 2024-09-27 DIAGNOSIS — E78.2 MIXED HYPERLIPIDEMIA: ICD-10-CM

## 2024-09-27 DIAGNOSIS — E66.01 OBESITY, MORBID (HCC): ICD-10-CM

## 2024-09-27 DIAGNOSIS — Z00.00 MEDICARE ANNUAL WELLNESS VISIT, SUBSEQUENT: ICD-10-CM

## 2024-09-27 DIAGNOSIS — M48.061 SPINAL STENOSIS OF LUMBAR REGION WITHOUT NEUROGENIC CLAUDICATION: Primary | ICD-10-CM

## 2024-09-27 DIAGNOSIS — M16.9 OSTEOARTHROSIS, HIP: ICD-10-CM

## 2024-09-27 DIAGNOSIS — M54.16 LUMBAR RADICULOPATHY: ICD-10-CM

## 2024-09-27 PROCEDURE — 97110 THERAPEUTIC EXERCISES: CPT

## 2024-09-27 PROCEDURE — G0439 PPPS, SUBSEQ VISIT: HCPCS | Performed by: INTERNAL MEDICINE

## 2024-09-27 PROCEDURE — 99214 OFFICE O/P EST MOD 30 MIN: CPT | Performed by: INTERNAL MEDICINE

## 2024-09-27 PROCEDURE — 97140 MANUAL THERAPY 1/> REGIONS: CPT

## 2024-09-27 NOTE — PROGRESS NOTES
Daily Note     Today's date: 2024  Patient name: Solis Dos Santos  : 1950  MRN: [id number]  Referring provider: Leonard Gallagher,*  Dx:   Encounter Diagnosis     ICD-10-CM    1. Spinal stenosis of lumbar region without neurogenic claudication  M48.061           Start Time: 09  Stop Time: 1031  Total time in clinic (min): 38 minutes      Subjective: Patient reports his L hip has felt better since Wednesday.      Objective: See treatment diary below      Assessment: Tolerated treatment fairly well. Continued gentle manual hip mobilizations today with improvements in pt's L hip pain and improved internal rotation ROM. Patient would benefit from continued PT to address remaining lumbar deficits in order to maximize overall functional ability.      Plan: Continue per plan of care.      POC expires Unit limit Auth Expiration date PT/OT + Visit Limit?   24 BOMN N/A BOMN                             Visit/Unit Tracking  AUTH Status:  Date  9   No - Medicare Used 10 - RE 11 12 13 14 15 16 17 18 19  20 -RE       Remaining  0 9 8 7 6 5 4 3 2 1 0  9 8 7         Precautions: fall risk        Visit #: 10 11 12 13 14 15 16 17 18 19 20  22   Date:   9   Manuals                         Hip LAD:L                 SFP       Hip lateral distraction                     Lateral / inferior   gII SFP Lateral / inferior   gII SFP                             Neuro Re-Ed                         TrA bracing                         TrA marches                         Curl-up                         Deadbug progressions                         Pball pressdown                                                   Lateral band walk       OTB f' 3x15'   OTB k' 5x15'   LOTB a' 5x15'         Pallof press       MTB c step-out 2x8ea OTB k' + MTB step-out 2x10ea       MTB 2x10ea                      "            Tandem stance                         SLS                                                   Prone femoral nerve glides                         Seated slump gliders                                                   Biodex LOS                         Ther Ex                         Bike   10 min 10 min     10 min 10 min 10 min 10 min 10 min 10 min 10 min 10 min   Treadmill walking 6 min     7 min 8 min               Seated flexion                         LTR                         SKC                    10x10\" 10x10\" 10x10\"   DKC       Manual 3x30\" Manual 3x30\"               Lumbar extension in standing   x20 x20                   Belt self hip mobilization            Inferior / lateral x2\" ea                              Prone quad S           2x45\"ea 2x45\"ea 3x30\"   3x30\"ea 3x30\" ea  3x30\"ea   Harlan S           3x30\"ea 3x30\"ea Manual s/l 3x30\":L         SLR HS S 3x30\"ea   3x30\" ea 3x30\"ea 3x30\"ea 3x30\"ea 3x30\"ea 3x30\"ea   3x30\"ea 3x30\" ea  3x30\"ea   PPU           2x10 2x10                                     Seated  > standing marches 8\" step tap 5# 3x10ea             Standing march x20ea    Supine march x20 ea     Seated > standing DF                         Standing hip Abd / Ext           5# ext 3x10             SLR                         LAQ   MTB x40ea MTB x50 ea   MTB 3x15ea nv Seated 20# x50   MTB + BTB 2x30  MTB 3x15ea     H/L hip abd   MTB 40x5\"     Clamshells MTB 3x10ea MTB 40x5\"      Clamshells MTB 3x10ea               MTB 3x20    Seated hip IR/ER             x30ea   TB eversion                         TB DF   MTB x30 MTB 40x   nv nv MTB 40x Med loop 3x10                                   Leg press         90#   95#  100#  105#  110#  115#      X10 reps ea       95#  100#  105#  110#  115#      X10 reps ea    110#  115#  120#  125#  130#  135#    X10 reps ea   Ther Activity                         TUG                                                   Chair squats                 Wall squats " "c pball 3x10  Wall squats c pball 3x10     Step-ups       5# a' 3x8ea nv 5# a' 3x10       5# a' 3x10ea      Step-overs 4\" 3x10; p! With L SLS                                                 Gait Training                         with SPC                         BOOST TM             70% BW, 4XL, x5min walk                                     Modalities                         MHP                                                           "

## 2024-09-27 NOTE — PATIENT INSTRUCTIONS
Medicare Preventive Visit Patient Instructions  Thank you for completing your Welcome to Medicare Visit or Medicare Annual Wellness Visit today. Your next wellness visit will be due in one year (9/28/2025).  The screening/preventive services that you may require over the next 5-10 years are detailed below. Some tests may not apply to you based off risk factors and/or age. Screening tests ordered at today's visit but not completed yet may show as past due. Also, please note that scanned in results may not display below.  Preventive Screenings:  Service Recommendations Previous Testing/Comments   Colorectal Cancer Screening  Colonoscopy    Fecal Occult Blood Test (FOBT)/Fecal Immunochemical Test (FIT)  Fecal DNA/Cologuard Test  Flexible Sigmoidoscopy Age: 45-75 years old   Colonoscopy: every 10 years (May be performed more frequently if at higher risk)  OR  FOBT/FIT: every 1 year  OR  Cologuard: every 3 years  OR  Sigmoidoscopy: every 5 years  Screening may be recommended earlier than age 45 if at higher risk for colorectal cancer. Also, an individualized decision between you and your healthcare provider will decide whether screening between the ages of 76-85 would be appropriate. Colonoscopy: 02/06/2018  FOBT/FIT: Not on file  Cologuard: Not on file  Sigmoidoscopy: Not on file          Prostate Cancer Screening Individualized decision between patient and health care provider in men between ages of 55-69   Medicare will cover every 12 months beginning on the day after your 50th birthday PSA: 0.8 ng/mL     Screening Not Indicated     Hepatitis C Screening Once for adults born between 1945 and 1965  More frequently in patients at high risk for Hepatitis C Hep C Antibody: 12/03/2021    Screening Current   Diabetes Screening 1-2 times per year if you're at risk for diabetes or have pre-diabetes Fasting glucose: 95 mg/dL (1/15/2024)  A1C: No results in last 5 years (No results in last 5 years)  Screening Current    Cholesterol Screening Once every 5 years if you don't have a lipid disorder. May order more often based on risk factors. Lipid panel: 01/15/2024  Screening Not Indicated  History Lipid Disorder      Other Preventive Screenings Covered by Medicare:  Abdominal Aortic Aneurysm (AAA) Screening: covered once if your at risk. You're considered to be at risk if you have a family history of AAA or a male between the age of 65-75 who smoking at least 100 cigarettes in your lifetime.  Lung Cancer Screening: covers low dose CT scan once per year if you meet all of the following conditions: (1) Age 55-77; (2) No signs or symptoms of lung cancer; (3) Current smoker or have quit smoking within the last 15 years; (4) You have a tobacco smoking history of at least 20 pack years (packs per day x number of years you smoked); (5) You get a written order from a healthcare provider.  Glaucoma Screening: covered annually if you're considered high risk: (1) You have diabetes OR (2) Family history of glaucoma OR (3)  aged 50 and older OR (4)  American aged 65 and older  Osteoporosis Screening: covered every 2 years if you meet one of the following conditions: (1) Have a vertebral abnormality; (2) On glucocorticoid therapy for more than 3 months; (3) Have primary hyperparathyroidism; (4) On osteoporosis medications and need to assess response to drug therapy.  HIV Screening: covered annually if you're between the age of 15-65. Also covered annually if you are younger than 15 and older than 65 with risk factors for HIV infection. For pregnant patients, it is covered up to 3 times per pregnancy.    Immunizations:  Immunization Recommendations   Influenza Vaccine Annual influenza vaccination during flu season is recommended for all persons aged >= 6 months who do not have contraindications   Pneumococcal Vaccine   * Pneumococcal conjugate vaccine = PCV13 (Prevnar 13), PCV15 (Vaxneuvance), PCV20 (Prevnar 20)  *  Pneumococcal polysaccharide vaccine = PPSV23 (Pneumovax) Adults 19-63 yo with certain risk factors or if 65+ yo  If never received any pneumonia vaccine: recommend Prevnar 20 (PCV20)  Give PCV20 if previously received 1 dose of PCV13 or PPSV23   Hepatitis B Vaccine 3 dose series if at intermediate or high risk (ex: diabetes, end stage renal disease, liver disease)   Respiratory syncytial virus (RSV) Vaccine - COVERED BY MEDICARE PART D  * RSVPreF3 (Arexvy) CDC recommends that adults 60 years of age and older may receive a single dose of RSV vaccine using shared clinical decision-making (SCDM)   Tetanus (Td) Vaccine - COST NOT COVERED BY MEDICARE PART B Following completion of primary series, a booster dose should be given every 10 years to maintain immunity against tetanus. Td may also be given as tetanus wound prophylaxis.   Tdap Vaccine - COST NOT COVERED BY MEDICARE PART B Recommended at least once for all adults. For pregnant patients, recommended with each pregnancy.   Shingles Vaccine (Shingrix) - COST NOT COVERED BY MEDICARE PART B  2 shot series recommended in those 19 years and older who have or will have weakened immune systems or those 50 years and older     Health Maintenance Due:      Topic Date Due   • Hepatitis C Screening  Completed   • Colorectal Cancer Screening  Discontinued     Immunizations Due:      Topic Date Due   • Pneumococcal Vaccine: 65+ Years (2 of 2 - PPSV23 or PCV20) 01/18/2017   • Influenza Vaccine (1) 09/01/2024     Advance Directives   What are advance directives?  Advance directives are legal documents that state your wishes and plans for medical care. These plans are made ahead of time in case you lose your ability to make decisions for yourself. Advance directives can apply to any medical decision, such as the treatments you want, and if you want to donate organs.   What are the types of advance directives?  There are many types of advance directives, and each state has rules  about how to use them. You may choose a combination of any of the following:  Living will:  This is a written record of the treatment you want. You can also choose which treatments you do not want, which to limit, and which to stop at a certain time. This includes surgery, medicine, IV fluid, and tube feedings.   Durable power of  for healthcare (DPAHC):  This is a written record that states who you want to make healthcare choices for you when you are unable to make them for yourself. This person, called a proxy, is usually a family member or a friend. You may choose more than 1 proxy.  Do not resuscitate (DNR) order:  A DNR order is used in case your heart stops beating or you stop breathing. It is a request not to have certain forms of treatment, such as CPR. A DNR order may be included in other types of advance directives.  Medical directive:  This covers the care that you want if you are in a coma, near death, or unable to make decisions for yourself. You can list the treatments you want for each condition. Treatment may include pain medicine, surgery, blood transfusions, dialysis, IV or tube feedings, and a ventilator (breathing machine).  Values history:  This document has questions about your views, beliefs, and how you feel and think about life. This information can help others choose the care that you would choose.  Why are advance directives important?  An advance directive helps you control your care. Although spoken wishes may be used, it is better to have your wishes written down. Spoken wishes can be misunderstood, or not followed. Treatments may be given even if you do not want them. An advance directive may make it easier for your family to make difficult choices about your care.   Fall Prevention    Fall prevention  includes ways to make your home and other areas safer. It also includes ways you can move more carefully to prevent a fall. Health conditions that cause changes in your blood  pressure, vision, or muscle strength and coordination may increase your risk for falls. Medicines may also increase your risk for falls if they make you dizzy, weak, or sleepy.   Fall prevention tips:   Stand or sit up slowly.    Use assistive devices as directed.    Wear shoes that fit well and have soles that .    Wear a personal alarm.    Stay active.    Manage your medical conditions.    Home Safety Tips:  Add items to prevent falls in the bathroom.    Keep paths clear.    Install bright lights in your home.    Keep items you use often on shelves within reach.    Paint or place reflective tape on the edges of your stairs.    Weight Management   Why it is important to manage your weight:  Being overweight increases your risk of health conditions such as heart disease, high blood pressure, type 2 diabetes, and certain types of cancer. It can also increase your risk for osteoarthritis, sleep apnea, and other respiratory problems. Aim for a slow, steady weight loss. Even a small amount of weight loss can lower your risk of health problems.  How to lose weight safely:  A safe and healthy way to lose weight is to eat fewer calories and get regular exercise. You can lose up about 1 pound a week by decreasing the number of calories you eat by 500 calories each day.   Healthy meal plan for weight management:  A healthy meal plan includes a variety of foods, contains fewer calories, and helps you stay healthy. A healthy meal plan includes the following:  Eat whole-grain foods more often.  A healthy meal plan should contain fiber. Fiber is the part of grains, fruits, and vegetables that is not broken down by your body. Whole-grain foods are healthy and provide extra fiber in your diet. Some examples of whole-grain foods are whole-wheat breads and pastas, oatmeal, brown rice, and bulgur.  Eat a variety of vegetables every day.  Include dark, leafy greens such as spinach, kale, carla greens, and mustard greens. Eat  yellow and orange vegetables such as carrots, sweet potatoes, and winter squash.   Eat a variety of fruits every day.  Choose fresh or canned fruit (canned in its own juice or light syrup) instead of juice. Fruit juice has very little or no fiber.  Eat low-fat dairy foods.  Drink fat-free (skim) milk or 1% milk. Eat fat-free yogurt and low-fat cottage cheese. Try low-fat cheeses such as mozzarella and other reduced-fat cheeses.  Choose meat and other protein foods that are low in fat.  Choose beans or other legumes such as split peas or lentils. Choose fish, skinless poultry (chicken or turkey), or lean cuts of red meat (beef or pork). Before you cook meat or poultry, cut off any visible fat.   Use less fat and oil.  Try baking foods instead of frying them. Add less fat, such as margarine, sour cream, regular salad dressing and mayonnaise to foods. Eat fewer high-fat foods. Some examples of high-fat foods include french fries, doughnuts, ice cream, and cakes.  Eat fewer sweets.  Limit foods and drinks that are high in sugar. This includes candy, cookies, regular soda, and sweetened drinks.  Exercise:  Exercise at least 30 minutes per day on most days of the week. Some examples of exercise include walking, biking, dancing, and swimming. You can also fit in more physical activity by taking the stairs instead of the elevator or parking farther away from stores. Ask your healthcare provider about the best exercise plan for you.      © Copyright [employer] 2018 Information is for End User's use only and may not be sold, redistributed or otherwise used for commercial purposes. All illustrations and images included in CareNotes® are the copyrighted property of [employer].A[employer]M., Inc. or [employer]

## 2024-09-27 NOTE — PROGRESS NOTES
Ambulatory Visit  Name: Solis Dos Santos      : 1950      MRN: [id number]  Encounter Provider: Riki Noguera MD  Encounter Date: 2024   Encounter department: [facility] INTERNAL MEDICINE    Assessment & Plan  Acquired hypothyroidism    Orders:    T4, free; Future    TSH, 3rd generation; Future    Primary osteoarthritis of both knees         Obesity, morbid (HCC)         Lumbar radiculopathy         Medicare annual wellness visit, subsequent         Osteoarthrosis, hip         Mixed hyperlipidemia            1. Acquired hypothyroidism  T4, free    TSH, 3rd generation    Continue levothyroxine.      2. Primary osteoarthritis of both knees        3. Obesity, morbid (HCC)      High-fiber low calorie diet advised.      4. Lumbar radiculopathy      Tylenol and small amounts of NSAID advised      5. Medicare annual wellness visit, subsequent        6. Osteoarthrosis, hip        7. Mixed hyperlipidemia      Stable continue atorvastatin.         Preventive health issues were discussed with patient, and age appropriate screening tests were ordered as noted in patient's After Visit Summary. Personalized health advice and appropriate referrals for health education or preventive services given if needed, as noted in patient's After Visit Summary.    History of Present Illness     HPI   Patient Care Team:  Riki Noguera MD as PCP - General (Internal Medicine)  MD Day Samaniego, RN as Nurse Navigator (Orthopedics)    Review of Systems   Constitutional:  Negative for appetite change, chills, fatigue and fever.   HENT:  Negative for sore throat and trouble swallowing.    Eyes:  Negative for redness.   Respiratory:  Negative for shortness of breath.    Cardiovascular:  Negative for chest pain and palpitations.   Gastrointestinal:  Negative for abdominal pain, constipation and diarrhea.   Genitourinary:  Negative for dysuria and hematuria.   Musculoskeletal:  Positive for arthralgias,  back pain and gait problem. Negative for neck pain.   Skin:  Negative for rash.   Neurological:  Negative for seizures, weakness and headaches.   Hematological:  Negative for adenopathy.   Psychiatric/Behavioral:  Negative for confusion. The patient is not nervous/anxious.      Medical History Reviewed by provider this encounter:  Tobacco  Allergies  Meds  Problems  Med Hx  Surg Hx  Fam Hx       Annual Wellness Visit Questionnaire   Solis is here for his Subsequent Wellness visit. Last Medicare Wellness visit information reviewed, patient interviewed and updates made to the record today.      Health Risk Assessment:   Patient rates overall health as good. Patient feels that their physical health rating is same. Patient is satisfied with their life. Eyesight was rated as same. Hearing was rated as same. Patient feels that their emotional and mental health rating is same. Patients states they are sometimes angry. Patient states they are sometimes unusually tired/fatigued. Pain experienced in the last 7 days has been some. Patient's pain rating has been 3/10. Patient states that he has experienced no weight loss or gain in last 6 months.     Depression Screening:   PHQ-2 Score: 0      Fall Risk Screening:   In the past year, patient has experienced: history of falling in past year      Home Safety:  Patient has trouble with stairs inside or outside of their home. Patient has working smoke alarms and has no working carbon monoxide detector. Home safety hazards include: none.     Nutrition:   Current diet is Regular.     Medications:   Patient is currently taking over-the-counter supplements. OTC medications include: see medication list. Patient is able to manage medications.     Activities of Daily Living (ADLs)/Instrumental Activities of Daily Living (IADLs):   Walk and transfer into and out of bed and chair?: Yes  Dress and groom yourself?: Yes    Bathe or shower yourself?: Yes    Feed yourself? Yes  Do your  laundry/housekeeping?: Yes  Manage your money, pay your bills and track your expenses?: Yes  Make your own meals?: Yes    Do your own shopping?: Yes    Previous Hospitalizations:   Any hospitalizations or ED visits within the last 12 months?: No      Advance Care Planning:   Living will: Yes    Durable POA for healthcare: Yes    Advanced directive: Yes      PREVENTIVE SCREENINGS      Cardiovascular Screening:    General: Screening Not Indicated and History Lipid Disorder      Diabetes Screening:     General: Screening Current      Prostate Cancer Screening:    General: Screening Not Indicated      Abdominal Aortic Aneurysm (AAA) Screening:    Risk factors include: tobacco use        Lung Cancer Screening:     General: Screening Not Indicated      Hepatitis C Screening:    General: Screening Current    Screening, Brief Intervention, and Referral to Treatment (SBIRT)    Screening  Typical number of drinks in a day: 0  Typical number of drinks in a week: 0  Interpretation: Low risk drinking behavior.    AUDIT-C Screenin) How often did you have a drink containing alcohol in the past year? monthly or less  2) How many drinks did you have on a typical day when you were drinking in the past year? 1 to 2  3) How often did you have 6 or more drinks on one occasion in the past year? never    AUDIT-C Score: 1  Interpretation: Score 0-3 (male): Negative screen for alcohol misuse    Single Item Drug Screening:  How often have you used an illegal drug (including marijuana) or a prescription medication for non-medical reasons in the past year? never    Single Item Drug Screen Score: 0  Interpretation: Negative screen for possible drug use disorder    Social Determinants of Health     Financial Resource Strain: Low Risk  (9/15/2023)    Overall Financial Resource Strain (CARDIA)     Difficulty of Paying Living Expenses: Not hard at all   Food Insecurity: No Food Insecurity (2024)    Hunger Vital Sign     Worried About  "Running Out of Food in the Last Year: Never true     Ran Out of Food in the Last Year: Never true   Transportation Needs: No Transportation Needs (9/20/2024)    PRAPARE - Transportation     Lack of Transportation (Medical): No     Lack of Transportation (Non-Medical): No   Housing Stability: Low Risk  (9/20/2024)    Housing Stability Vital Sign     Unable to Pay for Housing in the Last Year: No     Number of Times Moved in the Last Year: 0     Homeless in the Last Year: No   Utilities: Not At Risk (9/20/2024)    [facility] Utilities     Threatened with loss of utilities: No     No results found.    Objective     /84 (BP Location: Left arm, Patient Position: Sitting, Cuff Size: Standard)   Pulse 72   Temp 98 °F (36.7 °C) (Temporal)   Ht 6' 1\" (1.854 m)   Wt 125 kg (276 lb)   SpO2 96%   BMI 36.41 kg/m²     Physical Exam  Vitals and nursing note reviewed.   Constitutional:       General: He is not in acute distress.     Appearance: He is well-developed. He is obese.   HENT:      Head: Normocephalic and atraumatic.   Eyes:      Conjunctiva/sclera: Conjunctivae normal.   Cardiovascular:      Rate and Rhythm: Normal rate and regular rhythm.      Heart sounds: No murmur heard.  Pulmonary:      Effort: Pulmonary effort is normal. No respiratory distress.      Breath sounds: Normal breath sounds.   Abdominal:      Palpations: Abdomen is soft.      Tenderness: There is no abdominal tenderness.   Musculoskeletal:         General: No swelling.      Cervical back: Neck supple.   Skin:     General: Skin is warm and dry.      Capillary Refill: Capillary refill takes less than 2 seconds.   Neurological:      Mental Status: He is alert.      Gait: Gait abnormal.      Comments: Poor balance   Psychiatric:         Mood and Affect: Mood normal.         "

## 2024-09-30 ENCOUNTER — OFFICE VISIT (OUTPATIENT)
Dept: PHYSICAL THERAPY | Facility: OTHER | Age: 74
End: 2024-09-30
Payer: MEDICARE

## 2024-09-30 DIAGNOSIS — M48.061 SPINAL STENOSIS OF LUMBAR REGION WITHOUT NEUROGENIC CLAUDICATION: Primary | ICD-10-CM

## 2024-09-30 PROCEDURE — 97140 MANUAL THERAPY 1/> REGIONS: CPT

## 2024-09-30 PROCEDURE — 97110 THERAPEUTIC EXERCISES: CPT

## 2024-09-30 NOTE — PROGRESS NOTES
"Daily Note     Today's date: 2024  Patient name: Solis Dos Santos  : 1950  MRN: [id number]  Referring provider: Leonard Gallagher,*  Dx:   Encounter Diagnosis     ICD-10-CM    1. Spinal stenosis of lumbar region without neurogenic claudication  M48.061             Start Time: 939  Stop Time: 1017  Total time in clinic (min): 38 minutes      Subjective: \"I would be happy if I could have one thousand days like today.\"      Objective: See treatment diary below      Assessment: Tolerated treatment fairly well. Pt ambulating into session without SPC today, states he woke up with improvements in L hip pain attributed to lateral / inferior hip mobilizations. Noted increased L hip IR PROM today. Patient would benefit from continued PT to address remaining lumbar deficits in order to maximize overall functional ability.      Plan: Continue per plan of care.      POC expires Unit limit Auth Expiration date PT/OT + Visit Limit?   24 BOMN N/A BOMN                             Visit/Unit Tracking  AUTH Status:  Date  9 9/6    No - Medicare Used 10 - RE 11 12 13 14 15 16 17 18 19   -RE       Remaining  0 9 8 7 6 5 4 3 2 1 0  9 8 7 6         Precautions: fall risk        Visit #: 10 11 12 13 14 15 17 18 19 20    Date:   9   Manuals                          Hip LAD:L                 SFP        Hip lateral distraction                     Lateral / inferior   gII SFP Lateral / inferior   gII SFP Lateral / inferior   gII SFP                              Neuro Re-Ed                          TrA bracing                          TrA marches                          Curl-up                          Deadbug progressions                          Pball pressdown                                                     Lateral band walk       OTB f' 3x15'   OTB k' 5x15'  " " LOTB a' 5x15'          Pallof press       MTB c step-out 2x8ea OTB k' + MTB step-out 2x10ea       MTB 2x10ea                                   Tandem stance                          SLS                                                     Prone femoral nerve glides                          Seated slump gliders                                                     Biodex LOS                          Ther Ex                          Bike   10 min 10 min     10 min 10 min 10 min 10 min 10 min 10 min 10 min 10 min 10 min   Treadmill walking 6 min     7 min 8 min                Seated flexion                          LTR                          SKC                    10x10\" 10x10\" 10x10\"    DKC       Manual 3x30\" Manual 3x30\"                Lumbar extension in standing   x20 x20                    Belt self hip mobilization            Inferior / lateral x2\" ea  Supine band mob figure-4   X2 min ea                              Prone quad S           2x45\"ea 2x45\"ea 3x30\"   3x30\"ea 3x30\" ea  3x30\"ea    Harlan S           3x30\"ea 3x30\"ea Manual s/l 3x30\":L          SLR HS S 3x30\"ea   3x30\" ea 3x30\"ea 3x30\"ea 3x30\"ea 3x30\"ea 3x30\"ea   3x30\"ea 3x30\" ea  3x30\"ea    PPU           2x10 2x10                                       Seated  > standing marches 8\" step tap 5# 3x10ea             Standing march x20ea    Supine march x20 ea   Standing marches LOTB 3x10ea   Seated > standing DF                          Standing hip Abd / Ext           5# ext 3x10              SLR                          LAQ   MTB x40ea MTB x50 ea   MTB 3x15ea nv Seated 20# x50   MTB + BTB 2x30  MTB 3x15ea      H/L hip abd   MTB 40x5\"     Clamshells MTB 3x10ea MTB 40x5\"      Clamshells MTB 3x10ea               MTB 3x20     Seated hip IR/ER             x30ea LOTB 2x12ea   TB eversion                          TB DF   MTB x30 MTB 40x   nv nv MTB 40x Med loop 3x10                                     Leg press         90#   95#  100#  105#  110#  115#      X10 " "reps ea       95#  100#  105#  110#  115#      X10 reps ea    110#  115#  120#  125#  130#  135#    X10 reps ea    Ther Activity                          TUG                                                     Chair squats                 Wall squats c pball 3x10  Wall squats c pball 3x10      Step-ups       5# a' 3x8ea nv 5# a' 3x10       5# a' 3x10ea       Step-overs 4\" 3x10; p! With L SLS                                                   Gait Training                          with SPC                          BOOST TM             70% BW, 4XL, x5min walk                                       Modalities                          [facility]                                                             "

## 2024-10-01 ENCOUNTER — OFFICE VISIT (OUTPATIENT)
Dept: OBGYN CLINIC | Facility: HOSPITAL | Age: 74
End: 2024-10-01
Payer: MEDICARE

## 2024-10-01 ENCOUNTER — HOSPITAL ENCOUNTER (OUTPATIENT)
Dept: RADIOLOGY | Facility: HOSPITAL | Age: 74
Discharge: HOME | End: 2024-10-01
Attending: ORTHOPAEDIC SURGERY
Payer: MEDICARE

## 2024-10-01 VITALS
DIASTOLIC BLOOD PRESSURE: 98 MMHG | BODY MASS INDEX: 37.11 KG/M2 | HEART RATE: 80 BPM | HEIGHT: 73 IN | WEIGHT: 280 LBS | SYSTOLIC BLOOD PRESSURE: 154 MMHG

## 2024-10-01 DIAGNOSIS — Z01.812 PRE-OPERATIVE LABORATORY EXAMINATION: ICD-10-CM

## 2024-10-01 DIAGNOSIS — Z96.642 AFTERCARE FOLLOWING LEFT HIP JOINT REPLACEMENT SURGERY: ICD-10-CM

## 2024-10-01 DIAGNOSIS — Z47.1 AFTERCARE FOLLOWING LEFT HIP JOINT REPLACEMENT SURGERY: ICD-10-CM

## 2024-10-01 DIAGNOSIS — M25.552 CHRONIC LEFT HIP PAIN: ICD-10-CM

## 2024-10-01 DIAGNOSIS — M16.12 PRIMARY OSTEOARTHRITIS OF LEFT HIP: Primary | ICD-10-CM

## 2024-10-01 DIAGNOSIS — Z01.810 PRE-OPERATIVE CARDIOVASCULAR EXAMINATION: ICD-10-CM

## 2024-10-01 DIAGNOSIS — G89.29 CHRONIC LEFT HIP PAIN: ICD-10-CM

## 2024-10-01 DIAGNOSIS — Z79.01 ANTICOAGULATION MANAGEMENT ENCOUNTER: ICD-10-CM

## 2024-10-01 DIAGNOSIS — Z51.81 ANTICOAGULATION MANAGEMENT ENCOUNTER: ICD-10-CM

## 2024-10-01 DIAGNOSIS — M25.552 PAIN IN LEFT HIP: ICD-10-CM

## 2024-10-01 PROCEDURE — 73502 X-RAY EXAM HIP UNI 2-3 VIEWS: CPT

## 2024-10-01 PROCEDURE — 99214 OFFICE O/P EST MOD 30 MIN: CPT | Performed by: ORTHOPAEDIC SURGERY

## 2024-10-01 RX ORDER — ENOXAPARIN SODIUM 100 MG/ML
40 INJECTION SUBCUTANEOUS DAILY
Qty: 11.2 ML | Refills: 0 | Status: SHIPPED | OUTPATIENT
Start: 2024-10-01 | End: 2024-10-29

## 2024-10-01 RX ORDER — ASCORBIC ACID 500 MG
500 TABLET ORAL 2 TIMES DAILY
Qty: 60 TABLET | Refills: 0 | Status: SHIPPED | OUTPATIENT
Start: 2024-10-01

## 2024-10-01 RX ORDER — TRANEXAMIC ACID 10 MG/ML
1000 INJECTION, SOLUTION INTRAVENOUS ONCE
OUTPATIENT
Start: 2024-10-01 | End: 2024-10-01

## 2024-10-01 RX ORDER — FERROUS SULFATE 324(65)MG
TABLET, DELAYED RELEASE (ENTERIC COATED) ORAL
Qty: 30 TABLET | Refills: 0 | Status: SHIPPED | OUTPATIENT
Start: 2024-10-01

## 2024-10-01 RX ORDER — CHLORHEXIDINE GLUCONATE ORAL RINSE 1.2 MG/ML
15 SOLUTION DENTAL ONCE
OUTPATIENT
Start: 2024-10-01 | End: 2024-10-01

## 2024-10-01 RX ORDER — FOLIC ACID 1 MG/1
1 TABLET ORAL DAILY
Qty: 30 TABLET | Refills: 0 | Status: SHIPPED | OUTPATIENT
Start: 2024-10-01

## 2024-10-01 RX ORDER — CEFAZOLIN SODIUM 2 G/50ML
2000 SOLUTION INTRAVENOUS ONCE
OUTPATIENT
Start: 2024-10-01 | End: 2024-10-01

## 2024-10-01 NOTE — PROGRESS NOTES
Assessment:   Diagnosis ICD-10-CM Associated Orders   1. Primary osteoarthritis of left hip  M16.12 Diet NPO; Sips with meds     Nursing Communication Warmimg Interventions Implemented     Nursing Communication CHG bath, have staff wash entire body (neck down) per pre-op bathing protocol. Routine, evening prior to, and day of surgery.     Nursing Communication Swab both nares with Povidone-Iodine solution, EXCLUDE if patient has shellfish/Iodine allergy, and replace with nasal alcohol swabstick. Routine, day of surgery, on call to OR     Void on call to OR     Insert peripheral IV     Ambulatory referral to Family Practice     Ambulatory referral to Physical Therapy     Place sequential compression device     Case request operating room: Left total hip arthroplasty     Case request operating room: Left total hip arthroplasty      2. Chronic left hip pain  M25.552 Diet NPO; Sips with meds    G89.29 Nursing Communication Warmimg Interventions Implemented     Nursing Communication CHG bath, have staff wash entire body (neck down) per pre-op bathing protocol. Routine, evening prior to, and day of surgery.     Nursing Communication Swab both nares with Povidone-Iodine solution, EXCLUDE if patient has shellfish/Iodine allergy, and replace with nasal alcohol swabstick. Routine, day of surgery, on call to OR     Void on call to OR     Insert peripheral IV     [facility] referral to [facility]     Ambulatory referral to Physical Therapy     Place sequential compression device     Case request operating room: Left total hip arthroplasty     Case request operating room: Left total hip arthroplasty      3. Pre-operative laboratory examination  Z01.812 Comprehensive metabolic panel     Hemoglobin A1C W/EAG Estimation     CBC and differential     Anemia Panel w/Reflex     Protime-INR     APTT     Type and screen      4. Pre-operative cardiovascular examination  Z01.810 EKG 12 lead      5. Aftercare following left hip joint  replacement surgery  Z47.1 Ambulatory referral to Physical Therapy    Z96.642       6. Anticoagulation management encounter  Z51.81 CBC and differential    Z79.01 Protime-INR     APTT          Plan:  Diagnostics reviewed and physical exam performed.  Diagnosis, treatment options and associated risks were discussed with the patient including no treatment, nonsurgical treatment and potential for surgical intervention.  The patient was given the opportunity to ask questions regarding each.  Quality of life decision pursue elective left total hip arthroplasty.  Risks and benefits of left total hip arthroplasty were discussed.  Consents obtained.  Preoperative vitamins and postoperative Lovenox sent to his pharmacy of record.    To do next visit:  Return for post-op with x-rays upon arrival left hip and pelvis.    The above stated was discussed in layman's terms and the patient expressed understanding.  All questions were answered to the patient's satisfaction.       Scribe Attestation      I,:  Caio Hickey am acting as a scribe while in the presence of the attending physician.:       I,:  Leonard Gallagher MD personally performed the services described in this documentation    as scribed in my presence.:               Subjective:   Solis Dos Santos is a 74 y.o. male who presents today for repeat evaluation of his left hip due to return of pain.  He points into his groin in a C-shaped fashion in the area of his pain.  His pain increases with rotatory motions of his left lower extremity.  He had a successful recovery from a right total hip arthroplasty 5 years ago.  He uses a cane for ambulatory assistance as it pain at his left hip feels as if it wants to give out.  He was treating with Dr. Barfield for lumbar stenosis which improved with therapy and anti-inflammatories.  He finds that his left hip symptoms are severe and unrelenting and impede on his quality of life.      Review of systems negative unless  otherwise specified in HPI  Review of Systems    Past Medical History:   Diagnosis Date    Acute blood loss anemia 08/09/2019    Aftercare following right hip joint replacement surgery 11/05/2019    Anxiety disorder     Atherosclerotic heart disease of native coronary artery without angina pectoris     Benign prostatic hyperplasia without lower urinary tract symptoms     Cancer (HCC)     bladder    Clotting disorder (HCC) 1/25/2023    Blood in urine    Disease of thyroid gland     hypo    Dyslipidemia     GERD (gastroesophageal reflux disease)     Hypertension     Impaired fasting blood sugar     Kidney stone     Low back pain     Obesity     Osteoarthritis     last assesed 6-6-16    Other chest pain     Paresthesia of skin     Polyneuropathy     last assesed 5-8-17    Pure hypercholesterolemia     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)     Suspected UTI 03/09/2023    Urinary tract infection     Wears glasses        Past Surgical History:   Procedure Laterality Date    BACK SURGERY      CHOLECYSTECTOMY      COLONOSCOPY  02/06/2019    CYSTOSCOPY N/A 04/26/2023    Procedure: CYSTOSCOPY w/ bladder biopsy;  Surgeon: Geo Bentley MD;  Location: [facility];  Service: Urology    HIP SURGERY  August 2019    Replacement    JOINT REPLACEMENT  August 2019    Hip replacement    NM ARTHRP ACETBLR/PROX FEM PROSTC AGRFT/ALGRFT Right 08/05/2019    Procedure: ARTHROPLASTY HIP TOTAL;  Surgeon: Leonard Gallagher MD;  Location: [facility];  Service: Orthopedics    NM CYSTO W/REMOVAL OF LESIONS SMALL N/A 03/23/2023    Procedure: TRANSURETHRAL RESECTION OF BLADDER TUMOR (TURBT), mitomycin ;  Surgeon: Geo Bentley MD;  Location: [facility];  Service: Urology    NM CYSTO W/REMOVAL OF LESIONS SMALL N/A 04/26/2023    Procedure: TRANSURETHRAL RESECTION OF BLADDER TUMOR (TURBT), cystoscopy with bladder biopsy;  Surgeon: Geo Bentley MD;  Location: [facility];  Service: Urology     TONSILLECTOMY         Family History   Problem Relation Age of Onset    Arthritis Other     Heart disease Father     Arthritis Mother        Social History     Occupational History    Not on file   Tobacco Use    Smoking status: Former     Current packs/day: 0.00     Average packs/day: 1 pack/day for 20.8 years (20.8 ttl pk-yrs)     Types: Cigarettes     Start date: 1967     Quit date: 1987     Years since quittin.9    Smokeless tobacco: Never   Vaping Use    Vaping status: Never Used   Substance and Sexual Activity    Alcohol use: Not Currently    Drug use: Not Currently    Sexual activity: Not Currently     Partners: Female     Birth control/protection: None         Current Outpatient Medications:     atorvastatin (LIPITOR) 10 mg tablet, Take 1 tablet (10 mg total) by mouth daily at bedtime, Disp: 90 tablet, Rfl: 1    cholecalciferol (VITAMIN D3) 1,000 units tablet, Take 1 tablet (1,000 Units total) by mouth daily, Disp: 90 tablet, Rfl: 3    ciclopirox (LOPROX) 0.77 % cream, , Disp: , Rfl:     cyanocobalamin (VITAMIN B-12) 500 MCG tablet, Take 1 tablet (500 mcg total) by mouth daily, Disp: 100 tablet, Rfl: 3    hydrocortisone 2.5 % cream, APPLY TO SKIN TWICE DAILY FOR TWO WEEKS, THEN AS NEEDED, Disp: , Rfl:     levothyroxine 150 mcg tablet, Take 1 tablet (150 mcg total) by mouth daily, Disp: 90 tablet, Rfl: 1    Naproxen Sodium (Aleve) 220 MG CAPS, Take by mouth, Disp: , Rfl:     tamsulosin (FLOMAX) 0.4 mg, Take 1 capsule (0.4 mg total) by mouth daily with dinner, Disp: 90 capsule, Rfl: 0    No Known Allergies         Vitals:    10/01/24 1547   BP: 154/98   Pulse: 80       Body mass index is 36.94 kg/m².  Wt Readings from Last 3 Encounters:   10/01/24 127 kg (280 lb)   24 125 kg (276 lb)   24 127 kg (281 lb)       Objective:                    Left Hip Exam     Range of Motion   Flexion:  80 (leg goes into ER)   External rotation:  20 (with pain and restriction)   Left hip internal  "rotation: restricted due to pain and guarding.     Muscle Strength   The patient has normal left hip strength.     Other   Erythema: absent  Sensation: normal    Comments:    Pain with passive hip flexion and forced IR            Diagnostics, reviewed and taken today if performed as documented:    The attending physician has personally reviewed the pertinent films in PACS and interpretation is as follows:    Left hip and pelvis x-rays taken and reviewed in the office today and show:  Advanced left hip degenerative changes with deformity of his left hip with flattening of his superior femoral head. Subchondral sclerosis and cystic formation as well osteophyte formation present. well aligned right total hip prosthesis without signs of loosening or stress shielding.  Limited visualization of his lumbar spine which does show multiple levels of degenerative changes      Procedures, if performed today:    Procedures    None performed      Portions of the record may have been created with voice recognition software.  Occasional wrong word or \"sound a like\" substitutions may have occurred due to the inherent limitations of voice recognition software.  Read the chart carefully and recognize, using context, where substitutions have occurred.  "

## 2024-10-02 ENCOUNTER — APPOINTMENT (OUTPATIENT)
Dept: PHYSICAL THERAPY | Facility: OTHER | Age: 74
End: 2024-10-02
Payer: MEDICARE

## 2024-10-04 ENCOUNTER — OFFICE VISIT (OUTPATIENT)
Dept: PHYSICAL THERAPY | Facility: OTHER | Age: 74
End: 2024-10-04
Payer: MEDICARE

## 2024-10-04 ENCOUNTER — TELEPHONE (OUTPATIENT)
Dept: OBGYN CLINIC | Facility: HOSPITAL | Age: 74
End: 2024-10-04

## 2024-10-04 ENCOUNTER — APPOINTMENT (OUTPATIENT)
Dept: LAB | Age: 74
End: 2024-10-04
Payer: MEDICARE

## 2024-10-04 DIAGNOSIS — E03.9 ACQUIRED HYPOTHYROIDISM: ICD-10-CM

## 2024-10-04 DIAGNOSIS — M48.061 SPINAL STENOSIS OF LUMBAR REGION WITHOUT NEUROGENIC CLAUDICATION: Primary | ICD-10-CM

## 2024-10-04 LAB
T4 FREE SERPL-MCNC: 1.56 NG/DL (ref 0.61–1.12)
TSH SERPL DL<=0.05 MIU/L-ACNC: 1.17 UIU/ML (ref 0.45–4.5)

## 2024-10-04 PROCEDURE — 36415 COLL VENOUS BLD VENIPUNCTURE: CPT

## 2024-10-04 PROCEDURE — 84443 ASSAY THYROID STIM HORMONE: CPT

## 2024-10-04 PROCEDURE — 97110 THERAPEUTIC EXERCISES: CPT

## 2024-10-04 PROCEDURE — 97140 MANUAL THERAPY 1/> REGIONS: CPT

## 2024-10-04 PROCEDURE — 84439 ASSAY OF FREE THYROXINE: CPT

## 2024-10-04 NOTE — TELEPHONE ENCOUNTER
"Preoperative Elective Admission Assessment    Living Situation:    Who does pt live with: spouse  What kind of home: single level, ranch   How many levels in home: 1   # of steps to enter home: 1  Sleeping arrangement: first/entry floor  Where is Bathroom: first floor   Where is the tub or shower: first floor, walk in shower w/ grab bars      First Floor Setup:   Is there a bathroom: Yes  Where would pt sleep: bed     DME:  Pt has cane, RW, and shower seat.  Pt reports he has \"higher comfort toilet seats w/ hand bar device. Discussed posterior hip precautions. Pt states he used this set up for prior PEGGY 2019. No DME ordered. Instructed pt to bring RW on DOS, verbalizes understanding.    We discussed clearing pathways in the home and making sure there is accessibly to use the walker, for example, removing throw rugs.      Patient's Current Level of Function: Ambulates with walker, Ambulates with cane, and ADLs: Independent    Post-op Caregiver: spouse  Caregiver Name and phone number for Inpatient discharge needs: Aniya Dos Santos (Spouse)  [phone] (Home Phone)   Currently receive any HHC/aides/community supports: No     Post-op Transport:  spouse and/or brother in-law  To/from hospital: spouse and brother in-law  To/from PT 2-3x/week: spouse  Uses community transport now: No     Outpatient Physical Therapy Site:   Site: [facility]  pre and post-op appts scheduled? No, currently attending. Will route for pre/post appointments     Medication Management: self  Preferred Pharmacy for Post-op Medications: [address] [address] [place] [address] [[id number]]   Blood Management Vitamin Regimen:  Pt confirms he has preop vitamins at home and will begin as instructed.   Post-op anticoagulant: prescribed preoperatively - patient has at home ready for after surgery use only      DC Plan: Pt plans to be discharged home    Barriers to DC identified preoperatively: none identified    BMI: 36.94    Patient " Education:  Pt educated on post-op pain, early mobilization (POD0), LOS goals, OP PT goals, and preoperative bathing. Patient educated that our goal is to appropriately discharge patient based off their post-op function while striving to maintain maximal independence. The goal is to discharge patient to home and for them to attend outpatient physical therapy.    Assigned to care team? Yes

## 2024-10-04 NOTE — PROGRESS NOTES
"Daily Note     Today's date: 10/4/2024  Patient name: Solis Dos Santos  : 1950  MRN: [id number]  Referring provider: Leonard Gallagher,*  Dx:   Encounter Diagnosis     ICD-10-CM    1. Spinal stenosis of lumbar region without neurogenic claudication  M48.061             Start Time: 1046  Stop Time: 1139  Total time in clinic (min): 53 minutes      Subjective: Pt had visit with orthopedics on 10/1/24 for continued L hip pain and is now scheduled for L PEGGY on 24.      Objective: See treatment diary below      Assessment: Tolerated treatment well focused on L hip strengthening for upcoming L PEGGY on 24. Patient would benefit from continued PT to address remaining lumbar deficits in order to maximize overall functional ability.      Plan: Continue per plan of care.      POC expires Unit limit Auth Expiration date PT/OT + Visit Limit?   24 BOMN N/A BOMN                             Visit/Unit Tracking  AUTH Status:  Date 8/21 8/23 8/26 8/28 8/30 9/4 9/6 9/11 9/13 9/16  9/19  9/23 9/25 9/27 9/30 10/4   No - Medicare Used 10 - RE 11 12 13 14 15 16 17 18 19  20 -RE   25     Remaining  0 9 8 7 6 5 4 3 2 1 0  9 8 7 6 5         Precautions: fall risk        Visit #: 15 17 18 19 20  24 25   Date:  9/4 9/11 9/13 9/16 9/19 9/23 9/25 9/27 9/30 10/4   Manuals                 Hip LAD:L       SFP         Hip lateral distraction           Lateral / inferior   gII SFP Lateral / inferior   gII SFP Lateral / inferior   gII SFP Lateral / inferior   gII SFP                     Neuro Re-Ed                 TrA bracing                 TrA marches                 Curl-up                 Deadbug progressions                 Pball pressdown                                  SLR          Flex 2x10   S/L Clamshells          MTB 10x5\"   Lateral band walk OTB k' 5x15'   LOTB a' 5x15'           Pallof press       MTB 2x10ea                           Tandem stance                 SLS                           " "        Prone femoral nerve glides                 Seated slump gliders                                   Biodex LOS                 Ther Ex                 Bike 10 min 10 min 10 min 10 min 10 min 10 min 10 min 10 min 10 min 10 min   Treadmill walking                 Seated flexion                 LTR                 SKC          10x10\" 10x10\" 10x10\"     DKC                 Lumbar extension in standing                 Belt self hip mobilization       Inferior / lateral x2\" ea  Supine band mob figure-4   X2 min ea                      Prone quad S 2x45\"ea 2x45\"ea 3x30\"   3x30\"ea 3x30\" ea  3x30\"ea     Harlan S 3x30\"ea 3x30\"ea Manual s/l 3x30\":L           SLR HS S 3x30\"ea 3x30\"ea 3x30\"ea   3x30\"ea 3x30\" ea  3x30\"ea     PPU 2x10 2x10                               Seated  > standing marches     Standing march x20ea    Supine march x20 ea   Standing marches LOTB 3x10ea    Seated > standing DF                 Standing hip Abd / Ext 5# ext 3x10            LOTB 2x10   LAQ nv Seated 20# x50   MTB + BTB 2x30  MTB 3x15ea       H/L hip abd           MTB 3x20      Seated hip IR/ER        x30ea LOTB 2x12ea LOTB 2x12ea   TB eversion                 TB DF nv MTB 40x Med loop 3x10                             Leg press       95#  100#  105#  110#  115#      X10 reps ea    110#  115#  120#  125#  130#  135#    X10 reps ea     Ther Activity                 TUG                                   Chair squats       Wall squats c pball 3x10  Wall squats c pball 3x10       Step-ups 5# a' 3x10       5# a' 3x10ea        Step-overs                                   Gait Training                 with SPC                 BOOST TM   70% BW, 4XL, x5min walk                               Modalities                 M[facility]                                           "

## 2024-10-07 PROBLEM — M16.12 PRIMARY OSTEOARTHRITIS OF LEFT HIP: Status: ACTIVE | Noted: 2024-10-07

## 2024-10-07 PROBLEM — K57.30 DIVERTICULA OF INTESTINE: Status: ACTIVE | Noted: 2024-10-07

## 2024-10-07 PROBLEM — K21.9 GASTROESOPHAGEAL REFLUX DISEASE: Status: ACTIVE | Noted: 2024-10-07

## 2024-10-07 NOTE — PROGRESS NOTES
Internal Medicine Pre-Operative Evaluation:     Reason for Visit: Pre-operative Evaluation for Risk Stratification and Optimization    Patient ID: Solis Dos Santos is a 74 y.o. male.     Surgery: Arthroplasty of left Hip  Referring Provider: Dr Gallagher      Recommendations to Proceed withSurgery    Patient is considered to be Low risk for Medium risk procedure.     After evaluation and discussion with patient with emphasis that all surgery has some degree of inherent risk it is acknowledged by patient this risk is Acceptable.    Patient is optimized and may proceed with planned procedure.     Assessment    Pre-operative Medical Evaluation for planned surgery  Recommendations as listed in PLAN section below  Contact surgical nurse  navigator with any questions regarding preoperative plan or schedule.      Assessment & Plan  Primary osteoarthritis of left hip  Failed outpatient conservative measures  Electing to undergo arthroplasty    Malignant neoplasm of lateral wall of urinary bladder (HCC)    Obesity, morbid (HCC)  Recommend ongoing attempts at weight loss  Current BMI meets criteria of <40 per [facility] preoperative qualifications    Chronic left hip pain    Preoperative clearance             Plan:     1. Further preoperative workup as follows:   - none no further testing required may proceed with surgery    2. Preoperative Medication Management Review performed by PAT nursing  NO scheduled for next wk    3. Patient requires further consultation with:   No Consults Required    4. Discharge Planning / Barriers to Discharge  none identified - patients has post discharge therapy plan in place, transportation arranged for discharge day, adequate family support at home to assist with discharge to home.        Subjective:           History of Present Illness:     Solis Dos Santos is a 74 y.o. male who presents to the office today for a preoperative consultation at the request of surgeon. The patient understands this  is an elective procedure and not emergent. They are electing to undergo planned procedure with an understanding that all surgery has inherent risk. They have worked with their surgeon and failed conservative treatment measures. Today they present for preoperative risk assessment and recommendations for optimization in preparation for surgery.    Pt seen in surgical optimization center for upcoming proposed surgery. They have failed previous conservative measures and have elected surgical intervention.     Pt meets presurgical lab and BMI optimization goals.    Pt had a full cardiac work up done earlier this year that was benign. He was referred for a sleep study d/t probably ANNA.     He also has chronic back pan and is seen by Dr. Carolyne Dos Santos has an IN HOSPITAL cardiac risk of RCI RISK CLASS I (0 risk factors, risk of major cardiac compl. appr. 0.5%) based on RCRI calculator    Cardiac Risk Estimation: per the Revised Cardiac Risk Index (Circ. 100:1043, 1999),         Pre-op Exam    Previous history of bleeding disorders or clots?: No  Previous Anesthesia reaction?: No  Prolonged steroid use in the last 6 months?: No    Assessment of Cardiac Risk:   - Unstable or severe angina or MI in the last 6 weeks or history of stent placement in the last year?: No   - Decompensated heart failure (e.g. New onset heart failure, NYHA  Class IV heart failure, or worsening existing heart failure)?: No  - Significant arrhythmias such as high grade AV block, symptomatic ventricular arrhythmia, newly recognized ventricular tachycardia, supraventricular tachycardia with resting heart rate >100, or symptomatic bradycardia?: No  - Severe heart valve disease including aortic stenosis or symptomatic mitral stenosis?: No      Pre-operative Risk Factors:  Elevated-risk surgery: No    History of cerebrovascular disease: No    History of ischemic heart disease: No  Pre-operative treatment with insulin: No  Pre-operative  "creatinine >2 mg/dL: No    History of congestive heart failure: No    Duke Activity Status Index (DASI):   DASI Total Score: 18.95  METs: 5.1    Medications of Perioperative Concern:   NSAIDs        ROS: No TIA's or unusual headaches, no dysphagia.  No prolonged cough. No dyspnea or chest pain on exertion.  No abdominal pain, change in bowel habits, black or bloody stools.  No urinary tract or BPH symptoms.  Positive reported pain in arthritic joint. Positive difficulty with gait. No skin rashes or issues.      Objective:    /84   Pulse 74   Ht 6' 1\" (1.854 m)   Wt 126 kg (277 lb 12.8 oz)   SpO2 97%   BMI 36.65 kg/m²       General Appearance: no distress, conversive  HEENT: PERRLA, conjuctiva normal; oropharynx clear; mucous membranes moist;   Neck:  Supple, no lymphadenopathy or thyromegaly  Lungs: breath sounds normal, normal respiratory effort, no retractions, expiratory effort normal  CV: normal heart sounds S1/S2, PMI normal   ABD: soft non tender, no masses , no hepatic or splenomegaly  EXT: DP pulses intact, no lymphadenopathy, no edema  Skin: normal turgor, normal texture, no rash  Psych: affect normal, mood normal  Neuro: AAOx3        The following portions of the patient's history were reviewed and updated as appropriate: allergies, current medications, past family history, past medical history, past social history, past surgical history and problem list.     Past History:       Past Medical History:   Diagnosis Date    Acute blood loss anemia 08/09/2019    Aftercare following right hip joint replacement surgery 11/05/2019    Anxiety disorder     Atherosclerotic heart disease of native coronary artery without angina pectoris     Benign prostatic hyperplasia without lower urinary tract symptoms     Cancer (HCC)     bladder    Clotting disorder (HCC) 1/25/2023    Blood in urine    Disease of thyroid gland     hypo    Dyslipidemia     GERD (gastroesophageal reflux disease)     Hypertension     " Impaired fasting blood sugar     Kidney stone     Low back pain     Obesity     Osteoarthritis     last assesed 16    Other chest pain     Paresthesia of skin     Polyneuropathy     last assesed 17    Pure hypercholesterolemia     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)     Suspected UTI 2023    Urinary tract infection     Wears glasses     Past Surgical History:   Procedure Laterality Date    BACK SURGERY      CHOLECYSTECTOMY      COLONOSCOPY  2019    CYSTOSCOPY N/A 2023    Procedure: CYSTOSCOPY w/ bladder biopsy;  Surgeon: Geo Bentley MD;  Location: [facility];  Service: Urology    HIP SURGERY  2019    Replacement    JOINT REPLACEMENT  2019    Hip replacement    DE ARTHRP ACETBLR/PROX FEM PROSTC AGRFT/ALGRFT Right 2019    Procedure: ARTHROPLASTY HIP TOTAL;  Surgeon: Leonard Gallagher MD;  Location: [facility];  Service: Orthopedics    DE CYSTO W/REMOVAL OF LESIONS SMALL N/A 2023    Procedure: TRANSURETHRAL RESECTION OF BLADDER TUMOR (TURBT), mitomycin ;  Surgeon: Geo Bentley MD;  Location: [facility];  Service: Urology    DE CYSTO W/REMOVAL OF LESIONS SMALL N/A 2023    Procedure: TRANSURETHRAL RESECTION OF BLADDER TUMOR (TURBT), cystoscopy with bladder biopsy;  Surgeon: Geo Bentley MD;  Location: [facility];  Service: Urology    TONSILLECTOMY            Social History     Tobacco Use    Smoking status: Former     Current packs/day: 0.00     Average packs/day: 1 pack/day for 20.8 years (20.8 ttl pk-yrs)     Types: Cigarettes     Start date: 1967     Quit date: 1987     Years since quittin.9    Smokeless tobacco: Never   Vaping Use    Vaping status: Never Used   Substance Use Topics    Alcohol use: Not Currently    Drug use: Not Currently     Family History   Problem Relation Age of Onset    Arthritis Other     Heart disease Father     Arthritis Mother           Allergies:     No  "Known Allergies     Current Medications:     Current Outpatient Medications   Medication Instructions    ascorbic acid (VITAMIN C) 500 mg, Oral, 2 times daily    atorvastatin (LIPITOR) 10 mg, Oral, Daily at bedtime    cholecalciferol (VITAMIN D3) 1,000 Units, Oral, Daily    ciclopirox (LOPROX) 0.77 % cream     cyanocobalamin (VITAMIN B-12) 500 mcg, Oral, Daily    enoxaparin (LOVENOX) 40 mg, Subcutaneous, Daily, To start postoperatively    ferrous sulfate 324 (65 Fe) mg Take one tablet daily.    folic acid (FOLVITE) 1 mg, Oral, Daily    hydrocortisone 2.5 % cream APPLY TO SKIN TWICE DAILY FOR TWO WEEKS, THEN AS NEEDED    levothyroxine 150 mcg, Oral, Daily    Naproxen Sodium (Aleve) 220 MG CAPS Oral    tamsulosin (FLOMAX) 0.4 mg, Oral, Daily with dinner           PRE-OP WORKSHEET DATA    Assessment of Pre-Operative Risks     MLJ Quality Hard Stops:    BMI (<40) : Estimated body mass index is 36.65 kg/m² as calculated from the following:    Height as of this encounter: 6' 1\" (1.854 m).    Weight as of this encounter: 126 kg (277 lb 12.8 oz).    Hgb ( >11):   Lab Results   Component Value Date    HGB 15.5 10/10/2024    HGB 15.8 01/15/2024    HGB 15.9 09/08/2023       HbA1c (<7.5) :   Lab Results   Component Value Date    HGBA1C 5.6 10/10/2024       GFR (>60) (Less then 45 = Nephrology consult):    Lab Results   Component Value Date    EGFR 83 10/10/2024    EGFR 84 01/15/2024    EGFR 71 09/08/2023            Pre-Op Data Reviewed:       Laboratory Results: I have personally reviewed the pertinent laboratory results/reports     EKG:I have personally reviewed pertinent reports.  . I personally reviewed and interpreted available tracings in the electronic medical record    Encounter Date: 10/10/24   EKG 12 lead   Result Value    Ventricular Rate 74    Atrial Rate 74    KY Interval 168    QRSD Interval 100    QT Interval 400    QTC Interval 444    P Axis 26    QRS Axis 45    T Wave Axis 30    Narrative    Normal sinus " rhythm  Normal ECG  When compared with ECG of 09-MAR-2023 17:21,  No significant change was found  Confirmed by Milla Posey ([id number]) on 10/10/2024 7:19:09 PM       OLD RECORDS: reviewed old records in the chart review section if EHR on day of visit.    Previous cardiopulmonary studies within the past year:  Echocardiogram: yes   Lab Results   Component Value Date    LVEF 60 04/05/2024     Cardiac Catheterization: no  Stress Test: yes    Stress ECG: No ST deviation is noted.    Stress Function: Left ventricular function post-stress is normal. Stress ejection fraction is 70%.    Perfusion Defect Conclusion: The stress/rest perfusion ratio is 0.85. There is no evidence of transient ischemic dilation (TID).    Perfusion: There are no perfusion defects.     No evidence of ischemia or infarction by pharmacologic vasodilatory nuclear stress testing.     Time of visit including pre-visit chart review, visit and post-visit coordination of plan and care , review of pre-surgical lab work, preparation and time spent documenting note in electronic medical record, time spent face-to-face in physical examination answering patient questions by care team 35 minutes             [facility]

## 2024-10-09 ENCOUNTER — OFFICE VISIT (OUTPATIENT)
Dept: PHYSICAL THERAPY | Facility: OTHER | Age: 74
End: 2024-10-09
Payer: MEDICARE

## 2024-10-09 DIAGNOSIS — M48.061 SPINAL STENOSIS OF LUMBAR REGION WITHOUT NEUROGENIC CLAUDICATION: Primary | ICD-10-CM

## 2024-10-09 PROCEDURE — 97110 THERAPEUTIC EXERCISES: CPT

## 2024-10-09 NOTE — PROGRESS NOTES
"Daily Note     Today's date: 10/9/2024  Patient name: Solis Dos Santos  : 1950  MRN: [id number]  Referring provider: Leonard Gallagher,*  Dx:   No diagnosis found.                     Subjective: Pt reports no new changes.      Objective: See treatment diary below      Assessment: Tolerated treatment well focused on L hip strengthening for upcoming L PEGGY on 24. Patient would benefit from continued PT to address remaining lumbar deficits in order to maximize overall functional ability.      Plan: Continue per plan of care.      POC expires Unit limit Auth Expiration date PT/OT + Visit Limit?   24 BOMN N/A BOMN                             Visit/Unit Tracking  AUTH Status:  Date 8/21 8/23 8/26 8/28 8/30 9/4 9/6 9/11 9/13 9/16  9/19  9/23 9/25 9/27 9/30 10/4 10/9   No - Medicare Used 10 - RE 11 12 13 14 15 16 17 18 19  20 -RE   22 23 24 25 26     Remaining  0 9 8 7 6 5 4 3 2 1 0  9 8 7 6 5 4         Precautions: fall risk        Visit #: 15 17 18 19 20 21 22 23 24 25 26   Date:  9/4 9/11 9/13 9/16 9/19 9/23 9/25 9/27 9/30 10/4 10/9   Manuals                  Hip LAD:L       SFP          Hip lateral distraction           Lateral / inferior   gII SFP Lateral / inferior   gII SFP Lateral / inferior   gII SFP Lateral / inferior   gII SFP Lateral / inferior   gII SFP                      Neuro Re-Ed                  TrA bracing                  TrA marches                  Curl-up                  Deadbug progressions                  Pball pressdown                                    SLR          Flex 2x10 Flex 2x10   S/L Clamshells          MTB 10x5\" MTB 15x5\"   Lateral band walk OTB k' 5x15'   LOTB a' 5x15'            Pallof press       MTB 2x10ea                             Tandem stance                  SLS                                     Prone femoral nerve glides                  Seated slump gliders                                     Biodex LOS                  Ther Ex                " "  Bike 10 min 10 min 10 min 10 min 10 min 10 min 10 min 10 min 10 min 10 min 10 min   Treadmill walking                  Seated flexion                  LTR                  SKC          10x10\" 10x10\" 10x10\"      DKC                  Lumbar extension in standing                  Belt self hip mobilization       Inferior / lateral x2\" ea  Supine band mob figure-4   X2 min ea                        Prone quad S 2x45\"ea 2x45\"ea 3x30\"   3x30\"ea 3x30\" ea  3x30\"ea      Harlan S 3x30\"ea 3x30\"ea Manual s/l 3x30\":L            SLR HS S 3x30\"ea 3x30\"ea 3x30\"ea   3x30\"ea 3x30\" ea  3x30\"ea      PPU 2x10 2x10                                 Seated  > standing marches     Standing march x20ea    Supine march x20 ea   Standing marches LOTB 3x10ea     Seated > standing DF                  Standing hip Abd / Ext 5# ext 3x10            LOTB 2x10    LAQ nv Seated 20# x50   MTB + BTB 2x30  MTB 3x15ea        H/L hip abd           MTB 3x20    Bridge c hip abd 3x10   Seated hip IR/ER        x30ea LOTB 2x12ea LOTB 2x12ea LOTB x20ea   TB eversion                  TB DF nv MTB 40x Med loop 3x10                               Leg press       95#  100#  105#  110#  115#      X10 reps ea    110#  115#  120#  125#  130#  135#    X10 reps ea      Ther Activity                  TUG                                     Chair squats       Wall squats c pball 3x10  Wall squats c pball 3x10     Staggered:R 3x10   Step-ups 5# a' 3x10       5# a' 3x10ea         Step-overs                                     Gait Training                  with SPC                  BOOST TM   70% BW, 4XL, x5min walk                                 Modalities                  MHP                                             "

## 2024-10-10 ENCOUNTER — APPOINTMENT (OUTPATIENT)
Dept: LAB | Age: 74
End: 2024-10-10
Payer: MEDICARE

## 2024-10-10 ENCOUNTER — LAB REQUISITION (OUTPATIENT)
Dept: LAB | Facility: HOSPITAL | Age: 74
End: 2024-10-10
Payer: MEDICARE

## 2024-10-10 DIAGNOSIS — Z01.810 ENCOUNTER FOR PREPROCEDURAL CARDIOVASCULAR EXAMINATION: ICD-10-CM

## 2024-10-10 DIAGNOSIS — Z01.812 PRE-OPERATIVE LABORATORY EXAMINATION: ICD-10-CM

## 2024-10-10 DIAGNOSIS — Z01.810 PRE-OPERATIVE CARDIOVASCULAR EXAMINATION: ICD-10-CM

## 2024-10-10 DIAGNOSIS — Z51.81 ANTICOAGULATION MANAGEMENT ENCOUNTER: ICD-10-CM

## 2024-10-10 DIAGNOSIS — Z79.01 ANTICOAGULATION MANAGEMENT ENCOUNTER: ICD-10-CM

## 2024-10-10 LAB
ABO GROUP BLD: NORMAL
ALBUMIN SERPL BCG-MCNC: 3.9 G/DL (ref 3.5–5)
ALP SERPL-CCNC: 52 U/L (ref 34–104)
ALT SERPL W P-5'-P-CCNC: 12 U/L (ref 7–52)
ANION GAP SERPL CALCULATED.3IONS-SCNC: 9 MMOL/L (ref 4–13)
APTT PPP: 33 SECONDS (ref 23–34)
AST SERPL W P-5'-P-CCNC: 19 U/L (ref 13–39)
ATRIAL RATE: 74 BPM
BASOPHILS # BLD AUTO: 0.08 THOUSANDS/ΜL (ref 0–0.1)
BASOPHILS NFR BLD AUTO: 2 % (ref 0–1)
BILIRUB SERPL-MCNC: 0.94 MG/DL (ref 0.2–1)
BLD GP AB SCN SERPL QL: NEGATIVE
BUN SERPL-MCNC: 16 MG/DL (ref 5–25)
CALCIUM SERPL-MCNC: 8.6 MG/DL (ref 8.4–10.2)
CHLORIDE SERPL-SCNC: 102 MMOL/L (ref 96–108)
CO2 SERPL-SCNC: 27 MMOL/L (ref 21–32)
CREAT SERPL-MCNC: 0.87 MG/DL (ref 0.6–1.3)
EOSINOPHIL # BLD AUTO: 0.2 THOUSAND/ΜL (ref 0–0.61)
EOSINOPHIL NFR BLD AUTO: 4 % (ref 0–6)
ERYTHROCYTE [DISTWIDTH] IN BLOOD BY AUTOMATED COUNT: 12.9 % (ref 11.6–15.1)
EST. AVERAGE GLUCOSE BLD GHB EST-MCNC: 114 MG/DL
GFR SERPL CREATININE-BSD FRML MDRD: 83 ML/MIN/1.73SQ M
GLUCOSE P FAST SERPL-MCNC: 94 MG/DL (ref 65–99)
HBA1C MFR BLD: 5.6 %
HCT VFR BLD AUTO: 46.5 % (ref 36.5–49.3)
HGB BLD-MCNC: 15.5 G/DL (ref 12–17)
IMM GRANULOCYTES # BLD AUTO: 0.01 THOUSAND/UL (ref 0–0.2)
IMM GRANULOCYTES NFR BLD AUTO: 0 % (ref 0–2)
INR PPP: 1.02 (ref 0.85–1.19)
LYMPHOCYTES # BLD AUTO: 1.09 THOUSANDS/ΜL (ref 0.6–4.47)
LYMPHOCYTES NFR BLD AUTO: 21 % (ref 14–44)
MCH RBC QN AUTO: 31.1 PG (ref 26.8–34.3)
MCHC RBC AUTO-ENTMCNC: 33.3 G/DL (ref 31.4–37.4)
MCV RBC AUTO: 93 FL (ref 82–98)
MONOCYTES # BLD AUTO: 0.56 THOUSAND/ΜL (ref 0.17–1.22)
MONOCYTES NFR BLD AUTO: 11 % (ref 4–12)
NEUTROPHILS # BLD AUTO: 3.17 THOUSANDS/ΜL (ref 1.85–7.62)
NEUTS SEG NFR BLD AUTO: 62 % (ref 43–75)
NRBC BLD AUTO-RTO: 0 /100 WBCS
P AXIS: 26 DEGREES
PLATELET # BLD AUTO: 198 THOUSANDS/UL (ref 149–390)
PMV BLD AUTO: 11.4 FL (ref 8.9–12.7)
POTASSIUM SERPL-SCNC: 4.2 MMOL/L (ref 3.5–5.3)
PR INTERVAL: 168 MS
PROT SERPL-MCNC: 6.8 G/DL (ref 6.4–8.4)
PROTHROMBIN TIME: 13.7 SECONDS (ref 12.3–15)
QRS AXIS: 45 DEGREES
QRSD INTERVAL: 100 MS
QT INTERVAL: 400 MS
QTC INTERVAL: 444 MS
RBC # BLD AUTO: 4.99 MILLION/UL (ref 3.88–5.62)
RH BLD: POSITIVE
SODIUM SERPL-SCNC: 138 MMOL/L (ref 135–147)
SPECIMEN EXPIRATION DATE: NORMAL
T WAVE AXIS: 30 DEGREES
VENTRICULAR RATE: 74 BPM
WBC # BLD AUTO: 5.11 THOUSAND/UL (ref 4.31–10.16)

## 2024-10-10 PROCEDURE — 86901 BLOOD TYPING SEROLOGIC RH(D): CPT | Performed by: ORTHOPAEDIC SURGERY

## 2024-10-10 PROCEDURE — 85610 PROTHROMBIN TIME: CPT

## 2024-10-10 PROCEDURE — 86850 RBC ANTIBODY SCREEN: CPT | Performed by: ORTHOPAEDIC SURGERY

## 2024-10-10 PROCEDURE — 85025 COMPLETE CBC W/AUTO DIFF WBC: CPT

## 2024-10-10 PROCEDURE — 93010 ELECTROCARDIOGRAM REPORT: CPT | Performed by: INTERNAL MEDICINE

## 2024-10-10 PROCEDURE — 86900 BLOOD TYPING SEROLOGIC ABO: CPT | Performed by: ORTHOPAEDIC SURGERY

## 2024-10-10 PROCEDURE — 36415 COLL VENOUS BLD VENIPUNCTURE: CPT

## 2024-10-10 PROCEDURE — 85730 THROMBOPLASTIN TIME PARTIAL: CPT

## 2024-10-10 PROCEDURE — 83036 HEMOGLOBIN GLYCOSYLATED A1C: CPT

## 2024-10-10 PROCEDURE — 80053 COMPREHEN METABOLIC PANEL: CPT

## 2024-10-11 ENCOUNTER — OFFICE VISIT (OUTPATIENT)
Dept: PHYSICAL THERAPY | Facility: OTHER | Age: 74
End: 2024-10-11
Payer: MEDICARE

## 2024-10-11 DIAGNOSIS — M48.061 SPINAL STENOSIS OF LUMBAR REGION WITHOUT NEUROGENIC CLAUDICATION: Primary | ICD-10-CM

## 2024-10-11 PROCEDURE — 97110 THERAPEUTIC EXERCISES: CPT

## 2024-10-11 NOTE — PROGRESS NOTES
"Daily Note     Today's date: 10/11/2024  Patient name: Solis Dos Santos  : 1950  MRN: [id number]  Referring provider: Leonard Gallagher,*  Dx:   Encounter Diagnosis     ICD-10-CM    1. Spinal stenosis of lumbar region without neurogenic claudication  M48.061               Start Time: 0915  Stop Time: 1008  Total time in clinic (min): 53 minutes      Subjective: Pt notes increased aches in B knees today secondary to weather changes.      Objective: See treatment diary below      Assessment: Tolerated treatment well focused on functional LE strengthening and specific L hip strengthening for upcoming L PEGGY on 24. Patient would benefit from continued PT to address remaining lumbar deficits in order to maximize overall functional ability.      Plan: Continue per plan of care.      POC expires Unit limit Auth Expiration date PT/OT + Visit Limit?   24 BOMN N/A BOMN                             Visit/Unit Tracking  AUTH Status:  Date 8/21 8/23 8/26 8/28 8/30 9/4 9/6 9/11 9/13 9/16  9/19  9/23 9/25 9/27 9/30 10/4 10/9 10/11   No - Medicare Used 10 - RE 11 12 13 14 15 16 17 18 19  20 -RE  21 22 23 24 25 26 27     Remaining  0 9 8 7 6 5 4 3 2 1 0  9 8 7 6 5 4 3         Precautions: fall risk        Visit #: 15 17 18 19 20 21 22 23 24 25 26 27   Date:  9/4 9/11 9/13 9/16 9/19 9/23 9/25 9/27 9/30 10/4 10/9 10/11   Manuals                   Hip LAD:L       SFP           Hip lateral distraction           Lateral / inferior   gII SFP Lateral / inferior   gII SFP Lateral / inferior   gII SFP Lateral / inferior   gII SFP Lateral / inferior   gII SFP                        Neuro Re-Ed                   TrA bracing                   TrA marches                   Curl-up                   Deadbug progressions                   Pball pressdown                                      SLR          Flex 2x10 Flex 2x10    S/L Clamshells          MTB 10x5\" MTB 15x5\"    Lateral band walk OTB k' 5x15'   LOTB a' 5x15'          " "OTB k'  LOTB a'  4x30\" alt SS   Pallof press       MTB 2x10ea                               Tandem stance                   SLS                                       Prone femoral nerve glides                   Seated slump gliders                                       Biodex LOS                   Ther Ex                   Bike 10 min 10 min 10 min 10 min 10 min 10 min 10 min 10 min 10 min 10 min 10 min 10 min   Treadmill walking                   Seated flexion                   LTR                   SKC          10x10\" 10x10\" 10x10\"       DKC                   Lumbar extension in standing                   Belt self hip mobilization       Inferior / lateral x2\" ea  Supine band mob figure-4   X2 min ea                          Prone quad S 2x45\"ea 2x45\"ea 3x30\"   3x30\"ea 3x30\" ea  3x30\"ea       Harlan S 3x30\"ea 3x30\"ea Manual s/l 3x30\":L             SLR HS S 3x30\"ea 3x30\"ea 3x30\"ea   3x30\"ea 3x30\" ea  3x30\"ea       PPU 2x10 2x10                                   Seated  > standing marches     Standing march x20ea    Supine march x20 ea   Standing marches LOTB 3x10ea      Seated > standing DF                   Standing hip Abd / Ext 5# ext 3x10            LOTB 2x10     LAQ nv Seated 20# x50   MTB + BTB 2x30  MTB 3x15ea         H/L hip abd           MTB 3x20    Bridge c hip abd 3x10    Bridges            Bridges 3x12   Seated hip IR/ER        x30ea LOTB 2x12ea LOTB 2x12ea LOTB x20ea    TB eversion                   TB DF nv MTB 40x Med loop 3x10                                 Leg press       95#  100#  105#  110#  115#      X10 reps ea    110#  115#  120#  125#  130#  135#    X10 reps ea    70# SL 3x10   Ther Activity                   TUG                                       Chair squats       Wall squats c pball 3x10  Wall squats c pball 3x10     Staggered:R 3x10    Step-ups 5# a' 3x10       5# a' 3x10ea       10# KB x20ea   Step-overs                                       Gait Training                   with SPC "                   BOOST TM   70% BW, 4XL, x5min walk                                   Modalities                   [facility]

## 2024-10-14 ENCOUNTER — OFFICE VISIT (OUTPATIENT)
Age: 74
End: 2024-10-14
Payer: MEDICARE

## 2024-10-14 VITALS
DIASTOLIC BLOOD PRESSURE: 84 MMHG | SYSTOLIC BLOOD PRESSURE: 142 MMHG | BODY MASS INDEX: 36.82 KG/M2 | OXYGEN SATURATION: 97 % | WEIGHT: 277.8 LBS | HEIGHT: 73 IN | HEART RATE: 74 BPM

## 2024-10-14 DIAGNOSIS — M25.552 CHRONIC LEFT HIP PAIN: ICD-10-CM

## 2024-10-14 DIAGNOSIS — G89.29 CHRONIC LEFT HIP PAIN: ICD-10-CM

## 2024-10-14 DIAGNOSIS — C67.2 MALIGNANT NEOPLASM OF LATERAL WALL OF URINARY BLADDER (HCC): ICD-10-CM

## 2024-10-14 DIAGNOSIS — E66.01 OBESITY, MORBID (HCC): ICD-10-CM

## 2024-10-14 DIAGNOSIS — Z01.818 PREOPERATIVE CLEARANCE: Primary | ICD-10-CM

## 2024-10-14 DIAGNOSIS — M16.12 PRIMARY OSTEOARTHRITIS OF LEFT HIP: ICD-10-CM

## 2024-10-14 LAB
ABO GROUP BLD: NORMAL
BLD GP AB SCN SERPL QL: NEGATIVE
RH BLD: POSITIVE
SPECIMEN EXPIRATION DATE: NORMAL

## 2024-10-14 PROCEDURE — 99215 OFFICE O/P EST HI 40 MIN: CPT | Performed by: NURSE PRACTITIONER

## 2024-10-14 PROCEDURE — G2211 COMPLEX E/M VISIT ADD ON: HCPCS | Performed by: NURSE PRACTITIONER

## 2024-10-14 NOTE — PATIENT INSTRUCTIONS
BEFORE SURGERY    Contact your surgical nurse  navigator with any questions regarding preoperative plan or schedule.  Stop all over the counter supplements, herbal, naturopathic  medications for 1 week prior to surgery UNLESS prescribed by your surgeon  Hold NSAIDS (i.e. advil, alleve, motrin, ibuprofen, celebrex) minimum 5 days prior to surgery  Follow presurgical medication instructions provided by preadmission nursing team reviewed during your presurgery phone call  Strategies for optimizing your surgery through breathing exercises, nutrition and physical activity can be found at [email or web address]  Call [phone] with any presurgical concerns or medications questions or use the messaging feature in your [facility] to contact your provider    AFTER SURGERY    Recommend using Tylenol ( acetaminophen ) 1000 mg every eight hours during the first week post discharge along with icing the area for 20 mins every 3-4 hours while awake can be helpful in reducing your need for post operative opioid use. This opioid sparing plan can be used along side your surgeons pain plan.  Use stool softener over the counter (colace) daily after surgery during the first 1-2 weeks to avoid post operative constipation issues  If no bowel movement within 3 days after surgery then use over the counter Miralax in addition to your stool softener   If cleared by your surgical team for activity then early and often walking is encouraged and can be important in prevention of post surgical blood clots. Additionally spend as much time out of bed as possible and allowed by your surgical team  Use your incentive spirometer twice per hour in the first seven days after surgery to help prevent post surgery lung complications and infections  It is very important you follow the instructions from your surgeon regarding any medications for after surgery blood clot prevention. Compliance with these medications is very important.  Call [phone] with  any post discharge concerns or medical issues or use the messaging feature in your [facility] to contact your provider

## 2024-10-15 DIAGNOSIS — R39.12 BENIGN PROSTATIC HYPERPLASIA WITH WEAK URINARY STREAM: ICD-10-CM

## 2024-10-15 DIAGNOSIS — N40.1 BENIGN PROSTATIC HYPERPLASIA WITH WEAK URINARY STREAM: ICD-10-CM

## 2024-10-15 RX ORDER — TAMSULOSIN HYDROCHLORIDE 0.4 MG/1
0.4 CAPSULE ORAL
Qty: 90 CAPSULE | Refills: 1 | Status: SHIPPED | OUTPATIENT
Start: 2024-10-15

## 2024-10-16 ENCOUNTER — OFFICE VISIT (OUTPATIENT)
Dept: PHYSICAL THERAPY | Facility: OTHER | Age: 74
End: 2024-10-16
Payer: MEDICARE

## 2024-10-16 DIAGNOSIS — M48.061 SPINAL STENOSIS OF LUMBAR REGION WITHOUT NEUROGENIC CLAUDICATION: Primary | ICD-10-CM

## 2024-10-16 PROCEDURE — 97110 THERAPEUTIC EXERCISES: CPT

## 2024-10-16 NOTE — PROGRESS NOTES
Daily Note     Today's date: 10/16/2024  Patient name: Solis Dos Santos  : 1950  MRN: [id number]  Referring provider: Leonard Gallagher,*  Dx:   Encounter Diagnosis     ICD-10-CM    1. Spinal stenosis of lumbar region without neurogenic claudication  M48.061                 Start Time: 939  Stop Time: 954  Total time in clinic (min): 15 minutes      Subjective: Pt notes continued OA pain secondary to cold weather. Improved with hot shower this morning.      Objective: See treatment diary below      Assessment: Tolerated treatment well focused on functional LE strengthening and specific L hip strengthening for upcoming L PEGGY on 24. Requires Bernice c strap to perform SLR 2/2 pain. Patient would benefit from continued PT to address remaining lumbar deficits in order to maximize overall functional ability.      Plan: Continue per plan of care.      POC expires Unit limit Auth Expiration date PT/OT + Visit Limit?   24 BOMN N/A BOMN                             Visit/Unit Tracking  AUTH Status:  Date 8/21 8/23 8/26 8/28 8/30 9/4 9/6 9/11 9/13 9/16  9/19  9/23 9/25 9/27 9/30 10/4 10/9 10/11 10/16   No - Medicare Used 10 - RE 11 12 13 14 15 16 17 18 19  20 -RE  21 22 23 24 25 26 27 28     Remaining  0 9 8 7 6 5 4 3 2 1 0  9 8 7 6 5 4 3 2         Precautions: fall risk        Visit #: 15 17 18 19 20 21 22 23 24 25 26 27 28   Date:  9/4 9/11 9/13 9/16 9/19 9/23 9/25 9/27 9/30 10/4 10/9 10/11 10/16   Manuals                    Hip LAD:L       SFP            Hip lateral distraction           Lateral / inferior   gII SFP Lateral / inferior   gII SFP Lateral / inferior   gII SFP Lateral / inferior   gII SFP Lateral / inferior   gII SFP                          Neuro Re-Ed                    TrA bracing                    TrA marches                    Curl-up                    Deadbug progressions                    Pball pressdown                                        SLR          Flex 2x10 Flex 2x10   "Flex 2x10 c asst   S/L Clamshells          MTB 10x5\" MTB 15x5\"  MTB 20x5\"   Lateral band walk OTB k' 5x15'   LOTB a' 5x15'          OTB k'  LOTB a'  4x30\" alt SS    Pallof press       MTB 2x10ea                                 Tandem stance                    SLS                                         Prone femoral nerve glides                    Seated slump gliders                                         Biodex LOS                    Ther Ex                    Bike 10 min 10 min 10 min 10 min 10 min 10 min 10 min 10 min 10 min 10 min 10 min 10 min 10 min   Treadmill walking                    Seated flexion                    LTR                    SKC          10x10\" 10x10\" 10x10\"        DKC                    Lumbar extension in standing                    Belt self hip mobilization       Inferior / lateral x2\" ea  Supine band mob figure-4   X2 min ea                            Prone quad S 2x45\"ea 2x45\"ea 3x30\"   3x30\"ea 3x30\" ea  3x30\"ea        Harlan S 3x30\"ea 3x30\"ea Manual s/l 3x30\":L              SLR HS S 3x30\"ea 3x30\"ea 3x30\"ea   3x30\"ea 3x30\" ea  3x30\"ea        PPU 2x10 2x10                                     Seated  > standing marches     Standing march x20ea    Supine march x20 ea   Standing marches LOTB 3x10ea       Seated > standing DF                    Standing hip Abd / Ext 5# ext 3x10            LOTB 2x10      LAQ nv Seated 20# x50   MTB + BTB 2x30  MTB 3x15ea          H/L hip abd           MTB 3x20    Bridge c hip abd 3x10  MTB 30x5\"   Bridges            Bridges 3x12 Bridge butterfly MTB 3x10   Seated hip IR/ER        x30ea LOTB 2x12ea LOTB 2x12ea LOTB x20ea  OTB  2x20   TB eversion                    TB DF nv MTB 40x Med loop 3x10                                   Leg press       95#  100#  105#  110#  115#      X10 reps ea    110#  115#  120#  125#  130#  135#    X10 reps ea    70# SL 3x10    Ther Activity                    TUG                                         Chair squats       Wall " squats c pball 3x10  Wall squats c pball 3x10     Staggered:R 3x10  Stagger:R 2x10   Step-ups 5# a' 3x10       5# a' 3x10ea       10# KB x20ea    Step-overs                                         Gait Training                    with SPC                    BOOST TM   70% BW, 4XL, x5min walk                                     Modalities                    [facility]

## 2024-10-17 RX ORDER — ACETAMINOPHEN 325 MG/1
650 TABLET ORAL EVERY 6 HOURS PRN
COMMUNITY
End: 2024-11-05

## 2024-10-17 RX ORDER — MULTIVIT WITH IRON,MINERALS
LIQUID (ML) ORAL DAILY
COMMUNITY

## 2024-10-17 NOTE — PRE-PROCEDURE INSTRUCTIONS
Pre-Surgery Instructions:   Medication Instructions    acetaminophen (TYLENOL) 325 mg tablet Uses PRN- OK to take day of surgery    ascorbic acid (VITAMIN C) 500 MG tablet Hold day of surgery.    atorvastatin (LIPITOR) 10 mg tablet Take night before surgery    cholecalciferol (VITAMIN D3) 1,000 units tablet Stop taking 7 days prior to surgery.    ciclopirox (LOPROX) 0.77 % cream Hold day of surgery.    cyanocobalamin (VITAMIN B-12) 500 MCG tablet Stop taking 7 days prior to surgery.    ferrous sulfate 324 (65 Fe) mg Hold day of surgery.    folic acid (FOLVITE) 1 mg tablet Hold day of surgery.    hydrocortisone 2.5 % cream Hold day of surgery.    levothyroxine 150 mcg tablet Take day of surgery.    Multiple Vitamins-Minerals (multivitamin with iron-minerals) liquid Hold day of surgery.    tamsulosin (FLOMAX) 0.4 mg Take night before surgery    Medication instructions for day surgery reviewed. Please use only a sip of water to take your instructed medications. Avoid aspirin and all over the counter vitamins, supplements and NSAIDS for one week prior to surgery per anesthesia guidelines. Tylenol is ok to take as needed.     You will receive a call one business day prior to surgery with an arrival time and hospital directions. If your surgery is scheduled on a Monday, the hospital will be calling you on the Friday prior to your surgery. If you have not heard from anyone by 8pm, please call the hospital supervisor through the hospital  at [phone]. ([place] [phone] or [place] [phone]).    Do not eat or drink anything after midnight the night before your surgery, including candy, mints, lifesavers, or chewing gum. Do not drink alcohol 24hrs before your surgery. Try not to smoke at least 24hrs before your surgery.       Follow the pre surgery showering instructions as listed in the “My Surgical Experience Booklet” or otherwise provided by your surgeon's office. States has CHG soap & wipes from  office.  Do not use a blade to shave the surgical area 1 week before surgery. It is okay to use a clean electric clippers up to 24 hours before surgery. Do not apply any lotions, creams, including makeup, cologne, deodorant, or perfumes after showering on the day of your surgery. Do not use dry shampoo, hair spray, hair gel, or any type of hair products.     No contact lenses, eye make-up, or artificial eyelashes. Remove nail polish, including gel polish, and any artificial, gel, or acrylic nails if possible. Remove all jewelry including rings and body piercing jewelry.     Wear causal clothing that is easy to take on and off. Consider your type of surgery.    [facility], confirmed has DME . States has RW and cane. Inst to bring RW w/ him DOS.  Inst to f/u w/ Nurse Navigator in surgeon office w/ any questions or concerns or changes in his health between now and DOS.     Keep any valuables, jewelry, piercings at home. Please bring any specially ordered equipment (sling, braces) if indicated.    Arrange for a responsible person to drive you to and from the hospital on the day of your surgery. Please confirm the visitor policy for the day of your procedure when you receive your phone call with an arrival time.     Call the surgeon's office with any new illnesses, exposures, or additional questions prior to surgery.    Please reference your “My Surgical Experience Booklet” for additional information to prepare for your upcoming surgery. See Geriatric Assessment below...  Cognitive Assessment: no concerns  TUG <15 sec:  Falls (last 6 months): 1 ( slipped out of bed)   Hand  score:  -Attempt 1:  -Attempt 2:  -Attempt 3:  Tang Total Score: 19  PHQ- 9 Depression Scale:5  Nutrition Assessment Score:14  METS:6.79  Incentive Spirometry Level:   Health goals:  -What are your overall health goals? (quit smoking, wt. loss, rest, decrease stress) increase mobility , decreased pain w/ joints    -What brings you  strength? (family, friends, [employer], health)  Winter, physical strength, family   -What activities are important to you? (exercise, reading, travel, work)  time w/ family, exercise , travel

## 2024-10-18 ENCOUNTER — OFFICE VISIT (OUTPATIENT)
Dept: PHYSICAL THERAPY | Facility: OTHER | Age: 74
End: 2024-10-18
Payer: MEDICARE

## 2024-10-18 DIAGNOSIS — M48.061 SPINAL STENOSIS OF LUMBAR REGION WITHOUT NEUROGENIC CLAUDICATION: Primary | ICD-10-CM

## 2024-10-18 PROCEDURE — 97110 THERAPEUTIC EXERCISES: CPT

## 2024-10-18 NOTE — PROGRESS NOTES
"Daily Note     Today's date: 10/18/2024  Patient name: Solis Dos Santos  : 1950  MRN: [id number]  Referring provider: Leonard Gallagher,*  Dx:   Encounter Diagnosis     ICD-10-CM    1. Spinal stenosis of lumbar region without neurogenic claudication  M48.061                   Start Time: 1039  Stop Time: 1102  Total time in clinic (min): 23 minutes      Subjective: Pt reports improvements in L hip pain today after taking Aleve, was significantly worse this morning.      Objective: See treatment diary below      Assessment: Tolerated treatment well focused on functional LE strengthening and specific L hip strengthening for upcoming L PEGGY on 24. Improved SLR ability without strap assist today. Patient would benefit from continued PT to address remaining lumbar deficits in order to maximize overall functional ability.      Plan: Continue per plan of care.      POC expires Unit limit Auth Expiration date PT/OT + Visit Limit?   24 BOMN N/A BOMN                             Visit/Unit Tracking  AUTH Status:  Date 8/21 8/23 8/26 8/28 8/30 9/4 9/6 9/11 9/13 9/16  9/19  9/23 9/25 9/27 9/30 10/4 10/9 10/11 10/16   No - Medicare Used 10 - RE 11 12 13 14 15 16 17 18 19  20 -RE   22 23 24 25 26 27 28     Remaining  0 9 8 7 6 5 4 3 2 1 0  9 8 7 6 5 4 3 2         Precautions: fall risk        Visit #: 20  23 24 25 26 27 28 29   Date:  9/19 9/23 9/25 9/27 9/30 10/4 10/9 10/11 10/16 10/18   Manuals             Hip LAD:L             Hip lateral distraction   Lateral / inferior   gII SFP Lateral / inferior   gII SFP Lateral / inferior   gII SFP Lateral / inferior   gII SFP Lateral / inferior   gII SFP                    Neuro Re-Ed             TrA bracing             TrA marches             Curl-up             Deadbug progressions             Pball pressdown                          SLR      Flex 2x10 Flex 2x10  Flex 2x10 c asst Flex 2x10ea   S/L Clamshells      MTB 10x5\" MTB 15x5\"  MTB 20x5\"    Lateral " "band walk        OTB k'  LOTB a'  4x30\" alt SS     Pallof press                           Tandem stance             SLS                           Prone femoral nerve glides             Seated slump gliders                           Biodex LOS             Ther Ex             Bike 10 min 10 min 10 min 10 min 10 min 10 min 10 min 10 min 10 min 10 min   Treadmill walking             Seated flexion             LTR             SKC  10x10\" 10x10\" 10x10\"         DKC             Lumbar extension in standing             Belt self hip mobilization   Inferior / lateral x2\" ea  Supine band mob figure-4   X2 min ea                      Prone quad S 3x30\"ea 3x30\" ea  3x30\"ea         Harlan S             SLR HS S 3x30\"ea 3x30\" ea  3x30\"ea         PPU                           Seated  > standing marches  Supine march x20 ea   Standing marches LOTB 3x10ea        Seated > standing DF             Standing hip Abd / Ext      LOTB 2x10       LAQ  MTB 3x15ea        MTB 4x10ea   H/L hip abd   MTB 3x20    Bridge c hip abd 3x10  MTB 30x5\" S/L hip Abd 2x10ea   Bridges        Bridges 3x12 Bridge butterfly MTB 3x10 3x10   Seated hip IR/ER    x30ea LOTB 2x12ea LOTB 2x12ea LOTB x20ea  OTB  2x20    TB eversion             TB DF                           Leg press    110#  115#  120#  125#  130#  135#    X10 reps ea    70# SL 3x10     Ther Activity             TUG                           Chair squats  Wall squats c pball 3x10     Staggered:R 3x10  Stagger:R 2x10 22\" R stagger 3x10   Step-ups 5# a' 3x10ea       10# KB x20ea     Step-overs                           Gait Training             with SPC             BOOST TM                           Modalities             [facility]                                   "

## 2024-10-21 ENCOUNTER — ANESTHESIA EVENT (OUTPATIENT)
Age: 74
End: 2024-10-21
Payer: MEDICARE

## 2024-10-21 ENCOUNTER — IMMUNIZATIONS (OUTPATIENT)
Dept: INTERNAL MEDICINE CLINIC | Facility: CLINIC | Age: 74
End: 2024-10-21
Payer: MEDICARE

## 2024-10-21 DIAGNOSIS — Z23 ENCOUNTER FOR IMMUNIZATION: Primary | ICD-10-CM

## 2024-10-21 PROCEDURE — 90662 IIV NO PRSV INCREASED AG IM: CPT

## 2024-10-21 PROCEDURE — G0008 ADMIN INFLUENZA VIRUS VAC: HCPCS

## 2024-10-23 ENCOUNTER — EVALUATION (OUTPATIENT)
Dept: PHYSICAL THERAPY | Facility: OTHER | Age: 74
End: 2024-10-23
Payer: MEDICARE

## 2024-10-23 DIAGNOSIS — M16.12 PRIMARY OSTEOARTHRITIS OF LEFT HIP: Primary | ICD-10-CM

## 2024-10-23 PROCEDURE — 97161 PT EVAL LOW COMPLEX 20 MIN: CPT

## 2024-10-23 NOTE — PROGRESS NOTES
"PT Evaluation     Today's date: 10/23/2024  Patient name: Solis Dos Santos  : 1950  MRN: [id number]  Referring provider: Leonard Gallagher,*  Dx:   Encounter Diagnosis     ICD-10-CM    1. Primary osteoarthritis of left hip  M16.12           Start Time: 1044  Stop Time: 1059  Total time in clinic (min): 15 minutes    Assessment  Impairments: abnormal or restricted ROM, activity intolerance, impaired physical strength, lacks appropriate home exercise program, pain with function, weight-bearing intolerance, poor posture  and poor body mechanics    Assessment details: Solis Dos Santos is a 74 y.o. male presenting to OPPT initial evaluation pre-operatively for L PEGGY scheduled for 24 with Dr. Gallagher. Pt is currently being seen for spinal stenosis with significant improvements in LE strengthen and functional mobility, but continues to be limited by L hip pain and B knee pain. Hip pain is described as sharp with \"C-sign\" into L groin aggravated by SL weight-bearing, prolonged walking, lying on L side, and excessive flexion or internal rotation of the hip. Imaging reveals advanced left hip degenerative changes with deformity of his left hip with flattening of his superior femoral head. Pt notes pain is alleviated with hip distraction and lateral glide mobilizations with transient relief. Denies sleep disturbances. Denies numbness / tingling.     Presents with decreased L hip rotation ROM, painful weight-bearing in SLS, decreased balance and balance confidence, and decreased L hip strength   impacting functional mobility. Pt to continue with functional strength and hip-focused strength pre-operatively for the next two weeks, post-op re-eval to be scheduled in following weeks. Reviewed posterior hip precautions with pt and he demonstrates full understanding and recall of precautions. Home modifications are being made by pt as he prepares for surgery. The patient is a good candidate for physical therapy to " achieve the following goals.        Goals  STG (6-weeks post-op):  Pt will decrease pain to <5/10  Pt will maintain posterior hip precautions  Pt will be able to ambulation 100' with LRAD  Pt's self-perceived disability will decrease as demonstrated by a >5-point improvement in FOTO score.    Pt will be independent with HEP as demonstrated by return demo with proper technique and execution of exercises provided.     LTG (12-weeks post-op):  Pt will have absence of pain for 2 consecutive sessions in order to facilitate return to walking.  Pt will demonstrate full L hip ROM equivalent to R hip  Pt will demonstrate 5/5 L hip MMT in all directions  Pt's self-perceived disability will decrease as demonstrated by a FOTO score >60.  Pt will be independent with progressions to HEP as demonstrated by return demo with proper technique and execution of exercises provided.        Plan  Patient would benefit from: skilled physical therapy and PT eval  Planned modality interventions: biofeedback, electrical stimulation/Russian stimulation, TENS, thermotherapy: hydrocollator packs, traction, ultrasound and cryotherapy    Planned therapy interventions: abdominal trunk stabilization, activity modification, balance, balance/weight bearing training, body mechanics training, flexibility, functional ROM exercises, graded activity, graded exercise, graded motor, gait training, home exercise program, therapeutic training, therapeutic activities, therapeutic exercise, stretching, strengthening, joint mobilization, manual therapy, massage, neuromuscular re-education, patient education, postural training, IASTM and kinesiology taping    Frequency: 2x week  Plan of Care beginning date: 10/23/2024  Plan of Care expiration date: 4/21/2025  Treatment plan discussed with: patient        Subjective Evaluation    History of Present Illness  Mechanism of injury: Solis Dos Santos is a 74 y.o. male presenting to OPPT initial evaluation  "pre-operatively for L PEGGY scheduled for 24 with Dr. Gallagher. Pt is currently being seen for spinal stenosis with significant improvements in LE strengthen and functional mobility, but continues to be limited by L hip pain and B knee pain. Hip pain is described as sharp with \"C-sign\" into L groin aggravated by SL weight-bearing, prolonged walking, lying on L side, and excessive flexion or internal rotation of the hip. Imaging reveals advanced left hip degenerative changes with deformity of his left hip with flattening of his superior femoral head. Pt notes pain is alleviated with hip distraction and lateral glide mobilizations with transient relief. Denies sleep disturbances. Denies numbness / tingling.     Currently being treated for spinal stenosis with significant improvements; focus of therapy has shifted to L hip strengthening and pain relief.          Patient Goals  Patient goals for therapy: decreased pain, increased motion, improved balance, increased strength and independence with ADLs/IADLs  Patient goal: Be able to walk without pain  Pain  Current pain ratin  At best pain ratin  At worst pain ratin  Quality: sharp    Social Support  Steps to enter house: yes  Stairs in house: yes   Lives in: multiple-level home  Lives with: spouse    Employment status: not working    Diagnostic Tests  X-ray: abnormal  Treatments  Previous treatment: physical therapy  Current treatment: physical therapy      Objective     Palpation: Absence of L hip tenderness    Range of motion:   PROM:L PROM:R   Flexion 110 110   Extension 10; low back p! 10   Abduction 30 45   Adduction nt nt   ER (90/90) 30 45   IR (90/90) 0 45     Muscle length:  (-) 90/90 Hamstring test  (+) L Harlan test    Articular joint mobility:  Lateral glide - hypomobile  Inferior glide - hypomobile    Strength:    Left Right   Hip flexion 4+/5 4+/5   Hip ER 4/5 4+/5   Hip Abd 4/5 4+/5   Knee extension 5/5 5/5   Knee flexion 5/5 5/5   DF 4+/5 " "5/5   PF 3+/5 3+/5        Clinical tests:  (+) DOROTHEA  (+) L FADIR  (+) L Grind test    5xSTS: 17.33\"         POC expires Unit limit Auth Expiration date PT/OT + Visit Limit?   4/21/24 BOMN N/A BOMN                                              Visit/Unit Tracking  AUTH Status:  Date 10/18 10/23                    No - Medicare Used 8 9 - RE                      Remaining  2 1                         Precautions: none pre-operatively; posterior hip precautions post-op      Visit #: 28 29 30 - RE      Date:  10/16 10/18 10/23      Manuals           Hip LAD:L           Hip lateral distraction                       Neuro Re-Ed           TrA bracing           TrA marches           Curl-up           Deadbug progressions           Pball pressdown                       SLR Flex 2x10 c asst Flex 2x10ea Flex 2x10      S/L Clamshells MTB 20x5\"         Lateral band walk           Pallof press                       Tandem stance           SLS                       Prone femoral nerve glides           Seated slump gliders                       Biodex LOS           Ther Ex           Bike 10 min 10 min 10 min      Treadmill walking           Seated flexion           LTR           SKC           DKC           Lumbar extension in standing           Belt self hip mobilization                       Prone quad S           Harlan S           SLR HS S           PPU                       Seated  > standing marches     0# standing 2x20      Seated > standing DF           Standing hip Abd / Ext     OTB 3x10ea      LAQ   MTB 4x10ea       H/L hip abd MTB 30x5\" S/L hip Abd 2x10ea       Bridges Bridge butterfly MTB 3x10 3x10       Seated hip IR/ER OTB  2x20   OTB 3x10:L      TB eversion           TB DF                       Leg press           Ther Activity           TUG                       Chair squats Stagger:R 2x10 22\" R stagger 3x10 5xSTS testing      Step-ups           Step-overs                       Gait Training           with SPC        "    BOOST TM                       Modalities           [facility]

## 2024-10-25 ENCOUNTER — OFFICE VISIT (OUTPATIENT)
Dept: PHYSICAL THERAPY | Facility: OTHER | Age: 74
End: 2024-10-25
Payer: MEDICARE

## 2024-10-25 DIAGNOSIS — M16.12 PRIMARY OSTEOARTHRITIS OF LEFT HIP: Primary | ICD-10-CM

## 2024-10-25 DIAGNOSIS — M48.061 SPINAL STENOSIS OF LUMBAR REGION WITHOUT NEUROGENIC CLAUDICATION: ICD-10-CM

## 2024-10-25 PROCEDURE — 97110 THERAPEUTIC EXERCISES: CPT

## 2024-10-25 PROCEDURE — 97530 THERAPEUTIC ACTIVITIES: CPT

## 2024-10-25 NOTE — PROGRESS NOTES
Daily Note     Today's date: 10/25/2024  Patient name: Solis Dos Santos  : 1950  MRN: [id number]  Referring provider: Leonard Gallagher,*  Dx:   Encounter Diagnosis     ICD-10-CM    1. Primary osteoarthritis of left hip  M16.12       2. Spinal stenosis of lumbar region without neurogenic claudication  M48.061           Start Time: 1048  Stop Time: 1126  Total time in clinic (min): 38 minutes    Subjective: Pt reports readiness for surgery next week.      Objective: See treatment diary below      Assessment: Tolerated treatment well focused on L hip strengthening and functional mobility in preparation for L PEGGY on 24. Reviewed post-op home preparedness checklist, in which patient feels he has acquired all the necessary assisted devices and equipment.  Patient exhibited good technique with therapeutic exercises and would benefit from continued PT      Plan: Continue per plan of care.      POC expires Unit limit Auth Expiration date PT/OT + Visit Limit?   24 BOMN N/A BOMN                                              Visit/Unit Tracking  AUTH Status:  Date 10/23 10/25                   No - Medicare Used RE-1 2                     Remaining  9 8                        Precautions: none pre-operatively; posterior hip precautions post-op      Visit #: 1 RE 2     Date:  10/23 10/25     Manuals       Hip LAD:L       Hip lateral distraction               Neuro Re-Ed       TrA bracing       TrA marches       Curl-up       Deadbug progressions       Pball pressdown               SLR Flex 2x10 x30ea       S/L Clamshells       Lateral band walk       Pallof press               Tandem stance       SLS               Prone femoral nerve glides       Seated slump gliders               Biodex LOS       Ther Ex       Bike 10 min 10 min     Treadmill walking       Seated flexion       LTR       SKC       DKC       Lumbar extension in standing       Belt self hip mobilization               Prone quad S      "  Harlan S       SLR HS S       PPU               Seated  > standing marches 0# standing 2x20      Seated > standing DF       Standing hip Abd / Ext OTB 3x10ea      LAQ  BTB x50ea     H/L hip abd  x30ea     Bridges  Hip Add bolster c bridge 3x10     Seated hip IR/ER OTB 3x10:L      TB eversion       TB DF               Leg press       Ther Activity       TUG               Chair squats 5xSTS testing 24\" 3x10:R b-stance     Step-ups       Step-overs               Gait Training       with SPC       BOOST TM               Modalities       [facility]                       "

## 2024-10-30 ENCOUNTER — OFFICE VISIT (OUTPATIENT)
Dept: PHYSICAL THERAPY | Facility: OTHER | Age: 74
End: 2024-10-30
Payer: MEDICARE

## 2024-10-30 DIAGNOSIS — Z47.1 AFTERCARE FOLLOWING LEFT HIP JOINT REPLACEMENT SURGERY: Primary | ICD-10-CM

## 2024-10-30 DIAGNOSIS — M16.12 PRIMARY OSTEOARTHRITIS OF LEFT HIP: Primary | ICD-10-CM

## 2024-10-30 DIAGNOSIS — M48.061 SPINAL STENOSIS OF LUMBAR REGION WITHOUT NEUROGENIC CLAUDICATION: ICD-10-CM

## 2024-10-30 DIAGNOSIS — Z96.642 AFTERCARE FOLLOWING LEFT HIP JOINT REPLACEMENT SURGERY: Primary | ICD-10-CM

## 2024-10-30 PROCEDURE — 97110 THERAPEUTIC EXERCISES: CPT

## 2024-10-30 NOTE — PROGRESS NOTES
Daily Note     Today's date: 10/30/2024  Patient name: Solis Dos Santos  : 1950  MRN: [id number]  Referring provider: Leonard Gallagher,*  Dx:   Encounter Diagnosis     ICD-10-CM    1. Primary osteoarthritis of left hip  M16.12       2. Spinal stenosis of lumbar region without neurogenic claudication  M48.061           Start Time: 0959  Stop Time: 1022  Total time in clinic (min): 23 minutes    Subjective: Pt reports he's excited and nervous for surgery; had to move furniture today and is extra sore L hip.      Objective: See treatment diary below      Assessment: Tolerated treatment well focused on LE flexibility and pain modulation; utilization MHP for pain relief with no adverse effects. NV, final review of precautions, transfers, mobility prior to surgery. Patient exhibited good technique with therapeutic exercises and would benefit from continued PT      Plan: Continue per plan of care.  Practice car transfer nv.     POC expires Unit limit Auth Expiration date PT/OT + Visit Limit?   24 BOMN N/A BOMN                                              Visit/Unit Tracking  AUTH Status:  Date 10/23 10/25 10/30                  No - Medicare Used RE-1 2 3                    Remaining  9 8 7                       Precautions: none pre-operatively; posterior hip precautions post-op      Visit #: 1 RE 2 3    Date:  10/23 10/25 10/30    Manuals       Hip LAD:L       Hip lateral distraction               Neuro Re-Ed       TrA bracing       TrA marches       Curl-up       Deadbug progressions       Pball pressdown               SLR Flex 2x10 x30ea       S/L Clamshells       Lateral band walk       Pallof press               Tandem stance       SLS               Prone femoral nerve glides       Seated slump gliders               Biodex LOS       Ther Ex       Bike 10 min 10 min  10 min   Treadmill walking       Seated flexion       LTR       SKC       DKC       Lumbar extension in standing       Belt self hip  "mobilization               Prone quad S    4x30\":L   Harlan S    3x30\":L   SLR HS S    3x30\"ea   PPU               Seated  > standing marches 0# standing 2x20      Seated > standing DF       Standing hip Abd / Ext OTB 3x10ea      LAQ  BTB x50ea     H/L hip abd  x30ea     Bridges  Hip Add bolster c bridge 3x10     Seated hip IR/ER OTB 3x10:L      TB eversion       TB DF               Leg press       Ther Activity       TUG               Chair squats 5xSTS testing 24\" 3x10:R b-stance     Step-ups       Step-overs               Gait Training       with SPC       BOOST TM               Modalities       MHP    L hip x10 min                   "

## 2024-11-01 ENCOUNTER — OFFICE VISIT (OUTPATIENT)
Dept: PHYSICAL THERAPY | Facility: OTHER | Age: 74
End: 2024-11-01
Payer: MEDICARE

## 2024-11-01 DIAGNOSIS — M48.061 SPINAL STENOSIS OF LUMBAR REGION WITHOUT NEUROGENIC CLAUDICATION: ICD-10-CM

## 2024-11-01 DIAGNOSIS — M16.12 PRIMARY OSTEOARTHRITIS OF LEFT HIP: Primary | ICD-10-CM

## 2024-11-01 PROCEDURE — 97530 THERAPEUTIC ACTIVITIES: CPT

## 2024-11-01 NOTE — DISCHARGE INSTR - AVS FIRST PAGE
Discharge Instructions - Orthopedics  Solis Dos Santos 74 y.o. male MRN: [id number]  Unit/Bed#:     Weight Bearing Status:                                           Weight Bearing as tolerated to left lower extremity with assistive devices.     Be sure to maintain posterior hip precautions: no bending at waist, no crossing legs, on internally rotating surgical leg    Pain Management/Medications  You may resume your usual medications.  You may stop pre-operative vitamins (Folic acid, Ferrous sulfate, and Vitamin C).   Please take the following medications:  Anti-coagulation (blood clot prevention) - Complete Lovenox injections for 28 days.   Pain medication:  Oxycodone 5 m tablet every 6 hours as needed for severe pain  Robaxin (Muscle relaxer) 500 m tablet up to 3 times a day as needed for mild pain and muscle discomfort  Tylenol 1000 mg: up to three times daily as needed for mild to moderate pain. Do not exceed 3000mg daily   Continue applying ice to your hip on and off for about 20 minutes as needed.  Continue to elevate your operative leg, with ankle above the level of your heart when possible.  If you have questions or pain concerns, please contact the office.   If you need refills of your medications prior to your next office visit, please contact the office.      Showering/Dressing Instructions:   Do not shower until first follow up appointment.  Leave bandage covering surgical incision on until seen in office.   No baths, swimming, or submerging your leg until cleared to do so.      Driving Instructions:  No driving until cleared by Orthopaedic Surgery.    PT/OT:  Continue PT/OT on outpatient basis as directed    Appt Instructions:   Follow up as scheduled on 2024 in [place]   If you need to change or cancel your appointment for any reason, please call the clinic at [phone]    Please contact the office if you experience any of the following:  Excessive bleeding (bleeding through your  dressing)  Fever greater than 101 degrees F after 48 hours (low grade fevers the day or two after surgery are normal)  Persistent nausea or vomiting  Decreased sensation or discoloration of the operative limb  Pain or swelling that is getting worse and not better with medication    Miscellaneous:  Advise use of abduction pillow (pink pillow) for 6 weeks after surgery when sleeping.    Advise against any dental cleanings or procedures for 3 months after surgery.   - If there is a dental emergency, please contact the office for further instructions.

## 2024-11-01 NOTE — PROGRESS NOTES
"Daily Note     Today's date: 2024  Patient name: Solis Dos Santos  : 1950  MRN: [id number]  Referring provider: Leonard Gallagher,*  Dx:   Encounter Diagnosis     ICD-10-CM    1. Primary osteoarthritis of left hip  M16.12       2. Spinal stenosis of lumbar region without neurogenic claudication  M48.061             Start Time: 1046  Stop Time: 1101  Total time in clinic (min): 15 minutes    Subjective: Pt reports readiness for surgery.      Objective: See treatment diary below      Assessment: Tolerated treatment well focused on hip strengthening pre-operatively. Practiced car transfer with posterior hip precautions; demonstrating proper execution while maintaining precautions. Patient exhibited good technique with therapeutic exercises and would benefit from continued PT      Plan: Continue per plan of care.  First post-op visit nv.     POC expires Unit limit Auth Expiration date PT/OT + Visit Limit?   24 BOMN N/A BOMN                                              Visit/Unit Tracking  AUTH Status:  Date 10/23 10/25 10/30 11/1                 No - Medicare Used RE-1 2 3 4                   Remaining  9 8 7 6                      Precautions: none pre-operatively; posterior hip precautions post-op      Visit #: 1 RE 2 3 4   Date:  10/23 10/25 10/30 11/1   Manuals       Hip LAD:L       Hip lateral distraction               Neuro Re-Ed       TrA bracing       TrA marches       Curl-up       Deadbug progressions       Pball pressdown               SLR Flex 2x10 x30ea    x30   S/L Clamshells    MTB x20   Lateral band walk       Pallof press               Tandem stance       SLS               Prone femoral nerve glides       Seated slump gliders               Biodex LOS       Ther Ex       Bike 10 min 10 min 10 min 15 min   Treadmill walking       Seated flexion       LTR       SKC       DKC       Lumbar extension in standing       Belt self hip mobilization               Prone quad S   4x30\":L  " "  Harlan S   3x30\":L    SLR HS S   3x30\"ea    PPU               Seated  > standing marches 0# standing 2x20      Seated > standing DF       Standing hip Abd / Ext OTB 3x10ea      LAQ  BTB x50ea     H/L hip abd  x30ea  MTB 30x5\"   Bridges  Hip Add bolster c bridge 3x10     Seated hip IR/ER OTB 3x10:L      TB eversion       TB DF               Leg press       Ther Activity       TUG               Chair squats 5xSTS testing 24\" 3x10:R b-stance     Step-ups       Step-overs               Gait Training       with SPC       BOOST TM       Car transfer pt education    SFP           Modalities       MHP   L hip x10 min                    "

## 2024-11-04 ENCOUNTER — ANESTHESIA (OUTPATIENT)
Age: 74
End: 2024-11-04
Payer: MEDICARE

## 2024-11-04 ENCOUNTER — HOSPITAL ENCOUNTER (OUTPATIENT)
Age: 74
Setting detail: OUTPATIENT SURGERY
Discharge: HOME | End: 2024-11-05
Attending: ORTHOPAEDIC SURGERY | Admitting: ORTHOPAEDIC SURGERY
Payer: MEDICARE

## 2024-11-04 DIAGNOSIS — M25.552 CHRONIC LEFT HIP PAIN: ICD-10-CM

## 2024-11-04 DIAGNOSIS — M16.12 PRIMARY OSTEOARTHRITIS OF LEFT HIP: ICD-10-CM

## 2024-11-04 DIAGNOSIS — G89.29 CHRONIC LEFT HIP PAIN: ICD-10-CM

## 2024-11-04 PROBLEM — N40.0 BPH (BENIGN PROSTATIC HYPERPLASIA): Chronic | Status: ACTIVE | Noted: 2024-11-04

## 2024-11-04 PROBLEM — E66.9 OBESITY (BMI 30-39.9): Status: ACTIVE | Noted: 2024-11-04

## 2024-11-04 PROCEDURE — 99024 POSTOP FOLLOW-UP VISIT: CPT | Performed by: PHYSICIAN ASSISTANT

## 2024-11-04 PROCEDURE — C1776 JOINT DEVICE (IMPLANTABLE): HCPCS | Performed by: ORTHOPAEDIC SURGERY

## 2024-11-04 PROCEDURE — C1713 ANCHOR/SCREW BN/BN,TIS/BN: HCPCS | Performed by: ORTHOPAEDIC SURGERY

## 2024-11-04 PROCEDURE — 97167 OT EVAL HIGH COMPLEX 60 MIN: CPT

## 2024-11-04 PROCEDURE — 97163 PT EVAL HIGH COMPLEX 45 MIN: CPT | Performed by: PHYSICAL THERAPIST

## 2024-11-04 PROCEDURE — NC001 PR NO CHARGE: Performed by: ORTHOPAEDIC SURGERY

## 2024-11-04 PROCEDURE — 99222 1ST HOSP IP/OBS MODERATE 55: CPT | Performed by: INTERNAL MEDICINE

## 2024-11-04 PROCEDURE — 27130 TOTAL HIP ARTHROPLASTY: CPT | Performed by: ORTHOPAEDIC SURGERY

## 2024-11-04 DEVICE — M-SPEC METAL FEMORAL HEAD 12/14 TAPER DIAMETER 40MM +5 OFFSET: Type: IMPLANTABLE DEVICE | Site: HIP | Status: FUNCTIONAL

## 2024-11-04 DEVICE — PINNACLE POROCOAT ACETABULAR SHELL SECTOR II 56MM OD
Type: IMPLANTABLE DEVICE | Site: ACETABULUM | Status: FUNCTIONAL
Brand: PINNACLE POROCOAT

## 2024-11-04 DEVICE — CORAIL HIP SYSTEM CEMENTLESS FEMORAL STEM 12/14 AMT 135 DEGREES KHO SIZE 14 HA COATED HIGH OFFSET NO COLLAR
Type: IMPLANTABLE DEVICE | Site: FEMUR | Status: FUNCTIONAL
Brand: CORAIL

## 2024-11-04 DEVICE — PINNACLE HIP SOLUTIONS ALTRX LD POLYETHYLENE ACETABULAR LINER +4 10 DEGREE 40MM ID 56MM OD
Type: IMPLANTABLE DEVICE | Site: ACETABULUM | Status: FUNCTIONAL
Brand: PINNACLE ALTRX

## 2024-11-04 DEVICE — PINNACLE CANCELLOUS BONE SCREW 6.5MM X 30MM
Type: IMPLANTABLE DEVICE | Site: ACETABULUM | Status: FUNCTIONAL
Brand: PINNACLE

## 2024-11-04 RX ORDER — HYDROMORPHONE HCL IN WATER/PF 6 MG/30 ML
0.2 PATIENT CONTROLLED ANALGESIA SYRINGE INTRAVENOUS
Status: DISCONTINUED | OUTPATIENT
Start: 2024-11-04 | End: 2024-11-04 | Stop reason: HOSPADM

## 2024-11-04 RX ORDER — OXYCODONE HYDROCHLORIDE 5 MG/1
5 TABLET ORAL EVERY 6 HOURS PRN
Qty: 30 TABLET | Refills: 0 | Status: SHIPPED | OUTPATIENT
Start: 2024-11-04 | End: 2024-11-14

## 2024-11-04 RX ORDER — PROMETHAZINE HYDROCHLORIDE 25 MG/ML
12.5 INJECTION, SOLUTION INTRAMUSCULAR; INTRAVENOUS ONCE AS NEEDED
Status: DISCONTINUED | OUTPATIENT
Start: 2024-11-04 | End: 2024-11-04 | Stop reason: HOSPADM

## 2024-11-04 RX ORDER — ACETAMINOPHEN 325 MG/1
975 TABLET ORAL EVERY 6 HOURS PRN
Status: DISCONTINUED | OUTPATIENT
Start: 2024-11-04 | End: 2024-11-05 | Stop reason: HOSPADM

## 2024-11-04 RX ORDER — FENTANYL CITRATE/PF 50 MCG/ML
25 SYRINGE (ML) INJECTION
Status: DISCONTINUED | OUTPATIENT
Start: 2024-11-04 | End: 2024-11-04 | Stop reason: HOSPADM

## 2024-11-04 RX ORDER — METHOCARBAMOL 500 MG/1
500 TABLET, FILM COATED ORAL EVERY 6 HOURS SCHEDULED
Status: DISCONTINUED | OUTPATIENT
Start: 2024-11-04 | End: 2024-11-05 | Stop reason: HOSPADM

## 2024-11-04 RX ORDER — LABETALOL HYDROCHLORIDE 5 MG/ML
10 INJECTION, SOLUTION INTRAVENOUS
Status: DISCONTINUED | OUTPATIENT
Start: 2024-11-04 | End: 2024-11-04 | Stop reason: HOSPADM

## 2024-11-04 RX ORDER — ONDANSETRON 2 MG/ML
4 INJECTION INTRAMUSCULAR; INTRAVENOUS ONCE AS NEEDED
Status: DISCONTINUED | OUTPATIENT
Start: 2024-11-04 | End: 2024-11-04 | Stop reason: HOSPADM

## 2024-11-04 RX ORDER — ACETAMINOPHEN 500 MG
1000 TABLET ORAL EVERY 6 HOURS PRN
Qty: 90 TABLET | Refills: 0 | Status: SHIPPED | OUTPATIENT
Start: 2024-11-04

## 2024-11-04 RX ORDER — SODIUM CHLORIDE, SODIUM LACTATE, POTASSIUM CHLORIDE, CALCIUM CHLORIDE 600; 310; 30; 20 MG/100ML; MG/100ML; MG/100ML; MG/100ML
125 INJECTION, SOLUTION INTRAVENOUS CONTINUOUS
Status: DISCONTINUED | OUTPATIENT
Start: 2024-11-04 | End: 2024-11-05 | Stop reason: HOSPADM

## 2024-11-04 RX ORDER — CALCIUM CARBONATE 500 MG/1
1000 TABLET, CHEWABLE ORAL DAILY PRN
Status: DISCONTINUED | OUTPATIENT
Start: 2024-11-04 | End: 2024-11-05 | Stop reason: HOSPADM

## 2024-11-04 RX ORDER — CHLORHEXIDINE GLUCONATE ORAL RINSE 1.2 MG/ML
15 SOLUTION DENTAL ONCE
Status: COMPLETED | OUTPATIENT
Start: 2024-11-04 | End: 2024-11-04

## 2024-11-04 RX ORDER — CEFAZOLIN SODIUM 1 G/50ML
1000 SOLUTION INTRAVENOUS EVERY 8 HOURS
Status: COMPLETED | OUTPATIENT
Start: 2024-11-04 | End: 2024-11-05

## 2024-11-04 RX ORDER — ATORVASTATIN CALCIUM 20 MG/1
10 TABLET, FILM COATED ORAL
Status: DISCONTINUED | OUTPATIENT
Start: 2024-11-04 | End: 2024-11-05 | Stop reason: HOSPADM

## 2024-11-04 RX ORDER — LEVOTHYROXINE SODIUM 25 UG/1
150 TABLET ORAL
Status: DISCONTINUED | OUTPATIENT
Start: 2024-11-05 | End: 2024-11-05 | Stop reason: HOSPADM

## 2024-11-04 RX ORDER — HYDROMORPHONE HCL/PF 1 MG/ML
0.5 SYRINGE (ML) INJECTION EVERY 2 HOUR PRN
Status: DISCONTINUED | OUTPATIENT
Start: 2024-11-04 | End: 2024-11-05 | Stop reason: HOSPADM

## 2024-11-04 RX ORDER — PROPOFOL 10 MG/ML
INJECTION, EMULSION INTRAVENOUS CONTINUOUS PRN
Status: DISCONTINUED | OUTPATIENT
Start: 2024-11-04 | End: 2024-11-04

## 2024-11-04 RX ORDER — TRANEXAMIC ACID 10 MG/ML
1000 INJECTION, SOLUTION INTRAVENOUS ONCE
Status: COMPLETED | OUTPATIENT
Start: 2024-11-04 | End: 2024-11-04

## 2024-11-04 RX ORDER — ALBUTEROL SULFATE 0.83 MG/ML
2.5 SOLUTION RESPIRATORY (INHALATION) ONCE AS NEEDED
Status: DISCONTINUED | OUTPATIENT
Start: 2024-11-04 | End: 2024-11-04 | Stop reason: HOSPADM

## 2024-11-04 RX ORDER — ONDANSETRON 2 MG/ML
4 INJECTION INTRAMUSCULAR; INTRAVENOUS EVERY 6 HOURS PRN
Status: DISCONTINUED | OUTPATIENT
Start: 2024-11-04 | End: 2024-11-05 | Stop reason: HOSPADM

## 2024-11-04 RX ORDER — TAMSULOSIN HYDROCHLORIDE 0.4 MG/1
0.4 CAPSULE ORAL
Status: DISCONTINUED | OUTPATIENT
Start: 2024-11-04 | End: 2024-11-05 | Stop reason: HOSPADM

## 2024-11-04 RX ORDER — OXYCODONE HYDROCHLORIDE 5 MG/1
5 TABLET ORAL EVERY 4 HOURS PRN
Status: DISCONTINUED | OUTPATIENT
Start: 2024-11-04 | End: 2024-11-05 | Stop reason: HOSPADM

## 2024-11-04 RX ORDER — HYDRALAZINE HYDROCHLORIDE 20 MG/ML
5 INJECTION INTRAMUSCULAR; INTRAVENOUS
Status: DISCONTINUED | OUTPATIENT
Start: 2024-11-04 | End: 2024-11-04 | Stop reason: HOSPADM

## 2024-11-04 RX ORDER — MAGNESIUM HYDROXIDE 1200 MG/15ML
LIQUID ORAL AS NEEDED
Status: DISCONTINUED | OUTPATIENT
Start: 2024-11-04 | End: 2024-11-04 | Stop reason: HOSPADM

## 2024-11-04 RX ORDER — HYDROMORPHONE HCL/PF 1 MG/ML
0.5 SYRINGE (ML) INJECTION
Status: DISCONTINUED | OUTPATIENT
Start: 2024-11-04 | End: 2024-11-04 | Stop reason: HOSPADM

## 2024-11-04 RX ORDER — PHENYLEPHRINE HCL IN 0.9% NACL 1 MG/10 ML
SYRINGE (ML) INTRAVENOUS AS NEEDED
Status: DISCONTINUED | OUTPATIENT
Start: 2024-11-04 | End: 2024-11-04

## 2024-11-04 RX ORDER — CEFAZOLIN SODIUM 2 G/50ML
2000 SOLUTION INTRAVENOUS ONCE
Status: COMPLETED | OUTPATIENT
Start: 2024-11-04 | End: 2024-11-04

## 2024-11-04 RX ORDER — ENOXAPARIN SODIUM 100 MG/ML
40 INJECTION SUBCUTANEOUS DAILY
Status: DISCONTINUED | OUTPATIENT
Start: 2024-11-04 | End: 2024-11-05 | Stop reason: HOSPADM

## 2024-11-04 RX ORDER — CEFAZOLIN SODIUM 1 G/3ML
INJECTION, POWDER, FOR SOLUTION INTRAMUSCULAR; INTRAVENOUS AS NEEDED
Status: DISCONTINUED | OUTPATIENT
Start: 2024-11-04 | End: 2024-11-04

## 2024-11-04 RX ORDER — GABAPENTIN 100 MG/1
100 CAPSULE ORAL EVERY 8 HOURS
Status: DISCONTINUED | OUTPATIENT
Start: 2024-11-04 | End: 2024-11-05 | Stop reason: HOSPADM

## 2024-11-04 RX ORDER — METOCLOPRAMIDE HYDROCHLORIDE 5 MG/ML
10 INJECTION INTRAMUSCULAR; INTRAVENOUS ONCE AS NEEDED
Status: DISCONTINUED | OUTPATIENT
Start: 2024-11-04 | End: 2024-11-04 | Stop reason: HOSPADM

## 2024-11-04 RX ORDER — METHOCARBAMOL 500 MG/1
500 TABLET, FILM COATED ORAL 3 TIMES DAILY PRN
Qty: 60 TABLET | Refills: 0 | Status: SHIPPED | OUTPATIENT
Start: 2024-11-04

## 2024-11-04 RX ORDER — OXYCODONE HYDROCHLORIDE 10 MG/1
10 TABLET ORAL EVERY 4 HOURS PRN
Status: DISCONTINUED | OUTPATIENT
Start: 2024-11-04 | End: 2024-11-05 | Stop reason: HOSPADM

## 2024-11-04 RX ORDER — DOCUSATE SODIUM 100 MG/1
100 CAPSULE, LIQUID FILLED ORAL 2 TIMES DAILY
Status: DISCONTINUED | OUTPATIENT
Start: 2024-11-04 | End: 2024-11-05 | Stop reason: HOSPADM

## 2024-11-04 RX ORDER — MIDAZOLAM HYDROCHLORIDE 2 MG/2ML
INJECTION, SOLUTION INTRAMUSCULAR; INTRAVENOUS AS NEEDED
Status: DISCONTINUED | OUTPATIENT
Start: 2024-11-04 | End: 2024-11-04

## 2024-11-04 RX ORDER — GLYCOPYRROLATE 0.2 MG/ML
INJECTION INTRAMUSCULAR; INTRAVENOUS AS NEEDED
Status: DISCONTINUED | OUTPATIENT
Start: 2024-11-04 | End: 2024-11-04

## 2024-11-04 RX ADMIN — ACETAMINOPHEN 325MG 975 MG: 325 TABLET ORAL at 13:27

## 2024-11-04 RX ADMIN — SODIUM CHLORIDE, SODIUM LACTATE, POTASSIUM CHLORIDE, AND CALCIUM CHLORIDE 125 ML/HR: .6; .31; .03; .02 INJECTION, SOLUTION INTRAVENOUS at 09:22

## 2024-11-04 RX ADMIN — MIDAZOLAM 2 MG: 1 INJECTION INTRAMUSCULAR; INTRAVENOUS at 10:43

## 2024-11-04 RX ADMIN — TAMSULOSIN HYDROCHLORIDE 0.4 MG: 0.4 CAPSULE ORAL at 16:33

## 2024-11-04 RX ADMIN — CHLORHEXIDINE GLUCONATE 15 ML: 1.2 RINSE ORAL at 09:17

## 2024-11-04 RX ADMIN — SODIUM CHLORIDE, SODIUM LACTATE, POTASSIUM CHLORIDE, AND CALCIUM CHLORIDE: .6; .31; .03; .02 INJECTION, SOLUTION INTRAVENOUS at 12:10

## 2024-11-04 RX ADMIN — CEFAZOLIN SODIUM 2000 MG: 2 SOLUTION INTRAVENOUS at 10:56

## 2024-11-04 RX ADMIN — SODIUM CHLORIDE, SODIUM LACTATE, POTASSIUM CHLORIDE, AND CALCIUM CHLORIDE 125 ML/HR: .6; .31; .03; .02 INJECTION, SOLUTION INTRAVENOUS at 21:06

## 2024-11-04 RX ADMIN — CEFAZOLIN SODIUM 1000 MG: 1 SOLUTION INTRAVENOUS at 18:42

## 2024-11-04 RX ADMIN — ENOXAPARIN SODIUM 40 MG: 40 INJECTION SUBCUTANEOUS at 22:41

## 2024-11-04 RX ADMIN — MEPIVACAINE HYDROCHLORIDE 4 ML: 15 INJECTION, SOLUTION EPIDURAL; INFILTRATION at 10:50

## 2024-11-04 RX ADMIN — PHENYLEPHRINE HYDROCHLORIDE 20 MCG/MIN: 10 INJECTION INTRAVENOUS at 10:53

## 2024-11-04 RX ADMIN — Medication 200 MCG: at 10:57

## 2024-11-04 RX ADMIN — SODIUM CHLORIDE, SODIUM LACTATE, POTASSIUM CHLORIDE, AND CALCIUM CHLORIDE 125 ML/HR: .6; .31; .03; .02 INJECTION, SOLUTION INTRAVENOUS at 13:36

## 2024-11-04 RX ADMIN — METHOCARBAMOL TABLETS 500 MG: 500 TABLET, COATED ORAL at 13:27

## 2024-11-04 RX ADMIN — HYDROMORPHONE HYDROCHLORIDE 0.5 MG: 1 INJECTION, SOLUTION INTRAMUSCULAR; INTRAVENOUS; SUBCUTANEOUS at 13:27

## 2024-11-04 RX ADMIN — GLYCOPYRROLATE 0.1 MG: 0.2 INJECTION INTRAMUSCULAR; INTRAVENOUS at 10:23

## 2024-11-04 RX ADMIN — PROPOFOL 80 MCG/KG/MIN: 10 INJECTION, EMULSION INTRAVENOUS at 10:53

## 2024-11-04 RX ADMIN — GABAPENTIN 100 MG: 100 CAPSULE ORAL at 13:27

## 2024-11-04 RX ADMIN — OXYCODONE HYDROCHLORIDE 10 MG: 10 TABLET ORAL at 14:31

## 2024-11-04 RX ADMIN — GABAPENTIN 100 MG: 100 CAPSULE ORAL at 22:41

## 2024-11-04 RX ADMIN — GLYCOPYRROLATE 0.1 MG: 0.2 INJECTION INTRAMUSCULAR; INTRAVENOUS at 10:43

## 2024-11-04 RX ADMIN — CEFAZOLIN 1000 MG: 1 INJECTION, POWDER, FOR SOLUTION INTRAMUSCULAR; INTRAVENOUS at 10:53

## 2024-11-04 RX ADMIN — METHOCARBAMOL TABLETS 500 MG: 500 TABLET, COATED ORAL at 18:42

## 2024-11-04 RX ADMIN — TRANEXAMIC ACID 1000 MG: 10 INJECTION, SOLUTION INTRAVENOUS at 11:08

## 2024-11-04 RX ADMIN — ATORVASTATIN CALCIUM 10 MG: 20 TABLET, FILM COATED ORAL at 22:41

## 2024-11-04 RX ADMIN — DOCUSATE SODIUM 100 MG: 100 CAPSULE, LIQUID FILLED ORAL at 18:42

## 2024-11-04 NOTE — PHYSICAL THERAPY NOTE
PT EVALUATION    Pt. Name: Solis Dos Santos  Pt. Age: 74 y.o.  MRN: [id number]  LENGTH OF STAY: 0    Patient Active Problem List   Diagnosis    Primary osteoarthritis of both knees    Osgood-Schlatter's disease of both knees    Pain in both knees    Status post right hip replacement    Impaired mobility and ADLs    Hypothyroidism    Difficulty sleeping    Obesity, morbid (HCC)    Chronic pain of left knee    Gross hematuria    Hyperlipidemia    Bladder mass    Right leg swelling    Malignant neoplasm of posterior wall of urinary bladder (HCC)    Malignant neoplasm of lateral wall of urinary bladder (HCC)    Peripheral vascular disease, unspecified (HCC)    Primary osteoarthritis of left knee    Primary osteoarthritis of right knee    DDD (degenerative disc disease), lumbar    Diverticula of intestine    Gastroesophageal reflux disease    Lumbar radiculopathy    Primary osteoarthritis of left hip    Obesity (BMI 30-39.9)    Spinal stenosis       Admitting Diagnoses:   Primary osteoarthritis of left hip [M16.12]  Chronic left hip pain [M25.552, G89.29]    Past Medical History:   Diagnosis Date    Acute blood loss anemia 08/09/2019    Aftercare following right hip joint replacement surgery 11/05/2019    Anxiety disorder     Atherosclerotic heart disease of native coronary artery without angina pectoris     Benign prostatic hyperplasia without lower urinary tract symptoms     Cancer (HCC)     bladder    Clotting disorder (HCC) 1/25/2023    Blood in urine    Disease of thyroid gland     hypo    Dyslipidemia     GERD (gastroesophageal reflux disease)     manages w/ diet, takes no meds    Hypertension     Impaired fasting blood sugar     Kidney stone     Low back pain     Obesity     Osteoarthritis     last assesed 6-6-16    Other chest pain     Paresthesia of skin     Polyneuropathy     last assesed 5-8-17    Pure hypercholesterolemia     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)      Spinal stenosis     Suspected UTI 03/09/2023    Urinary tract infection     Wears glasses        Past Surgical History:   Procedure Laterality Date    BACK SURGERY      L4-L5 sx    CHOLECYSTECTOMY      COLONOSCOPY  02/06/2019    CYSTOSCOPY N/A 04/26/2023    Procedure: CYSTOSCOPY w/ bladder biopsy;  Surgeon: Geo Bentley MD;  Location: [facility];  Service: Urology    HIP SURGERY  August 2019    Replacement    JOINT REPLACEMENT  August 2019    Hip replacement    MO ARTHRP ACETBLR/PROX FEM PROSTC AGRFT/ALGRFT Right 08/05/2019    Procedure: ARTHROPLASTY HIP TOTAL;  Surgeon: Leonard Gallagher MD;  Location: [facility];  Service: Orthopedics    MO CYSTO W/REMOVAL OF LESIONS SMALL N/A 03/23/2023    Procedure: TRANSURETHRAL RESECTION OF BLADDER TUMOR (TURBT), mitomycin ;  Surgeon: Geo Bentley MD;  Location: [facility];  Service: Urology    MO CYSTO W/REMOVAL OF LESIONS SMALL N/A 04/26/2023    Procedure: TRANSURETHRAL RESECTION OF BLADDER TUMOR (TURBT), cystoscopy with bladder biopsy;  Surgeon: Geo Bentley MD;  Location: [facility];  Service: Urology    TONSILLECTOMY         Imaging Studies:  No orders to display        11/04/24 1607   PT Last Visit   PT Visit Date 11/04/24   Note Type   Note type Evaluation   Pain Assessment   Pain Assessment Tool 0-10   Pain Score 3   Pain Location/Orientation Orientation: Left;Location: Hip   Hospital Pain Intervention(s) Cold applied;Repositioned;Ambulation/increased activity;Elevated;Emotional support;Rest   Restrictions/Precautions   Weight Bearing Precautions Per Order Yes   LLE Weight Bearing Per Order WBAT   Braces or Orthoses   (hip abduction pillow)   Other Precautions Chair Alarm;Bed Alarm;THR;WBS;Multiple lines;Fall Risk;Pain  (posterior hip precautions)   Home Living   Type of Home House  ([place])   Home Layout One level;Performs ADLs on one level;Able to live on main level with bedroom/bathroom  (1 RUDY (1/2 landing step))   Bathroom Shower/Tub  Walk-in shower   Bathroom Toilet Raised   Bathroom Equipment Grab bars in shower;Shower chair;Grab bars around toilet   Home Equipment Walker;Cane;Grab bars   Prior Function   Level of [place] Independent with ADLs;Independent with functional mobility;Independent with IADLS  (w/ SPC)   Lives With Spouse   Receives Help From Family   IADLs Independent with driving;Independent with meal prep;Independent with medication management   Falls in the last 6 months 0   Vocational Retired   General   Family/Caregiver Present No   Cognition   Overall Cognitive Status WFL   Arousal/Participation Alert   Orientation Level Oriented X4   Following Commands Follows all commands and directions without difficulty   Subjective   Subjective I am nervous.   RUE Assessment   RUE Assessment   (refer to OT)   LUE Assessment   LUE Assessment   (refer to OT)   RLE Assessment   RLE Assessment WFL  (4/5 grossly)   LLE Assessment   LLE Assessment X   Strength LLE   L Knee Flexion 3/5   L Knee Extension 3/5   L Ankle Dorsiflexion 4/5   L Ankle Plantar Flexion 4/5   Light Touch   RLE Light Touch Grossly intact   LLE Light Touch Grossly intact   Bed Mobility   Supine to Sit 4  Minimal assistance   Additional items Assist x 1;HOB elevated;Bedrails;Increased time required;Verbal cues;LE management   Additional Comments Pt greeted in supine.   Transfers   Sit to Stand 4  Minimal assistance   Additional items Assist x 1;Bedrails;Increased time required;Verbal cues  (w/ RW)   Stand to Sit 4  Minimal assistance   Additional items Assist x 1;Armrests;Increased time required;Verbal cues  (w/ RW)   Additional Comments cues for hand placement   Ambulation/Elevation   Gait pattern Improper Weight shift;Antalgic;Wide THERESA;Decreased foot clearance;Decreased L stance;Short stride;Step to;Excessively slow   Gait Assistance 4  Minimal assist   Additional items Assist x 1;Verbal cues   Assistive Device Rolling walker   Distance 4'x1   Ambulation/Elevation  Additional Comments cues for RW management   Balance   Static Sitting Good   Dynamic Sitting Fair +   Static Standing Fair  (w/ RW)   Dynamic Standing Poor +  (w/ RW)   Ambulatory Poor +  (w/ RW)   Activity Tolerance   Activity Tolerance Patient tolerated treatment well   Medical Staff Made Aware JOHANA Melendrez   Nurse Made Aware FAIZAN Traylor   Assessment   Prognosis Good   Problem List Decreased strength;Decreased range of motion;Decreased endurance;Impaired balance;Decreased mobility;Obesity;Orthopedic restrictions;Pain   Assessment Pt. 74 y.o.male presents for elective surgery. Past medical hx includes bladder CA, HTN, polyneuropathy, RA. Pt admitted for Primary osteoarthritis of left hip w/ Primary osteoarthritis of left hip (M16.12)  Chronic left hip pain (M25.552, G89.29). S/p L elective THR (posterior) POD #0. Pt referred to PT for functional mobility evaluation & D/C planning w/ orders of activity as tolerated. WBAT LLE. Posterior hip precautions. Use of hip abduction pillow. PTA, pt reports being I w/ SPC. Personal factors affecting pt at time of IE include: dec caregiver support, steps to enter, and use of AD . During evaluation, deficits included dec mobility, balance, ambulation. Reviewed posterior hip precautions with good understanding. Required minAx1 for supine to sit, sit<>stand, and ambulation. Pt able to ambulate 4' with RW. Antalgic step to gait with no gross LOB noted. Pt demonstrated dec endurance and tolerance to activity. Denies reports of dizziness or SOB t/o session. Pt was educated on fall precautions and reinforced w/ good understanding. Pt would benefit from continued PT to address deficits as defined above and maximize level of independence with functional mobility and safety. Based on pt presentation and impaired function, pt would benefit from level III, (minimum resource intensity) at D/C. The patient's AM-PAC Basic Mobility Inpatient Short Form Raw Score is 19. A Raw score of greater than  16 suggests the patient may benefit from discharge to home. Please also refer to the recommendation of the Physical Therapist for safe discharge planning. Nsg staff to continue to mobilized pt (OOB in chair for all meals & ambulate in room/unit) as tolerated to prevent further decline in function. Nsg notified. Co-eval performed to complete this PT evaluation for the pts best interest given pts medical complexity and functional level.   Barriers to Discharge Inaccessible home environment   Barriers to Discharge Comments RUDY   Goals   Patient Goals to sit in chair   STG Expiration Date 11/11/24   Short Term Goal #1 1) Inc overall LE strength by 1/2 MMT grade to improve functional mobility; 2) Pt will demonstrate improved bed mobility with mod I to dec caregiver burden; 3) Pt will demonstrate improved transfers w/ mod I for inc safety; 4) Pt will be able to amb w/ mod I >150' w/ RW for household distances to inc safety and dec caregiver burden; 5) Pt will be able to navigate stairs with mod I to dec caregiver burden and inc safety with functional mobility; 6) Improve general balance by 1 grade to inc safety; 7) PT for ongoing patient and caregiver education   PT Treatment Day 0   Plan   Treatment/Interventions Functional transfer training;Elevations;Therapeutic exercise;LE strengthening/ROM;Endurance training;Patient/family training;Bed mobility;Gait training;Spoke to nursing;OT   PT Frequency Twice a day  (PRN)   Discharge Recommendation   Rehab Resource Intensity Level, PT III (Minimum Resource Intensity)   Equipment Recommended Walker  (pt owns)   AM-PAC Basic Mobility Inpatient   Turning in Flat Bed Without Bedrails 3   Lying on Back to Sitting on Edge of Flat Bed Without Bedrails 4   Moving Bed to Chair 3   Standing Up From Chair Using Arms 3   Walk in Room 3   Climb 3-5 Stairs With Railing 3   Basic Mobility Inpatient Raw Score 19   Basic Mobility Standardized Score 42.48   [facility] Highest Level Of Mobility    -HLM Goal 6: Walk 10 steps or more   -HLM Achieved 5: Stand (1 or more minutes)   End of Consult   Patient Position at End of Consult Bedside chair;Bed/Chair alarm activated;All needs within reach       Hx/personal factors: co-morbidities, inaccessible home, advanced age, mutliple lines, use of AD, pain, total hip precautions, fall risk, and obesity, coping styles, social background, past experience, behavior pattern, post op precautions  Examination: dec mobility, dec balance, dec endurance, dec amb, risk for falls, pain, assessed body system, balance, endurance, amb, D/C disposition & fall risk, impairements in locomotion, musculoskeletal, balance, endurance, posture, coordination, assessed cognition, impairments in systems including multiple body structures involved; musculoskeletal (ROM, strength, posture, BMI), neuromuscular (balance,locomotion, gait, transfers, motor control and learning, sensation), joint integrity, integumentary (skin integrity, presence of scars or wounds), cardiopulmonary (vitals, edema); activity limitations (difficulties executing an action); participation restrictions (problems associated w involvement in life situations)  Clinical: unpredictable (ongoing medical status, risk for falls, POD #0, and pain mgt)  Complexity: high      Mariposa Downing, PT

## 2024-11-04 NOTE — ASSESSMENT & PLAN NOTE
Status post TJA  Post op pain control per primary service  Follow up and monitor post op hgb  Appropriate DVT proph ordered by surgical team

## 2024-11-04 NOTE — H&P
H&P Exam - Orthopedics   Solis Dos Santos 74 y.o. male MRN: [id number]      Assessment/Plan   Assessment:  left Hip Osteoarthritis in this adult male who continues to have life altering weightbearing pain in the left hip and left groin that occurs despite appropriate nonsurgical treatments    Plan:  left posterior Total Hip Arthroplasty.  Patient is familiar with risks, benefits, and alternatives    TOTAL HIP REPLACEMENT INDICATIONS AND RISKS  We had a lengthy discussion with the patient regarding the potential options for treatment.  Based on current presentation, radiographic exam, and lack of sufficient response to nonsurgical management including activity modification, NSAIDs and therapeutic exercise, I would offer treatment in the form of total hip arthroplasty at this time.      The potential risks and benefits were discussed in detail.    While no guarantees can be made, total hip replacement has a very high success rate in terms of relieving a patient's hip pain and returning them to a more active, independent lifestyle for 10-15 years or more. All surgery carries some risk; for hip replacement, the complication rate is low but may include: death (very rare), infection, bleeding requiring transfusion, blood clots in legs traveling to lungs, nerve and/or blood vessel damage, bone fracture, leg length difference, prosthetic hip dislocation, persistent hip pain and/or stiffness, and repeat surgery(ies). The risk of a major complication is generally 1-2 per 1000 cases. Total hip replacement should only be done if conservative treatment has failed. The predicted revision rate is approximately 1% per year; in other words, 90% of hip replacements last 10 years or more, 80% last 20 years or more, and so on, assuming no injury. Additionally, we discussed anesthesia related complications which will be discussed in greater detail with the anesthesia team before surgery. The patient voiced their understanding of the  surgical plan and potential complications and wishes to proceed with surgery.    History of Present Illness   HPI:  Solis Dos Santos is a 74 y.o. male who presents with pain in the left hip. Patient is no longer getting adequate relief from non-operative modalities. Patient is continuing to have debilitating pain from their hip, interfering with daily activities and sleep. Patient denies any concerns with infections, new neuropathies, or any acute injuries.     Historical Information  Review Of Systems:   Skin: Normal  Neuro: See HPI  Musculoskeletal: See HPI  14 point review of systems negative except as stated above     Past Medical History:   Past Medical History:   Diagnosis Date    Acute blood loss anemia 08/09/2019    Aftercare following right hip joint replacement surgery 11/05/2019    Anxiety disorder     Atherosclerotic heart disease of native coronary artery without angina pectoris     Benign prostatic hyperplasia without lower urinary tract symptoms     Cancer (HCC)     bladder    Clotting disorder (HCC) 1/25/2023    Blood in urine    Disease of thyroid gland     hypo    Dyslipidemia     GERD (gastroesophageal reflux disease)     manages w/ diet, takes no meds    Hypertension     Impaired fasting blood sugar     Kidney stone     Low back pain     Obesity     Osteoarthritis     last assesed 6-6-16    Other chest pain     Paresthesia of skin     Polyneuropathy     last assesed 5-8-17    Pure hypercholesterolemia     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)     Spinal stenosis     Suspected UTI 03/09/2023    Urinary tract infection     Wears glasses        Past Surgical History:   Past Surgical History:   Procedure Laterality Date    BACK SURGERY      L4-L5 sx    CHOLECYSTECTOMY      COLONOSCOPY  02/06/2019    CYSTOSCOPY N/A 04/26/2023    Procedure: CYSTOSCOPY w/ bladder biopsy;  Surgeon: Geo Bentley MD;  Location: [facility];  Service: Urology    HIP SURGERY   2019    Replacement    JOINT REPLACEMENT  2019    Hip replacement    NC ARTHRP ACETBLR/PROX FEM PROSTC AGRFT/ALGRFT Right 2019    Procedure: ARTHROPLASTY HIP TOTAL;  Surgeon: Leonard Gallagher MD;  Location: [facility];  Service: Orthopedics    NC CYSTO W/REMOVAL OF LESIONS SMALL N/A 2023    Procedure: TRANSURETHRAL RESECTION OF BLADDER TUMOR (TURBT), mitomycin ;  Surgeon: Geo Bentley MD;  Location: [facility];  Service: Urology    NC CYSTO W/REMOVAL OF LESIONS SMALL N/A 2023    Procedure: TRANSURETHRAL RESECTION OF BLADDER TUMOR (TURBT), cystoscopy with bladder biopsy;  Surgeon: Geo Bentley MD;  Location: [facility];  Service: Urology    TONSILLECTOMY         Family History:  Family history reviewed and non-contributory  Family History   Problem Relation Age of Onset    Arthritis Other     Heart disease Father     Arthritis Mother        Social History:  Social History     Socioeconomic History    Marital status: /Civil Union     Spouse name: None    Number of children: None    Years of education: None    Highest education level: None   Occupational History    None   Tobacco Use    Smoking status: Former     Current packs/day: 0.00     Average packs/day: 1 pack/day for 20.8 years (20.8 ttl pk-yrs)     Types: Cigarettes     Start date: 1967     Quit date: 1987     Years since quittin.0    Smokeless tobacco: Never   Vaping Use    Vaping status: Never Used   Substance and Sexual Activity    Alcohol use: Yes     Alcohol/week: 1.0 standard drink of alcohol     Types: 1 Cans of beer per week    Drug use: Never    Sexual activity: Not Currently     Partners: Female     Birth control/protection: None   Other Topics Concern    None   Social History Narrative    Smoking: Non-smoker    As per [employer]     Social Determinants of Health     Financial Resource Strain: Low Risk  (9/15/2023)    Overall Financial Resource Strain (CARDIA)     Difficulty  "of Paying Living Expenses: Not hard at all   Food Insecurity: No Food Insecurity (9/20/2024)    Nursing - Inadequate Food Risk Classification     Worried About Running Out of Food in the Last Year: Never true     Ran Out of Food in the Last Year: Never true     Ran Out of Food in the Last Year: Not on file   Transportation Needs: No Transportation Needs (9/20/2024)    PRAPARE - Transportation     Lack of Transportation (Medical): No     Lack of Transportation (Non-Medical): No   Physical Activity: Not on file   Stress: Not on file   Social Connections: Not on file   Intimate Partner Violence: Not on file   Housing Stability: Low Risk  (9/20/2024)    Housing Stability Vital Sign     Unable to Pay for Housing in the Last Year: No     Number of Times Moved in the Last Year: 0     Homeless in the Last Year: No       Allergies:   No Known Allergies        Labs:  0   Lab Value Date/Time    HCT 46.5 10/10/2024 0853    HCT 49.2 01/15/2024 0941    HCT 47.2 09/08/2023 0913    HGB 15.5 10/10/2024 0853    HGB 15.8 01/15/2024 0941    HGB 15.9 09/08/2023 0913    INR 1.02 10/10/2024 0853    WBC 5.11 10/10/2024 0853    WBC 4.86 01/15/2024 0941    WBC 5.75 09/08/2023 0913    CRP <3.0 07/08/2019 1014       Meds:    Current Facility-Administered Medications:     ceFAZolin (ANCEF) IVPB (premix in dextrose) 2,000 mg 50 mL, 2,000 mg, Intravenous, Once, Leonard Gallagher MD    lactated ringers infusion, 125 mL/hr, Intravenous, Continuous, Leonard Gallagher MD, Last Rate: 125 mL/hr at 11/04/24 0922, 125 mL/hr at 11/04/24 0922    tranexamic acid (CYKLOKAPRON) 1000-0.7 MG/100ML-% injection 1,000 mg, 1,000 mg, Intravenous, Once, Leonard Gallagher MD    Blood Culture:   No results found for: \"BLOODCX\"    Wound Culture:   No results found for: \"WOUNDCULT\"    Ins and Outs:  No intake/output data recorded.          Physical Exam  /76   Pulse 83   Temp 97.9 °F (36.6 °C) (Temporal)   Resp 18   Ht 6' 1\" (1.854 m)   Wt 122 " "kg (269 lb 3.2 oz)   SpO2 94%   BMI 35.52 kg/m²   /76   Pulse 83   Temp 97.9 °F (36.6 °C) (Temporal)   Resp 18   Ht 6' 1\" (1.854 m)   Wt 122 kg (269 lb 3.2 oz)   SpO2 94%   BMI 35.52 kg/m²   Gen: No acute distress, resting comfortably in bed  HEENT: Eyes clear, moist mucus membranes, hearing intact  Respiratory: No audible wheezing or stridor  Cardiovascular: Well Perfused peripherally, 2+ distal pulse  Abdomen: nondistended, no peritoneal signs  Ortho Exam: limited hip ROM due to pain and mechanical blocking  Neuro Exam: intact    Lab Results: Reviewed  Imaging: Reviewed   "

## 2024-11-04 NOTE — PLAN OF CARE
Problem: PAIN - ADULT  Goal: Verbalizes/displays adequate comfort level or baseline comfort level  Description: Interventions:  - Encourage patient to monitor pain and request assistance  - Assess pain using appropriate pain scale  - Administer analgesics based on type and severity of pain and evaluate response  - Implement non-pharmacological measures as appropriate and evaluate response  - Consider cultural and social influences on pain and pain management  - Notify physician/advanced practitioner if interventions unsuccessful or patient reports new pain  Outcome: Progressing     Problem: INFECTION - ADULT  Goal: Absence or prevention of progression during hospitalization  Description: INTERVENTIONS:  - Assess and monitor for signs and symptoms of infection  - Monitor lab/diagnostic results  - Monitor all insertion sites, i.e. indwelling lines, tubes, and drains  - Monitor endotracheal if appropriate and nasal secretions for changes in amount and color  - Gaines appropriate cooling/warming therapies per order  - Administer medications as ordered  - Instruct and encourage patient and family to use good hand hygiene technique  - Identify and instruct in appropriate isolation precautions for identified infection/condition  Outcome: Progressing  Goal: Absence of fever/infection during neutropenic period  Description: INTERVENTIONS:  - Monitor WBC    Outcome: Progressing     Problem: SAFETY ADULT  Goal: Patient will remain free of falls  Description: INTERVENTIONS:  - Educate patient/family on patient safety including physical limitations  - Instruct patient to call for assistance with activity   - Consult OT/PT to assist with strengthening/mobility   - Keep Call bell within reach  - Keep bed low and locked with side rails adjusted as appropriate  - Keep care items and personal belongings within reach  - Initiate and maintain comfort rounds  - Make Fall Risk Sign visible to staff  - Offer Toileting every 2 Hours,  in advance of need  - Initiate/Maintain bed/chair alarm  - Obtain necessary fall risk management equipment: rolling walker  - Apply yellow socks and bracelet for high fall risk patients  - Consider moving patient to room near nurses station  Outcome: Progressing  Goal: Maintain or return to baseline ADL function  Description: INTERVENTIONS:  -  Assess patient's ability to carry out ADLs; assess patient's baseline for ADL function and identify physical deficits which impact ability to perform ADLs (bathing, care of mouth/teeth, toileting, grooming, dressing, etc.)  - Assess/evaluate cause of self-care deficits   - Assess range of motion  - Assess patient's mobility; develop plan if impaired  - Assess patient's need for assistive devices and provide as appropriate  - Encourage maximum independence but intervene and supervise when necessary  - Involve family in performance of ADLs  - Assess for home care needs following discharge   - Consider OT consult to assist with ADL evaluation and planning for discharge  - Provide patient education as appropriate  Outcome: Progressing  Goal: Maintains/Returns to pre admission functional level  Description: INTERVENTIONS:  - Perform AM-PAC 6 Click Basic Mobility/ Daily Activity assessment daily.  - Set and communicate daily mobility goal to care team and patient/family/caregiver.   - Collaborate with rehabilitation services on mobility goals if consulted  - Perform Range of Motion 3 times a day.  - Reposition patient every 2 hours.  - Dangle patient 3 times a day  - Stand patient 3 times a day  - Ambulate patient 3 times a day  - Out of bed to chair 3 times a day   - Out of bed for meals 3 times a day  - Out of bed for toileting  - Record patient progress and toleration of activity level   Outcome: Progressing     Problem: DISCHARGE PLANNING  Goal: Discharge to home or other facility with appropriate resources  Description: INTERVENTIONS:  - Identify barriers to discharge  w/patient and caregiver  - Arrange for needed discharge resources and transportation as appropriate  - Identify discharge learning needs (meds, wound care, etc.)  - Arrange for interpretive services to assist at discharge as needed  - Refer to Case Management Department for coordinating discharge planning if the patient needs post-hospital services based on physician/advanced practitioner order or complex needs related to functional status, cognitive ability, or social support system  Outcome: Progressing     Problem: Knowledge Deficit  Goal: Patient/family/caregiver demonstrates understanding of disease process, treatment plan, medications, and discharge instructions  Description: Complete learning assessment and assess knowledge base.  Interventions:  - Provide teaching at level of understanding  - Provide teaching via preferred learning methods  Outcome: Progressing

## 2024-11-04 NOTE — CONSULTS
Assessment & Plan  Primary osteoarthritis of left hip  Status post TJA  Post op pain control per primary service  Follow up and monitor post op hgb  Appropriate DVT proph ordered by surgical team    Hypothyroidism  Continue supplemental hormone replacement  Hyperlipidemia  Continue low-cholesterol diet  Gastroesophageal reflux disease    Obesity (BMI 30-39.9)      PRE-OP HGB LEVEL:   Lab Results   Component Value Date    HGB 15.5 10/10/2024         Subjective/ HPI: Solis Dos Santos was seen and examined. Hx of HIP pain failed out patient conservative measures. Elected to undergo total HIP arthroplasty We are asked to see patient for post op management of underlying medical co-morbidities as outlined above. Pt did well intra and post operatively with good hemodynamics. Pt currently comfortable and without any reported post op nausea.             ROS:   A 10 point ROS was performed; negative except as noted above.     Past medical history    Past Medical History:   Diagnosis Date    Acute blood loss anemia 08/09/2019    Aftercare following right hip joint replacement surgery 11/05/2019    Anxiety disorder     Atherosclerotic heart disease of native coronary artery without angina pectoris     Benign prostatic hyperplasia without lower urinary tract symptoms     Cancer (HCC)     bladder    Clotting disorder (HCC) 1/25/2023    Blood in urine    Disease of thyroid gland     hypo    Dyslipidemia     GERD (gastroesophageal reflux disease)     manages w/ diet, takes no meds    Hypertension     Impaired fasting blood sugar     Kidney stone     Low back pain     Obesity     Osteoarthritis     last assesed 6-6-16    Other chest pain     Paresthesia of skin     Polyneuropathy     last assesed 5-8-17    Pure hypercholesterolemia     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)     Spinal stenosis     Suspected UTI 03/09/2023    Urinary tract infection     Wears glasses        Social  History:    Substance Use History:   Social History     Substance and Sexual Activity   Alcohol Use Yes    Alcohol/week: 1.0 standard drink of alcohol    Types: 1 Cans of beer per week     Social History     Tobacco Use   Smoking Status Former    Current packs/day: 0.00    Average packs/day: 1 pack/day for 20.8 years (20.8 ttl pk-yrs)    Types: Cigarettes    Start date: 1967    Quit date: 1987    Years since quittin.0   Smokeless Tobacco Never     Social History     Substance and Sexual Activity   Drug Use Never       Family History:    Family history non-contributory      Review of Scheduled Meds:  Current Facility-Administered Medications   Medication Dose Route Frequency Provider Last Rate    acetaminophen  975 mg Oral Q6H PRN Radha Louise, PA-C      atorvastatin  10 mg Oral HS Radha Gabrielno, PA-C      calcium carbonate  1,000 mg Oral Daily PRN Radha Ciano, PA-C      cefazolin  1,000 mg Intravenous Q8H Radha Louise, PA-C      docusate sodium  100 mg Oral BID Radha Louise, PA-C      enoxaparin  40 mg Subcutaneous Daily Radha Louise, PA-C      gabapentin  100 mg Oral Q8H Radha Luoise, PA-C      HYDROmorphone  0.5 mg Intravenous Q2H PRN Radha Aspen, PA-C      lactated ringers  1,000 mL Intravenous Once PRN Radha Aspen PA-C      And    lactated ringers  1,000 mL Intravenous Once PRN Radha Louise, PA-C      lactated ringers  125 mL/hr Intravenous Continuous Radha Louise PA-C 125 mL/hr (24 1336)    lactated ringers  125 mL/hr Intravenous Continuous Melany Montero MD      lactated ringers  125 mL/hr Intravenous Continuous Leonard Gallagher  mL/hr (24 1210)    [START ON 2024] levothyroxine  150 mcg Oral Early Morning Radha Louise, PA-C      methocarbamol  500 mg Oral Q6H TRISTON Radha Louise, PA-C      ondansetron  4 mg Intravenous Q6H PRN Radha Ciano, PA-C      oxyCODONE  10 mg Oral Q4H PRN Radha Aspen, PA-C      oxyCODONE  5 mg Oral Q4H PRN Radha Louise, PA-C      povidone-iodine (BETADINE)  10 % 20 Application in sodium chloride 0.9 % 500 mL irrigation bottle   Irrigation Once Leonard Gallagher MD      sodium chloride  1,000 mL Intravenous Once PRN Radha Louise PA-C      And    sodium chloride  1,000 mL Intravenous Once PRN Radha Louise PA-C      tamsulosin  0.4 mg Oral Daily With Dinner Radha Louise PA-C         Labs:       Input and Output Summary (last 24 hours):       Intake/Output Summary (Last 24 hours) at 2024 1352  Last data filed at 2024 1210  Gross per 24 hour   Intake 1150 ml   Output 300 ml   Net 850 ml       Imaging:     No orders to display       *Labs /Radiology studiesLabs reviewed  *Medications reviewed and reconciled as needed  *Please refer to order section for additional ordered labs studies  *Case discussed with primary attending during morning huddle case rounds    Vitals:   Temp (24hrs), Av.7 °F (36.5 °C), Min:97.5 °F (36.4 °C), Max:97.9 °F (36.6 °C)    Temp:  [97.5 °F (36.4 °C)-97.9 °F (36.6 °C)] 97.5 °F (36.4 °C)  HR:  [71-90] 75  Resp:  [16-22] 18  BP: ()/(55-76) 114/64  SpO2:  [94 %-99 %] 97 %  Body mass index is 35.52 kg/m².     Physical Exam:   General Appearance: no distress, conversive  HEENT: PERRLA, conjuctiva normal; oropharynx clear; mucous membranes moist;   Neck:  Supple, no lymphadenopathy or thyromegaly  Lungs: clear bilaterally, normal respiratory effort, no retractions, expiratory effort normal  CV: S1 S2, no murmurs rubs or gallops, rate is regular   ABD: soft non tender, +BS x4  EXT: DP pulses intact, no lymphadenopathy, no edema ;  left HIP dressing in place  Skin: normal turgor, normal texture, no rash  Psych: affect normal, mood normal  Neuro: AAOx3          Invasive Devices       Peripheral Intravenous Line  Duration             Peripheral IV 24 Dorsal (posterior);Right Hand <1 day                       Code Status: Level 1 - Full Code  Current Length of Stay: 0 day(s)    Total floor / unit time spent today 30 minutes   Coordination of patient's care was performed in conjunction with primary service. Time invested included review of patient's labs, vitals, and management of their comorbidities with continued monitoring, examination of patient as well as answering patient questions, documenting her findings and creating progress note in electronic medical record,  ordering appropriate diagnostic testing. Medical decision making for the day was made by supervising physician unless otherwise noted in their attestation statement.    ** Please Note: Fluency Direct voice to text software may have been used in the creation of this document. Audio transcription errors may occur**

## 2024-11-04 NOTE — ANESTHESIA PROCEDURE NOTES
Spinal Block    Patient location during procedure: OR  Start time: 11/4/2024 10:50 AM  Reason for block: primary anesthetic  Staffing  Performed by: Bashir Coelho CRNA  Authorized by: Melany Montero MD    Preanesthetic Checklist  Completed: patient identified, IV checked, site marked, risks and benefits discussed, surgical consent, monitors and equipment checked, pre-op evaluation and timeout performed  Spinal Block  Patient position: sitting  Prep: ChloraPrep  Patient monitoring: cardiac monitor, continuous pulse ox and frequent blood pressure checks  Approach: midline  Location: L2-3  Needle  Needle type: Pencan   Needle gauge: 24 G  Needle length: 3.5 in  Assessment  Injection Assessment:  positive aspiration for clear CSF, no paresthesia on injection and negative aspiration for heme.  Post-procedure:  site cleaned

## 2024-11-04 NOTE — ANESTHESIA POSTPROCEDURE EVALUATION
Post-Op Assessment Note    Last Filed PACU Vitals:  Vitals Value Taken Time   Temp 97.5 °F (36.4 °C) 11/04/24 1324   Pulse 68 11/04/24 1431   /64 11/04/24 1332   Resp 18 11/04/24 1324   SpO2 97 % 11/04/24 1431   Vitals shown include unfiled device data.    Modified Racheal:  Activity: 2 (11/4/2024  1:00 PM)  Respiration: 2 (11/4/2024  1:00 PM)  Circulation: 2 (11/4/2024  1:00 PM)  Consciousness: 2 (11/4/2024  1:00 PM)  Oxygen Saturation: 2 (11/4/2024  1:00 PM)  Modified Racheal Score: 10 (11/4/2024  1:00 PM)

## 2024-11-04 NOTE — OP NOTE
OPERATIVE REPORT  PATIENT NAME: Solis Dos Santos  : 1950  MRN: [id number]  Pt Location:  [facility] OR ROOM 05    Surgery Date: 2024    Surgeons and Role:     * Leonard Gallagher MD - Primary     * Ian Reyes MD - Assisting     * Radha Louise PA-C - Assisting     Preop Diagnosis:  Primary osteoarthritis of left hip [M16.12]  Chronic left hip pain [M25.552, G89.29]    Post-Op Diagnosis Codes:     * Primary osteoarthritis of left hip [M16.12]     * Chronic left hip pain [M25.552, G89.29]    Procedure(s):  Left - Left total hip arthroplasty    Specimens:  * No specimens in log *    Estimated Blood Loss:   100 mL    Drains:  * No LDAs found *    Anesthesia Type:   Choice     Operative Indications:  Primary osteoarthritis of left hip [M16.12]  Chronic left hip pain [M25.552, G89.29]    Operative Findings:  Depuy   Cup-56mm metal   Liner-10 degree lipped poly   Head/meck=40 +5mm metal   Femur-14 HO    Complications:   None      Hip Approach: Posterior        Procedure and Technique:  Following the induction medical level of spinal anesthesia, Morfin catheter was then sterilely introduced in this patient's bladder.  Antibiotics administered.  He was then placed in the right lower decubitus, left side up position.  An axillary roll was placed under right axilla.  The left hip and lateral thigh were then prepped sterilely.  A posterolateral approach was created in order to gain access to the hip.  Full-thickness flaps were raised when accessed the excess tensor fascia.  This was split, expose the deep layer of the hip.  With the hip in internal Tatian, the piriformis tendon was identified, transected, and retracted in a posterior fashion.  The remainder the short external rotators were sectioned as well.  A T-type capsulotomy was used up the hip.  Femoral head was then delivered posteriorly.  Utilizing a femoral neck osteotomy guide, the proper femoral cut was then made.  A posterior capsulectomy, anterior  capsulotomy then created in order to circumferentially expose the acetabulum.  Reaming started at 54 and extended 56 which point time a Hemisphere bleeding cancellous bone was encountered.  56 trial was inserted and noted fit well, therefore the 56 mm insert was then hammered into place.  A single screw was then placed within the posterior superior quadrant for additional fixation.  The 10 degree lipped liner was snapped into position.  The proximal femur was then prepared for insertion of press-fit component.  Box osteotome was used with proximal femur.  Patient's canal sequentially broached.  With a #14 high offset, 5.0 40 mm head, the hip was located, taken through range of motion, the finding quite stable.  Trial components removed if the hip was carefully dislocated.  The insert components were assembled and introduced the patient's left hip in standard fashion.  Hip was once more located, taken the range of motion and found to be stable.  Satisfied with the extent of surgery, the wounds were flushed with saline and closed.  A Betadine soak initiated.  The piriformis tendons are approximated to the greater trochanter number Vicryl suture.  The tensor fascia was then closed with number Vicryl suture.  The subcu tissues were closed with a mixture #1 for deep layer, 2-0 Vicryl for the subcutaneous tissues, and skin staples the skin.  Sterile dressings were applied.  He was then transferred to recovery room in stable condition with plans to include physical therapy weight-bear document.  He will require DVT prophylaxis with Lovenox   I was present for the entire procedure.    Patient Disposition:  PACU               SIGNATURE: Leonard Gallagher MD  DATE: November 4, 2024  TIME: 12:02 PM

## 2024-11-04 NOTE — PLAN OF CARE
Problem: OCCUPATIONAL THERAPY ADULT  Goal: Performs self-care activities at highest level of function for planned discharge setting.  See evaluation for individualized goals.  Description: Treatment Interventions: ADL retraining, Functional transfer training, Endurance training, Patient/family training, Equipment evaluation/education, Compensatory technique education, Continued evaluation, Energy conservation, Activityengagement  Equipment Recommended: Bedside commode       See flowsheet documentation for full assessment, interventions and recommendations.   Note: Limitation: Decreased ADL status, Decreased endurance, Decreased self-care trans, Decreased high-level ADLs  Prognosis: Good  Assessment: Pt is a 74 y.o. male seen for OT evaluation s/p adm to [facility] on 11/4/2024 w/ Primary osteoarthritis of left hip . Comorbidities affecting pt’s functional performance include a significant PMH of anemia, anxiety, disease of thyroid gland, GERD, dyslipidemia, HTN, osteoarthritis, polyneuropathy, hypercholesterolemia, spinal stenosis. Pt with active OT orders and activity orders for Activity beginning POD #0. WBAT LLE. Posterior hip precautions. Pt lives in a ranch style house with 1 RUDY with spouse. Pt has walkin shower and raised toilets. At baseline, pt was independent with all ADLs/IADLs. Pt completed supine to sit with Bernice. Pt completed sit to stand with minAx1 and use of RW. Pt completed short functional mobility from EOB to chair with minAx1 and RW. Upon evaluation, pt currently requires S for UB ADLs, Bernice for LB ADLs, Bernice for toileting, Bernice for bed mobility, Bernice for functional mobility, and Bernice for transfers 2* the following deficits impacting occupational performance: weakness, decreased strength , decreased balance, decreased activity tolerance, increased pain, and orthopedic restrictions. These impairments, as well at pt’s personal factors of: RUDY home environment, difficulty performing ADLs,  difficulty performing IADLs, difficulty performing transfers/mobility, WBS, fall risk , and new use of AD for functional transfers/mobility limit pt’s ability to safely engage in all baseline areas of occupation. Based on the aforementioned OT evaluation, functional performance deficits, and assessments, pt has been identified as a high complexity evaluation. Pt to continue to benefit from continued acute OT services during hospital stay to address defined deficits and to maximize level of functional independence in the following Occupational Performance areas: grooming, bathing/shower, toilet hygiene, dressing, health maintenance, functional mobility, community mobility, clothing management, cleaning, and household maintenance. From OT standpoint, recommend III; Minimum Resource Intensity (pending progress) upon D/C. OT will continue to follow pt 3-5x/wk BID prn.     Rehab Resource Intensity Level, OT: No post-acute rehabilitation needs

## 2024-11-04 NOTE — ANESTHESIA POSTPROCEDURE EVALUATION
Post-Op Assessment Note    CV Status:  Stable    Pain management: adequate       Mental Status:  Sleepy   Hydration Status:  Euvolemic   PONV Controlled:  Controlled   Airway Patency:  Patent     Post Op Vitals Reviewed: Yes    No anethesia notable event occurred.    Staff: CRNA           Last Filed PACU Vitals:  Vitals Value Taken Time   Temp 98.3    Pulse 83 11/04/24 1221   BP 94/55 11/04/24 1221   Resp 15 11/04/24 1221   SpO2 96 % 11/04/24 1221   Vitals shown include unfiled device data.    Modified Racheal:  No data recorded

## 2024-11-04 NOTE — ANESTHESIA PROCEDURE NOTES
Spinal Block    Patient location during procedure: OR  Start time: 11/4/2024 10:50 AM  Reason for block: procedure for pain and at surgeon's request  Staffing  Performed by: Bashir Coelho CRNA  Authorized by: Melany Montero MD    Preanesthetic Checklist  Completed: patient identified, IV checked, site marked, risks and benefits discussed, surgical consent, monitors and equipment checked, pre-op evaluation and timeout performed  Spinal Block  Patient position: sitting  Prep: ChloraPrep and site prepped and draped  Patient monitoring: frequent blood pressure checks, continuous pulse ox and heart rate  Approach: midline  Location: L3-4  Needle  Needle type: Pencan   Needle gauge: 24 G  Needle length: 4 in  Assessment  Sensory level: T10  Injection Assessment:  negative aspiration for heme, no paresthesia on injection and positive aspiration for clear CSF.  Post-procedure:  site cleaned

## 2024-11-04 NOTE — PLAN OF CARE
Problem: PHYSICAL THERAPY ADULT  Goal: Performs mobility at highest level of function for planned discharge setting.  See evaluation for individualized goals.  Description: Treatment/Interventions: Functional transfer training, Elevations, Therapeutic exercise, LE strengthening/ROM, Endurance training, Patient/family training, Bed mobility, Gait training, Spoke to nursing, OT  Equipment Recommended: Walker (pt owns)       See flowsheet documentation for full assessment, interventions and recommendations.  Note: Prognosis: Good  Problem List: Decreased strength, Decreased range of motion, Decreased endurance, Impaired balance, Decreased mobility, Obesity, Orthopedic restrictions, Pain  Assessment: Pt. 74 y.o.male presents for elective surgery. Past medical hx includes bladder CA, HTN, polyneuropathy, RA. Pt admitted for Primary osteoarthritis of left hip w/ Primary osteoarthritis of left hip (M16.12)  Chronic left hip pain (M25.552, G89.29). S/p L elective THR (posterior) POD #0. Pt referred to PT for functional mobility evaluation & D/C planning w/ orders of activity as tolerated. WBAT LLE. Posterior hip precautions. Use of hip abduction pillow. PTA, pt reports being I w/ SPC. Personal factors affecting pt at time of IE include: dec caregiver support, steps to enter, and use of AD . During evaluation, deficits included dec mobility, balance, ambulation. Reviewed posterior hip precautions with good understanding. Required minAx1 for supine to sit, sit<>stand, and ambulation. Pt able to ambulate 4' with RW. Antalgic step to gait with no gross LOB noted. Pt demonstrated dec endurance and tolerance to activity. Denies reports of dizziness or SOB t/o session. Pt was educated on fall precautions and reinforced w/ good understanding. Pt would benefit from continued PT to address deficits as defined above and maximize level of independence with functional mobility and safety. Based on pt presentation and impaired  function, pt would benefit from level III, (minimum resource intensity) at D/C. The patient's AM-PAC Basic Mobility Inpatient Short Form Raw Score is 19. A Raw score of greater than 16 suggests the patient may benefit from discharge to home. Please also refer to the recommendation of the Physical Therapist for safe discharge planning. Nsg staff to continue to mobilized pt (OOB in chair for all meals & ambulate in room/unit) as tolerated to prevent further decline in function. Nsg notified. Co-eval performed to complete this PT evaluation for the pts best interest given pts medical complexity and functional level.  Barriers to Discharge: Inaccessible home environment  Barriers to Discharge Comments: RUDY  Rehab Resource Intensity Level, PT: III (Minimum Resource Intensity)    See flowsheet documentation for full assessment.

## 2024-11-04 NOTE — OCCUPATIONAL THERAPY NOTE
Occupational Therapy Evaluation     Patient Name: Solis Dos Santos  Today's Date: 11/4/2024  Problem List  Principal Problem:    Primary osteoarthritis of left hip  Active Problems:    Hypothyroidism    Hyperlipidemia    Gastroesophageal reflux disease    Obesity (BMI 30-39.9)    Past Medical History  Past Medical History:   Diagnosis Date    Acute blood loss anemia 08/09/2019    Aftercare following right hip joint replacement surgery 11/05/2019    Anxiety disorder     Atherosclerotic heart disease of native coronary artery without angina pectoris     Benign prostatic hyperplasia without lower urinary tract symptoms     Cancer (HCC)     bladder    Clotting disorder (HCC) 1/25/2023    Blood in urine    Disease of thyroid gland     hypo    Dyslipidemia     GERD (gastroesophageal reflux disease)     manages w/ diet, takes no meds    Hypertension     Impaired fasting blood sugar     Kidney stone     Low back pain     Obesity     Osteoarthritis     last assesed 6-6-16    Other chest pain     Paresthesia of skin     Polyneuropathy     last assesed 5-8-17    Pure hypercholesterolemia     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)     Spinal stenosis     Suspected UTI 03/09/2023    Urinary tract infection     Wears glasses      Past Surgical History  Past Surgical History:   Procedure Laterality Date    BACK SURGERY      L4-L5 sx    CHOLECYSTECTOMY      COLONOSCOPY  02/06/2019    CYSTOSCOPY N/A 04/26/2023    Procedure: CYSTOSCOPY w/ bladder biopsy;  Surgeon: Geo Bentley MD;  [facility]: [facility];  Service: Urology    HIP SURGERY  August 2019    Replacement    JOINT REPLACEMENT  August 2019    Hip replacement    HI ARTHRP ACETBLR/PROX FEM PROSTC AGRFT/ALGRFT Right 08/05/2019    Procedure: ARTHROPLASTY HIP TOTAL;  Surgeon: Leonard Gallagher MD;  [facility];  Service: Orthopedics    HI CYSTO W/REMOVAL OF LESIONS SMALL N/A 03/23/2023    Procedure: TRANSURETHRAL RESECTION OF  BLADDER TUMOR (TURBT), mitomycin ;  Surgeon: Geo Bentley MD;  [facility];  Service: Urology    WA CYSTO W/REMOVAL OF LESIONS SMALL N/A 04/26/2023    Procedure: TRANSURETHRAL RESECTION OF BLADDER TUMOR (TURBT), cystoscopy with bladder biopsy;  Surgeon: Geo Bentley MD;  [facility];  Service: Urology    TONSILLECTOMY          11/04/24 1554   OT Last Visit   OT Visit Date 11/04/24   Note Type   Note type Evaluation   Additional Comments Pt greeted in supine, agreeable to OT evaluation.   Pain Assessment   Pain Assessment Tool 0-10   Pain Score 3   Pain Location/Orientation Orientation: Left;Location: Hip   Hospital Pain Intervention(s) Repositioned;Ambulation/increased activity;Elevated;Emotional support;Rest   Restrictions/Precautions   Weight Bearing Precautions Per Order Yes   LLE Weight Bearing Per Order WBAT   Braces or Orthoses Other (Comment)  (hip abduction pillow)   Other Precautions Chair Alarm;Bed Alarm;WBS;THR;Multiple lines;Fall Risk;Pain  (posterior hip)   Home Living   Type of Home House  (ranch)   Home Layout One level;Performs ADLs on one level;Able to live on main level with bedroom/bathroom  (1 RUDY (1/2 steps) landing steps)   Bathroom Shower/Tub Walk-in shower   Bathroom Toilet Raised   Bathroom Equipment Grab bars in shower;Shower chair;Grab bars around toilet   Bathroom Accessibility Accessible   Home Equipment Walker;Cane;Grab bars   Prior Function   Level of [place] Independent with ADLs;Independent with functional mobility;Independent with IADLS   Lives With Spouse   Receives Help From Family   IADLs Independent with driving;Independent with meal prep;Independent with medication management   Falls in the last 6 months 0   Vocational Retired   Comments (+)    Lifestyle   Autonomy Independent with all ADLs/IADLs, no AD use at baseline   Reciprocal Relationships Spouse   Service to Others Retired   Intrinsic Gratification relax   General    Family/Caregiver Present Yes   ADL   Where Assessed Edge of bed   Eating Assistance 5  Supervision/Setup   Grooming Assistance 5  Supervision/Setup   UB Bathing Assistance 5  Supervision/Setup   LB Bathing Assistance 4  Minimal Assistance   UB Dressing Assistance 5  Supervision/Setup   LB Dressing Assistance 4  Minimal Assistance   Toileting Assistance  4  Minimal Assistance   Bed Mobility   Supine to Sit 4  Minimal assistance   Additional items Assist x 1;Increased time required;Verbal cues;LE management   Sit to Supine Unable to assess   Additional Comments Pt greeted in supine, ended session in chair BP EOB: 130/93.   Transfers   Sit to Stand 4  Minimal assistance   Additional items Assist x 1;Increased time required;Verbal cues  (RW)   Stand to Sit 4  Minimal assistance   Additional items Assist x 1;Increased time required;Verbal cues  (RW)   Additional Comments verbal cues for hand placement and safety   Functional Mobility   Functional Mobility 4  Minimal assistance   Additional Comments minAx1 short functional mobility from EOB to chair with use of RW   Additional items Rolling walker   Balance   Static Sitting Good   Dynamic Sitting Fair +   Static Standing Fair   Dynamic Standing Fair -   Ambulatory Fair -   Activity Tolerance   Activity Tolerance Patient tolerated treatment well   Medical Staff Made Aware PT [place], Pt seen for co-evaluation/treatment with skilled Physical Therapy due to pt's medical complexity, decreased endurance, overall functional level, overall safety, and post surgical day #0.   Nurse Made Aware RN C   RUE Assessment   RUE Assessment WFL   LUE Assessment   LUE Assessment WFL   Hand Function   Gross Motor Coordination Functional   Fine Motor Coordination Functional   Cognition   Overall Cognitive Status WFL   Arousal/Participation Alert;Cooperative   Attention Within functional limits   Orientation Level Oriented X4   Memory Within functional limits   Following Commands Follows all  commands and directions without difficulty   Comments Cooperative and pleasant   Assessment   Limitation Decreased ADL status;Decreased endurance;Decreased self-care trans;Decreased high-level ADLs   Prognosis Good   Assessment Pt is a 74 y.o. male seen for OT evaluation s/p adm to [facility] on 11/4/2024 w/ Primary osteoarthritis of left hip . Comorbidities affecting pt’s functional performance include a significant PMH of anemia, anxiety, disease of thyroid gland, GERD, dyslipidemia, HTN, osteoarthritis, polyneuropathy, hypercholesterolemia, spinal stenosis. Pt with active OT orders and activity orders for Activity beginning POD #0. WBAT LLE. Posterior hip precautions. Pt lives in a ranch style house with 1 RUDY with spouse. Pt has walkin shower and raised toilets. At baseline, pt was independent with all ADLs/IADLs. Pt completed supine to sit with Bernice. Pt completed sit to stand with minAx1 and use of RW. Pt completed short functional mobility from EOB to chair with minAx1 and RW. Upon evaluation, pt currently requires S for UB ADLs, Bernice for LB ADLs, Bernice for toileting, Bernice for bed mobility, Bernice for functional mobility, and Bernice for transfers 2* the following deficits impacting occupational performance: weakness, decreased strength , decreased balance, decreased activity tolerance, increased pain, and orthopedic restrictions. These impairments, as well at pt’s personal factors of: RUDY home environment, difficulty performing ADLs, difficulty performing IADLs, difficulty performing transfers/mobility, WBS, fall risk , and new use of AD for functional transfers/mobility limit pt’s ability to safely engage in all baseline areas of occupation. Based on the aforementioned OT evaluation, functional performance deficits, and assessments, pt has been identified as a high complexity evaluation. Pt to continue to benefit from continued acute OT services during hospital stay to address defined deficits and to maximize  level of functional independence in the following Occupational Performance areas: grooming, bathing/shower, toilet hygiene, dressing, health maintenance, functional mobility, community mobility, clothing management, cleaning, and household maintenance. From OT standpoint, recommend III; Minimum Resource Intensity (pending progress) upon D/C. OT will continue to follow pt 3-5x/wk BID prn.   Goals   Patient Goals to get to chair   STG Time Frame 1-3   Short Term Goal #1 Pt will demonstrate 100% adherence to 3/3 posterolateral hip precautions during all functional activities w/o cues from therapist   Short Term Goal #2 Pt will be able to state 3/3 posterolateral hip precautions w/o cues from therapist 100% of the time each session to increase safety at home and reduce risk of injury   Short Term Goal  Pt will improve activity tolerance to G for min 30 min treatment sessions for increase engagement in functional tasks   LTG Time Frame 3-7   Long Term Goal #1 Pt will complete LB dressing/self care w/ mod I using adaptive device and DME as needed   Long Term Goal #2 Pt will complete toileting w/ mod I w/ G hygiene/thoroughness using DME as needed   Long Term Goal Pt will improve functional mobility during ADL/IADL/leisure tasks to mod I using DME as needed w/ G balance/safety   Plan   Treatment Interventions ADL retraining;Functional transfer training;Endurance training;Patient/family training;Equipment evaluation/education;Compensatory technique education;Continued evaluation;Energy conservation;Activityengagement   Goal Expiration Date 11/11/24   OT Treatment Day 0   OT Frequency 3-5x/wk  (BID prn)   Discharge Recommendation   Rehab Resource Intensity Level, OT No post-acute rehabilitation needs   Equipment Recommended Bedside commode   Commode Type Standard   Additional Comments  The patient's raw score on the AM-PAC Daily Activity Inpatient Short Form is 20  . A raw score of greater than or equal to 19 suggests the  patient may benefit from discharge to home. Please refer to the recommendation of the Occupational Therapist for safe discharge planning.   AM-PAC Daily Activity Inpatient   Lower Body Dressing 3   Bathing 3   Toileting 3   Upper Body Dressing 4   Grooming 3   Eating 4   Daily Activity Raw Score 20   Daily Activity Standardized Score (Calc for Raw Score >=11) 42.03   AM-PAC Applied Cognition Inpatient   Following a Speech/Presentation 4   Understanding Ordinary Conversation 4   Taking Medications 4   Remembering Where Things Are Placed or Put Away 4   Remembering List of 4-5 Errands 4   Taking Care of Complicated Tasks 4   Applied Cognition Raw Score 24   Applied Cognition Standardized Score 62.21   End of Consult   Education Provided Yes;Family or social support of family present for education by provider   Patient Position at End of Consult Bedside chair;Bed/Chair alarm activated;All needs within reach   Nurse Communication Nurse aware of consult   End of Consult Comments Pt seated OOB in chair with chair alarm activated at end of session. Call bell and phone within reach. All needs met and pt reports no further questions for OT at this time.   Parvin Luque, OT

## 2024-11-04 NOTE — ANESTHESIA PREPROCEDURE EVALUATION
Procedure:  Left total hip arthroplasty (Left: Hip)    Relevant Problems   ANESTHESIA (within normal limits)      CARDIO   (+) Hyperlipidemia      ENDO   (+) Hypothyroidism      GI/HEPATIC   (+) Gastroesophageal reflux disease      MUSCULOSKELETAL   (+) DDD (degenerative disc disease), lumbar   (+) Primary osteoarthritis of both knees   (+) Primary osteoarthritis of left hip      Orthopedic/Musculoskeletal   (+) Spinal stenosis   (+) Status post right hip replacement      Other   (+) Obesity (BMI 30-39.9)      Normal sinus rhythm  Normal ECG  When compared with ECG of 09-MAR-2023 17:21,  No significant change was found    Interpretation Summary         Stress ECG: No ST deviation is noted.    Stress Function: Left ventricular function post-stress is normal. Stress ejection fraction is 70%.    Perfusion Defect Conclusion: The stress/rest perfusion ratio is 0.85. There is no evidence of transient ischemic dilation (TID).    Perfusion: There are no perfusion defects.     No evidence of ischemia or infarction by pharmacologic vasodilatory nuclear stress testing.        Findings    Left Ventricle Left ventricular cavity size is normal. Wall thickness is normal. The left ventricular ejection fraction is 60%. Systolic function is normal. Wall motion is normal. Diastolic function is normal.   Right Ventricle Right ventricular cavity size is normal. Systolic function is normal. Wall thickness is normal.   Left Atrium The atrium is normal in size.   Right Atrium The atrium is mildly dilated.   Aortic Valve The aortic valve is trileaflet. The leaflets are not thickened. The leaflets are not calcified. The leaflets exhibit normal mobility. There is no evidence of regurgitation. There is aortic valve sclerosis.   Mitral Valve There is no evidence of regurgitation. There is no evidence of stenosis. The mitral valve has normal structure and normal function.   Tricuspid Valve Tricuspid valve structure is normal. There is no  evidence of regurgitation. There is no evidence of stenosis. The estimated right ventricular systolic pressure is 30.00 mmHg.   Pulmonic Valve Pulmonic valve structure is normal. There is trace regurgitation. There is no evidence of stenosis.   Ascending Aorta The aortic root is normal in size.   IVC/SVC The right atrial pressure is estimated at 5.0 mmHg. The inferior vena cava is normal in size. Respirophasic changes were normal.   Pericardium There is no pericardial effusion. The pericardium is normal in appearance.     Physical Exam    Airway    Mallampati score: II  TM Distance: >3 FB  Neck ROM: full     Dental   No notable dental hx     Cardiovascular  Rhythm: regular, Rate: normal, Cardiovascular exam normal    Pulmonary  Pulmonary exam normal Breath sounds clear to auscultation    Other Findings        Anesthesia Plan  ASA Score- 3     Anesthesia Type- spinal with ASA Monitors.         Additional Monitors:     Airway Plan:            Plan Factors-Exercise tolerance (METS): <4 METS.Exercise comment: Denies orthopnea/PND.    Chart reviewed. EKG reviewed.  Existing labs reviewed. Patient summary reviewed.    Patient is not a current smoker.      Obstructive sleep apnea risk education given perioperatively.        Induction-     Postoperative Plan-         Informed Consent- Anesthetic plan and risks discussed with patient.  I personally reviewed this patient with the CRNA. Discussed and agreed on the Anesthesia Plan with the CRNA..

## 2024-11-05 VITALS
HEIGHT: 73 IN | OXYGEN SATURATION: 95 % | TEMPERATURE: 97.8 F | DIASTOLIC BLOOD PRESSURE: 71 MMHG | SYSTOLIC BLOOD PRESSURE: 106 MMHG | BODY MASS INDEX: 35.68 KG/M2 | RESPIRATION RATE: 18 BRPM | WEIGHT: 269.2 LBS | HEART RATE: 75 BPM

## 2024-11-05 LAB
ANION GAP SERPL CALCULATED.3IONS-SCNC: 9 MMOL/L (ref 4–13)
BUN SERPL-MCNC: 17 MG/DL (ref 5–25)
CALCIUM SERPL-MCNC: 8.4 MG/DL (ref 8.4–10.2)
CHLORIDE SERPL-SCNC: 100 MMOL/L (ref 96–108)
CO2 SERPL-SCNC: 26 MMOL/L (ref 21–32)
CREAT SERPL-MCNC: 0.95 MG/DL (ref 0.6–1.3)
DME PARACHUTE DELIVERY DATE REQUESTED: NORMAL
DME PARACHUTE ITEM DESCRIPTION: NORMAL
DME PARACHUTE ORDER STATUS: NORMAL
DME PARACHUTE SUPPLIER NAME: NORMAL
DME PARACHUTE SUPPLIER PHONE: NORMAL
ERYTHROCYTE [DISTWIDTH] IN BLOOD BY AUTOMATED COUNT: 12.7 % (ref 11.6–15.1)
GFR SERPL CREATININE-BSD FRML MDRD: 77 ML/MIN/1.73SQ M
GLUCOSE SERPL-MCNC: 146 MG/DL (ref 65–140)
HCT VFR BLD AUTO: 41.2 % (ref 36.5–49.3)
HGB BLD-MCNC: 14.1 G/DL (ref 12–17)
MCH RBC QN AUTO: 31.6 PG (ref 26.8–34.3)
MCHC RBC AUTO-ENTMCNC: 34.2 G/DL (ref 31.4–37.4)
MCV RBC AUTO: 92 FL (ref 82–98)
PLATELET # BLD AUTO: 187 THOUSANDS/UL (ref 149–390)
PMV BLD AUTO: 10.7 FL (ref 8.9–12.7)
POTASSIUM SERPL-SCNC: 3.8 MMOL/L (ref 3.5–5.3)
RBC # BLD AUTO: 4.46 MILLION/UL (ref 3.88–5.62)
SODIUM SERPL-SCNC: 135 MMOL/L (ref 135–147)
WBC # BLD AUTO: 8.66 THOUSAND/UL (ref 4.31–10.16)

## 2024-11-05 PROCEDURE — 99024 POSTOP FOLLOW-UP VISIT: CPT

## 2024-11-05 PROCEDURE — 80048 BASIC METABOLIC PNL TOTAL CA: CPT

## 2024-11-05 PROCEDURE — 97116 GAIT TRAINING THERAPY: CPT | Performed by: PHYSICAL THERAPIST

## 2024-11-05 PROCEDURE — 85027 COMPLETE CBC AUTOMATED: CPT

## 2024-11-05 PROCEDURE — 97535 SELF CARE MNGMENT TRAINING: CPT

## 2024-11-05 PROCEDURE — 97530 THERAPEUTIC ACTIVITIES: CPT | Performed by: PHYSICAL THERAPIST

## 2024-11-05 RX ADMIN — DOCUSATE SODIUM 100 MG: 100 CAPSULE, LIQUID FILLED ORAL at 09:43

## 2024-11-05 RX ADMIN — ACETAMINOPHEN 325MG 975 MG: 325 TABLET ORAL at 00:31

## 2024-11-05 RX ADMIN — GABAPENTIN 100 MG: 100 CAPSULE ORAL at 06:24

## 2024-11-05 RX ADMIN — OXYCODONE 5 MG: 5 TABLET ORAL at 00:31

## 2024-11-05 RX ADMIN — METHOCARBAMOL TABLETS 500 MG: 500 TABLET, COATED ORAL at 06:24

## 2024-11-05 RX ADMIN — METHOCARBAMOL TABLETS 500 MG: 500 TABLET, COATED ORAL at 00:31

## 2024-11-05 RX ADMIN — ACETAMINOPHEN 325MG 975 MG: 325 TABLET ORAL at 07:43

## 2024-11-05 RX ADMIN — ENOXAPARIN SODIUM 40 MG: 40 INJECTION SUBCUTANEOUS at 09:43

## 2024-11-05 RX ADMIN — OXYCODONE HYDROCHLORIDE 10 MG: 10 TABLET ORAL at 07:43

## 2024-11-05 RX ADMIN — HYDROMORPHONE HYDROCHLORIDE 0.5 MG: 1 INJECTION, SOLUTION INTRAMUSCULAR; INTRAVENOUS; SUBCUTANEOUS at 01:32

## 2024-11-05 RX ADMIN — LEVOTHYROXINE SODIUM 150 MCG: 25 TABLET ORAL at 05:07

## 2024-11-05 RX ADMIN — CEFAZOLIN SODIUM 1000 MG: 1 SOLUTION INTRAVENOUS at 03:42

## 2024-11-05 NOTE — PHYSICAL THERAPY NOTE
PT PROGRESS NOTE    Name: Solis Dos Santos  AGE: 74 y.o.  MRN: [id number]  LENGTH OF STAY: 0     11/05/24 1005   PT Last Visit   PT Visit Date 11/05/24   Note Type   Note Type Treatment   Pain Assessment   Pain Assessment Tool 0-10   Pain Score 1   Pain Location/Orientation Orientation: Left;Location: Hip   Hospital Pain Intervention(s) Cold applied;Repositioned;Ambulation/increased activity;Elevated;Emotional support;Rest   Restrictions/Precautions   Weight Bearing Precautions Per Order Yes   LLE Weight Bearing Per Order WBAT   Braces or Orthoses   (hip abduction pillow)   Other Precautions Chair Alarm;WBS;THR;Fall Risk;Pain  (posterior hip precautions)   General   Chart Reviewed Yes   Response to Previous Treatment Patient with no complaints from previous session.   Family/Caregiver Present No   Cognition   Overall Cognitive Status WFL   Arousal/Participation Alert;Cooperative   Attention Within functional limits   Orientation Level Oriented X4   Following Commands Follows all commands and directions without difficulty   Subjective   Subjective I am feeling much better than last night.   Bed Mobility   Additional Comments Pt greeted OOB in chair.   Transfers   Sit to Stand 5  Supervision   Additional items Armrests;Increased time required;Verbal cues  (w/ RW)   Stand to Sit 5  Supervision   Additional items Armrests;Increased time required;Verbal cues  (w/ RW)   Additional Comments cues for hand placement.   Ambulation/Elevation   Gait pattern Improper Weight shift;Antalgic;Decreased foot clearance;Decreased L stance   Gait Assistance 5  Supervision   Additional items Verbal cues   Assistive Device Rolling walker   Distance 120'x1   Ambulation/Elevation Additional Comments cues for dec in toe with ambulation   Balance   Static Sitting Good   Dynamic Sitting Fair +   Static Standing Fair  (w/ RW)   Dynamic Standing Fair -  (w/ RW)   Ambulatory Fair -  (w/ RW)   Activity Tolerance   Activity Tolerance Patient  tolerated treatment well   Nurse Made Aware RN Sarah   Exercises   THR   (Reviewed HEP and provided handout. All questions and concerns addressed at this time.)   Assessment   Prognosis Good   Problem List Decreased strength;Decreased range of motion;Decreased endurance;Impaired balance;Decreased mobility;Obesity;Orthopedic restrictions;Pain   Assessment Patient was seen today per POC. Overall, pt demonstrated improved mobility and inc tolerance to activity. Pain 1/10 in L hip. Reviewed posterior hip precautions with good understanding. Required S for sit<>stand, and ambulation. Pt able to ambulate 120' with RW and S in unit. Antalgic step to  gait with no gross LOB noted. Reviewed stair navigation with good understanding. Reviewed HEP and provided handout. All questions and concerns addressed at this time. No reports of dizziness t/o session. Will continue to see pt per POC as tolerated. From PT standpoint continued recommendation for level III, (minimum resource intensity) at D/C when medically cleared based current function. The patient's AM-PAC Basic Mobility Inpatient Short Form Raw Score is 23. A Raw score of greater than 16 suggests the patient may benefit from discharge to home. Please also refer to the recommendation of the Physical Therapist for safe discharge planning. Nsg staff to continue to mobilized pt (OOB in chair for all meals & ambulate in room/unit) as tolerated to prevent further decline in function. Nsg staff notified. From PT stand point, pt cleared functionally for D/C when medically appropriate.   Barriers to Discharge None   Barriers to Discharge Comments From PT stand point, pt cleared functionally for D/C when medically appropriate.   Goals   Patient Goals to go home   STG Expiration Date 11/11/24   PT Treatment Day 1   Plan   Treatment/Interventions Functional transfer training;LE strengthening/ROM;Elevations;Therapeutic exercise;Endurance training;Patient/family training;Bed  mobility;Gait training;Spoke to nursing;OT   Progress Progressing toward goals   PT Frequency Twice a day  (PRN)   Discharge Recommendation   Rehab Resource Intensity Level, PT III (Minimum Resource Intensity)   AM-PAC Basic Mobility Inpatient   Turning in Flat Bed Without Bedrails 4   Lying on Back to Sitting on Edge of Flat Bed Without Bedrails 4   Moving Bed to Chair 4   Standing Up From Chair Using Arms 4   Walk in Room 4   Climb 3-5 Stairs With Railing 3   Basic Mobility Inpatient Raw Score 23   Basic Mobility Standardized Score 50.88   [facility] Highest Level Of Mobility   -HLM Goal 7: Walk 25 feet or more   -HLM Achieved 7: Walk 25 feet or more   Education   Education Provided Mobility training;Home exercise program;Precautions for total hip arthroplasty (PEGGY);Assistive device   Patient Demonstrates acceptance/verbal understanding   End of Consult   Patient Position at End of Consult Bedside chair;All needs within reach       Mariposa Downing, PT

## 2024-11-05 NOTE — ASSESSMENT & PLAN NOTE
-s/p left total hip arthroplasty. Doing well. Pain controlled.  Weight Bearing as tolerated LLE  Up and out of bed with walker  DVT prophylaxis - SCDs and Lovenox  Posterior hip precaution   Analgesics and ice  PT/OT  Will continue to assess for acute blood loss anemia  Discharge per ortho when cleared by PT

## 2024-11-05 NOTE — PROGRESS NOTES
Progress Note - Orthopedics   Name: Solis Dos Santos 74 y.o. male I MRN: [id number]  Unit/Bed#: WE 2 N -01 I Date of Admission: 11/4/2024   Date of Service: 11/5/2024 I Hospital Day: 0    Assessment & Plan  Primary osteoarthritis of left hip  POD s/p left PEGGY  WBAT LLE  PT/OT - posterior hip precautions, abduction pillow, assistive devices  DVT ppx with Lovenox x28 days, SCDs, ambulation  Analgesics, apply ice prn  AM labs stable  Okay for discharge from Ortho perspective. Plan to follow up with Dr. Gallagher as previously scheduled  Hypothyroidism    Hyperlipidemia    Gastroesophageal reflux disease    Obesity (BMI 30-39.9)    BPH (benign prostatic hyperplasia)        Subjective   74 y.o.male POD 1 s/p left PEGGY by Dr. Gallagher. No acute overnight events or new complaints. The patient notes some pain last night that is much better controlled this morning. He denies fevers, chills, CP, SOB, N/V, calf pain, dizziness, and numbness/ tingling. Patient states he knows PT/OT may induce pain, but he is looking forward to going home today.    Objective :  Temp:  [97.4 °F (36.3 °C)-98.9 °F (37.2 °C)] 97.8 °F (36.6 °C)  HR:  [71-98] 75  BP: ()/(55-93) 106/71  Resp:  [16-22] 18  SpO2:  [93 %-99 %] 95 %  O2 Device: None (Room air)    Physical Exam  Vitals and nursing note reviewed.   Constitutional:       General: He is not in acute distress.     Appearance: He is well-developed. He is not ill-appearing.   HENT:      Head: Normocephalic and atraumatic.   Eyes:      Extraocular Movements: Extraocular movements intact.      Conjunctiva/sclera: Conjunctivae normal.   Cardiovascular:      Rate and Rhythm: Normal rate and regular rhythm.      Heart sounds: No murmur heard.  Pulmonary:      Effort: Pulmonary effort is normal. No respiratory distress.      Breath sounds: Normal breath sounds. No wheezing.   Abdominal:      Palpations: Abdomen is soft.      Tenderness: There is no abdominal tenderness.   Musculoskeletal:          "General: Tenderness present. No swelling.      Cervical back: Neck supple.   Skin:     General: Skin is warm and dry.      Capillary Refill: Capillary refill takes less than 2 seconds.   Neurological:      General: No focal deficit present.      Mental Status: He is alert and oriented to person, place, and time.   Psychiatric:         Mood and Affect: Mood normal.         Behavior: Behavior normal.       Musculoskeletal: left lower  Skin without erythema or ecchymosis.  Dressing CDI  Mild thigh swelling and carmen-incisional tenderness  Motor intact to +FHL/EHL, +ankle dorsi/plantar flexion  Sensation intact to saphenous, sural, tibial, superficial peroneal nerve, and deep peroneal  No calf swelling or tenderness to palpation  Palpable DP pulse  Brisk Cap refill  Musculature soft and compressible. No pain to passive stretch.      Lab Results: I have reviewed the following results:  Recent Labs     11/05/24  0513   WBC 8.66   HGB 14.1   HCT 41.2      BUN 17   CREATININE 0.95     Blood Culture:  No results found for: \"BLOODCX\"  Wound Culture: No results found for: \"WOUNDCULT\"  "

## 2024-11-05 NOTE — PLAN OF CARE
Problem: PAIN - ADULT  Goal: Verbalizes/displays adequate comfort level or baseline comfort level  Description: Interventions:  - Encourage patient to monitor pain and request assistance  - Assess pain using appropriate pain scale  - Administer analgesics based on type and severity of pain and evaluate response  - Implement non-pharmacological measures as appropriate and evaluate response  - Consider cultural and social influences on pain and pain management  - Notify physician/advanced practitioner if interventions unsuccessful or patient reports new pain  Outcome: Progressing     Problem: SAFETY ADULT  Goal: Patient will remain free of falls  Description: INTERVENTIONS:  - Educate patient/family on patient safety including physical limitations  - Instruct patient to call for assistance with activity   - Consult OT/PT to assist with strengthening/mobility   - Keep Call bell within reach  - Keep bed low and locked with side rails adjusted as appropriate  - Keep care items and personal belongings within reach  - Initiate and maintain comfort rounds  - Make Fall Risk Sign visible to staff  - Offer Toileting every 2 Hours, in advance of need  - Initiate/Maintain bed/chair alarm  - Obtain necessary fall risk management equipment: rolling walker  - Apply yellow socks and bracelet for high fall risk patients  - Consider moving patient to room near nurses station  Outcome: Progressing     Problem: SAFETY ADULT  Goal: Maintain or return to baseline ADL function  Description: INTERVENTIONS:  -  Assess patient's ability to carry out ADLs; assess patient's baseline for ADL function and identify physical deficits which impact ability to perform ADLs (bathing, care of mouth/teeth, toileting, grooming, dressing, etc.)  - Assess/evaluate cause of self-care deficits   - Assess range of motion  - Assess patient's mobility; develop plan if impaired  - Assess patient's need for assistive devices and provide as appropriate  -  Encourage maximum independence but intervene and supervise when necessary  - Involve family in performance of ADLs  - Assess for home care needs following discharge   - Consider OT consult to assist with ADL evaluation and planning for discharge  - Provide patient education as appropriate  Outcome: Progressing     Problem: DISCHARGE PLANNING  Goal: Discharge to home or other facility with appropriate resources  Description: INTERVENTIONS:  - Identify barriers to discharge w/patient and caregiver  - Arrange for needed discharge resources and transportation as appropriate  - Identify discharge learning needs (meds, wound care, etc.)  - Arrange for interpretive services to assist at discharge as needed  - Refer to Case Management Department for coordinating discharge planning if the patient needs post-hospital services based on physician/advanced practitioner order or complex needs related to functional status, cognitive ability, or social support system  Outcome: Progressing     Problem: Knowledge Deficit  Goal: Patient/family/caregiver demonstrates understanding of disease process, treatment plan, medications, and discharge instructions  Description: Complete learning assessment and assess knowledge base.  Interventions:  - Provide teaching at level of understanding  - Provide teaching via preferred learning methods  Outcome: Progressing

## 2024-11-05 NOTE — PLAN OF CARE
Problem: PHYSICAL THERAPY ADULT  Goal: Performs mobility at highest level of function for planned discharge setting.  See evaluation for individualized goals.  Description: Treatment/Interventions: Functional transfer training, Elevations, Therapeutic exercise, LE strengthening/ROM, Endurance training, Patient/family training, Bed mobility, Gait training, Spoke to nursing, OT  Equipment Recommended: Walker (pt owns)       See flowsheet documentation for full assessment, interventions and recommendations.  11/5/2024 1320 by Mariposa Downing PT  Outcome: Adequate for Discharge  Note: Prognosis: Good  Problem List: Decreased strength, Decreased range of motion, Decreased endurance, Impaired balance, Decreased mobility, Obesity, Orthopedic restrictions, Pain  Assessment: Patient was seen today per POC. Overall, pt demonstrated improved mobility and inc tolerance to activity. Pain 1/10 in L hip. Reviewed posterior hip precautions with good understanding. Required S for sit<>stand, and ambulation. Pt able to ambulate 120' with RW and S in unit. Antalgic step to  gait with no gross LOB noted. Reviewed stair navigation with good understanding. Reviewed HEP and provided handout. All questions and concerns addressed at this time. No reports of dizziness t/o session. Will continue to see pt per POC as tolerated. From PT standpoint continued recommendation for level III, (minimum resource intensity) at D/C when medically cleared based current function. The patient's AM-PAC Basic Mobility Inpatient Short Form Raw Score is 23. A Raw score of greater than 16 suggests the patient may benefit from discharge to home. Please also refer to the recommendation of the Physical Therapist for safe discharge planning. Nsg staff to continue to mobilized pt (OOB in chair for all meals & ambulate in room/unit) as tolerated to prevent further decline in function. Nsg staff notified. From PT stand point, pt cleared functionally for D/C when  medically appropriate.  Barriers to Discharge: None  Barriers to Discharge Comments: From PT stand point, pt cleared functionally for D/C when medically appropriate.  Rehab Resource Intensity Level, PT: III (Minimum Resource Intensity)    See flowsheet documentation for full assessment.

## 2024-11-05 NOTE — PROGRESS NOTES
PT Evaluation     Today's date: 2024  Patient name: Solis Dos Santos  : 1950  MRN: [id number]  Referring provider: Leonard Gallagher,*  Dx:   Encounter Diagnosis     ICD-10-CM    1. Primary osteoarthritis of left hip  M16.12       2. Spinal stenosis of lumbar region without neurogenic claudication  M48.061           Start Time: 1315  Stop Time: 1353  Total time in clinic (min): 38 minutes    Assessment  Impairments: abnormal gait, abnormal or restricted ROM, activity intolerance, impaired balance, lacks appropriate home exercise program, pain with function, participation limitations, activity limitations and endurance  Symptom irritability: high    Assessment details: Dandre is a 74 year old patient presenting to OPPT for evaluation regarding L PEGGY performed on 24 by Dr. Gallagher. Upon evaluation they show impairments in the following areas: High degree of irritability with symptoms in L hip thereby significantly limiting his functional status. Safe ambulation with RW. Very little active movement capable against gravity. Requires maxAx1 for LLE lifting to achieve sit <> supine and car transfers to ensure success and reduce symptoms. These impairments limit their ability to perform daily activities as well as their previous level of function causing decreased quality of life. Educated signs and symptoms of DVT, infection - patient verbalized understanding. Patient already has great understanding of posterior hip precautions from prehab.     Patient requires continued skilled PT services in order to improve aforementioned impairments so that they are able to return to highest level of function possible. Without initiation of skilled PT services, patient will have increased difficulty returning to prior level of function leading to decreased quality of life.     Understanding of Dx/Px/POC: good     Prognosis: good    Goals  Short-Term Goals (4 weeks)   1.  Patient will decrease worst rating of pain by  2/10 to improve quality of life   2. Pt will increase strength by 0.5 MMT proximal hip musculature to improve quality of life with improved efficiency of transfers and daily activities  3. Patient will be able to perform home and household duties with 2/10 reduction in pain indicating improved QOL   4. Patient will improve L/S AROM by 25% indicating improved mobility of affected area   5. Patient will improve affected hip AROM by 10% compared to IE   6. Patient will improve affected hip PROM by 10% compared to IE      Long-Term Goals (8 weeks)   1. Patient hip AROM will be progressing per protocol   2. Patient will decrease pain by 4/10 at worst in comparison to IE indicating significant reduction in pain and improved quality of life   3. Patient will be able to increase maximal walking distance by 200 ft indicating improved respiratory and musculoskeletal function   4. Patient will be able to perform household and hobby activities without increase in pain > or equal to 2/10 indicating ability to independently manage pain symptoms to accomplish daily activities.   5. Patient will be independent with HEP with good form accomplished   6. Patient will have symmetrical hip passive and active range of motion       Plan  Patient would benefit from: skilled physical therapy  Planned modality interventions: cryotherapy and thermotherapy: hydrocollator packs    Planned therapy interventions: manual therapy, neuromuscular re-education, postural training, self care, strengthening, therapeutic activities, stretching, therapeutic exercise, home exercise program, gait training and balance    Frequency: 2x week  Plan of Care beginning date: 11/6/2024  Plan of Care expiration date: 2/6/2024  Treatment plan discussed with: patient      Subjective Evaluation    History of Present Illness  Mechanism of injury: Dandre is a 74 year old patient presenting to OPPT for evaluation regarding s/p L PEGGY on 11/4/24 performed by Dr. Gallagher. Reports  "that he is struggling at this moment. Before coming to therapy, he has been having a significantly difficult time using the bathroom. Has a hard time getting up from the toilet. The toilet he is using is too small, the commode that he received. Has been very down on himself since surgery and is discouraged with how little he has been able to do. Has been doing some ankle pumps.     Had a lift chair delivered last week. Has been walking every hour. Using the lift recliner for sit <> stands.     Following up with Dr. Gallagher on Tuesday ().   Patient Goals  Patient goals for therapy: increased strength, improved balance, decreased pain, increased motion and return to sport/leisure activities    Pain  Current pain ratin  At best pain ratin  At worst pain ratin (Transitions)  Pain location: L groin area. Quad muscles.  Quality: \"It wants to tear things apart\"  Relieving factors: ice and relaxation  Aggravating factors: standing and walking (Transitions. Car transfer)  Progression: worsening    Social Support  Steps to enter house: yes  1  Stairs in house: no   Lives in: one-story house    Employment status: not working  Exercise history: Wants to get back to [employer].    Treatments  Previous treatment: physical therapy  Discharged from (in last 30 days): inpatient hospitalization      Objective    Hip Passive Range of Motion:   Flexion: 57 degrees pain   Extension: NT   Abduction: 21 pain  ER: NT pain   IR: NT precautions      Manual Muscle Testing:   Hip Flexion: R 4- /5  L  2-/5   Hip Extension: R 3+/ 5  L 2- /5   Hip Abduction: R  3+ /5  L  /5 (NT)    Hip Adduction: R 3+ /5  L  /5 (NT)     Knee Extension: R 5 /5   L 2-/5   Knee Flexion:  R  5/5  L 2- /5      Palpation not tested     No signs/sx of DVT nor infection     Gait: RW, antalgic. Noting 6/10 pain during amb      Precautions: Hx Neoplasm, Chronic Pain, L Posterior PEGGY precautions      Manuals             L hip PROM w/in precautions Hip flex < 90 " MH                                                    Neuro Re-Ed             Quad set  demo            SLR              Hip add              Hip abduction  demo            Clamshell              Weight shift                           Reverse acosta walk              Compliant Surface              Dynamic Balance                                                     Ther Ex             Heel Slide              Ankle pumps  Demo             HR/TR Demo seated                         Mini Squat              Standing march                           Leg press DL              Leg Press SL              Sit <> stand              Step ups fwd and lat                                        Ther Activity             Car Transfer  MH                          Gait Training             LRAD                           Modalities             Education

## 2024-11-05 NOTE — PLAN OF CARE
Problem: PAIN - ADULT  Goal: Verbalizes/displays adequate comfort level or baseline comfort level  Description: Interventions:  - Encourage patient to monitor pain and request assistance  - Assess pain using appropriate pain scale  - Administer analgesics based on type and severity of pain and evaluate response  - Implement non-pharmacological measures as appropriate and evaluate response  - Consider cultural and social influences on pain and pain management  - Notify physician/advanced practitioner if interventions unsuccessful or patient reports new pain  Outcome: Progressing     Problem: INFECTION - ADULT  Goal: Absence or prevention of progression during hospitalization  Description: INTERVENTIONS:  - Assess and monitor for signs and symptoms of infection  - Monitor lab/diagnostic results  - Monitor all insertion sites, i.e. indwelling lines, tubes, and drains  - Monitor endotracheal if appropriate and nasal secretions for changes in amount and color  - [place] appropriate cooling/warming therapies per order  - Administer medications as ordered  - Instruct and encourage patient and family to use good hand hygiene technique  - Identify and instruct in appropriate isolation precautions for identified infection/condition  Outcome: Progressing  Goal: Absence of fever/infection during neutropenic period  Description: INTERVENTIONS:  - Monitor WBC    Outcome: Progressing     Problem: SAFETY ADULT  Goal: Patient will remain free of falls  Description: INTERVENTIONS:  - Educate patient/family on patient safety including physical limitations  - Instruct patient to call for assistance with activity   - Consult OT/PT to assist with strengthening/mobility   - Keep Call bell within reach  - Keep bed low and locked with side rails adjusted as appropriate  - Keep care items and personal belongings within reach  - Initiate and maintain comfort rounds  - Make Fall Risk Sign visible to staff  - Offer Toileting every 2 Hours,  in advance of need  - Initiate/Maintain bed/chair alarm  - Obtain necessary fall risk management equipment: rolling walker  - Apply yellow socks and bracelet for high fall risk patients  - Consider moving patient to room near nurses station  Outcome: Progressing  Goal: Maintain or return to baseline ADL function  Description: INTERVENTIONS:  -  Assess patient's ability to carry out ADLs; assess patient's baseline for ADL function and identify physical deficits which impact ability to perform ADLs (bathing, care of mouth/teeth, toileting, grooming, dressing, etc.)  - Assess/evaluate cause of self-care deficits   - Assess range of motion  - Assess patient's mobility; develop plan if impaired  - Assess patient's need for assistive devices and provide as appropriate  - Encourage maximum independence but intervene and supervise when necessary  - Involve family in performance of ADLs  - Assess for home care needs following discharge   - Consider OT consult to assist with ADL evaluation and planning for discharge  - Provide patient education as appropriate  Outcome: Progressing  Goal: Maintains/Returns to pre admission functional level  Description: INTERVENTIONS:  - Perform AM-PAC 6 Click Basic Mobility/ Daily Activity assessment daily.  - Set and communicate daily mobility goal to care team and patient/family/caregiver.   - Collaborate with rehabilitation services on mobility goals if consulted  - Perform Range of Motion 3 times a day.  - Reposition patient every 2 hours.  - Dangle patient 3 times a day  - Stand patient 3 times a day  - Ambulate patient 3 times a day  - Out of bed to chair 3 times a day   - Out of bed for meals 3 times a day  - Out of bed for toileting  - Record patient progress and toleration of activity level   Outcome: Progressing     Problem: DISCHARGE PLANNING  Goal: Discharge to home or other facility with appropriate resources  Description: INTERVENTIONS:  - Identify barriers to discharge  w/patient and caregiver  - Arrange for needed discharge resources and transportation as appropriate  - Identify discharge learning needs (meds, wound care, etc.)  - Arrange for interpretive services to assist at discharge as needed  - Refer to Case Management Department for coordinating discharge planning if the patient needs post-hospital services based on physician/advanced practitioner order or complex needs related to functional status, cognitive ability, or social support system  Outcome: Progressing     Problem: Knowledge Deficit  Goal: Patient/family/caregiver demonstrates understanding of disease process, treatment plan, medications, and discharge instructions  Description: Complete learning assessment and assess knowledge base.  Interventions:  - Provide teaching at level of understanding  - Provide teaching via preferred learning methods  Outcome: Progressing

## 2024-11-05 NOTE — PLAN OF CARE
Problem: OCCUPATIONAL THERAPY ADULT  Goal: Performs self-care activities at highest level of function for planned discharge setting.  See evaluation for individualized goals.  Description: Treatment Interventions: ADL retraining, Functional transfer training, Endurance training, Patient/family training, Equipment evaluation/education, Compensatory technique education, Continued evaluation, Energy conservation, Activityengagement  Equipment Recommended: Bedside commode       See flowsheet documentation for full assessment, interventions and recommendations.   11/5/2024 1209 by Parvin Luque OT  Outcome: Adequate for Discharge  Note: Limitation: Decreased ADL status, Decreased endurance, Decreased self-care trans, Decreased high-level ADLs  Prognosis: Good  Assessment: Pt seen for OT treatment session focusing on ADLs/IADLs, functional mobility, functional standing tolerance, functional transfers, patient education, continued evaluation. Pt greeted OOB in recliner at start of session. Pt alert and cooperative throughout session. Pt with good sitting balance and fair - dynamic standing balance. Pt tolerated treatment well. Pt completed sit to stand with use of armrests and RW for stability. Pt completed functional mobility from chair to bathroom with supervision and use of RW. Pt completed toilet transfer onto standard toilet with BSC over and use of armrests. Pt completed functional mobility back to chair with supervision. Pt education on LHAE and reported he has hip kit from previous surgery. Pt completed don of underwear and pants with use of reacher seated in chair, then standing to pull over waist with use of RW for stability. Pt completed doff of socks with use of dressing stick and don of socks with use of sock aid. Pt educated use of LHAE to maintain posterior hip precautions. Pt educated on ADLs/IADLs, LE management, and transfers for independence at home. Pt reports 1/10 pain and no dizziness. Pt's vitals  include: stable.     Pt ended session seated OOB in recliner. Call bell and phone within reach. All needs met and pt reports no further questions at this time. Continue to recommend No post-acute rehabilitation needs when medically cleared. OT will continue to follow pt on caseload.     Rehab Resource Intensity Level, OT: No post-acute rehabilitation needs       11/5/2024 1209 by Parvin Luque, OT  Reactivated

## 2024-11-05 NOTE — PROGRESS NOTES
"Progress Note - Surgery-General   Name: Solis Dos Santos 74 y.o. male I MRN: [id number]  Unit/Bed#: WE 2 N -01 I Date of Admission: 11/4/2024   Date of Service: 11/4/2024       Assessment & Plan  Primary osteoarthritis of left hip  -s/p left total hip arthroplasty. Doing well. Pain controlled.  Weight Bearing as tolerated LLE  Up and out of bed with walker  DVT prophylaxis - SCDs and Lovenox  Posterior hip precaution   Analgesics and ice  PT/OT  Will continue to assess for acute blood loss anemia  Discharge per ortho when cleared by PT  Hypothyroidism  -continue levothyroxine  Hyperlipidemia  -continue statins  BPH (benign prostatic hyperplasia)  -continue tamsulosin  Gastroesophageal reflux disease    Obesity (BMI 30-39.9)      Subjective   75 yo male s/p left total hip arthroplasty. He reports his pain his controlled. He denies headache, chest pain, or n/v. He denies numbness or tingling in his left leg.    Objective :  Temp:  [97.4 °F (36.3 °C)-98.9 °F (37.2 °C)] 97.5 °F (36.4 °C)  HR:  [71-98] 98  BP: ()/(55-93) 119/80  Resp:  [16-22] 18  SpO2:  [94 %-99 %] 95 %  O2 Device: None (Room air)    I/O         11/03 0701  11/04 0700 11/04 0701  11/05 0700    P.O.  240    I.V. (mL/kg)  1000 (8.2)    IV Piggyback  150    Total Intake(mL/kg)  1390 (11.4)    Urine (mL/kg/hr)  525    Blood  100    Total Output  625    Net  +765                  Physical Exam  General: No acute distress, alert and oriented  CV: RRR  Lungs: Normal work of breathing   Resp:  Clear to auscultation bilaterally; no rales, rhonchi or wheezing; respirations unlabored   CV:  S1 S2, Regular rate and rhythm; no murmur, rub, or gallop.  GI:  Soft, non-tender, non-distended; Musculoskeletal: Left hip mepilex dressing c/d/I. Motor and senstation grossly intact distally, 2+ DP pulse in left leg  Skin:   No jaundice, rashes, or lesions       Lab Results: I have reviewed the following results:  No results for input(s): \"WBC\", \"HGB\", \"HCT\", " "\"PLT\", \"BANDSPCT\", \"SODIUM\", \"K\", \"CL\", \"CO2\", \"BUN\", \"CREATININE\", \"GLUC\", \"CAIONIZED\", \"MG\", \"PHOS\", \"AST\", \"ALT\", \"ALB\", \"TBILI\", \"DBILI\", \"ALKPHOS\", \"PTT\", \"INR\", \"HSTNI0\", \"HSTNI2\", \"BNP\", \"LACTICACID\" in the last 72 hours.    Imaging Results Review: No pertinent imaging studies reviewed.  Other Study Results Review: No additional pertinent studies reviewed.    VTE Pharmacologic Prophylaxis: VTE covered by:  enoxaparin, Subcutaneous     VTE Mechanical Prophylaxis: sequential compression device  "

## 2024-11-05 NOTE — OCCUPATIONAL THERAPY NOTE
Occupational Therapy Progress Note     Patient Name: Solis Dos Santos  Today's Date: 11/5/2024  Problem List  Principal Problem:    Primary osteoarthritis of left hip  Active Problems:    Hypothyroidism    Hyperlipidemia    Gastroesophageal reflux disease    Obesity (BMI 30-39.9)    BPH (benign prostatic hyperplasia)       11/05/24 0903   OT Last Visit   OT Visit Date 11/05/24   Note Type   Note Type Treatment   Pain Assessment   Pain Assessment Tool 0-10   Pain Score 1   Pain Location/Orientation Orientation: Left;Location: Hip   Hospital Pain Intervention(s) Repositioned;Ambulation/increased activity;Emotional support;Rest   Restrictions/Precautions   Weight Bearing Precautions Per Order Yes   LLE Weight Bearing Per Order WBAT   Braces or Orthoses Other (Comment)  (hip abduction pillow)   Other Precautions THR;WBS;Fall Risk;Pain  (posterior hip precautions)   Lifestyle   Autonomy Independent with all ADLs/IADLs, no AD use at baseline   Reciprocal Relationships Spouse   Service to Others Retired   Intrinsic Gratification relax   ADL   Where Assessed Chair   Equipment Provided Reacher;Sock aid;Long-handled shoe horn;Dressing stick   UB Dressing Assistance 5  Supervision/Setup   UB Dressing Deficit Setup;Verbal cueing;Supervision/safety   LB Dressing Assistance 5  Supervision/Setup   LB Dressing Deficit Setup;Verbal cueing;Supervision/safety;Increased time to complete;Requires assistive device for steadying;Use of adaptive equipment   Functional Standing Tolerance   Time ~1 min   Activity standing for dressing tasks   Comments use of RW for stability   Bed Mobility   Supine to Sit Unable to assess   Sit to Supine Unable to assess   Additional Comments Pt greeted OOB in recliner, ended session in recliner.   Transfers   Sit to Stand 5  Supervision   Additional items Increased time required;Verbal cues  (RW)   Stand to Sit 5  Supervision   Additional items Increased time required;Verbal cues  (RW)   Toilet transfer  5  Supervision   Additional items Armrests;Increased time required;Verbal cues;Standard toilet;Commode;Other  (use of BSC over standard toilet)   Additional Comments verbal cues for hand placement   Functional Mobility   Functional Mobility 5  Supervision   Additional Comments Pt completed functional mobility functional household distance in room with use of RW and supervision   Additional items Rolling walker   Toilet Transfers   Toilet Transfer From Other (Comment)  (BSC)   Toilet Transfer Type To and from   Toilet Transfer to Standard toilet  (BSC over)   Toilet Transfer Technique Ambulating   Toilet Transfers Supervision   Toilet Transfers Comments use of RW   Cognition   Overall Cognitive Status WFL   Arousal/Participation Alert;Cooperative   Attention Within functional limits   Orientation Level Oriented X4   Memory Within functional limits   Following Commands Follows all commands and directions without difficulty   Comments Cooperative and pleasant   Activity Tolerance   Activity Tolerance Patient tolerated treatment well   Medical Staff Made Aware RN C   Assessment   Assessment Pt seen for OT treatment session focusing on ADLs/IADLs, functional mobility, functional standing tolerance, functional transfers, patient education, continued evaluation. Pt greeted OOB in recliner at start of session. Pt alert and cooperative throughout session. Pt with good sitting balance and fair - dynamic standing balance. Pt tolerated treatment well. Pt completed sit to stand with use of armrests and RW for stability. Pt completed functional mobility from chair to bathroom with supervision and use of RW. Pt completed toilet transfer onto standard toilet with BSC over and use of armrests. Pt completed functional mobility back to chair with supervision. Pt education on LHAE and reported he has hip kit from previous surgery. Pt completed don of underwear and pants with use of reacher seated in chair, then standing to pull over  waist with use of RW for stability. Pt completed doff of socks with use of dressing stick and don of socks with use of sock aid. Pt educated use of LHAE to maintain posterior hip precautions. Pt educated on ADLs/IADLs, LE management, and transfers for independence at home. Pt reports 1/10 pain and no dizziness. Pt's vitals include: stable.     Pt ended session seated OOB in recliner. Call bell and phone within reach. All needs met and pt reports no further questions at this time. Continue to recommend No post-acute rehabilitation needs when medically cleared. OT will continue to follow pt on caseload.   Plan   Treatment Interventions ADL retraining;Functional transfer training;Endurance training;Patient/family training;Equipment evaluation/education;Compensatory technique education;Continued evaluation;Energy conservation;Activityengagement   Goal Expiration Date 11/11/24   OT Treatment Day 1   OT Frequency 3-5x/wk   Discharge Recommendation   Rehab Resource Intensity Level, OT No post-acute rehabilitation needs   Equipment Recommended Bedside commode   Commode Type Standard   Additional Comments  The patient's raw score on the AM-PAC Daily Activity Inpatient Short Form is 21. A raw score of greater than or equal to 19 suggests the patient may benefit from discharge to home. Please refer to the recommendation of the Occupational Therapist for safe discharge planning.   AM-PAC Daily Activity Inpatient   Lower Body Dressing 3   Bathing 3   Toileting 3   Upper Body Dressing 4   Grooming 4   Eating 4   Daily Activity Raw Score 21   Daily Activity Standardized Score (Calc for Raw Score >=11) 44.27   AM-PAC Applied Cognition Inpatient   Following a Speech/Presentation 4   Understanding Ordinary Conversation 4   Taking Medications 4   Remembering Where Things Are Placed or Put Away 4   Remembering List of 4-5 Errands 4   Taking Care of Complicated Tasks 4   Applied Cognition Raw Score 24   Applied Cognition Standardized  Score 62.21   End of Consult   Education Provided Yes   Patient Position at End of Consult Bedside chair;All needs within reach   Nurse Communication Nurse aware of consult   Parvin Luque, OT

## 2024-11-05 NOTE — ASSESSMENT & PLAN NOTE
POD s/p left PEGGY  WBAT LLE  PT/OT - posterior hip precautions, abduction pillow, assistive devices  DVT ppx with Lovenox x28 days, SCDs, ambulation  Analgesics, apply ice prn  AM labs stable  Okay for discharge from Ortho perspective. Plan to follow up with Dr. Gallagher as previously scheduled

## 2024-11-05 NOTE — CASE MANAGEMENT
Case Management Progress Note    Patient name Solis Dos Santos  Location 2 N /WE 2 N * MRN [id number]  : 1950 Date 2024       LOS (days): 0  Geometric Mean LOS (GMLOS) (days):   Days to GMLOS:        OBJECTIVE:        Current admission status: Outpatient Surgery  Preferred Pharmacy:   [facility] [place] [address] [address] [address]  Phone: [phone] Fax: [phone]    Primary Care Provider: Riki Noguera MD    Primary Insurance: MEDICARE  Secondary Insurance: [employer]    PROGRESS NOTE:    CM consulted by orthopedics s/p hip replacement. Per Ortho pre-op assessment/PT assessment, pt resides in a one story home with spouse and was independent with ADLs prior to admission. Pt has a RW. Therapy recommending OP therapy.  CM will continue to follow.     CM consult for DME needs a BSC.  Met with patient at bedside and discussed same.  Patient agreeable to any OP costs and needs BSC since ride is on way.  Update to WE-Ortho team.  Good to dispense BSC from consignment.

## 2024-11-05 NOTE — QUICK NOTE
"S: 75 yo POD1 s/p left PEGGY. Patient c/o pain with movement, is otherwise doing well postop. No new complaints at this time. No incidents overnight.     O: Afeb VSS.Blood pressure 130/84, pulse 94, temperature 98.4 °F (36.9 °C), temperature source Oral, resp. rate 18, height 6' 1\" (1.854 m), weight 122 kg (269 lb 3.2 oz), SpO2 93%.,Body mass index is 35.52 kg/m².    Recent Labs     11/05/24  0513   WBC 8.66   HGB 14.1      SODIUM 135   K 3.8      CO2 26   BUN 17   CREATININE 0.95   GLUC 146*   CALCIUM 8.4     Dressing C/D/I. Motor and sensation grossly intact.     A/P:75 yo POD1 s/p left left total hip arthroplasty, posterior approach.  -posterior hip precautions  -PT/OT  -discharge when cleared by PT     Shahrzad Kang PA-C    "

## 2024-11-06 ENCOUNTER — TELEPHONE (OUTPATIENT)
Dept: OBGYN CLINIC | Facility: HOSPITAL | Age: 74
End: 2024-11-06

## 2024-11-06 NOTE — TELEPHONE ENCOUNTER
I called and discussed lovenox with patient. I instructed patient that it is on to take at noon, just consistently take at the same time every day

## 2024-11-06 NOTE — TELEPHONE ENCOUNTER
Caller: Dandre     Doctor: Elliott Garcias     Reason for call: Patient had left total hip on 11/4.    Patient calling to ask if he may switch his 9 am time of taking  the enoxaparin to noon and then continue at that time.  The 9 am will be interfering with his PT time.    Please call to advise         Call back#: [phone]

## 2024-11-06 NOTE — TELEPHONE ENCOUNTER
"Patient contacted for a postoperative follow up assessment. Patient reports \"stiff and sore\" this morning, and just took a walk around the house.   Patient states current pain level of a  1/10  when sitting and 7/10 when walking with RW. Patient denies increase in swelling and dressing is clean, dry and intact. Patient is icing the site and pt encouraged to call me with questions, concerns or issues. He has OP PT tomorrow.     We reviewed patients AVS medication list. Patient is taking Tylenol 1000mg every 8 hours, Robaxin 500mg TID, Oxycodone 5mg PRN, Lovenox daily, and we discussed he start an OTC stool softener, as of today- denies yet having a BM. We discussed OTC Miralax if needed.     Patient denies nausea, vomiting, abdominal pain, chest pain, shortness of breath, fever, dizziness and calf pain. Patient does not have any other questions or concerns at this time. Pt was encouraged to call with any questions, concerns or issues.    "

## 2024-11-07 ENCOUNTER — TELEPHONE (OUTPATIENT)
Age: 74
End: 2024-11-07

## 2024-11-07 ENCOUNTER — OFFICE VISIT (OUTPATIENT)
Dept: PHYSICAL THERAPY | Facility: OTHER | Age: 74
End: 2024-11-07
Payer: MEDICARE

## 2024-11-07 DIAGNOSIS — M48.061 SPINAL STENOSIS OF LUMBAR REGION WITHOUT NEUROGENIC CLAUDICATION: ICD-10-CM

## 2024-11-07 DIAGNOSIS — M16.12 PRIMARY OSTEOARTHRITIS OF LEFT HIP: Primary | ICD-10-CM

## 2024-11-07 PROCEDURE — 97161 PT EVAL LOW COMPLEX 20 MIN: CPT

## 2024-11-07 PROCEDURE — 97110 THERAPEUTIC EXERCISES: CPT

## 2024-11-07 NOTE — TELEPHONE ENCOUNTER
Caller: Patient     Doctor: Elliott    Reason for call: Patient had surgery on Monday and was given a commode, Patient asked if there is a larger one to fit him.  when patient sits on it, the bandages tend to come off because it rubs against it     Call back#: [phone]

## 2024-11-07 NOTE — TELEPHONE ENCOUNTER
Spoke to patient. Discussed BSC/raised toilet seat. He ended up ordering one through [employer].    We discussed pain, using CARRIE/compression sleeves to the knee for support/swelling control, and all questions answered at this time.     pt encouraged to call me with questions, concerns or issues.

## 2024-11-09 ENCOUNTER — TELEPHONE (OUTPATIENT)
Dept: OBGYN CLINIC | Facility: HOSPITAL | Age: 74
End: 2024-11-09

## 2024-11-09 NOTE — TELEPHONE ENCOUNTER
Caller: Patient    Doctor: Elliott    Reason for call: Sent message to on call Dr. Cintron - Dandre had surgery left total hip on 11/4/24 and is constipated and wondered if something could be called into the his pharmacy to help with that. Thank you. Please call patient @ [phone]   Dr. Cintron advised - We don't have anything specific to call in - it's all over the counter. I would get Colace and Metamucil.  Let patient know.    Call back#: [phone]

## 2024-11-11 ENCOUNTER — OFFICE VISIT (OUTPATIENT)
Dept: PHYSICAL THERAPY | Facility: OTHER | Age: 74
End: 2024-11-11
Payer: MEDICARE

## 2024-11-11 DIAGNOSIS — M16.12 PRIMARY OSTEOARTHRITIS OF LEFT HIP: Primary | ICD-10-CM

## 2024-11-11 DIAGNOSIS — M48.061 SPINAL STENOSIS OF LUMBAR REGION WITHOUT NEUROGENIC CLAUDICATION: ICD-10-CM

## 2024-11-11 PROCEDURE — 97530 THERAPEUTIC ACTIVITIES: CPT | Performed by: PHYSICAL MEDICINE & REHABILITATION

## 2024-11-11 PROCEDURE — 97140 MANUAL THERAPY 1/> REGIONS: CPT | Performed by: PHYSICAL MEDICINE & REHABILITATION

## 2024-11-11 NOTE — PROGRESS NOTES
Daily Note     Today's date: 2024  Patient name: Solis Dos Santos  : 1950  MRN: [id number]  Referring provider: Leonard Gallagher,*  Dx:   Encounter Diagnosis     ICD-10-CM    1. Primary osteoarthritis of left hip  M16.12       2. Spinal stenosis of lumbar region without neurogenic claudication  M48.061                  Subjective: Patient concerned with safety of transfers at home. Difficulty getting out of car this morning, getting in car was easier today. Patient has been sleeping in recliner, wishes to transition to bed and would like to review safe transfer.     Objective: See treatment diary below    Assessment: Tolerated treatment well. Increased time spent on transfers, safe positioning, quad activation and LLE use, and use of LE strap for assist. NBOS with gait, cueing to avoid R foot catching L. Reviewed car transfer end of session, patient with improved comfort and understanding of mechanics. Patient demonstrated fatigue post treatment, exhibited good technique with therapeutic exercises, and would benefit from continued PT. Resume/begin TE as able nv.     Plan: Continue per plan of care.      Precautions: Hx Neoplasm, Chronic Pain, L Posterior PEGGY precautions    Manuals             L hip PROM w/in precautions Hip flex < 90 MH  LH                                                  Neuro Re-Ed             Quad set  demo reviewed           SLR              Hip add              Hip abduction  demo            Clamshell              Weight shift                           Reverse acosta walk              Compliant Surface              Dynamic Balance                                                     Ther Ex             Heel Slide              Ankle pumps  Demo             HR/TR Demo seated                         Mini Squat              Standing march                           Leg press DL              Leg Press SL              Sit <> stand              Step ups fwd and lat                                         Ther Activity  11/11           Car Transfer  [facility]             Bed transfer, STS, LH           Gait Training  11/11           LRAD   RW, LH                        Modalities             Education

## 2024-11-12 ENCOUNTER — HOSPITAL ENCOUNTER (OUTPATIENT)
Dept: RADIOLOGY | Facility: HOSPITAL | Age: 74
Discharge: HOME | End: 2024-11-12
Attending: ORTHOPAEDIC SURGERY
Payer: MEDICARE

## 2024-11-12 ENCOUNTER — OFFICE VISIT (OUTPATIENT)
Dept: OBGYN CLINIC | Facility: HOSPITAL | Age: 74
End: 2024-11-12

## 2024-11-12 VITALS
BODY MASS INDEX: 35.52 KG/M2 | DIASTOLIC BLOOD PRESSURE: 79 MMHG | SYSTOLIC BLOOD PRESSURE: 126 MMHG | HEIGHT: 73 IN | HEART RATE: 80 BPM

## 2024-11-12 DIAGNOSIS — Z96.642 AFTERCARE FOLLOWING LEFT HIP JOINT REPLACEMENT SURGERY: Primary | ICD-10-CM

## 2024-11-12 DIAGNOSIS — Z96.642 AFTERCARE FOLLOWING LEFT HIP JOINT REPLACEMENT SURGERY: ICD-10-CM

## 2024-11-12 DIAGNOSIS — Z47.1 AFTERCARE FOLLOWING LEFT HIP JOINT REPLACEMENT SURGERY: Primary | ICD-10-CM

## 2024-11-12 DIAGNOSIS — Z47.1 AFTERCARE FOLLOWING LEFT HIP JOINT REPLACEMENT SURGERY: ICD-10-CM

## 2024-11-12 PROCEDURE — 99024 POSTOP FOLLOW-UP VISIT: CPT | Performed by: ORTHOPAEDIC SURGERY

## 2024-11-12 PROCEDURE — 73502 X-RAY EXAM HIP UNI 2-3 VIEWS: CPT

## 2024-11-12 NOTE — PROGRESS NOTES
Assessment:  1. Aftercare following left hip joint replacement surgery            Plan:  One week left PEGGY, 11/4/2024.  The patient is doing well   He should continue daily Lovenox, pain medications as needed and current physical therapy regimen.    The patient can shower letting soapy water over incision, yet should not to submerge the incision, and then pat dry.    The patient should follow up in one week      To do next visit:  Return in about 1 week (around 11/19/2024).    The above stated was discussed in layman's terms and the patient expressed understanding.  All questions were answered to the patient's satisfaction.       Scribe Attestation      I,:  Giorgio Garcia MA am acting as a scribe while in the presence of the attending physician.:       I,:  Leonard Gallagher MD personally performed the services described in this documentation    as scribed in my presence.:               Subjective:   Solis Dos Santos is a 74 y.o. male who presents one week left PEGGY, 11/4/2024.  The patient is doing well.  Today he complains of generalized Left hip pain.  He does participate in physical therapy.  He does use Lovenox daily.  He does use Tylenol for pain control.  He denies fever, chills or shortness of breath.        Review of systems negative unless otherwise specified in HPI    Past Medical History:   Diagnosis Date    Acute blood loss anemia 08/09/2019    Aftercare following right hip joint replacement surgery 11/05/2019    Anxiety disorder     Atherosclerotic heart disease of native coronary artery without angina pectoris     Benign prostatic hyperplasia without lower urinary tract symptoms     Cancer (HCC)     bladder    Clotting disorder (HCC) 1/25/2023    Blood in urine    Disease of thyroid gland     hypo    Dyslipidemia     GERD (gastroesophageal reflux disease)     manages w/ diet, takes no meds    Hypertension     Impaired fasting blood sugar     Kidney stone     Low back pain     Obesity      Osteoarthritis     last assesed 6-6-16    Other chest pain     Paresthesia of skin     Polyneuropathy     last assesed 5-8-17    Pure hypercholesterolemia     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)     Spinal stenosis     Suspected UTI 03/09/2023    Urinary tract infection     Wears glasses        Past Surgical History:   Procedure Laterality Date    BACK SURGERY      L4-L5 sx    CHOLECYSTECTOMY      COLONOSCOPY  02/06/2019    CYSTOSCOPY N/A 04/26/2023    Procedure: CYSTOSCOPY w/ bladder biopsy;  Surgeon: Geo Bentley MD;  Location: [facility];  Service: Urology    HIP SURGERY  August 2019    Replacement    JOINT REPLACEMENT  August 2019    Hip replacement    NY ARTHRP ACETBLR/PROX FEM PROSTC AGRFT/ALGRFT Right 08/05/2019    Procedure: ARTHROPLASTY HIP TOTAL;  Surgeon: Leonard Gallagher MD;  Location: [facility];  Service: Orthopedics    NY ARTHRP ACETBLR/PROX FEM PROSTC AGRFT/ALGRFT Left 11/4/2024    Procedure: Left total hip arthroplasty;  Surgeon: Leonard Gallagher MD;  Location: [facility];  Service: Orthopedics    NY CYSTO W/REMOVAL OF LESIONS SMALL N/A 03/23/2023    Procedure: TRANSURETHRAL RESECTION OF BLADDER TUMOR (TURBT), mitomycin ;  Surgeon: Geo Bentley MD;  Location: [facility];  Service: Urology    NY CYSTO W/REMOVAL OF LESIONS SMALL N/A 04/26/2023    Procedure: TRANSURETHRAL RESECTION OF BLADDER TUMOR (TURBT), cystoscopy with bladder biopsy;  Surgeon: Geo Bentley MD;  Location: [facility];  Service: Urology    TONSILLECTOMY         Family History   Problem Relation Age of Onset    Arthritis Other     Heart disease Father     Arthritis Mother        Social History     Occupational History    Not on file   Tobacco Use    Smoking status: Former     Current packs/day: 0.00     Average packs/day: 1 pack/day for 20.8 years (20.8 ttl pk-yrs)     Types: Cigarettes     Start date: 1/1/1967     Quit date: 11/1/1987     Years since quitting:  37.0    Smokeless tobacco: Never   Vaping Use    Vaping status: Never Used   Substance and Sexual Activity    Alcohol use: Yes     Alcohol/week: 1.0 standard drink of alcohol     Types: 1 Cans of beer per week    Drug use: Never    Sexual activity: Not Currently     Partners: Female     Birth control/protection: None         Current Outpatient Medications:     acetaminophen (TYLENOL) 500 mg tablet, Take 2 tablets (1,000 mg total) by mouth every 6 (six) hours as needed for mild pain, Disp: 90 tablet, Rfl: 0    atorvastatin (LIPITOR) 10 mg tablet, Take 1 tablet (10 mg total) by mouth daily at bedtime, Disp: 90 tablet, Rfl: 1    cholecalciferol (VITAMIN D3) 1,000 units tablet, Take 1 tablet (1,000 Units total) by mouth daily, Disp: 90 tablet, Rfl: 3    ciclopirox (LOPROX) 0.77 % cream, , Disp: , Rfl:     cyanocobalamin (VITAMIN B-12) 500 MCG tablet, Take 1 tablet (500 mcg total) by mouth daily, Disp: 100 tablet, Rfl: 3    enoxaparin (LOVENOX) 40 mg/0.4 mL, Inject 0.4 mL (40 mg total) under the skin daily for 28 days To start postoperatively, Disp: 11.2 mL, Rfl: 0    hydrocortisone 2.5 % cream, if needed, Disp: , Rfl:     levothyroxine 150 mcg tablet, Take 1 tablet (150 mcg total) by mouth daily, Disp: 90 tablet, Rfl: 1    methocarbamol (ROBAXIN) 500 mg tablet, Take 1 tablet (500 mg total) by mouth 3 (three) times a day as needed for muscle spasms (Patient not taking: Reported on 11/12/2024), Disp: 60 tablet, Rfl: 0    Multiple Vitamins-Minerals (multivitamin with iron-minerals) liquid, Take by mouth daily, Disp: , Rfl:     oxyCODONE (Roxicodone) 5 immediate release tablet, Take 1 tablet (5 mg total) by mouth every 6 (six) hours as needed for moderate pain for up to 10 days Max Daily Amount: 20 mg (Patient not taking: Reported on 11/12/2024), Disp: 30 tablet, Rfl: 0    tamsulosin (FLOMAX) 0.4 mg, Take 1 capsule (0.4 mg total) by mouth daily with dinner, Disp: 90 capsule, Rfl: 1    No Known Allergies         Vitals:     "11/12/24 1325   BP: 126/79   Pulse: 80       Objective:  Physical exam  General: Awake, Alert, Oriented  Eyes: Pupils equal, round and reactive to light  Heart: regular rate and rhythm  Lungs: No audible wheezing  Abdomen: soft                    Ortho Exam  Left hip:  Posterior incision clean dry and intact  Staples well approximated   Appropriate warmth and swelling  Good arc of motion with no pain  Patient sits comfortably in chair with hip flexed at 90 degrees  Patient stands from seated position without assistance  Calf compartments soft and supple  Sensation intact  Toes are warm sensate and mobile      Diagnostics, reviewed and taken today if performed as documented:    The attending physician has personally reviewed the pertinent films in PACS and interpretation is as follows:  Left hip x-ray:  Well aligned prosthesis with no acute changes.      Procedures, if performed today:    Procedures    None performed      Portions of the record may have been created with voice recognition software.  Occasional wrong word or \"sound a like\" substitutions may have occurred due to the inherent limitations of voice recognition software.  Read the chart carefully and recognize, using context, where substitutions have occurred.    "

## 2024-11-13 ENCOUNTER — TELEPHONE (OUTPATIENT)
Age: 74
End: 2024-11-13

## 2024-11-13 NOTE — TELEPHONE ENCOUNTER
Spoke to patient, all questions answered. Reviewed OV note: The patient can shower letting soapy water over incision, yet should not to submerge the incision, and then pat dry.     Discussed he can use his normal soap, no longer needs our preop soap.

## 2024-11-13 NOTE — TELEPHONE ENCOUNTER
Caller: Solis    Doctor: Elliott    Reason for call: Was in yesterday for 1st PO , he was reviewing his AVS and saw that he was able to shower as long as he did not scrub the incision site and only allowed water to run over, and pat dry. He wanted to confirm these where the only instructions he needed to shower, and was also wondering if he still needed to use special soap    Call back#: [phone]

## 2024-11-14 ENCOUNTER — OFFICE VISIT (OUTPATIENT)
Dept: PHYSICAL THERAPY | Facility: OTHER | Age: 74
End: 2024-11-14
Payer: MEDICARE

## 2024-11-14 DIAGNOSIS — M48.061 SPINAL STENOSIS OF LUMBAR REGION WITHOUT NEUROGENIC CLAUDICATION: ICD-10-CM

## 2024-11-14 DIAGNOSIS — M16.12 PRIMARY OSTEOARTHRITIS OF LEFT HIP: Primary | ICD-10-CM

## 2024-11-14 PROCEDURE — 97140 MANUAL THERAPY 1/> REGIONS: CPT | Performed by: PHYSICAL THERAPIST

## 2024-11-14 PROCEDURE — 97112 NEUROMUSCULAR REEDUCATION: CPT | Performed by: PHYSICAL THERAPIST

## 2024-11-14 PROCEDURE — 97110 THERAPEUTIC EXERCISES: CPT | Performed by: PHYSICAL THERAPIST

## 2024-11-14 NOTE — PROGRESS NOTES
"Daily Note     Today's date: 2024  Patient name: Solis Dos Santos  : 1950  MRN: [id number]  Referring provider: Leonard Gallagher,*  Dx:   Encounter Diagnosis     ICD-10-CM    1. Primary osteoarthritis of left hip  M16.12       2. Spinal stenosis of lumbar region without neurogenic claudication  M48.061                      Subjective: patient reports that getting in/out of the car has been going better since the previous visit. He notes seeing small improvements each day.       Objective: See treatment diary below      Assessment: Tolerated treatment well. Continued with program as noted below. He continues to be challenged with quad strength. Patient exhibited good technique with therapeutic exercises and would benefit from continued PT      Plan: Continue per plan of care.      Precautions: Hx Neoplasm, Chronic Pain, L Posterior PEGGY precautions    Manuals            L hip PROM w/in precautions Hip flex < 90 [facility] SK                                                 Neuro Re-Ed             Quad set  demo reviewed 10\" x10          SLR              Hip add              Hip abduction  demo  OTB 2x10          Clamshell              Weight shift                           Reverse acosta walk              Compliant Surface              Dynamic Balance              Stand Hip 3 way   x10 ea Yo                                    Ther Ex             Heel Slide    10\" x10          Ankle pumps  Demo             HR/TR Demo seated  HR Stand 2x10                       Mini Squat    2x10          Standing march    2x10 ea                       Leg press DL              Leg Press SL              Sit <> stand              Step ups fwd and lat                                        Ther Activity             Car Transfer  [facility]             Bed transfer, STS,            Gait Training             LRAD   RW,                         Modalities             Education                         "

## 2024-11-18 ENCOUNTER — OFFICE VISIT (OUTPATIENT)
Dept: PHYSICAL THERAPY | Facility: OTHER | Age: 74
End: 2024-11-18
Payer: MEDICARE

## 2024-11-18 DIAGNOSIS — M16.12 PRIMARY OSTEOARTHRITIS OF LEFT HIP: Primary | ICD-10-CM

## 2024-11-18 PROCEDURE — 97110 THERAPEUTIC EXERCISES: CPT

## 2024-11-18 PROCEDURE — 97140 MANUAL THERAPY 1/> REGIONS: CPT

## 2024-11-18 NOTE — PROGRESS NOTES
"Daily Note     Today's date: 2024  Patient name: Solis Dos Santos  : 1950  MRN: [id number]  Referring provider: Leonard Gallagher,*  Dx:   Encounter Diagnosis     ICD-10-CM    1. Primary osteoarthritis of left hip  M16.12             Start Time: 941  Stop Time: 1019  Total time in clinic (min): 38 minutes    Subjective: Pt reports L hip pain has been well-controlled, he took Tylenol before PT today.      Objective: See treatment diary below      Assessment: Tolerated treatment well focused on L hip PROM and L hip / quad strengthening. Added 4\" step-ups with no pain, utilizing UE assist. Patient exhibited good technique with therapeutic exercises and would benefit from continued PT      Plan: Continue per plan of care.      Precautions: Hx Neoplasm, Chronic Pain, L Posterior PEGGY precautions    Manuals           L hip PROM w/in precautions Hip flex < 90 [facility] SK SFP                                                Neuro Re-Ed             Quad set  demo reviewed 10\" x10          SAQ    x30         SLR     MaxA x3 reps         Hip add              Hip abduction  demo  OTB 2x10          Clamshell              Weight shift                           Reverse acosta walk              Compliant Surface              Dynamic Balance              Stand Hip 3 way   x10 ea Yo X15ea:L                                   Ther Ex             Heel Slide    10\" x10 Active x30         Ankle pumps  Demo             HR/TR Demo seated  HR Stand 2x10                       Mini Squat    2x10          Standing march    2x10 ea x20ea                      Leg press DL              Leg Press SL              Sit <> stand              Step ups fwd and lat     4\" FSU x20                                   Ther Activity             Car Transfer  [facility]             Bed transfer, STS,            Gait Training             LRAD   RW,                         Modalities             Education            "

## 2024-11-19 ENCOUNTER — OFFICE VISIT (OUTPATIENT)
Dept: OBGYN CLINIC | Facility: HOSPITAL | Age: 74
End: 2024-11-19

## 2024-11-19 VITALS
SYSTOLIC BLOOD PRESSURE: 134 MMHG | HEIGHT: 73 IN | DIASTOLIC BLOOD PRESSURE: 64 MMHG | BODY MASS INDEX: 35.52 KG/M2 | HEART RATE: 87 BPM

## 2024-11-19 DIAGNOSIS — Z47.1 AFTERCARE FOLLOWING LEFT HIP JOINT REPLACEMENT SURGERY: Primary | ICD-10-CM

## 2024-11-19 DIAGNOSIS — Z96.642 AFTERCARE FOLLOWING LEFT HIP JOINT REPLACEMENT SURGERY: Primary | ICD-10-CM

## 2024-11-19 PROCEDURE — 99024 POSTOP FOLLOW-UP VISIT: CPT | Performed by: ORTHOPAEDIC SURGERY

## 2024-11-19 NOTE — PROGRESS NOTES
"Assessment:   Diagnosis ICD-10-CM Associated Orders   1. Aftercare following left hip joint replacement surgery  Z47.1     Z96.642           Plan:  2nd post-op visit 2 weeks s/p left PEGGY  His incision is healed and staples removed successfully  He may get his incision wet, do not submerge, pat dry  No ointment over his incision for at least 1 more week  Complete lovenox as instructed  Weight bearing and activities as tolerated  Total hip precautions as discussed  Walker to cane progression as tolerated  Can resume using his regular elevated commode seat.  Can cancel appointment with Dr. Barfield on 12/2    To do next visit:  Return in about 1 week (around 11/26/2024) for incision re-check with intent of staple removal.    The above stated was discussed in layman's terms and the patient expressed understanding.  All questions were answered to the patient's satisfaction.       Scribe Attestation      I,:  Caio Hickey am acting as a scribe while in the presence of the attending physician.:       I,:  Leonard Gallagher MD personally performed the services described in this documentation    as scribed in my presence.:               Subjective:   Solis Dos Santos is a 74 y.o. male who presents today for repeat evaluation of his left hip, 2 weeks s/p left PEGYG.  He feels better each passing day  He presents using a RW. He has been attending PT and administering his lovenox as instructed  Denies any calf or thigh pain.    He states that his \"stenosis pain\" has resolved as a result of his left PEGGY is wondering if it's necessary to follow-up with Dr. Barfield as scheduled next month.    Review of systems negative unless otherwise specified in HPI  Review of Systems    Past Medical History:   Diagnosis Date    Acute blood loss anemia 08/09/2019    Aftercare following right hip joint replacement surgery 11/05/2019    Anxiety disorder     Atherosclerotic heart disease of native coronary artery without angina pectoris     " Benign prostatic hyperplasia without lower urinary tract symptoms     Cancer (HCC)     bladder    Clotting disorder (HCC) 1/25/2023    Blood in urine    Disease of thyroid gland     hypo    Dyslipidemia     GERD (gastroesophageal reflux disease)     manages w/ diet, takes no meds    Hypertension     Impaired fasting blood sugar     Kidney stone     Low back pain     Obesity     Osteoarthritis     last assesed 6-6-16    Other chest pain     Paresthesia of skin     Polyneuropathy     last assesed 5-8-17    Pure hypercholesterolemia     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)     Spinal stenosis     Suspected UTI 03/09/2023    Urinary tract infection     Wears glasses        Past Surgical History:   Procedure Laterality Date    BACK SURGERY      L4-L5 sx    CHOLECYSTECTOMY      COLONOSCOPY  02/06/2019    CYSTOSCOPY N/A 04/26/2023    Procedure: CYSTOSCOPY w/ bladder biopsy;  Surgeon: Geo Bentley MD;  [facility]: [facility];  Service: Urology    HIP SURGERY  August 2019    Replacement    JOINT REPLACEMENT  August 2019    Hip replacement    MO ARTHRP ACETBLR/PROX FEM PROSTC AGRFT/ALGRFT Right 08/05/2019    Procedure: ARTHROPLASTY HIP TOTAL;  Surgeon: Leonard Gallagher MD;  Location: [facility];  Service: Orthopedics    MO ARTHRP ACETBLR/PROX FEM PROSTC AGRFT/ALGRFT Left 11/4/2024    Procedure: Left total hip arthroplasty;  Surgeon: Leonard Gallagher MD;  Location: [facility];  Service: Orthopedics    MO CYSTO W/REMOVAL OF LESIONS SMALL N/A 03/23/2023    Procedure: TRANSURETHRAL RESECTION OF BLADDER TUMOR (TURBT), mitomycin ;  Surgeon: Geo Bentley MD;  [facility];  Service: Urology    MO CYSTO W/REMOVAL OF LESIONS SMALL N/A 04/26/2023    Procedure: TRANSURETHRAL RESECTION OF BLADDER TUMOR (TURBT), cystoscopy with bladder biopsy;  Surgeon: Geo Bentley MD;  [facility];  Service: Urology    TONSILLECTOMY         Family History   Problem  Relation Age of Onset    Arthritis Other     Heart disease Father     Arthritis Mother        Social History     Occupational History    Not on file   Tobacco Use    Smoking status: Former     Current packs/day: 0.00     Average packs/day: 1 pack/day for 20.8 years (20.8 ttl pk-yrs)     Types: Cigarettes     Start date: 1967     Quit date: 1987     Years since quittin.0    Smokeless tobacco: Never   Vaping Use    Vaping status: Never Used   Substance and Sexual Activity    Alcohol use: Yes     Alcohol/week: 1.0 standard drink of alcohol     Types: 1 Cans of beer per week    Drug use: Never    Sexual activity: Not Currently     Partners: Female     Birth control/protection: None         Current Outpatient Medications:     acetaminophen (TYLENOL) 500 mg tablet, Take 2 tablets (1,000 mg total) by mouth every 6 (six) hours as needed for mild pain, Disp: 90 tablet, Rfl: 0    atorvastatin (LIPITOR) 10 mg tablet, Take 1 tablet (10 mg total) by mouth daily at bedtime, Disp: 90 tablet, Rfl: 1    cholecalciferol (VITAMIN D3) 1,000 units tablet, Take 1 tablet (1,000 Units total) by mouth daily, Disp: 90 tablet, Rfl: 3    ciclopirox (LOPROX) 0.77 % cream, , Disp: , Rfl:     cyanocobalamin (VITAMIN B-12) 500 MCG tablet, Take 1 tablet (500 mcg total) by mouth daily, Disp: 100 tablet, Rfl: 3    enoxaparin (LOVENOX) 40 mg/0.4 mL, Inject 0.4 mL (40 mg total) under the skin daily for 28 days To start postoperatively, Disp: 11.2 mL, Rfl: 0    hydrocortisone 2.5 % cream, if needed, Disp: , Rfl:     levothyroxine 150 mcg tablet, Take 1 tablet (150 mcg total) by mouth daily, Disp: 90 tablet, Rfl: 1    methocarbamol (ROBAXIN) 500 mg tablet, Take 1 tablet (500 mg total) by mouth 3 (three) times a day as needed for muscle spasms (Patient not taking: Reported on 2024), Disp: 60 tablet, Rfl: 0    Multiple Vitamins-Minerals (multivitamin with iron-minerals) liquid, Take by mouth daily, Disp: , Rfl:     tamsulosin (FLOMAX)  "0.4 mg, Take 1 capsule (0.4 mg total) by mouth daily with dinner, Disp: 90 capsule, Rfl: 1    No Known Allergies         Vitals:    11/19/24 1335   BP: 134/64   Pulse: 87       Body mass index is 35.52 kg/m².  Wt Readings from Last 3 Encounters:   11/04/24 122 kg (269 lb 3.2 oz)   10/14/24 126 kg (277 lb 12.8 oz)   10/01/24 127 kg (280 lb)       Objective:                    Left Hip Exam     Comments:    Adequately healed posterior-lateral incision with staples in place which were removed successfully. Appropriate amount of warmth, no drainage, no signs of infection.  Modest swelling  Calf and thigh are soft and non-tender, no signs of DVT  Painless arc of gentle PROM            Diagnostics, reviewed and taken today if performed as documented:    None performed        Procedures, if performed today:    Procedures    None performed      Portions of the record may have been created with voice recognition software.  Occasional wrong word or \"sound a like\" substitutions may have occurred due to the inherent limitations of voice recognition software.  Read the chart carefully and recognize, using context, where substitutions have occurred.  "

## 2024-11-21 ENCOUNTER — OFFICE VISIT (OUTPATIENT)
Dept: PHYSICAL THERAPY | Facility: OTHER | Age: 74
End: 2024-11-21
Payer: MEDICARE

## 2024-11-21 DIAGNOSIS — M16.12 PRIMARY OSTEOARTHRITIS OF LEFT HIP: Primary | ICD-10-CM

## 2024-11-21 DIAGNOSIS — M48.061 SPINAL STENOSIS OF LUMBAR REGION WITHOUT NEUROGENIC CLAUDICATION: ICD-10-CM

## 2024-11-21 PROCEDURE — 97140 MANUAL THERAPY 1/> REGIONS: CPT

## 2024-11-21 PROCEDURE — 97530 THERAPEUTIC ACTIVITIES: CPT

## 2024-11-21 PROCEDURE — 97110 THERAPEUTIC EXERCISES: CPT

## 2024-11-21 NOTE — PROGRESS NOTES
"Daily Note     Today's date: 2024  Patient name: Solis Dos Santos  : 1950  MRN: [id number]  Referring provider: Leonard Gallagher,*  Dx:   Encounter Diagnosis     ICD-10-CM    1. Primary osteoarthritis of left hip  M16.12       2. Spinal stenosis of lumbar region without neurogenic claudication  M48.061               Start Time: 09  Stop Time: 1031  Total time in clinic (min): 53 minutes    Subjective: Pt reports L hip pain is improving by the day; had visit with Dr. Gallagher on Tuesday and able to transition from RW to cane as tolerated.      Objective: See treatment diary below      Assessment: Tolerated treatment well focused on L hip PROM and L hip / quad strengthening; improving L hip ER PROM with manual stretching. Flex 90, Abd 25, ER 20. Cueing to point L foot straight ahead during gait, demonstrates L toe-in pattern. Continued RW use today for gait training. Patient exhibited good technique with therapeutic exercises and would benefit from continued PT      Plan: Continue per plan of care.      Precautions: Hx Neoplasm, Chronic Pain, L Posterior PEGGY precautions    Manuals          L hip PROM w/in precautions Hip flex < 90 MH  LH SK SFP                                                Neuro Re-Ed             Quad set  demo reviewed 10\" x10          SAQ    x30 1# 2x10        LAQ     1# 3x10        SLR     MaxA x3 reps         Bridges     10x3\"        Hip add      Ball squeeze 20x5\"        Hip abduction  demo  OTB 2x10  Iso 20x5\"        Clamshell              Weight shift                           Reverse acosta walk              Compliant Surface              Dynamic Balance              Stand Hip 3 way   x10 ea Yo X15ea:L 1# 2x10ea                                  Ther Ex             Heel Slide    10\" x10 Active x30 Active x20        Ankle pumps  Demo             HR/TR Demo seated  HR Stand 2x10                       Mini Squat    2x10          Standing march    " "2x10 ea x20ea                      Leg press DL              Leg Press SL              Sit <> stand              Step ups fwd and lat     4\" FSU x20 4\" FSU 3x10ea                                  Ther Activity  11/11           Car Transfer  [facility]             Bed transfer, STS,            Gait Training  11/11           LRAD   RW,                         Modalities             Education                                  "

## 2024-11-25 ENCOUNTER — OFFICE VISIT (OUTPATIENT)
Dept: PHYSICAL THERAPY | Facility: OTHER | Age: 74
End: 2024-11-25
Payer: MEDICARE

## 2024-11-25 DIAGNOSIS — M48.061 SPINAL STENOSIS OF LUMBAR REGION WITHOUT NEUROGENIC CLAUDICATION: ICD-10-CM

## 2024-11-25 DIAGNOSIS — M16.12 PRIMARY OSTEOARTHRITIS OF LEFT HIP: Primary | ICD-10-CM

## 2024-11-25 PROCEDURE — 97116 GAIT TRAINING THERAPY: CPT

## 2024-11-25 PROCEDURE — 97140 MANUAL THERAPY 1/> REGIONS: CPT

## 2024-11-25 PROCEDURE — 97110 THERAPEUTIC EXERCISES: CPT

## 2024-11-29 ENCOUNTER — OFFICE VISIT (OUTPATIENT)
Dept: PHYSICAL THERAPY | Facility: OTHER | Age: 74
End: 2024-11-29
Payer: MEDICARE

## 2024-11-29 DIAGNOSIS — M16.12 PRIMARY OSTEOARTHRITIS OF LEFT HIP: Primary | ICD-10-CM

## 2024-11-29 DIAGNOSIS — M48.061 SPINAL STENOSIS OF LUMBAR REGION WITHOUT NEUROGENIC CLAUDICATION: ICD-10-CM

## 2024-11-29 PROCEDURE — 97140 MANUAL THERAPY 1/> REGIONS: CPT

## 2024-11-29 PROCEDURE — 97110 THERAPEUTIC EXERCISES: CPT

## 2024-11-29 NOTE — PROGRESS NOTES
"Daily Note     Today's date: 2024  Patient name: Solis Dos Santos  : 1950  MRN: [id number]  Referring provider: Leonard Gallagher,*  Dx:   Encounter Diagnosis     ICD-10-CM    1. Primary osteoarthritis of left hip  M16.12       2. Spinal stenosis of lumbar region without neurogenic claudication  M48.061                   Start Time: 915  Stop Time: 938  Total time in clinic (min): 23 minutes    Subjective: Pt reports concern about not being able to initiate his SLR independently, still able to lift leg onto bed for bed mobility.      Objective: See treatment diary below    L hip ER 40    Assessment: Tolerated treatment well focused on L hip PROM and L hip / quad strengthening - able to perform SLR today with Bernice; introduced static SLS, pt apprehensive but no pain reproduction. Demonstrating improvements in L hip ER ROM. Patient exhibited good technique with therapeutic exercises and would benefit from continued PT      Plan: Continue per plan of care.        AUTH Status:  Date         Medicare - RE every 10th visit Used 1 2 3 4 5 6 7         Remaining  9 8 7 6 5 4 3              Precautions: Hx Neoplasm, Chronic Pain, L Posterior PEGGY precautions x6 weeks (25)    Manuals        L hip PROM w/in precautions Hip flex < 90 MH   SK SFP   SFP                                             Neuro Re-Ed             Quad set  demo reviewed 10\" x10          SAQ    x30 1# 2x10        LAQ     1# 3x10 5# 3x10       SLR     MaxA x3 reps  H/L march c asst 3x10 H/L march 3x12      Bridges     10x3\" + iso hip ABd  2x 10x3\" + iso hip Abd   3x10      Hip add      Ball squeeze 20x5\" Ball squeeze 20x5\"       Hip abduction  demo  OTB 2x10  Iso 20x5\" Iso 20x5\"       Clamshell              SS c band       No band 10x10'                                SLS       OG 3 x 5\"      Dynamic Balance              Stand Hip 3 way   x10 ea Yo " History of Present Illness   Cathy Espinosa is a 51 year old female being seen for c/o rash on arms for 3 days. Itches intensely at night. Her grandson also has an itchy rash. She has checked for bed bugs but has not found any. Her boyfriend does not have any symptoms.     Past Medical History     Past Medical History:   Diagnosis Date   • Anxiety and depression    • Constipation    • Gastroesophageal reflux disease    • Hyperlipidemia    • Migraine headache     Feb 2014 L sensory sx prolonged with HA nl MRI felt L sided sx were migraine   • Osteoarthritis gen'l    • Right upper quadrant pain 1/4/2016   • Sleeping difficulties    • Tachycardia        Health Maintenance     Health Maintenance   Topic Date Due   • DTaP/Tdap/Td Vaccine (2 - Td or Tdap) 01/26/2021   • COVID-19 Vaccine (3 - Booster for Pfizer series) 10/08/2021   • Breast Cancer Screening  12/15/2022   • Depression Screening  03/10/2023   • Cervical Cancer Risk  09/15/2024   • Colonoscopy Risk  07/11/2029   • Influenza Vaccine  Completed   • Shingles Vaccine  Completed   • Hepatitis B Vaccine  Aged Out   • Meningococcal Vaccine  Aged Out   • HPV Vaccine  Aged Out   • Pneumococcal Vaccine 0-64  Aged Out       Allergies      ALLERGIES:   Allergen Reactions   • Sulfa Antibiotics RASH   • Sulfa Antibiotics Palpitations       Medications     Current Outpatient Medications   Medication Sig Dispense Refill   • hydrOXYzine (ATARAX) 25 MG tablet Take 1 tablet by mouth at bedtime. 30 tablet 0   • meloxicam (MOBIC) 15 MG tablet Take 1 tablet by mouth daily. 20 tablet 0   • propranolol (INDERAL) 20 MG tablet Take 1 tablet by mouth 2 times daily. 180 tablet 3   • gabapentin (NEURONTIN) 300 MG capsule Take 1 capsule by mouth 3 times daily. 90 capsule 3   • atorvastatin (LIPITOR) 80 MG tablet Take 1 tablet by mouth daily. 90 tablet 3   • fluticasone (FLONASE) 50 MCG/ACT nasal spray Spray 1 spray in each nostril daily. 16 g 12   • rizatriptan (Maxalt) 10 MG  "X15ea:L 1# 2x10ea 2.5# 2x10 nv                                Ther Ex             Heel Slide    10\" x10 Active x30 Active x20        Ankle pumps  Demo             HR/TR Demo seated  HR Stand 2x10                       Mini Squat    2x10    4x10      Standing march    2x10 ea x20ea  x20ea                    Leg press DL              Leg Press SL              Sit <> stand              Step ups fwd and lat     4\" FSU x20 4\" FSU 3x10ea 4\" FSU / LSU 2x10ea                                 Ther Activity  11/11           Car Transfer  [facility]             Bed transfer, STS,            Gait Training  11/11           LRAD   RW,     SPC                    Modalities             Education                                  " tablet Take 1 tablet by mouth at onset of migraine. May repeat after 2 hours if needed. (Patient taking differently: as needed. Take 1 tablet by mouth at onset of migraine. May repeat after 2 hours if needed. ) 12 tablet 1   • omeprazole (PriLOSEC) 40 MG capsule Take 1 capsule by mouth daily. 30 capsule 3   • DULoxetine (CYMBALTA) 60 MG capsule Take 1 capsule by mouth daily. 120 capsule 3   • acetaminophen (TYLENOL) 500 MG tablet Take 2 tablets by mouth at bedtime. (Patient taking differently: Take 1,000 mg by mouth as needed. ) 60 tablet 0   • permethrin (Elimite) 5 % cream Apply to entire body from neck down. Rinse after 14 hours. Repeat in 1 week. 60 g 1     No current facility-administered medications for this visit.       Review of Systems   No fever, body aches, chills, visual disturbances, weakness, sore throat, cough, congestion, chest pain, SOB, abdominal pain, leg swelling or paresthesia.    Physical Exam      Vitals:    03/10/22 1459   BP: 94/60   BP Location: RUE - Right upper extremity   Patient Position: Sitting   Cuff Size: Regular   Pulse: 70   Resp: 18   Temp: 97.2 °F (36.2 °C)   TempSrc: Temporal   SpO2: 98%   Weight: 76.7 kg (169 lb 3.2 oz)   Height: 5' 2\" (1.575 m)   LMP: 07/25/2019     Body mass index is 30.95 kg/m².  General:  Alert and oriented.  Cooperative, no acute distress.  Well groomed.   Skin:  Moist.  No pallor.  6-7 erythematous papules with surrounding abrasions on each arm. Also 3-4 on upper back. No other lesions on body.    Assessment and Plan      Rash- may be the start of scabies.   -Permethrin 1% cream.  Apply to entire body.   Perform environmental cleaning.   Rinse cream after 14 hours.   Repeat treatment after 1 week.   I recommended that her boyfriend also be treated.  Her grandson should be seen by his pediatrician for his rash.   Basic skin care reviewed.   Rx hydroxyzine 25mg at bedtime for itch and insomnia.    Follow up:  1 week  Kiera Mello NP

## 2024-12-04 ENCOUNTER — OFFICE VISIT (OUTPATIENT)
Dept: PHYSICAL THERAPY | Facility: OTHER | Age: 74
End: 2024-12-04
Payer: MEDICARE

## 2024-12-04 DIAGNOSIS — M16.12 PRIMARY OSTEOARTHRITIS OF LEFT HIP: Primary | ICD-10-CM

## 2024-12-04 PROCEDURE — 97110 THERAPEUTIC EXERCISES: CPT

## 2024-12-04 NOTE — PROGRESS NOTES
"Daily Note     Today's date: 2024  Patient name: Solis Dos Santos  : 1950  MRN: [id number]  Referring provider: Leonard Gallagher,*  Dx:   Encounter Diagnosis     ICD-10-CM    1. Primary osteoarthritis of left hip  M16.12                     Start Time: 1132  Stop Time: 1155  Total time in clinic (min): 23 minutes    Subjective: Pt reports he went a full day yesterday without using the cane; absence of L hip pain. Has no difficulty getting in & out of vehicle.      Objective: See treatment diary below    L hip ER 45    Assessment: Tolerated treatment well focused on L hip PROM and L hip / quad strengthening - added bike with reclined UE to maintain precautions. Progress to 6\" step-up without issue. Given improvements, PT feels confident that pt will be able to return to driving without limitations; pt will reach out to MD for approval. Patient exhibited good technique with therapeutic exercises and would benefit from continued PT      Plan: Continue per plan of care.        AUTH Status:  Date        Medicare - RE every 10th visit Used 1 2 3 4 5 6 7 8        Remaining  9 8 7 6 5 4 3 2             Precautions: Hx Neoplasm, Chronic Pain, L Posterior PEGGY precautions x6 weeks (25)    Manuals       L hip PROM w/in precautions Hip flex < 90 MH  LH SK SFP   SFP SFP                                            Neuro Re-Ed             Quad set  demo reviewed 10\" x10          SAQ    x30 1# 2x10        LAQ     1# 3x10 5# 3x10       SLR     MaxA x3 reps  H/L march c asst 3x10 H/L march 3x12      Bridges     10x3\" + iso hip ABd  2x 10x3\" + iso hip Abd   3x10 OTB h' 3x10     Hip add      Ball squeeze 20x5\" Ball squeeze 20x5\"       Hip abduction  demo  OTB 2x10  Iso 20x5\" Iso 20x5\"       Clamshell              SS c band       No band 10x10' LOTB a' x5                               SLS       OG 3 x 5\"      Dynamic Balance  " "            Stand Hip 3 way   x10 ea Yo X15ea:L 1# 2x10ea 2.5# 2x10 nv                                Ther Ex             Bike        X10 min     Heel Slide    10\" x10 Active x30 Active x20        Ankle pumps  Demo             HR/TR Demo seated  HR Stand 2x10                       Mini Squat    2x10    4x10 4x10     Standing march    2x10 ea x20ea  x20ea  Step-taps 4# x30                  Leg press DL              Leg Press SL              Sit <> stand              Step ups fwd and lat     4\" FSU x20 4\" FSU 3x10ea 4\" FSU / LSU 2x10ea  6\" FSU x10                               Ther Activity  11/11           Car Transfer  [facility]             Bed transfer, STS, LH           Gait Training  11/11           LRAD   RW,     SPC                    Modalities             Education                                  "

## 2024-12-05 ENCOUNTER — TELEPHONE (OUTPATIENT)
Age: 74
End: 2024-12-05

## 2024-12-05 NOTE — TELEPHONE ENCOUNTER
Caller: Patient     Doctor: Elliott    Reason for call: Patient called had surgery 11/04, patient has questions regarding clearance for driving. Please advise.    Call back#: [phone]

## 2024-12-06 ENCOUNTER — OFFICE VISIT (OUTPATIENT)
Dept: PHYSICAL THERAPY | Facility: OTHER | Age: 74
End: 2024-12-06
Payer: MEDICARE

## 2024-12-06 DIAGNOSIS — M16.12 PRIMARY OSTEOARTHRITIS OF LEFT HIP: Primary | ICD-10-CM

## 2024-12-06 PROCEDURE — 97110 THERAPEUTIC EXERCISES: CPT

## 2024-12-07 NOTE — PROGRESS NOTES
"Daily Note     Today's date: 2024  Patient name: Solis Dos Santos  : 1950  MRN: [id number]  Referring provider: Leonard Gallagher,*  Dx:   Encounter Diagnosis     ICD-10-CM    1. Primary osteoarthritis of left hip  M16.12                     Start Time: 1017  Stop Time: 1040  Total time in clinic (min): 23 minutes    Subjective: Pt reports he has been granted permission to drive from physician's office.      Objective: See treatment diary below      Assessment: Tolerated treatment well focused on L hip PROM and L hip / quad strengthening with no complaints other than muscle fatigue. (-) LLD with testing today, as pt previously had LLD due to impacted L hip OA. Pt instructed to bring custom orthotics back to PT and they will be refurbished to eliminate heel lift. Patient exhibited good technique with therapeutic exercises and would benefit from continued PT      Plan: Continue per plan of care.        AUTH Status:  Date       Medicare - RE every 10th visit Used 1 2 3 4 5 6 7 8 9 10 - RE      Remaining  9 8 7 6 5 4 3 2 1            Precautions: Hx Neoplasm, Chronic Pain, L Posterior PGEGY precautions x6 weeks (25)    Manuals   12    L hip PROM w/in precautions Hip flex < 90 MH  LH SK SFP   SFP SFP                                            Neuro Re-Ed             Quad set  demo reviewed 10\" x10          SAQ    x30 1# 2x10        LAQ     1# 3x10 5# 3x10   OTB 4x15    SLR     MaxA x3 reps  H/L march c asst 3x10 H/L march 3x12      Bridges     10x3\" + iso hip ABd  2x 10x3\" + iso hip Abd   3x10 OTB h' 3x10     Hip add      Ball squeeze 20x5\" Ball squeeze 20x5\"       Hip abduction  demo  OTB 2x10  Iso 20x5\" Iso 20x5\"       Clamshell              SS c band       No band 10x10' LOTB a' x5                               SLS       OG 3 x 5\"  OG UE support 3x20\"    Dynamic Balance              Stand Hip 3 way   " "x10 ea Yo X15ea:L 1# 2x10ea 2.5# 2x10 nv  3# 2x10                              Ther Ex             Bike        X10 min x10 min    Heel Slide    10\" x10 Active x30 Active x20        Ankle pumps  Demo             HR/TR Demo seated  HR Stand 2x10                       Mini Squat    2x10    4x10 4x10     Standing march    2x10 ea x20ea  x20ea  Step-taps 4# x30                  Leg press DL          nv    Leg Press SL              Sit <> stand              Step ups fwd and lat     4\" FSU x20 4\" FSU 3x10ea 4\" FSU / LSU 2x10ea  6\" FSU x10 6\" FSU x30   LSU x20                              Ther Activity  11/11           Car Transfer  [facility]             Bed transfer, STS,            Gait Training  11/11           LRAD   RW,     SPC                    Modalities             Education                                  "

## 2024-12-09 ENCOUNTER — OFFICE VISIT (OUTPATIENT)
Dept: PHYSICAL THERAPY | Facility: OTHER | Age: 74
End: 2024-12-09
Payer: MEDICARE

## 2024-12-09 DIAGNOSIS — M16.12 PRIMARY OSTEOARTHRITIS OF LEFT HIP: Primary | ICD-10-CM

## 2024-12-09 PROCEDURE — 97110 THERAPEUTIC EXERCISES: CPT

## 2024-12-09 PROCEDURE — 97112 NEUROMUSCULAR REEDUCATION: CPT

## 2024-12-09 NOTE — PROGRESS NOTES
PT Evaluation     Today's date: 2024  Patient name: Solis Dos Santos  : 1950  MRN: [id number]  Referring provider: Leonard Gallagher,*  Dx:   Encounter Diagnosis     ICD-10-CM    1. Primary osteoarthritis of left hip  M16.12           Start Time: 1053  Stop Time: 1116  Total time in clinic (min): 23 minutes    Assessment  Impairments: abnormal gait, abnormal or restricted ROM, activity intolerance, impaired balance, lacks appropriate home exercise program, pain with function, participation limitations, activity limitations and endurance  Symptom irritability: high    Assessment details: RE: Dandre is a 74 year old patient presenting to OPPT for evaluation regarding L PEGGY performed on 24 by Dr. Gallagher. Upon RE, pt presents with improving L hip ROM within precautions, improvements in balance / balance confidence, LLE strength improvements all resulting in improved functional mobility & decreased pain. Pt continues to maintain L hip precautions with limited ROM, strength deficits and requiring SPC for ambulation 2/2 balance concerns. Pt would benefit from continued skilled PT in order to achieve the following goals.     IE: Dandre is a 74 year old patient presenting to OPPT for evaluation regarding L PEGGY performed on 24 by Dr. Gallagher. Upon evaluation they show impairments in the following areas: High degree of irritability with symptoms in L hip thereby significantly limiting his functional status. Safe ambulation with RW. Very little active movement capable against gravity. Requires maxAx1 for LLE lifting to achieve sit <> supine and car transfers to ensure success and reduce symptoms. These impairments limit their ability to perform daily activities as well as their previous level of function causing decreased quality of life. Educated signs and symptoms of DVT, infection - patient verbalized understanding. Patient already has great understanding of posterior hip precautions from prehab.      Patient requires continued skilled PT services in order to improve aforementioned impairments so that they are able to return to highest level of function possible. Without initiation of skilled PT services, patient will have increased difficulty returning to prior level of function leading to decreased quality of life.     Understanding of Dx/Px/POC: good     Prognosis: good    Goals  Short-Term Goals (4 weeks)   1.  Patient will decrease worst rating of pain by 2/10 to improve quality of life MET  2. Pt will increase strength by 0.5 MMT proximal hip musculature to improve quality of life with improved efficiency of transfers and daily activities MET  3. Patient will be able to perform home and household duties with 2/10 reduction in pain indicating improved QOL MET  4. Patient will improve L/S AROM by 25% indicating improved mobility of affected area MET  5. Patient will improve affected hip AROM by 10% compared to IE MET  6. Patient will improve affected hip PROM by 10% compared to IE MET      Long-Term Goals (8 weeks)   1. Patient hip AROM will be progressing per protocol   2. Patient will decrease pain by 4/10 at worst in comparison to IE indicating significant reduction in pain and improved quality of life   3. Patient will be able to increase maximal walking distance by 200 ft indicating improved respiratory and musculoskeletal function   4. Patient will be able to perform household and hobby activities without increase in pain > or equal to 2/10 indicating ability to independently manage pain symptoms to accomplish daily activities.   5. Patient will be independent with HEP with good form accomplished   6. Patient will have symmetrical hip passive and active range of motion       Plan  Patient would benefit from: skilled physical therapy  Planned modality interventions: cryotherapy and thermotherapy: hydrocollator packs    Planned therapy interventions: manual therapy, neuromuscular re-education,  postural training, self care, strengthening, therapeutic activities, stretching, therapeutic exercise, home exercise program, gait training and balance    Frequency: 2x week  Plan of Care beginning date: 11/6/2024  Plan of Care expiration date: 2/6/2025  Treatment plan discussed with: patient      Subjective Evaluation    History of Present Illness  Mechanism of injury: Dandre is a 74 year old patient presenting to OPPT for evaluation regarding s/p L PEGGY on 11/4/24 performed by Dr. Gallagher. Reports that he is struggling at this moment. Before coming to therapy, he has been having a significantly difficult time using the bathroom. Has a hard time getting up from the toilet. The toilet he is using is too small, the commode that he received. Has been very down on himself since surgery and is discouraged with how little he has been able to do. Has been doing some ankle pumps.     Had a lift chair delivered last week. Has been walking every hour. Using the lift recliner for sit <> stands.     Following up with Dr. Gallagher on Tuesday (11/12).   Patient Goals  Patient goals for therapy: increased strength, improved balance, decreased pain, increased motion and return to sport/leisure activities    Pain  No pain reported    Social Support  Steps to enter house: yes  1  Stairs in house: no   Lives in: one-story house    Employment status: not working  Exercise history: Wants to get back to [email or web address].    Treatments  Previous treatment: physical therapy  Discharged from (in last 30 days): inpatient hospitalization      Objective    FINDINGS IN BOLD INDICATIVE OF RE 12/9/24:    Hip Passive Range of Motion:   Flexion: 90 degrees  Extension: NT   Abduction: 40  ER: 45  IR: NT precautions      Manual Muscle Testing:   Hip Flexion: R 4- /5  L  4-/5   Hip Extension: R 3+/ 5  L 4-/5   Hip Abduction: R  3+ /5  L  4-/5 (NT)    Hip Adduction: R 3+ /5  L  /5 (NT)     Knee Extension: R 5 /5   L 4+/5     Gait: SPC, L foot IR able to correct with  "cueing; no pain         AUTH Status:  Date 11/7 11/11 11/14 11/18 11/21 11/25 11/29 12/4 12/6 12/9     Medicare - RE every 10th visit Used 1 2 3 4 5 6 7 8 9 10 - RE      Remaining  9 8 7 6 5 4 3 2 1            Precautions: Hx Neoplasm, Chronic Pain, L Posterior PEGGY precautions x6 weeks (12/16/25)    Manuals  11/11 11/14 11/18 11/21 11/25 11/29 12/4 12/6 12/9   L hip PROM w/in precautions Hip flex < 90 MH  LH SK SFP   SFP SFP  SFP                                          Neuro Re-Ed  11/11           Quad set  demo reviewed 10\" x10          SAQ    x30 1# 2x10        LAQ     1# 3x10 5# 3x10   OTB 4x15 MTB 3x20   SLR     MaxA x3 reps  H/L march c asst 3x10 H/L march 3x12      Bridges     10x3\" + iso hip ABd  2x 10x3\" + iso hip Abd   3x10 OTB h' 3x10     Hip add      Ball squeeze 20x5\" Ball squeeze 20x5\"       Hip abduction  demo  OTB 2x10  Iso 20x5\" Iso 20x5\"       Hip ER          AAROM c strap x30   Clamshell              SS c band       No band 10x10' LOTB a' x5                  Tandem stance          Blue foam 3x30\"   SLS       OG 3 x 5\"  OG UE support 3x20\"    Dynamic Balance           Ladder walk-thru:  - 1 in ea  - lateral x3ea   Stand Hip 3 way   x10 ea Yo X15ea:L 1# 2x10ea 2.5# 2x10 nv  3# 2x10                              Ther Ex             Bike        X10 min x10 min X10 min   Heel Slide    10\" x10 Active x30 Active x20        Ankle pumps  Demo             HR/TR Demo seated  HR Stand 2x10                       Mini Squat    2x10    4x10 4x10     Standing march    2x10 ea x20ea  x20ea  Step-taps 4# x30                  Leg press DL          nv GTB k' 70# 2x20  75# 2x20   Leg Press SL              Sit <> stand              Step ups fwd and lat     4\" FSU x20 4\" FSU 3x10ea 4\" FSU / LSU 2x10ea  6\" FSU x10 6\" FSU x30   LSU x20                              Ther Activity  11/11           Car Transfer  [facility]             Bed transfer, STS,            Gait Training  11/11           LRAD   RW,     SPC              "       Modalities             Education

## 2024-12-10 ENCOUNTER — RA CDI HCC (OUTPATIENT)
Dept: OTHER | Facility: HOSPITAL | Age: 74
End: 2024-12-10

## 2024-12-11 ENCOUNTER — OFFICE VISIT (OUTPATIENT)
Dept: PHYSICAL THERAPY | Facility: OTHER | Age: 74
End: 2024-12-11
Payer: MEDICARE

## 2024-12-11 DIAGNOSIS — M16.12 PRIMARY OSTEOARTHRITIS OF LEFT HIP: Primary | ICD-10-CM

## 2024-12-11 PROCEDURE — 97140 MANUAL THERAPY 1/> REGIONS: CPT

## 2024-12-11 PROCEDURE — 97110 THERAPEUTIC EXERCISES: CPT

## 2024-12-11 NOTE — PROGRESS NOTES
"Daily Note     Today's date: 2024  Patient name: Solis Dos Santos  : 1950  MRN: [id number]  Referring provider: Leonard Gallagher,*  Dx:   Encounter Diagnosis     ICD-10-CM    1. Primary osteoarthritis of left hip  M16.12           Start Time: 1041  Stop Time: 1119  Total time in clinic (min): 38 minutes    Subjective: Pt notes no difficulty with bed mobility at this time.      Objective: See treatment diary below      Assessment: Tolerated treatment well focused on hip ROM & LLE strengthening with no complaints other than muscle fatigue. Added tibial external rotation & hip external rotation training for foot IR correction in addition to cueing during gait. Patient demonstrated fatigue post treatment, exhibited good technique with therapeutic exercises, and would benefit from continued PT      Plan: Continue per plan of care.        AUTH Status:  Date     Medicare - RE every 10th visit Used 1 2 3 4 5 6 7 8 9 10 - RE 11     Remaining  9 8 7 6 5 4 3 2 1  9          Precautions: Hx Neoplasm, Chronic Pain, L Posterior PEGGY precautions x6 weeks (25)    Manuals     L hip PROM w/in precautions Hip flex < 90 MH  LH SK SFP   SFP SFP  SFP SFP                                             Neuro Re-Ed              Quad set  demo reviewed 10\" x10           SAQ    x30 1# 2x10         LAQ       1# 3x10 5# 3x10   OTB 4x15 MTB 3x20    SLR     MaxA x3 reps  H/L march c asst 3x10 H/L march 3x12       Bridges     10x3\" + iso hip ABd  2x 10x3\" + iso hip Abd   3x10 OTB h' 3x10   OTB h' 3x10   Hip add        Ball squeeze 20x5\" Ball squeeze 20x5\"        Hip abduction  demo  OTB 2x10  Iso 20x5\" Iso 20x5\"        Hip ER          AAROM c strap x30 AAROM c strap x30   Seated tibial ER c slider           x30   Clamshell               SS c band       No band 10x10' LOTB a' x5                    Tandem " "stance          Blue foam 3x30\"    SLS       OG 3 x 5\"  OG UE support 3x20\"     Dynamic Balance           Ladder walk-thru:  - 1 in ea  - lateral x3ea    Stand Hip 3 way   x10 ea Yo X15ea:L 1# 2x10ea 2.5# 2x10 nv  3# 2x10  5# 3x10                               Ther Ex              Bike        X10 min x10 min X10 min X10 min   Heel Slide    10\" x10 Active x30 Active x20         Ankle pumps  Demo              HR/TR Demo seated  HR Stand 2x10                         Mini Squat    2x10    4x10 4x10      Standing march    2x10 ea x20ea  x20ea  Step-taps 4# x30                    Leg press DL          nv GTB k' 70# 2x20  75# 2x20 70# --> 100# x10ea   Leg Press SL               Sit <> stand               Step ups fwd and lat     4\" FSU x20 4\" FSU 3x10ea 4\" FSU / LSU 2x10ea  6\" FSU x10 6\" FSU x30   LSU x20                                 Ther Activity  11/11            Car Transfer  [facility]              Bed transfer, STS, LH            Gait Training  11/11            LRAD   RW,     SPC                      Modalities              Education                                       "

## 2024-12-13 ENCOUNTER — OFFICE VISIT (OUTPATIENT)
Dept: PHYSICAL THERAPY | Facility: OTHER | Age: 74
End: 2024-12-13
Payer: MEDICARE

## 2024-12-13 DIAGNOSIS — M16.12 PRIMARY OSTEOARTHRITIS OF LEFT HIP: Primary | ICD-10-CM

## 2024-12-13 PROCEDURE — 97110 THERAPEUTIC EXERCISES: CPT

## 2024-12-13 PROCEDURE — 97112 NEUROMUSCULAR REEDUCATION: CPT

## 2024-12-13 PROCEDURE — 97140 MANUAL THERAPY 1/> REGIONS: CPT

## 2024-12-13 NOTE — PROGRESS NOTES
"Daily Note     Today's date: 2024  Patient name: Solis Dos Santos  : 1950  MRN: [id number]  Referring provider: Leonard Gallagher,*  Dx:   Encounter Diagnosis     ICD-10-CM    1. Primary osteoarthritis of left hip  M16.12             Start Time: 1018  Stop Time: 1111  Total time in clinic (min): 53 minutes    Subjective: Pt's primary complaint today is medial L knee pain worse with stepping up.      Objective: See treatment diary below      Assessment: Tolerated treatment well focused on LLE strengthening with no complaints other than muscle fatigue. Pt demonstrates L quad strength deficits with 35# 10-rep max. Patient demonstrated fatigue post treatment, exhibited good technique with therapeutic exercises, and would benefit from continued PT      Plan: Continue per plan of care.        AUTH Status:  Date    Medicare - RE every 10th visit Used 1 2 3 4 5 6 7 8 9 10 - RE   9 8 7 6 5 4 3 2 1  9 8         Precautions: Hx Neoplasm, Chronic Pain, L Posterior PEGGY precautions x6 weeks (25)    Manuals     L hip PROM w/in precautions Hip flex < 90 MH  LH SK SFP   SFP SFP  SFP SFP SFP                                                Neuro Re-Ed               Quad set  demo reviewed 10\" x10            SAQ    x30 1# 2x10          LAQ       1# 3x10 5# 3x10   OTB 4x15 MTB 3x20  OTB x40   SLR     MaxA x3 reps  H/L march c asst 3x10 H/L march 3x12        Bridges     10x3\" + iso hip ABd  2x 10x3\" + iso hip Abd   3x10 OTB h' 3x10   OTB h' 3x10    Hip add        Ball squeeze 20x5\" Ball squeeze 20x5\"         Hip abduction  demo  OTB 2x10  Iso 20x5\" Iso 20x5\"         Hip ER          AAROM c strap x30 AAROM c strap x30    Seated tibial ER c slider           x30    Clamshell                SS c band       No band 10x10' LOTB a' x5                      Tandem stance    " "      Blue foam 3x30\"  Airex 2x30\" EO  2x30\" EC   SLS       OG 3 x 5\"  OG UE support 3x20\"      Dynamic Balance           Ladder walk-thru:  - 1 in ea  - lateral x3ea     Stand Hip 3 way   x10 ea Yo X15ea:L 1# 2x10ea 2.5# 2x10 nv  3# 2x10  5# 3x10                                  Ther Ex               Bike        X10 min x10 min X10 min X10 min    Heel Slide    10\" x10 Active x30 Active x20          Ankle pumps  Demo               HR/TR Demo seated  HR Stand 2x10                           Mini Squat    2x10    4x10 4x10       Standing march    2x10 ea x20ea  x20ea  Step-taps 4# x30                      Leg press DL          nv GTB k' 70# 2x20  75# 2x20 70# --> 100# x10ea SL 35# 3x10   Leg Press SL                Sit <> stand                Step ups fwd and lat     4\" FSU x20 4\" FSU 3x10ea 4\" FSU / LSU 2x10ea  6\" FSU x10 6\" FSU x30   LSU x20   6\" FSU 2x5 after LP (fatigue)                                 Ther Activity  11/11             Car Transfer  [facility]               Bed transfer, STS,              Gait Training  11/11             LRAD   RW,     SPC                        Modalities               Education                                         "

## 2024-12-16 ENCOUNTER — OFFICE VISIT (OUTPATIENT)
Dept: PHYSICAL THERAPY | Facility: OTHER | Age: 74
End: 2024-12-16
Payer: MEDICARE

## 2024-12-16 ENCOUNTER — OFFICE VISIT (OUTPATIENT)
Dept: INTERNAL MEDICINE CLINIC | Facility: CLINIC | Age: 74
End: 2024-12-16
Payer: MEDICARE

## 2024-12-16 VITALS
TEMPERATURE: 97.9 F | BODY MASS INDEX: 37.03 KG/M2 | SYSTOLIC BLOOD PRESSURE: 122 MMHG | HEIGHT: 73 IN | OXYGEN SATURATION: 96 % | DIASTOLIC BLOOD PRESSURE: 78 MMHG | WEIGHT: 279.4 LBS | HEART RATE: 75 BPM

## 2024-12-16 DIAGNOSIS — E78.5 HYPERLIPIDEMIA, UNSPECIFIED HYPERLIPIDEMIA TYPE: Primary | ICD-10-CM

## 2024-12-16 DIAGNOSIS — E03.9 ACQUIRED HYPOTHYROIDISM: ICD-10-CM

## 2024-12-16 DIAGNOSIS — Z23 NEED FOR COVID-19 VACCINE: ICD-10-CM

## 2024-12-16 DIAGNOSIS — R35.1 BPH ASSOCIATED WITH NOCTURIA: ICD-10-CM

## 2024-12-16 DIAGNOSIS — N40.1 BPH ASSOCIATED WITH NOCTURIA: ICD-10-CM

## 2024-12-16 DIAGNOSIS — C67.2 MALIGNANT NEOPLASM OF LATERAL WALL OF URINARY BLADDER (HCC): ICD-10-CM

## 2024-12-16 DIAGNOSIS — M16.12 PRIMARY OSTEOARTHRITIS OF LEFT HIP: Primary | ICD-10-CM

## 2024-12-16 DIAGNOSIS — Z96.643 S/P HIP REPLACEMENT, BILATERAL: ICD-10-CM

## 2024-12-16 PROCEDURE — 97110 THERAPEUTIC EXERCISES: CPT

## 2024-12-16 PROCEDURE — G2211 COMPLEX E/M VISIT ADD ON: HCPCS | Performed by: INTERNAL MEDICINE

## 2024-12-16 PROCEDURE — 99214 OFFICE O/P EST MOD 30 MIN: CPT | Performed by: INTERNAL MEDICINE

## 2024-12-16 RX ORDER — LEVOTHYROXINE SODIUM 150 UG/1
150 TABLET ORAL DAILY
Qty: 90 TABLET | Refills: 1 | Status: SHIPPED | OUTPATIENT
Start: 2024-12-16

## 2024-12-16 NOTE — PROGRESS NOTES
"Daily Note     Today's date: 2024  Patient name: Solis Dos Santos  : 1950  MRN: [id number]  Referring provider: Leonard Gallagher,*  Dx:   Encounter Diagnosis     ICD-10-CM    1. Primary osteoarthritis of left hip  M16.12             Start Time: 1032  Stop Time: 1110  Total time in clinic (min): 38 minutes    Subjective: Pt reports he felt good into the weekend was able to do more things around the house.      Objective: See treatment diary below      Assessment: Tolerated treatment well - progressed hip PROM to full as tolerated in all directions, pt demonstrating L hip flexion 125, abduction 50, ER 45, IR 45, extension 5.  Patient demonstrated fatigue post treatment, exhibited good technique with therapeutic exercises, and would benefit from continued PT      Plan: Continue per plan of care.        AUTH Status:  Date    Medicare - RE every 10th visit Used 1 2 3 4 5 6 7 8 9 10 - RE 13    Remaining  9 8 7 6 5 4 3 2 1  9 8 7         Precautions: Hx Neoplasm, Chronic Pain, L Posterior PEGGY precautions x6 weeks (25)    Manuals     L hip PROM w/in precautions Hip flex < 90 MH  LH SK SFP   SFP SFP  SFP SFP SFP SFP                                                   Neuro Re-Ed                Quad set  demo reviewed 10\" x10             SAQ    x30 1# 2x10           LAQ       1# 3x10 5# 3x10   OTB 4x15 MTB 3x20  OTB x40    SLR     MaxA x3 reps  H/L march c asst 3x10 H/L march 3x12      Prone 0# 2x10   Bridges     10x3\" + iso hip ABd  2x 10x3\" + iso hip Abd   3x10 OTB h' 3x10   OTB h' 3x10     Hip add        Ball squeeze 20x5\" Ball squeeze 20x5\"          Hip abduction  demo  OTB 2x10  Iso 20x5\" Iso 20x5\"          Hip ER          AAROM c strap x30 AAROM c strap x30     Seated tibial ER c slider           x30     Clarafaell                 SS c band       " "No band 10x10' LOTB a' x5     LOTB a' x5                   Tandem stance          Blue foam 3x30\"  Airex 2x30\" EO  2x30\" EC    SLS       OG 3 x 5\"  OG UE support 3x20\"       Dynamic Balance           Ladder walk-thru:  - 1 in ea  - lateral x3ea      Stand Hip 3 way   x10 ea Yo X15ea:L 1# 2x10ea 2.5# 2x10 nv  3# 2x10  5# 3x10  LOTB ABd x20 c 2\" hold                                   Ther Ex                Bike        X10 min x10 min X10 min X10 min  X10 min   Heel Slide    10\" x10 Active x30 Active x20           Ankle pumps  Demo                HR/TR Demo seated  HR Stand 2x10                             Mini Squat    2x10    4x10 4x10        Standing march    2x10 ea x20ea  x20ea  Step-taps 4# x30     Step-taps 5# x30                   Leg press DL          nv GTB k' 70# 2x20  75# 2x20 70# --> 100# x10ea SL 35# 3x10    Leg Press SL                 Sit <> stand                 Step ups fwd and lat     4\" FSU x20 4\" FSU 3x10ea 4\" FSU / LSU 2x10ea  6\" FSU x10 6\" FSU x30   LSU x20   6\" FSU 2x5 after LP (fatigue)                                    Ther Activity  11/11              Car Transfer  [facility]                Bed transfer, STS, LH              Gait Training  11/11              LRAD   RW,     SPC                          Modalities                Education                                           "

## 2024-12-16 NOTE — PROGRESS NOTES
Assessment/Plan:           1. Hyperlipidemia, unspecified hyperlipidemia type  Comments:  Continue atorvastatin 10 mg daily.  Orders:  -     levothyroxine 150 mcg tablet; Take 1 tablet (150 mcg total) by mouth daily  2. Acquired hypothyroidism  Comments:  Continue levothyroxine 150 mcg.  Orders:  -     levothyroxine 150 mcg tablet; Take 1 tablet (150 mcg total) by mouth daily  3. Need for COVID-19 vaccine  4. Malignant neoplasm of lateral wall of urinary bladder (HCC)  Comments:  Following with urology.  5. S/P hip replacement, bilateral  Comments:  Doing well and is following with orthopedics.  6. BPH associated with nocturia  Comments:  Following with urology and is on tamsulosin         1. Hyperlipidemia, unspecified hyperlipidemia type (Primary)      2. Acquired hypothyroidism      3. Need for COVID-19 vaccine      4. Malignant neoplasm of lateral wall of urinary bladder (HCC)         No problem-specific Assessment & Plan notes found for this encounter.           Subjective:      Patient ID: Solis Dos Santos is a 74 y.o. male.    HPI    The following portions of the patient's history were reviewed and updated as appropriate: He  has a past medical history of Acute blood loss anemia (08/09/2019), Aftercare following right hip joint replacement surgery (11/05/2019), Anxiety disorder, Atherosclerotic heart disease of native coronary artery without angina pectoris, Benign prostatic hyperplasia without lower urinary tract symptoms, Cancer (HCC), Clotting disorder (HCC) (1/25/2023), Disease of thyroid gland, Dyslipidemia, GERD (gastroesophageal reflux disease), Hypertension, Impaired fasting blood sugar, Kidney stone, Low back pain, Obesity, Osteoarthritis, Other chest pain, Paresthesia of skin, Polyneuropathy, Pure hypercholesterolemia, 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  He   Patient Active  Problem List    Diagnosis Date Noted    Obesity (BMI 30-39.9) 11/04/2024    BPH (benign prostatic hyperplasia) 11/04/2024    Spinal stenosis     Diverticula of intestine 10/07/2024    Gastroesophageal reflux disease 10/07/2024    Primary osteoarthritis of left hip 10/07/2024    DDD (degenerative disc disease), lumbar 06/24/2024    Primary osteoarthritis of left knee 05/30/2024    Primary osteoarthritis of right knee 05/30/2024    Peripheral vascular disease, unspecified ([facility]) 01/10/2024    Malignant neoplasm of lateral wall of urinary bladder (HCC) 05/12/2023    Malignant neoplasm of posterior wall of urinary bladder (HCC) 03/23/2023    Bladder mass 03/09/2023    Right leg swelling 03/09/2023    Gross hematuria 03/08/2023    Hyperlipidemia 03/08/2023    Chronic pain of left knee 04/26/2021    Obesity, morbid ([facility]) 04/14/2021    Difficulty sleeping 08/13/2019    Impaired mobility and ADLs 08/09/2019    Hypothyroidism 08/09/2019    Status post right hip replacement 08/07/2019    Primary osteoarthritis of both knees 05/29/2019    Osgood-Schlatter's disease of both knees 05/29/2019    Pain in both knees 05/29/2019    Lumbar radiculopathy 11/06/2013     He  has a past surgical history that includes Back surgery; Tonsillectomy; Cholecystectomy; pr arthrp acetblr/prox fem prostc agrft/algrft (Right, 08/05/2019); Colonoscopy (02/06/2019); pr cysto w/removal of lesions small (N/A, 03/23/2023); pr cysto w/removal of lesions small (N/A, 04/26/2023); CYSTOSCOPY (N/A, 04/26/2023); Joint replacement (August 2019); Hip surgery (August 2019); and pr arthrp acetblr/prox fem prostc agrft/algrft (Left, 11/4/2024).  His family history includes Arthritis in his mother and another family member; Heart disease in his father.  He  reports that he quit smoking about 37 years ago. His smoking use included cigarettes. He started smoking about 57 years ago. He has a 20.8 pack-year smoking history. He has never used smokeless tobacco. He  reports current alcohol use of about 1.0 standard drink of alcohol per week. He reports that he does not use drugs.  Current Outpatient Medications   Medication Sig Dispense Refill    acetaminophen (TYLENOL) 500 mg tablet Take 2 tablets (1,000 mg total) by mouth every 6 (six) hours as needed for mild pain 90 tablet 0    atorvastatin (LIPITOR) 10 mg tablet Take 1 tablet (10 mg total) by mouth daily at bedtime 90 tablet 1    cholecalciferol (VITAMIN D3) 1,000 units tablet Take 1 tablet (1,000 Units total) by mouth daily 90 tablet 3    ciclopirox (LOPROX) 0.77 % cream       hydrocortisone 2.5 % cream if needed      levothyroxine 150 mcg tablet Take 1 tablet (150 mcg total) by mouth daily 90 tablet 1    Multiple Vitamins-Minerals (multivitamin with iron-minerals) liquid Take by mouth daily      tamsulosin (FLOMAX) 0.4 mg Take 1 capsule (0.4 mg total) by mouth daily with dinner 90 capsule 1    cyanocobalamin (VITAMIN B-12) 500 MCG tablet Take 1 tablet (500 mcg total) by mouth daily 100 tablet 3     No current facility-administered medications for this visit.     Current Outpatient Medications on File Prior to Visit   Medication Sig    acetaminophen (TYLENOL) 500 mg tablet Take 2 tablets (1,000 mg total) by mouth every 6 (six) hours as needed for mild pain    atorvastatin (LIPITOR) 10 mg tablet Take 1 tablet (10 mg total) by mouth daily at bedtime    cholecalciferol (VITAMIN D3) 1,000 units tablet Take 1 tablet (1,000 Units total) by mouth daily    ciclopirox (LOPROX) 0.77 % cream     hydrocortisone 2.5 % cream if needed    Multiple Vitamins-Minerals (multivitamin with iron-minerals) liquid Take by mouth daily    tamsulosin (FLOMAX) 0.4 mg Take 1 capsule (0.4 mg total) by mouth daily with dinner    [DISCONTINUED] levothyroxine 150 mcg tablet Take 1 tablet (150 mcg total) by mouth daily    cyanocobalamin (VITAMIN B-12) 500 MCG tablet Take 1 tablet (500 mcg total) by mouth daily    [DISCONTINUED] enoxaparin (LOVENOX) 40  "mg/0.4 mL Inject 0.4 mL (40 mg total) under the skin daily for 28 days To start postoperatively (Patient not taking: Reported on 12/16/2024)    [DISCONTINUED] methocarbamol (ROBAXIN) 500 mg tablet Take 1 tablet (500 mg total) by mouth 3 (three) times a day as needed for muscle spasms (Patient not taking: Reported on 11/12/2024)     No current facility-administered medications on file prior to visit.     Medications Discontinued During This Encounter   Medication Reason    enoxaparin (LOVENOX) 40 mg/0.4 mL     methocarbamol (ROBAXIN) 500 mg tablet     levothyroxine 150 mcg tablet Reorder      He has no known allergies..    Review of Systems   Constitutional:  Negative for appetite change, chills, fatigue and fever.   HENT:  Negative for sore throat and trouble swallowing.    Eyes:  Negative for redness.   Respiratory:  Negative for shortness of breath.    Cardiovascular:  Negative for chest pain and palpitations.   Gastrointestinal:  Negative for abdominal pain, constipation and diarrhea.   Genitourinary:  Negative for dysuria and hematuria.   Musculoskeletal:  Positive for arthralgias and gait problem. Negative for back pain and neck pain.   Skin:  Negative for rash.   Neurological:  Negative for seizures, weakness and headaches.   Hematological:  Negative for adenopathy.   Psychiatric/Behavioral:  Negative for confusion. The patient is not nervous/anxious.          Objective:      /78 (BP Location: Left arm, Patient Position: Sitting, Cuff Size: Large)   Pulse 75   Temp 97.9 °F (36.6 °C) (Oral)   Ht 6' 1\" (1.854 m)   Wt 127 kg (279 lb 6.4 oz)   SpO2 96% Comment: ra  BMI 36.86 kg/m²     Results Reviewed       None            Recent Results (from the past 8 weeks)   CBC (With Platelets)    Collection Time: 11/05/24  5:13 AM   Result Value Ref Range    WBC 8.66 4.31 - 10.16 Thousand/uL    RBC 4.46 3.88 - 5.62 Million/uL    Hemoglobin 14.1 12.0 - 17.0 g/dL    Hematocrit 41.2 36.5 - 49.3 %    MCV 92 82 - 98 " fL    MCH 31.6 26.8 - 34.3 pg    MCHC 34.2 31.4 - 37.4 g/dL    RDW 12.7 11.6 - 15.1 %    Platelets 187 149 - 390 Thousands/uL    MPV 10.7 8.9 - 12.7 fL   Basic metabolic panel    Collection Time: 11/05/24  5:13 AM   Result Value Ref Range    Sodium 135 135 - 147 mmol/L    Potassium 3.8 3.5 - 5.3 mmol/L    Chloride 100 96 - 108 mmol/L    CO2 26 21 - 32 mmol/L    ANION GAP 9 4 - 13 mmol/L    BUN 17 5 - 25 mg/dL    Creatinine 0.95 0.60 - 1.30 mg/dL    Glucose 146 (H) 65 - 140 mg/dL    Calcium 8.4 8.4 - 10.2 mg/dL    eGFR 77 ml/min/1.73sq m   Commode    Collection Time: 11/05/24 10:51 AM   Result Value Ref Range    Supplier Name [employer]     Supplier Phone Number [phone]     Order Status Completed     Delivery Note      Delivery Request Date 11/05/2024     Item Description 3 in 1 Commode         Physical Exam  Constitutional:       General: He is not in acute distress.     Appearance: Normal appearance. He is obese.   HENT:      Head: Normocephalic and atraumatic.      Nose: Nose normal.      Mouth/Throat:      Mouth: Mucous membranes are moist.   Eyes:      Extraocular Movements: Extraocular movements intact.      Pupils: Pupils are equal, round, and reactive to light.   Cardiovascular:      Rate and Rhythm: Normal rate and regular rhythm.      Pulses: Normal pulses.      Heart sounds: Normal heart sounds. No murmur heard.     No friction rub.   Pulmonary:      Effort: Pulmonary effort is normal. No respiratory distress.      Breath sounds: Normal breath sounds. No wheezing.   Abdominal:      General: Abdomen is flat. Bowel sounds are normal. There is no distension.      Palpations: Abdomen is soft. There is no mass.      Tenderness: There is no abdominal tenderness. There is no guarding.   Musculoskeletal:         General: Normal range of motion.      Cervical back: Neck supple.   Neurological:      General: No focal deficit present.      Mental Status: He is alert and oriented to  person, place, and time. Mental status is at baseline.      Cranial Nerves: No cranial nerve deficit.      Gait: Gait abnormal.   Psychiatric:         Mood and Affect: Mood normal.         Behavior: Behavior normal.

## 2024-12-17 ENCOUNTER — OFFICE VISIT (OUTPATIENT)
Dept: OBGYN CLINIC | Facility: HOSPITAL | Age: 74
End: 2024-12-17

## 2024-12-17 VITALS
HEIGHT: 73 IN | WEIGHT: 279 LBS | DIASTOLIC BLOOD PRESSURE: 84 MMHG | HEART RATE: 80 BPM | BODY MASS INDEX: 36.98 KG/M2 | SYSTOLIC BLOOD PRESSURE: 138 MMHG

## 2024-12-17 DIAGNOSIS — Z96.642 AFTERCARE FOLLOWING LEFT HIP JOINT REPLACEMENT SURGERY: Primary | ICD-10-CM

## 2024-12-17 DIAGNOSIS — Z47.1 AFTERCARE FOLLOWING LEFT HIP JOINT REPLACEMENT SURGERY: Primary | ICD-10-CM

## 2024-12-17 DIAGNOSIS — E03.9 ACQUIRED HYPOTHYROIDISM: ICD-10-CM

## 2024-12-17 DIAGNOSIS — E78.5 HYPERLIPIDEMIA, UNSPECIFIED HYPERLIPIDEMIA TYPE: ICD-10-CM

## 2024-12-17 PROCEDURE — 99024 POSTOP FOLLOW-UP VISIT: CPT | Performed by: ORTHOPAEDIC SURGERY

## 2024-12-17 NOTE — PROGRESS NOTES
Assessment:  1. Aftercare following left hip joint replacement surgery            Plan:  6 months s/p left PEGGY, 11/12/2024.   He is doing well.    He should continue physical therapy.    He should follow up in 6 weeks  Patient would like to schedule total knee arthroplasty       To do next visit:  Return in about 6 weeks (around 1/28/2025) for re-check with x-rays.    The above stated was discussed in layman's terms and the patient expressed understanding.  All questions were answered to the patient's satisfaction.       Scribe Attestation      I,:  Giorgio Garcia MA am acting as a scribe while in the presence of the attending physician.:       I,:  Leonard Gallagher MD personally performed the services described in this documentation    as scribed in my presence.:               Subjective:   Solis Dos Santos is a 74 y.o. male who presents 6 months s/p left PEGGY, 11/12/2024.  He is doing well.  Today he has minimal left hip discomfort.  He continues physical therapy with benefit.        Review of systems negative unless otherwise specified in HPI    Past Medical History:   Diagnosis Date    Acute blood loss anemia 08/09/2019    Aftercare following right hip joint replacement surgery 11/05/2019    Anxiety disorder     Atherosclerotic heart disease of native coronary artery without angina pectoris     Benign prostatic hyperplasia without lower urinary tract symptoms     Cancer (HCC)     bladder    Clotting disorder (HCC) 1/25/2023    Blood in urine    Disease of thyroid gland     hypo    Dyslipidemia     GERD (gastroesophageal reflux disease)     manages w/ diet, takes no meds    Hypertension     Impaired fasting blood sugar     Kidney stone     Low back pain     Obesity     Osteoarthritis     last assesed 6-6-16    Other chest pain     Paresthesia of skin     Polyneuropathy     last assesed 5-8-17    Pure hypercholesterolemia     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)     Spinal  stenosis     Suspected UTI 2023    Urinary tract infection     Wears glasses        Past Surgical History:   Procedure Laterality Date    BACK SURGERY      L4-L5 sx    CHOLECYSTECTOMY      COLONOSCOPY  2019    CYSTOSCOPY N/A 2023    Procedure: CYSTOSCOPY w/ bladder biopsy;  Surgeon: Geo Bentley MD;  Location: [facility];  Service: Urology    HIP SURGERY  2019    Replacement    JOINT REPLACEMENT  2019    Hip replacement    NJ ARTHRP ACETBLR/PROX FEM PROSTC AGRFT/ALGRFT Right 2019    Procedure: ARTHROPLASTY HIP TOTAL;  Surgeon: Leonard Gallagher MD;  Location: [facility];  Service: Orthopedics    N[place] ARTHRP ACETBLR/PROX FEM PROSTC AGRFT/ALGRFT Left 2024    Procedure: Left total hip arthroplasty;  Surgeon: Leonard Gallagher MD;  Location: [facility];  Service: Orthopedics    NJ CYSTO W/REMOVAL OF LESIONS SMALL N/A 2023    Procedure: TRANSURETHRAL RESECTION OF BLADDER TUMOR (TURBT), mitomycin ;  Surgeon: Geo Bentley MD;  [facility]: [facility];  Service: Urology    NJ CYSTO W/REMOVAL OF LESIONS SMALL N/A 2023    Procedure: TRANSURETHRAL RESECTION OF BLADDER TUMOR (TURBT), cystoscopy with bladder biopsy;  Surgeon: Geo Bentley MD;  [facility];  Service: Urology    TONSILLECTOMY         Family History   Problem Relation Age of Onset    Arthritis Other     Heart disease Father     Arthritis Mother        Social History     Occupational History    Not on file   Tobacco Use    Smoking status: Former     Current packs/day: 0.00     Average packs/day: 1 pack/day for 20.8 years (20.8 ttl pk-yrs)     Types: Cigarettes     Start date: 1967     Quit date: 1987     Years since quittin.1    Smokeless tobacco: Never   Vaping Use    Vaping status: Never Used   Substance and Sexual Activity    Alcohol use: Yes     Alcohol/week: 1.0 standard drink of alcohol     Types: 1 Cans of beer per week    Drug use: Never    Sexual  "activity: Not Currently     Partners: Female     Birth control/protection: None         Current Outpatient Medications:     acetaminophen (TYLENOL) 500 mg tablet, Take 2 tablets (1,000 mg total) by mouth every 6 (six) hours as needed for mild pain, Disp: 90 tablet, Rfl: 0    atorvastatin (LIPITOR) 10 mg tablet, Take 1 tablet (10 mg total) by mouth daily at bedtime, Disp: 90 tablet, Rfl: 1    cholecalciferol (VITAMIN D3) 1,000 units tablet, Take 1 tablet (1,000 Units total) by mouth daily, Disp: 90 tablet, Rfl: 3    ciclopirox (LOPROX) 0.77 % cream, , Disp: , Rfl:     cyanocobalamin (VITAMIN B-12) 500 MCG tablet, Take 1 tablet (500 mcg total) by mouth daily, Disp: 100 tablet, Rfl: 3    hydrocortisone 2.5 % cream, if needed, Disp: , Rfl:     levothyroxine 150 mcg tablet, Take 1 tablet (150 mcg total) by mouth daily, Disp: 90 tablet, Rfl: 1    Multiple Vitamins-Minerals (multivitamin with iron-minerals) liquid, Take by mouth daily, Disp: , Rfl:     tamsulosin (FLOMAX) 0.4 mg, Take 1 capsule (0.4 mg total) by mouth daily with dinner, Disp: 90 capsule, Rfl: 1    No Known Allergies         Vitals:    12/17/24 0921   BP: 138/84   Pulse: 80       Objective:  Physical exam  General: Awake, Alert, Oriented  Eyes: Pupils equal, round and reactive to light  Heart: regular rate and rhythm  Lungs: No audible wheezing  Abdomen: soft                    Ortho Exam  Left hip:  Well healed posterior incision  Good arc of motion with no pain  Patient sits comfortably in chair with hip flexed at 90 degrees  Patient stands from seated position without assistance  Calf compartments soft and supple  Sensation intact  Toes are warm sensate and mobile      Diagnostics, reviewed and taken today if performed as documented:    None performed     Procedures, if performed today:    Procedures    None performed      Portions of the record may have been created with voice recognition software.  Occasional wrong word or \"sound a like\" substitutions " may have occurred due to the inherent limitations of voice recognition software.  Read the chart carefully and recognize, using context, where substitutions have occurred.

## 2024-12-18 ENCOUNTER — OFFICE VISIT (OUTPATIENT)
Dept: PHYSICAL THERAPY | Facility: OTHER | Age: 74
End: 2024-12-18
Payer: MEDICARE

## 2024-12-18 ENCOUNTER — CLINICAL SUPPORT (OUTPATIENT)
Dept: INTERNAL MEDICINE CLINIC | Facility: CLINIC | Age: 74
End: 2024-12-18
Payer: MEDICARE

## 2024-12-18 DIAGNOSIS — M16.12 PRIMARY OSTEOARTHRITIS OF LEFT HIP: Primary | ICD-10-CM

## 2024-12-18 DIAGNOSIS — Z23 NEED FOR COVID-19 VACCINE: Primary | ICD-10-CM

## 2024-12-18 PROCEDURE — 90480 ADMN SARSCOV2 VAC 1/ONLY CMP: CPT

## 2024-12-18 PROCEDURE — 97110 THERAPEUTIC EXERCISES: CPT

## 2024-12-18 PROCEDURE — 91320 SARSCV2 VAC 30MCG TRS-SUC IM: CPT

## 2024-12-18 RX ORDER — ATORVASTATIN CALCIUM 10 MG/1
10 TABLET, FILM COATED ORAL
Qty: 90 TABLET | Refills: 1 | Status: SHIPPED | OUTPATIENT
Start: 2024-12-18

## 2024-12-18 NOTE — PROGRESS NOTES
"Daily Note     Today's date: 2024  Patient name: Solis Dos Santos  : 1950  MRN: [id number]  Referring provider: Leonard Gallagher,*  Dx:   Encounter Diagnosis     ICD-10-CM    1. Primary osteoarthritis of left hip  M16.12               Start Time: 1320  Stop Time: 1413  Total time in clinic (min): 53 minutes    Subjective: Pt had visit with ortho - progressing well with L hip. Will be able to get L TKA done in future, pt hopeful to get it done in May 2025.      Objective: See treatment diary below      Assessment: Tolerated treatment well focused on LLE strengthening; continues to demonstrate weaker L quad impacting functional strength for STS / stairs.  Patient demonstrated fatigue post treatment, exhibited good technique with therapeutic exercises, and would benefit from continued PT      Plan: Continue per plan of care.        AUTH Status:  Date    Medicare - RE every 10th visit Used 1 2 3 4 5 6 7 8 9 10 - RE 13 14    Remaining  9 8 7 6 5 4 3 2 1  9 8 7 6         Precautions: Hx Neoplasm, Chronic Pain, L Posterior PEGGY precautions x6 weeks (25)    Manuals     L hip PROM w/in precautions Hip flex < 90 MH  LH SK SFP   SFP SFP  SFP SFP SFP SFP                                                       Neuro Re-Ed                 Quad set  demo reviewed 10\" x10              SAQ    x30 1# 2x10            LAQ       1# 3x10 5# 3x10   OTB 4x15 MTB 3x20  OTB x40  OTB + LOTB 3x20   SLR     MaxA x3 reps  H/L march c asst 3x10 H/L march 3x12      Prone 0# 2x10    Bridges     10x3\" + iso hip ABd  2x 10x3\" + iso hip Abd   3x10 OTB h' 3x10   OTB h' 3x10      Hip add        Ball squeeze 20x5\" Ball squeeze 20x5\"           Hip abduction  demo  OTB 2x10  Iso 20x5\" Iso 20x5\"           Hip ER          AAROM c strap x30 AAROM c strap x30      Seated " "tibial ER c slider           x30      Clamshell                  SS c band       No band 10x10' LOTB a' x5     LOTB a' x5                     Tandem stance          Blue foam 3x30\"  Airex 2x30\" EO  2x30\" EC     SLS       OG 3 x 5\"  OG UE support 3x20\"        Dynamic Balance           Ladder walk-thru:  - 1 in ea  - lateral x3ea       Stand Hip 3 way   x10 ea Yo X15ea:L 1# 2x10ea 2.5# 2x10 nv  3# 2x10  5# 3x10  LOTB ABd x20 c 2\" hold                                      Ther Ex                 Bike        X10 min x10 min X10 min X10 min  X10 min X10 min   Heel Slide    10\" x10 Active x30 Active x20            Ankle pumps  Demo                 HR/TR Demo seated  HR Stand 2x10              SKC              10x10\"   LTR              x20                    Mini Squat    2x10    4x10 4x10         Standing march    2x10 ea x20ea  x20ea  Step-taps 4# x30     Step-taps 5# x30                     Leg press DL          nv GTB k' 70# 2x20  75# 2x20 70# --> 100# x10ea SL 35# 3x10  SL 70# 4x5   Leg Press SL                  Sit <> stand               B-stance:L 3x5 c 15# ecc   Step ups fwd and lat     4\" FSU x20 4\" FSU 3x10ea 4\" FSU / LSU 2x10ea  6\" FSU x10 6\" FSU x30   LSU x20   6\" FSU 2x5 after LP (fatigue)                                       Ther Activity  11/11               Car Transfer  [facility]                 Bed transfer, STS,                Gait Training  11/11               LRAD   RW,     SPC                            Modalities                 Education                                             "

## 2024-12-20 ENCOUNTER — OFFICE VISIT (OUTPATIENT)
Dept: PHYSICAL THERAPY | Facility: OTHER | Age: 74
End: 2024-12-20
Payer: MEDICARE

## 2024-12-20 DIAGNOSIS — M16.12 PRIMARY OSTEOARTHRITIS OF LEFT HIP: Primary | ICD-10-CM

## 2024-12-20 DIAGNOSIS — M48.061 SPINAL STENOSIS OF LUMBAR REGION WITHOUT NEUROGENIC CLAUDICATION: ICD-10-CM

## 2024-12-20 PROCEDURE — 97110 THERAPEUTIC EXERCISES: CPT

## 2024-12-20 NOTE — PROGRESS NOTES
"Daily Note     Today's date: 2024  Patient name: Solis Dos Santos  : 1950  MRN: [id number]  Referring provider: Leonard Gallagher,*  Dx:   Encounter Diagnosis     ICD-10-CM    1. Primary osteoarthritis of left hip  M16.12       2. Spinal stenosis of lumbar region without neurogenic claudication  M48.061                 Start Time: 1030  Stop Time: 1120  Total time in clinic (min): 50 minutes    Subjective: Pt reports the last two days have been most uncomfortable / painful in the knees. No issues with L hip today.      Objective: See treatment diary below      Assessment: Tolerated treatment well focused on pain modulation, performed MHP on B/L knees, quad STM and quad stretching with decreased pain at end of session. Added tandem walking in which pt was appropriately challenged.   Patient demonstrated fatigue post treatment, exhibited good technique with therapeutic exercises, and would benefit from continued PT      Plan: Continue per plan of care.        AUTH Status:  Date    Medicare - RE every 10th visit Used 1 2 3 4 5 6 7 8 9 10 - RE 11 12 13 14 15    Remaining  9 8 7 6 5 4 3 2 1  9 8 7 6 5         Precautions: Hx Neoplasm, Chronic Pain, L Posterior PEGGY precautions x6 weeks (25)    Manuals     L hip PROM w/in precautions Hip flex < 90 MH  LH SK SFP   SFP SFP  SFP SFP SFP SFP                                                           Neuro Re-Ed                  Quad set  demo reviewed 10\" x10               SAQ    x30 1# 2x10             LAQ       1# 3x10 5# 3x10   OTB 4x15 MTB 3x20  OTB x40  OTB + LOTB 3x20    SLR     MaxA x3 reps  H/L march c asst 3x10 H/L march 3x12      Prone 0# 2x10     Bridges     10x3\" + iso hip ABd  2x 10x3\" + iso hip Abd   3x10 OTB h' 3x10   OTB h' 3x10       Hip add        Ball squeeze 20x5\" " "Ball squeeze 20x5\"            Hip abduction  demo  OTB 2x10  Iso 20x5\" Iso 20x5\"            Hip ER          AAROM c strap x30 AAROM c strap x30       Seated tibial ER c slider           x30       Clamshell                   SS c band       No band 10x10' LOTB a' x5     LOTB a' x5                       Tandem stance          Blue foam 3x30\"  Airex 2x30\" EO  2x30\" EC   Full tandem OG 2x30\"   SLS       OG 3 x 5\"  OG UE support 3x20\"         Dynamic Balance           Ladder walk-thru:  - 1 in ea  - lateral x3ea     Tandem walk in rack x8   Stand Hip 3 way   x10 ea Yo X15ea:L 1# 2x10ea 2.5# 2x10 nv  3# 2x10  5# 3x10  LOTB ABd x20 c 2\" hold                                         Ther Ex                  Bike        X10 min x10 min X10 min X10 min  X10 min X10 min X10 min   Heel Slide    10\" x10 Active x30 Active x20             Standing quad S               3x30\"   Ankle pumps  Demo                  HR/TR Demo seated  HR Stand 2x10               SKC              10x10\" 10x10\"   LTR              x20                      Mini Squat    2x10    4x10 4x10          Standing march    2x10 ea x20ea  x20ea  Step-taps 4# x30     Step-taps 5# x30                       Leg press DL          nv GTB k' 70# 2x20  75# 2x20 70# --> 100# x10ea SL 35# 3x10  SL 70# 4x5    Leg Press SL                   Sit <> stand               B-stance:L 3x5 c 15# ecc    Step ups fwd and lat     4\" FSU x20 4\" FSU 3x10ea 4\" FSU / LSU 2x10ea  6\" FSU x10 6\" FSU x30   LSU x20   6\" FSU 2x5 after LP (fatigue)                                          Ther Activity  11/11                Car Transfer  [facility]                  Bed transfer, STS,                 Gait Training  11/11                LRAD   RW,     SPC                              Modalities                  MHP               B k' x10min                               "

## 2024-12-23 ENCOUNTER — OFFICE VISIT (OUTPATIENT)
Dept: PHYSICAL THERAPY | Facility: OTHER | Age: 74
End: 2024-12-23
Payer: MEDICARE

## 2024-12-23 DIAGNOSIS — M16.12 PRIMARY OSTEOARTHRITIS OF LEFT HIP: Primary | ICD-10-CM

## 2024-12-23 PROCEDURE — 97110 THERAPEUTIC EXERCISES: CPT

## 2024-12-23 NOTE — PROGRESS NOTES
"Daily Note     Today's date: 2024  Patient name: Solis Dos Santos  : 1950  MRN: [id number]  Referring provider: Leonard Gallagher,*  Dx:   Encounter Diagnosis     ICD-10-CM    1. Primary osteoarthritis of left hip  M16.12                 Start Time: 1018  Stop Time: 1056  Total time in clinic (min): 38 minutes    Subjective: Pt's CC today continues to be L knee, no complaints regarding L hip.      Objective: See treatment diary below      Assessment: Tolerated treatment well focused on hip ROM and quad strengthening with no complaints other than fatigue. Good eccentric control with 8\" retro step-downs today.  Patient demonstrated fatigue post treatment, exhibited good technique with therapeutic exercises, and would benefit from continued PT      Plan: Continue per plan of care.        AUTH Status:  Date    Medicare - RE every 10th visit Used 1 2 3 4 5 6 7 8 9 10 - RE 11 12 13 14 15 16    Remaining  9 8 7 6 5 4 3 2 1  9 8 7 6 5 4         Precautions: Hx Neoplasm, Chronic Pain, L Posterior PEGGY precautions x6 weeks (25)    Manuals     L hip PROM w/in precautions Hip flex < 90 MH  LH SK SFP   SFP SFP  SFP SFP SFP SFP   SFP                                                            Neuro Re-Ed                   Quad set  demo reviewed 10\" x10                SAQ    x30 1# 2x10              LAQ       1# 3x10 5# 3x10   OTB 4x15 MTB 3x20  OTB x40  OTB + LOTB 3x20  GTB 3x20   SLR     MaxA x3 reps  H/L march c asst 3x10 H/L march 3x12      Prone 0# 2x10      Bridges     10x3\" + iso hip ABd  2x 10x3\" + iso hip Abd   3x10 OTB h' 3x10   OTB h' 3x10     x20   Hip add        Ball squeeze 20x5\" Ball squeeze 20x5\"             Hip abduction  demo  OTB 2x10  Iso 20x5\" Iso 20x5\"             Hip ER          AAROM c strap x30 AAROM c " "strap x30        Seated tibial ER c slider           x30        Clamshell                    SS c band       No band 10x10' LOTB a' x5     LOTB a' x5                         Tandem stance          Blue foam 3x30\"  Airex 2x30\" EO  2x30\" EC   Full tandem OG 2x30\"    SLS       OG 3 x 5\"  OG UE support 3x20\"          Dynamic Balance           Ladder walk-thru:  - 1 in ea  - lateral x3ea     Tandem walk in rack x8    Stand Hip 3 way   x10 ea Yo X15ea:L 1# 2x10ea 2.5# 2x10 nv  3# 2x10  5# 3x10  LOTB ABd x20 c 2\" hold                                            Ther Ex                   Bike        X10 min x10 min X10 min X10 min  X10 min X10 min X10 min X10 min   Heel Slide    10\" x10 Active x30 Active x20              Standing quad S               3x30\" 3x30\" prone    Ankle pumps  Demo                   HR/TR Demo seated  HR Stand 2x10                SKC              10x10\" 10x10\"    LTR              x20                        Mini Squat    2x10    4x10 4x10           Standing march    2x10 ea x20ea  x20ea  Step-taps 4# x30     Step-taps 5# x30                         Leg press          nv GTB k' 70# 2x20  75# 2x20 70# --> 100# x10ea SL 35# 3x10  SL 70# 4x5  SL 40# 3x10   Sit <> stand               B-stance:L 3x5 c 15# ecc     Step ups fwd and lat     4\" FSU x20 4\" FSU 3x10ea 4\" FSU / LSU 2x10ea  6\" FSU x10 6\" FSU x30   LSU x20   6\" FSU 2x5 after LP (fatigue)    8\" biodex c eccentric lowering retro  3x5                                         Ther Activity  11/11                 Car Transfer  [facility]                   Bed transfer, STS, LH                 Gait Training  11/11                 LRAD   RW, LH    SPC                                Modalities                   MHP               B k' x10min                                 "

## 2024-12-27 ENCOUNTER — APPOINTMENT (OUTPATIENT)
Dept: PHYSICAL THERAPY | Facility: OTHER | Age: 74
End: 2024-12-27
Payer: MEDICARE

## 2024-12-30 ENCOUNTER — OFFICE VISIT (OUTPATIENT)
Dept: PHYSICAL THERAPY | Facility: OTHER | Age: 74
End: 2024-12-30
Payer: MEDICARE

## 2024-12-30 DIAGNOSIS — M16.12 PRIMARY OSTEOARTHRITIS OF LEFT HIP: Primary | ICD-10-CM

## 2024-12-30 PROCEDURE — 97110 THERAPEUTIC EXERCISES: CPT

## 2024-12-30 NOTE — PROGRESS NOTES
"Daily Note     Today's date: 2024  Patient name: Solis Dos Santos  : 1950  MRN: [id number]  Referring provider: Leonard Gallagher,*  Dx:   Encounter Diagnosis     ICD-10-CM    1. Primary osteoarthritis of left hip  M16.12                   Start Time: 1041  Stop Time: 1056  Total time in clinic (min): 15 minutes    Subjective: Pt reports he had a flare-up of LBP last week, notes he is 90% better today but has some discomfort R-sided low back..      Objective: See treatment diary below      Assessment: Tolerated treatment well focused on lumbar STM & paraspinal stretching with decreased lumbar discomfort at end of session.  Patient demonstrated fatigue post treatment, exhibited good technique with therapeutic exercises, and would benefit from continued PT      Plan: Continue per plan of care.        AUTH Status:  Date    Medicare - RE every 10th visit Used 1 2 3 4 5 6 7 8 9 10 - RE  12 13 14 15 16 17    Remaining  9 8 7 6 5 4 3 2 1  9 8 7 6 5 4 3         Precautions: Hx Neoplasm, Chronic Pain, L Posterior PEGGY precautions x6 weeks (25)    Manuals    L hip PROM w/in precautions SFP SFP  SFP SFP SFP SFP   SFP    IASTM           R L/s parapsinal   SFP                               Neuro Re-Ed              Quad set               SAQ              LAQ   OTB 4x15 MTB 3x20  OTB x40  OTB + LOTB 3x20  GTB 3x20    SLR  H/L march 3x12      Prone 0# 2x10       Bridges + iso hip Abd   3x10 OTB h' 3x10   OTB h' 3x10     x20    Hip add               Hip abduction               Hip ER    AAROM c strap x30 AAROM c strap x30         Seated tibial ER c slider     x30         Clamshell               SS c band No band 10x10' LOTB a' x5     LOTB a' x5                     Tandem stance    Blue foam 3x30\"  Airex 2x30\" EO  2x30\" EC   Full tandem OG 2x30\"   " "  SLS OG 3 x 5\"  OG UE support 3x20\"           Dynamic Balance     Ladder walk-thru:  - 1 in ea  - lateral x3ea     Tandem walk in rack x8     Stand Hip 3 way nv  3# 2x10  5# 3x10  LOTB ABd x20 c 2\" hold                                   Ther Ex              Bike  X10 min x10 min X10 min X10 min  X10 min X10 min X10 min X10 min X10 min   Heel Slide               Standing quad S         3x30\" 3x30\" prone     Ankle pumps               HR/TR              SKC        10x10\" 10x10\"     Seated L/s flexion           10x10\" ea fwd /R/L   LTR        x20   x20                 Mini Squat  4x10 4x10            Standing march   Step-taps 4# x30     Step-taps 5# x30                     Leg press    nv GTB k' 70# 2x20  75# 2x20 70# --> 100# x10ea SL 35# 3x10  SL 70# 4x5  SL 40# 3x10    Sit <> stand         B-stance:L 3x5 c 15# ecc      Step ups fwd and lat   6\" FSU x10 6\" FSU x30   LSU x20   6\" FSU 2x5 after LP (fatigue)    8\" biodex c eccentric lowering retro  3x5                                Ther Activity              Car Transfer                             Gait Training              LRAD                             Modalities              MHP         B k' x10min  X10' lumbar                           "

## 2024-12-31 ENCOUNTER — PROCEDURE VISIT (OUTPATIENT)
Dept: UROLOGY | Facility: AMBULATORY SURGERY CENTER | Age: 74
End: 2024-12-31
Payer: MEDICARE

## 2024-12-31 VITALS
HEIGHT: 73 IN | DIASTOLIC BLOOD PRESSURE: 80 MMHG | SYSTOLIC BLOOD PRESSURE: 120 MMHG | BODY MASS INDEX: 36.84 KG/M2 | OXYGEN SATURATION: 98 % | WEIGHT: 278 LBS | HEART RATE: 80 BPM

## 2024-12-31 DIAGNOSIS — C67.2 MALIGNANT NEOPLASM OF LATERAL WALL OF URINARY BLADDER (HCC): Primary | ICD-10-CM

## 2024-12-31 PROCEDURE — 52000 CYSTOURETHROSCOPY: CPT | Performed by: UROLOGY

## 2024-12-31 PROCEDURE — 88112 CYTOPATH CELL ENHANCE TECH: CPT | Performed by: PATHOLOGY

## 2024-12-31 NOTE — PROGRESS NOTES
Cystoscopy     Date/Time  12/31/2024 9:00 AM     Performed by  Geo Bentley MD   Authorized by  Geo Bentley MD         Procedure Details:  Procedure type: cystoscopy          Dandre is a 74-year-old male with a history of high-grade T1 urothelial carcinoma the bladder.  He underwent an initial TURBT in March 2023.  He then had a repeat resection to ensure proper staging.  Muscle was present and uninvolved by tumor.  There was no residual disease identified.  He then completed BCG in the follow-up 2023.  His last surveillance cystoscopy was in May 2024 and he returns for surveillance cystoscopy today.      Male cystoscopy procedure note:  Risk and benefits of flexible cystoscopy were discussed. Informed consent was obtained. The patient was placed in the supine position. His genitalia was prepped and draped in a sterile fashion. Viscous lidocaine jelly was instilled into the urethra and flexible cystoscopy was then performed. The urethra, prostatic urethra, and bladder were all thoroughly inspected there was no evidence of mucosal abnormalities or lesions.  A scar along the posterior wall was appreciated without disease recurrence.  Both ureteral orifices were visualized with clear efflux of urine.  Retroflexion revealed a slightly intravesical prostate.  Overall this was a negative cystoscopy for recurrent urothelial carcinoma.    Impression: History of high-grade T1 urothelial carcinoma the bladder without recurrence    Plan: I provided the patient with reassurance that cystoscopic evaluation in the office today shows no evidence of disease recurrence.  A urine cytology was sent from the office today.  Follow-up is recommended in 6 months with his next cystoscopy.  Assuming this cystoscopy is negative we will then go to yearly.  In the interim the patient was instructed to call sooner if he has gross hematuria.

## 2025-01-02 ENCOUNTER — OFFICE VISIT (OUTPATIENT)
Dept: PHYSICAL THERAPY | Facility: OTHER | Age: 75
End: 2025-01-02
Payer: MEDICARE

## 2025-01-02 DIAGNOSIS — M48.061 SPINAL STENOSIS OF LUMBAR REGION WITHOUT NEUROGENIC CLAUDICATION: ICD-10-CM

## 2025-01-02 DIAGNOSIS — M16.12 PRIMARY OSTEOARTHRITIS OF LEFT HIP: Primary | ICD-10-CM

## 2025-01-02 PROCEDURE — 97110 THERAPEUTIC EXERCISES: CPT

## 2025-01-02 NOTE — PROGRESS NOTES
"Daily Note     Today's date: 2025  Patient name: Solis Dos Santos  : 1950  MRN: [id number]  Referring provider: Leonard Gallagher,*  Dx:   Encounter Diagnosis     ICD-10-CM    1. Primary osteoarthritis of left hip  M16.12       2. Spinal stenosis of lumbar region without neurogenic claudication  M48.061               Start Time: 1054  Stop Time: 1117  Total time in clinic (min): 23 minutes    Subjective: Pt reports his low back pain was improved for days after last session, went to lie down in bed and noted reproduction of LBP in supine.      Objective: See treatment diary below      Assessment: Tolerated treatment well focused on lumbar mobility, hip flexor stretching and LLE strengthening. Greatest limitation during LE strengthening & balance is knee crepitus. Denies any L hip limitations or pain throughout exercises. Patient demonstrated fatigue post treatment, exhibited good technique with therapeutic exercises, and would benefit from continued PT      Plan: Continue per plan of care.        AUTH Status:  Date  1/2   Medicare - RE every 10th visit Used 10 - RE 11 12 13 14 15 16 17 18    Remaining   9 8 7 6 5 4 3 2         Precautions: Hx Neoplasm, Chronic Pain, L Posterior PEGGY precautions x6 weeks (25)    Manuals  1/2   L hip PROM w/in precautions SFP SFP   SFP     IASTM      R L/s parapsinal   SFP                        Neuro Re-Ed          Quad set           SAQ          LAQ OTB x40  OTB + LOTB 3x20  GTB 3x20     SLR   Prone 0# 2x10        Bridges     x20     Hip add           Hip abduction           Hip ER          Seated tibial ER c slider          Clamshell           SS c band  LOTB a' x5                  Tandem stance Airex 2x30\" EO  2x30\" EC   Full tandem OG 2x30\"   Full tandem GRN 2x30   SLS          Dynamic Balance     Tandem walk in rack x8      Stand Hip 3 way  LOTB ABd x20 c 2\" hold                " "            Ther Ex          Bike  X10 min X10 min X10 min X10 min X10 min X10 min   TM       Retro 3x45\"   Heel Slide           Standing quad S    3x30\" 3x30\" prone      Ankle pumps           HR/TR          SKC   10x10\" 10x10\"      Seated L/s flexion      10x10\" ea fwd /R/L 10x10\" ea fwd /R/L   Mod Harlan S       10x10\"   Standing hip flexor S       FFE 10x5\"   LTR   x20   x20              Mini Squat           Standing march   Step-taps 5# x30                  Leg press  SL 35# 3x10  SL 70# 4x5  SL 40# 3x10     Sit <> stand    B-stance:L 3x5 c 15# ecc       Step ups fwd and lat  6\" FSU 2x5 after LP (fatigue)    8\" biodex c eccentric lowering retro  3x5                         Ther Activity          Car Transfer                     Gait Training          LRAD                     Modalities          MHP    B k' x10min  X10' lumbar                        "

## 2025-01-06 PROCEDURE — 88112 CYTOPATH CELL ENHANCE TECH: CPT | Performed by: PATHOLOGY

## 2025-01-07 ENCOUNTER — TELEPHONE (OUTPATIENT)
Age: 75
End: 2025-01-07

## 2025-01-07 NOTE — TELEPHONE ENCOUNTER
If the patient feels this is related to lamotrigine please have her stop lamotrigine immediately, and do not restart.  If the rash persists any after stopping lamotrigine, or if at any time the rash worsens or any new symptoms at all develop, please have her be evaluated by primary care/urgent care/ER immediately.  Please have the patient reach back out to this APRN/office in the next day or two to let us know how the rash is doing, and if it is resolved, and we will discuss further possible medications/adjustments from there. Spoke to patient and he is willing to go to [place]  Made an appt with N[facility]-2/3 at [facility] address provided to patient

## 2025-01-08 ENCOUNTER — OFFICE VISIT (OUTPATIENT)
Dept: PHYSICAL THERAPY | Facility: OTHER | Age: 75
End: 2025-01-08
Payer: MEDICARE

## 2025-01-08 DIAGNOSIS — M16.12 PRIMARY OSTEOARTHRITIS OF LEFT HIP: Primary | ICD-10-CM

## 2025-01-08 PROCEDURE — 97110 THERAPEUTIC EXERCISES: CPT

## 2025-01-08 NOTE — PROGRESS NOTES
"Daily Note     Today's date: 2025  Patient name: Solis Dos Santos  : 1950  MRN: [id number]  Referring provider: Leonard Gallagher,*  Dx:   Encounter Diagnosis     ICD-10-CM    1. Primary osteoarthritis of left hip  M16.12                 Start Time: 1010  Stop Time: 1103  Total time in clinic (min): 53 minutes    Subjective: \"April can't come fast enough for this knee surgery.\" Chief complaint continues to be L knee pain > L hip pain.      Objective: See treatment diary below        Assessment: Tolerated treatment well focused on lumbar mobility, hip flexor stretching and LLE strengthening. Greatest limitation during LE strengthening & balance is knee crepitus. Denies any L hip limitations or pain throughout exercises. Patient demonstrated fatigue post treatment, exhibited good technique with therapeutic exercises, and would benefit from continued PT      Plan: Continue per plan of care.        AUTH Status:  Date    Medicare - RE every 10th visit Used 10 - RE 11 12 13 14 15 16 17 18 19    Remaining   9 8 7 6 5 4 3 2 1         Precautions: Hx Neoplasm, Chronic Pain, L Posterior PEGGY precautions x6 weeks (25)    Manuals    L hip PROM w/in precautions SFP SFP   SFP      IASTM      R L/s parapsinal   SFP                           Neuro Re-Ed           Quad set            SAQ           LAQ OTB x40  OTB + LOTB 3x20  GTB 3x20      SLR   Prone 0# 2x10         Bridges     x20      Hip add            Hip abduction            Hip ER           Seated tibial ER c slider           [facility]            SS c band  LOTB a' x5                    Tandem stance Airex 2x30\" EO  2x30\" EC   Full tandem OG 2x30\"   Full tandem GRN 2x30 Full tandem BLUE 2 x amap   SLS           Dynamic Balance     Tandem walk in rack x8    Star slider x20:L   Stand Hip 3 way  LOTB ABd x20 c 2\" hold                               Ther Ex       " "    Bike  X10 min X10 min X10 min X10 min X10 min X10 min X10 min   TM       Retro 3x45\" Retro walk JAE 15# x10   Heel Slide            Standing quad S    3x30\" 3x30\" prone       Figure-4 ABdER S, seated        10x10\"   Ankle pumps            HR/TR           SKC   10x10\" 10x10\"       Seated L/s flexion      10x10\" ea fwd /R/L 10x10\" ea fwd /R/L    Mod Harlan S       10x10\"    Standing hip flexor S       FFE 10x5\"    LTR   x20   x20                Mini Squat            Standing march   Step-taps 5# x30                    Leg press  SL 35# 3x10  SL 70# 4x5  SL 40# 3x10   DL   90#  95#  100#  105#  110#  115# x10ea   Sit <> stand    B-stance:L 3x5 c 15# ecc        Step ups fwd and lat  6\" FSU 2x5 after LP (fatigue)    8\" biodex c eccentric lowering retro  3x5                            Ther Activity           Car Transfer                       Gait Training           LRAD                       Modalities           MHP    B k' x10min  X10' lumbar                          "

## 2025-01-10 ENCOUNTER — OFFICE VISIT (OUTPATIENT)
Dept: PHYSICAL THERAPY | Facility: OTHER | Age: 75
End: 2025-01-10
Payer: MEDICARE

## 2025-01-10 DIAGNOSIS — M48.061 SPINAL STENOSIS OF LUMBAR REGION WITHOUT NEUROGENIC CLAUDICATION: ICD-10-CM

## 2025-01-10 DIAGNOSIS — M16.12 PRIMARY OSTEOARTHRITIS OF LEFT HIP: Primary | ICD-10-CM

## 2025-01-10 PROCEDURE — 97140 MANUAL THERAPY 1/> REGIONS: CPT

## 2025-01-10 PROCEDURE — 97110 THERAPEUTIC EXERCISES: CPT

## 2025-01-10 NOTE — PROGRESS NOTES
PT Re-Evaluation     Today's date: 1/10/2025  Patient name: Solis Dos Santos  : 1950  MRN: [id number]  Referring provider: Leonard Gallagher,*  Dx:   Encounter Diagnosis     ICD-10-CM    1. Primary osteoarthritis of left hip  M16.12       2. Spinal stenosis of lumbar region without neurogenic claudication  M48.061           Start Time: 1040  Stop Time: 1118  Total time in clinic (min): 38 minutes    Assessment  Impairments: abnormal gait, abnormal or restricted ROM, activity intolerance, impaired balance, lacks appropriate home exercise program, pain with function, participation limitations, activity limitations and endurance  Symptom irritability: high    Assessment details: RE 1/10/24: Dandre has been progressing well s/p L PEGGY performed on 24. Upon RE, pt demonstrates near-full L hip ROM in all directions, with only slight limitations in L hip ER compared bilaterally. Pt demonstrates symmetrical strength bilaterally. No complaints of functional limitations regarding L hip. Chief complaint is L knee instability, crepitus and pain that limits functional mobility including walking prolonged distances, STS and stair negotiation. Pt will follow-up with Dr. Gallagher regarding L TKA at 25 follow-up. Until then pt would benefit from continued skilled PT focused on L k' pain modulation, LLE strengthening / k' stabilization and dynamic balance.    RE 24: Dandre is a 74 year old patient presenting to OPPT for evaluation regarding L PEGGY performed on 24 by Dr. Gallagher. Upon RE, pt presents with improving L hip ROM within precautions, improvements in balance / balance confidence, LLE strength improvements all resulting in improved functional mobility & decreased pain. Pt continues to maintain L hip precautions with limited ROM, strength deficits and requiring SPC for ambulation 2/2 balance concerns. Pt would benefit from continued skilled PT in order to achieve the following goals.     IE: Dandre is a 74  year old patient presenting to OPPT for evaluation regarding L PEGGY performed on 11/4/24 by Dr. Gallagher. Upon evaluation they show impairments in the following areas: High degree of irritability with symptoms in L hip thereby significantly limiting his functional status. Safe ambulation with RW. Very little active movement capable against gravity. Requires maxAx1 for LLE lifting to achieve sit <> supine and car transfers to ensure success and reduce symptoms. These impairments limit their ability to perform daily activities as well as their previous level of function causing decreased quality of life. Educated signs and symptoms of DVT, infection - patient verbalized understanding. Patient already has great understanding of posterior hip precautions from prehab.     Patient requires continued skilled PT services in order to improve aforementioned impairments so that they are able to return to highest level of function possible. Without initiation of skilled PT services, patient will have increased difficulty returning to prior level of function leading to decreased quality of life.     Understanding of Dx/Px/POC: good     Prognosis: good    Goals  Short-Term Goals (4 weeks)   1.  Patient will decrease worst rating of pain by 2/10 to improve quality of life MET  2. Pt will increase strength by 0.5 MMT proximal hip musculature to improve quality of life with improved efficiency of transfers and daily activities MET  3. Patient will be able to perform home and household duties with 2/10 reduction in pain indicating improved QOL MET  4. Patient will improve L/S AROM by 25% indicating improved mobility of affected area MET  5. Patient will improve affected hip AROM by 10% compared to IE MET  6. Patient will improve affected hip PROM by 10% compared to IE MET      Long-Term Goals (8 weeks)   1. Patient hip AROM will be progressing per protocol  MET  2. Patient will decrease pain by 4/10 at worst in comparison to IE  indicating significant reduction in pain and improved quality of life MET  3. Patient will be able to increase maximal walking distance by 200 ft indicating improved respiratory and musculoskeletal function NOT MET  4. Patient will be able to perform household and hobby activities without increase in pain > or equal to 2/10 indicating ability to independently manage pain symptoms to accomplish daily activities. MET  5. Patient will be independent with HEP with good form accomplished  MET  6. Patient will have symmetrical hip passive and active range of motion PROGRESSING      Plan  Patient would benefit from: skilled physical therapy  Planned modality interventions: cryotherapy and thermotherapy: hydrocollator packs    Planned therapy interventions: manual therapy, neuromuscular re-education, postural training, self care, strengthening, therapeutic activities, stretching, therapeutic exercise, home exercise program, gait training and balance    Frequency: 2x week  Plan of Care beginning date: 11/6/2024  Plan of Care expiration date: 2/6/2025  Treatment plan discussed with: patient        Subjective Evaluation    History of Present Illness  Mechanism of injury: Dandre is a 74 year old patient presenting to OPPT for evaluation regarding s/p L PEGGY on 11/4/24 performed by Dr. Gallagher. Reports that he is struggling at this moment. Before coming to therapy, he has been having a significantly difficult time using the bathroom. Has a hard time getting up from the toilet. The toilet he is using is too small, the commode that he received. Has been very down on himself since surgery and is discouraged with how little he has been able to do. Has been doing some ankle pumps.     Had a lift chair delivered last week. Has been walking every hour. Using the lift recliner for sit <> stands.     Following up with Dr. Gallagher on Tuesday (11/12).   Patient Goals  Patient goals for therapy: increased strength, improved balance, decreased  "pain, increased motion and return to sport/leisure activities    Pain  No pain reported    Social Support  Steps to enter house: yes  1  Stairs in house: no   Lives in: one-story house    Employment status: not working  Exercise history: Wants to get back to [employer].    Treatments  Previous treatment: physical therapy  Discharged from (in last 30 days): inpatient hospitalization        Objective    Hip Passive Range of Motion:   Flexion: 120  Extension: 5   Abduction: 45  ER: 50  IR: 20    Knee ROM: R 4-108  L 0-100      Manual Muscle Testing:   Hip Flexion: R 4/5  L  4/5  Hip Abduction: R 4/5 L 4/5   Knee Extension: R 5 /5  L 5/5     Gait: SPC, minimal L foot IR - improved compared to previous RE          AUTH Status:  Date 12/9 12/11 12/12 12/16 12/18 12/20 12/23 12/30 1/2 1/8 10/10   Medicare - RE every 10th visit Used 10 - RE 11 12 13 14 15 16 17 18 19 20 - RE    Remaining   9 8 7 6 5 4 3 2 1 0         Precautions: Hx Neoplasm, Chronic Pain, L Posterior PEGGY precautions x6 weeks (12/16/25)    Manuals 12/12 12/16 12/18 12/20 12/23 12/30 1/2 1/8 1/10   L hip PROM w/in precautions SFP SFP   SFP       IASTM      R L/s parapsinal   SFP      LAD         LAD gIII    Tibfem distraction prone gIII SFP               Neuro Re-Ed            Quad set             SAQ            LAQ OTB x40  OTB + LOTB 3x20  GTB 3x20       SLR   Prone 0# 2x10          Bridges     x20       Hip add             Hip abduction             Hip ER            Seated tibial ER c slider            [place]             SS c band  LOTB a' x5                      Tandem stance Airex 2x30\" EO  2x30\" EC   Full tandem OG 2x30\"   Full tandem GRN 2x30 Full tandem BLUE 2 x amap    SLS            Dynamic Balance     Tandem walk in rack x8    Star slider x20:L    Stand Hip 3 way  LOTB ABd x20 c 2\" hold                                  Ther Ex            Bike  X10 min X10 min X10 min X10 min X10 min X10 min X10 min X10 min   TM       Retro 3x45\" Retro walk JAE " "15# x10 Retro walk JAE 25# x15   Heel Slide             Standing quad S    3x30\" 3x30\" prone     3x30\"  prone   Figure-4 ABdER S, seated        10x10\"    Ankle pumps             HR/TR            SKC   10x10\" 10x10\"        Seated L/s flexion      10x10\" ea fwd /R/L 10x10\" ea fwd /R/L     Mod Harlan S       10x10\"     Standing hip flexor S       FFE 10x5\"     LTR   x20   x20                  Mini Squat             Standing march   Step-taps 5# x30                      Leg press  SL 35# 3x10  SL 70# 4x5  SL 40# 3x10   DL   90#  95#  100#  105#  110#  115# x10ea    Sit <> stand    B-stance:L 3x5 c 15# ecc      20# eccentric only, 0# concentric  2x10 c 4\" ecc   Step ups fwd and lat  6\" FSU 2x5 after LP (fatigue)    8\" biodex c eccentric lowering retro  3x5                               Ther Activity            Car Transfer                         Gait Training            LRAD                         Modalities            MHP    B k' x10min  X10' lumbar                            "

## 2025-01-13 ENCOUNTER — OFFICE VISIT (OUTPATIENT)
Dept: PHYSICAL THERAPY | Facility: OTHER | Age: 75
End: 2025-01-13
Payer: MEDICARE

## 2025-01-13 DIAGNOSIS — M16.12 PRIMARY OSTEOARTHRITIS OF LEFT HIP: Primary | ICD-10-CM

## 2025-01-13 PROCEDURE — 97110 THERAPEUTIC EXERCISES: CPT

## 2025-01-13 NOTE — PROGRESS NOTES
"Daily Note     Today's date: 2025  Patient name: Solis Dos Santos  : 1950  MRN: [id number]  Referring provider: Leonard Gallagher,*  Dx:   Encounter Diagnosis     ICD-10-CM    1. Primary osteoarthritis of left hip  M16.12           Start Time: 1056  Stop Time: 1104  Total time in clinic (min): 8 minutes  One on one [id number], independent 1104- 1131    Subjective: Pt notes L knee pain is flared-up today, hurts just to walk.      Objective: See treatment diary below      Assessment: Tolerated treatment well focused on quad strengthening as tolerable given increase in pain today; pt noted mild improvements in pain with exercise, ultimately limited by L knee pain today. Patient exhibited good technique with therapeutic exercises and would benefit from continued PT      Plan: Continue per plan of care.           AUTH Status:  Date 12/9 12/11 12/12 12/16 12/18 12/20 12/23 12/30 1/2 1/8 10/10 1/13   Medicare - RE every 10th visit Used 10 - RE 11 12 13 14 15 16 17 18 19 20 - RE 21    Remaining   9 8 7 6 5 4 3 2 1 0 9         Precautions: Hx Neoplasm, Chronic Pain, L Posterior PEGGY precautions x6 weeks (25)    Manuals 12/12 12/16 12/18 12/20 12/23 12/30 1/2 1/8 1/10 1/13   L hip PROM w/in precautions SFP SFP   SFP        IASTM      R L/s parapsinal   SFP       LAD         LAD gIII    Tibfem distraction prone gIII SFP                 Neuro Re-Ed             Quad set              SAQ             LAQ OTB x40  OTB + LOTB 3x20  GTB 3x20     GTB 3x30   SLR   Prone 0# 2x10           Bridges     x20        Hip add              Hip abduction              Hip ER             Seated tibial ER c slider             [employer]              SS c band  LOTB a' x5                        Tandem stance Airex 2x30\" EO  2x30\" EC   Full tandem OG 2x30\"   Full tandem GRN 2x30 Full tandem BLUE 2 x amap     SLS             Dynamic Balance     Tandem walk in rack x8    Star slider x20:L     Stand Hip 3 way  LOTB ABd x20 c 2\" hold " "                                    Ther Ex             Bike  X10 min X10 min X10 min X10 min X10 min X10 min X10 min X10 min X10 min   TM       Retro 3x45\" Retro walk JAE 15# x10 Retro walk JAE 25# x15 Retro walk JAE 25# x20   Heel Slide              Standing quad S    3x30\" 3x30\" prone     3x30\"  prone    Figure-4 ABdER S, seated        10x10\"  10x10\" c strap   Ankle pumps              HR/TR             SKC   10x10\" 10x10\"         Seated L/s flexion      10x10\" ea fwd /R/L 10x10\" ea fwd /R/L      Mod Harlan S       10x10\"      Standing hip flexor S       FFE 10x5\"      LTR   x20   x20                    Mini Squat              Standing march   Step-taps 5# x30                        Leg press  SL 35# 3x10  SL 70# 4x5  SL 40# 3x10   DL   90#  95#  100#  105#  110#  115# x10ea     Sit <> stand    B-stance:L 3x5 c 15# ecc      20# eccentric only, 0# concentric  2x10 c 4\" ecc 15# 2x10   Step ups fwd and lat  6\" FSU 2x5 after LP (fatigue)    8\" biodex c eccentric lowering retro  3x5                                  Ther Activity             Car Transfer                           Gait Training             LRAD                           Modalities             MHP    B k' x10min  X10' lumbar                                 "

## 2025-01-15 DIAGNOSIS — Z47.1 AFTERCARE FOLLOWING LEFT HIP JOINT REPLACEMENT SURGERY: Primary | ICD-10-CM

## 2025-01-15 DIAGNOSIS — Z96.642 AFTERCARE FOLLOWING LEFT HIP JOINT REPLACEMENT SURGERY: Primary | ICD-10-CM

## 2025-01-16 ENCOUNTER — OFFICE VISIT (OUTPATIENT)
Dept: PHYSICAL THERAPY | Facility: OTHER | Age: 75
End: 2025-01-16
Payer: MEDICARE

## 2025-01-16 DIAGNOSIS — M16.12 PRIMARY OSTEOARTHRITIS OF LEFT HIP: Primary | ICD-10-CM

## 2025-01-16 PROCEDURE — 97110 THERAPEUTIC EXERCISES: CPT

## 2025-01-16 NOTE — PROGRESS NOTES
"Daily Note     Today's date: 2025  Patient name: Solis Dos Santos  : 1950  MRN: [id number]  Referring provider: Leonard Gallagher,*  Dx:   Encounter Diagnosis     ICD-10-CM    1. Primary osteoarthritis of left hip  M16.12           Start Time: 1015  Stop Time: 1038  Total time in clinic (min): 23 minutes      Subjective: Pt notes both knees hurt today.      Objective: See treatment diary below      Assessment: Tolerated treatment well focused on LE strength and static balance progressions today, tolerated well. Patient exhibited good technique with therapeutic exercises and would benefit from continued PT      Plan: Continue per plan of care.           AUTH Status:  Date 12/9 12/11 12/12 12/16 12/18 12/20 12/23 12/30 1/2 1/8 10/10 1/13   Medicare - RE every 10th visit Used 10 - RE 11 12 13 14 15 16 17 18 19 20 - RE 21    Remaining   9 8 7 6 5 4 3 2 1 0 9         Precautions: Hx Neoplasm, Chronic Pain, L Posterior PEGGY precautions x6 weeks (25)    Manuals 12/12 12/16 12/18 12/20 12/23 12/30 1/2 1/8 1/10 1/13 1/16   L hip PROM w/in precautions SFP SFP   SFP         IASTM      R L/s parapsinal   SFP        LAD         LAD gIII    Tibfem distraction prone gIII SFP                   Neuro Re-Ed              Quad set               SAQ              LAQ OTB x40  OTB + LOTB 3x20  GTB 3x20     GTB 3x30 5# + MTB   3x20   SLR   Prone 0# 2x10            Bridges     x20         Hip add               Hip abduction               Hip ER              Seated tibial ER c slider              [employer]               SS c band  LOTB a' x5                          Tandem stance Airex 2x30\" EO  2x30\" EC   Full tandem OG 2x30\"   Full tandem GRN 2x30 Full tandem BLUE 2 x amap   BLUE 3 x amap   SLS              Dynamic Balance     Tandem walk in rack x8    Star slider x20:L      Stand Hip 3 way  LOTB ABd x20 c 2\" hold                                        Ther Ex              Bike  X10 min X10 min X10 min X10 min X10 " "min X10 min X10 min X10 min X10 min X10 min   TM       Retro 3x45\" Retro walk JAE 15# x10 Retro walk JAE 25# x15 Retro walk JAE 25# x20 Retro walk JAE 25# 2x12   Heel Slide               Standing quad S    3x30\" 3x30\" prone     3x30\"  prone     Figure-4 ABdER S, seated        10x10\"  10x10\" c strap    Ankle pumps               HR/TR              SKC   10x10\" 10x10\"          Seated L/s flexion      10x10\" ea fwd /R/L 10x10\" ea fwd /R/L       Mod Harlan S       10x10\"       Standing hip flexor S       FFE 10x5\"       LTR   x20   x20                      Mini Squat               Standing march   Step-taps 5# x30                          Leg press  SL 35# 3x10  SL 70# 4x5  SL 40# 3x10   DL   90#  95#  100#  105#  110#  115# x10ea   DL 90#  100#  110#  120#  130# x15ea   Sit <> stand    B-stance:L 3x5 c 15# ecc      20# eccentric only, 0# concentric  2x10 c 4\" ecc 15# 2x10    Step ups fwd and lat  6\" FSU 2x5 after LP (fatigue)    8\" biodex c eccentric lowering retro  3x5                                     Ther Activity              Car Transfer                             Gait Training              LRAD                             Modalities              MHP    B k' x10min  X10' lumbar                                   "

## 2025-01-20 ENCOUNTER — APPOINTMENT (OUTPATIENT)
Dept: PHYSICAL THERAPY | Facility: OTHER | Age: 75
End: 2025-01-20
Payer: MEDICARE

## 2025-01-21 ENCOUNTER — OFFICE VISIT (OUTPATIENT)
Dept: PHYSICAL THERAPY | Facility: OTHER | Age: 75
End: 2025-01-21
Payer: MEDICARE

## 2025-01-21 DIAGNOSIS — M16.12 PRIMARY OSTEOARTHRITIS OF LEFT HIP: Primary | ICD-10-CM

## 2025-01-21 PROCEDURE — 97110 THERAPEUTIC EXERCISES: CPT

## 2025-01-21 NOTE — PROGRESS NOTES
"Daily Note     Today's date: 2025  Patient name: Solis Dos Santos  : 1950  MRN: [id number]  Referring provider: Leonard Gallagher,*  Dx:   Encounter Diagnosis     ICD-10-CM    1. Primary osteoarthritis of left hip  M16.12           Start Time: 1202  Stop Time: 1240  Total time in clinic (min): 38 minutes      Subjective: Pt reports he used the snowblower two days ago and surprisingly wasn't in too much pain afterwards.      Objective: See treatment diary below      Assessment: Tolerated treatment well focused on LE strength with no complaints other than muscle fatigue. Patient exhibited good technique with therapeutic exercises and would benefit from continued PT      Plan: Continue per plan of care.           AUTH Status:  Date 12/9 12/11 12/12 12/16 12/18 12/20 12/23 12/30 1/2 1/8 10/10 1/13 1/16 1/21   Medicare - RE every 10th visit Used 10 - RE 11 12 13 14 15 16 17 18 19 20 - RE     Remaining   9 8 7 6 5 4 3 2 1 0 9 8 7         Precautions: Hx Neoplasm, Chronic Pain, L Posterior PEGGY precautions x6 weeks (25)    Manuals 12/12 12/16 12/18 12/20 12/23 12/30 1/2 1/8 1/10 1/13 1/16 1/21   L hip PROM w/in precautions SFP SFP   SFP          IASTM      R L/s parapsinal   SFP         LAD         LAD gIII    Tibfem distraction prone gIII SFP                     Neuro Re-Ed               Quad set                SAQ               LAQ OTB x40  OTB + LOTB 3x20  GTB 3x20     GTB 3x30 5# + MTB   3x20    SLR   Prone 0# 2x10             Bridges     x20          Hip add                Hip abduction                Hip ER               Seated tibial ER c slider               [facility]                SS c band  LOTB a' x5                            Tandem stance Airex 2x30\" EO  2x30\" EC   Full tandem OG 2x30\"   Full tandem GRN 2x30 Full tandem BLUE 2 x amap   BLUE 3 x amap    SLS               Dynamic Balance     Tandem walk in rack x8    Star slider x20:L       Stand Hip 3 way  LOTB ABd x20 c 2\" hold  " "                                         Ther Ex               Bike  X10 min X10 min X10 min X10 min X10 min X10 min X10 min X10 min X10 min X10 min X10 min   TM       Retro 3x45\" Retro walk JAE 15# x10 Retro walk JAE 25# x15 Retro walk JAE 25# x20 Retro walk JAE 25# 2x12 Retro walk JAE 25# 3x10   Heel Slide                Standing quad S    3x30\" 3x30\" prone     3x30\"  prone      Figure-4 ABdER S, seated        10x10\"  10x10\" c strap     Ankle pumps                HR/TR               SKC   10x10\" 10x10\"           Seated L/s flexion      10x10\" ea fwd /R/L 10x10\" ea fwd /R/L        Mod Harlan S       10x10\"        Standing hip flexor S       FFE 10x5\"        LTR   x20   x20                        Mini Squat                Standing march   Step-taps 5# x30                            Leg press  SL 35# 3x10  SL 70# 4x5  SL 40# 3x10   DL   90#  95#  100#  105#  110#  115# x10ea   DL 90#  100#  110#  120#  130# x15ea DL 90#-120# by 5# increments x15ea   Sit <> stand    B-stance:L 3x5 c 15# ecc      20# eccentric only, 0# concentric  2x10 c 4\" ecc 15# 2x10  10# 3x10   Step ups fwd and lat  6\" FSU 2x5 after LP (fatigue)    8\" biodex c eccentric lowering retro  3x5                                        Ther Activity               Car Transfer                               Gait Training               LRAD                               Modalities               MHP    B k' x10min  X10' lumbar                                     "

## 2025-01-23 ENCOUNTER — OFFICE VISIT (OUTPATIENT)
Dept: PHYSICAL THERAPY | Facility: OTHER | Age: 75
End: 2025-01-23
Payer: MEDICARE

## 2025-01-23 DIAGNOSIS — M16.12 PRIMARY OSTEOARTHRITIS OF LEFT HIP: Primary | ICD-10-CM

## 2025-01-23 PROCEDURE — 97110 THERAPEUTIC EXERCISES: CPT

## 2025-01-23 NOTE — PROGRESS NOTES
"Daily Note     Today's date: 2025  Patient name: Solis Dos Santos  : 1950  MRN: [id number]  Referring provider: Leonard Gallagher,*  Dx:   Encounter Diagnosis     ICD-10-CM    1. Primary osteoarthritis of left hip  M16.12           Start Time: 1054  Stop Time: 1142  Total time in clinic (min): 48 minutes      Subjective: Pt is looking forward to follow-up with orthopedics to coordinate plan for knees.      Objective: See treatment diary below      Assessment: Tolerated treatment well focused on LE strength - able to tolerated weighted step-ups today and progressed leg press depth to seat lvl6. Patient exhibited good technique with therapeutic exercises and would benefit from continued PT      Plan: Continue per plan of care.           AUTH Status:  Date  1/2 1/8 10/10 1/13 1/16 1/21 1/23   Medicare - RE every 10th visit Used 10 - RE 11 12 13 14 15 16 17 18 19 20 - RE  24    Remaining   9 8 7 6 5 4 3 2 1 0 9 8 7 6         Precautions: Hx Neoplasm, Chronic Pain, L Posterior PEGGY precautions x6 weeks (25)    Manuals x1/2 1/8 1/10 1/13 1/16 1/21 1/23   L hip PROM w/in precautions          IASTM          LAD   LAD gIII    Tibfem distraction prone gIII SFP                 Neuro Re-Ed          Quad set           SAQ          LAQ    GTB 3x30 5# + MTB   3x20     SLR           Bridges          Hip add           Hip abduction           Hip ER          Seated tibial ER c slider          [place]           SS c band                    Tandem stance Full tandem GRN 2x30 Full tandem BLUE 2 x amap   BLUE 3 x amap     SLS          Dynamic Balance   Star slider x20:L        Stand Hip 3 way                              Ther Ex          Bike X10 min X10 min X10 min X10 min X10 min X10 min X10 min   TM Retro 3x45\" Retro walk JAE 15# x10 Retro walk JAE 25# x15 Retro walk JAE 25# x20 Retro walk JAE 25# 2x12 Retro walk JAE 25# 2x10 Retro walk " "JAE 25# 2x10   Heel Slide           Standing quad S   3x30\"  prone       Figure-4 ABdER S, seated  10x10\"  10x10\" c strap      Ankle pumps           HR/TR          SKC          Seated L/s flexion 10x10\" ea fwd /R/L         Mod Harlan S 10x10\"         Standing hip flexor S FFE 10x5\"         LTR                    Mini Squat           Standing march                     Leg press   DL   90#  95#  100#  105#  110#  115# x10ea   DL 90#  100#  110#  120#  130# x15ea DL 90#-120# by 5# increments x15ea DL 90#-120# by 5# increments x10ea   Sit <> stand    20# eccentric only, 0# concentric  2x10 c 4\" ecc 15# 2x10  10# 3x10    Step ups fwd and lat        4\" 15# 2x10                       Ther Activity          Car Transfer                     Gait Training          LRAD                     Modalities          MHP                                 "

## 2025-01-27 ENCOUNTER — APPOINTMENT (OUTPATIENT)
Dept: PHYSICAL THERAPY | Facility: OTHER | Age: 75
End: 2025-01-27
Payer: MEDICARE

## 2025-01-27 ENCOUNTER — OFFICE VISIT (OUTPATIENT)
Dept: URGENT CARE | Age: 75
End: 2025-01-27
Payer: MEDICARE

## 2025-01-27 VITALS
TEMPERATURE: 97.5 F | SYSTOLIC BLOOD PRESSURE: 138 MMHG | DIASTOLIC BLOOD PRESSURE: 78 MMHG | WEIGHT: 280 LBS | RESPIRATION RATE: 18 BRPM | HEART RATE: 76 BPM | OXYGEN SATURATION: 98 % | BODY MASS INDEX: 36.94 KG/M2

## 2025-01-27 DIAGNOSIS — L02.214 ABSCESS OF LEFT GROIN: Primary | ICD-10-CM

## 2025-01-27 PROCEDURE — G0463 HOSPITAL OUTPT CLINIC VISIT: HCPCS | Performed by: STUDENT IN AN ORGANIZED HEALTH CARE EDUCATION/TRAINING PROGRAM

## 2025-01-27 PROCEDURE — 87147 CULTURE TYPE IMMUNOLOGIC: CPT | Performed by: STUDENT IN AN ORGANIZED HEALTH CARE EDUCATION/TRAINING PROGRAM

## 2025-01-27 PROCEDURE — 87205 SMEAR GRAM STAIN: CPT | Performed by: STUDENT IN AN ORGANIZED HEALTH CARE EDUCATION/TRAINING PROGRAM

## 2025-01-27 PROCEDURE — 10060 I&D ABSCESS SIMPLE/SINGLE: CPT | Performed by: STUDENT IN AN ORGANIZED HEALTH CARE EDUCATION/TRAINING PROGRAM

## 2025-01-27 PROCEDURE — 87070 CULTURE OTHR SPECIMN AEROBIC: CPT | Performed by: STUDENT IN AN ORGANIZED HEALTH CARE EDUCATION/TRAINING PROGRAM

## 2025-01-27 PROCEDURE — 10160 PNXR ASPIR ABSC HMTMA BULLA: CPT | Performed by: STUDENT IN AN ORGANIZED HEALTH CARE EDUCATION/TRAINING PROGRAM

## 2025-01-27 PROCEDURE — 87186 SC STD MICRODIL/AGAR DIL: CPT | Performed by: STUDENT IN AN ORGANIZED HEALTH CARE EDUCATION/TRAINING PROGRAM

## 2025-01-27 PROCEDURE — 99213 OFFICE O/P EST LOW 20 MIN: CPT | Performed by: STUDENT IN AN ORGANIZED HEALTH CARE EDUCATION/TRAINING PROGRAM

## 2025-01-27 RX ORDER — DOXYCYCLINE 100 MG/1
100 CAPSULE ORAL 2 TIMES DAILY
Qty: 14 CAPSULE | Refills: 0 | Status: SHIPPED | OUTPATIENT
Start: 2025-01-27 | End: 2025-02-03

## 2025-01-27 NOTE — PROGRESS NOTES
[facility]        NAME: Solis Dos Santos is a 74 y.o. male  : 1950    MRN: [id number]  DATE: 2025  TIME: 3:43 PM    Assessment and Plan   Abscess of left groin [L02.214]  1. Abscess of left groin  doxycycline monohydrate (MONODOX) 100 mg capsule    Wound culture and Gram stain            Patient Instructions   Today we drained an abscess of your left groin area.  I am prescribing an antibiotic, please take as prescribed.  I recommend washing over the area daily with soap and water, pat dry, put a clean and dry dressing.  I also recommend using a clean warm compress with a warm washcloth to help any further drainage of the area.  Do this twice per day.  Schedule a recheck appoint with your PCP to ensure that the area is fully healing.  We are sending out a wound culture, this will help determine the antibiotic if the area is not properly healing with the antibiotic that I am prescribing today.  If you have any severe worsening symptoms despite treatment as above including spreading redness, swelling, increasing pus, fevers, chills, please go to the ER.    Chief Complaint     Chief Complaint   Patient presents with    Abscess     Pt presents with cyst or abscess to left groin. Swollen and appears blood filled.          History of Present Illness       Patient presents for evaluation of his left groin, yesterday he noticed a small area of irritation and swelling, has worsened today, appears as blood blister.  No fevers, chills.  He had an abscess in this area years ago that required incision and drainage and packing.        Review of Systems   Review of Systems   All other systems reviewed and are negative.        Current Medications       Current Outpatient Medications:     atorvastatin (LIPITOR) 10 mg tablet, Take 1 tablet (10 mg total) by mouth daily at bedtime, Disp: 90 tablet, Rfl: 1    cholecalciferol (VITAMIN D3) 1,000 units tablet, Take 1 tablet (1,000 Units total) by mouth daily,  Disp: 90 tablet, Rfl: 3    cyanocobalamin (VITAMIN B-12) 500 MCG tablet, Take 1 tablet (500 mcg total) by mouth daily, Disp: 100 tablet, Rfl: 3    doxycycline monohydrate (MONODOX) 100 mg capsule, Take 1 capsule (100 mg total) by mouth 2 (two) times a day for 7 days, Disp: 14 capsule, Rfl: 0    hydrocortisone 2.5 % cream, if needed, Disp: , Rfl:     levothyroxine 150 mcg tablet, Take 1 tablet (150 mcg total) by mouth daily, Disp: 90 tablet, Rfl: 1    Multiple Vitamins-Minerals (multivitamin with iron-minerals) liquid, Take by mouth daily, Disp: , Rfl:     tamsulosin (FLOMAX) 0.4 mg, Take 1 capsule (0.4 mg total) by mouth daily with dinner, Disp: 90 capsule, Rfl: 1    acetaminophen (TYLENOL) 500 mg tablet, Take 2 tablets (1,000 mg total) by mouth every 6 (six) hours as needed for mild pain, Disp: 90 tablet, Rfl: 0    ciclopirox (LOPROX) 0.77 % cream, , Disp: , Rfl:     Current Allergies     Allergies as of 01/27/2025    (No Known Allergies)            The following portions of the patient's history were reviewed and updated as appropriate: allergies, current medications, past family history, past medical history, past social history, past surgical history and problem list.     Past Medical History:   Diagnosis Date    Acute blood loss anemia 08/09/2019    Aftercare following right hip joint replacement surgery 11/05/2019    Anxiety disorder     Atherosclerotic heart disease of native coronary artery without angina pectoris     Benign prostatic hyperplasia without lower urinary tract symptoms     Cancer (HCC)     bladder    Clotting disorder (HCC) 1/25/2023    Blood in urine    Disease of thyroid gland     hypo    Dyslipidemia     GERD (gastroesophageal reflux disease)     manages w/ diet, takes no meds    Hypertension     Impaired fasting blood sugar     Kidney stone     Low back pain     Obesity     Osteoarthritis     last assesed 6-6-16    Other chest pain     Paresthesia of skin     Polyneuropathy     last assesed  5-8-17    Pure hypercholesterolemia     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)     Spinal stenosis     Suspected UTI 03/09/2023    Urinary tract infection     Wears glasses        Past Surgical History:   Procedure Laterality Date    BACK SURGERY      L4-L5 sx    CHOLECYSTECTOMY      COLONOSCOPY  02/06/2019    CYSTOSCOPY N/A 04/26/2023    Procedure: CYSTOSCOPY w/ bladder biopsy;  Surgeon: Geo Bentley MD;  Location: [facility];  Service: Urology    HIP SURGERY  August 2019    Replacement    JOINT REPLACEMENT  August 2019    Hip replacement    KY ARTHRP ACETBLR/PROX FEM PROSTC AGRFT/ALGRFT Right 08/05/2019    Procedure: ARTHROPLASTY HIP TOTAL;  Surgeon: Leonard Gallagher MD;  Location: [facility];  Service: Orthopedics    [place] ARTHRP ACETBLR/PROX FEM PROSTC AGRFT/ALGRFT Left 11/4/2024    Procedure: Left total hip arthroplasty;  Surgeon: Leonard Gallagher MD;  Location: [facility];  Service: Orthopedics    KY CYSTO W/REMOVAL OF LESIONS SMALL N/A 03/23/2023    Procedure: TRANSURETHRAL RESECTION OF BLADDER TUMOR (TURBT), mitomycin ;  Surgeon: Geo Bentley MD;  Location: [facility];  Service: Urology    KY CYSTO W/REMOVAL OF LESIONS SMALL N/A 04/26/2023    Procedure: TRANSURETHRAL RESECTION OF BLADDER TUMOR (TURBT), cystoscopy with bladder biopsy;  Surgeon: Geo Bentley MD;  [facility]: [facility];  Service: Urology    TONSILLECTOMY         Family History   Problem Relation Age of Onset    Arthritis Other     Heart disease Father     Arthritis Mother          Medications have been verified.        Objective   /78   Pulse 76   Temp 97.5 °F (36.4 °C)   Resp 18   Wt 127 kg (280 lb)   SpO2 98%   BMI 36.94 kg/m²   No LMP for male patient.       Physical Exam     Physical Exam  Vitals and nursing note reviewed.   Constitutional:       Appearance: He is not ill-appearing, toxic-appearing or diaphoretic.   HENT:      Head: Normocephalic and atraumatic.       Right Ear: External ear normal.      Left Ear: External ear normal.      Nose: Nose normal.   Eyes:      Extraocular Movements: Extraocular movements intact.      Conjunctiva/sclera: Conjunctivae normal.   Genitourinary:         Comments: Left groin with approximately 2.5 cm fluctuant abscess with overlying erythema, no active drainage  Skin:     General: Skin is warm and dry.      Findings: No rash.   Neurological:      Mental Status: He is alert.   Psychiatric:         Mood and Affect: Mood normal.                   Incision and drain    Date/Time: 1/27/2025 2:30 PM    Performed by: Angelique Santiago DO  Authorized by: Angelique Santiago DO  [place] Protocol:  procedure performed by consultantConsent: Verbal consent obtained.  Risks and benefits: risks, benefits and alternatives were discussed  Consent given by: patient  Patient understanding: patient states understanding of the procedure being performed  Patient identity confirmed: verbally with patient    Patient location:  Bedside  Location:     Type:  Abscess    Location:  Lower extremity    Lower extremity location: L groin.  Pre-procedure details:     Skin preparation:  Betadine  Anesthesia (see MAR for exact dosages):     Anesthesia method:  Local infiltration    Local anesthetic:  Lidocaine 1% WITH epi  Procedure details:     Complexity:  Simple    Needle aspiration: yes      Needle size:  18 G    Incision types:  Stab incision    Aspiration type: puncture aspiration      Scalpel blade:  11    Approach:  Open    Incision depth:  Subcutaneous    Wound management:  Probed and deloculated and irrigated with saline    Drainage:  Bloody and purulent    Drainage amount:  Moderate    Wound treatment:  Wound left open    Packing materials:  None  Post-procedure details:     Patient tolerance of procedure:  Tolerated well, no immediate complications

## 2025-01-27 NOTE — PATIENT INSTRUCTIONS
Today we drained an abscess of your left groin area.  I am prescribing an antibiotic, please take as prescribed.  I recommend washing over the area daily with soap and water, pat dry, put a clean and dry dressing.  I also recommend using a clean warm compress with a warm washcloth to help any further drainage of the area.  Do this twice per day.  Schedule a recheck appoint with your PCP to ensure that the area is fully healing.  We are sending out a wound culture, this will help determine the antibiotic if the area is not properly healing with the antibiotic that I am prescribing today.  If you have any severe worsening symptoms despite treatment as above including spreading redness, swelling, increasing pus, fevers, chills, please go to the ER.

## 2025-01-29 ENCOUNTER — HOSPITAL ENCOUNTER (OUTPATIENT)
Dept: RADIOLOGY | Facility: HOSPITAL | Age: 75
Discharge: HOME | End: 2025-01-29
Attending: ORTHOPAEDIC SURGERY
Payer: MEDICARE

## 2025-01-29 ENCOUNTER — OFFICE VISIT (OUTPATIENT)
Dept: OBGYN CLINIC | Facility: HOSPITAL | Age: 75
End: 2025-01-29
Payer: MEDICARE

## 2025-01-29 VITALS — HEIGHT: 73 IN | BODY MASS INDEX: 36.45 KG/M2 | WEIGHT: 275 LBS

## 2025-01-29 DIAGNOSIS — Z96.652 AFTERCARE FOLLOWING LEFT KNEE JOINT REPLACEMENT SURGERY: ICD-10-CM

## 2025-01-29 DIAGNOSIS — Z79.01 ANTICOAGULATION MANAGEMENT ENCOUNTER: ICD-10-CM

## 2025-01-29 DIAGNOSIS — M25.562 CHRONIC PAIN OF LEFT KNEE: ICD-10-CM

## 2025-01-29 DIAGNOSIS — M17.12 PRIMARY OSTEOARTHRITIS OF LEFT KNEE: Primary | ICD-10-CM

## 2025-01-29 DIAGNOSIS — Z47.1 AFTERCARE FOLLOWING LEFT KNEE JOINT REPLACEMENT SURGERY: ICD-10-CM

## 2025-01-29 DIAGNOSIS — Z01.810 PRE-OPERATIVE CARDIOVASCULAR EXAMINATION: ICD-10-CM

## 2025-01-29 DIAGNOSIS — G89.29 CHRONIC PAIN OF LEFT KNEE: ICD-10-CM

## 2025-01-29 DIAGNOSIS — E66.01 OBESITY, MORBID (HCC): ICD-10-CM

## 2025-01-29 DIAGNOSIS — Z47.1 AFTERCARE FOLLOWING LEFT HIP JOINT REPLACEMENT SURGERY: ICD-10-CM

## 2025-01-29 DIAGNOSIS — Z96.642 AFTERCARE FOLLOWING LEFT HIP JOINT REPLACEMENT SURGERY: ICD-10-CM

## 2025-01-29 DIAGNOSIS — Z01.812 PRE-OPERATIVE LABORATORY EXAMINATION: ICD-10-CM

## 2025-01-29 DIAGNOSIS — Z51.81 ANTICOAGULATION MANAGEMENT ENCOUNTER: ICD-10-CM

## 2025-01-29 DIAGNOSIS — M25.562 LEFT KNEE PAIN, UNSPECIFIED CHRONICITY: ICD-10-CM

## 2025-01-29 PROCEDURE — 99214 OFFICE O/P EST MOD 30 MIN: CPT | Performed by: ORTHOPAEDIC SURGERY

## 2025-01-29 PROCEDURE — 73502 X-RAY EXAM HIP UNI 2-3 VIEWS: CPT

## 2025-01-29 PROCEDURE — 73562 X-RAY EXAM OF KNEE 3: CPT

## 2025-01-29 RX ORDER — MUPIROCIN 20 MG/G
OINTMENT TOPICAL
Qty: 15 G | Refills: 1 | Status: SHIPPED | OUTPATIENT
Start: 2025-01-29

## 2025-01-29 RX ORDER — GABAPENTIN 300 MG/1
300 CAPSULE ORAL ONCE
OUTPATIENT
Start: 2025-01-29 | End: 2025-01-29

## 2025-01-29 RX ORDER — ACETAMINOPHEN 325 MG/1
975 TABLET ORAL ONCE
OUTPATIENT
Start: 2025-01-29 | End: 2025-01-29

## 2025-01-29 RX ORDER — SODIUM CHLORIDE, SODIUM LACTATE, POTASSIUM CHLORIDE, CALCIUM CHLORIDE 600; 310; 30; 20 MG/100ML; MG/100ML; MG/100ML; MG/100ML
125 INJECTION, SOLUTION INTRAVENOUS CONTINUOUS
OUTPATIENT
Start: 2025-01-29

## 2025-01-29 RX ORDER — ENOXAPARIN SODIUM 100 MG/ML
40 INJECTION SUBCUTANEOUS DAILY
Qty: 11.2 ML | Refills: 0 | Status: SHIPPED | OUTPATIENT
Start: 2025-01-29 | End: 2025-02-26

## 2025-01-29 RX ORDER — CEFAZOLIN SODIUM 2 G/50ML
2000 SOLUTION INTRAVENOUS ONCE
OUTPATIENT
Start: 2025-01-29 | End: 2025-01-29

## 2025-01-29 RX ORDER — TRANEXAMIC ACID 10 MG/ML
1000 INJECTION, SOLUTION INTRAVENOUS ONCE
OUTPATIENT
Start: 2025-01-29 | End: 2025-01-29

## 2025-01-29 RX ORDER — FOLIC ACID 1 MG/1
1 TABLET ORAL DAILY
Qty: 30 TABLET | Refills: 2 | Status: SHIPPED | OUTPATIENT
Start: 2025-01-29

## 2025-01-29 RX ORDER — ASCORBIC ACID 500 MG
500 TABLET ORAL 2 TIMES DAILY
Qty: 60 TABLET | Refills: 2 | Status: SHIPPED | OUTPATIENT
Start: 2025-01-29

## 2025-01-29 RX ORDER — CHLORHEXIDINE GLUCONATE ORAL RINSE 1.2 MG/ML
15 SOLUTION DENTAL ONCE
OUTPATIENT
Start: 2025-01-29 | End: 2025-01-29

## 2025-01-29 NOTE — PROGRESS NOTES
Assessment:   Diagnosis ICD-10-CM Associated Orders   1. Primary osteoarthritis of left knee  M17.12 Case request operating room: Robotically assisted left total knee arthroplasty, all associated procedures as indicated     [facility]     Ambulatory referral to Physical Therapy     Ambulatory referral to [facility]     Case request operating room: Robotically assisted left total knee arthroplasty, all associated procedures as indicated      2. Chronic pain of left knee  M25.562 XR knee 3 vw left non injury    G89.29 Case request operating room: Robotically assisted left total knee arthroplasty, all associated procedures as indicated     Amb[facility] Referral to [facility]     Ambulatory referral to Physical Therapy     Ambulatory referral to [facility]     Case request operating room: Robotically assisted left total knee arthroplasty, all associated procedures as indicated      3. Aftercare following left hip joint replacement surgery  Z47.1     Z96.642       4. Pre-operative laboratory examination  Z01.812 Comprehensive metabolic panel     Hemoglobin A1C W/EAG Estimation     CBC and differential     MRSA culture     Protime-INR     APTT     Type and screen     Anemia Panel w/Reflex      5. Pre-operative cardiovascular examination  Z01.810       6. Aftercare following left knee joint replacement surgery  Z47.1 Ambulatory referral to Physical Therapy    Z96.652       7. Anticoagulation management encounter  Z51.81 CBC and differential    Z79.01 Protime-INR     APTT          Plan:  X-rays taken and reviewed in the office today and show.  Doing well 3 months status post left total hip arthroplasty.  In regards to his left groin abscess complete antibiotics as prescribed  In regards to his left knee known advanced osteoarthritis.  Quality of life decision to pursue elective left total knee arthroplasty.  Risks and benefits of a robotically  assisted left total knee arthroplasty were discussed.  Consents obtained.  Preoperative vitamins, mupirocin as well as postoperative Lovenox sent to his pharmacy of record.    To do next visit:  Return in about 3 months (around 4/29/2025) for re-check with x-rays upon arrival left hip, and post-op left knee with x-rays.    The above stated was discussed in layman's terms and the patient expressed understanding.  All questions were answered to the patient's satisfaction.       Scribe Attestation      I,:  Caio Hickey am acting as a scribe while in the presence of the attending physician.:       I,:  Leonard Gallagher MD personally performed the services described in this documentation    as scribed in my presence.:               Subjective:   Solis Dos Santos is a 74 y.o. male who presents today for repeat evaluation, 3 months s/p left PEGGY. He is pleased with his surgical outcome. He has no complaints.  He is experiencing more pain and issues at his knees, left more painful than his right.  He states that his left knee wants to buckle underneath him at times.  He uses a cane for ambulatory assistance.  He finds that his left knee symptoms limit his day-to-day activities as well as impedes on his quality of life.  Pain scale 8/10    He is currently being treated for an antibiotic for an abscess in his left groin      Left PEGGY, 11/4/24  Right PEGGY 8/5/19    Review of systems negative unless otherwise specified in HPI  Review of Systems    Past Medical History:   Diagnosis Date    Acute blood loss anemia 08/09/2019    Aftercare following right hip joint replacement surgery 11/05/2019    Anxiety disorder     Atherosclerotic heart disease of native coronary artery without angina pectoris     Benign prostatic hyperplasia without lower urinary tract symptoms     Cancer (HCC)     bladder    Clotting disorder (HCC) 1/25/2023    Blood in urine    Disease of thyroid gland     hypo    Dyslipidemia     GERD (gastroesophageal  reflux disease)     manages w/ diet, takes no meds    Hypertension     Impaired fasting blood sugar     Kidney stone     Low back pain     Obesity     Osteoarthritis     last assesed 6-6-16    Other chest pain     Paresthesia of skin     Polyneuropathy     last assesed 5-8-17    Pure hypercholesterolemia     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)     Spinal stenosis     Suspected UTI 03/09/2023    Urinary tract infection     Wears glasses        Past Surgical History:   Procedure Laterality Date    BACK SURGERY      L4-L5 sx    CHOLECYSTECTOMY      COLONOSCOPY  02/06/2019    CYSTOSCOPY N/A 04/26/2023    Procedure: CYSTOSCOPY w/ bladder biopsy;  Surgeon: Geo Bentley MD;  Location: [facility];  Service: Urology    HIP SURGERY  August 2019    Replacement    JOINT REPLACEMENT  August 2019    Hip replacement    NY ARTHRP ACETBLR/PROX FEM PROSTC AGRFT/ALGRFT Right 08/05/2019    Procedure: ARTHROPLASTY HIP TOTAL;  Surgeon: Leonard Gallagher MD;  Location: [facility];  Service: Orthopedics    NY ARTHRP ACETBLR/PROX FEM PROSTC AGRFT/ALGRFT Left 11/4/2024    Procedure: Left total hip arthroplasty;  Surgeon: Leonard Gallagher MD;  Location: [facility];  Service: Orthopedics    NY CYSTO W/REMOVAL OF LESIONS SMALL N/A 03/23/2023    Procedure: TRANSURETHRAL RESECTION OF BLADDER TUMOR (TURBT), mitomycin ;  Surgeon: Geo Bentley MD;  Location: [facility];  Service: Urology    NY CYSTO W/REMOVAL OF LESIONS SMALL N/A 04/26/2023    Procedure: TRANSURETHRAL RESECTION OF BLADDER TUMOR (TURBT), cystoscopy with bladder biopsy;  Surgeon: Geo Bentley MD;  Location: [facility];  Service: Urology    TONSILLECTOMY         Family History   Problem Relation Age of Onset    Arthritis Other     Heart disease Father     Arthritis Mother        Social History     Occupational History    Not on file   Tobacco Use    Smoking status: Former     Current packs/day: 0.00     Average  packs/day: 1 pack/day for 20.8 years (20.8 ttl pk-yrs)     Types: Cigarettes     Start date: 1967     Quit date: 1987     Years since quittin.2    Smokeless tobacco: Never   Vaping Use    Vaping status: Never Used   Substance and Sexual Activity    Alcohol use: Yes     Alcohol/week: 1.0 standard drink of alcohol     Types: 1 Cans of beer per week    Drug use: Never    Sexual activity: Not Currently     Partners: Female     Birth control/protection: None         Current Outpatient Medications:     acetaminophen (TYLENOL) 500 mg tablet, Take 2 tablets (1,000 mg total) by mouth every 6 (six) hours as needed for mild pain, Disp: 90 tablet, Rfl: 0    atorvastatin (LIPITOR) 10 mg tablet, Take 1 tablet (10 mg total) by mouth daily at bedtime, Disp: 90 tablet, Rfl: 1    cholecalciferol (VITAMIN D3) 1,000 units tablet, Take 1 tablet (1,000 Units total) by mouth daily, Disp: 90 tablet, Rfl: 3    ciclopirox (LOPROX) 0.77 % cream, , Disp: , Rfl:     cyanocobalamin (VITAMIN B-12) 500 MCG tablet, Take 1 tablet (500 mcg total) by mouth daily, Disp: 100 tablet, Rfl: 3    doxycycline monohydrate (MONODOX) 100 mg capsule, Take 1 capsule (100 mg total) by mouth 2 (two) times a day for 7 days, Disp: 14 capsule, Rfl: 0    hydrocortisone 2.5 % cream, if needed, Disp: , Rfl:     levothyroxine 150 mcg tablet, Take 1 tablet (150 mcg total) by mouth daily, Disp: 90 tablet, Rfl: 1    Multiple Vitamins-Minerals (multivitamin with iron-minerals) liquid, Take by mouth daily, Disp: , Rfl:     tamsulosin (FLOMAX) 0.4 mg, Take 1 capsule (0.4 mg total) by mouth daily with dinner, Disp: 90 capsule, Rfl: 1    No Known Allergies       There were no vitals filed for this visit.    Body mass index is 36.28 kg/m².  Wt Readings from Last 3 Encounters:   25 125 kg (275 lb)   25 127 kg (280 lb)   24 126 kg (278 lb)       Objective:                    Right Knee Exam     Muscle Strength   The patient has normal right knee  strength.    Tenderness   The patient is experiencing tenderness in the medial joint line.    Range of Motion   Extension:  10   Flexion:  100     Other   Erythema: absent  Sensation: normal  Swelling: mild  Effusion: no effusion present      Left Knee Exam     Muscle Strength   The patient has normal left knee strength.    Tenderness   The patient is experiencing tenderness in the medial joint line.    Range of Motion   Extension:  5   Flexion:  100 (with crepitation and stiffness)     Other   Erythema: absent  Sensation: normal  Swelling: mild  Effusion: no effusion present    Comments:    Both knees are in modest varus alignment with bony enlargement medially and anteriorly over his tibial tubercle at both knees, right moreso than his left      Left Hip Exam     Muscle Strength   The patient has normal left hip strength.     Other   Erythema: absent  Sensation: normal    Comments:    Well-healed posterior lateral incision.  Minimal swelling.  Minimal warmth.  No signs of infection.  Painless arc of passive range of motion all planes    His left groin was visualized today however it was covered over, thus the dressing remained in place            Diagnostics, reviewed and taken today if performed as documented:    The attending physician has personally reviewed the pertinent films in PACS and interpretation is as follows:    Left hip and pelvis x-rays taken and reviewed in the office today and show: Both hips have well aligned, position, bonded total hip prostheses without signs of loosening or stress shielding    Left knee x-rays taken and reviewed in the office today and show: Advanced degenerative changes with bone-on-bone opposition of the medial and patellofemoral compartments.  Subchondral sclerosis and osteophyte formation present.  Varus deformity      Procedures, if performed today:    Procedures    None performed      Portions of the record may have been created with voice recognition software.   "Occasional wrong word or \"sound a like\" substitutions may have occurred due to the inherent limitations of voice recognition software.  Read the chart carefully and recognize, using context, where substitutions have occurred.  "

## 2025-01-30 ENCOUNTER — OFFICE VISIT (OUTPATIENT)
Dept: PHYSICAL THERAPY | Facility: OTHER | Age: 75
End: 2025-01-30
Payer: MEDICARE

## 2025-01-30 DIAGNOSIS — M17.12 PRIMARY OSTEOARTHRITIS OF LEFT KNEE: ICD-10-CM

## 2025-01-30 DIAGNOSIS — G89.29 CHRONIC PAIN OF LEFT KNEE: ICD-10-CM

## 2025-01-30 DIAGNOSIS — M25.562 CHRONIC PAIN OF LEFT KNEE: ICD-10-CM

## 2025-01-30 DIAGNOSIS — Z47.1 AFTERCARE FOLLOWING LEFT KNEE JOINT REPLACEMENT SURGERY: ICD-10-CM

## 2025-01-30 DIAGNOSIS — Z96.652 AFTERCARE FOLLOWING LEFT KNEE JOINT REPLACEMENT SURGERY: ICD-10-CM

## 2025-01-30 LAB
BACTERIA WND AEROBE CULT: ABNORMAL
BACTERIA WND AEROBE CULT: ABNORMAL
GRAM STN SPEC: ABNORMAL
GRAM STN SPEC: ABNORMAL

## 2025-01-30 PROCEDURE — 97110 THERAPEUTIC EXERCISES: CPT

## 2025-01-30 NOTE — PROGRESS NOTES
Daily Note     Today's date: 2025  Patient name: Solis Dos Santos  : 1950  MRN: [id number]  Referring provider: Leonard Gallagher,*  Dx:   Encounter Diagnosis     ICD-10-CM    1. Primary osteoarthritis of left knee  M17.12 Ambulatory referral to Physical Therapy      2. Chronic pain of left knee  M25.562 Ambulatory referral to Physical Therapy    G89.29       3. Aftercare following left knee joint replacement surgery  Z47.1 Ambulatory referral to Physical Therapy    Z96.652             Start Time: 1120  Stop Time: 1150  Total time in clinic (min): 30 minutes      Subjective: Pt has elected to undergo L TKA in late April of this year. Suggests taking a break until mid-March for pre-hab.      Objective: See treatment diary below      Assessment: After discussion with patient, we agreed upon discharge of current plan of care given improvements in L hip ROM & functional strength. Pt will return to PT in mid-March for L TKA prehab. Surgery planned for late April.      Plan: Discharge c HEP.       AUTH Status:  Date 12/9 12/11 12/12 12/16 12/18 12/20 12/23 12/30 1/2 1/8 10/10 1/13 1/16 1/21 1/23 1/30   Medicare - RE every 10th visit Used 10 - RE 11 12 13 14 15 16 17 18 19 20 - RE  22 23 24 25    Remaining   9 8 7 6 5 4 3 2 1 0 9 8 7 6 5         Precautions: Hx Neoplasm, Chronic Pain, L Posterior PEGGY precautions x6 weeks (25)    Manuals x1/2 1/8 1/10 1/13 1/16 1/21 1/23 1/30   L hip PROM w/in precautions           IASTM           LAD   LAD gIII    Tibfem distraction prone gIII SFP                   Neuro Re-Ed           Quad set            SAQ           LAQ    GTB 3x30 5# + MTB   3x20      SLR            Bridges           Hip add            Hip abduction            Hip ER           Seated tibial ER c slider           K[place]y            SS c band                      Tandem stance Full tandem GRN 2x30 Full tandem BLUE 2 x amap   BLUE 3 x amap      SLS           Dynamic Balance   Star slider x20:L  "        Stand Hip 3 way                                 Ther Ex           Bike X10 min X10 min X10 min X10 min X10 min X10 min X10 min 2 x 15 min   TM Retro 3x45\" Retro walk JAE 15# x10 Retro walk JAE 25# x15 Retro walk JAE 25# x20 Retro walk JAE 25# 2x12 Retro walk JAE 25# 2x10 Retro walk JAE 25# 2x10    Heel Slide            Standing quad S   3x30\"  prone        Figure-4 ABdER S, seated  10x10\"  10x10\" c strap       Ankle pumps            HR/TR           SKC           Seated L/s flexion 10x10\" ea fwd /R/L          Mod Harlan S 10x10\"          Standing hip flexor S FFE 10x5\"          LTR                      Mini Squat            Standing march                       Leg press   DL   90#  95#  100#  105#  110#  115# x10ea   DL 90#  100#  110#  120#  130# x15ea DL 90#-120# by 5# increments x15ea DL 90#-120# by 5# increments x10ea    Sit <> stand    20# eccentric only, 0# concentric  2x10 c 4\" ecc 15# 2x10  10# 3x10     Step ups fwd and lat        4\" 15# 2x10                          Ther Activity           Car Transfer                       Gait Training           LRAD                       Modalities           MHP                                   "

## 2025-01-31 ENCOUNTER — OFFICE VISIT (OUTPATIENT)
Dept: INTERNAL MEDICINE CLINIC | Facility: CLINIC | Age: 75
End: 2025-01-31
Payer: MEDICARE

## 2025-01-31 VITALS
TEMPERATURE: 98 F | DIASTOLIC BLOOD PRESSURE: 78 MMHG | SYSTOLIC BLOOD PRESSURE: 126 MMHG | OXYGEN SATURATION: 98 % | BODY MASS INDEX: 36.94 KG/M2 | HEART RATE: 81 BPM | WEIGHT: 280 LBS

## 2025-01-31 DIAGNOSIS — E03.9 ACQUIRED HYPOTHYROIDISM: ICD-10-CM

## 2025-01-31 DIAGNOSIS — L02.91 ABSCESS: Primary | ICD-10-CM

## 2025-01-31 DIAGNOSIS — Z96.643 S/P HIP REPLACEMENT, BILATERAL: ICD-10-CM

## 2025-01-31 PROCEDURE — G2211 COMPLEX E/M VISIT ADD ON: HCPCS | Performed by: INTERNAL MEDICINE

## 2025-01-31 PROCEDURE — 99214 OFFICE O/P EST MOD 30 MIN: CPT | Performed by: INTERNAL MEDICINE

## 2025-01-31 NOTE — PROGRESS NOTES
Assessment/Plan:           1. Abscess  Comments:  Staph aureus sensitive to Doxycyclin and he will continue.  Antibiotic with food.  2. Acquired hypothyroidism  Comments:  Continue levothyroxine.  3. S/P hip replacement, bilateral  Comments:  Living with orthopedics.         1. Abscess (Primary)         No problem-specific Assessment & Plan notes found for this encounter.           Subjective:      Patient ID: Solis Dos Santos is a 74 y.o. male.    HPI    The following portions of the patient's history were reviewed and updated as appropriate: He  has a past medical history of Acute blood loss anemia (08/09/2019), Aftercare following right hip joint replacement surgery (11/05/2019), Anxiety disorder, Atherosclerotic heart disease of native coronary artery without angina pectoris, Benign prostatic hyperplasia without lower urinary tract symptoms, Cancer ([facility]), Clotting disorder ([facility]) (1/25/2023), Disease of thyroid gland, Dyslipidemia, GERD (gastroesophageal reflux disease), Hypertension, Impaired fasting blood sugar, Kidney stone, Low back pain, Obesity, Osteoarthritis, Other chest pain, Paresthesia of skin, Polyneuropathy, Pure hypercholesterolemia, 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  He   Patient Active Problem List    Diagnosis Date Noted    Obesity (BMI 30-39.9) 11/04/2024    BPH (benign prostatic hyperplasia) 11/04/2024    Spinal stenosis     Diverticula of intestine 10/07/2024    Gastroesophageal reflux disease 10/07/2024    Primary osteoarthritis of left hip 10/07/2024    DDD (degenerative disc disease), lumbar 06/24/2024    Primary osteoarthritis of left knee 05/30/2024    Primary osteoarthritis of right knee 05/30/2024    Peripheral vascular disease, unspecified ([facility]) 01/10/2024    Malignant neoplasm of lateral wall of urinary bladder ([facility]) 05/12/2023    Malignant neoplasm of  posterior wall of urinary bladder (HCC) 03/23/2023    Bladder mass 03/09/2023    Right leg swelling 03/09/2023    Gross hematuria 03/08/2023    Hyperlipidemia 03/08/2023    Chronic pain of left knee 04/26/2021    Obesity, morbid (HCC) 04/14/2021    Difficulty sleeping 08/13/2019    Impaired mobility and ADLs 08/09/2019    Hypothyroidism 08/09/2019    Status post right hip replacement 08/07/2019    Primary osteoarthritis of both knees 05/29/2019    Osgood-Schlatter's disease of both knees 05/29/2019    Pain in both knees 05/29/2019    Lumbar radiculopathy 11/06/2013     He  has a past surgical history that includes Back surgery; Tonsillectomy; Cholecystectomy; pr arthrp acetblr/prox fem prostc agrft/algrft (Right, 08/05/2019); Colonoscopy (02/06/2019); pr cysto w/removal of lesions small (N/A, 03/23/2023); pr cysto w/removal of lesions small (N/A, 04/26/2023); CYSTOSCOPY (N/A, 04/26/2023); Joint replacement (August 2019); Hip surgery (August 2019); and pr arthrp acetblr/prox fem prostc agrft/algrft (Left, 11/4/2024).  His family history includes Arthritis in his mother and another family member; Heart disease in his father.  He  reports that he quit smoking about 37 years ago. His smoking use included cigarettes. He started smoking about 58 years ago. He has a 20.8 pack-year smoking history. He has never used smokeless tobacco. He reports current alcohol use of about 1.0 standard drink of alcohol per week. He reports that he does not use drugs.  Current Outpatient Medications   Medication Sig Dispense Refill    acetaminophen (TYLENOL) 500 mg tablet Take 2 tablets (1,000 mg total) by mouth every 6 (six) hours as needed for mild pain 90 tablet 0    atorvastatin (LIPITOR) 10 mg tablet Take 1 tablet (10 mg total) by mouth daily at bedtime 90 tablet 1    cholecalciferol (VITAMIN D3) 1,000 units tablet Take 1 tablet (1,000 Units total) by mouth daily 90 tablet 3    ciclopirox (LOPROX) 0.77 % cream       cyanocobalamin  (VITAMIN B-12) 500 MCG tablet Take 1 tablet (500 mcg total) by mouth daily 100 tablet 3    doxycycline monohydrate (MONODOX) 100 mg capsule Take 1 capsule (100 mg total) by mouth 2 (two) times a day for 7 days 14 capsule 0    enoxaparin (LOVENOX) 40 mg/0.4 mL Inject 0.4 mL (40 mg total) under the skin daily for 28 days To start postoperatively (Patient taking differently: Inject 40 mg under the skin daily To start postoperatively- after surgery) 11.2 mL 0    folic acid (FOLVITE) 1 mg tablet Take 1 tablet (1 mg total) by mouth daily 30 tablet 2    hydrocortisone 2.5 % cream if needed      levothyroxine 150 mcg tablet Take 1 tablet (150 mcg total) by mouth daily 90 tablet 1    Multiple Vitamins-Minerals (multivitamin with iron-minerals) liquid Take by mouth daily      mupirocin (BACTROBAN) 2 % ointment Apply topically to the inside of the left and right nostrils twice daily for 5 days before surgery, including the morning of surgery. 15 g 1    tamsulosin (FLOMAX) 0.4 mg Take 1 capsule (0.4 mg total) by mouth daily with dinner 90 capsule 1    ascorbic acid (VITAMIN C) 500 MG tablet Take 1 tablet (500 mg total) by mouth 2 (two) times a day (Patient not taking: Reported on 1/31/2025) 60 tablet 2     No current facility-administered medications for this visit.     Current Outpatient Medications on File Prior to Visit   Medication Sig    acetaminophen (TYLENOL) 500 mg tablet Take 2 tablets (1,000 mg total) by mouth every 6 (six) hours as needed for mild pain    atorvastatin (LIPITOR) 10 mg tablet Take 1 tablet (10 mg total) by mouth daily at bedtime    cholecalciferol (VITAMIN D3) 1,000 units tablet Take 1 tablet (1,000 Units total) by mouth daily    ciclopirox (LOPROX) 0.77 % cream     cyanocobalamin (VITAMIN B-12) 500 MCG tablet Take 1 tablet (500 mcg total) by mouth daily    doxycycline monohydrate (MONODOX) 100 mg capsule Take 1 capsule (100 mg total) by mouth 2 (two) times a day for 7 days    enoxaparin (LOVENOX) 40  mg/0.4 mL Inject 0.4 mL (40 mg total) under the skin daily for 28 days To start postoperatively (Patient taking differently: Inject 40 mg under the skin daily To start postoperatively- after surgery)    folic acid (FOLVITE) 1 mg tablet Take 1 tablet (1 mg total) by mouth daily    hydrocortisone 2.5 % cream if needed    levothyroxine 150 mcg tablet Take 1 tablet (150 mcg total) by mouth daily    Multiple Vitamins-Minerals (multivitamin with iron-minerals) liquid Take by mouth daily    mupirocin (BACTROBAN) 2 % ointment Apply topically to the inside of the left and right nostrils twice daily for 5 days before surgery, including the morning of surgery.    tamsulosin (FLOMAX) 0.4 mg Take 1 capsule (0.4 mg total) by mouth daily with dinner    ascorbic acid (VITAMIN C) 500 MG tablet Take 1 tablet (500 mg total) by mouth 2 (two) times a day (Patient not taking: Reported on 1/31/2025)     No current facility-administered medications on file prior to visit.     There are no discontinued medications.   He has no known allergies..    Review of Systems   Constitutional:  Negative for appetite change, chills, fatigue and fever.   HENT:  Negative for sore throat and trouble swallowing.    Eyes:  Negative for redness.   Respiratory:  Negative for shortness of breath.    Cardiovascular:  Negative for chest pain and palpitations.   Gastrointestinal:  Negative for abdominal pain, constipation and diarrhea.   Genitourinary:  Negative for dysuria and hematuria.   Musculoskeletal:  Positive for arthralgias and gait problem. Negative for back pain and neck pain.   Skin:  Positive for wound. Negative for rash.   Neurological:  Negative for seizures, weakness and headaches.   Hematological:  Negative for adenopathy.   Psychiatric/Behavioral:  Negative for confusion. The patient is not nervous/anxious.          Objective:      /78 (BP Location: Left arm, Patient Position: Sitting, Cuff Size: Large)   Pulse 81   Temp 98 °F (36.7 °C)  (Temporal)   Wt 127 kg (280 lb)   SpO2 98%   BMI 36.94 kg/m²     Results Reviewed       None            Recent Results (from the past 8 weeks)   Cytology, urine    Collection Time: 12/31/24 10:33 AM   Result Value Ref Range    Case Report       Non-gynecologic Cytology                          Case: [id number]                                  Authorizing Provider:  Geo Bentley MD  Collected:           12/31/2024 1033              Ordering Location:     [facility]        Received:            12/31/2024 1033                                     [facility]                                                            Pathologist:           Brian Greco DO                                                            Specimen:    Urine, Clean Catch                                                                         Final Diagnosis       A. Urine, Clean Catch:  Negative for high grade urothelial carcinoma ([facility]) - see comment.  Urothelial cells.  Squamous cells.  Red blood cells and rare casts.    Satisfactory for Evaluation.    Comment:  The above diagnostic category is from the recently published book, The Tracie System for Reporting Urinary Cytology, and is in keeping with the ongoing effort for utilization of standardized diagnostic terminology in urine cytology.*    *The Tracie System for Reporting Urinary Cytology. Elodia Madden, Pia Glasgow, Brian Charles; 2016.          Gross Description       A. 50 mL, banuelos yellow, hazy, not received in [facility]         Additional Information       [employer]'s FDA approved ,  and ThinPrep Imaging Duo System are utilized with strict adherence to the 's instruction manual to prepare gynecologic and non-gynecologic cytology specimens for the production of ThinPrep slides as well as for gynecologic ThinPrep imaging. These processes have been validated by our laboratory and/or by the .    These tests were  developed and their performance characteristics determined by [facility] or [facility]. They may not be cleared or approved by the U.S. Food and Drug Administration. The FDA has determined that such clearance or approval is not necessary. These tests are used for clinical purposes. They should not be regarded as investigational or for research. This laboratory has been approved by CLIA 88, designated as a high-complexity laboratory and is qualified to perform these tests.    Interpretation performed at [facility], [address] [place] [id number]       Wound culture and Gram stain    Collection Time: 01/27/25  7:53 PM    Specimen: Groin; Wound   Result Value Ref Range    Wound Culture 4+ Growth of Staphylococcus aureus (A)     Wound Culture 1+ Growth of     Gram Stain Result 2+ Gram positive cocci in pairs (A)     Gram Stain Result No polys seen (A)        Susceptibility    Staphylococcus aureus - JOSE ALFREDO     Cefazolin ($) <=8.00 Susceptible ug/ml     Clindamycin ($) <=0.25 Susceptible ug/ml     Erythromycin ($$$$) <=0.25 Susceptible ug/ml     Gentamicin ($$) <=4 Susceptible ug/ml     Oxacillin <=0.25 Susceptible ug/ml     Penicillin >2.000 Resistant ug/ml     Tetracycline <=4 Susceptible ug/ml     Trimethoprim + Sulfamethoxazole ($$$) <=0.5/9.5 Susceptible ug/ml     Vancomycin ($) 0.50 Susceptible ug/ml        Physical Exam  Constitutional:       General: He is not in acute distress.     Appearance: Normal appearance. He is obese.   HENT:      Head: Normocephalic and atraumatic.      Nose: Nose normal.      Mouth/Throat:      Mouth: Mucous membranes are moist.   Eyes:      Extraocular Movements: Extraocular movements intact.      Pupils: Pupils are equal, round, and reactive to light.   Cardiovascular:      Rate and Rhythm: Normal rate and regular rhythm.      Pulses: Normal pulses.      Heart sounds: Normal heart sounds. No murmur heard.     No friction rub.   Pulmonary:       Effort: Pulmonary effort is normal. No respiratory distress.      Breath sounds: Normal breath sounds. No wheezing.   Abdominal:      General: Abdomen is flat. Bowel sounds are normal. There is no distension.      Palpations: Abdomen is soft. There is no mass.      Tenderness: There is no abdominal tenderness. There is no guarding.   Musculoskeletal:         General: Normal range of motion.      Cervical back: Neck supple.   Skin:     Comments: Left groin cyst looks fine healing well.   Neurological:      General: No focal deficit present.      Mental Status: He is alert and oriented to person, place, and time. Mental status is at baseline.      Cranial Nerves: No cranial nerve deficit.      Gait: Gait abnormal.   Psychiatric:         Mood and Affect: Mood normal.         Behavior: Behavior normal.

## 2025-02-03 DIAGNOSIS — Z96.642 AFTERCARE FOLLOWING LEFT HIP JOINT REPLACEMENT SURGERY: Primary | ICD-10-CM

## 2025-02-03 DIAGNOSIS — Z47.1 AFTERCARE FOLLOWING LEFT HIP JOINT REPLACEMENT SURGERY: Primary | ICD-10-CM

## 2025-02-03 RX ORDER — CEPHALEXIN 500 MG/1
CAPSULE ORAL
Qty: 40 CAPSULE | Refills: 0 | Status: SHIPPED | OUTPATIENT
Start: 2025-02-03 | End: 2025-02-17

## 2025-02-25 DIAGNOSIS — K21.9 GASTROESOPHAGEAL REFLUX DISEASE WITHOUT ESOPHAGITIS: ICD-10-CM

## 2025-02-26 RX ORDER — PANTOPRAZOLE SODIUM 40 MG/1
TABLET, DELAYED RELEASE ORAL
Qty: 30 TABLET | Refills: 2 | OUTPATIENT
Start: 2025-02-26

## 2025-03-17 ENCOUNTER — EVALUATION (OUTPATIENT)
Dept: PHYSICAL THERAPY | Facility: OTHER | Age: 75
End: 2025-03-17
Payer: MEDICARE

## 2025-03-17 DIAGNOSIS — M17.12 PRIMARY OSTEOARTHRITIS OF LEFT KNEE: ICD-10-CM

## 2025-03-17 DIAGNOSIS — Z96.643 H/O TOTAL HIP ARTHROPLASTY, BILATERAL: Primary | ICD-10-CM

## 2025-03-17 PROCEDURE — 97110 THERAPEUTIC EXERCISES: CPT

## 2025-03-18 PROCEDURE — 97164 PT RE-EVAL EST PLAN CARE: CPT

## 2025-03-18 NOTE — PROGRESS NOTES
PT Re-Evaluation     Today's date: 3/18/2025  Patient name: Solis Dos Santos  : 1950  MRN: [id number]  Referring provider: Leonard Gallagher,*  Dx:   Encounter Diagnosis     ICD-10-CM    1. H/O total hip arthroplasty, bilateral  Z96.643       2. Primary osteoarthritis of left knee  M17.12           Start Time: 1017  Stop Time: 1110  Total time in clinic (min): 53 minutes    Assessment  Impairments: abnormal gait, abnormal or restricted ROM, activity intolerance, impaired balance, lacks appropriate home exercise program, pain with function, participation limitations, activity limitations and endurance  Symptom irritability: high    Assessment details: RE 3/18/25: Dandre is a 74 year old patient returning to OPPT for re-evaluation pre-operatively for L TKA scheduled on 25 with Dr. Gallagher. Pt notes his L knee cracks and shifts, causing discomfort and pain. Currently ambulating with SPC for balance concerns. Wishes to increase his strength, mobility and balance before going into surgery. Presents with decreased bilateral knee mobility, poor L hip mobility, good R quad strength, static & dynamic balance deficits. More in-depth TKA pre-op checklist to be reviewed closer to surgery date. Pt would benefit from continued skilled PT focused on L k' pain modulation, LLE strengthening / k' stabilization and dynamic balance.      RE 1/10/24: Dandre has been progressing well s/p L PEGGY performed on 24. Upon RE, pt demonstrates near-full L hip ROM in all directions, with only slight limitations in L hip ER compared bilaterally. Pt demonstrates symmetrical strength bilaterally. No complaints of functional limitations regarding L hip. Chief complaint is L knee instability, crepitus and pain that limits functional mobility including walking prolonged distances, STS and stair negotiation. Pt will follow-up with Dr. Gallagher regarding L TKA at 25 follow-up. Until then pt would benefit from continued skilled PT  focused on L k' pain modulation, LLE strengthening / k' stabilization and dynamic balance.    RE 12/9/24: Dandre is a 74 year old patient presenting to OPPT for evaluation regarding L PEGGY performed on 11/4/24 by Dr. Gallagher. Upon RE, pt presents with improving L hip ROM within precautions, improvements in balance / balance confidence, LLE strength improvements all resulting in improved functional mobility & decreased pain. Pt continues to maintain L hip precautions with limited ROM, strength deficits and requiring SPC for ambulation 2/2 balance concerns. Pt would benefit from continued skilled PT in order to achieve the following goals.     IE: Dandre is a 74 year old patient presenting to OPPT for evaluation regarding L PEGGY performed on 11/4/24 by Dr. Gallagher. Upon evaluation they show impairments in the following areas: High degree of irritability with symptoms in L hip thereby significantly limiting his functional status. Safe ambulation with RW. Very little active movement capable against gravity. Requires maxAx1 for LLE lifting to achieve sit <> supine and car transfers to ensure success and reduce symptoms. These impairments limit their ability to perform daily activities as well as their previous level of function causing decreased quality of life. Educated signs and symptoms of DVT, infection - patient verbalized understanding. Patient already has great understanding of posterior hip precautions from prehab.     Patient requires continued skilled PT services in order to improve aforementioned impairments so that they are able to return to highest level of function possible. Without initiation of skilled PT services, patient will have increased difficulty returning to prior level of function leading to decreased quality of life.     Understanding of Dx/Px/POC: good     Prognosis: good    Goals  STG (3 weeks):  Pt will demonstrate increased L hip ER ROM to 35 deg in order to improve ability to put shoes / socks on  Pt  will demonstrate increased proximal hip strength 4+/5 in order to improve  transfers  Pt will be independent with HEP as demonstrated by return demo with proper technique and execution of exercises provided.    LTG (6 weeks):  Pre-op TKA checklist will be reviewed in-depth with pt including HEP  Pt will demonstrate increased L hip ER ROM to 45 deg in order to improve ability to put shoes / socks on  Pt will demonstrate increased proximal hip strength 5/5 in order to improve  transfers  Pt will be independent with progressions to HEP as demonstrated by return demo with proper technique and execution of exercises provided.        Plan  Patient would benefit from: skilled physical therapy  Planned modality interventions: cryotherapy and thermotherapy: hydrocollator packs    Planned therapy interventions: manual therapy, neuromuscular re-education, postural training, self care, strengthening, therapeutic activities, stretching, therapeutic exercise, home exercise program, gait training and balance    Frequency: 2x week  Plan of Care beginning date: 2024  Plan of Care expiration date: 2025  Treatment plan discussed with: patient        Subjective Evaluation    History of Present Illness  Mechanism of injury: Dandre is a 74 year old patient returning to OPPT for re-evaluation pre-operatively for L TKA scheduled on 25 with Dr. Gallagher. Pt notes his L knee cracks and shifts, causing discomfort and pain. Currently ambulating with SPC for balance concerns. Wishes to increase his strength, mobility and balance before going into surgery.  Patient Goals  Patient goals for therapy: increased strength, improved balance, decreased pain, increased motion and return to sport/leisure activities    Pain  Current pain rating: 3  At best pain rating: 3  At worst pain ratin    Social Support  Steps to enter house: yes  1  Stairs in house: no   Lives in: one-story house    Employment status: not working  Exercise history:  "Wants to get back to golfing.    Treatments  Previous treatment: physical therapy  Discharged from (in last 30 days): inpatient hospitalization        Objective    R Hip Passive Range of Motion:   Flexion: 120  Extension: 5   Abduction: 45  ER: 25  IR: 50    L Hip Passive Range of Motion:   Flexion: 120  Extension: 5   Abduction: 45  ER: 45  IR: 45    Knee ROM: R   L 7-110    Muscle length:  L hamstring SLR (+) 50 deg      Manual Muscle Testing:   Hip Flexion: R 4/5  L  4/5  Hip Abduction: R 4/5 L 4/5   Knee Extension: R 5 /5  L 5/5     Gait: SPC, decreased B knee extension at terminal swing, initial contact and midstance          AUTH Status:  Date 3/18             Medicare - RE every 10th visit Used 1- RE              Remaining  9                   Precautions: Hx Neoplasm, Chronic Pain, L Posterior PEGGY precautions x6 weeks (12/16/25)    Manuals 12/23 12/30 1/2 1/8 1/10 3/18   L hip PROM w/in precautions SFP        IASTM  R L/s parapsinal   SFP       LAD     LAD gIII    Tibfem distraction prone gIII SFP             Neuro Re-Ed         Quad set          SAQ         LAQ GTB 3x20        SLR          Bridges x20        Hip add          Hip abduction          Hip ER         Seated tibial ER c slider         Clamshell          SS c band                  Tandem stance   Full tandem GRN 2x30 Full tandem BLUE 2 x amap     SLS         Dynamic Balance     Star slider x20:L     Stand Hip 3 way                           Ther Ex         Bike X10 min X10 min X10 min X10 min X10 min X15 min   TM   Retro 3x45\" Retro walk JAE 15# x10 Retro walk JAE 25# x15    Heel Slide          Supine HS S      3x45\"   Standing quad S 3x30\" prone     3x30\"  prone    Figure-4 ABdER S, seated    10x10\"  C strap 3x30\"   Ankle pumps          HR/TR         SKC         Seated L/s flexion  10x10\" ea fwd /R/L 10x10\" ea fwd /R/L      Mod Harlan S   10x10\"      Standing hip flexor S   FFE 10x5\"      LTR  x20                Mini Squat        " "  Standing march                   Leg press  SL 40# 3x10   DL   90#  95#  100#  105#  110#  115# x10ea     Sit <> stand      20# eccentric only, 0# concentric  2x10 c 4\" ecc    Step ups fwd and lat  8\" biodex c eccentric lowering retro  3x5                          Ther Activity         Car Transfer                   Gait Training         LRAD                   Modalities         MHP  X10' lumbar                          "

## 2025-03-20 ENCOUNTER — APPOINTMENT (OUTPATIENT)
Dept: PHYSICAL THERAPY | Facility: OTHER | Age: 75
End: 2025-03-20
Payer: MEDICARE

## 2025-03-20 ENCOUNTER — ANESTHESIA EVENT (OUTPATIENT)
Age: 75
End: 2025-03-20
Payer: MEDICARE

## 2025-03-21 ENCOUNTER — APPOINTMENT (OUTPATIENT)
Dept: PHYSICAL THERAPY | Facility: OTHER | Age: 75
End: 2025-03-21
Payer: MEDICARE

## 2025-03-24 ENCOUNTER — OFFICE VISIT (OUTPATIENT)
Dept: PHYSICAL THERAPY | Facility: OTHER | Age: 75
End: 2025-03-24
Payer: MEDICARE

## 2025-03-24 ENCOUNTER — TELEPHONE (OUTPATIENT)
Dept: OBGYN CLINIC | Facility: HOSPITAL | Age: 75
End: 2025-03-24

## 2025-03-24 DIAGNOSIS — Z96.643 H/O TOTAL HIP ARTHROPLASTY, BILATERAL: ICD-10-CM

## 2025-03-24 DIAGNOSIS — M17.12 PRIMARY OSTEOARTHRITIS OF LEFT KNEE: Primary | ICD-10-CM

## 2025-03-24 PROCEDURE — 97112 NEUROMUSCULAR REEDUCATION: CPT

## 2025-03-24 PROCEDURE — 97110 THERAPEUTIC EXERCISES: CPT

## 2025-03-24 NOTE — PROGRESS NOTES
"Daily Note     Today's date: 3/24/2025  Patient name: Solis Dos Santos  : 1950  MRN: [id number]  Referring provider: Leonard Gallagher,*  Dx:   Encounter Diagnosis     ICD-10-CM    1. Primary osteoarthritis of left knee  M17.12       2. H/O total hip arthroplasty, bilateral  Z96.643           Start Time: 1048  Stop Time: 1130  Total time in clinic (min): 42 minutes    Subjective: Pt notes he feels much stable on his feet with orthotics in at end of session. Reports a loss of balance at [place], no fall.      Objective: See treatment diary below      Assessment: Tolerated treatment well - pt bringing orthotics in today with complaints of lateral foot pain secondary to lateral clip, shaved clip with reported decreased discomfort and improved dynamic balance / stability during gait. Patient exhibited good technique with therapeutic exercises and would benefit from continued PT      Plan: Continue per plan of care.           AUTH Status:  Date 3/18 3/24            Medicare - RE every 10th visit Used 1- RE 2             Remaining  9 8                  Precautions: Hx Neoplasm, Chronic Pain, L Posterior PEGGY precautions x6 weeks (25)    Manuals 12/23 12/30 1/2 1/8 1/10 3/18 3/24   L hip PROM w/in precautions SFP         IASTM  R L/s parapsinal   SFP        LAD     LAD gIII    Tibfem distraction prone gIII SFP               Neuro Re-Ed          Quad set           SAQ          LAQ GTB 3x20         SLR           Bridges x20         Hip add           Hip abduction           Hip ER          Seated tibial ER c slider          [place]           SS c band                    Tandem stance   Full tandem GRN 2x30 Full tandem BLUE 2 x amap   OG c blazepods 2x30\"ea   SLS       FFE 4\" step c rebounder ball toss x20ea   Dynamic Balance     Star slider x20:L   Reactive leg march c blazepods 3x30\"   Stand Hip 3 way                              Ther Ex          Bike X10 min X10 min X10 min X10 min X10 min X15 min X10 min " "  TM   Retro 3x45\" Retro walk JAE 15# x10 Retro walk JAE 25# x15     Heel Slide           Supine HS S      3x45\" 3x60\"   Standing quad S 3x30\" prone     3x30\"  prone     Figure-4 ABdER S, seated    10x10\"  C strap 3x30\"    Ankle pumps           HR/TR          SKC          Seated L/s flexion  10x10\" ea fwd /R/L 10x10\" ea fwd /R/L       Mod Harlan S   10x10\"       Standing hip flexor S   FFE 10x5\"       LTR  x20                  Mini Squat           Standing march                     Leg press  SL 40# 3x10   DL   90#  95#  100#  105#  110#  115# x10ea      Sit <> stand      20# eccentric only, 0# concentric  2x10 c 4\" ecc     Step ups fwd and lat  8\" biodex c eccentric lowering retro  3x5      4\" FSU c hip flexion 2x15ea                       Ther Activity          Car Transfer                     Gait Training          LRAD                     Modalities          MHP  X10' lumbar                             "

## 2025-03-27 ENCOUNTER — OFFICE VISIT (OUTPATIENT)
Dept: PHYSICAL THERAPY | Facility: OTHER | Age: 75
End: 2025-03-27
Payer: MEDICARE

## 2025-03-27 DIAGNOSIS — K21.9 GASTROESOPHAGEAL REFLUX DISEASE WITHOUT ESOPHAGITIS: ICD-10-CM

## 2025-03-27 DIAGNOSIS — M17.12 PRIMARY OSTEOARTHRITIS OF LEFT KNEE: Primary | ICD-10-CM

## 2025-03-27 DIAGNOSIS — R39.12 BENIGN PROSTATIC HYPERPLASIA WITH WEAK URINARY STREAM: ICD-10-CM

## 2025-03-27 DIAGNOSIS — Z96.643 H/O TOTAL HIP ARTHROPLASTY, BILATERAL: ICD-10-CM

## 2025-03-27 DIAGNOSIS — N40.1 BENIGN PROSTATIC HYPERPLASIA WITH WEAK URINARY STREAM: ICD-10-CM

## 2025-03-27 PROCEDURE — 97110 THERAPEUTIC EXERCISES: CPT

## 2025-03-27 RX ORDER — TAMSULOSIN HYDROCHLORIDE 0.4 MG/1
0.4 CAPSULE ORAL
Qty: 90 CAPSULE | Refills: 1 | Status: SHIPPED | OUTPATIENT
Start: 2025-03-27

## 2025-03-27 NOTE — PROGRESS NOTES
"Daily Note     Today's date: 3/27/2025  Patient name: Solis Dos Santos  : 1950  MRN: [id number]  Referring provider: Leonard Gallagher,*  Dx:   Encounter Diagnosis     ICD-10-CM    1. Primary osteoarthritis of left knee  M17.12       2. H/O total hip arthroplasty, bilateral  Z96.643             Start Time: 0945  Stop Time: 1008  Total time in clinic (min): 23 minutes    Subjective: Pt reports his L knee cracking has been bothering him more than knee pain itself.      Objective: See treatment diary below      Assessment: Tolerated treatment well focused on L k' ROM in both directions as well as functional strengthening with no complaints other than muscle fatigue.  Patient exhibited good technique with therapeutic exercises and would benefit from continued PT      Plan: Continue per plan of care.           AUTH Status:  Date 3/18 3/24 3/27           Medicare - RE every 10th visit Used 1- RE 2 3            Remaining  9 8 7                 Precautions: Hx Neoplasm, Chronic Pain, L Posterior PEGGY precautions x6 weeks (25)    Manuals 12/23 12/30 1/2 1/8 1/10 3/18 3/24 3/27   L hip PROM w/in precautions SFP          IASTM  R L/s parapsinal   SFP         LAD     LAD gIII    Tibfem distraction prone gIII SFP                 Neuro Re-Ed           Quad set            SAQ           LAQ GTB 3x20       12# x30   SLR            Bridges x20          Hip add            Hip abduction            Hip ER           Seated tibial ER c slider           Clamshell            SS c band                      Tandem stance   Full tandem GRN 2x30 Full tandem BLUE 2 x amap   OG c blazepods 2x30\"ea OG c rebounder ball toss 2x30\"ea   SLS       FFE 4\" step c rebounder ball toss x20ea    Dynamic Balance     Star slider x20:L   Reactive leg march c blazepods 3x30\"    Stand Hip 3 way                                 Ther Ex           Bike X10 min X10 min X10 min X10 min X10 min X15 min X10 min X10 min   TM   Retro 3x45\" Retro walk " "JAE 15# x10 Retro walk JAE 25# x15      Heel Slide            Supine HS S      3x45\" 3x60\" Seated 3x60\"   Standing quad S 3x30\" prone     3x30\"  prone      Calf S slant board        3x60\"   Figure-4 ABdER S, seated    10x10\"  C strap 3x30\"  C strap 3x30\"   Ankle pumps            HR/TR           SKC           Seated L/s flexion  10x10\" ea fwd /R/L 10x10\" ea fwd /R/L        Mod Harlan S   10x10\"        Standing hip flexor S   FFE 10x5\"        LTR  x20                    Mini Squat            Standing march                       Leg press  SL 40# 3x10   DL   90#  95#  100#  105#  110#  115# x10ea    DL 85# x15  95# x15  105# x15  115# x15   Sit <> stand      20# eccentric only, 0# concentric  2x10 c 4\" ecc      Step ups fwd and lat  8\" biodex c eccentric lowering retro  3x5      4\" FSU c hip flexion 2x15ea                          Ther Activity           Car Transfer                       Gait Training           LRAD                       Modalities           MHP  X10' lumbar                               "

## 2025-03-31 ENCOUNTER — OFFICE VISIT (OUTPATIENT)
Dept: PHYSICAL THERAPY | Facility: OTHER | Age: 75
End: 2025-03-31
Payer: MEDICARE

## 2025-03-31 DIAGNOSIS — Z96.643 H/O TOTAL HIP ARTHROPLASTY, BILATERAL: ICD-10-CM

## 2025-03-31 DIAGNOSIS — M17.12 PRIMARY OSTEOARTHRITIS OF LEFT KNEE: Primary | ICD-10-CM

## 2025-03-31 PROCEDURE — 97110 THERAPEUTIC EXERCISES: CPT

## 2025-03-31 PROCEDURE — 97140 MANUAL THERAPY 1/> REGIONS: CPT

## 2025-03-31 RX ORDER — PANTOPRAZOLE SODIUM 40 MG/1
TABLET, DELAYED RELEASE ORAL
Qty: 30 TABLET | Refills: 2 | Status: SHIPPED | OUTPATIENT
Start: 2025-03-31

## 2025-03-31 NOTE — PROGRESS NOTES
"Daily Note     Today's date: 3/31/2025  Patient name: Solis Dos Santos  : 1950  MRN: [id number]  Referring provider: Leonard Gallagher,*  Dx:   Encounter Diagnosis     ICD-10-CM    1. Primary osteoarthritis of left knee  M17.12       2. H/O total hip arthroplasty, bilateral  Z96.643               Start Time: 09  Stop Time: 1031  Total time in clinic (min): 38 minutes    Subjective: Pt reports his low back was sore on Saturday, feels he overdid it on the leg press.      Objective: See treatment diary below      Assessment: Tolerated treatment well focused on L k' ROM in both directions as well as functional strengthening with no complaints other than muscle fatigue. Demonstrating improvements in static tandem stance balance today. Patient exhibited good technique with therapeutic exercises and would benefit from continued PT      Plan: Continue per plan of care.           AUTH Status:  Date 3/18 3/24 3/27 3/31          Medicare - RE every 10th visit Used 1- RE 2 3 4           Remaining  9 8 7 6                Precautions: Hx Neoplasm, Chronic Pain, L Posterior PEGGY precautions x6 weeks (25)    Manuals 1/8 1/10 3/18 3/24 3/27 3/31   L hip PROM w/in precautions      Hip flexion + knee flexion S  SFP   IASTM         LAD  LAD gIII    Tibfem distraction prone gIII SFP                Neuro Re-Ed         Quad set          SAQ         LAQ     12# x30    SLR          Bridges         Hip add          Hip abduction          Hip ER         Seated tibial ER c slider         Clamshell          SS c band                  Tandem stance Full tandem BLUE 2 x amap   OG c blazepods 2x30\"ea OG c rebounder ball toss 2x30\"ea OG static 2x45\"ea   SLS    FFE 4\" step c rebounder ball toss x20ea     Dynamic Balance  Star slider x20:L   Reactive leg march c blazepods 3x30\"     Stand Hip 3 way                           Ther Ex         Bike X10 min X10 min X15 min X10 min X10 min X10 min   TM Retro walk JAE 15# x10 Retro " "walk JAE 25# x15       Heel Slide          Supine HS S   3x45\" 3x60\" Seated 3x60\" Manual 3x60\"   Standing quad S  3x30\"  prone    Prone 3x60\"   Calf S slant board     3x60\" 3x60\"   Figure-4 ABdER S, seated 10x10\"  C strap 3x30\"  C strap 3x30\"    Ankle pumps          HR/TR         SKC         Seated L/s flexion         Mod Harlan S         Standing hip flexor S         LTR                  Mini Squat          Standing march                   Leg press  DL   90#  95#  100#  105#  110#  115# x10ea    DL 85# x15  95# x15  105# x15  115# x15    Sit <> stand   20# eccentric only, 0# concentric  2x10 c 4\" ecc       Step ups fwd and lat     4\" FSU c hip flexion 2x15ea                       Ther Activity         Car Transfer                   Gait Training         LRAD                   Modalities         MHP                             "

## 2025-03-31 NOTE — TELEPHONE ENCOUNTER
Received VM from patient regarding preop testing.     Called and spoke to pt. Confirmed he will go on Friday 4/4. No additional questions at this time.

## 2025-04-01 ENCOUNTER — RA CDI HCC (OUTPATIENT)
Dept: OTHER | Facility: HOSPITAL | Age: 75
End: 2025-04-01

## 2025-04-02 ENCOUNTER — LAB REQUISITION (OUTPATIENT)
Dept: LAB | Facility: HOSPITAL | Age: 75
End: 2025-04-02
Payer: MEDICARE

## 2025-04-02 ENCOUNTER — APPOINTMENT (OUTPATIENT)
Dept: LAB | Age: 75
End: 2025-04-02
Payer: MEDICARE

## 2025-04-02 DIAGNOSIS — Z01.812 ENCOUNTER FOR PREPROCEDURAL LABORATORY EXAMINATION: ICD-10-CM

## 2025-04-02 DIAGNOSIS — Z01.812 PRE-OPERATIVE LABORATORY EXAMINATION: ICD-10-CM

## 2025-04-02 DIAGNOSIS — Z79.01 ANTICOAGULATION MANAGEMENT ENCOUNTER: ICD-10-CM

## 2025-04-02 DIAGNOSIS — Z51.81 ANTICOAGULATION MANAGEMENT ENCOUNTER: ICD-10-CM

## 2025-04-02 LAB
ABO GROUP BLD: NORMAL
ALBUMIN SERPL BCG-MCNC: 4.1 G/DL (ref 3.5–5)
ALP SERPL-CCNC: 62 U/L (ref 34–104)
ALT SERPL W P-5'-P-CCNC: 17 U/L (ref 7–52)
ANION GAP SERPL CALCULATED.3IONS-SCNC: 8 MMOL/L (ref 4–13)
APTT PPP: 34 SECONDS (ref 23–34)
AST SERPL W P-5'-P-CCNC: 19 U/L (ref 13–39)
BASOPHILS # BLD AUTO: 0.09 THOUSANDS/ÂΜL (ref 0–0.1)
BASOPHILS NFR BLD AUTO: 2 % (ref 0–1)
BILIRUB SERPL-MCNC: 0.87 MG/DL (ref 0.2–1)
BLD GP AB SCN SERPL QL: NEGATIVE
BUN SERPL-MCNC: 22 MG/DL (ref 5–25)
CALCIUM SERPL-MCNC: 8.8 MG/DL (ref 8.4–10.2)
CHLORIDE SERPL-SCNC: 104 MMOL/L (ref 96–108)
CO2 SERPL-SCNC: 28 MMOL/L (ref 21–32)
CREAT SERPL-MCNC: 0.98 MG/DL (ref 0.6–1.3)
EOSINOPHIL # BLD AUTO: 0.2 THOUSAND/ÂΜL (ref 0–0.61)
EOSINOPHIL NFR BLD AUTO: 4 % (ref 0–6)
ERYTHROCYTE [DISTWIDTH] IN BLOOD BY AUTOMATED COUNT: 13.4 % (ref 11.6–15.1)
EST. AVERAGE GLUCOSE BLD GHB EST-MCNC: 128 MG/DL
GFR SERPL CREATININE-BSD FRML MDRD: 74 ML/MIN/1.73SQ M
GLUCOSE P FAST SERPL-MCNC: 94 MG/DL (ref 65–99)
HBA1C MFR BLD: 6.1 %
HCT VFR BLD AUTO: 49 % (ref 36.5–49.3)
HGB BLD-MCNC: 16 G/DL (ref 12–17)
IMM GRANULOCYTES # BLD AUTO: 0.01 THOUSAND/UL (ref 0–0.2)
IMM GRANULOCYTES NFR BLD AUTO: 0 % (ref 0–2)
INR PPP: 0.99 (ref 0.85–1.19)
LYMPHOCYTES # BLD AUTO: 1.18 THOUSANDS/ÂΜL (ref 0.6–4.47)
LYMPHOCYTES NFR BLD AUTO: 22 % (ref 14–44)
MCH RBC QN AUTO: 30.2 PG (ref 26.8–34.3)
MCHC RBC AUTO-ENTMCNC: 32.7 G/DL (ref 31.4–37.4)
MCV RBC AUTO: 93 FL (ref 82–98)
MONOCYTES # BLD AUTO: 0.52 THOUSAND/ÂΜL (ref 0.17–1.22)
MONOCYTES NFR BLD AUTO: 10 % (ref 4–12)
NEUTROPHILS # BLD AUTO: 3.31 THOUSANDS/ÂΜL (ref 1.85–7.62)
NEUTS SEG NFR BLD AUTO: 62 % (ref 43–75)
NRBC BLD AUTO-RTO: 0 /100 WBCS
PLATELET # BLD AUTO: 205 THOUSANDS/UL (ref 149–390)
PMV BLD AUTO: 11.7 FL (ref 8.9–12.7)
POTASSIUM SERPL-SCNC: 4.4 MMOL/L (ref 3.5–5.3)
PROT SERPL-MCNC: 7.3 G/DL (ref 6.4–8.4)
PROTHROMBIN TIME: 13.4 SECONDS (ref 12.3–15)
RBC # BLD AUTO: 5.3 MILLION/UL (ref 3.88–5.62)
RH BLD: POSITIVE
SODIUM SERPL-SCNC: 140 MMOL/L (ref 135–147)
SPECIMEN EXPIRATION DATE: NORMAL
WBC # BLD AUTO: 5.31 THOUSAND/UL (ref 4.31–10.16)

## 2025-04-02 PROCEDURE — 36415 COLL VENOUS BLD VENIPUNCTURE: CPT

## 2025-04-02 PROCEDURE — 86900 BLOOD TYPING SEROLOGIC ABO: CPT | Performed by: ORTHOPAEDIC SURGERY

## 2025-04-02 PROCEDURE — 86850 RBC ANTIBODY SCREEN: CPT | Performed by: ORTHOPAEDIC SURGERY

## 2025-04-02 PROCEDURE — 83036 HEMOGLOBIN GLYCOSYLATED A1C: CPT

## 2025-04-02 PROCEDURE — 85610 PROTHROMBIN TIME: CPT

## 2025-04-02 PROCEDURE — 80053 COMPREHEN METABOLIC PANEL: CPT

## 2025-04-02 PROCEDURE — 87081 CULTURE SCREEN ONLY: CPT

## 2025-04-02 PROCEDURE — 85025 COMPLETE CBC W/AUTO DIFF WBC: CPT

## 2025-04-02 PROCEDURE — 86901 BLOOD TYPING SEROLOGIC RH(D): CPT | Performed by: ORTHOPAEDIC SURGERY

## 2025-04-02 PROCEDURE — 85730 THROMBOPLASTIN TIME PARTIAL: CPT

## 2025-04-03 ENCOUNTER — OFFICE VISIT (OUTPATIENT)
Dept: PHYSICAL THERAPY | Facility: OTHER | Age: 75
End: 2025-04-03
Payer: MEDICARE

## 2025-04-03 DIAGNOSIS — M17.12 PRIMARY OSTEOARTHRITIS OF LEFT KNEE: Primary | ICD-10-CM

## 2025-04-03 DIAGNOSIS — Z96.643 H/O TOTAL HIP ARTHROPLASTY, BILATERAL: ICD-10-CM

## 2025-04-03 LAB — MRSA NOSE QL CULT: NORMAL

## 2025-04-03 PROCEDURE — 97110 THERAPEUTIC EXERCISES: CPT

## 2025-04-03 PROCEDURE — 97112 NEUROMUSCULAR REEDUCATION: CPT

## 2025-04-03 NOTE — PROGRESS NOTES
"Daily Note     Today's date: 4/3/2025  Patient name: Solis Dos Santos  : 1950  MRN: [id number]  Referring provider: Leonard Gallagher,*  Dx:   Encounter Diagnosis     ICD-10-CM    1. Primary osteoarthritis of left knee  M17.12       2. H/O total hip arthroplasty, bilateral  Z96.643                 Start Time: 09  Stop Time: 1046  Total time in clinic (min): 53 minutes    Subjective: Pt reports no new complaints today.      Objective: See treatment diary below      Assessment: Tolerated treatment well focused on L k' ROM in both directions as well as functional strengthening with no complaints other than muscle fatigue. Worked on STS with RLE dominance in preparation for L TKA. Patient exhibited good technique with therapeutic exercises and would benefit from continued PT      Plan: Continue per plan of care.           AUTH Status:  Date 3/18 3/24 3/27 3/31 4/3         Medicare - RE every 10th visit Used 1- RE 2 3 4 5          Remaining  9 8 7 6 5               Precautions: Hx Neoplasm, Chronic Pain, L Posterior PEGGY precautions x6 weeks (25)    Manuals 1/8 1/10 3/18 3/24 3/27 3/31 4/3   L hip PROM w/in precautions      Hip flexion + knee flexion S  SFP    IASTM          LAD  LAD gIII    Tibfem distraction prone gIII SFP                  Neuro Re-Ed          Quad set           SAQ          LAQ     12# x30     SLR           Bridges          Hip add           Hip abduction           Hip ER          Seated tibial ER c slider          Clamshell           SS c band                    Tandem stance Full tandem BLUE 2 x amap   OG c blazepods 2x30\"ea OG c rebounder ball toss 2x30\"ea OG static 2x45\"ea Green foam c ball toss x10ea   SLS    FFE 4\" step c rebounder ball toss x20ea      Dynamic Balance  Star slider x20:L   Reactive leg march c blazepods 3x30\"      Stand Hip 3 way                              Ther Ex          Bike X10 min X10 min X15 min X10 min X10 min X10 min X10 min   TM Retro walk JAE " "15# x10 Retro walk JAE 25# x15     Retro walk Anderson 20# x15   Heel Slide           Supine HS S   3x45\" 3x60\" Seated 3x60\" Manual 3x60\" 3x60\"ea   Standing quad S  3x30\"  prone    Prone 3x60\"    Calf S slant board     3x60\" 3x60\"    Figure-4 ABdER S, seated 10x10\"  C strap 3x30\"  C strap 3x30\"     Ankle pumps           HR/TR          SKC          Seated L/s flexion          Mod Harlan S          Standing hip flexor S          LTR                    Squat        15# 2x10    Staggered R fwd 2x10   Standing march                     Leg press  DL   90#  95#  100#  105#  110#  115# x10ea    DL 85# x15  95# x15  105# x15  115# x15     Step ups fwd and lat     4\" FSU c hip flexion 2x15ea                          Ther Activity          Car Transfer                     Gait Training          LRAD                     Modalities          MHP                               "

## 2025-04-07 ENCOUNTER — OFFICE VISIT (OUTPATIENT)
Dept: PHYSICAL THERAPY | Facility: OTHER | Age: 75
End: 2025-04-07
Payer: MEDICARE

## 2025-04-07 ENCOUNTER — OFFICE VISIT (OUTPATIENT)
Age: 75
End: 2025-04-07
Payer: MEDICARE

## 2025-04-07 VITALS
SYSTOLIC BLOOD PRESSURE: 134 MMHG | WEIGHT: 281 LBS | DIASTOLIC BLOOD PRESSURE: 72 MMHG | HEIGHT: 73 IN | BODY MASS INDEX: 37.24 KG/M2

## 2025-04-07 DIAGNOSIS — R73.03 PREDIABETES: ICD-10-CM

## 2025-04-07 DIAGNOSIS — Z96.643 H/O TOTAL HIP ARTHROPLASTY, BILATERAL: ICD-10-CM

## 2025-04-07 DIAGNOSIS — M25.562 CHRONIC PAIN OF LEFT KNEE: ICD-10-CM

## 2025-04-07 DIAGNOSIS — M17.12 PRIMARY OSTEOARTHRITIS OF LEFT KNEE: Primary | ICD-10-CM

## 2025-04-07 DIAGNOSIS — N40.0 BENIGN PROSTATIC HYPERPLASIA, UNSPECIFIED WHETHER LOWER URINARY TRACT SYMPTOMS PRESENT: Chronic | ICD-10-CM

## 2025-04-07 DIAGNOSIS — Z01.818 PREOPERATIVE CLEARANCE: Primary | ICD-10-CM

## 2025-04-07 DIAGNOSIS — E66.9 OBESITY (BMI 30-39.9): ICD-10-CM

## 2025-04-07 DIAGNOSIS — M17.12 PRIMARY OSTEOARTHRITIS OF LEFT KNEE: ICD-10-CM

## 2025-04-07 DIAGNOSIS — G89.29 CHRONIC PAIN OF LEFT KNEE: ICD-10-CM

## 2025-04-07 PROCEDURE — G2211 COMPLEX E/M VISIT ADD ON: HCPCS | Performed by: NURSE PRACTITIONER

## 2025-04-07 PROCEDURE — 99214 OFFICE O/P EST MOD 30 MIN: CPT | Performed by: NURSE PRACTITIONER

## 2025-04-07 PROCEDURE — 97530 THERAPEUTIC ACTIVITIES: CPT

## 2025-04-07 PROCEDURE — 97110 THERAPEUTIC EXERCISES: CPT

## 2025-04-07 NOTE — PROGRESS NOTES
Internal Medicine Pre-Operative Evaluation:     Reason for Visit: Pre-operative Evaluation for Risk Stratification and Optimization    Patient ID: Solis Dos Santos is a 74 y.o. male.     Future cases for Dandre Dos Santos [[id number]]       Case ID Status Date Time Kayden Procedure Provider Location    [id number] [facility] 4/28/2025  9:45   Case: [id number] Date/Time: 04/28/25 0945   Procedure: Robotically assisted left total knee arthroplasty, all associated procedures as indicated (Left: Knee)   Anesthesia type: Choice   Diagnosis:      Primary osteoarthritis of left knee [M17.12]      Chronic pain of left knee [M25.562, G89.29]   Pre-op diagnosis:      Primary osteoarthritis of left knee [M17.12]      Chronic pain of left knee [M25.562, G89.29]   Location:  OR ROOM 04 / [facility]   Surgeons: Leonard Gallagher MD    Robotically assisted left total knee arthroplasty, all associated procedures as indicated Leonard Gallagher MD [[id number]] [facility]               Recommendations to Proceed withSurgery    Patient is considered to be Low risk for Medium risk procedure.     After evaluation and discussion with patient with emphasis that all surgery has some degree of inherent risk it is acknowledged by patient this risk is Acceptable.    Patient is optimized and may proceed with planned procedure.     Assessment    Pre-operative Medical Evaluation for planned surgery  Recommendations as listed in PLAN section below  Contact surgical nurse  navigator with any questions regarding preoperative plan or schedule.      Assessment & Plan  Preoperative clearance    Primary osteoarthritis of left knee  Failed outpatient conservative measures  Electing to undergo surgical procedure as stated above    Obesity (BMI 30-39.9)  Recommend ongoing attempts at weight loss  Current BMI meets criteria of <40 per [facility] preoperative qualifications    Benign prostatic hyperplasia, unspecified whether  lower urinary tract symptoms present  stable  Prediabetes  Hgb A1c   Lab Results   Component Value Date    HGBA1C 6.1 (H) 04/02/2025     Recommend following DM diet             Plan:     1. Further preoperative workup as follows:   - none no further testing required may proceed with surgery    2. Preoperative Medication Management Review performed by PAT nursing  YES    3. Patient requires further consultation with:   No Consults Required    4. Discharge Planning / Barriers to Discharge  none identified - patients has post discharge therapy plan in place, transportation arranged for discharge day, adequate family support at home to assist with discharge to home.        Subjective:           History of Present Illness:     Solis Dos Santos is a 74 y.o. male who presents to the office today for a preoperative consultation at the request of surgeon. The patient understands this is an elective procedure and not emergent. They are electing to undergo planned procedure with an understanding that all surgery has inherent risk. They have worked with their surgeon and failed conservative treatment measures. Today they present for preoperative risk assessment and recommendations for optimization in preparation for surgery.    Pt seen in center for perioperative medicine for upcoming proposed surgery. They have failed previous conservative measures and have elected surgical intervention.     Pt meets presurgical lab and BMI optimization goals.  Pt had stress testing and echo in 2024 that were both negative.       Solis Dos Santos has an IN HOSPITAL cardiac risk of RCI RISK CLASS I (0 risk factors, risk of major cardiac compl. appr. 0.5%) based on RCRI calculator    Cardiac Risk Estimation: per the Revised Cardiac Risk Index (Circ. 100:1043, 1999),         Pre-op Exam    Previous history of bleeding disorders or clots?: No  Previous Anesthesia reaction?: No  Prolonged steroid use in the last 6 months?: No    Assessment of  "Cardiac Risk:   - Unstable or severe angina or MI in the last 6 weeks or history of stent placement in the last year?: No   - Decompensated heart failure (e.g. New onset heart failure, NYHA  Class IV heart failure, or worsening existing heart failure)?: No  - Significant arrhythmias such as high grade AV block, symptomatic ventricular arrhythmia, newly recognized ventricular tachycardia, supraventricular tachycardia with resting heart rate >100, or symptomatic bradycardia?: No  - Severe heart valve disease including aortic stenosis or symptomatic mitral stenosis?: No      Pre-operative Risk Factors:  Elevated-risk surgery: No    History of cerebrovascular disease: No    History of ischemic heart disease: No  Pre-operative treatment with insulin: No  Pre-operative creatinine >2 mg/dL: No    History of congestive heart failure: No        ROS: No TIA's or unusual headaches, no dysphagia.  No prolonged cough. No dyspnea or chest pain on exertion.  No abdominal pain, change in bowel habits, black or bloody stools.  No urinary tract or BPH symptoms.  Positive reported pain in arthritic joint. Positive difficulty with gait. No skin rashes or issues.      Objective:    /72   Ht 6' 1\" (1.854 m)   Wt 127 kg (281 lb)   BMI 37.07 kg/m²       General Appearance: no distress, conversive  HEENT: PERRLA, conjuctiva normal; oropharynx clear; mucous membranes moist;   Neck:  Supple, no lymphadenopathy or thyromegaly  Lungs: breath sounds normal, normal respiratory effort, no retractions, expiratory effort normal  CV: normal heart sounds S1/S2, PMI normal   ABD: soft non tender, obese +BS x4  EXT: DP pulses intact, no lymphadenopathy, no edema  Skin: normal turgor, normal texture, no rash  Psych: affect normal, mood normal  Neuro: AAOx3        The following portions of the patient's history were reviewed and updated as appropriate: allergies, current medications, past family history, past medical history, past social history, " past surgical history and problem list.     Past History:       Past Medical History:   Diagnosis Date    Acute blood loss anemia 08/09/2019    Aftercare following right hip joint replacement surgery 11/05/2019    Anxiety disorder     Atherosclerotic heart disease of native coronary artery without angina pectoris     Benign prostatic hyperplasia without lower urinary tract symptoms     Cancer (HCC)     bladder    Clotting disorder (HCC) 1/25/2023    Blood in urine    Disease of thyroid gland     hypo    Dyslipidemia     GERD (gastroesophageal reflux disease)     manages w/ diet, takes no meds    Hypertension     Impaired fasting blood sugar     Kidney stone     Low back pain     Obesity     Osteoarthritis     last assesed 6-6-16    Other chest pain     Paresthesia of skin     Polyneuropathy     last assesed 5-8-17    Pure hypercholesterolemia     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)     Spinal stenosis     Suspected UTI 03/09/2023    Urinary tract infection     Wears glasses     Past Surgical History:   Procedure Laterality Date    BACK SURGERY      L4-L5 sx    CHOLECYSTECTOMY      COLONOSCOPY  02/06/2019    CYSTOSCOPY N/A 04/26/2023    Procedure: CYSTOSCOPY w/ bladder biopsy;  Surgeon: Geo Bentley MD;  [facility];  Service: Urology    HIP SURGERY  August 2019    Replacement    JOINT REPLACEMENT  August 2019    Hip replacement    DE ARTHRP ACETBLR/PROX FEM PROSTC AGRFT/ALGRFT Right 08/05/2019    Procedure: ARTHROPLASTY HIP TOTAL;  Surgeon: Leonard Gallagher MD;  [facility];  Service: Orthopedics    DE ARTHRP ACETBLR/PROX FEM PROSTC AGRFT/ALGRFT Left 11/4/2024    Procedure: Left total hip arthroplasty;  Surgeon: Leonard Gallagher MD;  [facility];  Service: Orthopedics    DE CYSTO W/REMOVAL OF LESIONS SMALL N/A 03/23/2023    Procedure: TRANSURETHRAL RESECTION OF BLADDER TUMOR (TURBT), mitomycin ;  Surgeon: Geo Bentley MD;   [facility];  Service: Urology    TN CYSTO W/REMOVAL OF LESIONS SMALL N/A 2023    Procedure: TRANSURETHRAL RESECTION OF BLADDER TUMOR (TURBT), cystoscopy with bladder biopsy;  Surgeon: Geo Bentley MD;  [facility];  Service: Urology    TONSILLECTOMY            Social History     Tobacco Use    Smoking status: Former     Current packs/day: 0.00     Average packs/day: 1 pack/day for 20.8 years (20.8 ttl pk-yrs)     Types: Cigarettes     Start date: 1967     Quit date: 1987     Years since quittin.4    Smokeless tobacco: Never   Vaping Use    Vaping status: Never Used   Substance Use Topics    Alcohol use: Yes     Alcohol/week: 1.0 standard drink of alcohol     Types: 1 Cans of beer per week    Drug use: Never     Family History   Problem Relation Age of Onset    Arthritis Other     Heart disease Father     Arthritis Mother           Allergies:     No Known Allergies     Current Medications:     Current Outpatient Medications   Medication Instructions    acetaminophen (TYLENOL) 1,000 mg, Oral, Every 6 hours PRN    ascorbic acid (VITAMIN C) 500 mg, Oral, 2 times daily    atorvastatin (LIPITOR) 10 mg, Oral, Daily at bedtime    cholecalciferol (VITAMIN D3) 1,000 Units, Oral, Daily    ciclopirox (LOPROX) 0.77 % cream     cyanocobalamin (VITAMIN B-12) 500 mcg, Oral, Daily    enoxaparin (LOVENOX) 40 mg, Subcutaneous, Daily, To start postoperatively    folic acid (FOLVITE) 1 mg, Oral, Daily    hydrocortisone 2.5 % cream As needed    levothyroxine 150 mcg, Oral, Daily    Multiple Vitamins-Minerals (multivitamin with iron-minerals) liquid Daily    mupirocin (BACTROBAN) 2 % ointment Apply topically to the inside of the left and right nostrils twice daily for 5 days before surgery, including the morning of surgery.    pantoprazole (PROTONIX) 40 mg tablet TAKE ONE TABLET BY MOUTH EVERY DAY BEFORE BREAKFAST    tamsulosin (FLOMAX) 0.4 mg, Oral, Daily with dinner           PRE-OP  "WORKSHEET DATA    Assessment of Pre-Operative Risks     [facility] Quality Hard Stops:    BMI (<40) : Estimated body mass index is 37.07 kg/m² as calculated from the following:    Height as of this encounter: 6' 1\" (1.854 m).    Weight as of this encounter: 127 kg (281 lb).    Hgb ( >11):   Lab Results   Component Value Date    HGB 16.0 04/02/2025    HGB 14.1 11/05/2024    HGB 15.5 10/10/2024       HbA1c (<7.5) :   Lab Results   Component Value Date    HGBA1C 6.1 (H) 04/02/2025       GFR (>60) (Less then 45 = Nephrology consult):    Lab Results   Component Value Date    EGFR 74 04/02/2025    EGFR 77 11/05/2024    EGFR 83 10/10/2024            Pre-Op Data Reviewed:       Laboratory Results: I have personally reviewed the pertinent reports    EKG: I personally reviewed and interpreted available tracings in the electronic medical record    Encounter Date: 10/10/24   EKG 12 lead   Result Value    Ventricular Rate 74    Atrial Rate 74    TN Interval 168    QRSD Interval 100    QT Interval 400    QTC Interval 444    P Axis 26    QRS Axis 45    T Wave Axis 30    Narrative    Normal sinus rhythm  Normal ECG  When compared with ECG of 09-MAR-2023 17:21,  No significant change was found  Confirmed by Milla Posey ([id number]) on 10/10/2024 7:19:09 PM       OLD RECORDS: reviewed old records in the chart review section if EHR on day of visit.    Previous cardiopulmonary studies within the past year:  Echocardiogram: yes   Lab Results   Component Value Date    LVEF 60 04/05/2024     Cardiac Catheterization: no  Stress Test: yes, 2024    Stress ECG: No ST deviation is noted.    Stress Function: Left ventricular function post-stress is normal. Stress ejection fraction is 70%.    Perfusion Defect Conclusion: The stress/rest perfusion ratio is 0.85. There is no evidence of transient ischemic dilation (TID).    Perfusion: There are no perfusion defects.     No evidence of ischemia or infarction by pharmacologic vasodilatory nuclear stress " testing.       Time of visit including pre-visit chart review, visit and post-visit coordination of plan and care , review of pre-surgical lab work, preparation and time spent documenting note in electronic medical record, time spent face-to-face in physical examination answering patient questions by care team 35 minutes             [facility]

## 2025-04-07 NOTE — PATIENT INSTRUCTIONS
BEFORE SURGERY    Contact your surgical nurse navigator or surgical provider with any questions regarding preoperative plan or schedule.  Stop all over the counter supplements, herbal, naturopathic  medications for 1 week prior to surgery UNLESS prescribed by your surgeon  Hold NSAIDS (i.e. advil, alleve, motrin, ibuprofen, celebrex) minimum 5 days prior to surgery  Follow presurgical medication instructions provided by preadmission nursing team reviewed during your presurgery phone call  Strategies for optimizing your surgery through breathing exercises, nutrition and physical activity can be found at [email or web address]/best  Call [phone] with any presurgical concerns or medications questions or use the messaging feature in your [employer] dru to contact your provider    AFTER SURGERY    Recommend using Tylenol ( acetaminophen ) 1000 mg every eight hours during the first week post discharge along with icing the area for 20 mins every 3-4 hours while awake can be helpful in reducing your need for post operative opioid use. This opioid sparing plan can be used along side your surgeons pain plan.  Use stool softener over the counter (colace) daily after surgery during the first 1-2 weeks to avoid post operative constipation issues  If no bowel movement within 3 days after surgery then use over the counter Miralax in addition to your stool softener   If cleared by your surgical team for activity then early and often walking is encouraged and can be important in prevention of post surgical blood clots. Additionally spend as much time out of bed as possible and allowed by your surgical team  Use your incentive spirometer twice per hour in the first seven days after surgery to help prevent post surgery lung complications and infections  It is very important you follow the instructions from your surgeon regarding any medications for after surgery blood clot prevention. Compliance with these medications or interventions is very  important.  Call [phone] with any post discharge concerns or medical issues or use the messaging feature in your [employer] dru to contact your provider

## 2025-04-07 NOTE — ASSESSMENT & PLAN NOTE
Hgb A1c   Lab Results   Component Value Date    HGBA1C 6.1 (H) 04/02/2025     Recommend following DM diet

## 2025-04-07 NOTE — PROGRESS NOTES
"Daily Note     Today's date: 2025  Patient name: Solis Dos Santos  : 1950  MRN: [id number]  Referring provider: Leonard Gallagher,*  Dx:   Encounter Diagnosis     ICD-10-CM    1. Primary osteoarthritis of left knee  M17.12       2. H/O total hip arthroplasty, bilateral  Z96.643               Start Time: 1530  Stop Time: 1615  Total time in clinic (min): 45 minutes    Subjective: Pt reports no new complaints today.      Objective: See treatment diary below      Assessment: Tolerated treatment well focused on review of home set-up, HEP and swelling / pain control for post-op L TKA on 25. All of patients questions and concerns were answered, pt given HEP and will review content prior to surgery in 3 week. Will review information an additional time prior to surgery in the coming weeks. Patient exhibited good technique with therapeutic exercises and would benefit from continued PT      Plan: Continue per plan of care.           AUTH Status:  Date 3/18 3/24 3/27 3/31 4/3 4/7        Medicare - RE every 10th visit Used 1- RE 2 3 4 5 6         Remaining  9 8 7 6 5 4              Precautions: Hx Neoplasm, Chronic Pain, L Posterior PEGGY precautions x6 weeks (25)    Manuals 1/8 1/10 3/18 3/24 3/27 3/31 4/3 4/7   L hip PROM w/in precautions      Hip flexion + knee flexion S  SFP     IASTM           LAD  LAD gIII    Tibfem distraction prone gIII SFP                    Neuro Re-Ed           Quad set         10x5\"  HEP    SAQ        X20 HEP   LAQ     12# x30   X10 HEP   SLR         X10 HEP   Bridges           Hip add            Hip abduction            Hip ER           Seated tibial ER c slider           Clamshell            SS c band                      Tandem stance Full tandem BLUE 2 x amap   OG c blazepods 2x30\"ea OG c rebounder ball toss 2x30\"ea OG static 2x45\"ea Green foam c ball toss x10ea    SLS    FFE 4\" step c rebounder ball toss x20ea       Dynamic Balance  Star slider x20:L   Reactive leg " "march c blazepods 3x30\"       Stand Hip 3 way                                 Ther Ex           Bike X10 min X10 min X15 min X10 min X10 min X10 min X10 min X10 min   TM Retro walk JAE 15# x10 Retro walk JAE 25# x15     Retro walk [place] 20# x15    Heel Slide         X10 HEP   Heel prop        HEP   Supine HS S   3x45\" 3x60\" Seated 3x60\" Manual 3x60\" 3x60\"ea 3x60\" HEP   Standing quad S  3x30\"  prone    Prone 3x60\"     Calf S slant board     3x60\" 3x60\"  With strap 2x60\" HEP   Figure-4 ABdER S, seated 10x10\"  C strap 3x30\"  C strap 3x30\"      Ankle pumps         HEP   HR/TR        HEP   SKC           Seated L/s flexion           Mod Harlan S           Standing hip flexor S           LTR                      Squat        15# 2x10    Staggered R fwd 2x10    Standing march                       Leg press  DL   90#  95#  100#  105#  110#  115# x10ea    DL 85# x15  95# x15  105# x15  115# x15      Step ups fwd and lat     4\" FSU c hip flexion 2x15ea                             Ther Activity           Car Transfer                       Gait Training           LRAD                       Modalities           MHP                                 "

## 2025-04-08 NOTE — PRE-PROCEDURE INSTRUCTIONS
Pre-Surgery Instructions:   Medication Instructions    acetaminophen (TYLENOL) 500 mg tablet Uses PRN- OK to take day of surgery    ascorbic acid (VITAMIN C) 500 MG tablet Hold day of surgery.    atorvastatin (LIPITOR) 10 mg tablet Take night before surgery    cholecalciferol (VITAMIN D3) 1,000 units tablet Hold day of surgery.    ciclopirox (LOPROX) 0.77 % cream Hold day of surgery.    cyanocobalamin (VITAMIN B-12) 500 MCG tablet Hold day of surgery.    enoxaparin (LOVENOX) 40 mg/0.4 mL Instructions provided by MD    folic acid (FOLVITE) 1 mg tablet Hold day of surgery.    hydrocortisone 2.5 % cream Hold day of surgery.    levothyroxine 150 mcg tablet Take day of surgery.    Multiple Vitamins-Minerals (multivitamin with iron-minerals) liquid Hold day of surgery.    mupirocin (BACTROBAN) 2 % ointment Instructions provided by MD    pantoprazole (PROTONIX) 40 mg tablet Uses PRN- OK to take day of surgery    tamsulosin (FLOMAX) 0.4 mg Take night before surgery        Medication instructions for day of surgery reviewed. Please take all instructed medications with only a sip of water.       You will receive a call one business day prior to surgery with an arrival time and hospital directions. If your surgery is scheduled on a Monday, the hospital will be calling you on the Friday prior to your surgery. If you have not heard from anyone by 8pm, please call the hospital supervisor through the hospital  at [place] [phone].    Do not eat or drink anything after midnight the night before your surgery, including candy, mints, lifesavers, or chewing gum. Do not drink alcohol 24hrs before your surgery. Try not to smoke at least 24hrs before your surgery.       Follow the pre surgery showering instructions as listed in the “My Surgical Experience Booklet” or otherwise provided by your surgeon's office. Do not use a blade to shave the surgical area 1 week before surgery. It is okay to use a clean electric clippers up  to 24 hours before surgery. Do not apply any lotions, creams, including makeup, cologne, deodorant, or perfumes after showering on the day of your surgery. Do not use dry shampoo, hair spray, hair gel, or any type of hair products.     No contact lenses, eye make-up, or artificial eyelashes. Remove nail polish, including gel polish, and any artificial, gel, or acrylic nails if possible. Remove all jewelry including rings and body piercing jewelry.     Wear causal clothing that is easy to take on and off. Consider your type of surgery.    Keep any valuables, jewelry, piercings at home. Please bring any specially ordered equipment (sling, braces) if indicated.    Arrange for a responsible person to drive you to and from the hospital on the day of your surgery. Please confirm the visitor policy for the day of your procedure when you receive your phone call with an arrival time.     Call the surgeon's office with any new illnesses, exposures, or additional questions prior to surgery.    Please reference your “My Surgical Experience Booklet” for additional information to prepare for your upcoming surgery.     See Geriatric Assessment below...  Cognitive Assessment:   CAM:   TUG <15 sec:  Falls (last 6 months): Yes-tripped over a floor mat 4/7/25, denies injury  Tang Total Score: 19  PHQ- 9 Depression Scale: 1.0  Nutrition Assessment Score: 14.0  METS: 6.36  Incentive Spirometry Level:   Health goals:  -What are your overall health goals? (quit smoking, wt. loss, rest, decrease stress)  Lose weight and become more active  -What brings you strength? (family, friends, [employer], health)  My [employer] family  -What activities are important to you? 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

## 2025-04-10 ENCOUNTER — OFFICE VISIT (OUTPATIENT)
Dept: PHYSICAL THERAPY | Facility: OTHER | Age: 75
End: 2025-04-10
Payer: MEDICARE

## 2025-04-10 DIAGNOSIS — Z96.643 H/O TOTAL HIP ARTHROPLASTY, BILATERAL: ICD-10-CM

## 2025-04-10 DIAGNOSIS — M17.12 PRIMARY OSTEOARTHRITIS OF LEFT KNEE: Primary | ICD-10-CM

## 2025-04-10 PROCEDURE — 97110 THERAPEUTIC EXERCISES: CPT

## 2025-04-10 NOTE — PROGRESS NOTES
"Daily Note     Today's date: 4/10/2025  Patient name: Solis Dos Santos  : 1950  MRN: [id number]  Referring provider: Leonard Gallagher,*  Dx:   Encounter Diagnosis     ICD-10-CM    1. Primary osteoarthritis of left knee  M17.12       2. H/O total hip arthroplasty, bilateral  Z96.643                 Start Time: 0945  Stop Time: 1038  Total time in clinic (min): 53 minutes    Subjective: Pt reports improved confidence in post-operative game plan after discussion last visit. Pt also reports a fall in the home on Monday (4 days ago), was carrying a large item while walking backwards and tripped over a mat, falling backwards onto buttocks. Denies any pain, no acute injury immediately after fall as well as today.      Objective: See treatment diary below      Assessment: Tolerated treatment well focused on post-op exercise review in preparation for L TKA on 25. Patient exhibited good technique with therapeutic exercises and would benefit from continued PT      Plan: Continue per plan of care.           AUTH Status:  Date 3/18 3/24 3/27 3/31 4/3 4/7 4/10       Medicare - RE every 10th visit Used 1- RE 2 3 4 5 6 7        Remaining  9 8 7 6 5 4 3             Precautions: Hx Neoplasm, Chronic Pain, L Posterior PEGGY precautions x6 weeks (25)    Manuals 1/8 1/10 3/18 3/24 3/27 3/31 4/3 4/7 4/10   L hip PROM w/in precautions      Hip flexion + knee flexion S  SFP      IASTM            LAD  LAD gIII    Tibfem distraction prone gIII SFP                      Neuro Re-Ed            Quad set         10x5\"  HEP  10x5\"   SAQ        X20 HEP 5# 20x5\"   LAQ     12# x30   X10 HEP 5# x30   SLR         X10 HEP    Bridges            Hip add             Hip abduction             Hip ER            Seated tibial ER c slider            Clamshell             SS c band                        Tandem stance Full tandem BLUE 2 x amap   OG c blazepods 2x30\"ea OG c rebounder ball toss 2x30\"ea OG static 2x45\"ea Green foam c ball " "toss x10ea  Airex 3x30\"   SLS    FFE 4\" step c rebounder ball toss x20ea        Dynamic Balance  Star slider x20:L   Reactive leg march c blazepods 3x30\"        Stand Hip 3 way                                    Ther Ex            Bike X10 min X10 min X15 min X10 min X10 min X10 min X10 min X10 min X10 min   TM Retro walk JAE 15# x10 Retro walk JAE 25# x15     Retro walk [place] 20# x15     Heel Slide         X10 HEP 10x10\"   Heel prop        HEP X5' c MHP & calf stretch   Supine HS S   3x45\" 3x60\" Seated 3x60\" Manual 3x60\" 3x60\"ea 3x60\" HEP 3x60   Standing quad S  3x30\"  prone    Prone 3x60\"      Calf S slant board     3x60\" 3x60\"  With strap 2x60\" HEP    Figure-4 ABdER S, seated 10x10\"  C strap 3x30\"  C strap 3x30\"       Ankle pumps         HEP    HR/TR        HEP    SKC            Seated L/s flexion            Mod Harlan S            Standing hip flexor S            LTR                        Squat        15# 2x10    Staggered R fwd 2x10     Standing march                         Leg press  DL   90#  95#  100#  105#  110#  115# x10ea    DL 85# x15  95# x15  105# x15  115# x15       Step ups fwd and lat     4\" FSU c hip flexion 2x15ea                                Ther Activity            Car Transfer                         Gait Training            LRAD                         Modalities            MHP                                   "

## 2025-04-14 ENCOUNTER — OFFICE VISIT (OUTPATIENT)
Dept: PHYSICAL THERAPY | Facility: OTHER | Age: 75
End: 2025-04-14
Attending: ORTHOPAEDIC SURGERY
Payer: MEDICARE

## 2025-04-14 DIAGNOSIS — Z96.643 H/O TOTAL HIP ARTHROPLASTY, BILATERAL: ICD-10-CM

## 2025-04-14 DIAGNOSIS — M17.12 PRIMARY OSTEOARTHRITIS OF LEFT KNEE: Primary | ICD-10-CM

## 2025-04-14 PROCEDURE — 97110 THERAPEUTIC EXERCISES: CPT

## 2025-04-14 PROCEDURE — 97112 NEUROMUSCULAR REEDUCATION: CPT

## 2025-04-14 NOTE — PROGRESS NOTES
"Daily Note     Today's date: 2025  Patient name: Solis Dos Santos  : 1950  MRN: [id number]  Referring provider: Leonard Gallagher,*  Dx:   Encounter Diagnosis     ICD-10-CM    1. Primary osteoarthritis of left knee  M17.12       2. H/O total hip arthroplasty, bilateral  Z96.643                 Start Time: 0850  Stop Time: 0913  Total time in clinic (min): 23 minutes    Subjective: Pt with no new complaints.      Objective: See treatment diary below      Assessment: Tolerated treatment well focused on BLE strength & balance training with no complaints other than muscle fatigue. Patient exhibited good technique with therapeutic exercises and would benefit from continued PT      Plan: Continue per plan of care.           AUTH Status:  Date 3/18 3/24 3/27 3/31 4/3 4/7 4/10 4/14      Medicare - RE every 10th visit Used 1- RE 2 3 4 5 6 7 8       Remaining  9 8 7 6 5 4 3 2            Precautions: Hx Neoplasm, Chronic Pain, L Posterior PEGGY precautions x6 weeks (25)    Manuals 3/24 3/27 3/31 4/3 4/7 4/10 4/14   L hip PROM w/in precautions   Hip flexion + knee flexion S  SFP       IASTM          LAD                    Neuro Re-Ed          Quad set      10x5\"  HEP  10x5\"    SAQ     X20 HEP 5# 20x5\"    LAQ  12# x30   X10 HEP 5# x30 10# x30   SLR      X10 HEP     Bridges          Hip add           Hip abduction           Hip ER          Seated tibial ER c slider          Clamshell           SS c band                    Tandem stance OG c blazepods 2x30\"ea OG c rebounder ball toss 2x30\"ea OG static 2x45\"ea Green foam c ball toss x10ea  Airex 3x30\" 2x30\"ea OG   SLS FFE 4\" step c rebounder ball toss x20ea      UE supp OG 3x30\":L   Dynamic Balance  Reactive leg march c blazepods 3x30\"         Stand Hip 3 way                              Ther Ex          Bike X10 min X10 min X10 min X10 min X10 min X10 min X10 min   TM    Retro walk [place] 20# x15      Heel Slide      X10 HEP 10x10\"    Heel prop     HEP X5' c " "MHP & calf stretch    Supine HS S 3x60\" Seated 3x60\" Manual 3x60\" 3x60\"ea 3x60\" HEP 3x60 3x60\"   Standing quad S   Prone 3x60\"       Calf S slant board  3x60\" 3x60\"  With strap 2x60\" HEP  3x60\"   Figure-4 ABdER S, seated  C strap 3x30\"        Ankle pumps      HEP     HR/TR     HEP     SKC          Seated L/s flexion          Mod Harlan S          Standing hip flexor S          LTR                    Squat     15# 2x10    Staggered R fwd 2x10   Staggered R 2x12    Even stance 2x12   Standing march                     Leg press   DL 85# x15  95# x15  105# x15  115# x15     90# x10  110# x10  130# x10   Step ups fwd and lat  4\" FSU c hip flexion 2x15ea                             Ther Activity          Car Transfer                     Gait Training          LRAD                     Modalities          MHP                               "

## 2025-04-16 ENCOUNTER — APPOINTMENT (OUTPATIENT)
Dept: PHYSICAL THERAPY | Facility: OTHER | Age: 75
End: 2025-04-16
Payer: MEDICARE

## 2025-04-17 ENCOUNTER — OFFICE VISIT (OUTPATIENT)
Dept: PHYSICAL THERAPY | Facility: OTHER | Age: 75
End: 2025-04-17
Attending: ORTHOPAEDIC SURGERY
Payer: MEDICARE

## 2025-04-17 ENCOUNTER — APPOINTMENT (OUTPATIENT)
Dept: PHYSICAL THERAPY | Facility: OTHER | Age: 75
End: 2025-04-17
Payer: MEDICARE

## 2025-04-17 DIAGNOSIS — M17.12 PRIMARY OSTEOARTHRITIS OF LEFT KNEE: Primary | ICD-10-CM

## 2025-04-17 DIAGNOSIS — Z96.643 H/O TOTAL HIP ARTHROPLASTY, BILATERAL: ICD-10-CM

## 2025-04-17 PROCEDURE — 97110 THERAPEUTIC EXERCISES: CPT

## 2025-04-17 NOTE — PROGRESS NOTES
"Daily Note     Today's date: 2025  Patient name: Solis Dos Santos  : 1950  MRN: [id number]  Referring provider: Leonard Gallagher,*  Dx:   Encounter Diagnosis     ICD-10-CM    1. Primary osteoarthritis of left knee  M17.12       2. H/O total hip arthroplasty, bilateral  Z96.643                   Start Time: 1516  Stop Time: 1539  Total time in clinic (min): 23 minutes    Subjective: Pt with no new complaints.      Objective: See treatment diary below      Assessment: Tolerated treatment well focused on pre-operative preparation for L TKA on 25. Pt able to demonstrate HEP independently without cueing. Patient exhibited good technique with therapeutic exercises and would benefit from continued PT      Plan: Continue per plan of care.           AUTH Status:  Date 3/18 3/24 3/27 3/31 4/3 4/7 4/10 4/14 4/17     Medicare - RE every 10th visit Used 1- RE 2 3 4 5 6 7 8 9      Remaining  9 8 7 6 5 4 3 2 1           Precautions: Hx Neoplasm, Chronic Pain, L Posterior PEGGY precautions x6 weeks (25)    Manuals 3/24 3/27 3/31 4/3 4/7 410    L hip PROM w/in precautions   Hip flexion + knee flexion S  SFP        IASTM           LAD                      Neuro Re-Ed           Quad set      10x5\"  HEP  10x5\"     SAQ     X20 HEP 5# 20x5\"  10# 30x5\"   LAQ  12# x30   X10 HEP 5# x30 10# x30 10# x30   SLR      X10 HEP      Bridges           Hip add            Hip abduction            Hip ER           Seated tibial ER c slider           Clamshell            SS c band                      Tandem stance OG c blazepods 2x30\"ea OG c rebounder ball toss 2x30\"ea OG static 2x45\"ea Green foam c ball toss x10ea  Airex 3x30\" 2x30\"ea OG    SLS FFE 4\" step c rebounder ball toss x20ea      UE supp OG 3x30\":L    Dynamic Balance  Reactive leg march c blazepods 3x30\"          Stand Hip 3 way                                 Ther Ex           Bike X10 min X10 min X10 min X10 min X10 min X10 min X10 min X10 min   TM    " "Retro walk [place] 20# x15       Heel Slide      X10 HEP 10x10\"  10x10\"   Seated k' flexion        10x10\"   Heel prop     HEP X5' c MHP & calf stretch  X5' c MHP & calf S    X3' heel prop c two chairs & MHP   Supine HS S 3x60\" Seated 3x60\" Manual 3x60\" 3x60\"ea 3x60\" HEP 3x60 3x60\" 3x60\"   Standing quad S   Prone 3x60\"        Calf S slant board  3x60\" 3x60\"  With strap 2x60\" HEP  3x60\"    Figure-4 ABdER S, seated  C strap 3x30\"         Ankle pumps      HEP      HR/TR     HEP      SKC           Seated L/s flexion           Mod Harlan S           Standing hip flexor S           LTR                      Squat     15# 2x10    Staggered R fwd 2x10   Staggered R 2x12    Even stance 2x12    Standing march                       Leg press   DL 85# x15  95# x15  105# x15  115# x15     90# x10  110# x10  130# x10    Step ups fwd and lat  4\" FSU c hip flexion 2x15ea                                Ther Activity           Car Transfer                       Gait Training           LRAD                       Modalities           MHP                                 "

## 2025-04-21 ENCOUNTER — OFFICE VISIT (OUTPATIENT)
Dept: PHYSICAL THERAPY | Facility: OTHER | Age: 75
End: 2025-04-21
Payer: MEDICARE

## 2025-04-21 DIAGNOSIS — Z96.643 H/O TOTAL HIP ARTHROPLASTY, BILATERAL: ICD-10-CM

## 2025-04-21 DIAGNOSIS — M17.12 PRIMARY OSTEOARTHRITIS OF LEFT KNEE: Primary | ICD-10-CM

## 2025-04-21 PROCEDURE — 97110 THERAPEUTIC EXERCISES: CPT

## 2025-04-21 NOTE — PROGRESS NOTES
"Daily Note     Today's date: 2025  Patient name: Solis Dos Santos  : 1950  MRN: [id number]  Referring provider: Leonard Gallagher,*  Dx:   Encounter Diagnosis     ICD-10-CM    1. Primary osteoarthritis of left knee  M17.12       2. H/O total hip arthroplasty, bilateral  Z96.643                     Start Time: 0949  Stop Time: 1027  Total time in clinic (min): 38 minutes    Subjective: Pt reports readiness for surgery a week from today.      Objective: See treatment diary below      Assessment: Tolerated treatment well focused on pre-operative preparation for L TKA on 25. Pt able to demonstrate HEP independently without cueing. Patient exhibited good technique with therapeutic exercises and would benefit from continued PT      Plan: Continue per plan of care.           AUTH Status:  Date 3/18 3/24 3/27 3/31 4/3 4/7 4/10 4/14 4/17 4/21    Medicare - RE every 10th visit Used 1- RE 2 3 4 5 6 7 8 9 10     Remaining  9 8 7 6 5 4 3 2 1 0          Precautions: Hx Neoplasm, Chronic Pain, L PEGGY, R PEGGY    Manuals 3/24 3/27 3/31 4/3 4/7 4/10    L hip PROM w/in precautions   Hip flexion + knee flexion S  SFP         IASTM            LAD                        Neuro Re-Ed            Quad set      10x5\"  HEP  10x5\"      SAQ     X20 HEP 5# 20x5\"  10# 30x5\" 10# x30   LAQ  12# x30   X10 HEP 5# x30 10# x30 10# x30 10# x30   SLR      X10 HEP       Bridges            Hip add             Hip abduction             Hip ER            Seated tibial ER c slider            Clamshell             SS c band                        Tandem stance OG c blazepods 2x30\"ea OG c rebounder ball toss 2x30\"ea OG static 2x45\"ea Green foam c ball toss x10ea  Airex 3x30\" 2x30\"ea OG     SLS FFE 4\" step c rebounder ball toss x20ea      UE supp OG 3x30\":L     Dynamic Balance  Reactive leg march c blazepods 3x30\"           Stand Hip 3 way                                    Ther Ex            Bike X10 min X10 min X10 min X10 min " "X10 min X10 min X10 min X10 min X10 min   TM    Retro walk [place] 20# x15        Heel Slide      X10 HEP 10x10\"  10x10\"    Seated k' flexion        10x10\"    Heel prop     HEP X5' c MHP & calf stretch  X5' c MHP & calf S    X3' heel prop c two chairs & MHP X5' c MHP & calf S   Supine HS S 3x60\" Seated 3x60\" Manual 3x60\" 3x60\"ea 3x60\" HEP 3x60 3x60\" 3x60\" 3x60\"   Standing quad S   Prone 3x60\"         Calf S slant board  3x60\" 3x60\"  With strap 2x60\" HEP  3x60\"  3x60\"   Figure-4 ABdER S, seated  C strap 3x30\"          Ankle pumps      HEP       HR/TR     HEP       SKC            Seated L/s flexion            Mod Harlan S            Standing hip flexor S            LTR                        Squat     15# 2x10    Staggered R fwd 2x10   Staggered R 2x12    Even stance 2x12  Staggered R 2x12    Even stance 2x12   Standing march                         Leg press   DL 85# x15  95# x15  105# x15  115# x15     90# x10  110# x10  130# x10     Step ups fwd and lat  4\" FSU c hip flexion 2x15ea                                   Ther Activity            Car Transfer                         Gait Training            LRAD                         Modalities            MHP                                   "

## 2025-04-23 DIAGNOSIS — Z96.642 AFTERCARE FOLLOWING LEFT HIP JOINT REPLACEMENT SURGERY: ICD-10-CM

## 2025-04-23 DIAGNOSIS — Z96.652 AFTERCARE FOLLOWING LEFT KNEE JOINT REPLACEMENT SURGERY: Primary | ICD-10-CM

## 2025-04-23 DIAGNOSIS — Z47.1 AFTERCARE FOLLOWING LEFT KNEE JOINT REPLACEMENT SURGERY: Primary | ICD-10-CM

## 2025-04-23 DIAGNOSIS — Z47.1 AFTERCARE FOLLOWING LEFT HIP JOINT REPLACEMENT SURGERY: ICD-10-CM

## 2025-04-24 ENCOUNTER — OFFICE VISIT (OUTPATIENT)
Dept: PHYSICAL THERAPY | Facility: OTHER | Age: 75
End: 2025-04-24
Payer: MEDICARE

## 2025-04-24 DIAGNOSIS — Z96.643 H/O TOTAL HIP ARTHROPLASTY, BILATERAL: ICD-10-CM

## 2025-04-24 DIAGNOSIS — M17.12 PRIMARY OSTEOARTHRITIS OF LEFT KNEE: Primary | ICD-10-CM

## 2025-04-24 PROCEDURE — 97530 THERAPEUTIC ACTIVITIES: CPT

## 2025-04-24 PROCEDURE — 97110 THERAPEUTIC EXERCISES: CPT

## 2025-04-24 NOTE — PROGRESS NOTES
"Daily Note     Today's date: 2025  Patient name: Solis Dos Santos  : 1950  MRN: [id number]  Referring provider: Leonard Gallagher,*  Dx:   Encounter Diagnosis     ICD-10-CM    1. Primary osteoarthritis of left knee  M17.12       2. H/O total hip arthroplasty, bilateral  Z96.643             Start Time: 1002  Stop Time: 1040  Total time in clinic (min): 38 minutes    Subjective: Pt reports some anxiety associated with upcoming surgery.      Objective: See treatment diary below      Assessment: Tolerated treatment well focused on pre-operative preparation for L TKA on 25. Pt demonstrating independence with HEP; reviewed pain-control, edema control, DVT prevention, home set-up, and mobility with AD. All questions answered. Patient exhibited good technique with therapeutic exercises and would benefit from continued PT      Plan: Continue per plan of care.           AUTH Status:  Date 3/18 3/24 3/27 3/31 4/3 4/7 4/10 4/14 4/17 4/21 4/24   Medicare - RE every 10th visit Used 1- RE 2 3 4 5 6 7 8 9 10 11    Remaining  9 8 7 6 5 4 3 2 1 0 0 - RE nv         Precautions: Hx Neoplasm, Chronic Pain, L PEGGY, R PEGGY    Manuals 3/24 3/27 3/31 4/3 4/7 4/10 4/14 4/17 4/21 4/24   L hip PROM w/in precautions   Hip flexion + knee flexion S  SFP          IASTM             LAD                          Neuro Re-Ed             Quad set      10x5\"  HEP  10x5\"       SAQ     X20 HEP 5# 20x5\"  10# 30x5\" 10# x30 HEP   LAQ  12# x30   X10 HEP 5# x30 10# x30 10# x30 10# x30 HEP   SLR      X10 HEP        Bridges             Hip add              Hip abduction              Hip ER             Seated tibial ER c slider             Clamshell              SS c band                          Tandem stance OG c blazepods 2x30\"ea OG c rebounder ball toss 2x30\"ea OG static 2x45\"ea Green foam c ball toss x10ea  Airex 3x30\" 2x30\"ea OG      SLS FFE 4\" step c rebounder ball toss x20ea      UE supp OG 3x30\":L      Dynamic Balance  Reactive leg " "march c blazepods 3x30\"            Stand Hip 3 way                                       Ther Ex             Bike X10 min X10 min X10 min X10 min X10 min X10 min X10 min X10 min X10 min X15 MIN   TM    Retro walk Karen 20# x15         Heel Slide      X10 HEP 10x10\"  10x10\"     Seated k' flexion        10x10\"     Heel prop     HEP X5' c MHP & calf stretch  X5' c MHP & calf S    X3' heel prop c two chairs & MHP X5' c MHP & calf S X5' c MHP & calf S   Supine HS S 3x60\" Seated 3x60\" Manual 3x60\" 3x60\"ea 3x60\" HEP 3x60 3x60\" 3x60\" 3x60\" 3x60\"   Standing quad S   Prone 3x60\"          Calf S slant board  3x60\" 3x60\"  With strap 2x60\" HEP  3x60\"  3x60\" 3x60\"   Figure-4 ABdER S, seated  C strap 3x30\"           Ankle pumps      HEP        HR/TR     HEP        SKC             Seated L/s flexion             Mod Harlan S             Standing hip flexor S             LTR                          Squat     15# 2x10    Staggered R fwd 2x10   Staggered R 2x12    Even stance 2x12  Staggered R 2x12    Even stance 2x12    Standing march                           Leg press   DL 85# x15  95# x15  105# x15  115# x15     90# x10  110# x10  130# x10      Step ups fwd and lat  4\" FSU c hip flexion 2x15ea                                      Ther Activity             Car Transfer              Pt education          X20'                Gait Training             LRAD                           Modalities             MHP                                     "

## 2025-04-25 NOTE — DISCHARGE INSTR - AVS FIRST PAGE
Discharge Instructions: Knee replacement with Dr. Gallagher    Weight Bearing Status:                                           Weight Bearing as tolerated to the left lower extremity with assistive devices.     Pain Management/Medications  You may resume your usual medications.  You may stop pre-operative vitamins (Folic acid, Ferrous sulfate, and Vitamin C) and Bactroban ointment.   Please take the following medications:  Anti-coagulation (blood clot prevention) - Complete Lovenox injections for 28 days.   Pain medication:  Oxycodone 5 m tablet every 6 hours as needed for severe pain  Robaxin (Muscle relaxer) 500 m tablet up to 3 times a day as needed for mild pain and muscle discomfort  Tylenol 1000 mg: up to three times daily as needed for mild to moderate pain. Do not exceed 3000mg daily   The pain medications will likely cause constipation, in order to decrease this risk consider taking over the counter stool softeners or MiraLax    Continue applying ice to your knee on and off for about 20 minutes as needed.  Continue to elevate your operative leg, with ankle above the level of your heart when possible.    If you have questions or pain concerns, please contact the office.   If you need refills of your medications prior to your next office visit, please contact the office.     Showering/Dressing Instructions:   Do not shower until first follow up appointment.  Keep surgical dressing clean and dry until follow up appointment.  You may adjust the ACE bandage as it slides down   If your leg is swelling around the bandage due to continued sliding, please remove the bandage   No baths, swimming, or submerging your leg until cleared to do so.      Driving Instructions:  No driving until cleared by Orthopaedic Surgery.    PT/OT:  Continue PT/OT on outpatient basis as directed    Follow up instructions:   Follow up as scheduled on 2025 in [place].  If you need to change or cancel your appointment for  any reason, please call the clinic at [phone]    Please contact the office if you experience any of the following:  Excessive bleeding (bleeding through your dressing)  Fever greater than 101 degrees F after 48 hours (low grade fevers the day or two after surgery are normal)  Persistent nausea or vomiting  Decreased sensation or discoloration of the operative limb  Pain or swelling that is getting worse and not better with medication    Miscellaneous:  Advise against any dental cleanings or procedures for 3 months after surgery.   - If there is a dental emergency, please contact the office for further instructions.

## 2025-04-28 ENCOUNTER — ANESTHESIA (OUTPATIENT)
Age: 75
End: 2025-04-28
Payer: MEDICARE

## 2025-04-28 ENCOUNTER — HOSPITAL ENCOUNTER (OUTPATIENT)
Age: 75
Setting detail: OUTPATIENT SURGERY
Discharge: HOME | End: 2025-04-29
Attending: ORTHOPAEDIC SURGERY | Admitting: ORTHOPAEDIC SURGERY
Payer: MEDICARE

## 2025-04-28 DIAGNOSIS — Z96.652 AFTERCARE FOLLOWING LEFT KNEE JOINT REPLACEMENT SURGERY: ICD-10-CM

## 2025-04-28 DIAGNOSIS — Z47.1 AFTERCARE FOLLOWING LEFT KNEE JOINT REPLACEMENT SURGERY: ICD-10-CM

## 2025-04-28 DIAGNOSIS — G89.29 CHRONIC PAIN OF LEFT KNEE: ICD-10-CM

## 2025-04-28 DIAGNOSIS — M25.562 CHRONIC PAIN OF LEFT KNEE: ICD-10-CM

## 2025-04-28 DIAGNOSIS — M17.12 PRIMARY OSTEOARTHRITIS OF LEFT KNEE: Primary | ICD-10-CM

## 2025-04-28 PROCEDURE — C1776 JOINT DEVICE (IMPLANTABLE): HCPCS | Performed by: ORTHOPAEDIC SURGERY

## 2025-04-28 PROCEDURE — NC001 PR NO CHARGE: Performed by: ORTHOPAEDIC SURGERY

## 2025-04-28 PROCEDURE — C1713 ANCHOR/SCREW BN/BN,TIS/BN: HCPCS | Performed by: ORTHOPAEDIC SURGERY

## 2025-04-28 PROCEDURE — 27447 TOTAL KNEE ARTHROPLASTY: CPT | Performed by: ORTHOPAEDIC SURGERY

## 2025-04-28 PROCEDURE — 97167 OT EVAL HIGH COMPLEX 60 MIN: CPT

## 2025-04-28 PROCEDURE — 99222 1ST HOSP IP/OBS MODERATE 55: CPT | Performed by: INTERNAL MEDICINE

## 2025-04-28 PROCEDURE — S2900 ROBOTIC SURGICAL SYSTEM: HCPCS | Performed by: ORTHOPAEDIC SURGERY

## 2025-04-28 PROCEDURE — 97163 PT EVAL HIGH COMPLEX 45 MIN: CPT | Performed by: PHYSICAL THERAPIST

## 2025-04-28 DEVICE — ATTUNE KNEE SYSTEM FEMORAL POSTERIOR STABILIZED SIZE 7 LEFT CEMENTED
Type: IMPLANTABLE DEVICE | Site: KNEE | Status: FUNCTIONAL
Brand: ATTUNE

## 2025-04-28 DEVICE — ATTUNE PATELLA MEDIALIZED DOME 38MM CEMENTED AOX
Type: IMPLANTABLE DEVICE | Site: KNEE | Status: FUNCTIONAL
Brand: ATTUNE

## 2025-04-28 DEVICE — ATTUNE KNEE SYSTEM TIBIAL BASE FIXED BEARING SIZE 7 CEMENTED
Type: IMPLANTABLE DEVICE | Site: KNEE | Status: FUNCTIONAL
Brand: ATTUNE

## 2025-04-28 DEVICE — SMARTSET HV HIGH VISCOSITY BONE CEMENT 40G
Type: IMPLANTABLE DEVICE | Site: KNEE | Status: FUNCTIONAL
Brand: SMARTSET

## 2025-04-28 DEVICE — ATTUNE KNEE SYSTEM TIBIAL INSERT FIXED BEARING POSTERIOR STABILIZED 7 8MM AOX
Type: IMPLANTABLE DEVICE | Site: KNEE | Status: FUNCTIONAL
Brand: ATTUNE

## 2025-04-28 RX ORDER — OXYCODONE HYDROCHLORIDE 5 MG/1
5 TABLET ORAL EVERY 6 HOURS PRN
Qty: 30 TABLET | Refills: 0 | Status: SHIPPED | OUTPATIENT
Start: 2025-04-28 | End: 2025-05-08

## 2025-04-28 RX ORDER — GABAPENTIN 300 MG/1
300 CAPSULE ORAL ONCE
Status: COMPLETED | OUTPATIENT
Start: 2025-04-28 | End: 2025-04-28

## 2025-04-28 RX ORDER — CEFAZOLIN SODIUM 2 G/50ML
2000 SOLUTION INTRAVENOUS ONCE
Status: COMPLETED | OUTPATIENT
Start: 2025-04-28 | End: 2025-04-28

## 2025-04-28 RX ORDER — OXYCODONE HYDROCHLORIDE 10 MG/1
10 TABLET ORAL EVERY 4 HOURS PRN
Status: DISCONTINUED | OUTPATIENT
Start: 2025-04-28 | End: 2025-04-29 | Stop reason: HOSPADM

## 2025-04-28 RX ORDER — ACETAMINOPHEN 325 MG/1
975 TABLET ORAL EVERY 6 HOURS PRN
Status: DISCONTINUED | OUTPATIENT
Start: 2025-04-28 | End: 2025-04-29 | Stop reason: HOSPADM

## 2025-04-28 RX ORDER — ONDANSETRON 2 MG/ML
INJECTION INTRAMUSCULAR; INTRAVENOUS AS NEEDED
Status: DISCONTINUED | OUTPATIENT
Start: 2025-04-28 | End: 2025-04-28

## 2025-04-28 RX ORDER — FENTANYL CITRATE/PF 50 MCG/ML
50 SYRINGE (ML) INJECTION
Status: DISCONTINUED | OUTPATIENT
Start: 2025-04-28 | End: 2025-04-28 | Stop reason: HOSPADM

## 2025-04-28 RX ORDER — MIDAZOLAM HYDROCHLORIDE 2 MG/2ML
INJECTION, SOLUTION INTRAMUSCULAR; INTRAVENOUS AS NEEDED
Status: DISCONTINUED | OUTPATIENT
Start: 2025-04-28 | End: 2025-04-28

## 2025-04-28 RX ORDER — ONDANSETRON 2 MG/ML
4 INJECTION INTRAMUSCULAR; INTRAVENOUS ONCE AS NEEDED
Status: DISCONTINUED | OUTPATIENT
Start: 2025-04-28 | End: 2025-04-28 | Stop reason: HOSPADM

## 2025-04-28 RX ORDER — ATORVASTATIN CALCIUM 20 MG/1
10 TABLET, FILM COATED ORAL
Status: DISCONTINUED | OUTPATIENT
Start: 2025-04-28 | End: 2025-04-29 | Stop reason: HOSPADM

## 2025-04-28 RX ORDER — DEXAMETHASONE SODIUM PHOSPHATE 10 MG/ML
INJECTION, SOLUTION INTRAMUSCULAR; INTRAVENOUS AS NEEDED
Status: DISCONTINUED | OUTPATIENT
Start: 2025-04-28 | End: 2025-04-28

## 2025-04-28 RX ORDER — HYDROMORPHONE HCL/PF 1 MG/ML
SYRINGE (ML) INJECTION AS NEEDED
Status: DISCONTINUED | OUTPATIENT
Start: 2025-04-28 | End: 2025-04-28

## 2025-04-28 RX ORDER — CALCIUM CARBONATE 500 MG/1
1000 TABLET, CHEWABLE ORAL DAILY PRN
Status: DISCONTINUED | OUTPATIENT
Start: 2025-04-28 | End: 2025-04-29 | Stop reason: HOSPADM

## 2025-04-28 RX ORDER — CHLORHEXIDINE GLUCONATE ORAL RINSE 1.2 MG/ML
15 SOLUTION DENTAL ONCE
Status: COMPLETED | OUTPATIENT
Start: 2025-04-28 | End: 2025-04-28

## 2025-04-28 RX ORDER — HYDROMORPHONE HCL/PF 1 MG/ML
0.5 SYRINGE (ML) INJECTION
Status: DISCONTINUED | OUTPATIENT
Start: 2025-04-28 | End: 2025-04-28 | Stop reason: HOSPADM

## 2025-04-28 RX ORDER — DOCUSATE SODIUM 100 MG/1
100 CAPSULE, LIQUID FILLED ORAL 2 TIMES DAILY
Status: DISCONTINUED | OUTPATIENT
Start: 2025-04-28 | End: 2025-04-29 | Stop reason: HOSPADM

## 2025-04-28 RX ORDER — PROPOFOL 10 MG/ML
INJECTION, EMULSION INTRAVENOUS AS NEEDED
Status: DISCONTINUED | OUTPATIENT
Start: 2025-04-28 | End: 2025-04-28

## 2025-04-28 RX ORDER — SODIUM CHLORIDE, SODIUM LACTATE, POTASSIUM CHLORIDE, CALCIUM CHLORIDE 600; 310; 30; 20 MG/100ML; MG/100ML; MG/100ML; MG/100ML
125 INJECTION, SOLUTION INTRAVENOUS CONTINUOUS
Status: DISCONTINUED | OUTPATIENT
Start: 2025-04-28 | End: 2025-04-29 | Stop reason: HOSPADM

## 2025-04-28 RX ORDER — MAGNESIUM HYDROXIDE 1200 MG/15ML
LIQUID ORAL AS NEEDED
Status: DISCONTINUED | OUTPATIENT
Start: 2025-04-28 | End: 2025-04-28 | Stop reason: HOSPADM

## 2025-04-28 RX ORDER — ONDANSETRON 2 MG/ML
4 INJECTION INTRAMUSCULAR; INTRAVENOUS EVERY 6 HOURS PRN
Status: DISCONTINUED | OUTPATIENT
Start: 2025-04-28 | End: 2025-04-29 | Stop reason: HOSPADM

## 2025-04-28 RX ORDER — ROCURONIUM BROMIDE 10 MG/ML
INJECTION, SOLUTION INTRAVENOUS AS NEEDED
Status: DISCONTINUED | OUTPATIENT
Start: 2025-04-28 | End: 2025-04-28

## 2025-04-28 RX ORDER — LIDOCAINE HYDROCHLORIDE 20 MG/ML
INJECTION, SOLUTION EPIDURAL; INFILTRATION; INTRACAUDAL; PERINEURAL AS NEEDED
Status: DISCONTINUED | OUTPATIENT
Start: 2025-04-28 | End: 2025-04-28

## 2025-04-28 RX ORDER — ACETAMINOPHEN 500 MG
1000 TABLET ORAL EVERY 6 HOURS PRN
Qty: 120 TABLET | Refills: 0 | Status: SHIPPED | OUTPATIENT
Start: 2025-04-28

## 2025-04-28 RX ORDER — OXYCODONE HYDROCHLORIDE 5 MG/1
5 TABLET ORAL EVERY 4 HOURS PRN
Status: DISCONTINUED | OUTPATIENT
Start: 2025-04-28 | End: 2025-04-29 | Stop reason: HOSPADM

## 2025-04-28 RX ORDER — CEFAZOLIN SODIUM 1 G/3ML
INJECTION, POWDER, FOR SOLUTION INTRAMUSCULAR; INTRAVENOUS AS NEEDED
Status: DISCONTINUED | OUTPATIENT
Start: 2025-04-28 | End: 2025-04-28

## 2025-04-28 RX ORDER — TAMSULOSIN HYDROCHLORIDE 0.4 MG/1
0.4 CAPSULE ORAL
Status: DISCONTINUED | OUTPATIENT
Start: 2025-04-28 | End: 2025-04-29 | Stop reason: HOSPADM

## 2025-04-28 RX ORDER — CEFAZOLIN SODIUM 1 G/50ML
1000 SOLUTION INTRAVENOUS EVERY 8 HOURS
Status: COMPLETED | OUTPATIENT
Start: 2025-04-28 | End: 2025-04-29

## 2025-04-28 RX ORDER — FENTANYL CITRATE 50 UG/ML
INJECTION, SOLUTION INTRAMUSCULAR; INTRAVENOUS AS NEEDED
Status: DISCONTINUED | OUTPATIENT
Start: 2025-04-28 | End: 2025-04-28

## 2025-04-28 RX ORDER — METHOCARBAMOL 500 MG/1
500 TABLET, FILM COATED ORAL EVERY 6 HOURS SCHEDULED
Status: DISCONTINUED | OUTPATIENT
Start: 2025-04-28 | End: 2025-04-29 | Stop reason: HOSPADM

## 2025-04-28 RX ORDER — ENOXAPARIN SODIUM 100 MG/ML
40 INJECTION SUBCUTANEOUS DAILY
Status: DISCONTINUED | OUTPATIENT
Start: 2025-04-28 | End: 2025-04-29 | Stop reason: HOSPADM

## 2025-04-28 RX ORDER — METHOCARBAMOL 500 MG/1
500 TABLET, FILM COATED ORAL 3 TIMES DAILY PRN
Qty: 60 TABLET | Refills: 0 | Status: SHIPPED | OUTPATIENT
Start: 2025-04-28

## 2025-04-28 RX ORDER — HYDROMORPHONE HCL/PF 1 MG/ML
0.5 SYRINGE (ML) INJECTION EVERY 2 HOUR PRN
Status: DISCONTINUED | OUTPATIENT
Start: 2025-04-28 | End: 2025-04-29 | Stop reason: HOSPADM

## 2025-04-28 RX ORDER — TRANEXAMIC ACID 10 MG/ML
1000 INJECTION, SOLUTION INTRAVENOUS ONCE
Status: COMPLETED | OUTPATIENT
Start: 2025-04-28 | End: 2025-04-28

## 2025-04-28 RX ORDER — EPHEDRINE SULFATE 50 MG/ML
INJECTION INTRAVENOUS AS NEEDED
Status: DISCONTINUED | OUTPATIENT
Start: 2025-04-28 | End: 2025-04-28

## 2025-04-28 RX ORDER — GABAPENTIN 100 MG/1
100 CAPSULE ORAL EVERY 8 HOURS
Status: DISCONTINUED | OUTPATIENT
Start: 2025-04-28 | End: 2025-04-29 | Stop reason: HOSPADM

## 2025-04-28 RX ORDER — BUPIVACAINE HYDROCHLORIDE 2.5 MG/ML
INJECTION, SOLUTION EPIDURAL; INFILTRATION; INTRACAUDAL; PERINEURAL
Status: COMPLETED | OUTPATIENT
Start: 2025-04-28 | End: 2025-04-28

## 2025-04-28 RX ORDER — ACETAMINOPHEN 325 MG/1
975 TABLET ORAL ONCE
Status: COMPLETED | OUTPATIENT
Start: 2025-04-28 | End: 2025-04-28

## 2025-04-28 RX ORDER — LEVOTHYROXINE SODIUM 100 UG/1
150 TABLET ORAL
Status: DISCONTINUED | OUTPATIENT
Start: 2025-04-28 | End: 2025-04-29 | Stop reason: HOSPADM

## 2025-04-28 RX ADMIN — NOREPINEPHRINE BITARTRATE 8 MCG: 1 SOLUTION INTRAVENOUS at 12:10

## 2025-04-28 RX ADMIN — NOREPINEPHRINE BITARTRATE 16 MCG: 1 SOLUTION INTRAVENOUS at 11:15

## 2025-04-28 RX ADMIN — CHLORHEXIDINE GLUCONATE 15 ML: 1.2 SOLUTION ORAL at 08:56

## 2025-04-28 RX ADMIN — SODIUM CHLORIDE, SODIUM LACTATE, POTASSIUM CHLORIDE, AND CALCIUM CHLORIDE 125 ML/HR: .6; .31; .03; .02 INJECTION, SOLUTION INTRAVENOUS at 14:33

## 2025-04-28 RX ADMIN — OXYCODONE HYDROCHLORIDE 5 MG: 5 TABLET ORAL at 13:37

## 2025-04-28 RX ADMIN — EPHEDRINE SULFATE 15 MG: 50 INJECTION, SOLUTION INTRAVENOUS at 11:11

## 2025-04-28 RX ADMIN — LIDOCAINE HYDROCHLORIDE 100 MG: 20 INJECTION, SOLUTION EPIDURAL; INFILTRATION; INTRACAUDAL at 11:11

## 2025-04-28 RX ADMIN — ONDANSETRON 4 MG: 2 INJECTION INTRAMUSCULAR; INTRAVENOUS at 11:45

## 2025-04-28 RX ADMIN — CEFAZOLIN 1000 MG: 1 INJECTION, POWDER, FOR SOLUTION INTRAMUSCULAR; INTRAVENOUS at 12:12

## 2025-04-28 RX ADMIN — GABAPENTIN 100 MG: 100 CAPSULE ORAL at 23:21

## 2025-04-28 RX ADMIN — LEVOTHYROXINE SODIUM 150 MCG: 100 TABLET ORAL at 14:33

## 2025-04-28 RX ADMIN — FENTANYL CITRATE 25 MCG: 50 INJECTION INTRAMUSCULAR; INTRAVENOUS at 11:19

## 2025-04-28 RX ADMIN — HYDROMORPHONE HYDROCHLORIDE 0.5 MG: 1 INJECTION, SOLUTION INTRAMUSCULAR; INTRAVENOUS; SUBCUTANEOUS at 12:28

## 2025-04-28 RX ADMIN — DOCUSATE SODIUM 100 MG: 100 CAPSULE, LIQUID FILLED ORAL at 17:50

## 2025-04-28 RX ADMIN — HYDROMORPHONE HYDROCHLORIDE 0.5 MG: 1 INJECTION, SOLUTION INTRAMUSCULAR; INTRAVENOUS; SUBCUTANEOUS at 13:17

## 2025-04-28 RX ADMIN — FENTANYL CITRATE 75 MCG: 50 INJECTION INTRAMUSCULAR; INTRAVENOUS at 12:12

## 2025-04-28 RX ADMIN — HYDROMORPHONE HYDROCHLORIDE 0.5 MG: 1 INJECTION, SOLUTION INTRAMUSCULAR; INTRAVENOUS; SUBCUTANEOUS at 13:26

## 2025-04-28 RX ADMIN — PROPOFOL 300 MG: 10 INJECTION, EMULSION INTRAVENOUS at 11:02

## 2025-04-28 RX ADMIN — SODIUM CHLORIDE, SODIUM LACTATE, POTASSIUM CHLORIDE, AND CALCIUM CHLORIDE 125 ML/HR: .6; .31; .03; .02 INJECTION, SOLUTION INTRAVENOUS at 09:31

## 2025-04-28 RX ADMIN — MIDAZOLAM 1 MG: 1 INJECTION INTRAMUSCULAR; INTRAVENOUS at 10:27

## 2025-04-28 RX ADMIN — BUPIVACAINE 10 ML: 13.3 INJECTION, SUSPENSION, LIPOSOMAL INFILTRATION at 10:30

## 2025-04-28 RX ADMIN — ROCURONIUM BROMIDE 50 MG: 10 INJECTION, SOLUTION INTRAVENOUS at 11:02

## 2025-04-28 RX ADMIN — PROPOFOL 30 MG: 10 INJECTION, EMULSION INTRAVENOUS at 12:24

## 2025-04-28 RX ADMIN — FENTANYL CITRATE 50 MCG: 50 INJECTION INTRAMUSCULAR; INTRAVENOUS at 13:01

## 2025-04-28 RX ADMIN — METHOCARBAMOL 500 MG: 500 TABLET ORAL at 16:14

## 2025-04-28 RX ADMIN — GABAPENTIN 300 MG: 300 CAPSULE ORAL at 09:11

## 2025-04-28 RX ADMIN — ATORVASTATIN CALCIUM 10 MG: 20 TABLET, FILM COATED ORAL at 22:07

## 2025-04-28 RX ADMIN — CEFAZOLIN SODIUM 1000 MG: 1 SOLUTION INTRAVENOUS at 18:27

## 2025-04-28 RX ADMIN — CEFAZOLIN SODIUM 2000 MG: 2 SOLUTION INTRAVENOUS at 10:40

## 2025-04-28 RX ADMIN — METHOCARBAMOL 500 MG: 500 TABLET ORAL at 22:06

## 2025-04-28 RX ADMIN — GABAPENTIN 100 MG: 100 CAPSULE ORAL at 14:34

## 2025-04-28 RX ADMIN — ACETAMINOPHEN 975 MG: 325 TABLET ORAL at 08:56

## 2025-04-28 RX ADMIN — FENTANYL CITRATE 50 MCG: 50 INJECTION INTRAMUSCULAR; INTRAVENOUS at 10:27

## 2025-04-28 RX ADMIN — FENTANYL CITRATE 50 MCG: 50 INJECTION INTRAMUSCULAR; INTRAVENOUS at 13:11

## 2025-04-28 RX ADMIN — BUPIVACAINE HYDROCHLORIDE 10 ML: 2.5 INJECTION, SOLUTION EPIDURAL; INFILTRATION; INTRACAUDAL; PERINEURAL at 10:28

## 2025-04-28 RX ADMIN — SUGAMMADEX 200 MG: 100 INJECTION, SOLUTION INTRAVENOUS at 12:22

## 2025-04-28 RX ADMIN — SODIUM CHLORIDE, SODIUM LACTATE, POTASSIUM CHLORIDE, AND CALCIUM CHLORIDE 125 ML/HR: .6; .31; .03; .02 INJECTION, SOLUTION INTRAVENOUS at 22:00

## 2025-04-28 RX ADMIN — FENTANYL CITRATE 50 MCG: 50 INJECTION INTRAMUSCULAR; INTRAVENOUS at 11:02

## 2025-04-28 RX ADMIN — TRANEXAMIC ACID 1000 MG: 10 INJECTION, SOLUTION INTRAVENOUS at 11:09

## 2025-04-28 RX ADMIN — TAMSULOSIN HYDROCHLORIDE 0.4 MG: 0.4 CAPSULE ORAL at 16:14

## 2025-04-28 RX ADMIN — ACETAMINOPHEN 975 MG: 325 TABLET ORAL at 19:58

## 2025-04-28 RX ADMIN — DEXAMETHASONE SODIUM PHOSPHATE 10 MG: 10 INJECTION, SOLUTION INTRAMUSCULAR; INTRAVENOUS at 11:45

## 2025-04-28 RX ADMIN — ENOXAPARIN SODIUM 40 MG: 40 INJECTION SUBCUTANEOUS at 22:07

## 2025-04-28 RX ADMIN — BUPIVACAINE HYDROCHLORIDE 10 ML: 2.5 INJECTION, SOLUTION EPIDURAL; INFILTRATION; INTRACAUDAL; PERINEURAL at 10:30

## 2025-04-28 RX ADMIN — HYDROMORPHONE HYDROCHLORIDE 0.5 MG: 1 INJECTION, SOLUTION INTRAMUSCULAR; INTRAVENOUS; SUBCUTANEOUS at 12:24

## 2025-04-28 NOTE — ANESTHESIA PROCEDURE NOTES
Peripheral Block    Patient location during procedure: holding area  Start time: 4/28/2025 10:30 AM  Reason for block: at surgeon's request and post-op pain management  Staffing  Performed by: Yonis Julian MD  Authorized by: Yonis Julian MD    Preanesthetic Checklist  Completed: patient identified, IV checked, site marked, risks and benefits discussed, surgical consent, monitors and equipment checked, pre-op evaluation and timeout performed  Peripheral Block  Patient position: supine  Prep: ChloraPrep  Patient monitoring: frequent blood pressure checks, continuous pulse oximetry and heart rate  Block type: IPACK  Laterality: left  Injection technique: single-shot  Procedures: ultrasound guided, Ultrasound guidance required for the procedure to increase accuracy and safety of medication placement and decrease risk of complications.  Ultrasound permanent image saved  bupivacaine (PF) (MARCAINE) 0.25 % injection 20 mL - Perineural   10 mL - 4/28/2025 10:30:00 AM  bupivacaine liposomal (EXPAREL) 1.3 % injection 20 mL - Perineural   10 mL - 4/28/2025 10:30:00 AM  Needle  Needle type: Stimuplex   Needle gauge: 20 G  Needle length: 4 in  Needle localization: anatomical landmarks and ultrasound guidance  Assessment  Injection assessment: incremental injection, frequent aspiration, injected with ease, negative aspiration, negative for heart rate change, no paresthesia on injection, no symptoms of intraneural/intravenous injection and needle tip visualized at all times  Paresthesia pain: none  Post-procedure:  site cleaned  patient tolerated the procedure well with no immediate complications

## 2025-04-28 NOTE — H&P
H&P Exam - Orthopedics   Solis Dos Santos 74 y.o. male MRN: [id number]      Assessment/Plan   Assessment:  left Knee Osteoarthritis in this adult male who continues to have weightbearing pain and dysfunction despite appropriate nonsurgical treatments    Plan:  left  Total Knee Arthroplasty.  Patient is familiar with risks, benefits, and alternatives    TOTAL KNEE REPLACEMENT INDICATIONS AND RISKS    We had a lengthy discussion with the patient regarding the potential options for treatment. The patient has had an extensive conservative management course up until this time and therefore I do not feel that any additional conservative management will provide additional relief. The patient's symptoms have progressively worsened to the point where they now limit the patient's daily activities and quality of life.     At this time, I feel that this patient would be an excellent candidate for a total knee replacement. The patient has failed non-operative care and continues to have unacceptable symptoms. We discussed the treatment options and alternatives and the risks and benefits of surgery in great detail.    While no guarantees can be made, total knee replacement has a very high success rate in terms of relieving a patient's knee pain and returning them to a more active, independent lifestyle for 10-15 years or more. All surgery carries some risk; for knee replacement, the complication rate is low but may include: death (very rare), infection, bleeding requiring transfusion, blood clots in legs traveling to lungs, nerve and/or blood vessel damage, bone fracture, persistent knee pain and/or stiffness, and repeat surgery(ies). The risk of a major complication is about 1-2 per 1000 cases. Total knee replacement should only be done if conservative treatment has failed. The revision rate for total knee replacements is about 1-2% per year; in other words, 85-95% of knee replacements may last 10 years; 75-85% last 20 years; and  so on, assuming no injury to the knee. Finally we discussed anesthesia related complications which will be discussed in greater detail with the anesthesia team before surgery.     The patient was shown total knee booklets, diagrams and/or models and all of their questions have been answered at the present time. The patient may call or come in if they have any other concerns or questions. The patient also understands the post-operative rehabilitation process and the need for their cooperation and participation, and that their results may be compromised by their lack of compliance. The patient would like to proceed. Patient is encouraged to seek additional opinions if they so desire. The patient voiced their understanding of the surgical plan and potential complications and wishes to proceed with surgery.      History of Present Illness   HPI:  Solis Dos Santos is a 74 y.o. male who presents with pain in the left knee. Patient is no longer getting adequate relief from non-operative modalities. Patient is continuing to have debilitating pain from their knee, interfering with daily activities and sleep. Patient denies any concerns with infections, new neuropathies, or any acute injuries.     Historical Information  Review Of Systems:   Skin: Normal  Neuro: See HPI  Musculoskeletal: See HPI  14 point review of systems negative except as stated above     Past Medical History:   Past Medical History:   Diagnosis Date    Acute blood loss anemia 08/09/2019    Aftercare following right hip joint replacement surgery 11/05/2019    Anxiety disorder     Atherosclerotic heart disease of native coronary artery without angina pectoris     Benign prostatic hyperplasia without lower urinary tract symptoms     Cancer (HCC)     bladder    Clotting disorder (HCC) 1/25/2023    Blood in urine    Disease of thyroid gland     hypo    Dyslipidemia     GERD (gastroesophageal reflux disease)     manages w/ diet, takes no meds    Hypertension      Impaired fasting blood sugar     Kidney stone     Low back pain     Obesity     Osteoarthritis     last assesed 6-6-16    Other chest pain     Paresthesia of skin     Polyneuropathy     last assesed 5-8-17    Pure hypercholesterolemia     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)     Spinal stenosis     Suspected UTI 03/09/2023    Urinary tract infection     Wears glasses        Past Surgical History:   Past Surgical History:   Procedure Laterality Date    BACK SURGERY      L4-L5 sx    CHOLECYSTECTOMY      COLONOSCOPY  02/06/2019    CYSTOSCOPY N/A 04/26/2023    Procedure: CYSTOSCOPY w/ bladder biopsy;  Surgeon: Geo Bentley MD;  Location: [facility];  Service: Urology    HIP SURGERY  August 2019    Replacement    JOINT REPLACEMENT  August 2019    Hip replacement    DE ARTHRP ACETBLR/PROX FEM PROSTC AGRFT/ALGRFT Right 08/05/2019    Procedure: ARTHROPLASTY HIP TOTAL;  Surgeon: Leonard Gallagher MD;  Location: [facility];  Service: Orthopedics    DE ARTHRP ACETBLR/PROX FEM PROSTC AGRFT/ALGRFT Left 11/4/2024    Procedure: Left total hip arthroplasty;  Surgeon: Leonard Gallagher MD;  Location: [facility];  Service: Orthopedics    DE CYSTO W/REMOVAL OF LESIONS SMALL N/A 03/23/2023    Procedure: TRANSURETHRAL RESECTION OF BLADDER TUMOR (TURBT), mitomycin ;  Surgeon: Geo Bentley MD;  Location: [facility];  Service: Urology    DE CYSTO W/REMOVAL OF LESIONS SMALL N/A 04/26/2023    Procedure: TRANSURETHRAL RESECTION OF BLADDER TUMOR (TURBT), cystoscopy with bladder biopsy;  Surgeon: Geo Bentley MD;  Location: [facility];  Service: Urology    TONSILLECTOMY         Family History:  Family history reviewed and non-contributory  Family History   Problem Relation Age of Onset    Arthritis Other     Heart disease Father     Arthritis Mother        Social History:  Social History     Socioeconomic History    Marital status: /Civil Union     Spouse name: None     Number of children: None    Years of education: None    Highest education level: None   Occupational History    None   Tobacco Use    Smoking status: Former     Current packs/day: 0.00     Average packs/day: 1 pack/day for 20.8 years (20.8 ttl pk-yrs)     Types: Cigarettes     Start date: 1967     Quit date: 1987     Years since quittin.5    Smokeless tobacco: Never   Vaping Use    Vaping status: Never Used   Substance and Sexual Activity    Alcohol use: Yes     Alcohol/week: 1.0 standard drink of alcohol     Types: 1 Cans of beer per week     Comment: rarely    Drug use: Never    Sexual activity: Not Currently     Partners: Female     Birth control/protection: None   Other Topics Concern    None   Social History Narrative    Smoking: Non-smoker    As per [employer]     Social Drivers of Health     Financial Resource Strain: Low Risk  (9/15/2023)    Overall Financial Resource Strain (CARDIA)     Difficulty of Paying Living Expenses: Not hard at all   Food Insecurity: No Food Insecurity (2024)    Nursing - Inadequate Food Risk Classification     Worried About Running Out of Food in the Last Year: Never true     Ran Out of Food in the Last Year: Never true     Ran Out of Food in the Last Year: Never true   Transportation Needs: No Transportation Needs (2024)    Nursing - Transportation Risk Classification     Lack of Transportation: Not on file     Lack of Transportation: No   Physical Activity: Not on file   Stress: Not on file   Social Connections: Not on file   Intimate Partner Violence: Unknown (2024)    Nursing IPS     Feels Physically and Emotionally Safe: Not on file     Physically Hurt by Someone: Not on file     Humiliated or Emotionally Abused by Someone: Not on file     Physically Hurt by Someone: No     Hurt or Threatened by Someone: No   Housing Stability: Unknown (2024)    Nursing: Inadequate Housing Risk Classification     Has Housing: Not on file     Worried  "About Losing Housing: Not on file     Unable to Get Utilities: Not on file     Unable to Pay for Housing in the Last Year: No     Has Housin       Allergies:   No Known Allergies        Labs:  0   Lab Value Date/Time    HCT 49.0 202534    HCT 41.2 2024 0513    HCT 46.5 10/10/2024 0853    HGB 16.0 2025 0834    HGB 14.1 2024    HGB 15.5 10/10/2024 0853    INR 0.99 2025 0834    WBC 5.31 202534    WBC 8.66 2024    WBC 5.11 10/10/2024 0853    CRP <3.0 2019 1014       Meds:    Current Facility-Administered Medications:     ceFAZolin (ANCEF) IVPB (premix in dextrose) 2,000 mg 50 mL, 2,000 mg, Intravenous, Once, Leonard Gallagher MD    lactated ringers infusion, 125 mL/hr, Intravenous, Continuous, Leonard Gallagher MD, Last Rate: 125 mL/hr at 25 0931, 125 mL/hr at 25 0931    tranexamic acid (CYKLOKAPRON) 1000-0.7 MG/100ML-% injection 1,000 mg, 1,000 mg, Intravenous, Once, Leonard Gallagher MD    Blood Culture:   No results found for: \"BLOODCX\"    Wound Culture:   Lab Results   Component Value Date    WOUNDCULT 4+ Growth of Staphylococcus aureus (A) 2025    WOUNDCULT 1+ Growth of 2025       Ins and Outs:  No intake/output data recorded.          Physical Exam  /79   Pulse 73   Temp 98.5 °F (36.9 °C) (Temporal)   Resp 16   Ht 6' 1\" (1.854 m)   Wt 124 kg (272 lb 12.8 oz)   SpO2 99%   BMI 35.99 kg/m²   /79   Pulse 73   Temp 98.5 °F (36.9 °C) (Temporal)   Resp 16   Ht 6' 1\" (1.854 m)   Wt 124 kg (272 lb 12.8 oz)   SpO2 99%   BMI 35.99 kg/m²   Gen: No acute distress, resting comfortably in bed  HEENT: Eyes clear, moist mucus membranes, hearing intact  Respiratory: No audible wheezing or stridor  Cardiovascular: Well Perfused peripherally, 2+ distal pulse  Abdomen: nondistended, no peritoneal signs  Ortho Exam: limited ROM due to pain and mechanical blocking  Neuro Exam: intact    Lab Results: " Reviewed  Imaging: Reviewed

## 2025-04-28 NOTE — OCCUPATIONAL THERAPY NOTE
Occupational Therapy Evaluation     Patient Name: Solis Dos Santos  Today's Date: 4/28/2025  Problem List  Active Problems:    Hypothyroidism    Hyperlipidemia    Primary osteoarthritis of left knee    BPH (benign prostatic hyperplasia)    Prediabetes    Past Medical History  Past Medical History:   Diagnosis Date    Acute blood loss anemia 08/09/2019    Aftercare following right hip joint replacement surgery 11/05/2019    Anxiety disorder     Atherosclerotic heart disease of native coronary artery without angina pectoris     Benign prostatic hyperplasia without lower urinary tract symptoms     Cancer (HCC)     bladder    Clotting disorder (HCC) 1/25/2023    Blood in urine    Disease of thyroid gland     hypo    Dyslipidemia     GERD (gastroesophageal reflux disease)     manages w/ diet, takes no meds    Hypertension     Impaired fasting blood sugar     Kidney stone     Low back pain     Obesity     Osteoarthritis     last assesed 6-6-16    Other chest pain     Paresthesia of skin     Polyneuropathy     last assesed 5-8-17    Pure hypercholesterolemia     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)     Spinal stenosis     Suspected UTI 03/09/2023    Urinary tract infection     Wears glasses      Past Surgical History  Past Surgical History:   Procedure Laterality Date    BACK SURGERY      L4-L5 sx    CHOLECYSTECTOMY      COLONOSCOPY  02/06/2019    CYSTOSCOPY N/A 04/26/2023    Procedure: CYSTOSCOPY w/ bladder biopsy;  Surgeon: Geo Bentley MD;  Location: [facility];  Service: Urology    HIP SURGERY  August 2019    Replacement    JOINT REPLACEMENT  August 2019    Hip replacement    IA ARTHRP ACETBLR/PROX FEM PROSTC AGRFT/ALGRFT Right 08/05/2019    Procedure: ARTHROPLASTY HIP TOTAL;  Surgeon: Leonard Gallagher MD;  Location: [facility];  Service: Orthopedics    IA ARTHRP ACETBLR/PROX FEM PROSTC AGRFT/ALGRFT Left 11/4/2024    Procedure: Left total hip arthroplasty;  Surgeon:  Leonard Gallagher MD;  Location:  MAIN OR;  Service: Orthopedics    NY CYSTO W/REMOVAL OF LESIONS SMALL N/A 03/23/2023    Procedure: TRANSURETHRAL RESECTION OF BLADDER TUMOR (TURBT), mitomycin ;  Surgeon: Geo Bentley MD;  [facility];  Service: Urology    NY CYSTO W/REMOVAL OF LESIONS SMALL N/A 04/26/2023    Procedure: TRANSURETHRAL RESECTION OF BLADDER TUMOR (TURBT), cystoscopy with bladder biopsy;  Surgeon: Geo Bentley MD;  [facility];  Service: Urology    TONSILLECTOMY          04/28/25 1505   OT Last Visit   OT Visit Date 04/28/25   Note Type   Note type Evaluation   Additional Comments Pt greeted in supine, agreeable to OT evaluation   Pain Assessment   Pain Assessment Tool 0-10   Pain Score 1   Pain Location/Orientation Orientation: Left;Location: [facility] Pain Intervention(s) Cold applied;Repositioned;Ambulation/increased activity;Elevated;Emotional support;Rest   Restrictions/Precautions   Weight Bearing Precautions Per Order Yes   LLE Weight Bearing Per Order WBAT   Other Precautions Chair Alarm;Bed Alarm;WBS;Fall Risk;Pain  (hemovac)   Home Living   Type of Home House  (ranch)   Home Layout One level;Performs ADLs on one level;Able to live on main level with bedroom/bathroom;Stairs to enter without rails  (from garage, 2 small steps to get in, no rail)   Bathroom Shower/Tub Walk-in shower   Bathroom Toilet Raised   Bathroom Equipment Grab bars in shower;Commode;Toilet raiser;Shower chair   Bathroom Accessibility Accessible   Home Equipment Walker;Cane;Sock aid;Reacher;Long-handled shoehorn;Grab bars   Prior Function   Level of [place] Independent with ADLs;Independent with functional mobility;Independent with IADLS   Lives With Spouse   Receives Help From Family   IADLs Independent with driving;Independent with meal prep;Independent with medication management   Falls in the last 6 months 1 to 4   Vocational Retired   Comments (+)     Lifestyle   Autonomy Independent with all ADLs/IADLs   Reciprocal Relationships Spouse   Service to Others Retired   Intrinsic Gratification Relaxing   General   Family/Caregiver Present Yes   ADL   Where Assessed Edge of bed   Eating Assistance 5  Supervision/Setup   Grooming Assistance 5  Supervision/Setup   UB Bathing Assistance 5  Supervision/Setup   LB Bathing Assistance 4  Minimal Assistance   UB Dressing Assistance 5  Supervision/Setup   LB Dressing Assistance 4  Minimal Assistance   Toileting Assistance  4  Minimal Assistance   Bed Mobility   Supine to Sit 4  Minimal assistance   Additional items Assist x 1;HOB elevated;Bedrails;Increased time required;Verbal cues;LE management   Sit to Supine Unable to assess   Additional Comments Pt greeted in supine, BP supine: 132/89.   Transfers   Sit to Stand 4  Minimal assistance   Additional items Assist x 1;Increased time required;Verbal cues  (RW)   Stand to Sit 4  Minimal assistance   Additional items Assist x 1;Increased time required;Verbal cues  (RW)   Additional Comments verbal cues for hand placement and safety with use of RW. BP EOB: 144/92   Functional Mobility   Functional Mobility 4  Minimal assistance   Additional Comments Pt completed short in room distance with use of RW and Bernice.   Additional items Rolling walker   Balance   Static Sitting Good   Dynamic Sitting Fair +   Static Standing Fair   Dynamic Standing Fair -   Ambulatory Fair -   Activity Tolerance   Activity Tolerance Patient tolerated treatment well   Medical Staff Made Aware PT Ali,Pt seen for co-evaluation/treatment with skilled Physical Therapy due to pt's medical complexity, decreased endurance, overall functional level, overall safety, and post surgical day #0.   Nurse Made Aware FAIZAN LEWIS Assessment   RUE Assessment WFL   LUE Assessment   LUE Assessment WFL   Hand Function   Gross Motor Coordination Functional   Fine Motor Coordination Functional   Cognition   Overall Cognitive  Status WFL   Arousal/Participation Alert;Cooperative   Attention Within functional limits   Orientation Level Oriented X4   Memory Within functional limits   Following Commands Follows all commands and directions without difficulty   Comments Cooperative and pleasant   Assessment   Limitation Decreased ADL status;Decreased endurance;Decreased self-care trans;Decreased high-level ADLs   Prognosis Good   Assessment Pt is a 74 y.o. male seen for OT evaluation s/p adm to [facility] on 4/28/2025 w/ primary osteoarthritis of left knee and chronic pain of left knee. Comorbidities affecting pt’s functional performance include a significant PMH of anemia, anxiety, bladder cancer, disease of thyroid gland, dyslipidemia, HTN, osteoarthritis, polyneuropathy, hypercholesterolemia, spinal stenosis. Pt with active OT orders and activity orders for OOB to chair. Pt lives w/ spouse in a ranch style house 1 [address]. Pt has walkin shower with grab bars and chair and raised toilet with grab bars. At baseline, pt was independent with all ADLs/IADLs. Pt completed supine to sit with Bernice. Sit to stand with Bernice. Pt completed short functional mobility from EOB to chair with use of RW and Bernice. Verbal cues for hand placement and safety. Pt completed stand to sit to chair with verbal cues for LE management. Upon evaluation, pt currently requires S for UB ADLs, Bernice for LB ADLs, Bernice for toileting, Bernice for bed mobility, Bernice for functional mobility, and Bernice for transfers 2* the following deficits impacting occupational performance: weakness, decreased strength , decreased balance, decreased activity tolerance, increased pain, orthopedic restrictions, and lethargy . These impairments, as well at pt’s personal factors of: RUDY home environment, difficulty performing ADLs, difficulty performing IADLs, difficulty performing transfers/mobility, WBS, fall risk , and new use of AD for functional transfers/mobility limit pt’s ability to safely  engage in all baseline areas of occupation. Based on the aforementioned OT evaluation, functional performance deficits, and assessments, pt has been identified as a high complexity evaluation. Pt to continue to benefit from continued acute OT services during hospital stay to address defined deficits and to maximize level of functional independence in the following Occupational Performance areas: grooming, bathing/shower, toilet hygiene, dressing, health maintenance, functional mobility, community mobility, clothing management, cleaning, household maintenance, and job performance/volunteering. From OT standpoint, recommend III; Minimum Resource Intensity upon D/C. OT will continue to follow pt 3-5x/wk.   Goals   Patient Goals to get better   STG Time Frame 1-3   Short Term Goal #1 Pt will improve activity tolerance to G for min 30 min treatment sessions for increase engagement in functional tasks   Short Term Goal #2 Pt will complete bed mobility at a mod I level w/ G balance/safety demonstrated to decrease caregiver assistance required   Short Term Goal  Pt will complete LB dressing/self care w/ mod I using adaptive device and DME as needed   LTG Time Frame 3-7   Long Term Goal #1 Pt will complete toileting w/ mod I w/ G hygiene/thoroughness using DME as needed   Long Term Goal #2 Pt will improve functional transfers to mod I on/off all surfaces using DME as needed w/ G balance/safety   Long Term Goal Pt will improve functional mobility during ADL/IADL/leisure tasks to mod I using DME as needed w/ G balance/safety   Plan   Treatment Interventions ADL retraining;Functional transfer training;Endurance training;Patient/family training;Equipment evaluation/education;Compensatory technique education;Continued evaluation;Energy conservation;Activityengagement   Goal Expiration Date 05/05/25   OT Treatment Day 0   OT Frequency 3-5x/wk   Discharge Recommendation   Rehab Resource Intensity Level, OT III (Minimum Resource  Intensity)   Additional Comments  The patient's raw score on the AM-PAC Daily Activity Inpatient Short Form is 19 . A raw score of greater than or equal to 19 suggests the patient may benefit from discharge to home. Please refer to the recommendation of the Occupational Therapist for safe discharge planning.   AM-PAC Daily Activity Inpatient   Lower Body Dressing 2   Bathing 3   Toileting 3   Upper Body Dressing 4   Grooming 3   Eating 4   Daily Activity Raw Score 19   Daily Activity Standardized Score (Calc for Raw Score >=11) 40.22   AM-PAC Applied Cognition Inpatient   Following a Speech/Presentation 4   Understanding Ordinary Conversation 4   Taking Medications 4   Remembering Where Things Are Placed or Put Away 4   Remembering List of 4-5 Errands 4   Taking Care of Complicated Tasks 4   Applied Cognition Raw Score 24   Applied Cognition Standardized Score 62.21   End of Consult   Education Provided Yes   Patient Position at End of Consult Bedside chair;Bed/Chair alarm activated;All needs within reach   Nurse Communication Nurse aware of consult   End of Consult Comments Pt seated OOB in chair with chair alarm activated at end of session. Call bell and phone within reach. All needs met and pt reports no further questions for OT at this time.   Parvin Luque, OT

## 2025-04-28 NOTE — ASSESSMENT & PLAN NOTE
74 year old male with primary OA of left knee now POD 1 s/p left TKA  Hgb: 13.5 from 16    Plan:  - WBAT on LLE  - HV drain in place, will remove this AM  - Pain and nausea control PRN  - IS use   - DVT ppx; lovenox   - PT/OT  - Trend labs   - Frequent vitals   - D/C pending PT

## 2025-04-28 NOTE — CONSULTS
Assessment & Plan  Primary osteoarthritis of left knee   -DVT prophylaxis in place-follow-up CBC in a.m. monitoring postoperative hemoglobin-pain control per primary service-ongoing PT evaluation for disposition-follow closely postoperative hemodynamics-overnight medical support    BPH (benign prostatic hyperplasia)  Continue flomax  Monitor for POUR - follow urine retention protocol  Hypothyroidism  Continue current thyroid supplement    Hyperlipidemia  Continue low cholesterol diet and statin therapy    Prediabetes  Follow CCD    PRE-OP HGB LEVEL:   Lab Results   Component Value Date    HGB 16.0 04/02/2025         Subjective/ HPI: Solis Dos Santos was seen and examined. Hx of KNEE pain failed out patient conservative measures. Elected to undergo total KNEE arthroplasty We are asked to see patient for post op management of underlying medical co-morbidities as outlined above.           ROS:   A 10 point ROS was performed; negative except as noted above.     Past medical history    Past Medical History:   Diagnosis Date    Acute blood loss anemia 08/09/2019    Aftercare following right hip joint replacement surgery 11/05/2019    Anxiety disorder     Atherosclerotic heart disease of native coronary artery without angina pectoris     Benign prostatic hyperplasia without lower urinary tract symptoms     Cancer (HCC)     bladder    Clotting disorder (HCC) 1/25/2023    Blood in urine    Disease of thyroid gland     hypo    Dyslipidemia     GERD (gastroesophageal reflux disease)     manages w/ diet, takes no meds    Hypertension     Impaired fasting blood sugar     Kidney stone     Low back pain     Obesity     Osteoarthritis     last assesed 6-6-16    Other chest pain     Paresthesia of skin     Polyneuropathy     last assesed 5-8-17    Pure hypercholesterolemia     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)     Spinal stenosis     Suspected UTI 03/09/2023    Urinary tract infection     Wears  glasses        Social History:    Substance Use History:   Social History     Substance and Sexual Activity   Alcohol Use Yes    Alcohol/week: 1.0 standard drink of alcohol    Types: 1 Cans of beer per week    Comment: rarely     Social History     Tobacco Use   Smoking Status Former    Current packs/day: 0.00    Average packs/day: 1 pack/day for 20.8 years (20.8 ttl pk-yrs)    Types: Cigarettes    Start date: 1967    Quit date: 1987    Years since quittin.5   Smokeless Tobacco Never     Social History     Substance and Sexual Activity   Drug Use Never       Family History:    Family history non-contributory    Medications Prior to Admission  Current Outpatient Medications on File Prior to Encounter   Medication Sig    acetaminophen (TYLENOL) 500 mg tablet Take 2 tablets (1,000 mg total) by mouth every 6 (six) hours as needed for mild pain    ascorbic acid (VITAMIN C) 500 MG tablet Take 1 tablet (500 mg total) by mouth 2 (two) times a day    atorvastatin (LIPITOR) 10 mg tablet Take 1 tablet (10 mg total) by mouth daily at bedtime    cholecalciferol (VITAMIN D3) 1,000 units tablet Take 1 tablet (1,000 Units total) by mouth daily    ciclopirox (LOPROX) 0.77 % cream 2 (two) times a day as needed    cyanocobalamin (VITAMIN B-12) 500 MCG tablet Take 1 tablet (500 mcg total) by mouth daily    enoxaparin (LOVENOX) 40 mg/0.4 mL Inject 0.4 mL (40 mg total) under the skin daily for 28 days To start postoperatively (Patient taking differently: Inject 40 mg under the skin daily To start postoperatively- after surgery)    folic acid (FOLVITE) 1 mg tablet Take 1 tablet (1 mg total) by mouth daily    hydrocortisone 2.5 % cream if needed    levothyroxine 150 mcg tablet Take 1 tablet (150 mcg total) by mouth daily    Multiple Vitamins-Minerals (multivitamin with iron-minerals) liquid Take by mouth daily    mupirocin (BACTROBAN) 2 % ointment Apply topically to the inside of the left and right nostrils twice daily for 5  days before surgery, including the morning of surgery.        Review of Scheduled Meds:  Current Facility-Administered Medications   Medication Dose Route Frequency Provider Last Rate    acetaminophen  975 mg Oral Q6H PRN AIR Hawk-RIK      atorvastatin  10 mg Oral HS ARI Hawk-C      calcium carbonate  1,000 mg Oral Daily PRN Radha Aspen, PA-C      cefazolin  1,000 mg Intravenous Q8H ARI Hawk-C      docusate sodium  100 mg Oral BID ARI Hawk-C      enoxaparin  40 mg Subcutaneous Daily ARI Hawk-RIK      gabapentin  100 mg Oral Q8H Radha Louise, PA-C      HYDROmorphone  0.5 mg Intravenous Q2H PRN ARI Hawk-C      lactated ringers  1,000 mL Intravenous Once PRN ARI Hawk-C      And    lactated ringers  1,000 mL Intravenous Once PRN ARI Hawk-C      lactated ringers  125 mL/hr Intravenous Continuous ARI Hawk-C      lactated ringers  125 mL/hr Intravenous Continuous Leonard Gallagher  mL/hr (04/28/25 0325)    levothyroxine  150 mcg Oral Early Morning ARI Hawk-C      methocarbamol  500 mg Oral Q6H TRISTON ARI Hawk-RIK      ondansetron  4 mg Intravenous Q6H PRN ARI Hawk-C      oxyCODONE  10 mg Oral Q4H PRN Radha Louise, PA-C      oxyCODONE  5 mg Oral Q4H PRN Radha Louise PA-C      sodium chloride  1,000 mL Intravenous Once PRN ARI Hawk-C      And    sodium chloride  1,000 mL Intravenous Once PRN ARI Hawk-C      tamsulosin  0.4 mg Oral Daily With Dinner ARI Hawk-RIK         Labs:       Input and Output Summary (last 24 hours):     No intake or output data in the 24 hours ending 04/28/25 1411    Imaging:     No orders to display       *Labs /Radiology studiesLabs reviewed  *Medications reviewed and reconciled as needed  *Please refer to order section for additional ordered labs studies  *Case discussed with primary attending during morning huddle case rounds    Vitals:   /86 (BP Location: Right arm)   Pulse 79   Temp (!)  "97.1 °F (36.2 °C) (Temporal)   Resp 18   Ht 6' 1\" (1.854 m)   Wt 124 kg (272 lb 12.8 oz)   SpO2 98%   BMI 35.99 kg/m²       Physical Exam:   General Appearance: no distress, conversive  HEENT: PERRLA, conjuctiva normal; oropharynx clear; mucous membranes moist;   Neck:  Supple, no lymphadenopathy or thyromegaly  Lungs: clear bilaterally, normal respiratory effort, no retractions, expiratory effort normal  CV: S1 S2, no murmurs rubs or gallops, rate is regular   ABD: soft non tender, +BS x4  EXT: DP pulses intact, no lymphadenopathy, no edema ;  left KNEE dressing in place  Skin: normal turgor, normal texture, no rash  Psych: affect normal, mood normal  Neuro: AAOx3          Invasive Devices       Peripheral Intravenous Line  Duration             Peripheral IV 04/28/25 Right Hand <1 day              Drain  Duration             Closed/Suction Drain Anterior;Left Knee Accordion 10 Fr. <1 day                       Code Status: Level 1 - Full Code  Current Length of Stay: 0 day(s)    Total floor / unit time spent today 30 minutes  Coordination of patient's care was performed in conjunction with primary service. Time invested included review of patient's labs, vitals, and management of their comorbidities with continued monitoring, examination of patient as well as answering patient questions, documenting her findings and creating progress note in electronic medical record,  ordering appropriate diagnostic testing. Medical decision making for the day was made by supervising physician unless otherwise noted in their attestation statement.    ** Please Note: Fluency Direct voice to text software may have been used in the creation of this document. Audio transcription errors may occur**    "

## 2025-04-28 NOTE — PROGRESS NOTES
Progress Note - Surgery-General   Name: Solis Dos Santos 74 y.o. male I MRN: [id number]  Unit/Bed#: [facility] 2 N -01 I Date of Admission: 4/28/2025   Date of Service: 4/28/2025 I Hospital Day: 0     Assessment & Plan  Primary osteoarthritis of left knee  74 year old male with primary OA of left knee now POD 1 s/p left TKA  Hgb: 13.5 from 16    Plan:  - WBAT on LLE  - HV drain in place, will remove this AM  - Pain and nausea control PRN  - IS use   - DVT ppx; lovenox   - PT/OT  - Trend labs   - Frequent vitals   - D/C pending PT   Hypothyroidism  - Levothyroxine   Hyperlipidemia  - Continue statin   BPH (benign prostatic hyperplasia)  - Tamsulosin for BPH  Prediabetes  - Recommend lo-kathy/kathy controlled diet    24 Hour Events : No acute overnight events  Subjective : Patient in minimal amount of pain this morning. He denies any N/V, fevers or chills. Patient has been ambulating to bathroom with ease.    Objective :  Temp:  [97.7 °F (36.5 °C)-98.5 °F (36.9 °C)] 97.7 °F (36.5 °C)  HR:  [61-88] 84  BP: ()/(56-92) 122/73  Resp:  [13-20] 18  SpO2:  [85 %-99 %] 93 %  O2 Device: None (Room air)  Nasal Cannula O2 Flow Rate (L/min):  [2 L/min-6 L/min] 3 L/min    Physical Exam  Constitutional:       General: He is not in acute distress.     Appearance: Normal appearance. He is not ill-appearing, toxic-appearing or diaphoretic.   Cardiovascular:      Rate and Rhythm: Normal rate and regular rhythm.      Pulses: Normal pulses.      Heart sounds: Normal heart sounds. No murmur heard.     No gallop.   Pulmonary:      Effort: Pulmonary effort is normal. No respiratory distress.      Breath sounds: Normal breath sounds. No wheezing.   Abdominal:      General: Abdomen is flat. Bowel sounds are normal. There is no distension.      Palpations: Abdomen is soft.      Tenderness: There is no abdominal tenderness. There is no guarding.   Skin:     General: Skin is warm and dry.      Coloration: Skin is not jaundiced.      Findings: No  bruising.   Neurological:      General: No focal deficit present.      Mental Status: He is alert and oriented to person, place, and time.      Cranial Nerves: No cranial nerve deficit.      Motor: No weakness.     LLE: Bandages are clean, dry and intact. Neurovascularly intact. Appropriate amount of swelling near incision site. No erythema or drainage. Toes warm and pink.      Lab Results: I have reviewed the following results:  Recent Labs     04/29/25  0529   WBC 8.04   HGB 13.5             VTE Pharmacologic Prophylaxis: VTE covered by:  enoxaparin, Subcutaneous     VTE Mechanical Prophylaxis: sequential compression device

## 2025-04-28 NOTE — ASSESSMENT & PLAN NOTE
-DVT prophylaxis in place-follow-up CBC in a.m. monitoring postoperative hemoglobin-pain control per primary service-ongoing PT evaluation for disposition-follow closely postoperative hemodynamics-overnight medical support

## 2025-04-28 NOTE — PLAN OF CARE
Problem: OCCUPATIONAL THERAPY ADULT  Goal: Performs self-care activities at highest level of function for planned discharge setting.  See evaluation for individualized goals.  Description: Treatment Interventions: ADL retraining, Functional transfer training, Endurance training, Patient/family training, Equipment evaluation/education, Compensatory technique education, Continued evaluation, Energy conservation, Activityengagement          See flowsheet documentation for full assessment, interventions and recommendations.   Note: Limitation: Decreased ADL status, Decreased endurance, Decreased self-care trans, Decreased high-level ADLs  Prognosis: Good  Assessment: Pt is a 74 y.o. male seen for OT evaluation s/p adm to [facility] on 4/28/2025 w/ primary osteoarthritis of left knee and chronic pain of left knee. Comorbidities affecting pt’s functional performance include a significant PMH of anemia, anxiety, bladder cancer, disease of thyroid gland, dyslipidemia, HTN, osteoarthritis, polyneuropathy, hypercholesterolemia, spinal stenosis. Pt with active OT orders and activity orders for OOB to chair. Pt lives w/ spouse in a [place] style house [place]. Pt has walkin shower with grab bars and chair and raised toilet with grab bars. At baseline, pt was independent with all ADLs/IADLs. Pt completed supine to sit with Bernice. Sit to stand with Bernice. Pt completed short functional mobility from EOB to chair with use of RW and Bernice. Verbal cues for hand placement and safety. Pt completed stand to sit to chair with verbal cues for LE management. Upon evaluation, pt currently requires S for UB ADLs, Bernice for LB ADLs, Bernice for toileting, Bernice for bed mobility, Bernice for functional mobility, and Bernice for transfers 2* the following deficits impacting occupational performance: weakness, decreased strength , decreased balance, decreased activity tolerance, increased pain, orthopedic restrictions, and lethargy . These impairments, as  well at pt’s personal factors of: RUDY home environment, difficulty performing ADLs, difficulty performing IADLs, difficulty performing transfers/mobility, WBS, fall risk , and new use of AD for functional transfers/mobility limit pt’s ability to safely engage in all baseline areas of occupation. Based on the aforementioned OT evaluation, functional performance deficits, and assessments, pt has been identified as a high complexity evaluation. Pt to continue to benefit from continued acute OT services during hospital stay to address defined deficits and to maximize level of functional independence in the following Occupational Performance areas: grooming, bathing/shower, toilet hygiene, dressing, health maintenance, functional mobility, community mobility, clothing management, cleaning, household maintenance, and job performance/volunteering. From OT standpoint, recommend III; Minimum Resource Intensity upon D/C. OT will continue to follow pt 3-5x/wk.     Rehab Resource Intensity Level, OT: III (Minimum Resource Intensity)

## 2025-04-28 NOTE — ANESTHESIA POSTPROCEDURE EVALUATION
Post-Op Assessment Note    CV Status:  Stable  Pain Score: 0    Pain management: adequate       Mental Status:  Alert and awake   Hydration Status:  Euvolemic   PONV Controlled:  Controlled   Airway Patency:  Patent     Post Op Vitals Reviewed: Yes    No anethesia notable event occurred.    Staff: Anesthesiologist           Last Filed PACU Vitals:  Vitals Value Taken Time   Temp 98.4    Pulse 78    BP 96/58    Resp 14    SpO2 98

## 2025-04-28 NOTE — PHYSICAL THERAPY NOTE
PT EVALUATION    Pt. Name: Solis Dos Santos  Pt. Age: 74 y.o.  MRN: [id number]  LENGTH OF STAY: 0    Patient Active Problem List   Diagnosis    Primary osteoarthritis of both knees    Osgood-Schlatter's disease of both knees    Pain in both knees    Status post right hip replacement    Impaired mobility and ADLs    Hypothyroidism    Difficulty sleeping    Chronic pain of left knee    Gross hematuria    Hyperlipidemia    Bladder mass    Right leg swelling    Malignant neoplasm of posterior wall of urinary bladder (HCC)    Malignant neoplasm of lateral wall of urinary bladder (HCC)    Peripheral vascular disease, unspecified (HCC)    Primary osteoarthritis of left knee    Primary osteoarthritis of right knee    DDD (degenerative disc disease), lumbar    Diverticula of intestine    Gastroesophageal reflux disease    Lumbar radiculopathy    Primary osteoarthritis of left hip    Obesity (BMI 30-39.9)    Spinal stenosis    BPH (benign prostatic hyperplasia)    Prediabetes       Admitting Diagnoses:   Primary osteoarthritis of left knee [M17.12]  Chronic pain of left knee [M25.562, G89.29]    Past Medical History:   Diagnosis Date    Acute blood loss anemia 08/09/2019    Aftercare following right hip joint replacement surgery 11/05/2019    Anxiety disorder     Atherosclerotic heart disease of native coronary artery without angina pectoris     Benign prostatic hyperplasia without lower urinary tract symptoms     Cancer (HCC)     bladder    Clotting disorder (HCC) 1/25/2023    Blood in urine    Disease of thyroid gland     hypo    Dyslipidemia     GERD (gastroesophageal reflux disease)     manages w/ diet, takes no meds    Hypertension     Impaired fasting blood sugar     Kidney stone     Low back pain     Obesity     Osteoarthritis     last assesed 6-6-16    Other chest pain     Paresthesia of skin     Polyneuropathy     last assesed 5-8-17    Pure hypercholesterolemia     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)     Spinal stenosis     Suspected UTI 03/09/2023    Urinary tract infection     Wears glasses        Past Surgical History:   Procedure Laterality Date    BACK SURGERY      L4-L5 sx    CHOLECYSTECTOMY      COLONOSCOPY  02/06/2019    CYSTOSCOPY N/A 04/26/2023    Procedure: CYSTOSCOPY w/ bladder biopsy;  Surgeon: Geo Bentley MD;  [facility];  Service: Urology    HIP SURGERY  August 2019    Replacement    JOINT REPLACEMENT  August 2019    Hip replacement    PA ARTHRP ACETBLR/PROX FEM PROSTC AGRFT/ALGRFT Right 08/05/2019    Procedure: ARTHROPLASTY HIP TOTAL;  Surgeon: Leonard Gallagher MD;  [facility];  Service: Orthopedics    PA ARTHRP ACETBLR/PROX FEM PROSTC AGRFT/ALGRFT Left 11/4/2024    Procedure: Left total hip arthroplasty;  Surgeon: Leonard Gallagher MD;  [facility];  Service: Orthopedics    PA CYSTO W/REMOVAL OF LESIONS SMALL N/A 03/23/2023    Procedure: TRANSURETHRAL RESECTION OF BLADDER TUMOR (TURBT), mitomycin ;  Surgeon: Geo Bentley MD;  [facility];  Service: Urology    PA CYSTO W/REMOVAL OF LESIONS SMALL N/A 04/26/2023    Procedure: TRANSURETHRAL RESECTION OF BLADDER TUMOR (TURBT), cystoscopy with bladder biopsy;  Surgeon: Geo Bentley MD;  [facility];  Service: Urology    TONSILLECTOMY         Imaging Studies:  No orders to display         04/28/25 1504   PT Last Visit   PT Visit Date 04/28/25   Note Type   Note type Evaluation   Pain Assessment   Pain Assessment Tool 0-10   Pain Score 1   Pain Location/Orientation Orientation: Left;Location: Knee   Hospital Pain Intervention(s) Cold applied;Repositioned;Ambulation/increased activity;Elevated;Emotional support;Rest   Restrictions/Precautions   Weight Bearing Precautions Per Order Yes   LLE Weight Bearing Per Order WBAT   Other Precautions Chair Alarm;Bed Alarm;Fall Risk;Pain  (hemovac)   Home Living   Type of Home House  (ranch)   Home  Layout One level;Performs ADLs on one level;Able to live on main level with bedroom/bathroom  (from garage, 2 small steps to get in, no rail)   Bathroom Shower/Tub Walk-in shower   Bathroom Toilet   (comfort)   Bathroom Equipment Grab bars in shower;Toilet raiser;Commode   Bathroom Accessibility Accessible   Home Equipment Walker;Cane;Sock aid   Prior Function   Level of Hand Independent with ADLs;Independent with functional mobility;Independent with IADLS  (needs assistance with socks)   Lives With Spouse   Receives Help From Family   IADLs Independent with driving;Independent with meal prep;Independent with medication management   Falls in the last 6 months 1 to 4   Vocational Retired   Comments use of cane in community   General   Family/Caregiver Present No   Cognition   Overall Cognitive Status WFL   Arousal/Participation Alert   Orientation Level Oriented X4   Following Commands Follows all commands and directions without difficulty   Comments pt pleasant   RUE Assessment   RUE Assessment   (see OT note)   LUE Assessment   LUE Assessment   (see OT note)   RLE Assessment   RLE Assessment WFL   LLE Assessment   LLE Assessment X  (did not assess knee due to post op, able to flex hip and move ankle through normal ROM)   Light Touch   RLE Light Touch Grossly intact   LLE Light Touch Grossly intact   Bed Mobility   Supine to Sit 4  Minimal assistance   Additional items Assist x 1;Increased time required;Verbal cues;LE management   Sit to Supine Unable to assess   Additional Comments greeted in supine. BP supine 132/89, /92   Transfers   Sit to Stand 4  Minimal assistance   Additional items Assist x 1;Increased time required;Verbal cues  (RW)   Stand to Sit 4  Minimal assistance   Additional items Assist x 1;Increased time required  (RW)   Additional Comments cues for hand placement   Ambulation/Elevation   Gait pattern Improper Weight shift;Decreased L stance;Antalgic;Step to;Excessively  slow;Decreased toe off;Decreased heel strike   Gait Assistance 4  Minimal assist   Additional items Assist x 1   Assistive Device Rolling walker   Distance 3 steps   Stair Management Assistance Not tested   Ambulation/Elevation Additional Comments cues for proper gait pattern with RW   Balance   Static Sitting Fair +   Dynamic Sitting Fair   Static Standing Fair -   Dynamic Standing Poor +   Ambulatory Poor +   Endurance Deficit   Endurance Deficit Yes   Endurance Deficit Description pain   Activity Tolerance   Activity Tolerance Patient tolerated treatment well   Medical Staff Made Aware RN Ros, JOHANA Leyva   Nurse Made Aware yes   Assessment   Prognosis Good   Problem List Decreased strength;Decreased range of motion;Decreased endurance;Impaired balance;Decreased mobility;Orthopedic restrictions;Pain   Assessment Pt is a 74 y.o. male who [facility] on [facility]2025 s/p L TKA done by Dr. Gallagher. Precautions include WBAT. Pt  has a past medical history of Acute blood loss anemia (08/09/2019), Aftercare following right hip joint replacement surgery (11/05/2019), Anxiety disorder, Atherosclerotic heart disease of native coronary artery without angina pectoris. Pt greeted at bedside for PT evaluation on 04/28/25. Pt referred to PT for functional mobility evaluation & D/C planning w/ orders of activity beginning POD #0 and activity as tolerated. PTA, pt reports being I w/o AD and I w/ IADLs. Personal factors affecting pt at time of IE include: ambulating w/ assistive device. Please find objective findings from PT assessment regarding body systems outlined above with impairments and limitations including weakness, decreased ROM, impaired balance, decreased endurance, gait deviations, pain, decreased activity tolerance, decreased functional mobility tolerance, fall risk, and orthopedic restrictions.  Please see flow sheet above for objective findings and level of assistance required for safe completion of functional  tasks. Pt able to perform supine to sit with minAx1 for LE management. Pt able to perform sit<>stand with RW and minAx1. BP monitored, please refer to flowsheet. Pt able to take 3 steps with recliner chair with RW and minAx1.  Pt demonstrated dec endurance and tolerance to activity. Denies reports of dizziness or SOB t/o session. Patient was left in recliner chair  with call bell and all needs within reach. Pt was educated on fall precautions and reinforced w/ good understanding. Based on pt presentation and impaired function, pt would benefit from level III, (minimal resource intensity) at D/C. From PT/mobility standpoint, pt would benefit from OPPT to address deficits as defined above and maximize level of independence and return pt to PLOF. HEP given and reviewed with patient, no questions at this time. CM to follow. Nsg staff to continue to mobilized pt (OOB in chair for all meals & ambulate in room/unit) as tolerated to prevent further decline in function. Nsg notified. Co-eval performed with OT to complete this evaluation for the pts best interest given pts medical complexity and functional level.   Barriers to Discharge Inaccessible home environment  (RUDY)   Goals   Patient Goals to have less pain   STG Expiration Date 05/05/25   Short Term Goal #1 1).  Pt will perform bed mobility with Pawel demonstrating appropriate technique 100% of the time in order to improve function.2)  Perform all transfers with Pawel demonstrating safe and appropriate technique 100% of the time in order to improve ability to negotiate safely in home environment.3) Amb with least restrictive AD > 200'x1 with aPwel in order to demonstrate ability to negotiate in home environment.4)  Improve overall strength and balance 1/2 grade in order to optimize ability to perform functional tasks and reduce fall risk.5) Increase activity tolerance to 45 minutes in order to improve endurance to functional tasks.6)  Negotiate stairs using most  appropriate technique and Pawel in order to be able to negotiate safely in home environment. 7) PT for ongoing patient and family/caregiver education, DME needs and d/c planning in order to promote highest level of function in least restrictive environment.   Plan   Treatment/Interventions Functional transfer training;LE strengthening/ROM;Elevations;Therapeutic exercise;Endurance training;Bed mobility;Gait training;Spoke to nursing;OT;Family   PT Frequency Twice a day  (prn)   Discharge Recommendation   Rehab Resource Intensity Level, PT III (Minimum Resource Intensity)   Equipment Recommended Walker  (pt owns)   Additional Comments The patient's AM-PAC Basic Mobility Inpatient Short Form Raw Score is 17. A Raw score of greater than 16 suggests the patient may benefit from discharge to home. Please also refer to the recommendation of the Physical Therapist for safe discharge planning.   AM-PAC Basic Mobility Inpatient   Turning in Flat Bed Without Bedrails 3   Lying on Back to Sitting on Edge of Flat Bed Without Bedrails 3   Moving Bed to Chair 3   Standing Up From Chair Using Arms 3   Walk in Room 3   Climb 3-5 Stairs With Railing 2   Basic Mobility Inpatient Raw Score 17   Basic Mobility Standardized Score 39.67   [facility] Highest Level Of Mobility   -HLM Goal 5: Stand one or more mins   -HLM Achieved 5: Stand (1 or more minutes)   End of Consult   Patient Position at End of Consult Bedside chair;All needs within reach;Bed/Chair alarm activated   End of Consult Comments pt stable, left in recliner chair with alarm on and call bell. RN updated     Hx/personal factors: co-morbidities, inaccessible home, use of AD, pain, WB restrictions, and fall risk  Examination: dec mobility, dec balance, dec endurance, dec amb, risk for falls, pain, assessed body system, balance, endurance, amb, D/C disposition & fall risk, WB restrictions, impairements in locomotion, musculoskeletal, balance, endurance, posture,  coordination  Clinical: unpredictable (ongoing medical status, risk for falls, POD #0, and pain mgt)  Complexity: high  Darling Ambrosio, PT

## 2025-04-28 NOTE — OP NOTE
OPERATIVE REPORT  PATIENT NAME: Solis Dos Santos  : 1950  MRN: [id number]  Pt Location:  [facility] ROOM 04    Surgery Date: 2025    Surgeons and Role:     * Leonard Gallagher MD - Primary     * Daniel Camacho MD - Assisting     * Radha Louise PA-C - Assisting     Preop Diagnosis:  Primary osteoarthritis of left knee [M17.12]  Chronic pain of left knee [M25.562, G89.29]    Post-Op Diagnosis Codes:     * Primary osteoarthritis of left knee [M17.12]     * Chronic pain of left knee [M25.562, G89.29]    Procedure(s):  Left - Robotically assisted left total knee arthroplasty. all associated procedures as indicated    Specimens:  * No specimens in log *    Estimated Blood Loss:   Minimal    Drains:  Closed/Suction Drain Anterior;Left Knee Accordion 10 Fr. (Active)   Number of days: 0       Anesthesia Type:   Choice     Operative Indications:  Primary osteoarthritis of left knee [M17.12]  Chronic pain of left knee [M25.562, G89.29]    Operative Findings:  Depuy attune   Femur-7   Poly-8   Tibia-7   Patella-38    Complications:   None    Knee Technique: Suture (direct) Repair  Knee Approach: Medial Parapatellar    Chronic Narcotic Use:  No      Procedure and Technique:  Following the induction of adequate level of general anesthesia, the left thigh was not fitted with a thigh-high tourniquet.  Antibiotics administered.  The left lower extremity underwent sterile prep and drape.  The left lower extremity was exsanguinated to gravity, the tourniquet plated to 300 mmHg.  A midline knee incision was greater than in flexion.  Full-thickness flaps were raised and relaxes the extensor mechanism.  A medial arthrotomy was created to open up the knee joint.  2 half pins in place with the proximal tibia, 2 half pins were placed with the distal femur.  In doing so, modules were created.  The arrays were then attached to the modules.  Checkpoints.  The proximal tibia distal femur.  The knee was then registered.  The surgery  was planned out on the computer, the plan was finalized.  The robot was docked.  The first maneuver of all the distal femoral cut probably anterior posterior cuts.  The chamfer cuts completed the process.  The proximal to tibia cut was made next.  Care was taken preserve the intake of the medial collateral, lateral collateral, and posterior structures during these maneuvers.  The box cut was made for the posterior stabilized unit, remnant medial and lateral meniscectomies then performed.  Trials were inserted, the knee was taken through range of motion, and found to be capable full extension, good flexion, stable throughout.  The patella was then resurfaced while utilizing manufactures equipment, is found to be a size 38 mm button.  The trial components removed and the knee was prepared for insertion of cemented components.  The cemented tibia, cemented femur, trial poly-, cemented patella in place.  Excess cement was removed, the knee was brought into extension.  The cement was allowed to cure.  The trial poly was taken out, the knee was packed off.  The tourniquet was deflated, hemostasis was secured.  The insert polyethylene was then snapped into position.  The knee was taken through a final range of motion, found to be capable full extension, good flexion, stable throughout, and X patella tracking.  Satisfied with the extent of surgery, the wounds were flushed with saline and closed.  A Betadine soak initiated.  A drain was placed deep and brought in via separate lateral stab incision.  The arthrotomy was closed with number Vicryl suture.  The subcu tissue was closed with 2-0 Vicryl suture.  The skin was closed with staples.  Sterile dressings were applied.  He was then awakened from general anesthesia and taken recovery in stable condition with equal leg length bilaterally, and plans for physical therapy for weightbearing to tolerance.  He will require DVT prophylaxis with Lovenox   I was present for the entire  procedure.    Patient Disposition:  PACU             SIGNATURE: Leonard Gallagher MD  DATE: April 28, 2025  TIME: 12:14 PM

## 2025-04-28 NOTE — ANESTHESIA PROCEDURE NOTES
Spinal Block    Staffing  Performed by: Marilee Schreiber CRNA  Authorized by: Yonis Julian MD    Needle  Needle type: Pencan   Needle gauge: 24 G  Needle length: 4 in  Assessment  Post-procedure:  site cleaned  Additional Notes  Unable to perform spinal. 3 providers x3 spaces

## 2025-04-28 NOTE — ANESTHESIA PREPROCEDURE EVALUATION
Procedure:  Robotically assisted left total knee arthroplasty, all associated procedures as indicated (Left: Knee)    Relevant Problems   CARDIO   (+) Hyperlipidemia   (+) Peripheral vascular disease, unspecified (HCC)      ENDO   (+) Hypothyroidism      GI/HEPATIC   (+) Gastroesophageal reflux disease      /RENAL   (+) BPH (benign prostatic hyperplasia)      MUSCULOSKELETAL   (+) Primary osteoarthritis of left knee      Orthopedic/Musculoskeletal   (+) Spinal stenosis      Other   (+) Obesity (BMI 30-39.9)      Lab Results   Component Value Date    WBC 5.31 04/02/2025    HGB 16.0 04/02/2025    HCT 49.0 04/02/2025    MCV 93 04/02/2025     04/02/2025     Lab Results   Component Value Date    K 4.4 04/02/2025    CO2 28 04/02/2025     04/02/2025    BUN 22 04/02/2025    CREATININE 0.98 04/02/2025     Lab Results   Component Value Date    INR 0.99 04/02/2025    INR 1.02 10/10/2024    INR 1.05 03/08/2023    PROTIME 13.4 04/02/2025    PROTIME 13.7 10/10/2024    PROTIME 14.0 03/08/2023     Lab Results   Component Value Date    PTT 34 04/02/2025     Lab Results   Component Value Date    HGBA1C 6.1 (H) 04/02/2025       Type and Screen:  A        Physical Exam    Airway    Mallampati score: II  TM Distance: >3 FB  Neck ROM: full     Dental       Cardiovascular      Pulmonary      Other Findings        Anesthesia Plan  ASA Score- 3     Anesthesia Type- spinal with ASA Monitors.         Additional Monitors:     Airway Plan:     Comment: Plan for long acting adductor canal and iPACK nerve blocks for postoperative analgesia discussed with risks/benefits/alternatives. Patient understands that these blocks are intended to be motor sparing, allowing for immediate postoperative physical therapy. Consequentially, this combination of blocks is not expected to provide complete analgesia to the joint and is primarily intended to reduce postoperative opioid consumption.     Discussed plan for spinal anesthetic with  risks/benefits/alternatives, including extremely low risk of spinal hematoma, infection, peripheral nerve damage, and paralysis. Patient aware of benefits including improved postoperative analgesia, decreased intraoperative bleeding, decreased intraoperative DVT risk, and avoidance of general anesthetic. Discussed possibility of general anesthesia as a back-up to neuraxial anesthetic given frequent concomitant degenerative disease of the lumbar spine  .       Plan Factors-Exercise tolerance (METS): >4 METS.    Chart reviewed.   Existing labs reviewed. Patient summary reviewed.                  Induction- intravenous.    Postoperative Plan- Plan for postoperative opioid use.         Informed Consent- Anesthetic plan and risks discussed with patient.  I personally reviewed this patient with the CRNA. Discussed and agreed on the Anesthesia Plan with the CRNA..      NPO Status:  Vitals Value Taken Time   Date of last liquid 04/27/25 04/28/25 0903   Time of last liquid 2130 04/28/25 0903   Date of last solid 04/27/25 04/28/25 0903   Time of last solid 2130 04/28/25 0903

## 2025-04-28 NOTE — PLAN OF CARE
Problem: PHYSICAL THERAPY ADULT  Goal: Performs mobility at highest level of function for planned discharge setting.  See evaluation for individualized goals.  Description: Treatment/Interventions: Functional transfer training, LE strengthening/ROM, Elevations, Therapeutic exercise, Endurance training, Bed mobility, Gait training, Spoke to nursing, OT, Family  Equipment Recommended: Walker (pt owns)       See flowsheet documentation for full assessment, interventions and recommendations.  Note: Prognosis: Good  Problem List: Decreased strength, Decreased range of motion, Decreased endurance, Impaired balance, Decreased mobility, Orthopedic restrictions, Pain  Assessment: Pt is a 74 y.o. male who presented to [facility] on 4/28/2025 s/p L TKA done by Dr. Gallagher. Precautions include WBAT. Pt  has a past medical history of Acute blood loss anemia (08/09/2019), Aftercare following right hip joint replacement surgery (11/05/2019), Anxiety disorder, Atherosclerotic heart disease of native coronary artery without angina pectoris. Pt greeted at bedside for PT evaluation on 04/28/25. Pt referred to PT for functional mobility evaluation & D/C planning w/ orders of activity beginning POD #0 and activity as tolerated. PTA, pt reports being I w/o AD and I w/ IADLs. Personal factors affecting pt at time of IE include: ambulating w/ assistive device. Please find objective findings from PT assessment regarding body systems outlined above with impairments and limitations including weakness, decreased ROM, impaired balance, decreased endurance, gait deviations, pain, decreased activity tolerance, decreased functional mobility tolerance, fall risk, and orthopedic restrictions.  Please see flow sheet above for objective findings and level of assistance required for safe completion of functional tasks. Pt able to perform supine to sit with minAx1 for LE management. Pt able to perform sit<>stand with RW and minAx1. BP monitored, please refer  to flowsheet. Pt able to take 3 steps with recliner chair with RW and minAx1.  Pt demonstrated dec endurance and tolerance to activity. Denies reports of dizziness or SOB t/o session. Patient was left in recliner chair  with call bell and all needs within reach. Pt was educated on fall precautions and reinforced w/ good understanding. Based on pt presentation and impaired function, pt would benefit from level III, (minimal resource intensity) at D/C. From PT/mobility standpoint, pt would benefit from OPPT to address deficits as defined above and maximize level of independence and return pt to PLOF. HEP given and reviewed with patient, no questions at this time. CM to follow. Nsg staff to continue to mobilized pt (OOB in chair for all meals & ambulate in room/unit) as tolerated to prevent further decline in function. Nsg notified. Co-eval performed with OT to complete this evaluation for the pts best interest given pts medical complexity and functional level.  Barriers to Discharge: Inaccessible home environment (RUDY)     Rehab Resource Intensity Level, PT: III (Minimum Resource Intensity)    See flowsheet documentation for full assessment.

## 2025-04-28 NOTE — PLAN OF CARE
Problem: PAIN - ADULT  Goal: Verbalizes/displays adequate comfort level or baseline comfort level  Description: Interventions:- Encourage patient to monitor pain and request assistance- Assess pain using appropriate pain scale- Administer analgesics based on type and severity of pain and evaluate response- Implement non-pharmacological measures as appropriate and evaluate response- Consider cultural and social influences on pain and pain management- Notify physician/advanced practitioner if interventions unsuccessful or patient reports new pain  Outcome: Progressing     Problem: INFECTION - ADULT  Goal: Absence or prevention of progression during hospitalization  Description: INTERVENTIONS:- Assess and monitor for signs and symptoms of infection- Monitor lab/diagnostic results- Monitor all insertion sites, i.e. indwelling lines, tubes, and drains- Monitor endotracheal if appropriate and nasal secretions for changes in amount and color- [place] appropriate cooling/warming therapies per order- Administer medications as ordered- Instruct and encourage patient and family to use good hand hygiene technique- Identify and instruct in appropriate isolation precautions for identified infection/condition  Outcome: Progressing  Goal: Absence of fever/infection during neutropenic period  Description: INTERVENTIONS:- Monitor WBC  Outcome: Progressing     Problem: SAFETY ADULT  Goal: Patient will remain free of falls  Description: INTERVENTIONS:- Educate patient/family on patient safety including physical limitations- Instruct patient to call for assistance with activity - Consult OT/PT to assist with strengthening/mobility - Keep Call bell within reach- Keep bed low and locked with side rails adjusted as appropriate- Keep care items and personal belongings within reach- Initiate and maintain comfort rounds- Make Fall Risk Sign visible to staff- Offer Toileting every 2 Hours, in advance of need- Initiate/Maintain bed alarm-  Obtain necessary fall risk management equipment: - Apply yellow socks and bracelet for high fall risk patients- Consider moving patient to room near nurses station  Outcome: Progressing  Goal: Maintain or return to baseline ADL function  Description: INTERVENTIONS:-  Assess patient's ability to carry out ADLs; assess patient's baseline for ADL function and identify physical deficits which impact ability to perform ADLs (bathing, care of mouth/teeth, toileting, grooming, dressing, etc.)- Assess/evaluate cause of self-care deficits - Assess range of motion- Assess patient's mobility; develop plan if impaired- Assess patient's need for assistive devices and provide as appropriate- Encourage maximum independence but intervene and supervise when necessary- Involve family in performance of ADLs- Assess for home care needs following discharge - Consider OT consult to assist with ADL evaluation and planning for discharge- Provide patient education as appropriate  Outcome: Progressing  Goal: Maintains/Returns to pre admission functional level  Description: INTERVENTIONS:- Perform AM-PAC 6 Click Basic Mobility/ Daily Activity assessment daily.- Set and communicate daily mobility goal to care team and patient/family/caregiver. - Collaborate with rehabilitation services on mobility goals if consulted- Perform Range of Motion 3 times a day.- Reposition patient every 3 hours.- Dangle patient 3 times a day- Stand patient 3 times a day- Ambulate patient 3 times a day- Out of bed to chair 3 times a day - Out of bed for meals 3 times a day- Out of bed for toileting- Record patient progress and toleration of activity level   Outcome: Progressing     Problem: DISCHARGE PLANNING  Goal: Discharge to home or other facility with appropriate resources  Description: INTERVENTIONS:- Identify barriers to discharge w/patient and caregiver- Arrange for needed discharge resources and transportation as appropriate- Identify discharge learning  needs (meds, wound care, etc.)- Arrange for interpretive services to assist at discharge as needed- Refer to Case Management Department for coordinating discharge planning if the patient needs post-hospital services based on physician/advanced practitioner order or complex needs related to functional status, cognitive ability, or social support system  Outcome: Progressing     Problem: Knowledge Deficit  Goal: Patient/family/caregiver demonstrates understanding of disease process, treatment plan, medications, and discharge instructions  Description: Complete learning assessment and assess knowledge base.Interventions:- Provide teaching at level of understanding- Provide teaching via preferred learning methods  Outcome: Progressing

## 2025-04-28 NOTE — ANESTHESIA PROCEDURE NOTES
Peripheral Block    Patient location during procedure: holding area  Start time: 4/28/2025 10:30 AM  Reason for block: at surgeon's request and post-op pain management  Staffing  Performed by: Yonis Julian MD  Authorized by: Yonis Julian MD    Preanesthetic Checklist  Completed: patient identified, IV checked, site marked, risks and benefits discussed, surgical consent, monitors and equipment checked, pre-op evaluation and timeout performed  Peripheral Block  Patient position: supine  Prep: ChloraPrep  Patient monitoring: frequent blood pressure checks, continuous pulse oximetry and heart rate  Block type: Adductor Canal  Laterality: left  Injection technique: single-shot  Procedures: ultrasound guided, Ultrasound guidance required for the procedure to increase accuracy and safety of medication placement and decrease risk of complications.  Ultrasound permanent image saved  bupivacaine (PF) (MARCAINE) 0.25 % injection 20 mL - Perineural   10 mL - 4/28/2025 10:28:00 AM  bupivacaine liposomal (EXPAREL) 1.3 % injection 20 mL - Perineural   10 mL - 4/28/2025 10:28:00 AM  Needle  Needle type: Stimuplex   Needle gauge: 20 G  Needle length: 4 in  Needle localization: anatomical landmarks and ultrasound guidance  Assessment  Injection assessment: incremental injection, frequent aspiration, injected with ease, negative aspiration, negative for heart rate change, no paresthesia on injection, no symptoms of intraneural/intravenous injection and needle tip visualized at all times  Paresthesia pain: none  Post-procedure:  site cleaned  patient tolerated the procedure well with no immediate complications  Additional Notes  Additional 10 mL  bupivacaine 0.25% administered to femoral cutaneous nerves upon needle retraction

## 2025-04-29 ENCOUNTER — TELEPHONE (OUTPATIENT)
Age: 75
End: 2025-04-29

## 2025-04-29 VITALS
OXYGEN SATURATION: 98 % | RESPIRATION RATE: 18 BRPM | BODY MASS INDEX: 36.15 KG/M2 | WEIGHT: 272.8 LBS | HEIGHT: 73 IN | TEMPERATURE: 97.9 F | HEART RATE: 70 BPM | SYSTOLIC BLOOD PRESSURE: 118 MMHG | DIASTOLIC BLOOD PRESSURE: 66 MMHG

## 2025-04-29 LAB
ALBUMIN SERPL BCG-MCNC: 3.6 G/DL (ref 3.5–5)
ALP SERPL-CCNC: 69 U/L (ref 34–104)
ALT SERPL W P-5'-P-CCNC: 252 U/L (ref 7–52)
ANION GAP SERPL CALCULATED.3IONS-SCNC: 6 MMOL/L (ref 4–13)
AST SERPL W P-5'-P-CCNC: 196 U/L (ref 13–39)
BILIRUB SERPL-MCNC: 0.83 MG/DL (ref 0.2–1)
BUN SERPL-MCNC: 14 MG/DL (ref 5–25)
CALCIUM SERPL-MCNC: 8.4 MG/DL (ref 8.4–10.2)
CHLORIDE SERPL-SCNC: 103 MMOL/L (ref 96–108)
CO2 SERPL-SCNC: 29 MMOL/L (ref 21–32)
CREAT SERPL-MCNC: 0.82 MG/DL (ref 0.6–1.3)
ERYTHROCYTE [DISTWIDTH] IN BLOOD BY AUTOMATED COUNT: 13.2 % (ref 11.6–15.1)
GFR SERPL CREATININE-BSD FRML MDRD: 85 ML/MIN/1.73SQ M
GLUCOSE SERPL-MCNC: 130 MG/DL (ref 65–140)
HCT VFR BLD AUTO: 40.2 % (ref 36.5–49.3)
HGB BLD-MCNC: 13.5 G/DL (ref 12–17)
MCH RBC QN AUTO: 30.6 PG (ref 26.8–34.3)
MCHC RBC AUTO-ENTMCNC: 33.6 G/DL (ref 31.4–37.4)
MCV RBC AUTO: 91 FL (ref 82–98)
PLATELET # BLD AUTO: 189 THOUSANDS/UL (ref 149–390)
PMV BLD AUTO: 11.4 FL (ref 8.9–12.7)
POTASSIUM SERPL-SCNC: 4.6 MMOL/L (ref 3.5–5.3)
PROT SERPL-MCNC: 6.3 G/DL (ref 6.4–8.4)
RBC # BLD AUTO: 4.41 MILLION/UL (ref 3.88–5.62)
SODIUM SERPL-SCNC: 138 MMOL/L (ref 135–147)
WBC # BLD AUTO: 8.04 THOUSAND/UL (ref 4.31–10.16)

## 2025-04-29 PROCEDURE — 85027 COMPLETE CBC AUTOMATED: CPT

## 2025-04-29 PROCEDURE — 97116 GAIT TRAINING THERAPY: CPT

## 2025-04-29 PROCEDURE — 99232 SBSQ HOSP IP/OBS MODERATE 35: CPT | Performed by: INTERNAL MEDICINE

## 2025-04-29 PROCEDURE — 99024 POSTOP FOLLOW-UP VISIT: CPT | Performed by: PHYSICIAN ASSISTANT

## 2025-04-29 PROCEDURE — 80053 COMPREHEN METABOLIC PANEL: CPT

## 2025-04-29 PROCEDURE — 97110 THERAPEUTIC EXERCISES: CPT

## 2025-04-29 RX ADMIN — METHOCARBAMOL 500 MG: 500 TABLET ORAL at 05:36

## 2025-04-29 RX ADMIN — OXYCODONE HYDROCHLORIDE 5 MG: 5 TABLET ORAL at 11:47

## 2025-04-29 RX ADMIN — ACETAMINOPHEN 975 MG: 325 TABLET ORAL at 03:40

## 2025-04-29 RX ADMIN — ENOXAPARIN SODIUM 40 MG: 40 INJECTION SUBCUTANEOUS at 08:27

## 2025-04-29 RX ADMIN — CEFAZOLIN SODIUM 1000 MG: 1 SOLUTION INTRAVENOUS at 03:40

## 2025-04-29 RX ADMIN — LEVOTHYROXINE SODIUM 150 MCG: 100 TABLET ORAL at 05:36

## 2025-04-29 RX ADMIN — ACETAMINOPHEN 975 MG: 325 TABLET ORAL at 08:27

## 2025-04-29 RX ADMIN — GABAPENTIN 100 MG: 100 CAPSULE ORAL at 08:28

## 2025-04-29 RX ADMIN — DOCUSATE SODIUM 100 MG: 100 CAPSULE, LIQUID FILLED ORAL at 08:28

## 2025-04-29 NOTE — TELEPHONE ENCOUNTER
Caller: patient    Doctor: Dr. Gallagher    Reason for call: Patient has some concerns, patient had sx done on 4/28 and states the gauze in back of his knee is saturated with blood. Patient would like to know if this is normal   Please advise     Call back#: [phone]

## 2025-04-29 NOTE — PHYSICAL THERAPY NOTE
PHYSICAL THERAPY NOTE          Patient Name: Solis Dos Santos  Today's Date: 4/29/2025  07:57-08:27       04/29/25 0827   PT Last Visit   PT Visit Date 04/29/25   Note Type   Note Type Treatment   Pain Assessment   Pain Score 3   Pain Location/Orientation Orientation: Left;Location: Knee   Restrictions/Precautions   Weight Bearing Precautions Per Order Yes   LLE Weight Bearing Per Order WBAT   Other Precautions Fall Risk;Pain;Multiple lines;Chair Alarm;Bed Alarm  (Masimo)   General   Chart Reviewed Yes   Family/Caregiver Present No   Cognition   Overall Cognitive Status WFL   Subjective   Subjective Reports had a good night. Pain well managed and ready to go home today.   Bed Mobility   Additional Comments received sitting OOB in chair.   Transfers   Sit to Stand 5  Supervision   Additional items Increased time required;Verbal cues;Armrests   Stand to Sit 5  Supervision   Additional items Increased time required;Verbal cues;Armrests   Additional Comments Increased time to complete transitoins.   Ambulation/Elevation   Gait pattern Improper Weight shift;Decreased foot clearance;Antalgic;Decreased L stance;Step through pattern   Gait Assistance 5  Supervision   Additional items Verbal cues   Assistive Device Rolling walker   Distance Amb with RW 15'x1, then additional 100'x1, stair navigation with return to room 100 ft.   Stair Management Assistance 4  Minimal assist  (CG)   Additional items Assist x 1;Verbal cues;Tactile cues   Stair Management Technique Two rails;Foreward;Nonreciprocal   Number of Stairs 5  ( steps x 2.)   Ambulation/Elevation Additional Comments Gait antalgic but with step through pattern.   Balance   Static Sitting Good   Dynamic Sitting Fair +   Static Standing Fair   Dynamic Standing Fair -   Ambulatory Fair -   Endurance Deficit   Endurance Deficit Yes   Endurance Deficit Description fatigue, thigh pain    Activity Tolerance   Activity Tolerance Patient tolerated treatment well;Patient limited by fatigue;Patient limited by pain   Medical Staff Made Aware NurseRos   Nurse Made Aware yes   Exercises   TKR Sitting;10 reps;AAROM;AROM;Left  (issued, reviewed and performed written HEP. Ed on frequency and progression in reps)   Neuro re-ed reinforced importance of knee extension and positioning to promote same.   Assessment   Prognosis Good   Problem List Decreased strength;Decreased range of motion;Decreased endurance;Impaired balance;Decreased mobility;Orthopedic restrictions;Pain   Assessment Dandre is seen for progression of PT in order to facilitate discharge to home.  Demonstrates S for transfers and ambulation with RW.  Beginning to demonstrate step through pattern. + antalgic.  Able to progress to stair training with min A.  Step to pattern with B/L rails.  Has 2 4 inch RUDY home without rail, however uses supports on BUE in order to enter home.  Use of  steps to mimic entry into home environment.  Tolerated progression of HEP. Review of car transfers for discharge.  The patient's AM-PAC Basic Mobility Inpatient Short Form Raw Score is 23. A Raw score of greater than 16 suggests the patient may benefit from discharge to home. Please also refer to the recommendation of the Physical Therapist for safe discharge planning. Anticipate ability to discharge to home with OPPT as planned.  Cleared for discharge from PT perspective for home.   Goals   Patient Goals go home   PT Treatment Day 1   Plan   Treatment/Interventions Functional transfer training;LE strengthening/ROM;Elevations;Therapeutic exercise;Endurance training;Patient/family training;Equipment eval/education;Bed mobility;Gait training;Compensatory technique education;Continued evaluation;Spoke to nursing   Progress Improving as expected   PT Frequency 2-3x/wk   Discharge Recommendation   Rehab Resource Intensity Level, PT III (Minimum Resource  Intensity)  (OPPT planned for May 1)   Equipment Recommended   (has all DME)   AM-PAC Basic Mobility Inpatient   Turning in Flat Bed Without Bedrails 4   Lying on Back to Sitting on Edge of Flat Bed Without Bedrails 4   Moving Bed to Chair 4   Standing Up From Chair Using Arms 4   Walk in Room 4   Climb 3-5 Stairs With Railing 3   Basic Mobility Inpatient Raw Score 23   Basic Mobility Standardized Score 50.88   [facility] Highest Level Of Mobility   -HLM Goal 7: Walk 25 feet or more   -HLM Achieved 7: Walk 25 feet or more   Education   Education Provided Mobility training;Home exercise program;Assistive device   Patient Demonstrates acceptance/verbal understanding   End of Consult   Patient Position at End of Consult Bedside chair;Bed/Chair alarm activated;All needs within reach   End of Consult Comments ice to L knee.   Dorys Gardner, PT

## 2025-04-29 NOTE — ASSESSMENT & PLAN NOTE
- Continue with ice and analgesics as needed for pain.  -Lovenox for DVT prophylaxis.  -Drain discontinued today, reinforced compression dressing and leave in place at this time.  -PT/OT as ordered.  -Hemoglobin 13.5 with no signs of bleeding in the operative extremity continue to monitor at this time.  -Plan for discharge home when cleared from physical therapy standpoint, follow-up outpatient with Dr. Gallagher.

## 2025-04-29 NOTE — PROGRESS NOTES
"Progress Note - Orthopedics   Name: Solis Dos Santos 74 y.o. male I MRN: [id number]  Unit/Bed#: WE 2 N -01 I Date of Admission: 4/28/2025   Date of Service: 4/29/2025 I Hospital Day: 0     Assessment & Plan  Primary osteoarthritis of left knee  - Continue with ice and analgesics as needed for pain.  -Lovenox for DVT prophylaxis.  -Drain discontinued today, reinforced compression dressing and leave in place at this time.  -PT/OT as ordered.  -Hemoglobin 13.5 with no signs of bleeding in the operative extremity continue to monitor at this time.  -Plan for discharge home when cleared from physical therapy standpoint, follow-up outpatient with Dr. Gallagher.  Hypothyroidism    Hyperlipidemia    BPH (benign prostatic hyperplasia)    Prediabetes        Subjective   74 y.o.male in 1 status post left total knee replacement.  No acute events, no new complaints. Pain well controlled at this time. Denies fevers, chills, CP, SOB, N/V, numbness or tingling. Patient reports no issues with urination or bowel movements. Patient states plan is for discharge home when ready.    Objective :  Temp:  [97.3 °F (36.3 °C)-98.4 °F (36.9 °C)] 97.9 °F (36.6 °C)  HR:  [61-88] 70  BP: ()/(56-92) 118/66  Resp:  [13-20] 18  SpO2:  [85 %-99 %] 98 %  O2 Device: None (Room air)  Nasal Cannula O2 Flow Rate (L/min):  [2 L/min-6 L/min] 3 L/min    Physical Exam  Musculoskeletal: Left lower extremity  No erythema or ecchymosis.  Vascular Status intact  Neurologic Status intact  Dressing clean, dry and intact  Motor intact to +FHL/EHL, +ankle dorsi/plantar flexion  Sensation intact to saphenous, sural, tibial, superficial peroneal nerve, and deep peroneal  2+ DP pulse  No calf swelling or tenderness to palpation      Lab Results: I have reviewed the following results:  Recent Labs     04/29/25  0529   WBC 8.04   HGB 13.5   HCT 40.2      BUN 14   CREATININE 0.82     Blood Culture:  No results found for: \"BLOODCX\"  Wound Culture:   Lab " Results   Component Value Date    WOUNDCULT 4+ Growth of Staphylococcus aureus (A) 01/27/2025    WOUNDCULT 1+ Growth of 01/27/2025       Imaging Results Review: No pertinent imaging studies reviewed.  Other Study Results Review: No additional pertinent studies reviewed.

## 2025-04-29 NOTE — PLAN OF CARE
Problem: PHYSICAL THERAPY ADULT  Goal: Performs mobility at highest level of function for planned discharge setting.  See evaluation for individualized goals.  Description: Treatment/Interventions: Functional transfer training, LE strengthening/ROM, Elevations, Therapeutic exercise, Endurance training, Bed mobility, Gait training, Spoke to nursing, OT, Family  Equipment Recommended: Walker (pt owns)       See flowsheet documentation for full assessment, interventions and recommendations.  Outcome: Adequate for Discharge  Note: Prognosis: Good  Problem List: Decreased strength, Decreased range of motion, Decreased endurance, Impaired balance, Decreased mobility, Orthopedic restrictions, Pain  Assessment: Dandre is seen for progression of PT in order to facilitate discharge to home.  Demonstrates S for transfers and ambulation with RW.  Beginning to demonstrate step through pattern. + antalgic.  Able to progress to stair training with min A.  Step to pattern with B/L rails.  Has 2 4 inch RUDY home without rail, however uses supports on BUE in order to enter home.  Use of  steps to mimic entry into home environment.  Tolerated progression of HEP. Review of car transfers for discharge.  The patient's AM-PAC Basic Mobility Inpatient Short Form Raw Score is 23. A Raw score of greater than 16 suggests the patient may benefit from discharge to home. Please also refer to the recommendation of the Physical Therapist for safe discharge planning. Anticipate ability to discharge to home with OPPT as planned.  Cleared for discharge from PT perspective for home.  Barriers to Discharge: Inaccessible home environment (RUDY)     Rehab Resource Intensity Level, PT: III (Minimum Resource Intensity) (OPPT planned for May 1)    See flowsheet documentation for full assessment.

## 2025-04-29 NOTE — PLAN OF CARE
Problem: PAIN - ADULT  Goal: Verbalizes/displays adequate comfort level or baseline comfort level  Description: Interventions:- Encourage patient to monitor pain and request assistance- Assess pain using appropriate pain scale- Administer analgesics based on type and severity of pain and evaluate response- Implement non-pharmacological measures as appropriate and evaluate response- Consider cultural and social influences on pain and pain management- Notify physician/advanced practitioner if interventions unsuccessful or patient reports new pain  Outcome: Progressing     Problem: INFECTION - ADULT  Goal: Absence or prevention of progression during hospitalization  Description: INTERVENTIONS:- Assess and monitor for signs and symptoms of infection- Monitor lab/diagnostic results- Monitor all insertion sites, i.e. indwelling lines, tubes, and drains- Monitor endotracheal if appropriate and nasal secretions for changes in amount and color- [place] appropriate cooling/warming therapies per order- Administer medications as ordered- Instruct and encourage patient and family to use good hand hygiene technique- Identify and instruct in appropriate isolation precautions for identified infection/condition  Outcome: Progressing  Goal: Absence of fever/infection during neutropenic period  Description: INTERVENTIONS:- Monitor WBC  Outcome: Progressing     Problem: SAFETY ADULT  Goal: Patient will remain free of falls  Description: INTERVENTIONS:- Educate patient/family on patient safety including physical limitations- Instruct patient to call for assistance with activity - Consult OT/PT to assist with strengthening/mobility - Keep Call bell within reach- Keep bed low and locked with side rails adjusted as appropriate- Keep care items and personal belongings within reach- Initiate and maintain comfort rounds- Make Fall Risk Sign visible to staff- Offer Toileting every 2 Hours, in advance of need- Initiate/Maintain bed alarm-  Obtain necessary fall risk management equipment: - Apply yellow socks and bracelet for high fall risk patients- Consider moving patient to room near nurses station  Outcome: Progressing  Goal: Maintain or return to baseline ADL function  Description: INTERVENTIONS:-  Assess patient's ability to carry out ADLs; assess patient's baseline for ADL function and identify physical deficits which impact ability to perform ADLs (bathing, care of mouth/teeth, toileting, grooming, dressing, etc.)- Assess/evaluate cause of self-care deficits - Assess range of motion- Assess patient's mobility; develop plan if impaired- Assess patient's need for assistive devices and provide as appropriate- Encourage maximum independence but intervene and supervise when necessary- Involve family in performance of ADLs- Assess for home care needs following discharge - Consider OT consult to assist with ADL evaluation and planning for discharge- Provide patient education as appropriate  Outcome: Progressing  Goal: Maintains/Returns to pre admission functional level  Description: INTERVENTIONS:- Perform AM-PAC 6 Click Basic Mobility/ Daily Activity assessment daily.- Set and communicate daily mobility goal to care team and patient/family/caregiver. - Collaborate with rehabilitation services on mobility goals if consulted- Perform Range of Motion 3 times a day.- Reposition patient every 3 hours.- Dangle patient 3 times a day- Stand patient 3 times a day- Ambulate patient 3 times a day- Out of bed to chair 3 times a day - Out of bed for meals 3 times a day- Out of bed for toileting- Record patient progress and toleration of activity level   Outcome: Progressing     Problem: DISCHARGE PLANNING  Goal: Discharge to home or other facility with appropriate resources  Description: INTERVENTIONS:- Identify barriers to discharge w/patient and caregiver- Arrange for needed discharge resources and transportation as appropriate- Identify discharge learning  needs (meds, wound care, etc.)- Arrange for interpretive services to assist at discharge as needed- Refer to Case Management Department for coordinating discharge planning if the patient needs post-hospital services based on physician/advanced practitioner order or complex needs related to functional status, cognitive ability, or social support system  Outcome: Progressing     Problem: Knowledge Deficit  Goal: Patient/family/caregiver demonstrates understanding of disease process, treatment plan, medications, and discharge instructions  Description: Complete learning assessment and assess knowledge base.Interventions:- Provide teaching at level of understanding- Provide teaching via preferred learning methods  Outcome: Progressing

## 2025-04-30 ENCOUNTER — TELEPHONE (OUTPATIENT)
Dept: OBGYN CLINIC | Facility: HOSPITAL | Age: 75
End: 2025-04-30

## 2025-04-30 NOTE — TELEPHONE ENCOUNTER
Patient contacted for a postoperative follow up assessment. Patient states current pain level of a 10/10 when sitting and 10/10 when walking with RW.  Patient denies increase in swelling and dressing is Dressing with small amount of saturation  Patient is icing the site regularly. NN educated patient on post-op bruising, swelling, and icing. PT 5/1 at 11:15AM.      We reviewed patients AVS medication list. Patient is taking Tylenol 1000mg every 8 hours, Oxycodone 5mg every 6 hours, Lovenox injections daily, and Methocarbamol 500mg TID and OTC stool softener.Patient has not had a BM but is passing gas.       Patient denies nausea, vomiting, abdominal pain, chest pain, shortness of breath, fever, dizziness, and calf pain. Patient confirmed post-op appointment with surgeon on 5/6 at 9AM .Patient does not have any other questions or concerns at this time. Pt was encouraged to call with any questions, concerns or issues.

## 2025-05-01 ENCOUNTER — OFFICE VISIT (OUTPATIENT)
Dept: PHYSICAL THERAPY | Facility: OTHER | Age: 75
End: 2025-05-01
Payer: MEDICARE

## 2025-05-01 DIAGNOSIS — M17.12 PRIMARY OSTEOARTHRITIS OF LEFT KNEE: ICD-10-CM

## 2025-05-01 DIAGNOSIS — Z96.652 S/P TOTAL KNEE REPLACEMENT NOT USING CEMENT, LEFT: Primary | ICD-10-CM

## 2025-05-01 PROCEDURE — 97110 THERAPEUTIC EXERCISES: CPT

## 2025-05-01 PROCEDURE — 97161 PT EVAL LOW COMPLEX 20 MIN: CPT

## 2025-05-01 NOTE — PROGRESS NOTES
PT Evaluation     Today's date: 2025  Patient name: Solis Dos Santos  : 1950  MRN: [id number]  Referring provider: Leonard Gallagher,*  Dx:   Encounter Diagnosis     ICD-10-CM    1. S/P total knee replacement not using cement, left  Z96.652       2. Primary osteoarthritis of left knee  M17.12           Start Time: 1115  Stop Time: 1200  Total time in clinic (min): 45 minutes    Assessment  Impairments: abnormal gait, abnormal muscle firing, abnormal or restricted ROM, activity intolerance, impaired balance, impaired physical strength, lacks appropriate home exercise program, pain with function, weight-bearing intolerance, poor posture  and poor body mechanics    Assessment details: Solis Dos Santos is a 74 y.o. male presenting to OPPT initial evaluation s/p robotically assisted left total knee arthroplasty with Dr. Gallagher on 25. Pt is WBAT, current ambulating with RW. Pain is described as quad pain, pressure, 2/10.  Pt notes pain is alleviated with ice, medication. Currently taking Tylenol, 5 mg oxycodone and muscle relaxers. Most recently taken at 9am today. Sleeping in power chair, altered sleep quality due to pain. Denies numbness / tingling. Has not had a BM since surgery.     Presents with s/s consistent c POD3 status following L TKA including mild knee joint edema, decreased L k' ROM in both direction, poor L quad NMR  impacting to ambulation, transfers, stair negotiation and all upright functional mobility. Reviewed HEP, pt demonstrates good understanding. Pt ambulating with RW today, cueing for heel strike at initial contact. The patient is a good candidate for physical therapy to achieve the following goals.        Goals  STG (Post-op 4 weeks):  Pt will decrease pain to <5/10 in order to facilitate functional mobility.  Pt will demonstrate increased knee flexion ROM to 100 deg in order to improve stair negotiation  Pt will demonstrate full knee extension in order to improve gait  Pt  will demonstrate good neuromuscular quad control by 20 consecutive SLR with no quad in order to improve gait  Pt's self-perceived disability will decrease as demonstrated by a >5 improvement in FOTO score.    Pt will be independent with HEP as demonstrated by return demo with proper technique and execution of exercises provided.    LTG (Post-op 12 weeks):  Pt will have absence of pain for 2 consecutive sessions in order to facilitate return to sport  Pt will demonstrate full knee flexion >120 deg  Pt will be able to climb a full flight of stairs in reciprocal pattern without pain  Pt's self-perceived disability will decrease as demonstrated by a FOTO score >60.  Pt will be independent with progressions to HEP as demonstrated by return demo with proper technique and execution of exercises provided.     Plan  Patient would benefit from: skilled physical therapy and PT eval  Planned modality interventions: biofeedback, electrical stimulation/Russian stimulation, TENS, thermotherapy: hydrocollator packs, traction, ultrasound and cryotherapy    Planned therapy interventions: abdominal trunk stabilization, activity modification, balance, balance/weight bearing training, body mechanics training, flexibility, functional ROM exercises, graded activity, graded exercise, graded motor, gait training, home exercise program, therapeutic training, therapeutic activities, therapeutic exercise, stretching, strengthening, joint mobilization, manual therapy, massage, neuromuscular re-education, patient education, postural training, IASTM and kinesiology taping    Frequency: 2x week  Plan of Care beginning date: 5/1/2025  Plan of Care expiration date: 8/29/2025  Treatment plan discussed with: patient        Subjective Evaluation    History of Present Illness  Mechanism of injury: Solis Dos Santos is a 74 y.o. male presenting to OPPT initial evaluation s/p robotically assisted left total knee arthroplasty with Dr. Gallagher on 4/28/25. Pt  "is WBAT, current ambulating with RW. Pain is described as quad tightness, pressure, 2/10.  Pt notes pain is alleviated with ice, medication. Currently taking Tylenol, 5 mg oxycodone and muscle relaxers. Most recently taken at 9am today. Sleeping in power chair, altered sleep quality due to pain. Denies numbness / tingling. Has not had a BM since surgery.     Seen by myself for pre-op strengthening.          Patient Goals  Patient goals for therapy: decreased pain, improved balance, increased motion, decreased edema, increased strength and independence with ADLs/IADLs  Patient goal: Be able to walk, squat  Pain  Current pain ratin  At best pain ratin  At worst pain rating: 10 (yesterday)    Social Support  Steps to enter house: yes (1)  Stairs in house: yes   Lives in: multiple-level home  Lives with: spouse    Employment status: not working    Diagnostic Tests  No diagnostic tests performed  Treatments  Previous treatment: physical therapy        Objective    Observation: Incision C/D/I, lateral portal site bleeding slightly    Palpation: Increased scar tissue density along scar    Range of motion:      PROM   L knee 9-93   R knee      Patellar mobility: WNL    Strength:  Good quad set  Straight-leg raise: unable to perform independently, ModA partial ROM        POC expires Unit limit Auth Expiration date PT/OT + Visit Limit?   25 bomn N/a bomn                           Visit/Unit Tracking  AUTH Status:  Date               MC - RE every 10 visits Used 1               Remaining                       Precautions: PMH (+) for B PEGGY, R knee OA c pain    Visit #: IE 1            Date:             Manuals             STM / IASTM                                       Ther Ex             Bike             Heel slides 3x45\"            Heel prop  3x45\" c calf S strap            Supine HS/calf stretch c strap                                                                              Neuro Re-Ed        " "     QS 2x 10x5\"            GS             SAQ             LAQ             SLR             S/L hip ABd                          TKE                                                    Ther Activity             Sport cord retro walkouts             Step-ups                          Gait Training                                       Modalities             Gameready                             "

## 2025-05-05 ENCOUNTER — TELEPHONE (OUTPATIENT)
Dept: OBGYN CLINIC | Facility: HOSPITAL | Age: 75
End: 2025-05-05

## 2025-05-05 NOTE — TELEPHONE ENCOUNTER
"Spoke with patient. Patient replacing ace bandage because incision is \"leaking clear fluid.\" Informed patient to do so and follow up with Dr. Gallagher tomorrow as scheduled.   "

## 2025-05-06 ENCOUNTER — HOSPITAL ENCOUNTER (OUTPATIENT)
Dept: RADIOLOGY | Facility: HOSPITAL | Age: 75
Discharge: HOME | End: 2025-05-06
Attending: ORTHOPAEDIC SURGERY
Payer: MEDICARE

## 2025-05-06 ENCOUNTER — APPOINTMENT (OUTPATIENT)
Dept: PHYSICAL THERAPY | Facility: OTHER | Age: 75
End: 2025-05-06
Payer: MEDICARE

## 2025-05-06 ENCOUNTER — OFFICE VISIT (OUTPATIENT)
Dept: OBGYN CLINIC | Facility: HOSPITAL | Age: 75
End: 2025-05-06

## 2025-05-06 VITALS — HEIGHT: 73 IN | BODY MASS INDEX: 35.99 KG/M2

## 2025-05-06 DIAGNOSIS — Z47.1 AFTERCARE FOLLOWING LEFT KNEE JOINT REPLACEMENT SURGERY: ICD-10-CM

## 2025-05-06 DIAGNOSIS — Z47.1 AFTERCARE FOLLOWING LEFT HIP JOINT REPLACEMENT SURGERY: ICD-10-CM

## 2025-05-06 DIAGNOSIS — Z96.642 AFTERCARE FOLLOWING LEFT HIP JOINT REPLACEMENT SURGERY: ICD-10-CM

## 2025-05-06 DIAGNOSIS — Z47.1 AFTERCARE FOLLOWING LEFT KNEE JOINT REPLACEMENT SURGERY: Primary | ICD-10-CM

## 2025-05-06 DIAGNOSIS — Z96.652 AFTERCARE FOLLOWING LEFT KNEE JOINT REPLACEMENT SURGERY: ICD-10-CM

## 2025-05-06 DIAGNOSIS — Z96.652 AFTERCARE FOLLOWING LEFT KNEE JOINT REPLACEMENT SURGERY: Primary | ICD-10-CM

## 2025-05-06 PROCEDURE — 99024 POSTOP FOLLOW-UP VISIT: CPT | Performed by: ORTHOPAEDIC SURGERY

## 2025-05-06 PROCEDURE — 73502 X-RAY EXAM HIP UNI 2-3 VIEWS: CPT

## 2025-05-06 PROCEDURE — 73560 X-RAY EXAM OF KNEE 1 OR 2: CPT

## 2025-05-06 NOTE — PROGRESS NOTES
Encounter Provider: Leonard Gallagher MD   Encounter Date: 05/06/25  Encounter department: [facility] [place]         ASSESSMENT & PLAN:  Assessment & Plan  Aftercare following left knee joint replacement surgery  one week s/p left TKA with robot, 4/28/2025  The patient is doing well   He should continue daily Lovenox, pain medications as needed and current physical therapy regimen.    The patient can shower letting soapy water over incision, yet should not to submerge the incision, and then pat dry.    The patient should follow up in one week    Aftercare following left hip joint replacement surgery  6 months s/p left PEGGY  Patient currently dong well            To do next visit:  Return in about 1 week (around 5/13/2025).    Scribe Attestation      I,:  Giorgio Garcia MA am acting as a scribe while in the presence of the attending physician.:       I,:  Leonard Gallagher MD personally performed the services described in this documentation    as scribed in my presence.:             ____________________________________________________  CHIEF COMPLAINT:  Chief Complaint   Patient presents with    Left Knee - Post-op    Left Hip - Post-op         SUBJECTIVE:  Solis Dos Santos is a 74 y.o. male who presents one week s/p left TKA with robot, 4/28/2025.  The patient is doing well.  Today he complains of generalized left knee pain with posterior drainage.  He does participate in physical therapy.  He does use Lovenox.  He does use Tylenol and methocarbamol for pain control.  He denies fever, chills or shortness of breath.            PAST MEDICAL HISTORY:  Past Medical History:   Diagnosis Date    Acute blood loss anemia 08/09/2019    Aftercare following right hip joint replacement surgery 11/05/2019    Anxiety disorder     Atherosclerotic heart disease of native coronary artery without angina pectoris     Benign prostatic hyperplasia without lower urinary tract symptoms     Cancer  (HCC)     bladder    Clotting disorder (HCC) 1/25/2023    Blood in urine    Disease of thyroid gland     hypo    Dyslipidemia     GERD (gastroesophageal reflux disease)     manages w/ diet, takes no meds    Hypertension     Impaired fasting blood sugar     Kidney stone     Low back pain     Obesity     Osteoarthritis     last assesed 6-6-16    Other chest pain     Paresthesia of skin     Polyneuropathy     last assesed 5-8-17    Pure hypercholesterolemia     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)     Spinal stenosis     Suspected UTI 03/09/2023    Urinary tract infection     Wears glasses        PAST SURGICAL HISTORY:  Past Surgical History:   Procedure Laterality Date    BACK SURGERY      L4-L5 sx    CHOLECYSTECTOMY      COLONOSCOPY  02/06/2019    CYSTOSCOPY N/A 04/26/2023    Procedure: CYSTOSCOPY w/ bladder biopsy;  Surgeon: Geo Bentley MD;  Location: [facility];  Service: Urology    HIP SURGERY  August 2019    Replacement    JOINT REPLACEMENT  August 2019    Hip replacement    ID ARTHRP ACETBLR/PROX FEM PROSTC AGRFT/ALGRFT Right 08/05/2019    Procedure: ARTHROPLASTY HIP TOTAL;  Surgeon: Leonard Gallagher MD;  Location: [facility];  Service: Orthopedics    ID ARTHRP ACETBLR/PROX FEM PROSTC AGRFT/ALGRFT Left 11/4/2024    Procedure: Left total hip arthroplasty;  Surgeon: Leonard Gallagher MD;  Location: [facility];  Service: Orthopedics    ID ARTHRP KNE CONDYLE&PLATU MEDIAL&LAT COMPARTMENTS Left 4/28/2025    Procedure: Robotically assisted left total knee arthroplasty, all associated procedures as indicated;  Surgeon: Leonard Gallagher MD;  Location: [facility];  Service: Orthopedics    ID CYSTO W/REMOVAL OF LESIONS SMALL N/A 03/23/2023    Procedure: TRANSURETHRAL RESECTION OF BLADDER TUMOR (TURBT), mitomycin ;  Surgeon: Geo Bentley MD;  [facility]: [facility];  Service: Urology    ID CYSTO W/REMOVAL OF LESIONS SMALL N/A 04/26/2023    Procedure:  TRANSURETHRAL RESECTION OF BLADDER TUMOR (TURBT), cystoscopy with bladder biopsy;  Surgeon: Geo Bentley MD;  Location: [facility];  Service: Urology    TONSILLECTOMY         FAMILY HISTORY:  Family History   Problem Relation Age of Onset    Arthritis Other     Heart disease Father     Arthritis Mother        SOCIAL HISTORY:  Social History     Tobacco Use    Smoking status: Former     Current packs/day: 0.00     Average packs/day: 1 pack/day for 20.8 years (20.8 ttl pk-yrs)     Types: Cigarettes     Start date: 1967     Quit date: 1987     Years since quittin.5    Smokeless tobacco: Never   Vaping Use    Vaping status: Never Used   Substance Use Topics    Alcohol use: Yes     Alcohol/week: 1.0 standard drink of alcohol     Types: 1 Cans of beer per week     Comment: rarely    Drug use: Never       MEDICATIONS:    Current Outpatient Medications:     metroNIDAZOLE (METROCREAM) 0.75 % cream, APPLY TO FACE TWO TIMES A DAY, Disp: , Rfl:     acetaminophen (TYLENOL) 500 mg tablet, Take 2 tablets (1,000 mg total) by mouth every 6 (six) hours as needed for mild pain, Disp: 90 tablet, Rfl: 0    acetaminophen (TYLENOL) 500 mg tablet, Take 2 tablets (1,000 mg total) by mouth every 6 (six) hours as needed for mild pain, Disp: 120 tablet, Rfl: 0    atorvastatin (LIPITOR) 10 mg tablet, Take 1 tablet (10 mg total) by mouth daily at bedtime, Disp: 90 tablet, Rfl: 1    cholecalciferol (VITAMIN D3) 1,000 units tablet, Take 1 tablet (1,000 Units total) by mouth daily, Disp: 90 tablet, Rfl: 3    ciclopirox (LOPROX) 0.77 % cream, 2 (two) times a day as needed, Disp: , Rfl:     cyanocobalamin (VITAMIN B-12) 500 MCG tablet, Take 1 tablet (500 mcg total) by mouth daily, Disp: 100 tablet, Rfl: 3    enoxaparin (LOVENOX) 40 mg/0.4 mL, Inject 0.4 mL (40 mg total) under the skin daily for 28 days To start postoperatively (Patient taking differently: Inject 40 mg under the skin daily To start postoperatively- after  "surgery), Disp: 11.2 mL, Rfl: 0    hydrocortisone 2.5 % cream, if needed, Disp: , Rfl:     levothyroxine 150 mcg tablet, Take 1 tablet (150 mcg total) by mouth daily, Disp: 90 tablet, Rfl: 1    methocarbamol (ROBAXIN) 500 mg tablet, Take 1 tablet (500 mg total) by mouth 3 (three) times a day as needed for muscle spasms, Disp: 60 tablet, Rfl: 0    Multiple Vitamins-Minerals (multivitamin with iron-minerals) liquid, Take by mouth daily, Disp: , Rfl:     oxyCODONE (Roxicodone) 5 immediate release tablet, Take 1 tablet (5 mg total) by mouth every 6 (six) hours as needed for moderate pain for up to 10 days Max Daily Amount: 20 mg, Disp: 30 tablet, Rfl: 0    pantoprazole (PROTONIX) 40 mg tablet, TAKE ONE TABLET BY MOUTH EVERY DAY BEFORE BREAKFAST (Patient taking differently: if needed), Disp: 30 tablet, Rfl: 2    tamsulosin (FLOMAX) 0.4 mg, TAKE ONE CAPSULE BY MOUTH EVERY DAY WITH DINNER, Disp: 90 capsule, Rfl: 1    ALLERGIES:  No Known Allergies    LABS:  HgA1c:   Lab Results   Component Value Date    HGBA1C 6.1 (H) 04/02/2025     BMP:   Lab Results   Component Value Date    CALCIUM 8.4 04/29/2025    K 4.6 04/29/2025    CO2 29 04/29/2025     04/29/2025    BUN 14 04/29/2025    CREATININE 0.82 04/29/2025     CBC: No components found for: \"CBC\"    _____________________________________________________  PHYSICAL EXAMINATION:  Vital signs: Ht 6' 1\" (1.854 m)   BMI 35.99 kg/m²   General: No acute distress, awake and alert  Psychiatric: Mood and affect appear appropriate  HEENT: Trachea Midline, No torticollis, no apparent facial trauma  Cardiovascular: No audible murmurs; Extremities appear perfused  Pulmonary: No audible wheezing or stridor  Skin: No open lesions; see further details (if any) below    Ortho Exam:  Left knee:  Incision clean dry and intact  Posterior blistered skin 7cm x 2cm, no signs of infection  Staples well approximated   No erythema and no ecchymosis  Appropriate swelling of lower limb  Appropriate " "warmth of knee  Extensor mechanism intact  Extension Limited  Flexion 90  Calf compartments soft and supple  Sensation intact  Toes are warm sensate and mobile          _____________________________________________________  STUDIES REVIEWED:    The attending physician has personally reviewed the pertinent films in PACS and interpretation is as follows:  Left knee x-ray:  Well aligned prosthesis with no acute changes.      Bilateral hip x-rays:  Each hip displays well positioned total hip arthroplasty with no acute changes, signs of loosening or hardware failure.      PROCEDURES PERFORMED:  Procedures      None Preformed       Portions of the record may have been created with voice recognition software.  Occasional wrong word or \"sound a like\" substitutions may have occurred due to the inherent limitations of voice recognition software.  Read the chart carefully and recognize, using context, where substitutions have occurred.        "

## 2025-05-07 ENCOUNTER — OFFICE VISIT (OUTPATIENT)
Dept: PHYSICAL THERAPY | Facility: OTHER | Age: 75
End: 2025-05-07
Payer: MEDICARE

## 2025-05-07 DIAGNOSIS — Z96.643 H/O TOTAL HIP ARTHROPLASTY, BILATERAL: ICD-10-CM

## 2025-05-07 DIAGNOSIS — Z96.652 S/P TOTAL KNEE REPLACEMENT NOT USING CEMENT, LEFT: Primary | ICD-10-CM

## 2025-05-07 DIAGNOSIS — M17.12 PRIMARY OSTEOARTHRITIS OF LEFT KNEE: ICD-10-CM

## 2025-05-07 PROCEDURE — 97110 THERAPEUTIC EXERCISES: CPT

## 2025-05-07 NOTE — PROGRESS NOTES
"Daily Note     Today's date: 2025  Patient name: Solis Dos Santos  : 1950  MRN: [id number]  Referring provider: Leonard Gallagher,*  Dx:   Encounter Diagnosis     ICD-10-CM    1. S/P total knee replacement not using cement, left  Z96.652       2. Primary osteoarthritis of left knee  M17.12       3. H/O total hip arthroplasty, bilateral  Z96.643           Start Time: 1330  Stop Time: 1408  Total time in clinic (min): 38 minutes    Subjective: Pt had follow-up with Dr. Gallagher, pt is progressing well. Reports a wound on back of leg, draining serous fluid.      Objective: See treatment diary below      Assessment: Tolerated treatment well focused on L k' P / AROM with no issues; pt has staple removal scheduled for this coming Tuesday with expected improvements in L k' flexion following. Extension ROM approaching full, lacking 5-10 degrees at the moment. Patient exhibited good technique with therapeutic exercises and would benefit from continued PT      Plan: Continue per plan of care.      Precautions: PMH (+) for B PEGGY, R knee OA c pain    Visit #: IE 1 2           Date:            Manuals             STM / IASTM                                       Ther Ex             Bike             Heel slides 3x45\" 10x10\"           Heel prop  3x45\" c calf S strap On chair  c calf S 2x60\"           Supine HS/calf stretch c strap             Calf S on slant  3x30\"                                                               Neuro Re-Ed             QS 2x 10x5\"            GS             SAQ             LAQ  2x10           SLR             S/L hip ABd                          Chair squat  28\" 3x5           TKE                                                    Ther Activity             Sport cord retro walkouts             Step-taps  6\" 2x10           Step-ups                          Gait Training                                       Modalities             Gameready                                  "

## 2025-05-09 ENCOUNTER — OFFICE VISIT (OUTPATIENT)
Dept: PHYSICAL THERAPY | Facility: OTHER | Age: 75
End: 2025-05-09
Payer: MEDICARE

## 2025-05-09 ENCOUNTER — APPOINTMENT (OUTPATIENT)
Dept: PHYSICAL THERAPY | Facility: OTHER | Age: 75
End: 2025-05-09
Payer: MEDICARE

## 2025-05-09 DIAGNOSIS — M17.12 PRIMARY OSTEOARTHRITIS OF LEFT KNEE: ICD-10-CM

## 2025-05-09 DIAGNOSIS — Z96.652 S/P TOTAL KNEE REPLACEMENT NOT USING CEMENT, LEFT: Primary | ICD-10-CM

## 2025-05-09 DIAGNOSIS — Z96.643 H/O TOTAL HIP ARTHROPLASTY, BILATERAL: ICD-10-CM

## 2025-05-09 PROCEDURE — 97110 THERAPEUTIC EXERCISES: CPT

## 2025-05-09 NOTE — PROGRESS NOTES
"Daily Note     Today's date: 2025  Patient name: Solis Dos Santos  : 1950  MRN: [id number]  Referring provider: Leonard Gallagher,*  Dx:   Encounter Diagnosis     ICD-10-CM    1. S/P total knee replacement not using cement, left  Z96.652       2. Primary osteoarthritis of left knee  M17.12       3. H/O total hip arthroplasty, bilateral  Z96.643             Start Time: 1009  Stop Time: 1047  Total time in clinic (min): 38 minutes    Subjective: Pt worried that the ACE wrapping will keep bunching up under his pants.      Objective: See treatment diary below      Assessment: Tolerated treatment well focused on L k' P / AROM with no issues; pt has staple removal scheduled for this coming Tuesday with expected improvements in L k' flexion following. Added recumbent bike 1/2 revolutions with no issues. Good independent SLR with extension deficit. Patient exhibited good technique with therapeutic exercises and would benefit from continued PT      Plan: Continue per plan of care.      Precautions: PMH (+) for B PEGGY, R knee OA c pain    Visit #: IE 1 2 3          Date:           Manuals             STM / IASTM                                       Ther Ex             Bike   1/2 rev x8'          Heel slides 3x45\" 10x10\"           Heel prop  3x45\" c calf S strap On chair  c calf S 2x60\" C calf S x3'          Supine HS/calf stretch c strap             Calf S on slant  3x30\"                                                               Neuro Re-Ed             QS 2x 10x5\"            GS             SAQ   x30          LAQ  2x10 2x10          SLR   2x10          S/L hip ABd                          Chair squat  28\" 3x5           TKE                                                    Ther Activity             Sport cord retro walkouts             Step-taps  6\" 2x10           Step-ups                          Gait Training                                       Modalities             Gameready                "

## 2025-05-12 ENCOUNTER — OFFICE VISIT (OUTPATIENT)
Dept: PHYSICAL THERAPY | Facility: OTHER | Age: 75
End: 2025-05-12
Payer: MEDICARE

## 2025-05-12 DIAGNOSIS — M17.12 PRIMARY OSTEOARTHRITIS OF LEFT KNEE: ICD-10-CM

## 2025-05-12 DIAGNOSIS — Z96.652 S/P TOTAL KNEE REPLACEMENT NOT USING CEMENT, LEFT: Primary | ICD-10-CM

## 2025-05-12 DIAGNOSIS — Z96.643 H/O TOTAL HIP ARTHROPLASTY, BILATERAL: ICD-10-CM

## 2025-05-12 PROCEDURE — 97140 MANUAL THERAPY 1/> REGIONS: CPT

## 2025-05-12 PROCEDURE — 97110 THERAPEUTIC EXERCISES: CPT

## 2025-05-12 NOTE — PROGRESS NOTES
"Daily Note     Today's date: 2025  Patient name: Solis Dos Santos  : 1950  MRN: [id number]  Referring provider: Leonard Gallagher,*  Dx:   Encounter Diagnosis     ICD-10-CM    1. S/P total knee replacement not using cement, left  Z96.652       2. Primary osteoarthritis of left knee  M17.12       3. H/O total hip arthroplasty, bilateral  Z96.643             Start Time: 1031  Stop Time: 1131  Total time in clinic (min): 60 minutes    Subjective: Pt noted increased posterior k' pain yesterday, specifically with active k' flexion.      Objective: See treatment diary below      Assessment: Tolerated treatment well focused on L k' P / AROM & L quad activation with no issues; pt has staple removal scheduled tomorrow. Good quad activation against light resistance. Begin gait training / weight shifting onto LLE for transition to [facility]. Patient exhibited good technique with therapeutic exercises and would benefit from continued PT      Plan: Continue per plan of care.      Precautions: PMH (+) for B PEGGY, R knee OA c pain    Visit #: IE 1 2 3 4         Date:          Manuals             STM / IASTM    Calf SFP         Manual stretching    Calf / HS  SFP                      Ther Ex             Bike    rev x8' nv         Heel slides 3x45\" 10x10\"  10x10\"         Seated k' flexion EOT    10x10\"         Heel prop  3x45\" c calf S strap On chair  c calf S 2x60\" C calf S x3' C calf S x3'         Supine HS/calf stretch c strap             Calf S on slant  3x30\"                                                               Neuro Re-Ed             QS 2x 10x5\"            GS             SAQ   x30 x30         LAQ  2x10 2x10 1.5# 3x10         SLR   2x10          S/L hip ABd                          Chair squat  28\" 3x5           TKE                                                    Ther Activity             Sport cord retro walkouts             Step-taps  6\" 2x10           Step-ups                      "     Gait Training                                       Modalities             Gameready

## 2025-05-13 ENCOUNTER — OFFICE VISIT (OUTPATIENT)
Dept: OBGYN CLINIC | Facility: HOSPITAL | Age: 75
End: 2025-05-13

## 2025-05-13 VITALS — HEIGHT: 73 IN | BODY MASS INDEX: 35.99 KG/M2

## 2025-05-13 DIAGNOSIS — Z96.652 AFTERCARE FOLLOWING LEFT KNEE JOINT REPLACEMENT SURGERY: Primary | ICD-10-CM

## 2025-05-13 DIAGNOSIS — Z47.1 AFTERCARE FOLLOWING LEFT KNEE JOINT REPLACEMENT SURGERY: Primary | ICD-10-CM

## 2025-05-13 PROCEDURE — 99024 POSTOP FOLLOW-UP VISIT: CPT | Performed by: ORTHOPAEDIC SURGERY

## 2025-05-13 NOTE — PROGRESS NOTES
Encounter Provider: Leonard Gallagher MD   Encounter Date: 05/13/25  Encounter department: [facility] [place]         ASSESSMENT & PLAN:  Assessment & Plan  Aftercare following left knee joint replacement surgery  2 weeks s/p left TKA with robot, 4/28/2025  The patient is doing well   He should continue daily Lovenox, pain medications as needed and current physical therapy regimen.    Patient may use otc ibuprofen or naproxen as needed for pain  The patient can shower letting soapy water over incision, yet should not to submerge the incision, and then pat dry.    The patient should follow up in 4 weeks              To do next visit:  Return in about 4 weeks (around 6/10/2025).    Scribe Attestation      I,:  Giorgio Garcia MA am acting as a scribe while in the presence of the attending physician.:       I,:  Leonard aGllagher MD personally performed the services described in this documentation    as scribed in my presence.:             ____________________________________________________  CHIEF COMPLAINT:  Chief Complaint   Patient presents with    Left Knee - Post-op         SUBJECTIVE:  Solis Dos Santos is a 74 y.o. male who presents 2 weeks s/p left TKA with robot, 4/28/2025.  The patient is doing well.  Today he complains of generalized left knee pain.  He does participate in physical therapy.  He does use Lovenox daily.  He does use Tylenol and Methocarbamol for pain control.  He denies fever, chills or shortness of breath.            PAST MEDICAL HISTORY:  Past Medical History:   Diagnosis Date    Acute blood loss anemia 08/09/2019    Aftercare following right hip joint replacement surgery 11/05/2019    Anxiety disorder     Atherosclerotic heart disease of native coronary artery without angina pectoris     Benign prostatic hyperplasia without lower urinary tract symptoms     Cancer (HCC)     bladder    Clotting disorder (HCC) 1/25/2023    Blood in urine    Disease of  thyroid gland     hypo    Dyslipidemia     GERD (gastroesophageal reflux disease)     manages w/ diet, takes no meds    Hypertension     Impaired fasting blood sugar     Kidney stone     Low back pain     Obesity     Osteoarthritis     last assesed 6-6-16    Other chest pain     Paresthesia of skin     Polyneuropathy     last assesed 5-8-17    Pure hypercholesterolemia     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)     Spinal stenosis     Suspected UTI 03/09/2023    Urinary tract infection     Wears glasses        PAST SURGICAL HISTORY:  Past Surgical History:   Procedure Laterality Date    BACK SURGERY      L4-L5 sx    CHOLECYSTECTOMY      COLONOSCOPY  02/06/2019    CYSTOSCOPY N/A 04/26/2023    Procedure: CYSTOSCOPY w/ bladder biopsy;  Surgeon: Geo Bentley MD;  [facility]: [facility];  Service: Urology    HIP SURGERY  August 2019    Replacement    JOINT REPLACEMENT  August 2019    Hip replacement    NJ ARTHRP ACETBLR/PROX FEM PROSTC AGRFT/ALGRFT Right 08/05/2019    Procedure: ARTHROPLASTY HIP TOTAL;  Surgeon: Leonard Gallagher MD;  Location[facility];  Service: Orthopedics    NJ ARTHRP ACETBLR/PROX FEM PROSTC AGRFT/ALGRFT Left 11/4/2024    Procedure: Left total hip arthroplasty;  Surgeon: Leonard Gallagher MD;  Location: [facility];  Service: Orthopedics    NJ ARTHRP KNE CONDYLE&PLATU MEDIAL&LAT COMPARTMENTS Left 4/28/2025    Procedure: Robotically assisted left total knee arthroplasty, all associated procedures as indicated;  Surgeon: Leonard Gallagher MD;  [facility];  Service: Orthopedics    NJ CYSTO W/REMOVAL OF LESIONS SMALL N/A 03/23/2023    Procedure: TRANSURETHRAL RESECTION OF BLADDER TUMOR (TURBT), mitomycin ;  Surgeon: Geo Bentley MD;  [facility];  Service: Urology    NJ CYSTO W/REMOVAL OF LESIONS SMALL N/A 04/26/2023    Procedure: TRANSURETHRAL RESECTION OF BLADDER TUMOR (TURBT), cystoscopy with bladder biopsy;  Surgeon: Geo  Eliazar Bentley MD;  [facility];  Service: Urology    TONSILLECTOMY         FAMILY HISTORY:  Family History   Problem Relation Age of Onset    Arthritis Other     Heart disease Father     Arthritis Mother        SOCIAL HISTORY:  Social History     Tobacco Use    Smoking status: Former     Current packs/day: 0.00     Average packs/day: 1 pack/day for 20.8 years (20.8 ttl pk-yrs)     Types: Cigarettes     Start date: 1967     Quit date: 1987     Years since quittin.5    Smokeless tobacco: Never   Vaping Use    Vaping status: Never Used   Substance Use Topics    Alcohol use: Yes     Alcohol/week: 1.0 standard drink of alcohol     Types: 1 Cans of beer per week     Comment: rarely    Drug use: Never       MEDICATIONS:    Current Outpatient Medications:     acetaminophen (TYLENOL) 500 mg tablet, Take 2 tablets (1,000 mg total) by mouth every 6 (six) hours as needed for mild pain, Disp: 90 tablet, Rfl: 0    acetaminophen (TYLENOL) 500 mg tablet, Take 2 tablets (1,000 mg total) by mouth every 6 (six) hours as needed for mild pain, Disp: 120 tablet, Rfl: 0    atorvastatin (LIPITOR) 10 mg tablet, Take 1 tablet (10 mg total) by mouth daily at bedtime, Disp: 90 tablet, Rfl: 1    cholecalciferol (VITAMIN D3) 1,000 units tablet, Take 1 tablet (1,000 Units total) by mouth daily, Disp: 90 tablet, Rfl: 3    ciclopirox (LOPROX) 0.77 % cream, 2 (two) times a day as needed, Disp: , Rfl:     cyanocobalamin (VITAMIN B-12) 500 MCG tablet, Take 1 tablet (500 mcg total) by mouth daily, Disp: 100 tablet, Rfl: 3    enoxaparin (LOVENOX) 40 mg/0.4 mL, Inject 0.4 mL (40 mg total) under the skin daily for 28 days To start postoperatively (Patient taking differently: Inject 40 mg under the skin daily To start postoperatively- after surgery), Disp: 11.2 mL, Rfl: 0    hydrocortisone 2.5 % cream, if needed, Disp: , Rfl:     levothyroxine 150 mcg tablet, Take 1 tablet (150 mcg total) by mouth daily, Disp: 90 tablet, Rfl: 1     "methocarbamol (ROBAXIN) 500 mg tablet, Take 1 tablet (500 mg total) by mouth 3 (three) times a day as needed for muscle spasms, Disp: 60 tablet, Rfl: 0    metroNIDAZOLE (METROCREAM) 0.75 % cream, APPLY TO FACE TWO TIMES A DAY, Disp: , Rfl:     Multiple Vitamins-Minerals (multivitamin with iron-minerals) liquid, Take by mouth daily, Disp: , Rfl:     pantoprazole (PROTONIX) 40 mg tablet, TAKE ONE TABLET BY MOUTH EVERY DAY BEFORE BREAKFAST (Patient taking differently: if needed), Disp: 30 tablet, Rfl: 2    tamsulosin (FLOMAX) 0.4 mg, TAKE ONE CAPSULE BY MOUTH EVERY DAY WITH DINNER, Disp: 90 capsule, Rfl: 1    ALLERGIES:  No Known Allergies    LABS:  HgA1c:   Lab Results   Component Value Date    HGBA1C 6.1 (H) 04/02/2025     BMP:   Lab Results   Component Value Date    CALCIUM 8.4 04/29/2025    K 4.6 04/29/2025    CO2 29 04/29/2025     04/29/2025    BUN 14 04/29/2025    CREATININE 0.82 04/29/2025     CBC: No components found for: \"CBC\"    _____________________________________________________  PHYSICAL EXAMINATION:  Vital signs: Ht 6' 1\" (1.854 m)   BMI 35.99 kg/m²   General: No acute distress, awake and alert  Psychiatric: Mood and affect appear appropriate  HEENT: Trachea Midline, No torticollis, no apparent facial trauma  Cardiovascular: No audible murmurs; Extremities appear perfused  Pulmonary: No audible wheezing or stridor  Skin: No open lesions; see further details (if any) below    Ortho Exam:  Left knee:  Incision clean dry and intact  Staples well approximated and removed today   Healing posterior blistered skin 7cm x 2cm, no signs of infection  No erythema and no ecchymosis  Appropriate swelling of lower limb  Appropriate warmth of knee  Extensor mechanism intact  Extension near full  Flexion 90+  Calf compartments soft and supple  Sensation intact  Toes are warm sensate and mobile            _____________________________________________________  STUDIES REVIEWED:    None performed     PROCEDURES " "PERFORMED:  Procedures      None Preformed       Portions of the record may have been created with voice recognition software.  Occasional wrong word or \"sound a like\" substitutions may have occurred due to the inherent limitations of voice recognition software.  Read the chart carefully and recognize, using context, where substitutions have occurred.        "

## 2025-05-15 ENCOUNTER — APPOINTMENT (OUTPATIENT)
Dept: PHYSICAL THERAPY | Facility: OTHER | Age: 75
End: 2025-05-15
Payer: MEDICARE

## 2025-05-16 ENCOUNTER — OFFICE VISIT (OUTPATIENT)
Dept: PHYSICAL THERAPY | Facility: OTHER | Age: 75
End: 2025-05-16
Payer: MEDICARE

## 2025-05-16 DIAGNOSIS — Z96.652 S/P TOTAL KNEE REPLACEMENT NOT USING CEMENT, LEFT: Primary | ICD-10-CM

## 2025-05-16 DIAGNOSIS — M17.12 PRIMARY OSTEOARTHRITIS OF LEFT KNEE: ICD-10-CM

## 2025-05-16 PROCEDURE — 97110 THERAPEUTIC EXERCISES: CPT

## 2025-05-16 NOTE — PROGRESS NOTES
"Daily Note     Today's date: 2025  Patient name: Solis Dos Santos  : 1950  MRN: [id number]  Referring provider: Leonard Gallagher,*  Dx:   Encounter Diagnosis     ICD-10-CM    1. S/P total knee replacement not using cement, left  Z96.652       2. Primary osteoarthritis of left knee  M17.12             Start Time: 1054  Stop Time: 1132  Total time in clinic (min): 38 minutes    Subjective: Pt noted increased posterior k' pain yesterday, specifically with active k' flexion.      Objective: See treatment diary below    L k' ROM: 5-100    Assessment: Tolerated treatment well focused on L k' P / AROM & L quad activation with no issues; staples removed, incision appears C/D/I. Does present with redness at superior portion of scar with increased fibrous tissue density. Pt able to complete full revolutions on recumbent bike today. Begin gait training / weight shifting onto LLE for transition to [facility]. Patient exhibited good technique with therapeutic exercises and would benefit from continued PT      Plan: Continue per plan of care.      Precautions: PMH (+) for B PEGGY, R knee OA c pain    Visit #: IE 1 2 3 4 5        Date:         Manuals             STM / IASTM    Calf SFP         Manual stretching    Calf / HS  SFP                      Ther Ex             Bike   1/2 rev x8' nv 1/2 to full x5'        Heel slides 3x45\" 10x10\"  10x10\" 10x10\"        Seated k' flexion EOT    10x10\"         Heel prop  3x45\" c calf S strap On chair  c calf S 2x60\" C calf S x3' C calf S x3' C calf S 3x1'        Supine HS/calf stretch c strap     ^        Calf S on slant  3x30\"                                                               Neuro Re-Ed             QS 2x 10x5\"            GS             SAQ   x30 x30 1.5# x30        LAQ  2x10 2x10 1.5# 3x10         SLR   2x10  2x5        S/L hip ABd                          Chair squat  28\" 3x5           TKE                                                  " "  Ther Activity             Sport cord retro walkouts             Step-taps  6\" 2x10           Step-ups                          Gait Training                                       Modalities             Gameready                                  " 100

## 2025-05-19 ENCOUNTER — OFFICE VISIT (OUTPATIENT)
Dept: PHYSICAL THERAPY | Facility: OTHER | Age: 75
End: 2025-05-19
Payer: MEDICARE

## 2025-05-19 DIAGNOSIS — Z96.652 S/P TOTAL KNEE REPLACEMENT NOT USING CEMENT, LEFT: Primary | ICD-10-CM

## 2025-05-19 DIAGNOSIS — M17.12 PRIMARY OSTEOARTHRITIS OF LEFT KNEE: ICD-10-CM

## 2025-05-19 DIAGNOSIS — Z96.643 H/O TOTAL HIP ARTHROPLASTY, BILATERAL: ICD-10-CM

## 2025-05-19 PROCEDURE — 97110 THERAPEUTIC EXERCISES: CPT

## 2025-05-19 PROCEDURE — 97140 MANUAL THERAPY 1/> REGIONS: CPT

## 2025-05-19 NOTE — PROGRESS NOTES
"Daily Note     Today's date: 2025  Patient name: Solis Dos Santos  : 1950  MRN: [id number]  Referring provider: Leonard Gallagher,*  Dx:   Encounter Diagnosis     ICD-10-CM    1. S/P total knee replacement not using cement, left  Z96.652       2. Primary osteoarthritis of left knee  M17.12       3. H/O total hip arthroplasty, bilateral  Z96.643               Start Time: 945  Stop Time: 1030  Total time in clinic (min): 45 minutes    Subjective: Pt reports feeling much better today compared to last week, notes he feels confident on his LLE and has been trying to walk with SPC around home.      Objective: See treatment diary below    L k' ROM:     Assessment: Tolerated treatment well - progressed to functional strengthening including chair squats & step-ups with no complaints other than L quad fatigue. Pt demonstrating good LLE stability and NM control of L quad, good step-through gait pattern with SPC during gait training - progressed to ambulation c SPC at home. Encouraged pt to wear compression sleeve on B k' if he chooses. Patient exhibited good technique with therapeutic exercises and would benefit from continued PT      Plan: Continue per plan of care.      Precautions: PMH (+) for B PEGGY, R knee OA c pain    Visit #: IE 1 2 3 4 5 6       Date:        Manuals             STM / IASTM    Calf SFP  Scar mobilization  SFP       Manual stretching    Calf / HS  SFP  Ext mob 3x20\"                    Ther Ex             Bike   1/2 rev x8' nv 1/2 to full x5' nv       Heel slides 3x45\" 10x10\"  10x10\" 10x10\" 10x10\"       Seated k' flexion EOT    10x10\"         Heel prop  3x45\" c calf S strap On chair  c calf S 2x60\" C calf S x3' C calf S x3' C calf S 3x1'        Supine HS/calf stretch c strap     ^ 3x1'       Calf S on slant  3x30\"    nv                                                           Neuro Re-Ed             QS 2x 10x5\"            GS             SAQ   x30 x30 1.5# " "x30        LAQ  2x10 2x10 1.5# 3x10         SLR   2x10  2x5        S/L hip ABd                          Lateral band walk      LOTB k' 2x15ea       Chair squat  28\" 3x5    22\" c UE asst 3x6       TKE                                                    Ther Activity             Sport cord retro walkouts             Step-taps  6\" 2x10           Step-ups      2\" FSU x30                    Gait Training      SPC x8'                                 Modalities             Gameready                                  "

## 2025-05-21 ENCOUNTER — OFFICE VISIT (OUTPATIENT)
Dept: PHYSICAL THERAPY | Facility: OTHER | Age: 75
End: 2025-05-21
Payer: MEDICARE

## 2025-05-21 DIAGNOSIS — Z96.643 H/O TOTAL HIP ARTHROPLASTY, BILATERAL: ICD-10-CM

## 2025-05-21 DIAGNOSIS — Z96.652 S/P TOTAL KNEE REPLACEMENT NOT USING CEMENT, LEFT: Primary | ICD-10-CM

## 2025-05-21 DIAGNOSIS — M17.12 PRIMARY OSTEOARTHRITIS OF LEFT KNEE: ICD-10-CM

## 2025-05-21 PROCEDURE — 97140 MANUAL THERAPY 1/> REGIONS: CPT

## 2025-05-21 PROCEDURE — 97110 THERAPEUTIC EXERCISES: CPT

## 2025-05-21 NOTE — PROGRESS NOTES
"Daily Note     Today's date: 2025  Patient name: Solis Dos Santos  : 1950  MRN: [id number]  Referring provider: Leonard Gallagher,*  Dx:   Encounter Diagnosis     ICD-10-CM    1. S/P total knee replacement not using cement, left  Z96.652       2. Primary osteoarthritis of left knee  M17.12       3. H/O total hip arthroplasty, bilateral  Z96.643                 Start Time: 938  Stop Time: 1016  Total time in clinic (min): 38 minutes    Subjective: Pt added compression k' sleeve to L knee, feels good with it.      Objective: See treatment diary below    L k' ROM: 5-115    Assessment: Tolerated treatment well focused on L k' ROM and functional strengthening with no complaints other than end-range tightness and muscle fatigue. Significant improvements in k' flexion today. Added leg press with good tolerance. Patient exhibited good technique with therapeutic exercises and would benefit from continued PT      Plan: Continue per plan of care.      Precautions: PMH (+) for B PEGYG, R knee OA c pain    Visit #: IE 1 2 3 4 5 6 7      Date:       Manuals             STM / IASTM    Calf SFP  Scar mobilization  SFP Scar mobilization  SFP      Manual stretching    Calf / HS  SFP  Ext mob 3x20\"                    Ther Ex             Bike   1/2 rev x8' nv 1/2 to full x5' nv x8'      Heel slides 3x45\" 10x10\"  10x10\" 10x10\" 10x10\" Manual PROM 3x1'    Active c pball x20      Seated k' flexion EOT    10x10\"         Heel prop  3x45\" c calf S strap On chair  c calf S 2x60\" C calf S x3' C calf S x3' C calf S 3x1'        Supine HS/calf stretch c strap     ^ 3x1' Manual 3x30\"      Calf S on slant  3x30\"    nv nv                                                          Neuro Re-Ed             QS 2x 10x5\"            GS             SAQ   x30 x30 1.5# x30        LAQ  2x10 2x10 1.5# 3x10         SLR   2x10  2x5        S/L hip ABd                          Lateral band walk      LOTB k' 2x15ea     " "  Chair squat  28\" 3x5    22\" c UE asst 3x6       TKE                          Leg press       DL 65# 3x10                   Ther Activity             Sport cord retro walkouts             Step-taps  6\" 2x10           Step-ups      2\" FSU x30                    Gait Training      SPC x8'                                 Modalities             Gameready                                  "

## 2025-05-22 ENCOUNTER — APPOINTMENT (OUTPATIENT)
Dept: PHYSICAL THERAPY | Facility: OTHER | Age: 75
End: 2025-05-22
Attending: ORTHOPAEDIC SURGERY
Payer: MEDICARE

## 2025-05-27 ENCOUNTER — OFFICE VISIT (OUTPATIENT)
Dept: PHYSICAL THERAPY | Facility: OTHER | Age: 75
End: 2025-05-27
Payer: MEDICARE

## 2025-05-27 DIAGNOSIS — Z96.652 S/P TOTAL KNEE REPLACEMENT NOT USING CEMENT, LEFT: Primary | ICD-10-CM

## 2025-05-27 DIAGNOSIS — M17.12 PRIMARY OSTEOARTHRITIS OF LEFT KNEE: ICD-10-CM

## 2025-05-27 PROCEDURE — 97110 THERAPEUTIC EXERCISES: CPT

## 2025-05-27 PROCEDURE — 97140 MANUAL THERAPY 1/> REGIONS: CPT

## 2025-05-27 NOTE — PROGRESS NOTES
"Daily Note     Today's date: 2025  Patient name: Solis Dos Santos  : 1950  MRN: [id number]  Referring provider: Leonard Gallagher,*  Dx:   Encounter Diagnosis     ICD-10-CM    1. S/P total knee replacement not using cement, left  Z96.652       2. Primary osteoarthritis of left knee  M17.12                 Start Time: 954  Stop Time: 1047  Total time in clinic (min): 53 minutes    Subjective: Pt reports increased stiffness after prolonged sitting, as well as quad soreness / fatigue.      Objective: See treatment diary below    L k' ROM: 5-115    Assessment: Tolerated treatment well focused on L k' ROM, STM and functional strengthening - progressed to 4\" step-ups with no complaints other than quad fatigue. Tolerated soft tissue mobilization better in conjunction with MHP. Instructed pt to perform heel prop / passive k' extension more often at home in order to regain final 5 degrees of terminal knee extension. Patient exhibited good technique with therapeutic exercises and would benefit from continued PT      Plan: Continue per plan of care.  Focus on L k' ROM in both directions and progressions to functional strengthening.      Precautions: PMH (+) for B PEGGY, R knee OA c pain    Visit #: IE 1 2 3 4 5 6 7 8     Date:      Manuals             STM / IASTM    Calf SFP  Scar mobilization  SFP Scar mobilization  SFP L quad FR SFP     Manual stretching    Calf / HS  SFP  Ext mob 3x20\"                    Ther Ex             Bike   1/2 rev x8' nv 1/2 to full x5' nv x8' x10'     Heel slides 3x45\" 10x10\"  10x10\" 10x10\" 10x10\" Manual PROM 3x1'    Active c pball x20 PROM 10x20\"     Seated k' flexion EOT    10x10\"         Heel prop  3x45\" c calf S strap On chair  c calf S 2x60\" C calf S x3' C calf S x3' C calf S 3x1'        Prone hang        MHP post k' 3x30\"     Supine HS/calf stretch c strap     ^ 3x1' Manual 3x30\"      Calf S on slant  3x30\"    nv nv 3x45\"     Prone quad S c " "strap        3x30\"                                            Neuro Re-Ed             QS 2x 10x5\"            GS             SAQ   x30 x30 1.5# x30        LAQ  2x10 2x10 1.5# 3x10    5# x20     SLR   2x10  2x5        S/L hip ABd                          Lateral band walk      LOTB k' 2x15ea       Chair squat  28\" 3x5    22\" c UE asst 3x6       TKE                          Leg press       DL 65# 3x10 nv                  Ther Activity             Sport cord retro walkouts             Step-taps  6\" 2x10           Step-ups      2\" FSU x30  4\" FSU 3x10                  Gait Training      SPC x8'                                 Modalities             Gameready                                  "

## 2025-05-29 ENCOUNTER — OFFICE VISIT (OUTPATIENT)
Dept: PHYSICAL THERAPY | Facility: OTHER | Age: 75
End: 2025-05-29
Attending: ORTHOPAEDIC SURGERY
Payer: MEDICARE

## 2025-05-29 DIAGNOSIS — M17.12 PRIMARY OSTEOARTHRITIS OF LEFT KNEE: ICD-10-CM

## 2025-05-29 DIAGNOSIS — Z96.652 S/P TOTAL KNEE REPLACEMENT NOT USING CEMENT, LEFT: Primary | ICD-10-CM

## 2025-05-29 PROCEDURE — 97110 THERAPEUTIC EXERCISES: CPT

## 2025-05-29 PROCEDURE — 97140 MANUAL THERAPY 1/> REGIONS: CPT

## 2025-05-29 NOTE — PROGRESS NOTES
"Daily Note     Today's date: 2025  Patient name: Solis Dos Santos  : 1950  MRN: [id number]  Referring provider: Leonard Gallagher,*  Dx:   Encounter Diagnosis     ICD-10-CM    1. S/P total knee replacement not using cement, left  Z96.652       2. Primary osteoarthritis of left knee  M17.12                      Subjective: Dandre reports moderate soreness after LV. He is compliant with stretching at home.       Objective: See treatment diary below      Assessment: Tolerated treatment well. Dandre continues to respond well to MHP pre STM/stretching. He continues to present with limited knee flexion and ext, but he is making incremental gains into both directions.  Patient demonstrated fatigue post session and would benefit from continued PT.          Plan: Continue per plan of care.       Precautions: PMH (+) for B PEGGY, R knee OA c pain    Visit #: IE 1 2 3 4 5 6 7 8 9    Date:     Manuals             STM / IASTM    Calf SFP  Scar mobilization  SFP Scar mobilization  SFP L quad FR SFP L quad MB    Manual stretching    Calf / HS  SFP  Ext mob 3x20\"                    Ther Ex             Bike   1/2 rev x8' nv 1/2 to full x5' nv x8' x10' 10'    Heel slides 3x45\" 10x10\"  10x10\" 10x10\" 10x10\" Manual PROM 3x1'    Active c pball x20 PROM 10x20\" PROM 10x20\"    Seated k' flexion EOT    10x10\"         Heel prop  3x45\" c calf S strap On chair  c calf S 2x60\" C calf S x3' C calf S x3' C calf S 3x1'        Prone hang        MHP post k' 3x30\" MHP post k' 3x30\"    Supine HS/calf stretch c strap     ^ 3x1' Manual 3x30\"  -    Calf S on slant  3x30\"    nv nv 3x45\" 3x45\"    Prone quad S c strap        3x30\" -                                           Neuro Re-Ed             QS 2x 10x5\"            GS             SAQ   x30 x30 1.5# x30        LAQ  2x10 2x10 1.5# 3x10    5# x20 5# 20x    SLR   2x10  2x5        S/L hip ABd                          Lateral band walk      MIGUEL k' 2x15ea     " "  Chair squat  28\" 3x5    22\" c UE asst 3x6       TKE                          Leg press       DL 65# 3x10 nv DL 65# 3x10                 Ther Activity             Sport cord retro walkouts             Step-taps  6\" 2x10           Step-ups      2\" FSU x30  4\" FSU 3x10 4\" FSU 3x10                 Gait Training      SPC x8'                                 Modalities             Gameready                                    "

## 2025-06-02 ENCOUNTER — OFFICE VISIT (OUTPATIENT)
Dept: PHYSICAL THERAPY | Facility: OTHER | Age: 75
End: 2025-06-02
Attending: ORTHOPAEDIC SURGERY
Payer: MEDICARE

## 2025-06-02 DIAGNOSIS — M17.12 PRIMARY OSTEOARTHRITIS OF LEFT KNEE: ICD-10-CM

## 2025-06-02 DIAGNOSIS — Z96.643 H/O TOTAL HIP ARTHROPLASTY, BILATERAL: ICD-10-CM

## 2025-06-02 DIAGNOSIS — Z96.652 S/P TOTAL KNEE REPLACEMENT NOT USING CEMENT, LEFT: Primary | ICD-10-CM

## 2025-06-02 PROCEDURE — 97110 THERAPEUTIC EXERCISES: CPT

## 2025-06-02 NOTE — PROGRESS NOTES
"Daily Note     Today's date: 2025  Patient name: Solis Dos Santos  : 1950  MRN: [id number]  Referring provider: Leonard Gallagher,*  Dx:   Encounter Diagnosis     ICD-10-CM    1. S/P total knee replacement not using cement, left  Z96.652       2. Primary osteoarthritis of left knee  M17.12       3. H/O total hip arthroplasty, bilateral  Z96.643           Start Time: 823  Stop Time: 923  Total time in clinic (min): 60 minutes    Subjective: Patient reports knee rom and pain ebbs and flows but overall is making gradual gains.       Objective: See treatment diary below      Assessment: Patient is making steady functional gains in quad strength and range of motion. Reports a favorable response to moist heat prior to manual interventions. Extension is lacking multiple degrees and requires verbal cues for more tke with step ups. Patient would benefit from continued PT to improve level of function.       Plan: Continue per POC. Increase reps/resistance as tolerated.      Precautions: PMH (+) for B PEGGY, R knee OA c pain    Visit #: IE 1 2 3 4 5 6 7 8 9 10   Date:    Manuals             STM / IASTM    Calf SFP  Scar mobilization  SFP Scar mobilization  SFP L quad FR SFP L quad MB L quad MS   Manual stretching    Calf / HS  SFP  Ext mob 3x20\"                    Ther Ex             Bike   1/2 rev x8' nv 1/2 to full x5' nv x8' x10' 10' 10'   Heel slides 3x45\" 10x10\"  10x10\" 10x10\" 10x10\" Manual PROM 3x1'    Active c pball x20 PROM 10x20\" PROM 10x20\" PROM 10x20\"   Seated k' flexion EOT    10x10\"         Heel prop  3x45\" c calf S strap On chair  c calf S 2x60\" C calf S x3' C calf S x3' C calf S 3x1'        Prone hang        MHP post k' 3x30\" MHP post k' 3x30\" MHP post k' 3x30\"   Supine HS/calf stretch c strap     ^ 3x1' Manual 3x30\"  -    Calf S on slant  3x30\"    nv nv 3x45\" 3x45\" 3x45\"   Prone quad S c strap        3x30\" - 3x30\"                                      " "    Neuro Re-Ed             QS 2x 10x5\"            GS             SAQ   x30 x30 1.5# x30        LAQ  2x10 2x10 1.5# 3x10    5# x20 5# 20x 5# 20x   SLR   2x10  2x5        S/L hip ABd                          Lateral band walk      LOTB k' 2x15ea       Chair squat  28\" 3x5    22\" c UE asst 3x6       TKE                          Leg press       DL 65# 3x10 nv DL 65# 3x10 DL 70# 3x10                Ther Activity             Sport cord retro walkouts             Step-taps  6\" 2x10           Step-ups      2\" FSU x30  4\" FSU 3x10 4\" FSU 3x10 4\" FSU 3x10                Gait Training      SPC x8'                                 Modalities             Gameready                                      "

## 2025-06-05 ENCOUNTER — OFFICE VISIT (OUTPATIENT)
Dept: PHYSICAL THERAPY | Facility: OTHER | Age: 75
End: 2025-06-05
Attending: ORTHOPAEDIC SURGERY
Payer: MEDICARE

## 2025-06-05 DIAGNOSIS — Z96.652 S/P TOTAL KNEE REPLACEMENT NOT USING CEMENT, LEFT: Primary | ICD-10-CM

## 2025-06-05 DIAGNOSIS — Z96.643 H/O TOTAL HIP ARTHROPLASTY, BILATERAL: ICD-10-CM

## 2025-06-05 DIAGNOSIS — M17.12 PRIMARY OSTEOARTHRITIS OF LEFT KNEE: ICD-10-CM

## 2025-06-05 PROCEDURE — 97140 MANUAL THERAPY 1/> REGIONS: CPT

## 2025-06-05 PROCEDURE — 97110 THERAPEUTIC EXERCISES: CPT

## 2025-06-05 PROCEDURE — 97112 NEUROMUSCULAR REEDUCATION: CPT

## 2025-06-05 NOTE — PROGRESS NOTES
"Daily Note     Today's date: 2025  Patient name: Solis Dos Santos  : 1950  MRN: [id number]  Referring provider: Leonard Gallagher,*  Dx:   Encounter Diagnosis     ICD-10-CM    1. S/P total knee replacement not using cement, left  Z96.652       2. Primary osteoarthritis of left knee  M17.12       3. H/O total hip arthroplasty, bilateral  Z96.643           Start Time: 945  Stop Time: 103  Total time in clinic (min): 50 minutes    Subjective: Presents to therapy noting the usual morning knee stiffness, as well as no increase in symptoms following last visit. States he is able to walk short distances without using his cane, but still takes it with outside and for longer walks.        Objective: See treatment diary below      Assessment: Patient tolerated today's session well, continuing to focus on left knee range of motion, both flexion and extension, as well as left quad control. Session initiated with recumbent bike followed by moist heat and manual STM to left quad for soft tissue extensibility. Patient subjectively reported slight improvement in left quad tightness post session. Patient demonstrated fatigue post treatment, exhibited good technique with therapeutic exercises, and would benefit from continued PT      Plan: Continue per plan of care.      Precautions: PMH (+) for B PEGGY, R knee OA c pain    Visit #: IE 1 2 3 4 5 6 7 8 9 10 11   Date:    Manuals              STM / IASTM    Calf SFP  Scar mobilization  SFP Scar mobilization  SFP L quad FR SFP L quad MB L quad MS L quad MS   Manual stretching    Calf / HS  SFP  Ext mob 3x20\"                      Ther Ex              Bike   1/2 rev x8' nv 1/2 to full x5' nv x8' x10' 10' 10' 10'   Heel slides 3x45\" 10x10\"  10x10\" 10x10\" 10x10\" Manual PROM 3x1'    Active c pball x20 PROM 10x20\" PROM 10x20\" PROM 10x20\" PROM 10x20\"   Seated k' flexion EOT    10x10\"          Heel prop  3x45\" c calf S strap On " "chair  c calf S 2x60\" C calf S x3' C calf S x3' C calf S 3x1'         Prone hang        MHP post k' 3x30\" MHP post k' 3x30\" MHP post k' 3x30\" MHP post k' 3x30\"   Supine HS/calf stretch c strap     ^ 3x1' Manual 3x30\"  -     Calf S on slant  3x30\"    nv nv 3x45\" 3x45\" 3x45\" 3x45''   Prone quad S c strap        3x30\" - 3x30\" 3x30''                                             Neuro Re-Ed              QS 2x 10x5\"             GS              SAQ   x30 x30 1.5# x30         LAQ  2x10 2x10 1.5# 3x10    5# x20 5# 20x 5# 20x 5#  25x   SLR   2x10  2x5         S/L hip ABd                            Lateral band walk      LOTB k' 2x15ea        Chair squat  28\" 3x5    22\" c UE asst 3x6        TKE                            Leg press       DL 65# 3x10 nv DL 65# 3x10 DL 70# 3x10 DL 70#, 75#, 80#  10x ea                 Ther Activity              Sport cord retro walkouts              Step-taps  6\" 2x10            Step-ups      2\" FSU x30  4\" FSU 3x10 4\" FSU 3x10 4\" FSU 3x10 4'' FSU  3x10                 Gait Training      SPC x8'                                    Modalities              Gameready                                          "

## 2025-06-09 ENCOUNTER — OFFICE VISIT (OUTPATIENT)
Dept: PHYSICAL THERAPY | Facility: OTHER | Age: 75
End: 2025-06-09
Attending: ORTHOPAEDIC SURGERY
Payer: MEDICARE

## 2025-06-09 DIAGNOSIS — Z96.652 S/P TOTAL KNEE REPLACEMENT NOT USING CEMENT, LEFT: Primary | ICD-10-CM

## 2025-06-09 DIAGNOSIS — Z96.643 H/O TOTAL HIP ARTHROPLASTY, BILATERAL: ICD-10-CM

## 2025-06-09 DIAGNOSIS — M17.12 PRIMARY OSTEOARTHRITIS OF LEFT KNEE: ICD-10-CM

## 2025-06-09 PROCEDURE — 97140 MANUAL THERAPY 1/> REGIONS: CPT | Performed by: PHYSICAL THERAPIST

## 2025-06-09 PROCEDURE — 97112 NEUROMUSCULAR REEDUCATION: CPT | Performed by: PHYSICAL THERAPIST

## 2025-06-09 PROCEDURE — 97110 THERAPEUTIC EXERCISES: CPT | Performed by: PHYSICAL THERAPIST

## 2025-06-09 NOTE — PROGRESS NOTES
"Daily Note     Today's date: 2025  Patient name: Solis Dos aSntos  : 1950  MRN: [id number]  Referring provider: Leonard Gallagher,*  Dx:   Encounter Diagnosis     ICD-10-CM    1. S/P total knee replacement not using cement, left  Z96.652       2. Primary osteoarthritis of left knee  M17.12       3. H/O total hip arthroplasty, bilateral  Z96.643           Start Time: 1015  Stop Time: 1115  Total time in clinic (min): 60 minutes    Subjective: Patient reports that he switched from taking Tylenol to taking 1 Aleve yesterday and states that he notices good improvement in sx relief.       Objective: See treatment diary below    Knee ext A/PROM: 0-8/ 0-4   Knee flexion PROM: 108 (stopped at first onset of discomfort; R1 surpassed but additional range still available)     Assessment: Patient demonstrated good tolerance to prescribed exercise today with no aggravation of sx reported post session. In addition, he was able to initiate single leg press with good muscle fatigue reported in quad without causing pain in muscle. Continue to address LE strength/endurance deficits to improve ambulatory tolerance/safety.       Plan: Continue per plan of care.      Precautions: PMH (+) for B PEGGY, R knee OA c pain    Visit #: 3 4 5 6 7 8 9 10 11 12   Date:    Manuals             STM / IASTM  Calf SFP  Scar mobilization  SFP Scar mobilization  SFP L quad FR SFP L quad MB L quad MS L quad MS L quad JAB   Manual stretching  Calf / HS  SFP  Ext mob 3x20\"                      Ther Ex             Bike 1/2 rev x8' nv 1/2 to full x5' nv x8' x10' 10' 10' 10' 10'   Heel slides  10x10\" 10x10\" 10x10\" Manual PROM 3x1'    Active c pball x20 PROM 10x20\" PROM 10x20\" PROM 10x20\" PROM 10x20\"    Seated k' flexion EOT  10x10\"           Heel prop  C calf S x3' C calf S x3' C calf S 3x1'          Prone hang      MHP post k' 3x30\" MHP post k' 3x30\" MHP post k' 3x30\" MHP post k' 3x30\"    Supine " "HS/calf stretch c strap   ^ 3x1' Manual 3x30\"  -      Calf S on slant    nv nv 3x45\" 3x45\" 3x45\" 3x45'' 3x45\"   Prone quad S c strap      3x30\" - 3x30\" 3x30'' 3x30\"                                          Neuro Re-Ed             QS             GS             SAQ x30 x30 1.5# x30          LAQ 2x10 1.5# 3x10    5# x20 5# 20x 5# 20x 5#  25x 5# 25x   SLR 2x10  2x5          S/L hip ABd                          Lateral band walk    LOTB k' 2x15ea         Chair squat    22\" c UE asst 3x6         TKE                          Leg press     DL 65# 3x10 nv DL 65# 3x10 DL 70# 3x10 DL 70#, 75#, 80#  10x ea DL 75#  80#  85# 10x ea             SL 35# 2x10 L   Ther Activity             Sport cord retro walkouts             Step-taps             Step-ups    2\" FSU x30  4\" FSU 3x10 4\" FSU 3x10 4\" FSU 3x10 4'' FSU  3x10 4\" FSU 3x10                Gait Training    SPC x8'                                   Modalities             Gameready                                          "

## 2025-06-10 ENCOUNTER — OFFICE VISIT (OUTPATIENT)
Dept: OBGYN CLINIC | Facility: HOSPITAL | Age: 75
End: 2025-06-10

## 2025-06-10 ENCOUNTER — RA CDI HCC (OUTPATIENT)
Dept: OTHER | Facility: HOSPITAL | Age: 75
End: 2025-06-10

## 2025-06-10 VITALS — HEIGHT: 73 IN | BODY MASS INDEX: 36.98 KG/M2 | WEIGHT: 279 LBS

## 2025-06-10 DIAGNOSIS — Z47.1 AFTERCARE FOLLOWING LEFT KNEE JOINT REPLACEMENT SURGERY: Primary | ICD-10-CM

## 2025-06-10 DIAGNOSIS — Z96.652 AFTERCARE FOLLOWING LEFT KNEE JOINT REPLACEMENT SURGERY: Primary | ICD-10-CM

## 2025-06-10 PROCEDURE — 99024 POSTOP FOLLOW-UP VISIT: CPT | Performed by: ORTHOPAEDIC SURGERY

## 2025-06-10 NOTE — PROGRESS NOTES
"Name: Solis Dos Santos      : 1950       MRN: [id number]   Encounter Provider: Leonard Gallagher MD   Encounter Date: 06/10/25  Encounter [facility]     ASSESSMENT & PLAN:  Assessment & Plan  Aftercare following left knee joint replacement surgery  Continue physical therapy   Continue weight bearing activities as tolerated   Continue pain medications as needed   Follow up in 6 weeks for 3 month post-op appointment with repeat x-rays              To do next visit:  Return in about 6 weeks (around 2025) for 3-month postop appointment with repeat x-rays.    _____________________________________________________  CHIEF COMPLAINT:  Chief Complaint   Patient presents with   • Left Knee - Post-op         SUBJECTIVE:  Solis Dos Santos is a 74 y.o. male who presents 6 weeks status post left total knee arthroplasty.  He is doing well.  He admits to some pain of the left knee which she has been able to manage well primarily with Tylenol and Aleve as needed.  Patient continues to attend physical therapy twice a week and admits to making good progress.  He is ambulating well with SPC today.      PAST MEDICAL HISTORY:  Past Medical History[1]    PAST SURGICAL HISTORY:  Past Surgical History[2]    FAMILY HISTORY:  Family History[3]    SOCIAL HISTORY:  Social History[4]    MEDICATIONS:  Current Medications[5]    ALLERGIES:  Allergies[6]    LABS:  HgA1c:   Lab Results   Component Value Date    HGBA1C 6.1 (H) 2025     BMP:   Lab Results   Component Value Date    CALCIUM 8.4 2025    K 4.6 2025    CO2 29 2025     2025    BUN 14 2025    CREATININE 0.82 2025     CBC: No components found for: \"CBC\"    _____________________________________________________  PHYSICAL EXAMINATION:  Vital signs: Ht 6' 1\" (1.854 m)   Wt 127 kg (279 lb)   BMI 36.81 kg/m²   General: No acute distress, awake and alert  Psychiatric: Mood and affect " appear appropriate  HEENT: Trachea Midline, No torticollis, no apparent facial trauma  Cardiovascular: No audible murmurs; Extremities appear perfused  Pulmonary: No audible wheezing or stridor  Skin: No open lesions; see further details (if any) below    MUSCULOSKELETAL EXAMINATION:  Ortho Exam  Left knee:  Well healed anterior incision  Surgical incision pink in color without surrounding erythema, edema, or signs of infection   No ecchymosis  Appropriate swelling of lower limb  Appropriate warmth of knee  Extensor mechanism intact  Extension near full  Flexion 110  Calf compartments soft and supple  Sensation intact  Toes are warm sensate and mobile     ___________________________________________________  STUDIES REVIEWED:  I personally reviewed the images obtained in office today and my independent interpretation is as follows:    None performed      PROCEDURES PERFORMED:    None preformed       Radha Louise PA-C         [1]  Past Medical History:  Diagnosis Date   • Acute blood loss anemia 08/09/2019   • Aftercare following right hip joint replacement surgery 11/05/2019   • Anxiety disorder    • Atherosclerotic heart disease of native coronary artery without angina pectoris    • Benign prostatic hyperplasia without lower urinary tract symptoms    • Cancer (HCC)     bladder   • Clotting disorder (HCC) 1/25/2023    Blood in urine   • Disease of thyroid gland     hypo   • Dyslipidemia    • GERD (gastroesophageal reflux disease)     manages w/ diet, takes no meds   • Hypertension    • Impaired fasting blood sugar    • Kidney stone    • Low back pain    • Obesity    • Osteoarthritis     last assesed 6-6-16   • Other chest pain    • Paresthesia of skin    • Polyneuropathy     last assesed 5-8-17   • Pure hypercholesterolemia    • 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)    • Spinal stenosis    • Suspected UTI 03/09/2023   • Urinary tract infection    • Wears glasses    [2]  Past Surgical  History:  Procedure Laterality Date   • BACK SURGERY      L4-L5 sx   • CHOLECYSTECTOMY     • COLONOSCOPY  2019   • CYSTOSCOPY N/A 2023    Procedure: CYSTOSCOPY w/ bladder biopsy;  Surgeon: Geo Bentley MD;  [facility]: [facility];  Service: Urology   • HIP SURGERY  2019    Replacement   • JOINT REPLACEMENT  2019    Hip replacement   • LA ARTHRP ACETBLR/PROX FEM PROSTC AGRFT/ALGRFT Right 2019    Procedure: ARTHROPLASTY HIP TOTAL;  Surgeon: Leonard Gallagher MD;  [facility]: [facility];  Service: Orthopedics   • LA ARTHRP ACETBLR/PROX FEM PROSTC AGRFT/ALGRFT Left 2024    Procedure: Left total hip arthroplasty;  Surgeon: Leonard Gallagher MD;  Location: [facility];  Service: Orthopedics   • LA ARTHRP KNE CONDYLE&PLATU MEDIAL&LAT COMPARTMENTS Left 2025    Procedure: Robotically assisted left total knee arthroplasty, all associated procedures as indicated;  Surgeon: Leonard Gallagher MD;  [facility];  Service: Orthopedics   • LA CYSTO W/REMOVAL OF LESIONS SMALL N/A 2023    Procedure: TRANSURETHRAL RESECTION OF BLADDER TUMOR (TURBT), mitomycin ;  Surgeon: Geo Bentley MD;  [facility];  Service: Urology   • LA CYSTO W/REMOVAL OF LESIONS SMALL N/A 2023    Procedure: TRANSURETHRAL RESECTION OF BLADDER TUMOR (TURBT), cystoscopy with bladder biopsy;  Surgeon: Geo Bentley MD;  [facility];  Service: Urology   • TONSILLECTOMY     [3]  Family History  Problem Relation Name Age of Onset   • Arthritis Other unknown    • Heart disease Father Solis Dos Santos    • Arthritis Mother Yaneth Dos Santos    [4]  Social History  Tobacco Use   • Smoking status: Former     Current packs/day: 0.00     Average packs/day: 1 pack/day for 20.8 years (20.8 ttl pk-yrs)     Types: Cigarettes     Start date: 1967     Quit date: 1987     Years since quittin.6   • Smokeless tobacco: Never   Vaping Use   • Vaping  status: Never Used   Substance Use Topics   • Alcohol use: Yes     Alcohol/week: 1.0 standard drink of alcohol     Types: 1 Cans of beer per week     Comment: rarely   • Drug use: Never   [5]    Current Outpatient Medications:   •  acetaminophen (TYLENOL) 500 mg tablet, Take 2 tablets (1,000 mg total) by mouth every 6 (six) hours as needed for mild pain, Disp: 120 tablet, Rfl: 0  •  atorvastatin (LIPITOR) 10 mg tablet, Take 1 tablet (10 mg total) by mouth daily at bedtime, Disp: 90 tablet, Rfl: 1  •  cholecalciferol (VITAMIN D3) 1,000 units tablet, Take 1 tablet (1,000 Units total) by mouth daily, Disp: 90 tablet, Rfl: 3  •  ciclopirox (LOPROX) 0.77 % cream, 2 (two) times a day as needed, Disp: , Rfl:   •  cyanocobalamin (VITAMIN B-12) 500 MCG tablet, Take 1 tablet (500 mcg total) by mouth daily, Disp: 100 tablet, Rfl: 3  •  enoxaparin (LOVENOX) 40 mg/0.4 mL, Inject 0.4 mL (40 mg total) under the skin daily for 28 days To start postoperatively (Patient taking differently: Inject 40 mg under the skin daily To start postoperatively- after surgery), Disp: 11.2 mL, Rfl: 0  •  hydrocortisone 2.5 % cream, if needed, Disp: , Rfl:   •  levothyroxine 150 mcg tablet, Take 1 tablet (150 mcg total) by mouth daily, Disp: 90 tablet, Rfl: 1  •  methocarbamol (ROBAXIN) 500 mg tablet, Take 1 tablet (500 mg total) by mouth 3 (three) times a day as needed for muscle spasms, Disp: 60 tablet, Rfl: 0  •  metroNIDAZOLE (METROCREAM) 0.75 % cream, APPLY TO FACE TWO TIMES A DAY, Disp: , Rfl:   •  Multiple Vitamins-Minerals (multivitamin with iron-minerals) liquid, Take by mouth daily, Disp: , Rfl:   •  pantoprazole (PROTONIX) 40 mg tablet, TAKE ONE TABLET BY MOUTH EVERY DAY BEFORE BREAKFAST (Patient taking differently: if needed), Disp: 30 tablet, Rfl: 2  •  tamsulosin (FLOMAX) 0.4 mg, TAKE ONE CAPSULE BY MOUTH EVERY DAY WITH DINNER, Disp: 90 capsule, Rfl: 1[6]  No Known Allergies

## 2025-06-12 ENCOUNTER — OFFICE VISIT (OUTPATIENT)
Dept: PHYSICAL THERAPY | Facility: OTHER | Age: 75
End: 2025-06-12
Attending: ORTHOPAEDIC SURGERY
Payer: MEDICARE

## 2025-06-12 DIAGNOSIS — M17.12 PRIMARY OSTEOARTHRITIS OF LEFT KNEE: ICD-10-CM

## 2025-06-12 DIAGNOSIS — Z96.652 S/P TOTAL KNEE REPLACEMENT NOT USING CEMENT, LEFT: Primary | ICD-10-CM

## 2025-06-12 DIAGNOSIS — Z96.643 H/O TOTAL HIP ARTHROPLASTY, BILATERAL: ICD-10-CM

## 2025-06-12 PROCEDURE — 97110 THERAPEUTIC EXERCISES: CPT

## 2025-06-12 NOTE — PROGRESS NOTES
"Daily Note     Today's date: 2025  Patient name: Solis Dos Santos  : 1950  MRN: [id number]  Referring provider: Leonard Gallagher,*  Dx:   Encounter Diagnosis     ICD-10-CM    1. S/P total knee replacement not using cement, left  Z96.652       2. Primary osteoarthritis of left knee  M17.12       3. H/O total hip arthroplasty, bilateral  Z96.643             Start Time: 853  Stop Time: 931  Total time in clinic (min): 38 minutes    Subjective: Patient feels he is doing much better compared to when I last saw him two weeks ago. Had follow-up with Dr. Gallagher this week with no complaints. Notes he is using the SPC far less than before.      Objective: See treatment diary below      Assessment: Patient tolerated session well focused on L k' ROM and functional strengthening. Progressed to 6\" FSU with good tolerance, no issues. Added static tandem stance balance with no assistance necessary. Continue to address LE strength/endurance deficits to improve ambulatory tolerance/safety.       Plan: Continue per plan of care.      Precautions: PMH (+) for B PEGGY, R knee OA c pain    Visit #: 3 4 5 6 7 8 9 10 11 12 13   Date:    Manuals              STM / IASTM  Calf SFP  Scar mobilization  SFP Scar mobilization  SFP L quad FR SFP L quad MB L quad MS L quad MS L quad JAB    Manual stretching  Calf / HS  SFP  Ext mob 3x20\"       Ext mob 3x30\"                 Ther Ex              Bike 1/2 rev x8' nv 1/2 to full x5' nv x8' x10' 10' 10' 10' 10' 10'   Heel slides  10x10\" 10x10\" 10x10\" Manual PROM 3x1'    Active c pball x20 PROM 10x20\" PROM 10x20\" PROM 10x20\" PROM 10x20\"     Seated k' flexion EOT  10x10\"         3x30\"   Heel prop  C calf S x3' C calf S x3' C calf S 3x1'        MHP + calf S 3x1'   Prone hang      MHP post k' 3x30\" MHP post k' 3x30\" MHP post k' 3x30\" MHP post k' 3x30\"     Supine HS/calf stretch c strap   ^ 3x1' Manual 3x30\"  -       Calf S on slant    nv " "nv 3x45\" 3x45\" 3x45\" 3x45'' 3x45\" 3x45\"   Prone quad S c strap      3x30\" - 3x30\" 3x30'' 3x30\"                                              Neuro Re-Ed              QS              GS              SAQ x30 x30 1.5# x30           LAQ 2x10 1.5# 3x10    5# x20 5# 20x 5# 20x 5#  25x 5# 25x OTB 2x20   SLR 2x10  2x5           S/L hip ABd                            Lateral band walk    LOTB k' 2x15ea          Chair squat    22\" c UE asst 3x6          TKE           nv                 Leg press     DL 65# 3x10 nv DL 65# 3x10 DL 70# 3x10 DL 70#, 75#, 80#  10x ea DL 75#  80#  85# 10x ea SL:L 50# 3x10             SL 35# 2x10 L    Ther Activity              Sport cord retro walkouts              Step-taps              Step-ups    2\" FSU x30  4\" FSU 3x10 4\" FSU 3x10 4\" FSU 3x10 4'' FSU  3x10 4\" FSU 3x10 6\" FSU 3x10                 Gait Training    SPC x8'                                      Modalities              Gameready                                            "

## 2025-06-16 ENCOUNTER — OFFICE VISIT (OUTPATIENT)
Dept: INTERNAL MEDICINE CLINIC | Facility: CLINIC | Age: 75
End: 2025-06-16
Payer: MEDICARE

## 2025-06-16 ENCOUNTER — OFFICE VISIT (OUTPATIENT)
Dept: PHYSICAL THERAPY | Facility: OTHER | Age: 75
End: 2025-06-16
Attending: ORTHOPAEDIC SURGERY
Payer: MEDICARE

## 2025-06-16 VITALS
HEART RATE: 75 BPM | DIASTOLIC BLOOD PRESSURE: 80 MMHG | OXYGEN SATURATION: 98 % | HEIGHT: 73 IN | SYSTOLIC BLOOD PRESSURE: 128 MMHG | TEMPERATURE: 98.5 F | WEIGHT: 280 LBS | BODY MASS INDEX: 37.11 KG/M2

## 2025-06-16 DIAGNOSIS — R73.03 PREDIABETES: ICD-10-CM

## 2025-06-16 DIAGNOSIS — E53.8 B12 DEFICIENCY: ICD-10-CM

## 2025-06-16 DIAGNOSIS — M17.0 PRIMARY OSTEOARTHRITIS OF BOTH KNEES: ICD-10-CM

## 2025-06-16 DIAGNOSIS — C67.2 MALIGNANT NEOPLASM OF LATERAL WALL OF URINARY BLADDER (HCC): ICD-10-CM

## 2025-06-16 DIAGNOSIS — E03.9 ACQUIRED HYPOTHYROIDISM: Primary | ICD-10-CM

## 2025-06-16 DIAGNOSIS — Z96.652 S/P TOTAL KNEE REPLACEMENT NOT USING CEMENT, LEFT: Primary | ICD-10-CM

## 2025-06-16 DIAGNOSIS — Z96.643 H/O TOTAL HIP ARTHROPLASTY, BILATERAL: ICD-10-CM

## 2025-06-16 DIAGNOSIS — E78.5 HYPERLIPIDEMIA, UNSPECIFIED HYPERLIPIDEMIA TYPE: ICD-10-CM

## 2025-06-16 DIAGNOSIS — Z96.643 S/P HIP REPLACEMENT, BILATERAL: ICD-10-CM

## 2025-06-16 DIAGNOSIS — M17.12 PRIMARY OSTEOARTHRITIS OF LEFT KNEE: ICD-10-CM

## 2025-06-16 DIAGNOSIS — E66.01 OBESITY, MORBID (HCC): ICD-10-CM

## 2025-06-16 DIAGNOSIS — N40.0 BENIGN PROSTATIC HYPERPLASIA, UNSPECIFIED WHETHER LOWER URINARY TRACT SYMPTOMS PRESENT: ICD-10-CM

## 2025-06-16 PROCEDURE — G2211 COMPLEX E/M VISIT ADD ON: HCPCS | Performed by: INTERNAL MEDICINE

## 2025-06-16 PROCEDURE — 99214 OFFICE O/P EST MOD 30 MIN: CPT | Performed by: INTERNAL MEDICINE

## 2025-06-16 PROCEDURE — 97110 THERAPEUTIC EXERCISES: CPT

## 2025-06-16 NOTE — PROGRESS NOTES
"Daily Note     Today's date: 2025  Patient name: Solis Dos Santos  : 1950  MRN: [id number]  Referring provider: Leonard Gallagher,*  Dx:   Encounter Diagnosis     ICD-10-CM    1. S/P total knee replacement not using cement, left  Z96.652       2. Primary osteoarthritis of left knee  M17.12       3. H/O total hip arthroplasty, bilateral  Z96.643               Start Time: 1556  Stop Time: 1634  Total time in clinic (min): 38 minutes    Subjective: Patient reports feeling good today.      Objective: See treatment diary below      Assessment: Patient tolerated session well focused on L k' ROM and functional strengthening without issue. Pt continues to demonstrate mild extension lag impacting gait. Continue to address LE strength/endurance deficits to improve ambulatory tolerance/safety.       Plan: Continue per plan of care.      Precautions: PMH (+) for B PEGGY, R knee OA c pain    Visit #: 3 4 5 6 7 8 9 10 11 12 13 14   Date:    Manuals               STM / IASTM  Calf SFP  Scar mobilization  SFP Scar mobilization  SFP L quad FR SFP L quad MB L quad MS L quad MS L quad JAB     Manual stretching  Calf / HS  SFP  Ext mob 3x20\"       Ext mob 3x30\"                   Ther Ex               Bike 1/ rev x8' nv 2 to full x5' nv x8' x10' 10' 10' 10' 10' 10' 10'   Heel slides  10x10\" 10x10\" 10x10\" Manual PROM 3x1'    Active c pball x20 PROM 10x20\" PROM 10x20\" PROM 10x20\" PROM 10x20\"   10x10\"   Seated k' flexion EOT  10x10\"         3x30\"    Heel prop  C calf S x3' C calf S x3' C calf S 3x1'        MHP + calf S 3x1' X3' TERT   Prone hang      MHP post k' 3x30\" MHP post k' 3x30\" MHP post k' 3x30\" MHP post k' 3x30\"      Supine HS/calf stretch c strap   ^ 3x1' Manual 3x30\"  -     3x45\"   Calf S on slant    nv nv 3x45\" 3x45\" 3x45\" 3x45'' 3x45\" 3x45\" 3x45\"   Prone quad S c strap      3x30\" - 3x30\" 3x30'' 3x30\"                                                  Neuro " "Re-Ed               QS               GS               SAQ x30 x30 1.5# x30            LAQ 2x10 1.5# 3x10    5# x20 5# 20x 5# 20x 5#  25x 5# 25x OTB 2x20    SLR 2x10  2x5            S/L hip ABd                              Lateral band walk    LOTB k' 2x15ea        GTB a' x20ea   Chair squat    22\" c UE asst 3x6        22\" c GTB k' 3x10   TKE           nv nv                  Leg press     DL 65# 3x10 nv DL 65# 3x10 DL 70# 3x10 DL 70#, 75#, 80#  10x ea DL 75#  80#  85# 10x ea SL:L 50# 3x10 SL:L  70# 3x10             SL 35# 2x10 L     Ther Activity               Sport cord retro walkouts               Step-taps               Step-ups    2\" FSU x30  4\" FSU 3x10 4\" FSU 3x10 4\" FSU 3x10 4'' FSU  3x10 4\" FSU 3x10 6\" FSU 3x10 nv                  Gait Training    SPC x8'                                         Modalities               Gameready                                              "

## 2025-06-16 NOTE — PROGRESS NOTES
Assessment/Plan:           1. Acquired hypothyroidism  Comments:  Continue levothyroxine.  Orders:  -     T4, free; Future  -     TSH, 3rd generation; Future  2. Prediabetes  Comments:  Stable continue diabetic diet.  3. Malignant neoplasm of lateral wall of urinary bladder (HCC)  4. B12 deficiency  Comments:  Importance of B12 supplementation discussed.  5. Benign prostatic hyperplasia, unspecified whether lower urinary tract symptoms present  6. S/P hip replacement, bilateral  7. Hyperlipidemia, unspecified hyperlipidemia type  -     CBC and differential; Future  -     Comprehensive metabolic panel; Future  -     Urinalysis with microscopic; Future  -     Lipid panel; Future  8. Primary osteoarthritis of both knees  9. Obesity, morbid (HCC)         1. Acquired hypothyroidism (Primary)    - T4, free; Future  - TSH, 3rd generation; Future    2. Prediabetes      3. Malignant neoplasm of lateral wall of urinary bladder (HCC)      4. B12 deficiency      5. Benign prostatic hyperplasia, unspecified whether lower urinary tract symptoms present      6. S/P hip replacement, bilateral      7. Hyperlipidemia, unspecified hyperlipidemia type    - CBC and differential; Future  - Comprehensive metabolic panel; Future  - Urinalysis with microscopic; Future  - Lipid panel; Future    8. Primary osteoarthritis of both knees      9. Obesity, morbid (HCC)         No problem-specific Assessment & Plan notes found for this encounter.           Subjective:      Patient ID: Solis Dos Santos is a 74 y.o. male.    HPI    The following portions of the patient's history were reviewed and updated as appropriate: He  has a past medical history of Acute blood loss anemia (08/09/2019), Aftercare following right hip joint replacement surgery (11/05/2019), Anxiety disorder, Atherosclerotic heart disease of native coronary artery without angina pectoris, Benign prostatic hyperplasia without lower urinary tract symptoms, Cancer (HCC), Clotting  disorder ([facility]) (1/25/2023), Disease of thyroid gland, Dyslipidemia, GERD (gastroesophageal reflux disease), Hypertension, Impaired fasting blood sugar, Kidney stone, Low back pain, Obesity, Osteoarthritis, Other chest pain, Paresthesia of skin, Polyneuropathy, Pure hypercholesterolemia, 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  He   Patient Active Problem List    Diagnosis Date Noted    Prediabetes 04/07/2025    Obesity (BMI 30-39.9) 11/04/2024    BPH (benign prostatic hyperplasia) 11/04/2024    Spinal stenosis     Diverticula of intestine 10/07/2024    Gastroesophageal reflux disease 10/07/2024    Primary osteoarthritis of left hip 10/07/2024    DDD (degenerative disc disease), lumbar 06/24/2024    Primary osteoarthritis of left knee 05/30/2024    Primary osteoarthritis of right knee 05/30/2024    Peripheral vascular disease, unspecified ([facility]) 01/10/2024    Malignant neoplasm of lateral wall of urinary bladder ([facility]) 05/12/2023    Malignant neoplasm of posterior wall of urinary bladder ([facility]) 03/23/2023    Bladder mass 03/09/2023    Right leg swelling 03/09/2023    Gross hematuria 03/08/2023    Hyperlipidemia 03/08/2023    Chronic pain of left knee 04/26/2021    Difficulty sleeping 08/13/2019    Impaired mobility and ADLs 08/09/2019    Hypothyroidism 08/09/2019    Status post right hip replacement 08/07/2019    Primary osteoarthritis of both knees 05/29/2019    Osgood-Schlatter's disease of both knees 05/29/2019    Pain in both knees 05/29/2019    Lumbar radiculopathy 11/06/2013     He  has a past surgical history that includes Back surgery; Tonsillectomy; Cholecystectomy; pr arthrp acetblr/prox fem prostc agrft/algrft (Right, 08/05/2019); Colonoscopy (02/06/2019); pr cysto w/removal of lesions small (N/A, 03/23/2023); pr cysto w/removal of lesions small (N/A, 04/26/2023); CYSTOSCOPY (N/A,  04/26/2023); Joint replacement (August 2019); Hip surgery (August 2019); pr arthrp acetblr/prox fem prostc agrft/algrft (Left, 11/4/2024); and pr arthrp kne condyle&platu medial&lat compartments (Left, 4/28/2025).  His family history includes Arthritis in his mother and another family member; Heart disease in his father.  He  reports that he quit smoking about 37 years ago. His smoking use included cigarettes. He started smoking about 58 years ago. He has a 20.8 pack-year smoking history. He has never used smokeless tobacco. He reports current alcohol use of about 1.0 standard drink of alcohol per week. He reports that he does not use drugs.  Current Outpatient Medications   Medication Sig Dispense Refill    acetaminophen (TYLENOL) 500 mg tablet Take 2 tablets (1,000 mg total) by mouth every 6 (six) hours as needed for mild pain 120 tablet 0    atorvastatin (LIPITOR) 10 mg tablet Take 1 tablet (10 mg total) by mouth daily at bedtime 90 tablet 1    cholecalciferol (VITAMIN D3) 1,000 units tablet Take 1 tablet (1,000 Units total) by mouth daily 90 tablet 3    ciclopirox (LOPROX) 0.77 % cream as needed in the morning and as needed in the evening.      cyanocobalamin (VITAMIN B-12) 500 MCG tablet Take 1 tablet (500 mcg total) by mouth daily 100 tablet 3    hydrocortisone 2.5 % cream if needed      levothyroxine 150 mcg tablet Take 1 tablet (150 mcg total) by mouth daily 90 tablet 1    methocarbamol (ROBAXIN) 500 mg tablet Take 1 tablet (500 mg total) by mouth 3 (three) times a day as needed for muscle spasms 60 tablet 0    metroNIDAZOLE (METROCREAM) 0.75 % cream       Multiple Vitamins-Minerals (multivitamin with iron-minerals) liquid Take by mouth in the morning.      pantoprazole (PROTONIX) 40 mg tablet TAKE ONE TABLET BY MOUTH EVERY DAY BEFORE BREAKFAST 30 tablet 2    tamsulosin (FLOMAX) 0.4 mg TAKE ONE CAPSULE BY MOUTH EVERY DAY WITH DINNER 90 capsule 1     No current facility-administered medications for this  visit.     Current Outpatient Medications on File Prior to Visit   Medication Sig    acetaminophen (TYLENOL) 500 mg tablet Take 2 tablets (1,000 mg total) by mouth every 6 (six) hours as needed for mild pain    atorvastatin (LIPITOR) 10 mg tablet Take 1 tablet (10 mg total) by mouth daily at bedtime    cholecalciferol (VITAMIN D3) 1,000 units tablet Take 1 tablet (1,000 Units total) by mouth daily    ciclopirox (LOPROX) 0.77 % cream as needed in the morning and as needed in the evening.    cyanocobalamin (VITAMIN B-12) 500 MCG tablet Take 1 tablet (500 mcg total) by mouth daily    hydrocortisone 2.5 % cream if needed    levothyroxine 150 mcg tablet Take 1 tablet (150 mcg total) by mouth daily    methocarbamol (ROBAXIN) 500 mg tablet Take 1 tablet (500 mg total) by mouth 3 (three) times a day as needed for muscle spasms    metroNIDAZOLE (METROCREAM) 0.75 % cream     Multiple Vitamins-Minerals (multivitamin with iron-minerals) liquid Take by mouth in the morning.    pantoprazole (PROTONIX) 40 mg tablet TAKE ONE TABLET BY MOUTH EVERY DAY BEFORE BREAKFAST    tamsulosin (FLOMAX) 0.4 mg TAKE ONE CAPSULE BY MOUTH EVERY DAY WITH DINNER    [DISCONTINUED] enoxaparin (LOVENOX) 40 mg/0.4 mL Inject 0.4 mL (40 mg total) under the skin daily for 28 days To start postoperatively (Patient not taking: Reported on 6/16/2025)     No current facility-administered medications on file prior to visit.     Medications Discontinued During This Encounter   Medication Reason    enoxaparin (LOVENOX) 40 mg/0.4 mL       He has no known allergies..    Review of Systems   Constitutional:  Negative for appetite change, chills, fatigue and fever.   HENT:  Negative for sore throat and trouble swallowing.    Eyes:  Negative for redness.   Respiratory:  Negative for shortness of breath.    Cardiovascular:  Negative for chest pain and palpitations.   Gastrointestinal:  Negative for abdominal pain, constipation and diarrhea.   Genitourinary:  Negative for  "dysuria and hematuria.   Musculoskeletal:  Positive for arthralgias. Negative for back pain and neck pain.   Skin:  Negative for rash.   Neurological:  Negative for seizures, weakness and headaches.   Hematological:  Negative for adenopathy.   Psychiatric/Behavioral:  Negative for confusion. The patient is not nervous/anxious.          Objective:      /80 (BP Location: Left arm, Patient Position: Sitting, Cuff Size: Standard)   Pulse 75   Temp 98.5 °F (36.9 °C) (Temporal)   Ht 6' 1\" (1.854 m)   Wt 127 kg (280 lb)   SpO2 98%   BMI 36.94 kg/m²     Results Reviewed       None            Recent Results (from the past 8 weeks)   Comprehensive metabolic panel    Collection Time: 04/29/25  5:29 AM   Result Value Ref Range    Sodium 138 135 - 147 mmol/L    Potassium 4.6 3.5 - 5.3 mmol/L    Chloride 103 96 - 108 mmol/L    CO2 29 21 - 32 mmol/L    ANION GAP 6 4 - 13 mmol/L    BUN 14 5 - 25 mg/dL    Creatinine 0.82 0.60 - 1.30 mg/dL    Glucose 130 65 - 140 mg/dL    Calcium 8.4 8.4 - 10.2 mg/dL     (H) 13 - 39 U/L     (H) 7 - 52 U/L    Alkaline Phosphatase 69 34 - 104 U/L    Total Protein 6.3 (L) 6.4 - 8.4 g/dL    Albumin 3.6 3.5 - 5.0 g/dL    Total Bilirubin 0.83 0.20 - 1.00 mg/dL    eGFR 85 ml/min/1.73sq m   CBC    Collection Time: 04/29/25  5:29 AM   Result Value Ref Range    WBC 8.04 4.31 - 10.16 Thousand/uL    RBC 4.41 3.88 - 5.62 Million/uL    Hemoglobin 13.5 12.0 - 17.0 g/dL    Hematocrit 40.2 36.5 - 49.3 %    MCV 91 82 - 98 fL    MCH 30.6 26.8 - 34.3 pg    MCHC 33.6 31.4 - 37.4 g/dL    RDW 13.2 11.6 - 15.1 %    Platelets 189 149 - 390 Thousands/uL    MPV 11.4 8.9 - 12.7 fL        Physical Exam  Constitutional:       General: He is not in acute distress.     Appearance: Normal appearance.   HENT:      Head: Normocephalic and atraumatic.      Nose: Nose normal.      Mouth/Throat:      Mouth: Mucous membranes are moist.     Eyes:      Extraocular Movements: Extraocular movements intact.      " Pupils: Pupils are equal, round, and reactive to light.       Cardiovascular:      Rate and Rhythm: Normal rate and regular rhythm.      Pulses: Normal pulses.      Heart sounds: Normal heart sounds. No murmur heard.     No friction rub.   Pulmonary:      Effort: Pulmonary effort is normal. No respiratory distress.      Breath sounds: Normal breath sounds. No wheezing.   Abdominal:      General: Abdomen is flat. Bowel sounds are normal. There is no distension.      Palpations: Abdomen is soft. There is no mass.      Tenderness: There is no abdominal tenderness. There is no guarding.     Musculoskeletal:         General: Normal range of motion.      Cervical back: Normal range of motion and neck supple.     Neurological:      General: No focal deficit present.      Mental Status: He is alert and oriented to person, place, and time. Mental status is at baseline.      Cranial Nerves: No cranial nerve deficit.     Psychiatric:         Mood and Affect: Mood normal.         Behavior: Behavior normal.

## 2025-06-19 ENCOUNTER — OFFICE VISIT (OUTPATIENT)
Dept: PHYSICAL THERAPY | Facility: OTHER | Age: 75
End: 2025-06-19
Attending: ORTHOPAEDIC SURGERY
Payer: MEDICARE

## 2025-06-19 DIAGNOSIS — Z96.643 H/O TOTAL HIP ARTHROPLASTY, BILATERAL: ICD-10-CM

## 2025-06-19 DIAGNOSIS — Z96.652 S/P TOTAL KNEE REPLACEMENT NOT USING CEMENT, LEFT: Primary | ICD-10-CM

## 2025-06-19 DIAGNOSIS — M17.12 PRIMARY OSTEOARTHRITIS OF LEFT KNEE: ICD-10-CM

## 2025-06-19 PROCEDURE — 97110 THERAPEUTIC EXERCISES: CPT

## 2025-06-19 NOTE — PROGRESS NOTES
"   Daily Note     Today's date: 2025  Patient name: Solis Dos Santos  : 1950  MRN: [id number]  Referring provider: Leonard Gallagher,*  Dx:   Encounter Diagnosis     ICD-10-CM    1. S/P total knee replacement not using cement, left  Z96.652       2. Primary osteoarthritis of left knee  M17.12       3. H/O total hip arthroplasty, bilateral  Z96.643                 Start Time: 1108  Stop Time: 1146  Total time in clinic (min): 38 minutes    Subjective: Patient reports feeling good today, but the weather yesterday affected him. Noted balance-related fear of climbing stairs at son's house due to single handrail.      Objective: See treatment diary below    FOTO 25: 58      Assessment: Patient tolerated session well focused on L k' ROM and functional strengthening without issue. Added step-ups onto 4\" box + Airex and tandem walking to address dynamic balance deficits. Continue to address LE strength/endurance deficits to improve ambulatory tolerance/safety.       Plan: Continue per plan of care.      Precautions: PMH (+) for B PEGGY, R knee OA c pain    Visit #: 3 4 5 6 7 8 9 10 11 12 13 14 15   Date:    Manuals                STM / IASTM  Calf SFP  Scar mobilization  SFP Scar mobilization  SFP L quad FR SFP L quad MB L quad MS L quad MS L quad JAB      Manual stretching  Calf / HS  SFP  Ext mob 3x20\"       Ext mob 3x30\"                     Ther Ex                Bike 1/2 rev x8' nv 1/2 to full x5' nv x8' x10' 10' 10' 10' 10' 10' 10' 10'   Heel slides  10x10\" 10x10\" 10x10\" Manual PROM 3x1'    Active c pball x20 PROM 10x20\" PROM 10x20\" PROM 10x20\" PROM 10x20\"   10x10\"    Seated k' flexion EOT  10x10\"         3x30\"  3X30\"   Heel prop  C calf S x3' C calf S x3' C calf S 3x1'        MHP + calf S 3x1' X3' TERT    Prone hang      MHP post k' 3x30\" MHP post k' 3x30\" MHP post k' 3x30\" MHP post k' 3x30\"       Supine HS/calf stretch c strap   ^ 3x1' " "Manual 3x30\"  -     3x45\" 2x60\"   Calf S on slant    nv nv 3x45\" 3x45\" 3x45\" 3x45'' 3x45\" 3x45\" 3x45\"    Prone quad S c strap      3x30\" - 3x30\" 3x30'' 3x30\"                                                      Neuro Re-Ed                QS                GS                SAQ x30 x30 1.5# x30             LAQ 2x10 1.5# 3x10    5# x20 5# 20x 5# 20x 5#  25x 5# 25x OTB 2x20  nv   SLR 2x10  2x5             S/L hip ABd                                Lateral band walk    LOTB k' 2x15ea        GTB a' x20ea OTB a' x 3 trips   Chair squat    22\" c UE asst 3x6        22\" c GTB k' 3x10 20\" 3x10   TKE           nv nv                    Leg press     DL 65# 3x10 nv DL 65# 3x10 DL 70# 3x10 DL 70#, 75#, 80#  10x ea DL 75#  80#  85# 10x ea SL:L 50# 3x10 SL:L  70# 3x10    Tandem walk             X 4 trips d/b mirror   SLS                                Ther Activity                Sport cord retro walkouts                Step-taps                Step-ups    2\" FSU x30  4\" FSU 3x10 4\" FSU 3x10 4\" FSU 3x10 4'' FSU  3x10 4\" FSU 3x10 6\" FSU 3x10 nv 4\" + airex 3x10                   Gait Training    SPC x8'                                            Modalities                Gameready                                                "

## 2025-06-20 ENCOUNTER — APPOINTMENT (OUTPATIENT)
Dept: LAB | Age: 75
End: 2025-06-20
Attending: INTERNAL MEDICINE
Payer: MEDICARE

## 2025-06-20 DIAGNOSIS — E03.9 ACQUIRED HYPOTHYROIDISM: ICD-10-CM

## 2025-06-20 DIAGNOSIS — E78.5 HYPERLIPIDEMIA, UNSPECIFIED HYPERLIPIDEMIA TYPE: ICD-10-CM

## 2025-06-20 LAB
ALBUMIN SERPL BCG-MCNC: 3.9 G/DL (ref 3.5–5)
ALP SERPL-CCNC: 63 U/L (ref 34–104)
ALT SERPL W P-5'-P-CCNC: 15 U/L (ref 7–52)
ANION GAP SERPL CALCULATED.3IONS-SCNC: 9 MMOL/L (ref 4–13)
AST SERPL W P-5'-P-CCNC: 21 U/L (ref 13–39)
BACTERIA UR QL AUTO: ABNORMAL /HPF
BASOPHILS # BLD AUTO: 0.1 THOUSANDS/ÂΜL (ref 0–0.1)
BASOPHILS NFR BLD AUTO: 2 % (ref 0–1)
BILIRUB SERPL-MCNC: 0.72 MG/DL (ref 0.2–1)
BILIRUB UR QL STRIP: NEGATIVE
BUN SERPL-MCNC: 20 MG/DL (ref 5–25)
CALCIUM SERPL-MCNC: 8.8 MG/DL (ref 8.4–10.2)
CHLORIDE SERPL-SCNC: 103 MMOL/L (ref 96–108)
CHOLEST SERPL-MCNC: 129 MG/DL (ref ?–200)
CLARITY UR: CLEAR
CO2 SERPL-SCNC: 27 MMOL/L (ref 21–32)
COLOR UR: YELLOW
CREAT SERPL-MCNC: 0.85 MG/DL (ref 0.6–1.3)
EOSINOPHIL # BLD AUTO: 0.24 THOUSAND/ÂΜL (ref 0–0.61)
EOSINOPHIL NFR BLD AUTO: 4 % (ref 0–6)
ERYTHROCYTE [DISTWIDTH] IN BLOOD BY AUTOMATED COUNT: 13.8 % (ref 11.6–15.1)
GFR SERPL CREATININE-BSD FRML MDRD: 84 ML/MIN/1.73SQ M
GLUCOSE P FAST SERPL-MCNC: 95 MG/DL (ref 65–99)
GLUCOSE UR STRIP-MCNC: NEGATIVE MG/DL
HCT VFR BLD AUTO: 44.7 % (ref 36.5–49.3)
HDLC SERPL-MCNC: 43 MG/DL
HGB BLD-MCNC: 14.3 G/DL (ref 12–17)
HGB UR QL STRIP.AUTO: NEGATIVE
HYALINE CASTS #/AREA URNS LPF: ABNORMAL /LPF
IMM GRANULOCYTES # BLD AUTO: 0.01 THOUSAND/UL (ref 0–0.2)
IMM GRANULOCYTES NFR BLD AUTO: 0 % (ref 0–2)
KETONES UR STRIP-MCNC: NEGATIVE MG/DL
LDLC SERPL CALC-MCNC: 68 MG/DL (ref 0–100)
LEUKOCYTE ESTERASE UR QL STRIP: NEGATIVE
LYMPHOCYTES # BLD AUTO: 1.36 THOUSANDS/ÂΜL (ref 0.6–4.47)
LYMPHOCYTES NFR BLD AUTO: 25 % (ref 14–44)
MCH RBC QN AUTO: 30.6 PG (ref 26.8–34.3)
MCHC RBC AUTO-ENTMCNC: 32 G/DL (ref 31.4–37.4)
MCV RBC AUTO: 96 FL (ref 82–98)
MONOCYTES # BLD AUTO: 0.54 THOUSAND/ÂΜL (ref 0.17–1.22)
MONOCYTES NFR BLD AUTO: 10 % (ref 4–12)
NEUTROPHILS # BLD AUTO: 3.17 THOUSANDS/ÂΜL (ref 1.85–7.62)
NEUTS SEG NFR BLD AUTO: 59 % (ref 43–75)
NITRITE UR QL STRIP: NEGATIVE
NON-SQ EPI CELLS URNS QL MICRO: ABNORMAL /HPF
NONHDLC SERPL-MCNC: 86 MG/DL
NRBC BLD AUTO-RTO: 0 /100 WBCS
PH UR STRIP.AUTO: 6.5 [PH]
PLATELET # BLD AUTO: 232 THOUSANDS/UL (ref 149–390)
PMV BLD AUTO: 11.4 FL (ref 8.9–12.7)
POTASSIUM SERPL-SCNC: 4.4 MMOL/L (ref 3.5–5.3)
PROT SERPL-MCNC: 7 G/DL (ref 6.4–8.4)
PROT UR STRIP-MCNC: NEGATIVE MG/DL
RBC # BLD AUTO: 4.67 MILLION/UL (ref 3.88–5.62)
RBC #/AREA URNS AUTO: ABNORMAL /HPF
SODIUM SERPL-SCNC: 139 MMOL/L (ref 135–147)
SP GR UR STRIP.AUTO: 1.02 (ref 1–1.03)
T4 FREE SERPL-MCNC: 1.21 NG/DL (ref 0.61–1.12)
TRIGL SERPL-MCNC: 91 MG/DL (ref ?–150)
TSH SERPL DL<=0.05 MIU/L-ACNC: 2.31 UIU/ML (ref 0.45–4.5)
UROBILINOGEN UR STRIP-ACNC: <2 MG/DL
WBC # BLD AUTO: 5.42 THOUSAND/UL (ref 4.31–10.16)
WBC #/AREA URNS AUTO: ABNORMAL /HPF

## 2025-06-20 PROCEDURE — 80061 LIPID PANEL: CPT

## 2025-06-20 PROCEDURE — 84439 ASSAY OF FREE THYROXINE: CPT

## 2025-06-20 PROCEDURE — 81001 URINALYSIS AUTO W/SCOPE: CPT

## 2025-06-20 PROCEDURE — 80053 COMPREHEN METABOLIC PANEL: CPT

## 2025-06-20 PROCEDURE — 36415 COLL VENOUS BLD VENIPUNCTURE: CPT

## 2025-06-20 PROCEDURE — 85025 COMPLETE CBC W/AUTO DIFF WBC: CPT

## 2025-06-20 PROCEDURE — 84443 ASSAY THYROID STIM HORMONE: CPT

## 2025-06-23 ENCOUNTER — OFFICE VISIT (OUTPATIENT)
Dept: PHYSICAL THERAPY | Facility: OTHER | Age: 75
End: 2025-06-23
Attending: ORTHOPAEDIC SURGERY
Payer: MEDICARE

## 2025-06-23 DIAGNOSIS — Z96.652 S/P TOTAL KNEE REPLACEMENT NOT USING CEMENT, LEFT: Primary | ICD-10-CM

## 2025-06-23 DIAGNOSIS — Z96.643 H/O TOTAL HIP ARTHROPLASTY, BILATERAL: ICD-10-CM

## 2025-06-23 DIAGNOSIS — M17.12 PRIMARY OSTEOARTHRITIS OF LEFT KNEE: ICD-10-CM

## 2025-06-23 PROCEDURE — 97110 THERAPEUTIC EXERCISES: CPT

## 2025-06-23 NOTE — PROGRESS NOTES
"   Daily Note     Today's date: 2025  Patient name: Solis Dos Santos  : 1950  MRN: [id number]  Referring provider: Leonard Gallagher,*  Dx:   Encounter Diagnosis     ICD-10-CM    1. S/P total knee replacement not using cement, left  Z96.652       2. Primary osteoarthritis of left knee  M17.12       3. H/O total hip arthroplasty, bilateral  Z96.643                 Start Time: 938  Stop Time: 1001  Total time in clinic (min): 23 minutes    Subjective: Patient reports no new complaints.      Objective: See treatment diary below    FOTO 25: 58    Assessment: Patient tolerated session well focused on L k' ROM and functional strengthening without issue. Added lateral step-ups (6\") with good technique. Continue to address LE strength/endurance deficits to improve ambulatory tolerance/safety.       Plan: Continue per plan of care.      Precautions: PMH (+) for B PEGGY, R knee OA c pain    Visit #: 9 10 11 12 13 14 15 16   Date:    Manuals           STM / IASTM L quad MB L quad MS L quad MS L quad JAB       Manual stretching     Ext mob 3x30\"                 Ther Ex           Bike 10' 10' 10' 10' 10' 10' 10' 10'   Heel slides PROM 10x20\" PROM 10x20\" PROM 10x20\"   10x10\"     Seated k' flexion EOT     3x30\"  3X30\"    Heel prop      MHP + calf S 3x1' X3' TERT     Prone hang MHP post k' 3x30\" MHP post k' 3x30\" MHP post k' 3x30\"        Supine HS/calf stretch c strap -     3x45\" 2x60\" C heel prop 2.5# 3x1'   Calf S on slant 3x45\" 3x45\" 3x45'' 3x45\" 3x45\" 3x45\"  2x1'   Prone quad S c strap - 3x30\" 3x30'' 3x30\"                                        Neuro Re-Ed           QS           GS           SAQ           LAQ 5# 20x 5# 20x 5#  25x 5# 25x OTB 2x20  nv nv   SLR           S/L hip ABd                      Lateral band walk      GTB a' x20ea OTB a' x 3 trips    Chair squat      22\" c GTB k' 3x10 20\" 3x10 20\" 3x10   TKE     nv nv                Leg press DL 65# 3x10 DL 70# " "3x10 DL 70#, 75#, 80#  10x ea DL 75#  80#  85# 10x ea SL:L 50# 3x10 SL:L  70# 3x10     Tandem walk       X 4 trips d/b mirror    SLS        Green 3x20\"ea              Ther Activity           Sport cord retro walkouts           Step-taps           Step-ups 4\" FSU 3x10 4\" FSU 3x10 4'' FSU  3x10 4\" FSU 3x10 6\" FSU 3x10 nv 4\" + airex 3x10 6\" FSU 4x10  LSU 3x10              Gait Training                                 Modalities           Gameready                                      "

## 2025-06-25 ENCOUNTER — TELEPHONE (OUTPATIENT)
Age: 75
End: 2025-06-25

## 2025-06-25 DIAGNOSIS — E78.5 HYPERLIPIDEMIA, UNSPECIFIED HYPERLIPIDEMIA TYPE: ICD-10-CM

## 2025-06-25 DIAGNOSIS — E03.9 ACQUIRED HYPOTHYROIDISM: ICD-10-CM

## 2025-06-25 RX ORDER — ATORVASTATIN CALCIUM 10 MG/1
10 TABLET, FILM COATED ORAL
Qty: 90 TABLET | Refills: 1 | OUTPATIENT
Start: 2025-06-25

## 2025-06-25 RX ORDER — LEVOTHYROXINE SODIUM 150 UG/1
150 TABLET ORAL DAILY
Qty: 90 TABLET | Refills: 1 | OUTPATIENT
Start: 2025-06-25

## 2025-06-25 NOTE — TELEPHONE ENCOUNTER
Patient called to review results, they have been released and viewed in patients mychart. Please have provider review and follow up with patient thank you

## 2025-06-26 ENCOUNTER — OFFICE VISIT (OUTPATIENT)
Dept: PHYSICAL THERAPY | Facility: OTHER | Age: 75
End: 2025-06-26
Attending: ORTHOPAEDIC SURGERY
Payer: MEDICARE

## 2025-06-26 DIAGNOSIS — Z96.652 S/P TOTAL KNEE REPLACEMENT NOT USING CEMENT, LEFT: Primary | ICD-10-CM

## 2025-06-26 DIAGNOSIS — Z96.643 H/O TOTAL HIP ARTHROPLASTY, BILATERAL: ICD-10-CM

## 2025-06-26 DIAGNOSIS — M17.12 PRIMARY OSTEOARTHRITIS OF LEFT KNEE: ICD-10-CM

## 2025-06-26 PROCEDURE — 97110 THERAPEUTIC EXERCISES: CPT

## 2025-06-26 NOTE — PROGRESS NOTES
"   Daily Note     Today's date: 2025  Patient name: Solis Dos Santos  : 1950  MRN: [id number]  Referring provider: Leonard Gallagher,*  Dx:   Encounter Diagnosis     ICD-10-CM    1. S/P total knee replacement not using cement, left  Z96.652       2. Primary osteoarthritis of left knee  M17.12       3. H/O total hip arthroplasty, bilateral  Z96.643             Start Time: 1102  Stop Time: 1155  Total time in clinic (min): 53 minutes    Subjective: Patient reports no new complaints.      Objective: See treatment diary below      Assessment: Patient tolerated session well focused on L k' ROM and functional strengthening without issue. Good strength gains demonstrated by progressions per flowsheet. Continue to address LE strength/endurance deficits to improve ambulatory tolerance/safety.       Plan: Continue per plan of care.      Precautions: PMH (+) for B PEGGY, R knee OA c pain    Visit #: 9 10 11 12 13 14 15 16 17   Date:    Manuals            STM / IASTM L quad MB L quad MS L quad MS L quad JAB        Manual stretching     Ext mob 3x30\"                   Ther Ex            Bike 10' 10' 10' 10' 10' 10' 10' 10' 10'   Heel slides PROM 10x20\" PROM 10x20\" PROM 10x20\"   10x10\"      Seated k' flexion EOT     3x30\"  3X30\"     Heel prop      MHP + calf S 3x1' X3' TERT      Prone hang MHP post k' 3x30\" MHP post k' 3x30\" MHP post k' 3x30\"         Supine HS/calf stretch c strap -     3x45\" 2x60\" C heel prop 2.5# 3x1' 90/90 HS 30x5\"   Calf S on slant 3x45\" 3x45\" 3x45'' 3x45\" 3x45\" 3x45\"  2x1' 2x1'   Prone quad S c strap - 3x30\" 3x30'' 3x30\"                                            Neuro Re-Ed            QS            GS            SAQ            LAQ 5# 20x 5# 20x 5#  25x 5# 25x OTB 2x20  nv nv BTB x30   SLR            S/L hip ABd                        Lateral band walk      GTB a' x20ea OTB a' x 3 trips     Chair squat      22\" c GTB k' 3x10 20\" 3x10 20\" 3x10    TKE    " " nv nv                  Leg press DL 65# 3x10 DL 70# 3x10 DL 70#, 75#, 80#  10x ea DL 75#  80#  85# 10x ea SL:L 50# 3x10 SL:L  70# 3x10   SL 70#:L 4x10   Suitcase carries         25# x3 trips ea   Tandem walk       X 4 trips d/b mirror     SLS        Green 3x20\"ea                Ther Activity            Sport cord retro walkouts            Step-taps            Step-ups 4\" FSU 3x10 4\" FSU 3x10 4'' FSU  3x10 4\" FSU 3x10 6\" FSU 3x10 nv 4\" + airex 3x10 6\" FSU 4x10  LSU 3x10                Gait Training                                    Modalities            Gameready                                        "

## 2025-06-27 ENCOUNTER — TELEPHONE (OUTPATIENT)
Age: 75
End: 2025-06-27

## 2025-06-27 DIAGNOSIS — E78.5 HYPERLIPIDEMIA, UNSPECIFIED HYPERLIPIDEMIA TYPE: ICD-10-CM

## 2025-06-27 DIAGNOSIS — E03.9 ACQUIRED HYPOTHYROIDISM: ICD-10-CM

## 2025-06-27 NOTE — TELEPHONE ENCOUNTER
Pt called to get Dr Noguera's opinion on taking a tumeric and curcumin supplement.  Please advise back to Pt via [facility] or [phone].

## 2025-06-28 RX ORDER — LEVOTHYROXINE SODIUM 150 UG/1
150 TABLET ORAL DAILY
Qty: 90 TABLET | Refills: 1 | Status: SHIPPED | OUTPATIENT
Start: 2025-06-28

## 2025-06-28 RX ORDER — ATORVASTATIN CALCIUM 10 MG/1
10 TABLET, FILM COATED ORAL
Qty: 90 TABLET | Refills: 1 | Status: SHIPPED | OUTPATIENT
Start: 2025-06-28

## 2025-07-01 ENCOUNTER — OFFICE VISIT (OUTPATIENT)
Dept: PHYSICAL THERAPY | Facility: OTHER | Age: 75
End: 2025-07-01
Attending: ORTHOPAEDIC SURGERY
Payer: MEDICARE

## 2025-07-01 DIAGNOSIS — M17.12 PRIMARY OSTEOARTHRITIS OF LEFT KNEE: ICD-10-CM

## 2025-07-01 DIAGNOSIS — Z96.652 S/P TOTAL KNEE REPLACEMENT NOT USING CEMENT, LEFT: Primary | ICD-10-CM

## 2025-07-01 DIAGNOSIS — Z96.643 H/O TOTAL HIP ARTHROPLASTY, BILATERAL: ICD-10-CM

## 2025-07-01 PROCEDURE — 97110 THERAPEUTIC EXERCISES: CPT

## 2025-07-01 NOTE — PROGRESS NOTES
"   Daily Note     Today's date: 2025  Patient name: Solis Dos Santos  : 1950  MRN: [id number]  Referring provider: Leonard Gallagher,*  Dx:   Encounter Diagnosis     ICD-10-CM    1. S/P total knee replacement not using cement, left  Z96.652       2. Primary osteoarthritis of left knee  M17.12       3. H/O total hip arthroplasty, bilateral  Z96.643             Start Time: 1300  Stop Time: 1353  Total time in clinic (min): 53 minutes    Subjective: Patient noticed a mass of soft tissue in distal quad, not painful but patient inquiring about it.      Objective: See treatment diary below      Assessment: Patient tolerated session well - added percussion STM to distal quad with good tolerance; after STM and stretching of L quad, pt presenting with decreased tone in distal quad and subjective complaints of less tightness. Tolerated all functional strengthening and balance training well. Continue to address LE strength/endurance deficits to improve ambulatory tolerance/safety.       Plan: Continue per plan of care.      Precautions: PMH (+) for B PEGGY, R knee OA c pain    Visit #: 9 10 11 12 13 14 15 16 17 18   Date:    Manuals             STM / IASTM L quad MB L quad MS L quad MS L quad JAB      Vibration, L quad 2000 pls, 21 Hz, 5.0 bars   Manual stretching     Ext mob 3x30\"                     Ther Ex             Bike 10' 10' 10' 10' 10' 10' 10' 10' 10' 10'   Heel slides PROM 10x20\" PROM 10x20\" PROM 10x20\"   10x10\"       Seated k' flexion EOT     3x30\"  3X30\"      Heel prop      MHP + calf S 3x1' X3' TERT       Prone hang MHP post k' 3x30\" MHP post k' 3x30\" MHP post k' 3x30\"          Supine HS/calf stretch c strap -     3x45\" 2x60\" C heel prop 2.5# 3x1' 90/90 HS 30x5\" 90/90 HS 10x10\"   Calf S on slant 3x45\" 3x45\" 3x45'' 3x45\" 3x45\" 3x45\"  2x1' 2x1'    Prone quad S c strap - 3x30\" 3x30'' 3x30\"      Prone 3x30\"    S/L 2x45\"                                        " "  Neuro Re-Ed             QS             GS             SAQ             LAQ 5# 20x 5# 20x 5#  25x 5# 25x OTB 2x20  nv nv BTB x30 MTB 3x15ea   SLR             S/L hip ABd                          Lateral band walk      GTB a' x20ea OTB a' x 3 trips      Chair squat      22\" c GTB k' 3x10 20\" 3x10 20\" 3x10     TKE     nv nv                    Leg press DL 65# 3x10 DL 70# 3x10 DL 70#, 75#, 80#  10x ea DL 75#  80#  85# 10x ea SL:L 50# 3x10 SL:L  70# 3x10   SL 70#:L 4x10 # x15  110# x15  115# x15  120# x15  125# x15   Suitcase carries         25# x3 trips ea    Tandem walk       X 4 trips d/b mirror      SLS        Green 3x20\"ea     Wobble board          M-L 3x45\"                Ther Activity             Sport cord retro walkouts             Step-taps             Step-ups 4\" FSU 3x10 4\" FSU 3x10 4'' FSU  3x10 4\" FSU 3x10 6\" FSU 3x10 nv 4\" + airex 3x10 6\" FSU 4x10  LSU 3x10                  Gait Training                                       Modalities             Gameready                                          "

## 2025-07-02 DIAGNOSIS — Z47.1 AFTERCARE FOLLOWING LEFT KNEE JOINT REPLACEMENT SURGERY: Primary | ICD-10-CM

## 2025-07-02 DIAGNOSIS — Z96.652 AFTERCARE FOLLOWING LEFT KNEE JOINT REPLACEMENT SURGERY: Primary | ICD-10-CM

## 2025-07-03 ENCOUNTER — OFFICE VISIT (OUTPATIENT)
Dept: PHYSICAL THERAPY | Facility: OTHER | Age: 75
End: 2025-07-03
Attending: ORTHOPAEDIC SURGERY
Payer: MEDICARE

## 2025-07-03 DIAGNOSIS — Z96.652 S/P TOTAL KNEE REPLACEMENT NOT USING CEMENT, LEFT: Primary | ICD-10-CM

## 2025-07-03 DIAGNOSIS — Z96.643 H/O TOTAL HIP ARTHROPLASTY, BILATERAL: ICD-10-CM

## 2025-07-03 DIAGNOSIS — M17.12 PRIMARY OSTEOARTHRITIS OF LEFT KNEE: ICD-10-CM

## 2025-07-03 PROCEDURE — 97110 THERAPEUTIC EXERCISES: CPT

## 2025-07-03 NOTE — PROGRESS NOTES
"   Daily Note     Today's date: 7/3/2025  Patient name: Solis Dos Santos  : 1950  MRN: [id number]  Referring provider: Leonard Gallagher,*  Dx:   Encounter Diagnosis     ICD-10-CM    1. S/P total knee replacement not using cement, left  Z96.652       2. Primary osteoarthritis of left knee  M17.12       3. H/O total hip arthroplasty, bilateral  Z96.643               Start Time: 1133  Stop Time: 1211  Total time in clinic (min): 38 minutes    Subjective: Patient reports no new complaints, knees are feeling good today.      Objective: See treatment diary below      Assessment: Patient tolerated session well focused on functional strengthening and balance training with no complaints other than muscle fatigue. Added forward step-downs today with good tolerance, required cueing for eccentric control. Continue to address LE strength/endurance deficits to improve ambulatory tolerance/safety.       Plan: Continue per plan of care.      Precautions: PMH (+) for B PEGGY, R knee OA c pain    Visit #: 9 10 11 12 13 14 15 16 17 18    Date: 5/29 6/2 6/5 6/9 6/12 6/16 6/19 6/23 6/26 7/1 7/3   Manuals              STM / IASTM L quad MB L quad MS L quad MS L quad JAB      Vibration, L quad 2000 pls, 21 Hz, 5.0 bars EPAT, L quad 2000 pls, 18 Hz, 2.0 bars   Manual stretching     Ext mob 3x30\"                       Ther Ex              Bike 10' 10' 10' 10' 10' 10' 10' 10' 10' 10' 10'   Heel slides PROM 10x20\" PROM 10x20\" PROM 10x20\"   10x10\"        Seated k' flexion EOT     3x30\"  3X30\"       Heel prop      MHP + calf S 3x1' X3' TERT        Prone hang MHP post k' 3x30\" MHP post k' 3x30\" MHP post k' 3x30\"           Supine HS/calf stretch c strap -     3x45\" 2x60\" C heel prop 2.5# 3x1' 90/90 HS 30x5\" 90/90 HS 10x10\"    Calf S on slant 3x45\" 3x45\" 3x45'' 3x45\" 3x45\" 3x45\"  2x1' 2x1'     Prone quad S c strap - 3x30\" 3x30'' 3x30\"      Prone 3x30\"    S/L 2x45\" S/L 3x45\"                                             Neuro Re-Ed      " "        QS              GS              SAQ              LAQ 5# 20x 5# 20x 5#  25x 5# 25x OTB 2x20  nv nv BTB x30 MTB 3x15ea    SLR              S/L hip ABd                            Lateral band walk      GTB a' x20ea OTB a' x 3 trips    OTB a' x4 trips   Chair squat      22\" c GTB k' 3x10 20\" 3x10 20\" 3x10   20\" 5# 3x8   Step-down           2\" FSD x8ea   TKE     nv nv                      Leg press DL 65# 3x10 DL 70# 3x10 DL 70#, 75#, 80#  10x ea DL 75#  80#  85# 10x ea SL:L 50# 3x10 SL:L  70# 3x10   SL 70#:L 4x10 # x15  110# x15  115# x15  120# x15  125# x15    Suitcase carries         25# x3 trips ea  25# x 4 trips ea   Tandem walk       X 4 trips d/b mirror       SLS        Green 3x20\"ea      Wobble board          M-L 3x45\"                  Ther Activity              Sport cord retro walkouts              Step-taps              Step-ups 4\" FSU 3x10 4\" FSU 3x10 4'' FSU  3x10 4\" FSU 3x10 6\" FSU 3x10 nv 4\" + airex 3x10 6\" FSU 4x10  LSU 3x10                    Gait Training                                          Modalities              Gameready                                            "

## 2025-07-07 ENCOUNTER — OFFICE VISIT (OUTPATIENT)
Dept: PHYSICAL THERAPY | Facility: OTHER | Age: 75
End: 2025-07-07
Attending: ORTHOPAEDIC SURGERY
Payer: MEDICARE

## 2025-07-07 DIAGNOSIS — M17.12 PRIMARY OSTEOARTHRITIS OF LEFT KNEE: ICD-10-CM

## 2025-07-07 DIAGNOSIS — Z96.652 S/P TOTAL KNEE REPLACEMENT NOT USING CEMENT, LEFT: Primary | ICD-10-CM

## 2025-07-07 DIAGNOSIS — Z96.643 H/O TOTAL HIP ARTHROPLASTY, BILATERAL: ICD-10-CM

## 2025-07-07 PROCEDURE — 97110 THERAPEUTIC EXERCISES: CPT

## 2025-07-07 NOTE — PROGRESS NOTES
"   Daily Note     Today's date: 2025  Patient name: Solis Dos Santos  : 1950  MRN: [id number]  Referring provider: Leonard Gallagher,*  Dx:   Encounter Diagnosis     ICD-10-CM    1. S/P total knee replacement not using cement, left  Z96.652       2. Primary osteoarthritis of left knee  M17.12       3. H/O total hip arthroplasty, bilateral  Z96.643                 Start Time: 1001  Stop Time: 1039  Total time in clinic (min): 38 minutes    Subjective: Patient reports no new complaints, but noted excessive soreness over the weekend.      Objective: See treatment diary below      Assessment: Patient tolerated session well focused on functional strengthening and balance training with no complaints other than muscle fatigue. Held on progressions given excessive soreness lv. Continue to address LE strength/endurance deficits to improve ambulatory tolerance/safety.       Plan: Continue per plan of care.      Precautions: PMH (+) for B PEGGY, R knee OA c pain    Visit #: 9 10 11 12 13 14 15 16 17 18 19 20   Date: 5/29 6/2 6/5 6/9 6/12 6/16 6/19 6/23 6/26 7/1 7/3 7/7   Manuals               STM / IASTM L quad MB L quad MS L quad MS L quad JAB      Vibration, L quad 2000 pls, 21 Hz, 5.0 bars EPAT, L quad 2000 pls, 18 Hz, 2.0 bars Vibration, L quad 2000 pls, 21 Hz, 5.0 bars   Manual stretching     Ext mob 3x30\"                         Ther Ex               Bike 10' 10' 10' 10' 10' 10' 10' 10' 10' 10' 10' 10'   Heel slides PROM 10x20\" PROM 10x20\" PROM 10x20\"   10x10\"         Seated k' flexion EOT     3x30\"  3X30\"        Heel prop      MHP + calf S 3x1' X3' TERT         Prone hang MHP post k' 3x30\" MHP post k' 3x30\" MHP post k' 3x30\"            Supine HS/calf stretch c strap -     3x45\" 2x60\" C heel prop 2.5# 3x1' 90/90 HS 30x5\" 90/90 HS 10x10\"  3x45\"   Calf S on slant 3x45\" 3x45\" 3x45'' 3x45\" 3x45\" 3x45\"  2x1' 2x1'      Prone quad S c strap - 3x30\" 3x30'' 3x30\"      Prone 3x30\"    S/L 2x45\" S/L 3x45\" S/L 3x45\"    " "                                            Neuro Re-Ed               QS               GS               SAQ               LAQ 5# 20x 5# 20x 5#  25x 5# 25x OTB 2x20  nv nv BTB x30 MTB 3x15ea  MTB 3x15ea   Standing HS curl            2.5# 3x10ea   SLR               S/L hip ABd                              Standing hip ABd            2.5# 3x10ea   Lateral band walk      GTB a' x20ea OTB a' x 3 trips    OTB a' x4 trips    Chair squat      22\" c GTB k' 3x10 20\" 3x10 20\" 3x10   20\" 5# 3x8    Step-down           2\" FSD x8ea    TKE     nv nv                        Leg press DL 65# 3x10 DL 70# 3x10 DL 70#, 75#, 80#  10x ea DL 75#  80#  85# 10x ea SL:L 50# 3x10 SL:L  70# 3x10   SL 70#:L 4x10 # x15  110# x15  115# x15  120# x15  125# x15  # 3x15   Suitcase carries         25# x3 trips ea  25# x 4 trips ea    Tandem walk       X 4 trips d/b mirror        SLS        Green 3x20\"ea       Wobble board          M-L 3x45\"                    Ther Activity               Sport cord retro walkouts               Step-taps               Step-ups 4\" FSU 3x10 4\" FSU 3x10 4'' FSU  3x10 4\" FSU 3x10 6\" FSU 3x10 nv 4\" + airex 3x10 6\" FSU 4x10  LSU 3x10                      Gait Training                                             Modalities               Gameready                                              "

## 2025-07-10 ENCOUNTER — OFFICE VISIT (OUTPATIENT)
Dept: PHYSICAL THERAPY | Facility: OTHER | Age: 75
End: 2025-07-10
Attending: ORTHOPAEDIC SURGERY
Payer: MEDICARE

## 2025-07-10 DIAGNOSIS — M17.12 PRIMARY OSTEOARTHRITIS OF LEFT KNEE: ICD-10-CM

## 2025-07-10 DIAGNOSIS — Z96.643 H/O TOTAL HIP ARTHROPLASTY, BILATERAL: ICD-10-CM

## 2025-07-10 DIAGNOSIS — Z96.652 S/P TOTAL KNEE REPLACEMENT NOT USING CEMENT, LEFT: Primary | ICD-10-CM

## 2025-07-10 PROCEDURE — 97110 THERAPEUTIC EXERCISES: CPT

## 2025-07-10 NOTE — PROGRESS NOTES
"   Daily Note     Today's date: 7/10/2025  Patient name: Solis Dos Santos  : 1950  MRN: [id number]  Referring provider: Leonard Gallagher,*  Dx:   Encounter Diagnosis     ICD-10-CM    1. S/P total knee replacement not using cement, left  Z96.652       2. Primary osteoarthritis of left knee  M17.12       3. H/O total hip arthroplasty, bilateral  Z96.643                 Start Time: 1005  Stop Time: 1030  Total time in clinic (min): 25 minutes    Subjective: Patient requesting abbreviated session as he needs to return home soon.      Objective: See treatment diary below      Assessment: Patient tolerated session well focused on functional strengthening with no complaints other than muscle fatigue; improved ability to perform 2\" forward step-downs with good eccentric control LLE. Unable to perform on RLE as well 2/2 increased crepitus. Continue to address LE strength/endurance deficits to improve ambulatory tolerance/safety.       Plan: Continue per plan of care.      Precautions: PMH (+) for B PEGGY, R knee OA c pain    Visit #: 13 14 15 16 17 18 19 20 21   Date: 6/12 6/16 6/19 6/23 6/26 7/1 7/3 7/7 7/10   Manuals            STM / IASTM      Vibration, L quad 2000 pls, 21 Hz, 5.0 bars EPAT, L quad 2000 pls, 18 Hz, 2.0 bars Vibration, L quad 2000 pls, 21 Hz, 5.0 bars Vibration, L quad 2000 pls, 21 Hz, 5.0 bars   Manual stretching Ext mob 3x30\"                       Ther Ex            Bike 10' 10' 10' 10' 10' 10' 10' 10'    Heel slides  10x10\"          Seated k' flexion EOT 3x30\"  3X30\"         Heel prop  MHP + calf S 3x1' X3' TERT          Prone hang            Supine HS/calf stretch c strap  3x45\" 2x60\" C heel prop 2.5# 3x1' 90/90 HS 30x5\" 90/90 HS 10x10\"  3x45\"    Calf S on slant 3x45\" 3x45\"  2x1' 2x1'       Prone quad S c strap      Prone 3x30\"    S/L 2x45\" S/L 3x45\" S/L 3x45\" S/L 3x45\"                                       Neuro Re-Ed            QS            GS            SAQ            LAQ OTB 2x20  nv " "nv BTB x30 MTB 3x15ea  MTB 3x15ea    Standing HS curl        2.5# 3x10ea    SLR            S/L hip ABd                        Standing hip ABd        2.5# 3x10ea    Lateral band walk  GTB a' x20ea OTB a' x 3 trips    OTB a' x4 trips  BTB k' x4 trips ea   Chair squat  22\" c GTB k' 3x10 20\" 3x10 20\" 3x10   20\" 5# 3x8  24\" 7.5# 3x10   Step-down       2\" FSD x8ea  2\" FSD 2x10:L  1x10:R   TKE nv nv                      Leg press SL:L 50# 3x10 SL:L  70# 3x10   SL 70#:L 4x10 # x15  110# x15  115# x15  120# x15  125# x15  # 3x15    Suitcase carries     25# x3 trips ea  25# x 4 trips ea     Tandem walk   X 4 trips d/b mirror         SLS    Green 3x20\"ea        Wobble board      M-L 3x45\"                  Ther Activity            Sport cord retro walkouts            Step-taps            Step-ups 6\" FSU 3x10 nv 4\" + airex 3x10 6\" FSU 4x10  LSU 3x10                    Gait Training                                    Modalities            Gameready                                        "

## 2025-07-14 ENCOUNTER — OFFICE VISIT (OUTPATIENT)
Dept: PHYSICAL THERAPY | Facility: OTHER | Age: 75
End: 2025-07-14
Attending: ORTHOPAEDIC SURGERY
Payer: MEDICARE

## 2025-07-14 DIAGNOSIS — Z96.652 S/P TOTAL KNEE REPLACEMENT NOT USING CEMENT, LEFT: ICD-10-CM

## 2025-07-14 DIAGNOSIS — Z96.643 H/O TOTAL HIP ARTHROPLASTY, BILATERAL: ICD-10-CM

## 2025-07-14 DIAGNOSIS — M17.12 PRIMARY OSTEOARTHRITIS OF LEFT KNEE: Primary | ICD-10-CM

## 2025-07-14 PROCEDURE — 97110 THERAPEUTIC EXERCISES: CPT

## 2025-07-14 NOTE — PROGRESS NOTES
"   Daily Note     Today's date: 2025  Patient name: Solis Dos Santos  : 1950  MRN: [id number]  Referring provider: Leonard Gallagher,*  Dx:   Encounter Diagnosis     ICD-10-CM    1. Primary osteoarthritis of left knee  M17.12       2. H/O total hip arthroplasty, bilateral  Z96.643       3. S/P total knee replacement not using cement, left  Z96.652                 Start Time: 1445  Stop Time: 1508  Total time in clinic (min): 23 minutes    Subjective: Patient reports \"today is an off day, not feeling as well.\"       Objective: See treatment diary below      Assessment: Patient tolerated session well focused on functional strengthening - minor progressions made to volume of strength exercises per flowsheet with no adverse effects. Continue to address LE strength/endurance deficits to improve ambulatory tolerance/safety.       Plan: Continue per plan of care.      Precautions: PMH (+) for B PEGGY, R knee OA c pain    Visit #: 13 14 15 16 17 18 19 20    Date: 6/12 6/16 6/19 6/23 6/26 7/1 7/3 7/7 7/10 7/14   Manuals             STM / IASTM      Vibration, L quad 2000 pls, 21 Hz, 5.0 bars EPAT, L quad 2000 pls, 18 Hz, 2.0 bars Vibration, L quad 2000 pls, 21 Hz, 5.0 bars Vibration, L quad 2000 pls, 21 Hz, 5.0 bars Vibration, L quad 2000 pls, 21 Hz, 5.0 bars   Manual stretching Ext mob 3x30\"                         Ther Ex             Bike 10' 10' 10' 10' 10' 10' 10' 10'  5'   Heel slides  10x10\"           Seated k' flexion EOT 3x30\"  3X30\"          Heel prop  MHP + calf S 3x1' X3' TERT           Prone hang             Supine HS/calf stretch c strap  3x45\" 2x60\" C heel prop 2.5# 3x1' 90/90 HS 30x5\" 90/90 HS 10x10\"  3x45\"  3x45\"   Calf S on slant 3x45\" 3x45\"  2x1' 2x1'        Prone quad S c strap      Prone 3x30\"    S/L 2x45\" S/L 3x45\" S/L 3x45\" S/L 3x45\" S/L 3x45\"                                          Neuro Re-Ed             QS             GS             SAQ             LAQ OTB 2x20  nv nv BTB x30 " "MTB 3x15ea  MTB 3x15ea  MTB 3x20ea   Standing HS curl        2.5# 3x10ea     SLR             S/L hip ABd                          Standing hip ABd        2.5# 3x10ea     Lateral band walk  GTB a' x20ea OTB a' x 3 trips    OTB a' x4 trips  BTB k' x4 trips ea    Chair squat  22\" c GTB k' 3x10 20\" 3x10 20\" 3x10   20\" 5# 3x8  24\" 7.5# 3x10    Step-down       2\" FSD x8ea  2\" FSD 2x10:L  1x10:R    TKE nv nv                        Leg press SL:L 50# 3x10 SL:L  70# 3x10   SL 70#:L 4x10 # x15  110# x15  115# x15  120# x15  125# x15  # 3x15  SL 55# 3x15   Suitcase carries     25# x3 trips ea  25# x 4 trips ea      Tandem walk   X 4 trips d/b mirror       Tandem stance airex 3x30\"ea   SLS    Green 3x20\"ea         Wobble board      M-L 3x45\"                    Ther Activity             Sport cord retro walkouts             Step-taps             Step-ups 6\" FSU 3x10 nv 4\" + airex 3x10 6\" FSU 4x10  LSU 3x10                      Gait Training                                       Modalities             Gameready                                          "

## 2025-07-17 ENCOUNTER — OFFICE VISIT (OUTPATIENT)
Dept: PHYSICAL THERAPY | Facility: OTHER | Age: 75
End: 2025-07-17
Attending: ORTHOPAEDIC SURGERY
Payer: MEDICARE

## 2025-07-17 DIAGNOSIS — Z96.652 S/P TOTAL KNEE REPLACEMENT NOT USING CEMENT, LEFT: ICD-10-CM

## 2025-07-17 DIAGNOSIS — M17.12 PRIMARY OSTEOARTHRITIS OF LEFT KNEE: Primary | ICD-10-CM

## 2025-07-17 DIAGNOSIS — Z96.643 H/O TOTAL HIP ARTHROPLASTY, BILATERAL: ICD-10-CM

## 2025-07-17 PROCEDURE — 97110 THERAPEUTIC EXERCISES: CPT

## 2025-07-17 NOTE — PROGRESS NOTES
"   Daily Note     Today's date: 2025  Patient name: Solis Dos Santos  : 1950  MRN: [id number]  Referring provider: Leonard Gallagher,*  Dx:   Encounter Diagnosis     ICD-10-CM    1. Primary osteoarthritis of left knee  M17.12       2. H/O total hip arthroplasty, bilateral  Z96.643       3. S/P total knee replacement not using cement, left  Z96.652                   Start Time: 09  Stop Time: 1033  Total time in clinic (min): 38 minutes    Subjective: Patient reports feeling good today.      Objective: See treatment diary below      Assessment: Patient tolerated session well focused on functional strengthening with no complaints throughout. Demonstrates lack of strength with STS from 20\" chair and lower. Continue to address LE strength/endurance deficits to improve ambulatory tolerance/safety.       Plan: Continue per plan of care.      Precautions: PMH (+) for B PEGGY, R knee OA c pain    Visit #: 13 14 15 16 17 18 19 20    Date: 6/12 6/16 6/19 6/23 6/26 7/1 7/3 7/7 7/10 7/14 7/16   Manuals              STM / IASTM      Vibration, L quad 2000 pls, 21 Hz, 5.0 bars EPAT, L quad 2000 pls, 18 Hz, 2.0 bars Vibration, L quad 2000 pls, 21 Hz, 5.0 bars Vibration, L quad 2000 pls, 21 Hz, 5.0 bars Vibration, L quad 2000 pls, 21 Hz, 5.0 bars Vibration, L quad 2000 pls, 21 Hz, 5.0 bars   Manual stretching Ext mob 3x30\"                           Ther Ex              Bike 10' 10' 10' 10' 10' 10' 10' 10'  5' 12'   Heel slides  10x10\"            Seated k' flexion EOT 3x30\"  3X30\"           Heel prop  MHP + calf S 3x1' X3' TERT            Prone hang              Supine HS/calf stretch c strap  3x45\" 2x60\" C heel prop 2.5# 3x1' 90/90 HS 30x5\" 90/90 HS 10x10\"  3x45\"  3x45\"    Calf S on slant 3x45\" 3x45\"  2x1' 2x1'         Prone quad S c strap      Prone 3x30\"    S/L 2x45\" S/L 3x45\" S/L 3x45\" S/L 3x45\" S/L 3x45\" S/L 3x45\"                                             Neuro Re-Ed              QS              GS   " "           SAQ              LAQ OTB 2x20  nv nv BTB x30 MTB 3x15ea  MTB 3x15ea  MTB 3x20ea    Standing HS curl        2.5# 3x10ea      SLR              S/L hip ABd                            Standing hip ABd        2.5# 3x10ea      Lateral band walk  GTB a' x20ea OTB a' x 3 trips    OTB a' x4 trips  BTB k' x4 trips ea  BTB k' 10x 10'   Chair squat  22\" c GTB k' 3x10 20\" 3x10 20\" 3x10   20\" 5# 3x8  24\" 7.5# 3x10  20\" BW 3x10   Step-down       2\" FSD x8ea  2\" FSD 2x10:L  1x10:R     TKE nv nv                          Leg press SL:L 50# 3x10 SL:L  70# 3x10   SL 70#:L 4x10 # x15  110# x15  115# x15  120# x15  125# x15  # 3x15  SL 55# 3x15    Suitcase carries     25# x3 trips ea  25# x 4 trips ea       Tandem walk   X 4 trips d/b mirror       Tandem stance airex 3x30\"ea    SLS    Green 3x20\"ea          Wobble board      M-L 3x45\"                      Ther Activity              Sport cord retro walkouts              Step-taps              Step-ups 6\" FSU 3x10 nv 4\" + airex 3x10 6\" FSU 4x10  LSU 3x10       6\" LSU 3x10                 Gait Training                                          Modalities              Gameready                                            "

## 2025-07-21 ENCOUNTER — OFFICE VISIT (OUTPATIENT)
Dept: PHYSICAL THERAPY | Facility: OTHER | Age: 75
End: 2025-07-21
Attending: ORTHOPAEDIC SURGERY
Payer: MEDICARE

## 2025-07-21 DIAGNOSIS — Z96.643 H/O TOTAL HIP ARTHROPLASTY, BILATERAL: ICD-10-CM

## 2025-07-21 DIAGNOSIS — M17.12 PRIMARY OSTEOARTHRITIS OF LEFT KNEE: Primary | ICD-10-CM

## 2025-07-21 DIAGNOSIS — Z96.652 S/P TOTAL KNEE REPLACEMENT NOT USING CEMENT, LEFT: ICD-10-CM

## 2025-07-21 PROCEDURE — 97110 THERAPEUTIC EXERCISES: CPT

## 2025-07-21 NOTE — PROGRESS NOTES
"   Daily Note     Today's date: 2025  Patient name: Solis Dos Santos  : 1950  MRN: [id number]  Referring provider: Leonard Gallagher,*  Dx:   Encounter Diagnosis     ICD-10-CM    1. Primary osteoarthritis of left knee  M17.12       2. H/O total hip arthroplasty, bilateral  Z96.643       3. S/P total knee replacement not using cement, left  Z96.652                   Start Time: 1123  Stop Time: 1216  Total time in clinic (min): 53 minutes    Subjective: Patient has follow-up with Dr. Gallagher tomorrow - feels the k' has been doing him well but gets global stiffness in low back / hips / knees that impacts.      Objective: See treatment diary below      Assessment: Patient tolerated session well focused on functional strengthening with no complaints throughout. Trained STS at 24\" box today, able to tolerate increased load with no complaints other than fatigue. Demonstrates good LLE eccentric control with forward step-downs. Continue to address LE strength/endurance deficits to improve ambulatory tolerance/safety.       Plan: Continue per plan of care.      Precautions: PMH (+) for B PEGGY, R knee OA c pain    Visit #: 13 14 15 16 17 18 19 20 21 22 23 24   Date: 6/12 6/16 6/19 6/23 6/26 7/1 7/3 7/7 7/10 7/14 7/16 7/21   Manuals               STM / IASTM      Vibration, L quad 2000 pls, 21 Hz, 5.0 bars EPAT, L quad 2000 pls, 18 Hz, 2.0 bars Vibration, L quad 2000 pls, 21 Hz, 5.0 bars Vibration, L quad 2000 pls, 21 Hz, 5.0 bars Vibration, L quad 2000 pls, 21 Hz, 5.0 bars Vibration, L quad 2000 pls, 21 Hz, 5.0 bars    Manual stretching Ext mob 3x30\"                             Ther Ex               Bike 10' 10' 10' 10' 10' 10' 10' 10'  5' 12' 10'   Heel slides  10x10\"             Seated k' flexion EOT 3x30\"  3X30\"            Heel prop  MHP + calf S 3x1' X3' TERT             Prone hang               Supine HS/calf stretch c strap  3x45\" 2x60\" C heel prop 2.5# 3x1' 90/90 HS 30x5\" 90/90 HS 10x10\"  3x45\"  3x45\"  " "3x30   Calf S on slant 3x45\" 3x45\"  2x1' 2x1'          Prone quad S c strap      Prone 3x30\"    S/L 2x45\" S/L 3x45\" S/L 3x45\" S/L 3x45\" S/L 3x45\" S/L 3x45\"    Standing                Standing lumbar ext            x20                                 Neuro Re-Ed               QS               GS               SAQ               LAQ OTB 2x20  nv nv BTB x30 MTB 3x15ea  MTB 3x15ea  MTB 3x20ea     Standing HS curl        2.5# 3x10ea       SLR               S/L hip ABd                              Standing hip ABd        2.5# 3x10ea       Lateral band walk  GTB a' x20ea OTB a' x 3 trips    OTB a' x4 trips  BTB k' x4 trips ea  BTB k' 10x 10' MTB k' 5x10'   Chair squat  22\" c GTB k' 3x10 20\" 3x10 20\" 3x10   20\" 5# 3x8  24\" 7.5# 3x10  20\" BW 3x10 24\" 15# MTB k' 3x10   Step-down       2\" FSD x8ea  2\" FSD 2x10:L  1x10:R   2\" FSD 2x10:L  1x10:R   TKE nv nv                            Leg press SL:L 50# 3x10 SL:L  70# 3x10   SL 70#:L 4x10 # x15  110# x15  115# x15  120# x15  125# x15  # 3x15  SL 55# 3x15  DL 95# 2x20   Suitcase carries     25# x3 trips ea  25# x 4 trips ea        Tandem walk   X 4 trips d/b mirror       Tandem stance airex 3x30\"ea     SLS    Green 3x20\"ea           Wobble board      M-L 3x45\"                        Ther Activity               Sport cord retro walkouts               Step-taps               Step-ups 6\" FSU 3x10 nv 4\" + airex 3x10 6\" FSU 4x10  LSU 3x10       6\" LSU 3x10                   Gait Training                                             Modalities               Gameready                                              "

## 2025-07-22 ENCOUNTER — HOSPITAL ENCOUNTER (OUTPATIENT)
Dept: RADIOLOGY | Facility: HOSPITAL | Age: 75
Discharge: HOME | End: 2025-07-22
Attending: ORTHOPAEDIC SURGERY
Payer: MEDICARE

## 2025-07-22 ENCOUNTER — OFFICE VISIT (OUTPATIENT)
Dept: OBGYN CLINIC | Facility: HOSPITAL | Age: 75
End: 2025-07-22

## 2025-07-22 VITALS — BODY MASS INDEX: 36.94 KG/M2 | HEIGHT: 73 IN

## 2025-07-22 DIAGNOSIS — Z96.652 AFTERCARE FOLLOWING LEFT KNEE JOINT REPLACEMENT SURGERY: Primary | ICD-10-CM

## 2025-07-22 DIAGNOSIS — Z96.652 AFTERCARE FOLLOWING LEFT KNEE JOINT REPLACEMENT SURGERY: ICD-10-CM

## 2025-07-22 DIAGNOSIS — Z47.1 AFTERCARE FOLLOWING LEFT KNEE JOINT REPLACEMENT SURGERY: Primary | ICD-10-CM

## 2025-07-22 DIAGNOSIS — Z47.1 AFTERCARE FOLLOWING LEFT KNEE JOINT REPLACEMENT SURGERY: ICD-10-CM

## 2025-07-22 PROCEDURE — 73560 X-RAY EXAM OF KNEE 1 OR 2: CPT

## 2025-07-22 PROCEDURE — 99024 POSTOP FOLLOW-UP VISIT: CPT | Performed by: ORTHOPAEDIC SURGERY

## 2025-07-22 NOTE — ASSESSMENT & PLAN NOTE
Status post left total knee arthroplasty on 04/20/2025  Patient is doing well from a postoperative standpoint.    X-ray left knee reveals stable alignment of hardware consistent with left TKA without evidence of hardware failure or loosening.  Patient with history of spinal stenosis with recurrent symptoms, recommend follow-up evaluation with Dr. Lyons.  Continue physical therapy progress range of motion and strength to the left knee  Continue over-the-counter anti-inflammatories or Tylenol as needed for persistent pain and inflammation  Follow-up in 3 months for reevaluation with x-rays of the left knee

## 2025-07-22 NOTE — PROGRESS NOTES
Assessment:  1. Aftercare following left knee joint replacement surgery          Problem List[1]    Plan:    74 y.o. male that is status post left total knee arthroplasty on 04/20/2025      Assessment & Plan  Aftercare following left knee joint replacement surgery  Status post left total knee arthroplasty on 04/20/2025  Patient is doing well from a postoperative standpoint.    X-ray left knee reveals stable alignment of hardware consistent with left TKA without evidence of hardware failure or loosening.  Patient with history of spinal stenosis with recurrent symptoms, recommend follow-up evaluation with Dr. Lyons.  Continue physical therapy progress range of motion and strength to the left knee  Continue over-the-counter anti-inflammatories or Tylenol as needed for persistent pain and inflammation  Follow-up in 3 months for reevaluation with x-rays of the left knee              To Do Next Visit:  X-rays of the  left  knee    Subjective:   Patient ID: Solis Dos Santos is a 74 y.o. male .    HPI    Patient presents to the office for follow up of left knee status post left total knee arthroplasty on 04/28/2025.  Patient is doing well in his postoperative period.  States that he continues to progress strengthening with physical therapy.  Notes some weakness to the bilateral lower extremities secondary to history of final stenosis for which she has been evaluated with Dr. Lyons in the past.  Incision well-healed without evidence of breakdown or erythema.    The following portions of the patient's history were reviewed and updated as appropriate: allergies, current medications, past family history, past social history, past surgical history and problem list.    Social History     Socioeconomic History    Marital status: /Civil Union     Spouse name: Not on file    Number of children: Not on file    Years of education: Not on file    Highest education level: Not on file   Occupational History    Not on file    Tobacco Use    Smoking status: Former     Current packs/day: 0.00     Average packs/day: 1 pack/day for 20.8 years (20.8 ttl pk-yrs)     Types: Cigarettes     Start date: 1967     Quit date: 1987     Years since quittin.7    Smokeless tobacco: Never   Vaping Use    Vaping status: Never Used   Substance and Sexual Activity    Alcohol use: Yes     Alcohol/week: 1.0 standard drink of alcohol     Types: 1 Cans of beer per week     Comment: rarely    Drug use: Never    Sexual activity: Not Currently     Partners: Female     Birth control/protection: None   Other Topics Concern    Not on file   Social History Narrative    Smoking: Non-smoker    As per [employer]     Social Drivers of Health     Financial Resource Strain: Low Risk  (9/15/2023)    Overall Financial Resource Strain (CARDIA)     Difficulty of Paying Living Expenses: Not hard at all   Food Insecurity: No Food Insecurity (2025)    Nursing - Inadequate Food Risk Classification     Worried About Running Out of Food in the Last Year: Never true     Ran Out of Food in the Last Year: Never true     Ran Out of Food in the Last Year: Never true   Transportation Needs: No Transportation Needs (2025)    Nursing - Transportation Risk Classification     Lack of Transportation: Not on file     Lack of Transportation: No   Physical Activity: Not on file   Stress: Not on file   Social Connections: Not on file   Intimate Partner Violence: Unknown (2025)    Nursing IPS     Feels Physically and Emotionally Safe: Not on file     Physically Hurt by Someone: Not on file     Humiliated or Emotionally Abused by Someone: Not on file     Physically Hurt by Someone: No     Hurt or Threatened by Someone: No   Housing Stability: Unknown (2025)    Nursing: Inadequate Housing Risk Classification     Has Housing: Not on file     Worried About Losing Housing: Not on file     Unable to Get Utilities: Not on file     Unable to Pay for Housing in the Last  Year: No     Has Housing: No     Past Medical History[2]  Past Surgical History[3]  Allergies[4]  Medications Ordered Prior to Encounter[5]    Review of Systems   Constitutional:  Negative for chills and fever.   HENT:  Negative for ear pain and sore throat.    Eyes:  Negative for pain and visual disturbance.   Respiratory:  Negative for cough and shortness of breath.    Cardiovascular:  Negative for chest pain and palpitations.   Gastrointestinal:  Negative for abdominal pain and vomiting.   Genitourinary:  Negative for dysuria and hematuria.   Musculoskeletal:  Negative for arthralgias and back pain.   Skin:  Negative for color change and rash.   Neurological:  Negative for seizures and syncope.   All other systems reviewed and are negative.    See HPi    Objective:    There were no vitals filed for this visit.    Physical Exam  Vitals and nursing note reviewed.   Constitutional:       General: He is not in acute distress.     Appearance: He is well-developed.   HENT:      Head: Normocephalic and atraumatic.     Eyes:      Conjunctiva/sclera: Conjunctivae normal.       Cardiovascular:      Rate and Rhythm: Normal rate and regular rhythm.      Heart sounds: No murmur heard.  Pulmonary:      Effort: Pulmonary effort is normal. No respiratory distress.      Breath sounds: Normal breath sounds.   Abdominal:      Palpations: Abdomen is soft.      Tenderness: There is no abdominal tenderness.     Musculoskeletal:         General: No swelling.      Cervical back: Neck supple.     Skin:     General: Skin is warm and dry.      Capillary Refill: Capillary refill takes less than 2 seconds.     Neurological:      Mental Status: He is alert.     Psychiatric:         Mood and Affect: Mood normal.         Left Knee Exam     Tenderness   The patient is experiencing no tenderness.     Range of Motion   Extension:  normal   Flexion:  normal     Tests   Varus: negative Valgus: negative    Other   Erythema: absent  Sensation:  "normal  Pulse: present  Swelling: none    Comments:  Neutral alignment  Incision well-healed without evidence of breakdown or erythema            I have personally reviewed pertinent films in PACS and my interpretation is left knee reveals stable alignment of hardware consistent with total knee arthroplasty without evidence of hardware failure or loosening.  No acute osseous abnormality.  No acute fracture or dislocation..    Procedures  No Procedures performed today         Scribe Attestation      I,:  Ping Johnson PA-C am acting as a scribe while in the presence of the attending physician.:       I,:  Leonard Gallagher MD personally performed the services described in this documentation    as scribed in my presence.:                Portions of the record may have been created with voice recognition software.  Occasional wrong word or \"sound a like\" substitutions may have occurred due to the inherent limitations of voice recognition software.  Read the chart carefully and recognize, using context, where substitutions have occurred.          [1]   Patient Active Problem List  Diagnosis    Primary osteoarthritis of both knees    Osgood-Schlatter's disease of both knees    Pain in both knees    Status post right hip replacement    Impaired mobility and ADLs    Hypothyroidism    Difficulty sleeping    Aftercare following left knee joint replacement surgery    Chronic pain of left knee    Gross hematuria    Hyperlipidemia    Bladder mass    Right leg swelling    Malignant neoplasm of posterior wall of urinary bladder (HCC)    Malignant neoplasm of lateral wall of urinary bladder (HCC)    Peripheral vascular disease, unspecified (HCC)    Primary osteoarthritis of left knee    Primary osteoarthritis of right knee    DDD (degenerative disc disease), lumbar    Diverticula of intestine    Gastroesophageal reflux disease    Lumbar radiculopathy    Primary osteoarthritis of left hip    Obesity (BMI 30-39.9)    Spinal " stenosis    BPH (benign prostatic hyperplasia)    Prediabetes   [2]   Past Medical History:  Diagnosis Date    Acute blood loss anemia 08/09/2019    Aftercare following right hip joint replacement surgery 11/05/2019    Anxiety disorder     Atherosclerotic heart disease of native coronary artery without angina pectoris     Benign prostatic hyperplasia without lower urinary tract symptoms     Cancer (HCC)     bladder    Clotting disorder (HCC) 1/25/2023    Blood in urine    Disease of thyroid gland     hypo    Dyslipidemia     GERD (gastroesophageal reflux disease)     manages w/ diet, takes no meds    Hypertension     Impaired fasting blood sugar     Kidney stone     Low back pain     Obesity     Osteoarthritis     last assesed 6-6-16    Other chest pain     Paresthesia of skin     Polyneuropathy     last assesed 5-8-17    Pure hypercholesterolemia     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)     Spinal stenosis     Suspected UTI 03/09/2023    Urinary tract infection     Wears glasses    [3]   Past Surgical History:  Procedure Laterality Date    BACK SURGERY      L4-L5 sx    CHOLECYSTECTOMY      COLONOSCOPY  02/06/2019    CYSTOSCOPY N/A 04/26/2023    Procedure: CYSTOSCOPY w/ bladder biopsy;  Surgeon: Geo Bentley MD;  Location: [facility];  Service: Urology    HIP SURGERY  August 2019    Replacement    JOINT REPLACEMENT  August 2019    Hip replacement    [place] ARTHRP ACETBLR/PROX FEM PROSTC AGRFT/ALGRFT Right 08/05/2019    Procedure: ARTHROPLASTY HIP TOTAL;  Surgeon: Leonard Gallagher MD;  Location: [facility];  Service: Orthopedics    [place] ARTHRP ACETBLR/PROX FEM PROSTC AGRFT/ALGRFT Left 11/4/2024    Procedure: Left total hip arthroplasty;  Surgeon: Leonard Gallagher MD;  Location: [facility];  Service: Orthopedics    [place] ARTHRP KNE CONDYLE&PLATU MEDIAL&LAT COMPARTMENTS Left 4/28/2025    Procedure: Robotically assisted left total knee arthroplasty, all associated procedures as  indicated;  Surgeon: Leonard Gallagher MD;  Location: [facility];  Service: Orthopedics    [place] CYSTO W/REMOVAL OF LESIONS SMALL N/A 03/23/2023    Procedure: TRANSURETHRAL RESECTION OF BLADDER TUMOR (TURBT), mitomycin ;  Surgeon: Geo Bentley MD;  [facility]: [facility];  Service: Urology    [place] CYSTO W/REMOVAL OF LESIONS SMALL N/A 04/26/2023    Procedure: TRANSURETHRAL RESECTION OF BLADDER TUMOR (TURBT), cystoscopy with bladder biopsy;  Surgeon: Geo Bentley MD;  Location: [facility];  Service: Urology    TONSILLECTOMY     [4] No Known Allergies  [5]   Current Outpatient Medications on File Prior to Visit   Medication Sig Dispense Refill    acetaminophen (TYLENOL) 500 mg tablet Take 2 tablets (1,000 mg total) by mouth every 6 (six) hours as needed for mild pain 120 tablet 0    atorvastatin (LIPITOR) 10 mg tablet TAKE ONE TABLET BY MOUTH EVERY DAY AT BEDTIME 90 tablet 1    cholecalciferol (VITAMIN D3) 1,000 units tablet Take 1 tablet (1,000 Units total) by mouth daily 90 tablet 3    ciclopirox (LOPROX) 0.77 % cream as needed in the morning and as needed in the evening.      cyanocobalamin (VITAMIN B-12) 500 MCG tablet Take 1 tablet (500 mcg total) by mouth daily 100 tablet 3    hydrocortisone 2.5 % cream if needed      levothyroxine 150 mcg tablet TAKE ONE TABLET BY MOUTH EVERY DAY 90 tablet 1    metroNIDAZOLE (METROCREAM) 0.75 % cream       Multiple Vitamins-Minerals (multivitamin with iron-minerals) liquid Take by mouth in the morning.      pantoprazole (PROTONIX) 40 mg tablet TAKE ONE TABLET BY MOUTH EVERY DAY BEFORE BREAKFAST 30 tablet 2    tamsulosin (FLOMAX) 0.4 mg TAKE ONE CAPSULE BY MOUTH EVERY DAY WITH DINNER 90 capsule 1     No current facility-administered medications on file prior to visit.

## 2025-07-24 ENCOUNTER — APPOINTMENT (OUTPATIENT)
Dept: PHYSICAL THERAPY | Facility: OTHER | Age: 75
End: 2025-07-24
Attending: ORTHOPAEDIC SURGERY
Payer: MEDICARE

## 2025-07-28 ENCOUNTER — OFFICE VISIT (OUTPATIENT)
Dept: PHYSICAL THERAPY | Facility: OTHER | Age: 75
End: 2025-07-28
Attending: ORTHOPAEDIC SURGERY
Payer: MEDICARE

## 2025-07-28 DIAGNOSIS — Z96.652 S/P TOTAL KNEE REPLACEMENT NOT USING CEMENT, LEFT: ICD-10-CM

## 2025-07-28 DIAGNOSIS — Z96.643 H/O TOTAL HIP ARTHROPLASTY, BILATERAL: ICD-10-CM

## 2025-07-28 DIAGNOSIS — M17.12 PRIMARY OSTEOARTHRITIS OF LEFT KNEE: Primary | ICD-10-CM

## 2025-07-28 PROCEDURE — 97110 THERAPEUTIC EXERCISES: CPT

## 2025-07-29 ENCOUNTER — OFFICE VISIT (OUTPATIENT)
Dept: PAIN MEDICINE | Facility: CLINIC | Age: 75
End: 2025-07-29
Payer: MEDICARE

## 2025-07-29 VITALS
DIASTOLIC BLOOD PRESSURE: 86 MMHG | WEIGHT: 280 LBS | HEIGHT: 73 IN | SYSTOLIC BLOOD PRESSURE: 133 MMHG | BODY MASS INDEX: 37.11 KG/M2 | HEART RATE: 75 BPM

## 2025-07-29 DIAGNOSIS — M48.061 SPINAL STENOSIS OF LUMBAR REGION WITHOUT NEUROGENIC CLAUDICATION: ICD-10-CM

## 2025-07-29 DIAGNOSIS — G89.29 CHRONIC BILATERAL LOW BACK PAIN WITHOUT SCIATICA: ICD-10-CM

## 2025-07-29 DIAGNOSIS — M47.817 FACET ARTHROPATHY, LUMBOSACRAL: Primary | ICD-10-CM

## 2025-07-29 DIAGNOSIS — M54.50 CHRONIC BILATERAL LOW BACK PAIN WITHOUT SCIATICA: ICD-10-CM

## 2025-07-29 PROCEDURE — 99214 OFFICE O/P EST MOD 30 MIN: CPT | Performed by: PHYSICAL MEDICINE & REHABILITATION

## 2025-07-31 ENCOUNTER — OFFICE VISIT (OUTPATIENT)
Dept: PHYSICAL THERAPY | Facility: OTHER | Age: 75
End: 2025-07-31
Attending: ORTHOPAEDIC SURGERY
Payer: MEDICARE

## 2025-07-31 DIAGNOSIS — Z96.643 H/O TOTAL HIP ARTHROPLASTY, BILATERAL: ICD-10-CM

## 2025-07-31 DIAGNOSIS — Z96.652 S/P TOTAL KNEE REPLACEMENT NOT USING CEMENT, LEFT: ICD-10-CM

## 2025-07-31 DIAGNOSIS — M17.12 PRIMARY OSTEOARTHRITIS OF LEFT KNEE: Primary | ICD-10-CM

## 2025-07-31 PROCEDURE — 97110 THERAPEUTIC EXERCISES: CPT

## 2025-08-05 ENCOUNTER — OFFICE VISIT (OUTPATIENT)
Dept: PHYSICAL THERAPY | Facility: OTHER | Age: 75
End: 2025-08-05
Attending: ORTHOPAEDIC SURGERY
Payer: MEDICARE

## 2025-08-05 DIAGNOSIS — M17.12 PRIMARY OSTEOARTHRITIS OF LEFT KNEE: Primary | ICD-10-CM

## 2025-08-05 DIAGNOSIS — Z96.643 H/O TOTAL HIP ARTHROPLASTY, BILATERAL: ICD-10-CM

## 2025-08-05 DIAGNOSIS — Z96.652 S/P TOTAL KNEE REPLACEMENT NOT USING CEMENT, LEFT: ICD-10-CM

## 2025-08-05 PROCEDURE — 97110 THERAPEUTIC EXERCISES: CPT

## 2025-08-07 ENCOUNTER — OFFICE VISIT (OUTPATIENT)
Dept: PHYSICAL THERAPY | Facility: OTHER | Age: 75
End: 2025-08-07
Attending: ORTHOPAEDIC SURGERY
Payer: MEDICARE

## 2025-08-07 DIAGNOSIS — M17.12 PRIMARY OSTEOARTHRITIS OF LEFT KNEE: Primary | ICD-10-CM

## 2025-08-07 DIAGNOSIS — Z96.652 S/P TOTAL KNEE REPLACEMENT NOT USING CEMENT, LEFT: ICD-10-CM

## 2025-08-07 DIAGNOSIS — Z96.643 H/O TOTAL HIP ARTHROPLASTY, BILATERAL: ICD-10-CM

## 2025-08-07 PROCEDURE — 97140 MANUAL THERAPY 1/> REGIONS: CPT

## 2025-08-07 PROCEDURE — 97110 THERAPEUTIC EXERCISES: CPT

## 2025-08-11 ENCOUNTER — OFFICE VISIT (OUTPATIENT)
Dept: PHYSICAL THERAPY | Facility: OTHER | Age: 75
End: 2025-08-11
Attending: ORTHOPAEDIC SURGERY
Payer: MEDICARE

## 2025-08-12 ENCOUNTER — PROCEDURE VISIT (OUTPATIENT)
Dept: UROLOGY | Facility: AMBULATORY SURGERY CENTER | Age: 75
End: 2025-08-12
Payer: MEDICARE

## 2025-08-14 ENCOUNTER — EVALUATION (OUTPATIENT)
Dept: PHYSICAL THERAPY | Facility: OTHER | Age: 75
End: 2025-08-14
Attending: ORTHOPAEDIC SURGERY
Payer: MEDICARE

## 2025-08-18 ENCOUNTER — OFFICE VISIT (OUTPATIENT)
Dept: PHYSICAL THERAPY | Facility: OTHER | Age: 75
End: 2025-08-18
Attending: ORTHOPAEDIC SURGERY
Payer: MEDICARE

## 2025-08-18 DIAGNOSIS — M17.12 PRIMARY OSTEOARTHRITIS OF LEFT KNEE: Primary | ICD-10-CM

## 2025-08-18 DIAGNOSIS — Z96.643 H/O TOTAL HIP ARTHROPLASTY, BILATERAL: ICD-10-CM

## 2025-08-18 DIAGNOSIS — M48.061 SPINAL STENOSIS OF LUMBAR REGION WITHOUT NEUROGENIC CLAUDICATION: ICD-10-CM

## 2025-08-18 DIAGNOSIS — Z96.652 S/P TOTAL KNEE REPLACEMENT NOT USING CEMENT, LEFT: ICD-10-CM

## 2025-08-18 PROCEDURE — 97110 THERAPEUTIC EXERCISES: CPT

## 2025-08-18 PROCEDURE — 97140 MANUAL THERAPY 1/> REGIONS: CPT

## 2025-08-21 ENCOUNTER — OFFICE VISIT (OUTPATIENT)
Dept: PHYSICAL THERAPY | Facility: OTHER | Age: 75
End: 2025-08-21
Attending: ORTHOPAEDIC SURGERY
Payer: MEDICARE

## 2025-08-21 DIAGNOSIS — M17.12 PRIMARY OSTEOARTHRITIS OF LEFT KNEE: Primary | ICD-10-CM

## 2025-08-21 DIAGNOSIS — M48.061 SPINAL STENOSIS OF LUMBAR REGION WITHOUT NEUROGENIC CLAUDICATION: ICD-10-CM

## 2025-08-21 DIAGNOSIS — Z96.652 S/P TOTAL KNEE REPLACEMENT NOT USING CEMENT, LEFT: ICD-10-CM

## 2025-08-21 DIAGNOSIS — Z96.643 H/O TOTAL HIP ARTHROPLASTY, BILATERAL: ICD-10-CM

## 2025-08-21 PROCEDURE — 97110 THERAPEUTIC EXERCISES: CPT

## (undated) DEVICE — STOCKINETTE REGULAR

## (undated) DEVICE — HOOD: Brand: FLYTE, SURGICOOL

## (undated) DEVICE — NEPTUNE E-SEP SMOKE EVACUATION PENCIL, COATED, 70MM BLADE, PUSH BUTTON SWITCH: Brand: NEPTUNE E-SEP

## (undated) DEVICE — BETHLEHEM TOTAL HIP, KIT: Brand: CARDINAL HEALTH

## (undated) DEVICE — DRAPE EQUIPMENT RF WAND

## (undated) DEVICE — HOOD WITH PEEL AWAY FACE SHIELD: Brand: T7PLUS

## (undated) DEVICE — Device: Brand: OLYMPUS

## (undated) DEVICE — BAG URINE DRAINAGE 2000ML ANTI RFLX LF

## (undated) DEVICE — SPECIMEN CONTAINER STERILE PEEL PACK

## (undated) DEVICE — CATH FOLEY 22FR 5ML 2 WAY SILICONE ELASTIMER

## (undated) DEVICE — PENCIL ELECTROSURG E-Z CLEAN -0035H

## (undated) DEVICE — ACE WRAP 6 IN STERILE

## (undated) DEVICE — ASTOUND STANDARD SURGICAL GOWN, XL: Brand: CONVERTORS

## (undated) DEVICE — DRESSING MEPILEX AG BORDER 4 X 12 IN

## (undated) DEVICE — GLOVE SRG BIOGEL 6.5

## (undated) DEVICE — 3M™ IOBAN™ 2 ANTIMICROBIAL INCISE DRAPE 6650EZ: Brand: IOBAN™ 2

## (undated) DEVICE — SUT VICRYL PLUS 1 CTB-1 36 IN VCPB947H

## (undated) DEVICE — CAPIT HIP MOP -POLY CEMEMTED

## (undated) DEVICE — BLADE INTREX LRG BONE OSCILLATING

## (undated) DEVICE — SCD SEQUENTIAL COMPRESSION COMFORT SLEEVE MEDIUM KNEE LENGTH: Brand: KENDALL SCD

## (undated) DEVICE — BASIC SINGLE BASIN 2-LF: Brand: MEDLINE INDUSTRIES, INC.

## (undated) DEVICE — GLOVE SRG BIOGEL ORTHOPEDIC 8

## (undated) DEVICE — GLOVE INDICATOR PI UNDERGLOVE SZ 8.5 BLUE

## (undated) DEVICE — ABDUCTION PILLOW FOAM POSITIONER: Brand: CARDINAL HEALTH

## (undated) DEVICE — ACE WRAP 6 IN UNSTERILE

## (undated) DEVICE — INTENDED FOR TISSUE SEPARATION, AND OTHER PROCEDURES THAT REQUIRE A SHARP SURGICAL BLADE TO PUNCTURE OR CUT.: Brand: BARD-PARKER SAFETY BLADES SIZE 10, STERILE

## (undated) DEVICE — HOOD: Brand: T7PLUS

## (undated) DEVICE — SUT VICRYL PLUS 2-0 CTB-1 27 IN VCPB259H

## (undated) DEVICE — CUFF TOURNIQUET 30 X 4 IN QUICK CONNECT DISP 1BLA

## (undated) DEVICE — GLOVE INDICATOR PI UNDERGLOVE SZ 8 BLUE

## (undated) DEVICE — VELYS SATEL DRAPE

## (undated) DEVICE — WET SKIN PREP TRAY: Brand: MEDLINE INDUSTRIES, INC.

## (undated) DEVICE — SYRINGE 10ML LL

## (undated) DEVICE — GLOVE SRG BIOGEL 8

## (undated) DEVICE — Device

## (undated) DEVICE — STIRRUP STRAP ADULT DISP

## (undated) DEVICE — 2108 SERIES SAGITTAL BLADE (18.6 X 0.64 X 61.1MM)

## (undated) DEVICE — CAPIT HIP MOP -METAL ON POLY

## (undated) DEVICE — TRAY FOLEY 16FR URIMETER SILICONE SURESTEP

## (undated) DEVICE — THE SIMPULSE SOLO SYSTEM WITH ULTREX RETRACTABLE SPLASH SHIELD TIP: Brand: SIMPULSE SOLO

## (undated) DEVICE — CAPIT KNEE ATTUNE FB W/DOM AOX

## (undated) DEVICE — EVACUATOR BLADDER ELLIK DISP STRL

## (undated) DEVICE — BAG POLY CLEAR 12 X 8 X 30

## (undated) DEVICE — BLADE SAW RECIP 12.5 X 76MM 0.9/0.9MM THCK

## (undated) DEVICE — PADDING CAST 4 IN  COTTON STRL

## (undated) DEVICE — DRESSING MEPILEX AG BORDER POST-OP 4 X 12 IN

## (undated) DEVICE — GLOVE SRG BIOGEL 7.5

## (undated) DEVICE — VELYS BLADE SAW OSC 85 X 19 X 2MM

## (undated) DEVICE — JP 3-SPRING RES W/10FR PVC DRAIN/TR: Brand: CARDINAL HEALTH

## (undated) DEVICE — VELYS ROBOT-ASSISTED SOLUTION ARRAY DRILL PIN 125 MM X 4 MM: Brand: VELYS

## (undated) DEVICE — PACK TUR

## (undated) DEVICE — VELYS ROBOTIC-ASSISTED SOLUTION ARRAY SET - KNEE: Brand: VELYS

## (undated) DEVICE — PROXIMATE PLUS MD MULTI-DIRECTIONAL RELEASE SKIN STAPLERS CONTAINS 35 STAINLESS STEEL STAPLES APPROXIMATE CLOSED DIMENSIONS: 6.9MM X 3.9MM WIDE: Brand: PROXIMATE

## (undated) DEVICE — GLOVE INDICATOR PI UNDERGLOVE SZ 7.5 BLUE

## (undated) DEVICE — BETHLEHEM UNIV TOTAL KNEE, KIT: Brand: CARDINAL HEALTH

## (undated) DEVICE — ABDOMINAL PAD: Brand: DERMACEA

## (undated) DEVICE — GLOVE SRG BIOGEL ECLIPSE 7.5

## (undated) DEVICE — SPONGE PVP SCRUB WING STERILE

## (undated) DEVICE — SILVER-COATED ANTIMICROBIAL BARRIER DRESSING: Brand: ACTICOAT   4" X 8"

## (undated) DEVICE — GLOVE INDICATOR PI UNDERGLOVE SZ 7 BLUE

## (undated) DEVICE — VELYS ROBOT DRAPE

## (undated) DEVICE — MEDI-VAC TUBING CONNECTOR 6-IN-1 STRAIGHT: Brand: CARDINAL HEALTH

## (undated) DEVICE — TUBING SUCTION 5MM X 12 FT

## (undated) DEVICE — 3 BONE CEMENT MIXER: Brand: MIXEVAC

## (undated) DEVICE — HANDPIECE SET WITH RETRACTABLE COAXIAL FAN SPRAY TIP AND SUCTION TUBE: Brand: INTERPULSE

## (undated) DEVICE — GROUNDING PAD RFL

## (undated) DEVICE — CYSTOSCOPY PACK: Brand: CONVERTORS

## (undated) DEVICE — PAD CAST 6 IN COTTON NON STERILE

## (undated) DEVICE — CHLORAPREP HI-LITE 26ML ORANGE

## (undated) DEVICE — TRAY FOLEY 16FR URIMETER SURESTEP

## (undated) DEVICE — SAW BLADE OSCILLATING BRAZOL 167

## (undated) DEVICE — SMOKE EVAC FLUID SUCTION HEPA FILTER

## (undated) DEVICE — STOCKINETTE: Brand: DEROYAL

## (undated) DEVICE — PAD GROUNDING ADULT

## (undated) DEVICE — NO-SCRATCH ™ SMALL WHITNEY CURETTE ™ IS A SINGLE-USE, PLASTIC CURETTE FOR QUICKLY APPLYING, MANIPULATING AND REMOVING BONE CEMENT DURING HIP AND KNEE REPLACEMENT SURGERY. THE PLASTIC IS SOFTER THAN STEEL INSTRUMENTS, REDUCING THE RISK OF DAMAGING THE PROSTHESIS WITH METAL INSTRUMENTS.  THE CURETTE’S 6MM TIP REMOVES EXCESS CEMENT FROM REPLACEMENT HIPS AND KNEES. EASY-TO-MANEUVER, THE SMALL BLUE CURETTE LETS YOU REMOVE CEMENT FROM ALL EDGES OF THE PROSTHESIS.NO-SCRATCH WHITNEY SMALL CURETTE FEATURES:SAFER THAN STEEL- MADE OF PLASTIC - STURDY YET SOFTER THAN SURGICAL STEEL.HANDIER- EACH TOOL HAS A MOLDED-IN THUMB INDENTATION INSTANTLY ORIENTING THE TOOL.- EASIER TO MANEUVER IN HARD TO SEE PLACES.- COLOR-CODED FOR EASY IDENTIFICATION.FASTER- COMES INDIVIDUALLY PACKAGED IN STERILE, PEEL OPEN POUCH, READY TO GO.- APPLIES, MANIPULATES, OR REMOVES CEMENT WITH FINGERTIP PRECISION.ECONOMICAL- THE COST OF A SINGLE REVISION DWARFS THE COST OF A SINGLE-USE CURETTE. - DISPOSABLE – THERE’S NO NEED TO WASTE TIME REMOVING HARDENED CEMENT OR RE-STERILIZING TOOLS.- LESS EXPENSIVE TO BUY AND INVENTORY - ORDER ONLY THE TOOL YOU USE.- PACKAGED 25 INDIVIDUALLY WRAPPED TOOLS TO A CARTON FOR CONVENIENT SHELF STORAGE.: Brand: WHITNEY NO-SCRATCH CURETTE (SMALL)